# Patient Record
Sex: MALE | Race: WHITE | NOT HISPANIC OR LATINO | Employment: OTHER | URBAN - METROPOLITAN AREA
[De-identification: names, ages, dates, MRNs, and addresses within clinical notes are randomized per-mention and may not be internally consistent; named-entity substitution may affect disease eponyms.]

---

## 2017-03-23 ENCOUNTER — GENERIC CONVERSION - ENCOUNTER (OUTPATIENT)
Dept: OTHER | Facility: OTHER | Age: 62
End: 2017-03-23

## 2017-04-03 ENCOUNTER — HOSPITAL ENCOUNTER (OUTPATIENT)
Dept: RADIOLOGY | Facility: HOSPITAL | Age: 62
Discharge: HOME | End: 2017-04-03
Attending: INTERNAL MEDICINE
Payer: COMMERCIAL

## 2017-04-03 ENCOUNTER — TRANSCRIBE ORDERS (OUTPATIENT)
Dept: ADMINISTRATIVE | Facility: HOSPITAL | Age: 62
End: 2017-04-03

## 2017-04-03 DIAGNOSIS — Z79.899 LONG TERM CURRENT USE OF THERAPEUTIC DRUG: ICD-10-CM

## 2017-04-03 DIAGNOSIS — E78.5 OTHER AND UNSPECIFIED HYPERLIPIDEMIA: ICD-10-CM

## 2017-04-03 DIAGNOSIS — E78.5 OTHER AND UNSPECIFIED HYPERLIPIDEMIA: Primary | ICD-10-CM

## 2017-04-03 PROCEDURE — 71020 HB CHEST X-RAY 2VW FRONTAL&LATL: CPT

## 2017-04-10 ENCOUNTER — ALLSCRIPTS OFFICE VISIT (OUTPATIENT)
Dept: OTHER | Facility: OTHER | Age: 62
End: 2017-04-10

## 2017-04-12 ENCOUNTER — TRANSCRIBE ORDERS (OUTPATIENT)
Dept: ADMINISTRATIVE | Facility: HOSPITAL | Age: 62
End: 2017-04-12

## 2017-04-12 DIAGNOSIS — G47.30 UNSPECIFIED SLEEP APNEA: Primary | ICD-10-CM

## 2017-05-11 ENCOUNTER — HOSPITAL ENCOUNTER (OUTPATIENT)
Dept: SLEEP CENTER | Facility: CLINIC | Age: 62
Discharge: HOME | End: 2017-05-11
Payer: COMMERCIAL

## 2017-05-11 ENCOUNTER — TRANSCRIBE ORDERS (OUTPATIENT)
Dept: SLEEP CENTER | Facility: CLINIC | Age: 62
End: 2017-05-11

## 2017-05-11 DIAGNOSIS — G47.33 OSA (OBSTRUCTIVE SLEEP APNEA): Primary | ICD-10-CM

## 2017-05-11 DIAGNOSIS — G47.30 UNSPECIFIED SLEEP APNEA: ICD-10-CM

## 2017-07-10 ENCOUNTER — ALLSCRIPTS OFFICE VISIT (OUTPATIENT)
Dept: OTHER | Facility: OTHER | Age: 62
End: 2017-07-10

## 2017-07-18 ENCOUNTER — TRANSCRIBE ORDERS (OUTPATIENT)
Dept: SLEEP CENTER | Facility: CLINIC | Age: 62
End: 2017-07-18

## 2017-07-18 ENCOUNTER — HOSPITAL ENCOUNTER (OUTPATIENT)
Dept: SLEEP CENTER | Facility: CLINIC | Age: 62
Discharge: HOME | End: 2017-07-18
Payer: COMMERCIAL

## 2017-07-18 DIAGNOSIS — J44.9 OSA AND COPD OVERLAP SYNDROME (HCC): Primary | ICD-10-CM

## 2017-07-18 DIAGNOSIS — G47.33 OSA AND COPD OVERLAP SYNDROME (HCC): Primary | ICD-10-CM

## 2017-07-18 DIAGNOSIS — G47.33 OSA (OBSTRUCTIVE SLEEP APNEA): ICD-10-CM

## 2017-08-18 ENCOUNTER — GENERIC CONVERSION - ENCOUNTER (OUTPATIENT)
Dept: OTHER | Facility: OTHER | Age: 62
End: 2017-08-18

## 2017-08-25 ENCOUNTER — ALLSCRIPTS OFFICE VISIT (OUTPATIENT)
Dept: OTHER | Facility: OTHER | Age: 62
End: 2017-08-25

## 2017-08-27 ENCOUNTER — GENERIC CONVERSION - ENCOUNTER (OUTPATIENT)
Dept: OTHER | Facility: OTHER | Age: 62
End: 2017-08-27

## 2017-08-28 ENCOUNTER — GENERIC CONVERSION - ENCOUNTER (OUTPATIENT)
Dept: OTHER | Facility: OTHER | Age: 62
End: 2017-08-28

## 2017-09-25 ENCOUNTER — GENERIC CONVERSION - ENCOUNTER (OUTPATIENT)
Dept: OTHER | Facility: OTHER | Age: 62
End: 2017-09-25

## 2017-09-30 ENCOUNTER — LAB CONVERSION - ENCOUNTER (OUTPATIENT)
Dept: OTHER | Facility: OTHER | Age: 62
End: 2017-09-30

## 2017-09-30 LAB
A/G RATIO (HISTORICAL): 1.9 (CALC) (ref 1–2.5)
ALBUMIN SERPL BCP-MCNC: 4.4 G/DL (ref 3.6–5.1)
ALP SERPL-CCNC: 65 U/L (ref 40–115)
ALT SERPL W P-5'-P-CCNC: 51 U/L (ref 9–46)
AST SERPL W P-5'-P-CCNC: 23 U/L (ref 10–35)
BASOPHILS # BLD AUTO: 1 %
BASOPHILS # BLD AUTO: 50 CELLS/UL (ref 0–200)
BILIRUB SERPL-MCNC: 1.3 MG/DL (ref 0.2–1.2)
BUN SERPL-MCNC: 12 MG/DL (ref 7–25)
BUN/CREA RATIO (HISTORICAL): ABNORMAL (CALC) (ref 6–22)
CALCIUM SERPL-MCNC: 9.5 MG/DL (ref 8.6–10.3)
CHLORIDE SERPL-SCNC: 105 MMOL/L (ref 98–110)
CHOLEST SERPL-MCNC: 127 MG/DL
CHOLEST/HDLC SERPL: 3.3 (CALC)
CO2 SERPL-SCNC: 29 MMOL/L (ref 20–31)
CREAT SERPL-MCNC: 1.01 MG/DL (ref 0.7–1.25)
DEPRECATED RDW RBC AUTO: 12.6 % (ref 11–15)
EGFR AFRICAN AMERICAN (HISTORICAL): 92 ML/MIN/1.73M2
EGFR-AMERICAN CALC (HISTORICAL): 79 ML/MIN/1.73M2
EOSINOPHIL # BLD AUTO: 110 CELLS/UL (ref 15–500)
EOSINOPHIL # BLD AUTO: 2.2 %
GAMMA GLOBULIN (HISTORICAL): 2.3 G/DL (CALC) (ref 1.9–3.7)
GLUCOSE (HISTORICAL): 107 MG/DL (ref 65–99)
HBA1C MFR BLD HPLC: 5.6 % OF TOTAL HGB
HCT VFR BLD AUTO: 48.1 % (ref 38.5–50)
HDLC SERPL-MCNC: 38 MG/DL
HGB BLD-MCNC: 16.4 G/DL (ref 13.2–17.1)
LDL CHOLESTEROL (HISTORICAL): 71 MG/DL (CALC)
LYMPHOCYTES # BLD AUTO: 1840 CELLS/UL (ref 850–3900)
LYMPHOCYTES # BLD AUTO: 36.8 %
MCH RBC QN AUTO: 30.9 PG (ref 27–33)
MCHC RBC AUTO-ENTMCNC: 34.1 G/DL (ref 32–36)
MCV RBC AUTO: 90.6 FL (ref 80–100)
MONOCYTES # BLD AUTO: 625 CELLS/UL (ref 200–950)
MONOCYTES (HISTORICAL): 12.5 %
NEUTROPHILS # BLD AUTO: 2375 CELLS/UL (ref 1500–7800)
NEUTROPHILS # BLD AUTO: 47.5 %
NON-HDL-CHOL (CHOL-HDL) (HISTORICAL): 89 MG/DL (CALC)
PLATELET # BLD AUTO: 222 THOUSAND/UL (ref 140–400)
PMV BLD AUTO: 10.1 FL (ref 7.5–12.5)
POTASSIUM SERPL-SCNC: 4.3 MMOL/L (ref 3.5–5.3)
RBC # BLD AUTO: 5.31 MILLION/UL (ref 4.2–5.8)
SODIUM SERPL-SCNC: 141 MMOL/L (ref 135–146)
TOTAL PROTEIN (HISTORICAL): 6.7 G/DL (ref 6.1–8.1)
TRIGL SERPL-MCNC: 92 MG/DL
WBC # BLD AUTO: 5 THOUSAND/UL (ref 3.8–10.8)

## 2017-10-02 ENCOUNTER — GENERIC CONVERSION - ENCOUNTER (OUTPATIENT)
Dept: OTHER | Facility: OTHER | Age: 62
End: 2017-10-02

## 2017-10-10 DIAGNOSIS — R73.01 IMPAIRED FASTING GLUCOSE: ICD-10-CM

## 2017-10-10 DIAGNOSIS — I25.10 ATHEROSCLEROTIC HEART DISEASE OF NATIVE CORONARY ARTERY WITHOUT ANGINA PECTORIS: ICD-10-CM

## 2017-10-10 DIAGNOSIS — I44.0 FIRST DEGREE ATRIOVENTRICULAR BLOCK: ICD-10-CM

## 2017-10-10 DIAGNOSIS — R03.0 ELEVATED BLOOD PRESSURE READING WITHOUT DIAGNOSIS OF HYPERTENSION: ICD-10-CM

## 2017-10-10 DIAGNOSIS — E78.5 HYPERLIPIDEMIA: ICD-10-CM

## 2017-10-17 ENCOUNTER — ALLSCRIPTS OFFICE VISIT (OUTPATIENT)
Dept: OTHER | Facility: OTHER | Age: 62
End: 2017-10-17

## 2017-10-18 NOTE — PROGRESS NOTES
Assessment  1  Need for shingles vaccine (V04 89) (Z23)   2  Coronary artery disease (414 00) (I25 10)   3  Hyperlipidemia (272 4) (E78 5)   4  Impaired fasting glucose (790 21) (R73 01)   5  Special screening for malignant neoplasm of prostate (V76 44) (Z12 5)   6  History of Cholecystectomy Laparoscopic    Plan  Coronary artery disease    · Metoprolol Succinate ER 25 MG Oral Tablet Extended Release 24 Hour   · Metoprolol Succinate ER 25 MG Oral Tablet Extended Release 24 Hour  Coronary artery disease, Hyperlipidemia, Impaired fasting glucose    · (1) CBC/PLT/DIFF; Status:Active; Requested for:04Yge1204;    · (1) COMPREHENSIVE METABOLIC PANEL; Status:Active; Requested for:90Ffk1247;    · (1) HEMOGLOBIN A1C; Status:Active; Requested for:98Aia1674;    · (1) LIPID PANEL, FASTING; Status:Active; Requested for:31Zme2423;   Need for shingles vaccine    · Zostavax 35292 UNT/0 65ML Subcutaneous Solution Reconstituted (Zostavax  60943 UNT/0 65ML Subcutaneous Solution Reconstituted); INJECT 0 65 ML Once  Special screening for malignant neoplasm of prostate    · (1) PSA (SCREEN) (Dx V76 44 Screen for Prostate Cancer); Status:Active; Requested  for:51Xel8735; Discussion/Summary    -status post laparoscopic cholecystectomy and doing well  Follow up with general surgeon as scheduledis significantly improved on received 10 mg once daily  Continue same  Recheck cholesterol profile in 6 months  Goal LDL less than 70 which should hopefully help with reabsorption of plaquing in coronary arterieswill follow up with cardiologist in regards to coronary artery disease  He does take baby aspirin  He is on statin therapy  He declined being on beta blocker  He is asymptomatic with his heart disease  He still is active and exercisesdoes have mildly impaired fasting glucose with normal hemoglobin A1c  Patient will watch his dietary intake of carbohydrates   Repeat glucose and A1c in 6 1-patient will follow up in 6 months with labs physical exam admissiongiven a printed prescription for shingles vaccination  The patient was counseled regarding diagnostic results,-- instructions for management,-- risk factor reductions,-- prognosis,-- impressions,-- risks and benefits of treatment options  Possible side effects of new medications were reviewed with the patient/guardian today  The treatment plan was reviewed with the patient/guardian  The patient/guardian understands and agrees with the treatment plan      Chief Complaint  3 month follow up and lab review  Patient is here today for follow up of chronic conditions described in HPI  History of Present Illness  Patient presents for 3 month follow-up of chronic conditions  Patient has experienced significant improvement in his cholesterol profile since being switched from atorvastatin to rosuvastatin  No side effects from medication  Patient did have consultation with [facility] cardiologist  [place] cardiologist recommended medical management of his coronary artery disease  He was placed on beta-blocker but he spoke with his regular cardiologist and felt that he did not want to take the medication  He is asymptomatic with this heart disease  If he did need PCI he require dual stenting through a stent due to bifurcation and the location at the ostium of the circumflex artery  Patient will be following up with his regular cardiologist  Patient also had robot assisted laparoscopic cholecystectomy performed 5 days ago  He does feel well  He is scheduled to follow up with general surgeon at [facility] this Friday  Labs reviewed which showed normal CBC, impaired fasting glucose of 107, normal hemoglobin A1c of 5 6%, total cholesterol 127, HDL 38, LDL 71 and triglycerides of 92  Review of Systems    Constitutional: No fever or chills, feels well, no tiredness, no recent weight gain or weight loss  Eyes: No complaints of eye pain, no red eyes, no discharge from eyes, no itchy eyes     ENT: no complaints of earache, no hearing loss, no nosebleeds, no nasal discharge, no sore throat, no hoarseness  Cardiovascular: No complaints of slow heart rate, no fast heart rate, no chest pain, no palpitations, no leg claudication, no lower extremity  Respiratory: No complaints of shortness of breath, no wheezing, no cough, no SOB on exertion, no orthopnea or PND  Gastrointestinal: No complaints of abdominal pain, no constipation, no nausea or vomiting, no diarrhea or bloody stools  Genitourinary: No complaints of dysuria, no incontinence, no hesitancy, no nocturia, no genital lesion, no testicular pain  Musculoskeletal: No complaints of arthralgia, no myalgias, no joint swelling or stiffness, no limb pain or swelling  Integumentary: No complaints of skin rash or skin lesions, no itching, no skin wound, no dry skin  Neurological: No compliants of headache, no confusion, no convulsions, no numbness or tingling, no dizziness or fainting, no limb weakness, no difficulty walking  Psychiatric: Is not suicidal, no sleep disturbances, no anxiety or depression, no change in personality, no emotional problems  Endocrine: No complaints of proptosis, no hot flashes, no muscle weakness, no erectile dysfunction, no deepening of the voice, no feelings of weakness  Hematologic/Lymphatic: No complaints of swollen glands, no swollen glands in the neck, does not bleed easily, no easy bruising  Active Problems  1  Abnormal stress test (794 39) (R94 39)   2  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)   3  Chronic back pain (724 5,338 29) (M54 9,G89 29)   4  Coronary artery disease (414 00) (I25 10)   5  Elevated BP without diagnosis of hypertension (796 2) (R03 0)   6  Environmental and seasonal allergies (477 8) (J30 89)   7  External hemorrhoids (455 3) (K64 4)   8  Gastro-esophageal reflux disease without esophagitis (530 81) (K21 9)   9  Hyperlipidemia (272 4) (E78 5)   10   Impaired fasting glucose (790 21) (R73 01)   11  Organic impotence (607 84) (N52 9)   12  Orthostasis (458 0) (I95 1)   13  Sleep apnea (780 57) (G47 30)   14  Special screening for malignant neoplasm of prostate (V76 44) (Z12 5)    Past Medical History  1  History of _   2  History of _   3  History of _   4  History of Abrasion Of Left Foot (917 0)   5  History of Achilles tendinitis, unspecified laterality (726 71) (M76 60)   6  History of Cervicalgia (723 1) (M54 2)   7  History of Cough (786 2) (R05)   8  History of Cough (786 2) (R05)   9  History of Dysfunction of left eustachian tube (381 81) (H69 82)   10  History of acute bronchitis (V12 69) (Z87 09)   11  History of acute bronchitis (V12 69) (Z87 09)   12  History of acute sinusitis (V12 69) (Z87 09)   13  History of allergic rhinitis (V12 69) (Z87 09)   14  History of allergic rhinitis (V12 69) (Z87 09)   15  History of backache (V13 59) (Z87 39)   16  History of backache (V13 59) (Z87 39)   17  History of chest pain (V13 89) (Z87 898)   18  History of epistaxis (V12 69) (Z87 898)   19  History of first degree atrioventricular block (V12 59) (Z86 79)   20  History of gastric ulcer (V12 79) (Z87 19)   21  History of hemorrhoids (V13 89) (Z87 19)   22  History of sleep apnea (V13 89) (Z86 69)   23  History of Long term use of drug (V58 69) (Z79 899)   24  History of Myalgia And Myositis (729 1)   25  History of Need For Prophylactic Antibiotics (V07 8)   26  History of Numbness and tingling of foot (782 0) (R20 2)   27  History of Otalgia, unspecified laterality (388 70) (H92 09)   28  History of Otitis media of left ear (382 9) (H66 92)   29  History of Otitis media, unspecified laterality   30  History of Pain of finger, unspecified laterality (729 5) (M79 646)   31  History of Well adult on routine health check (V70 0) (Z00 00)    The active problems and past medical history were reviewed and updated today  Surgical History  1  History of Cholecystectomy Laparoscopic   2   History of Complete Colonoscopy   3  History of Myringotomy - With Ventilating Tube Insertion    Family History  Mother    1  Family history of Cancer  Maternal Uncle    2  Family history of Prostate Cancer (V16 42)    The family history was reviewed and updated today  Social History   · Alcohol Use (History)   · Denied: History of Drug Use   · Never A Smoker  The social history was reviewed and updated today  The social history was reviewed and is unchanged  Current Meds   1  CoQ10 200 MG Oral Capsule; TAKE AS DIRECTED; Therapy: (Federico Sandoval) to Recorded   2  Fish Oil 1000 MG Oral Capsule Delayed Release; TAKE CAPSULE Daily; Therapy: (Federico Sandoval) to Recorded   3  Flonase SUSP; Therapy: (Federico Sandoval) to Recorded   4  Glucosamine-Chondroitin Oral Capsule; TAKE CAPSULE Daily; Therapy: (Federico Sandoval) to Recorded   5  Omeprazole 20 MG Oral Capsule Delayed Release; TAKE 1 CAPSULE Daily; Therapy: (Federico Morillor) to Recorded   6  Rosuvastatin Calcium 10 MG Oral Tablet; Take 1 tablet daily; Therapy: [id number] to (Last Rx:16Oct2017)  Requested for: 16Oct2017 Ordered   7  ZyrTEC Allergy 10 MG Oral Tablet; take 1 tablet daily as needed Recorded    The medication list was reviewed and updated today  Allergies  1  AUGMENTIN    Vitals  Vital Signs    Recorded: [id number] 07:48AM   Heart Rate 72   Respiration 16   Systolic 479   Diastolic 74   Height 6 ft    Weight 219 lb    BMI Calculated 29 7   BSA Calculated 2 21     Physical Exam    Constitutional   General appearance: No acute distress, well appearing and well nourished  Ears, Nose, Mouth, and Throat   External inspection of ears and nose: Normal     Otoscopic examination: Tympanic membrance translucent with normal light reflex  Canals patent without erythema  Nasal mucosa, septum, and turbinates: Normal without edema or erythema  Oropharynx: Normal with no erythema, edema, exudate or lesions      Pulmonary Respiratory effort: No increased work of breathing or signs of respiratory distress  Auscultation of lungs: Clear to auscultation, equal breath sounds bilaterally, no wheezes, no rales, no rhonci  Cardiovascular   Palpation of heart: Normal PMI, no thrills  Auscultation of heart: Normal rate and rhythm, normal S1 and S2, without murmurs  Examination of extremities for edema and/or varicosities: Normal     Carotid pulses: Normal     Abdomen   Abdomen: Non-tender, no masses  Liver and spleen: No hepatomegaly or splenomegaly  Lymphatic   Palpation of lymph nodes in neck: No lymphadenopathy  Musculoskeletal   Gait and station: Normal     Skin   Skin and subcutaneous tissue: Normal without rashes or lesions      Psychiatric   Orientation to person, place and time: Normal     Mood and affect: Normal          Signatures   Electronically signed by : Chepe Lipscomb DO; Oct 17 2017  8:53AM EST                       (Author)

## 2017-12-14 ENCOUNTER — GENERIC CONVERSION - ENCOUNTER (OUTPATIENT)
Dept: OTHER | Facility: OTHER | Age: 62
End: 2017-12-14

## 2017-12-14 ENCOUNTER — APPOINTMENT (OUTPATIENT)
Dept: RADIOLOGY | Facility: OTHER | Age: 62
End: 2017-12-14
Payer: COMMERCIAL

## 2017-12-14 ENCOUNTER — TRANSCRIBE ORDERS (OUTPATIENT)
Dept: ADMINISTRATIVE | Facility: HOSPITAL | Age: 62
End: 2017-12-14

## 2017-12-14 DIAGNOSIS — M25.562 PAIN IN LEFT KNEE: ICD-10-CM

## 2017-12-14 DIAGNOSIS — S89.92XA INJURY OF LEFT LOWER LEG: ICD-10-CM

## 2017-12-14 DIAGNOSIS — S89.92XA INJURY OF LEFT KNEE, INITIAL ENCOUNTER: Primary | ICD-10-CM

## 2017-12-14 PROCEDURE — 73564 X-RAY EXAM KNEE 4 OR MORE: CPT

## 2017-12-22 ENCOUNTER — HOSPITAL ENCOUNTER (OUTPATIENT)
Dept: RADIOLOGY | Age: 62
Discharge: HOME | End: 2017-12-22
Payer: COMMERCIAL

## 2017-12-22 DIAGNOSIS — M25.562 PAIN IN LEFT KNEE: ICD-10-CM

## 2017-12-22 DIAGNOSIS — S89.92XA INJURY OF LEFT LOWER LEG: ICD-10-CM

## 2017-12-22 PROCEDURE — 73721 MRI JNT OF LWR EXTRE W/O DYE: CPT

## 2017-12-28 ENCOUNTER — GENERIC CONVERSION - ENCOUNTER (OUTPATIENT)
Dept: OTHER | Facility: OTHER | Age: 62
End: 2017-12-28

## 2018-01-09 ENCOUNTER — GENERIC CONVERSION - ENCOUNTER (OUTPATIENT)
Dept: OTHER | Facility: OTHER | Age: 63
End: 2018-01-09

## 2018-01-09 NOTE — RESULT NOTES
Verified Results  (1) LIPID PANEL, FASTING 10Oct2016 08:28AM Shirley Duck     Test Name Result Flag Reference   CHOLESTEROL, TOTAL 170 mg/dL  125-200   HDL CHOLESTEROL 41 mg/dL  > OR = 40   TRIGLICERIDES 612 mg/dL  <150   LDL-CHOLESTEROL 108 mg/dL (calc)  <130   Desirable range <100 mg/dL for patients with CHD or  diabetes and <70 mg/dL for diabetic patients with  known heart disease  CHOL/HDLC RATIO 4 1 (calc)  < OR = 5 0   NON HDL CHOLESTEROL 129 mg/dL (calc)     Target for non-HDL cholesterol is 30 mg/dL higher than   LDL cholesterol target  (1) COMPREHENSIVE METABOLIC PANEL [id number] 72:23IQ Shirley Duck     Test Name Result Flag Reference   GLUCOSE 95 mg/dL  65-99   Fasting reference interval   UREA NITROGEN (BUN) 14 mg/dL  7-25   CREATININE 1 07 mg/dL  0 70-1 25   For patients >52years of age, the reference limit  for Creatinine is approximately 13% higher for people  identified as -American  eGFR NON-AFR   AMERICAN 75 mL/min/1 73m2  > OR = 60   eGFR AFRICAN AMERICAN 86 mL/min/1 73m2  > OR = 60   BUN/CREATININE RATIO   2-80   NOT APPLICABLE (calc)   SODIUM 139 mmol/L  135-146   POTASSIUM 4 3 mmol/L  3 5-5 3   CHLORIDE 103 mmol/L     CARBON DIOXIDE 26 mmol/L  20-31   CALCIUM 9 8 mg/dL  8 6-10 3   PROTEIN, TOTAL 7 1 g/dL  6 1-8 1   ALBUMIN 4 4 g/dL  3 6-5 1   GLOBULIN 2 7 g/dL (calc)  1 9-3 7   ALBUMIN/GLOBULIN RATIO 1 6 (calc)  1 0-2 5   BILIRUBIN, TOTAL 1 6 mg/dL H 0 2-1 2   ALKALINE PHOSPHATASE 58 U/L     AST 20 U/L  10-35   ALT 38 U/L  9-46     (1) CBC/PLT/DIFF 10Oct2016 08:28AM Shirley Duck     Test Name Result Flag Reference   WHITE BLOOD CELL COUNT 6 3 Thousand/uL  3 8-10 8   RED BLOOD CELL COUNT 5 71 Million/uL  4 20-5 80   HEMOGLOBIN 17 4 g/dL H 13 2-17 1   HEMATOCRIT 53 1 % H 38 5-50 0   MCV 93 0 fL  80 0-100 0   MCH 30 5 pg  27 0-33 0   MCHC 32 7 g/dL  32 0-36 0   RDW 14 3 %  11 0-15 0   PLATELET COUNT 126 Thousand/uL  140-400   MPV 8 9 fL  7 5-11 5   ABSOLUTE NEUTROPHILS 3156 cells/uL  2076-6939   ABSOLUTE LYMPHOCYTES 2249 cells/uL  850-3900   ABSOLUTE MONOCYTES 712 cells/uL  200-950   ABSOLUTE EOSINOPHILS 158 cells/uL     ABSOLUTE BASOPHILS 25 cells/uL  0-200   NEUTROPHILS 50 1 %     LYMPHOCYTES 35 7 %     MONOCYTES 11 3 %     EOSINOPHILS 2 5 %     BASOPHILS 0 4 %       (Q) TSH, 3RD GENERATION W/REFLEX TO FT4 10Oct2016 08:28AM Jacekjoy Kin     Test Name Result Flag Reference   TSH W/REFLEX TO FT4 2 08 mIU/L  0 40-4 50     (Q) HEMOGLOBIN A1c 10Oct2016 08:28AM Malu Sanderson   REPORT COMMENT:  FASTING:YES     Test Name Result Flag Reference   HEMOGLOBIN A1c 5 8 % of total Hgb H <5 7   According to ADA guidelines, hemoglobin A1c <7 0%  represents optimal control in non-pregnant diabetic  patients  Different metrics may apply to specific  patient populations  Standards of Medical Care in    Diabetes Care  2013;36:s11-s66     For the purpose of screening for the presence of  diabetes  <5 7%       Consistent with the absence of diabetes  5 7-6 4%    Consistent with increased risk for diabetes              (prediabetes)  >or=6 5%    Consistent with diabetes     This assay result is consistent with an increased risk  of diabetes  Currently, no consensus exists for use of hemoglobin  A1c for diagnosis of diabetes for children  Discussion/Summary   Essentially normal screening labs  Cholesterol is very well controlled on atorvastatin 10 mg once daily  Please continue same  Hemoglobin A1c mildly elevated at 5 8% which indicates impaired fasting glucose  Recommend diet and exercise for weight loss  Please follow through with carotid Doppler and evaluation with next door neighbor, Dr Matt Galdamez

## 2018-01-09 NOTE — RESULT NOTES
Verified Results  (1) CBC/PLT/DIFF [id number] 07:45AM [facility]     Test Name Result Flag Reference   WHITE BLOOD CELL COUNT 5 1 Thousand/uL  3 8-10 8   RED BLOOD CELL COUNT 5 50 Million/uL  4 20-5 80   HEMOGLOBIN 16 5 g/dL  13 2-17 1   HEMATOCRIT 51 5 % H 38 5-50 0   MCV 93 8 fL  80 0-100 0   MCH 30 1 pg  27 0-33 0   MCHC 32 1 g/dL  32 0-36 0   RDW 14 0 %  11 0-15 0   PLATELET COUNT 990 Thousand/uL  140-400   MPV 8 8 fL  7 5-11 5   ABSOLUTE NEUTROPHILS 2504 cells/uL  5193-7903   ABSOLUTE LYMPHOCYTES 1882 cells/uL  850-3900   ABSOLUTE MONOCYTES 602 cells/uL  200-950   ABSOLUTE EOSINOPHILS 82 cells/uL     ABSOLUTE BASOPHILS 31 cells/uL  0-200   NEUTROPHILS 49 1 %     LYMPHOCYTES 36 9 %     MONOCYTES 11 8 %     EOSINOPHILS 1 6 %     BASOPHILS 0 6 %       (1) COMPREHENSIVE METABOLIC PANEL [id number] 97:18VF [facility]     Test Name Result Flag Reference   GLUCOSE 99 mg/dL  65-99   Fasting reference interval   UREA NITROGEN (BUN) 15 mg/dL  7-25   CREATININE 0 99 mg/dL  0 70-1 25   For patients >52years of age, the reference limit  for Creatinine is approximately 13% higher for people  identified as -American  eGFR NON-AFR   AMERICAN 82 mL/min/1 73m2  > OR = 60   eGFR AFRICAN AMERICAN 95 mL/min/1 73m2  > OR = 60   BUN/CREATININE RATIO   2-09   NOT APPLICABLE (calc)   SODIUM 139 mmol/L  135-146   POTASSIUM 4 8 mmol/L  3 5-5 3   CHLORIDE 103 mmol/L     CARBON DIOXIDE 24 mmol/L  19-30   CALCIUM 9 8 mg/dL  8 6-10 3   PROTEIN, TOTAL 7 2 g/dL  6 1-8 1   ALBUMIN 4 6 g/dL  3 6-5 1   GLOBULIN 2 6 g/dL (calc)  1 9-3 7   ALBUMIN/GLOBULIN RATIO 1 8 (calc)  1 0-2 5   BILIRUBIN, TOTAL 1 7 mg/dL H 0 2-1 2   ALKALINE PHOSPHATASE 60 U/L     AST 26 U/L  10-35   ALT 53 U/L H 9-46     (1) LIPID PANEL, FASTING [id number] 07:45AM [facility]     Test Name Result Flag Reference   CHOLESTEROL, TOTAL 155 mg/dL  125-200   HDL CHOLESTEROL 37 mg/dL L > OR = 40   TRIGLICERIDES 575 mg/dL  <150   LDL-CHOLESTEROL 91 mg/dL (calc)  <130   Desirable range <100 mg/dL for patients with CHD or  diabetes and <70 mg/dL for diabetic patients with  known heart disease  CHOL/HDLC RATIO 4 2 (calc)  < OR = 5 0   NON HDL CHOLESTEROL 118 mg/dL (calc)     Target for non-HDL cholesterol is 30 mg/dL higher than   LDL cholesterol target  (1) PSA (SCREEN) (Dx V76 44 Screen for Prostate Cancer) [id number] 07:45AM Day Ceballos     Test Name Result Flag Reference   PSA, TOTAL 1 1 ng/mL  < OR = 4 0   This test was performed using the Siemens  chemiluminescent method  Values obtained from  different assay methods cannot be used  interchangeably  PSA levels, regardless of  value, should not be interpreted as absolute  evidence of the presence or absence of disease       (Q) TSH, 3RD GENERATION W/REFLEX TO FT4 [id number] 07:45AM Day Ceballos   REPORT COMMENT:  FASTING:YES     Test Name Result Flag Reference   TSH W/REFLEX TO FT4 2 14 mIU/L  0 40-4 50

## 2018-01-10 NOTE — CONSULTS
Chief Complaint  New patient visit  History of Present Illness  Cardiology HPI Free Text Note Form [facility]: Issue here for second opinion consultation regarding coronary artery disease  Patient recently underwent cardiac catheterization at [facility] with Dr Torey Walter  His performed after ECG portion of a treadmill nuclear stress test was abnormal     Patient says 10 minutes Ángel protocol apparently had some symptoms that peak exercise  Nuclear images were normal with normal left ventricular ejection fraction  He underwent cardiac catheterization as a result here this revealed ostial circumflex disease and mild to moderate coronary artery disease and the remaining coronary arteries  Her graft since that time, he's been able to do all of his activities of daily living without difficulty including working out  He has had no symptoms  He was switched from atorvastatin to rosuvastatin after his cardiac catheterization recent visit with his family doctor  No follow up lipids were were performed as this which was just made  Review of Systems      Cardiac: as noted in HPI  Skin: No complaints of nonhealing sores or skin rash  Genitourinary: No complaints of recurrent urinary tract infections, frequent urination at night, difficult urination, blood in urine, kidney stones, loss of bladder control, no kidney or prostate problems, no erectile dysfunction  Psychological: No complaints of feeling depressed, anxiety, panic attacks, or difficulty concentrating  General: No complaints of trouble sleeping, lack of energy, fatigue, appetite changes, weight changes, fever, frequent infections, or night sweats  Respiratory: No complaints of shortness of breath, cough with sputum, or wheezing  HEENT: No complaints of serious problems, hearing problems, nose problems, throat problems, or snoring     Gastrointestinal: No complaints of liver problems, nausea, vomiting, heartburn, constipation, bloody stools, diarrhea, problems swallowing, adbominal pain, or rectal bleeding  Hematologic: No complaints of bleeding disorders, anemia, blood clots, or excessive brusing  Neurological: No complaints of numbness, tingling, dizziness, weakness, seizures, headaches, syncope or fainting, AM fatigue, daytime sleepiness, no witnessed apnea episodes  Musculoskeletal: No complaints of arthritis, back pain, or painfull swelling  ROS reviewed  Active Problems    1  Abnormal stress test (794 39) (R94 39)   2  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)   3  Chronic back pain (724 5,338 29) (M54 9,G89 29)   4  Coronary artery disease (414 00) (I25 10)   5  Elevated BP without diagnosis of hypertension (796 2) (R03 0)   6  Environmental and seasonal allergies (477 8) (J30 89)   7  External hemorrhoids (455 3) (K64 4)   8  First degree AV block (426 11) (I44 0)   9  Gastro-esophageal reflux disease without esophagitis (530 81) (K21 9)   10  Hyperlipidemia (272 4) (E78 5)   11  Impaired fasting glucose (790 21) (R73 01)   12  Organic impotence (607 84) (N52 9)   13  Orthostasis (458 0) (I95 1)   14  Sleep apnea (780 57) (G47 30)   15   Special screening for malignant neoplasm of prostate (V76 44) (Z12 5)    Past Medical History    · History of _   · History of _   · History of _   · History of Abrasion Of Left Foot (917 0)   · History of Achilles tendinitis, unspecified laterality (726 71) (M76 60)   · History of Cervicalgia (723 1) (M54 2)   · History of Cough (786 2) (R05)   · History of Cough (786 2) (R05)   · History of Dysfunction of left eustachian tube (381 81) (H69 82)   · History of acute bronchitis (V12 69) (Z87 09)   · History of acute bronchitis (V12 69) (Z87 09)   · History of acute sinusitis (V12 69) (Z87 09)   · History of allergic rhinitis (V12 69) (Z87 09)   · History of allergic rhinitis (V12 69) (Z87 09)   · History of backache (V13 59) (Z87 39)   · History of backache (V13 59) (Z87 39)   · History of chest pain (V13 89) (P94 117)   · History of epistaxis (V12 69) (Z87 898)   · History of gastric ulcer (V12 79) (Z87 19)   · History of hemorrhoids (V13 89) (Z87 19)   · History of sleep apnea (V13 89) (Z86 69)   · History of Long term use of drug (V58 69) (Z79 899)   · History of Myalgia And Myositis (729 1)   · History of Need For Prophylactic Antibiotics (V07 8)   · History of Numbness and tingling of foot (782 0) (R20 2)   · History of Otalgia, unspecified laterality (388 70) (H92 09)   · History of Otitis media of left ear (382 9) (H66 92)   · History of Otitis media, unspecified laterality   · History of Pain of finger, unspecified laterality (729 5) (M79 646)   · History of Well adult on routine health check (V70 0) (Z00 00)    The active problems and past medical history were reviewed and updated today  Surgical History    · History of Complete Colonoscopy   · History of Myringotomy - With Ventilating Tube Insertion    The surgical history was reviewed and updated today  Family History    · Family history of Cancer    · Family history of Prostate Cancer (V16 42)    The family history was reviewed and updated today  Social History    · Alcohol Use (History)   · Socially   · Denied: History of Drug Use   · Never A Smoker  The social history was reviewed and updated today  Current Meds   1  Aspirin 81 MG Oral Tablet Chewable; chew and swallow 1 tablet by mouth once daily; Therapy: [id number] to Recorded   2  CoQ10 200 MG Oral Capsule; TAKE AS DIRECTED; Therapy: (Jo Ayala) to Recorded   3  Fish Oil 1000 MG Oral Capsule Delayed Release; TAKE CAPSULE Daily; Therapy: (Jo Ayala) to Recorded   4  Flonase SUSP; Therapy: (Jo Ayala) to Recorded   5  Glucosamine-Chondroitin Oral Capsule; TAKE CAPSULE Daily; Therapy: (Jo Ayala) to Recorded   6  Omeprazole 20 MG Oral Capsule Delayed Release; TAKE 1 CAPSULE Daily;    Therapy: (Jo Ayala) to Recorded   7  Rosuvastatin Calcium 10 MG Oral Tablet; take 1 tablet by mouth daily; Therapy: 39XDV6959 to (Last Rx:[id number])  Requested for: 21Phg2728 Ordered   8  ZyrTEC Allergy 10 MG Oral Tablet; take 1 tablet daily as needed Recorded    Allergies    1  AUGMENTIN    Vitals  Signs    Heart Rate: 68, ApicalPulse Quality: Normal, ApicalSystolic: 404, RUE, SittingDiastolic: 70, RUE, SittingHeight: 6 ft Weight: 231 lb BMI Calculated: 31 33BSA Calculated: 2 26    Physical Exam    Constitutional   General appearance: No acute distress, well appearing and well nourished  Eyes   Conjunctiva and Sclera examination: Conjunctiva pink, sclera anicteric  Ears, Nose, Mouth, and Throat - Oropharynx: Clear, nares are clear, mucous membranes are moist    Neck   Neck and thyroid: Normal, supple, trachea midline, no thyromegaly  Pulmonary   Respiratory effort: No increased work of breathing or signs of respiratory distress  Auscultation of lungs: Clear to auscultation, no rales, no rhonchi, no wheezing, good air movement  Cardiovascular   Auscultation of heart: Normal rate and rhythm, normal S1 and S2, no murmurs  Carotid pulses: Normal, 2+ bilaterally  Peripheral vascular exam: Normal pulses throughout, no tenderness, erythema or swelling  Pedal pulses: Normal, 2+ bilaterally  Examination of extremities for edema and/or varicosities: Normal     Abdomen   Abdomen: Non-tender and no distention  Liver and spleen: No hepatomegaly or splenomegaly  Musculoskeletal Gait and station: Normal gait  Digits and nails: Normal without clubbing or cyanosis  Inspection/palpation of joints, bones, and muscles: Normal, ROM normal     Skin - Skin and subcutaneous tissue: Normal without rashes or lesions  Skin is warm and well perfused, normal turgor  Neurologic - Cranial nerves: II - XII intact   Speech: Normal     Psychiatric - Orientation to person, place, and time: Normal  Mood and affect: Normal  Results/Data    Rhythm and rate:  normal sinus rhythm  Assessment    1  Coronary artery disease (414 00) (I25 10)   2  Abnormal stress test (794 39) (R94 39)    Plan     Coronary artery disease    · Metoprolol Succinate ER 25 MG Oral Tablet Extended Release 24 Hour; Take 1  tablet daily   Rx By: Rosey Mota; Dispense: 30 Days ; #:30 Tablet Extended Release 24 Hour; Refill: 11; For: Coronary artery disease; BALTA = N; Verified Transmission to [id number] Last Updated By: System, [employer]; 8/25/2017 3:19:19 PM   · Metoprolol Succinate ER 25 MG Oral Tablet Extended Release 24 Hour; TAKE 1  TABLET DAILY   Rx By: Rosey Mota; Dispense: 90 Days ; #:90 Tablet Extended Release 24 Hour; Refill: 3; For: Coronary artery disease; BALTA = N; Verified Transmission to [facility]; Last Updated By: System, [employer]; 8/25/2017 3:18:21 PM   · EKG/ECG- POC; Status:Complete;   Done: 31NXC2582   Perform: In Office; Due:84Ioi5579; Last Updated By:Nikhil Garcia; 8/25/2017 1:59:22 PM;Ordered; For:Coronary artery disease; Ordered By:Adam Arellano;    Discussion/Summary    1  CAD, CCS 1  A  Moderate LCX disease cath 6/2017 following ex nuclear (images normal, ecg portion 0 5mm ST Dep per report)  B  Normal LVEF    2  hyperlipidemia recent change to rosuvastatin      Plan  Reviewed in detail with Mr Elayne Barbosa his angiogram, stress test and talk about his symptoms in detail  This was over 80 minute conversation  Reviewed his angiogram from [facility] in detail  We discussed treatment options for coronary artery disease  This includes medical therapy, bypass surgery and percutaneous revascularization for stable CAD  He is able to do his activities of daily living without any symptoms  Review the angiogram reveals probable moderate ostial circumflex disease, no other appreciable high-grade disease other than mild to moderate coronary artery disease   He had normal images on a nuclear stress test  He had some symptoms at peak exercise at 10 minutes of Ángel protocol but has had none since  With this in mind, we've elected to start metoprolol succinate 25 mg daily  He prefers low dose as worried about potential side effects  These were reviewed  Should he develop symptoms on medical therapy or symptoms that limit his ability to do any prefers to do throughout the course of the day then revascularization can be considered  However, First we want to perform FFR of both the ramus and circumflex as it is not readily apparent how significant this disease is angiographically after intensive review nor did it result in an inferolateral wall defect on stress images (normal images albeit less sensitive in circumflex terriory)  It appears upper moderate after my review  We also discussed the details of revascularization of this should it ever become significant and deemed to be source of symptoms  This includes bifurcaton PCI which is possible albeit at slightly higher risk due to ostial location and bifurcation  It is certainly not prohibitive  While bypass surgery is possible would not consider this first line treatment for single vessel CAD involving the circumflex  This was also discussed  He has follow up with his family doctor and Dr Ivelisse Lackey, cardiology in [place]  We left follow up open ended as a result        Signatures   Electronically signed by : Sharee Green DO; Aug 25 2017  3:56PM EST                       (Author)

## 2018-01-10 NOTE — MISCELLANEOUS
To Whom It May Concern,        Mr Mohini Vegas is presently a patient of my practice  He has a diagnosis of obstructive sleep apnea which is proven on sleep study  He does need to be on continuous positive airway  pressure therapy otherwise known as CPAP  Untreated obstructive sleep apnea is associated with daytime somnolence, elevated blood pressure, pulmonary hypertension, congestive heart failure as well as cardiac dysrhythmia such as atrial fibrillation  It  is my opinion as his physician, as well as the recommendation of most learned participants in the medical community, that with this diagnosis he should be treated with an effective treatment such as CPAP therapy  Should you have any questions in regards  to this common treatment for this common disorder please do not hesitate to contact my office  Thank you,          KATARINA Figueroa        Electronically signed by:Shemar Shepard DO  May 20 2016  1:28PM EST Author

## 2018-01-12 VITALS
RESPIRATION RATE: 16 BRPM | DIASTOLIC BLOOD PRESSURE: 72 MMHG | HEART RATE: 74 BPM | BODY MASS INDEX: 30.75 KG/M2 | HEIGHT: 72 IN | WEIGHT: 227 LBS | SYSTOLIC BLOOD PRESSURE: 126 MMHG

## 2018-01-12 NOTE — PROGRESS NOTES
Assessment    1  Hyperlipidemia (272 4) (E78 5)   2  Impaired fasting glucose (790 21) (R73 01)   3  Special screening for malignant neoplasm of prostate (V76 44) (Z12 5)   4  Encounter for preventive health examination (V70 0) (Z00 00)   5  Gastro-esophageal reflux disease without esophagitis (530 81) (K21 9)   6  First degree AV block (426 11) (I44 0)   7  Abnormal stress test (794 39) (R94 39)   8  Sleep apnea (780 57) (G47 30)   9  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)   10  Elevated BP without diagnosis of hypertension (796 2) (R03 0)    Plan  Environmental and seasonal allergies    · Montelukast Sodium 10 MG Oral Tablet  Gastro-esophageal reflux disease without esophagitis, Hyperlipidemia, Impaired fasting  glucose    · Pantoprazole Sodium 40 MG Oral Tablet Delayed Release; TAKE 1 TABLET DAILY  Health Maintenance, Hyperlipidemia, Impaired fasting glucose, Special screening for  malignant neoplasm of prostate    · (1) CBC/PLT/DIFF; Status:Complete;   Done: 57FSK8813   · (1) COMPREHENSIVE METABOLIC PANEL; Status:Complete;   Done: 43ARB0883   · (1) LIPID PANEL, FASTING; Status:Complete;   Done: 22NPV9899   · (1) PSA (SCREEN) (Dx V76 44 Screen for Prostate Cancer); Status:Complete;   Done:  78MHU5551  PMH: Need For Prophylactic Antibiotics    · Mefloquine HCl - 250 MG Oral Tablet  Sleep apnea    · 2 - Kane SLATER, Phyllis Gold (Pulmonary Medicine) Co-Management  *  Status: Hold For -  Scheduling  Requested for: 83Jcr4538  Care Summary provided  : Yes    Discussion/Summary  Impression: health maintenance visit, healthy adult male  Currently, he eats an adequate diet and has an adequate exercise regimen  Prostate cancer screening: prostate cancer screening is current  Testicular cancer screening: clinical testicular exam was done today  Colorectal cancer screening: colorectal cancer screening is current and colonoscopy is needed every five years   Advice and education were given regarding nutrition, aerobic exercise and weight loss      - Annual screening examination performed  -Hyperlipidemia is well controlled on atorvastatin 10 mg once daily  continue same dosage  Repeat lipid profile in 6 months  - GERD is no longer controlled on omeprazole  Switch to pantoprazole 40 mg once daily  He will need repeat EGD in the fall  -Patient's blood pressure is significantly elevated  He declines going on medications at this time  He did have normal carotid Dopplers performed  He will undergo cardiac catheter on the first with his cardiologist  I would like to see him back in 3 months  Counseled on diet and exercise for weight loss  - Referral to pulmonology for reevaluation in regards to repeating a titration sleep study or giving a different machine for obstructive sleep apnea  Chief Complaint  Preventative visit  History of Present Illness  HM, Adult Male: The patient is being seen for a health maintenance evaluation  General Health: The patient's health since the last visit is described as good  He has regular dental visits  He denies vision problems  He denies hearing loss  Immunizations status: not up to date  Lifestyle:  He consumes a diverse and healthy diet  He does not have any weight concerns  He exercises regularly  He does not use tobacco  He denies alcohol use  Screening: cancer screening reviewed and current  metabolic screening reviewed and current  risk screening reviewed and current  HPI: Patient presents for annual physical examination  Patient does complain that omeprazole is no longer working to control his GERD  In the past he was on Nexium  Nexium was not covered by his insurance  He does have history of Last's esophagus  Last EGD was October 2015  He will be due in October 2017  Patient did have abnormal stress test performed by Dr Tish Bray which showed first-degree AV block following stress test  Patient also had symptoms of right-sided chest pain   He will be going for cardiac catheterization on May 1  Labs reviewed which shows very well-controlled cholesterol  Mildly impaired fasting glucose at 102, normal TSH, normal PSA  Patient also states that he feels as though his C Pap machine is not working  Last time he had a sleep study with titration was 6 years ago by Dr Yashira Stacy  Patient's blood pressure has been elevated recently  He has been making dietary adjustments including reducing his intake of caffeine      Review of Systems    Constitutional: No fever or chills, feels well, no tiredness, no recent weight gain or weight loss  Eyes: No complaints of eye pain, no red eyes, no discharge from eyes, no itchy eyes  ENT: no complaints of earache, no hearing loss, no nosebleeds, no nasal discharge, no sore throat, no hoarseness  Cardiovascular: No complaints of slow heart rate, no fast heart rate, no chest pain, no palpitations, no leg claudication, no lower extremity  Respiratory: No complaints of shortness of breath, no wheezing, no cough, no SOB on exertion, no orthopnea or PND  Gastrointestinal: GERD, but as noted in HPI, no constipation and no blood in stools  Genitourinary: Erectile dysfunction, but as noted in HPI, no urinary hesitancy, no incontinence and no nocturia  Musculoskeletal: No complaints of arthralgia, no myalgias, no joint swelling or stiffness, no limb pain or swelling  Integumentary: No complaints of skin rash or skin lesions, no itching, no skin wound, no dry skin  Neurological: No compliants of headache, no confusion, no convulsions, no numbness or tingling, no dizziness or fainting, no limb weakness, no difficulty walking  Psychiatric: Is not suicidal, no sleep disturbances, no anxiety or depression, no change in personality, no emotional problems  Endocrine: No complaints of proptosis, no hot flashes, no muscle weakness, no erectile dysfunction, no deepening of the voice, no feelings of weakness     Hematologic/Lymphatic: No complaints of swollen glands, no swollen glands in the neck, does not bleed easily, no easy bruising  Active Problems    1  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)   2  Chronic back pain (724 5,338 29) (M54 9,G89 29)   3  Environmental and seasonal allergies (477 8) (J30 89)   4  External hemorrhoids (455 3) (K64 4)   5  Gastro-esophageal reflux disease without esophagitis (530 81) (K21 9)   6  Hyperlipidemia (272 4) (E78 5)   7  Impaired fasting glucose (790 21) (R73 01)   8  Organic impotence (607 84) (N52 9)   9  Orthostasis (458 0) (I95 1)   10  Sleep apnea (780 57) (G47 30)   11   Special screening for malignant neoplasm of prostate (V76 44) (Z12 5)    Past Medical History    · History of _   · History of _   · History of _   · History of Abrasion Of Left Foot (917 0)   · History of Achilles tendinitis, unspecified laterality (726 71) (M76 60)   · History of Cervicalgia (723 1) (M54 2)   · History of Cough (786 2) (R05)   · History of Cough (786 2) (R05)   · History of Dysfunction of left eustachian tube (381 81) (H69 82)   · History of acute bronchitis (V12 69) (Z87 09)   · History of acute bronchitis (V12 69) (Z87 09)   · History of acute sinusitis (V12 69) (Z87 09)   · History of allergic rhinitis (V12 69) (Z87 09)   · History of allergic rhinitis (V12 69) (Z87 09)   · History of backache (V13 59) (Z87 39)   · History of backache (V13 59) (Z87 39)   · History of chest pain (V13 89) (T89 466)   · History of epistaxis (V12 69) (B18 240)   · History of gastric ulcer (V12 79) (Z87 19)   · History of hemorrhoids (V13 89) (Z87 19)   · History of sleep apnea (V13 89) (Z86 69)   · History of Long term use of drug (V58 69) (Z79 899)   · History of Myalgia And Myositis (729 1)   · History of Need For Prophylactic Antibiotics (V07 8)   · History of Numbness and tingling of foot (782 0) (R20 2)   · History of Otalgia, unspecified laterality (388 70) (H92 09)   · History of Otitis media of left ear (382 9) (H66 92)   · History of Otitis media, unspecified laterality   · History of Pain of finger, unspecified laterality (729 5) (M38 048)   · History of Well adult on routine health check (V70 0) (Z00 00)    Surgical History    · History of Complete Colonoscopy   · History of Myringotomy - With Ventilating Tube Insertion    Family History  Mother    · Family history of Cancer  Maternal Uncle    · Family history of Prostate Cancer (V16 42)    Social History    · Alcohol Use (History)   · Socially   · Denied: History of Drug Use   · Never A Smoker    Current Meds   1  Atorvastatin Calcium 10 MG Oral Tablet; TAKE 1 TABLET DAILY IN THE EVENING; Therapy: 36PNK4507 to (Last Rx:06Apr2017)  Requested for: 06Apr2017 Ordered   2  Cialis 20 MG Oral Tablet; 1 po pre episode and not to use more than one per 48 hours; Therapy: 48XNL5202 to (Last Rx:11Mar2016) Ordered   3  Mefloquine HCl - 250 MG Oral Tablet; TAKE 1 TABLET WEEKLY (ON THE SAME DAY OF   EACH WEEK) BEGINNING 1 WEEK BEFORE DEPARTURE, CONTINUE DURING   TRAVEL AND FOR 4 WEEKS AFTER RETURN;   Therapy: 90ZTN6421 to (Evaluate:26Eeb3200)  Requested for: 10HMQ6553; Last   Rx:51Pqd8642 Ordered   4  Montelukast Sodium 10 MG Oral Tablet; 1 every day; Therapy: 41WVK3947 to (Last Rx:95Ksg3472) Ordered   5  ZyrTEC Allergy 10 MG Oral Tablet; take 1 tablet daily as needed Recorded    Allergies    1  AUGMENTIN    Vitals   Recorded: 38Acy9006 08:00AM   Heart Rate 84   Respiration 16   Systolic 149   Diastolic 70   Height 6 ft    Weight 227 lb    BMI Calculated 30 79   BSA Calculated 2 25     Physical Exam    Constitutional   General appearance: No acute distress, well appearing and well nourished  Eyes   Conjunctiva and lids: No erythema, swelling or discharge  Pupils and irises: Equal, round, reactive to light  Ophthalmoscopic examination: Normal fundi and optic discs  Ears, Nose, Mouth, and Throat   External inspection of ears and nose: Normal     Otoscopic examination: Abnormal   myringotomy and left ear   Unable to discern if it is still in place or simply embedded in the earwax  Hearing: Normal     Nasal mucosa, septum, and turbinates: Normal without edema or erythema  Lips, teeth, and gums: Normal, good dentition  Oropharynx: Normal with no erythema, edema, exudate or lesions  Neck   Neck: Supple, symmetric, trachea midline, no masses  Thyroid: Normal, no thyromegaly  Pulmonary   Respiratory effort: No increased work of breathing or signs of respiratory distress  Percussion of chest: Normal     Palpation of chest: Normal     Auscultation of lungs: Clear to auscultation  Cardiovascular   Palpation of heart: Normal PMI, no thrills  Auscultation of heart: Normal rate and rhythm, normal S1 and S2, no murmurs  Carotid pulses: 2+ bilaterally  Abdominal aorta: Normal     Femoral pulses: 2+ bilaterally  Pedal pulses: 2+ bilaterally  Examination of extremities for edema and/or varicosities: Normal     Chest   Breasts: Normal, no dimpling or skin changes appreciated  Palpation of breasts and axillae: Normal, no masses palpated  Chest: Normal     Abdomen   Abdomen: Non-tender, no masses  Liver and spleen: No hepatomegaly or splenomegaly  Examination for hernias: No hernias appreciated  Lymphatic   Palpation of lymph nodes in neck: No lymphadenopathy  Palpation of lymph nodes in axillae: No lymphadenopathy  Palpation of lymph nodes in groin: No lymphadenopathy  Palpation of lymph nodes in other areas: No lymphadenopathy  Musculoskeletal   Gait and station: Normal     Inspection/palpation of digits and nails: Normal without clubbing or cyanosis  Inspection/palpation of joints, bones, and muscles: Normal     Range of motion: Normal     Stability: Normal     Muscle strength/tone: Normal     Skin   Skin and subcutaneous tissue: Normal without rashes or lesions  Palpation of skin and subcutaneous tissue: Normal turgor      Neurologic   Cranial nerves: Cranial nerves 2-12 intact  Reflexes: 2+ and symmetric  Sensation: No sensory loss  Psychiatric   Judgment and insight: Normal     Orientation to person, place and time: Normal     Recent and remote memory: Intact  Mood and affect: Normal        Procedure    Procedure:   Results: 20/20 in the right eye with corrective device, 20/20 in the left eye with corrective device      Signatures   Electronically signed by : El Gray DO;  Apr 10 2017  8:45AM EST                       (Author)

## 2018-01-12 NOTE — PROGRESS NOTES
Assessment    1  Hyperlipidemia (272 4) (E78 5)   2  Organic impotence (607 84) (N52 9)   3  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)   4  Encounter for preventive health examination (V70 0) (Z00 00)   5  Gastro-Esophageal Reflux Disease Without Esophagitis (530 81) (K21 9)   6  External hemorrhoids (455 3) (K64 4)    Plan  External hemorrhoids    · 3 - Swetha Mckeon  (Gastroenterology) Physician Referral  Consult  Status: Hold For  - Scheduling  Requested for: 59ABB1934  are Referring to a non- Preferred Provider : Established Patient  Care Summary provided  : Yes  Hyperlipidemia    · (1) COMPREHENSIVE METABOLIC PANEL; Status:Active; Requested for:89Opk7609;    · (1) LIPID PANEL, FASTING; Status:Active; Requested for:68Izh3210;   Organic impotence    · Cialis 20 MG Oral Tablet; 1 po pre episode and not to use more than one per 48  hours    Discussion/Summary  Impression: health maintenance visit, healthy adult male  Currently, he eats an adequate diet and has an adequate exercise regimen  Prostate cancer screening: prostate cancer screening is current  Testicular cancer screening: clinical testicular exam was done today  Colorectal cancer screening: colorectal cancer screening is current and colonoscopy is needed every five years  - Annual screening examination performed  -Hyperlipidemia is well controlled on atorvastatin 10 mg once daily  Patient's continue same dosage  Repeat lipid profile in 6 months  -Patient does have a large external hemorrhoid  Referral back to gastroenterologist for treatment  -GERD is well controlled on omeprazole 40mg daily  Patient is scheduled for routine follow-up with gastroenterologist for repeat EGD  His Last's esophagus had resolved on last EGD  -Patient given a prescription for Cialis for use on a when necessary basis for erectile dysfunction  Patient will try this   If it is effective then he can request a three-month supply to be sent to his mail-order pharmacy  - Follow-up in 6 months with labs  Chief Complaint  Preventative visit  History of Present Illness  HM, Adult Male: The patient is being seen for a health maintenance evaluation  General Health: The patient's health since the last visit is described as good  He has regular dental visits  He denies vision problems  He denies hearing loss  Immunizations status: up to date  Lifestyle:  He consumes a diverse and healthy diet  He does not have any weight concerns  He exercises regularly  He does not use tobacco  He denies alcohol use  He denies drug use  Screening: cancer screening reviewed and current  metabolic screening reviewed and current  risk screening reviewed and current  HPI: Patient presents for annual physical examination  Patient does complain of difficulty with maintaining an erection  He is able to attain an erection but unable to maintain an erection until climax  Patient also complains of hemorrhoidal itching and occasional bleeding  Patient did have treatment for hemorrhoid in 2014 by Dr Renetta Macedo  He does have a history of GERD, prior history of Last's esophagus  Last EGD was performed in October 2015 which showed resolution of his Last's esophagus  Hyperlipidemia is well controlled on atorvastatin 10 mg once daily which was reduced from 20 mg  Remainder of screening labs were essentially normal  Patient does have intermittent elevation of his bilirubin associated with fasting labs which is consistent with Gilbert's syndrome  Review of Systems    Constitutional: No fever or chills, feels well, no tiredness, no recent weight gain or weight loss  Eyes: No complaints of eye pain, no red eyes, no discharge from eyes, no itchy eyes  ENT: no complaints of earache, no hearing loss, no nosebleeds, no nasal discharge, no sore throat, no hoarseness     Cardiovascular: No complaints of slow heart rate, no fast heart rate, no chest pain, no palpitations, no leg claudication, no lower extremity  Respiratory: No complaints of shortness of breath, no wheezing, no cough, no SOB on exertion, no orthopnea or PND  Gastrointestinal: hemorrhoid, but as noted in HPI, no constipation and no blood in stools  Genitourinary: Erectile dysfunction, but as noted in HPI, no urinary hesitancy, no incontinence and no nocturia  Musculoskeletal: No complaints of arthralgia, no myalgias, no joint swelling or stiffness, no limb pain or swelling  Integumentary: No complaints of skin rash or skin lesions, no itching, no skin wound, no dry skin  Neurological: No compliants of headache, no confusion, no convulsions, no numbness or tingling, no dizziness or fainting, no limb weakness, no difficulty walking  Psychiatric: Is not suicidal, no sleep disturbances, no anxiety or depression, no change in personality, no emotional problems  Endocrine: No complaints of proptosis, no hot flashes, no muscle weakness, no erectile dysfunction, no deepening of the voice, no feelings of weakness  Hematologic/Lymphatic: No complaints of swollen glands, no swollen glands in the neck, does not bleed easily, no easy bruising  Active Problems    1  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)   2  Chronic back pain (724 5,338 29) (M54 9,G89 29)   3  Environmental and seasonal allergies (477 8) (J30 89)   4  Gastro-Esophageal Reflux Disease Without Esophagitis (530 81) (K21 9)   5  Hyperlipidemia (272 4) (E78 5)   6  Impaired fasting glucose (790 21) (R73 01)   7  Organic impotence (607 84) (N52 9)   8  Sleep apnea (780 57) (G47 30)   9   Special screening for malignant neoplasm of prostate (V76 44) (Z12 5)    Past Medical History    · History of _   · History of _   · History of _   · History of Abrasion Of Left Foot (917 0)   · History of Achilles tendinitis, unspecified laterality (726 71) (M76 60)   · History of Cervicalgia (723 1) (M54 2)   · History of Cough (786 2) (R05)   · History of Cough (786 2) (R05)   · History of Dysfunction of left Eustachian tube (381 81) (H69 82)   · History of acute bronchitis (V12 69) (Z87 09)   · History of acute bronchitis (V12 69) (Z87 09)   · History of acute sinusitis (V12 69) (Z87 09)   · History of allergic rhinitis (V12 69) (Z87 09)   · History of allergic rhinitis (V12 69) (Z87 09)   · History of backache (V13 59) (Z87 39)   · History of backache (V13 59) (Z87 39)   · History of chest pain (V13 89) (H14 449)   · History of epistaxis (V12 69) (O41 336)   · History of gastric ulcer (V12 79) (Z87 19)   · History of hemorrhoids (V13 89) (Z87 19)   · History of sleep apnea (V13 89) (Z87 09)   · History of Long term use of drug (V58 69) (Z79 899)   · History of Myalgia And Myositis (729 1)   · History of Need For Prophylactic Antibiotics (V07 8)   · History of Numbness and tingling of foot (782 0) (R20 2)   · History of Otalgia, unspecified laterality (388 70) (H92 09)   · History of Otitis media of left ear (382 9) (H66 92)   · History of Otitis media, unspecified laterality   · History of Pain of finger, unspecified laterality (729 5) (M79 646)   · History of Well adult on routine health check (V70 0) (Z00 00)    Surgical History    · History of Complete Colonoscopy   · History of Myringotomy - With Ventilating Tube Insertion    Family History    · Family history of Cancer    · Family history of Prostate Cancer (V16 42)    Social History    · Alcohol Use (History)   · Socially   · Denied: History of Drug Use   · Never A Smoker    Current Meds   1  Atorvastatin Calcium 10 MG Oral Tablet; TAKE 1 TABLET DAILY IN THE EVENING; Therapy: 41BDF3136 to (Last Rx:93Lwq4334)  Requested for: 28Ebv3886 Ordered   2  Montelukast Sodium 10 MG Oral Tablet; 1 every day; Therapy: 56QIL4071 to (Last Rx:56Zjh7875) Ordered   3  Omeprazole 40 MG Oral Capsule Delayed Release; take 1 capsule daily; Therapy: 70QIA6239 to (Last Rx:48Kph4936)  Requested for: 63Bgp3797 Ordered   4   ZyrTEC Allergy 10 MG Oral Tablet; take 1 tablet daily as needed Recorded    Allergies    1  AUGMENTIN    Vitals   Recorded: 60ZZB1982 08:34AM   Heart Rate 80   Respiration 16   Systolic 801   Diastolic 62   Height 6 ft    Weight 226 lb    BMI Calculated 30 65   BSA Calculated 2 24     Physical Exam    Constitutional   General appearance: No acute distress, well appearing and well nourished  Eyes   Conjunctiva and lids: No erythema, swelling or discharge  Pupils and irises: Equal, round, reactive to light  Ophthalmoscopic examination: Normal fundi and optic discs  Ears, Nose, Mouth, and Throat   External inspection of ears and nose: Normal     Otoscopic examination: Abnormal   myringotomy and left ear  Unable to discern if it is still in place or simply embedded in the earwax  Hearing: Normal     Nasal mucosa, septum, and turbinates: Normal without edema or erythema  Lips, teeth, and gums: Normal, good dentition  Oropharynx: Normal with no erythema, edema, exudate or lesions  Neck   Neck: Supple, symmetric, trachea midline, no masses  Thyroid: Normal, no thyromegaly  Pulmonary   Respiratory effort: No increased work of breathing or signs of respiratory distress  Percussion of chest: Normal     Palpation of chest: Normal     Auscultation of lungs: Clear to auscultation  Cardiovascular   Palpation of heart: Normal PMI, no thrills  Auscultation of heart: Normal rate and rhythm, normal S1 and S2, no murmurs  Carotid pulses: 2+ bilaterally  Abdominal aorta: Normal     Femoral pulses: 2+ bilaterally  Pedal pulses: 2+ bilaterally  Examination of extremities for edema and/or varicosities: Normal     Chest   Breasts: Normal, no dimpling or skin changes appreciated  Palpation of breasts and axillae: Normal, no masses palpated  Chest: Normal     Abdomen   Abdomen: Non-tender, no masses  Liver and spleen: No hepatomegaly or splenomegaly  Examination for hernias: No hernias appreciated      Anus, perineum, and rectum: Abnormal   Internal hemorrhoid(s) were not present  Nontender, non-swollen and non-thrombosed external hemorrhoid(s) were present  The sphincter tone was normal  Large, nonthrombosed hemorrhoid at the 3 o'clock position  Stool sample for occult blood: Negative  Genitourinary   Scrotal contents: Normal testes, no masses  Penis: Normal, no lesions  Digital rectal exam of prostate: Normal size, no masses  Lymphatic   Palpation of lymph nodes in neck: No lymphadenopathy  Palpation of lymph nodes in axillae: No lymphadenopathy  Palpation of lymph nodes in groin: No lymphadenopathy  Palpation of lymph nodes in other areas: No lymphadenopathy  Musculoskeletal   Gait and station: Normal     Inspection/palpation of digits and nails: Normal without clubbing or cyanosis  Inspection/palpation of joints, bones, and muscles: Normal     Range of motion: Normal     Stability: Normal     Muscle strength/tone: Normal     Skin   Skin and subcutaneous tissue: Normal without rashes or lesions  Palpation of skin and subcutaneous tissue: Normal turgor  Neurologic   Cranial nerves: Cranial nerves 2-12 intact  Reflexes: 2+ and symmetric  Sensation: No sensory loss  Psychiatric   Judgment and insight: Normal     Orientation to person, place and time: Normal     Recent and remote memory: Intact      Mood and affect: Normal        Procedure    Procedure:   Results: 20/20 in the right eye with corrective device, 20/20 in the left eye with corrective device      Future Appointments    Date/Time Provider Specialty Site   09/16/2016 08:00 AM Phong Waite DO Family Medicine [facility]     Signatures   Electronically signed by : El Gray DO; Mar 11 2016  1:00PM EST                       (Author)

## 2018-01-13 VITALS
BODY MASS INDEX: 30.75 KG/M2 | SYSTOLIC BLOOD PRESSURE: 164 MMHG | WEIGHT: 227 LBS | DIASTOLIC BLOOD PRESSURE: 70 MMHG | HEART RATE: 84 BPM | HEIGHT: 72 IN | RESPIRATION RATE: 16 BRPM

## 2018-01-13 VITALS
BODY MASS INDEX: 29.66 KG/M2 | RESPIRATION RATE: 16 BRPM | DIASTOLIC BLOOD PRESSURE: 74 MMHG | WEIGHT: 219 LBS | HEIGHT: 72 IN | HEART RATE: 72 BPM | SYSTOLIC BLOOD PRESSURE: 132 MMHG

## 2018-01-14 VITALS
BODY MASS INDEX: 31.29 KG/M2 | DIASTOLIC BLOOD PRESSURE: 70 MMHG | HEIGHT: 72 IN | WEIGHT: 231 LBS | HEART RATE: 68 BPM | SYSTOLIC BLOOD PRESSURE: 138 MMHG

## 2018-01-15 NOTE — RESULT NOTES
Verified Results  VAS CAROTID COMPLETE STUDY 21Oct2016 07:55AM Vicky Medina     Test Name Result Flag Reference   VAS CAROTID COMPLETE STUDY (Report)     [facility] REPORT   CLINICAL:   Indications: Other abnormalities of gait and mobility [R26 89]  last 3 to 4   months lightheadedness with postural changes only lasts a few seconds denies any   other symptoms   Clinical   Right Pressure: 126/68 mm Hg, Left Pressure: 146/70 mm Hg  FINDINGS:      Right    Impression PSV EDV (cm/s) Ratio    Dist  ICA         40     14  0 36    Mid  ICA         46     12  0 42    Prox  ICA  1 - 49%   74     22  0 67    Dist CCA         100     15        Mid CCA         110     24  1 00    Prox CCA         110     17        Ext Carotid       131     14  1 18    Prox Vert         58     13        Subclavian        81     11           Left     Impression PSV EDV (cm/s) Ratio    Dist  ICA         69     22  0 76    Mid  ICA         59     20  0 64    Prox  ICA  1 - 49%   68     19  0 74    Dist CCA         78     19        Mid CCA          91     19  1 14    Prox CCA         80     16        Ext Carotid        82     15  0 90             CONCLUSION:      Impression   RIGHT:   There is <50% stenosis noted in the internal carotid artery  Plaque is   homogenous and smooth  Vertebral artery flow is antegrade  There is no significant subclavian artery   disease  LEFT:   There is <50% stenosis noted in the internal carotid artery  Plaque is   homogenous and smooth  Vertebral artery flow is antegrade  There is no significant subclavian artery   disease  No previous study for comparison      Internal carotid artery stenosis determination by consensus criteria from:   Edi Calle et al  Carotid Artery Stenosis: Gray-Scale and Doppler US Diagnosis   - Society of Radiologists in [address] [place] [address], Radiology 2003;   [phone]        SIGNATURE:   Electronically Signed by: Jae Saeed on 2016-10-21 03:58:23 PM       Discussion/Summary   Normal carotid Dopplers  No carotid cause of dizziness  Please follow-up with cardiologist, Dr Douglas Primer

## 2018-01-18 NOTE — RESULT NOTES
Verified Results  (1) CBC/PLT/DIFF [id number] 07:56AM [place]     Test Name Result Flag Reference   WHITE BLOOD CELL COUNT 5 0 Thousand/uL  3 8-10 8   RED BLOOD CELL COUNT 5 31 Million/uL  4 20-5 80   HEMOGLOBIN 16 4 g/dL  13 2-17 1   HEMATOCRIT 48 1 %  38 5-50 0   MCV 90 6 fL  80 0-100 0   MCH 30 9 pg  27 0-33 0   MCHC 34 1 g/dL  32 0-36 0   RDW 12 6 %  11 0-15 0   PLATELET COUNT 713 Thousand/uL  140-400   ABSOLUTE NEUTROPHILS 2375 cells/uL  4797-8010   ABSOLUTE LYMPHOCYTES 1840 cells/uL  850-3900   ABSOLUTE MONOCYTES 625 cells/uL  200-950   ABSOLUTE EOSINOPHILS 110 cells/uL     ABSOLUTE BASOPHILS 50 cells/uL  0-200   NEUTROPHILS 47 5 %     LYMPHOCYTES 36 8 %     MONOCYTES 12 5 %     EOSINOPHILS 2 2 %     BASOPHILS 1 0 %     MPV 10 1 fL  7 5-12 5     (1) COMPREHENSIVE METABOLIC PANEL [id number] 67:05NX [place]     Test Name Result Flag Reference   GLUCOSE 107 mg/dL H 65-99   Fasting reference interval     For someone without known diabetes, a glucose value  between 100 and 125 mg/dL is consistent with  prediabetes and should be confirmed with a  follow-up test    UREA NITROGEN (BUN) 12 mg/dL  7-25   CREATININE 1 01 mg/dL  0 70-1 25   For patients >52years of age, the reference limit  for Creatinine is approximately 13% higher for people  identified as -American  eGFR NON-AFR   AMERICAN 79 mL/min/1 73m2  > OR = 60   eGFR AFRICAN AMERICAN 92 mL/min/1 73m2  > OR = 60   BUN/CREATININE RATIO   1-69   NOT APPLICABLE (calc)   SODIUM 141 mmol/L  135-146   POTASSIUM 4 3 mmol/L  3 5-5 3   CHLORIDE 105 mmol/L     CARBON DIOXIDE 29 mmol/L  20-31   CALCIUM 9 5 mg/dL  8 6-10 3   PROTEIN, TOTAL 6 7 g/dL  6 1-8 1   ALBUMIN 4 4 g/dL  3 6-5 1   GLOBULIN 2 3 g/dL (calc)  1 9-3 7   ALBUMIN/GLOBULIN RATIO 1 9 (calc)  1 0-2 5   BILIRUBIN, TOTAL 1 3 mg/dL H 0 2-1 2   ALKALINE PHOSPHATASE 65 U/L     AST 23 U/L  10-35   ALT 51 U/L H 9-46     (1) LIPID PANEL, FASTING [id number] 07:56AM [facility] Test Name Result Flag Reference   CHOLESTEROL, TOTAL 127 mg/dL  <200   HDL CHOLESTEROL 38 mg/dL L >91   TRIGLICERIDES 92 mg/dL  <349   LDL-CHOLESTEROL 71 mg/dL (calc)     Reference range: <100     Desirable range <100 mg/dL for patients with CHD or  diabetes and <70 mg/dL for diabetic patients with  known heart disease  LDL-C is now calculated using the Malcolm-Montenegro   calculation, which is a validated novel method providing   better accuracy than the Friedewald equation in the   estimation of LDL-C  Arvind Romero al  [facility]  4052;046(10): 1235-4085   (http://[employer]/faq/QBR469)   CHOL/HDLC RATIO 3 3 (calc)  <5 0   NON HDL CHOLESTEROL 89 mg/dL (calc)  <130   For patients with diabetes plus 1 major ASCVD risk   factor, treating to a non-HDL-C goal of <100 mg/dL   (LDL-C of <70 mg/dL) is considered a therapeutic   option  (Q) HEMOGLOBIN A1c [id number] 07:56AM Lela Gerard   REPORT COMMENT:  FASTING:YES     Test Name Result Flag Reference   HEMOGLOBIN A1c 5 6 % of total Hgb  <5 7   For the purpose of screening for the presence of  diabetes:     <5 7%       Consistent with the absence of diabetes  5 7-6 4%    Consistent with increased risk for diabetes              (prediabetes)  > or =6 5%  Consistent with diabetes     This assay result is consistent with a decreased risk  of diabetes  Currently, no consensus exists regarding use of  hemoglobin A1c for diagnosis of diabetes in children  According to American Diabetes Association (ADA)  guidelines, hemoglobin A1c <7 0% represents optimal  control in non-pregnant diabetic patients  Different  metrics may apply to specific patient populations  Standards of Medical Care in Diabetes(ADA)

## 2018-01-23 NOTE — RESULT NOTES
Verified Results  * MRI KNEE LEFT  WO CONTRAST [id number] 08:23AM Mayra Sathya Order Number: [id number]   Performing Comments: Mickey 16-year-old male status post left knee injury with persistent medial femoral condyle and medial joint line pain with associated positive Alfredo's  Please evaluate for medial femoral condyle occult fracture versus bone    contusion and medial meniscal tear  - Patient Instructions: Scheduled at [facility] [address]   12/22/2017 @@ 8:30am   Please arrive 15 minutes early, No metal, No jewerly     Test Name Result Flag Reference   MRI KNEE LEFT  WO CONTRAST (Report)     MRI LEFT KNEE     INDICATION: Injury of left lower leg  [place] [place] Mickey 16-year-old male status post left knee injury with persistent medial femoral condyle and medial joint line pain with associated positive Alfredo's  Please evaluate for medial femoral condyle occult    fracture versus bone contusion and medial meniscal tear     COMPARISON: Left knee plain films from 12/14/2017  TECHNIQUE:  MR sequences were obtained of the left knee including: Localizer, axial T2 fat sat, coronal T1/T2 fat sat, sagittal PD/T2 fat sat  Images were acquired on a 1 5 Yoly unit  Gadolinium was not used  FINDINGS:     SUBCUTANEOUS TISSUES: Normal     JOINT EFFUSION: None  BAKER'S CYST: None  MENISCI: Intact  CRUCIATE LIGAMENTS: Intact  EXTENSOR APPARATUS: Intact  COLLATERAL LIGAMENTS: Intact  ARTICULAR SURFACES: Moderate medial tibiofemoral compartment osteoarthritis with near full-thickness to full-thickness cartilage loss over significant portions of the medial femoral condyle and medial tibial plateau  There is also mild patellofemoral compartment osteoarthritis with grade I chondromalacia over the medial patellar facet  BONE MARROW: Normal      MUSCULATURE: Intact  IMPRESSION:     There is no evidence of acute traumatic injury to the left knee       There is moderate medial tibiofemoral compartment and mild patellofemoral compartment osteoarthritis  Workstation performed: [id number]     Signed by:   China Anaya MD   12/22/17     * XR KNEE 4+ VW LEFT INJURY [id number] 11:53AM [facility] Order Number: [id number]   Performing Comments: room 5     Test Name Result Flag Reference   XR KNEE 4+ VW LEFT (Report)     LEFT KNEE     INDICATION: Medial left knee pain, twisted knee     COMPARISON: None  VIEWS: 4     IMAGES: 4     FINDINGS:     There is no acute fracture or dislocation  There is no joint effusion  Mild patellofemoral osteoarthritis seen     No lytic or blastic lesions are seen  Soft tissues are unremarkable  IMPRESSION:     No acute displaced fracture seen   Mild patellofemoral osteoarthritis seen         Workstation performed: [id number]     Signed by:   Ina Thomas MD   12/19/17

## 2018-01-24 VITALS
WEIGHT: 220.02 LBS | SYSTOLIC BLOOD PRESSURE: 124 MMHG | DIASTOLIC BLOOD PRESSURE: 79 MMHG | HEIGHT: 72 IN | HEART RATE: 71 BPM | BODY MASS INDEX: 29.8 KG/M2

## 2018-01-24 VITALS
HEART RATE: 71 BPM | WEIGHT: 230 LBS | SYSTOLIC BLOOD PRESSURE: 133 MMHG | DIASTOLIC BLOOD PRESSURE: 76 MMHG | HEIGHT: 72 IN | BODY MASS INDEX: 31.15 KG/M2

## 2018-02-28 DIAGNOSIS — K21.9 GASTROESOPHAGEAL REFLUX DISEASE WITHOUT ESOPHAGITIS: ICD-10-CM

## 2018-02-28 DIAGNOSIS — E78.2 MIXED HYPERLIPIDEMIA: Primary | ICD-10-CM

## 2018-02-28 RX ORDER — OMEPRAZOLE 40 MG/1
CAPSULE, DELAYED RELEASE ORAL
Qty: 90 CAPSULE | Refills: 1 | Status: SHIPPED | OUTPATIENT
Start: 2018-02-28 | End: 2018-10-26 | Stop reason: SDUPTHER

## 2018-02-28 RX ORDER — ROSUVASTATIN CALCIUM 10 MG/1
TABLET, COATED ORAL
Qty: 90 TABLET | Refills: 1 | Status: SHIPPED | OUTPATIENT
Start: 2018-02-28 | End: 2018-08-16 | Stop reason: SDUPTHER

## 2018-03-26 ENCOUNTER — HOSPITAL ENCOUNTER (EMERGENCY)
Facility: HOSPITAL | Age: 63
Discharge: HOME | End: 2018-03-26
Attending: EMERGENCY MEDICINE
Payer: COMMERCIAL

## 2018-03-26 ENCOUNTER — TELEPHONE (OUTPATIENT)
Dept: FAMILY MEDICINE CLINIC | Facility: CLINIC | Age: 63
End: 2018-03-26

## 2018-03-26 ENCOUNTER — OFFICE VISIT (OUTPATIENT)
Dept: FAMILY MEDICINE CLINIC | Facility: CLINIC | Age: 63
End: 2018-03-26
Payer: COMMERCIAL

## 2018-03-26 VITALS
SYSTOLIC BLOOD PRESSURE: 125 MMHG | OXYGEN SATURATION: 92 % | DIASTOLIC BLOOD PRESSURE: 70 MMHG | HEART RATE: 72 BPM | RESPIRATION RATE: 18 BRPM

## 2018-03-26 VITALS
RESPIRATION RATE: 16 BRPM | WEIGHT: 224 LBS | SYSTOLIC BLOOD PRESSURE: 130 MMHG | HEART RATE: 73 BPM | OXYGEN SATURATION: 98 % | BODY MASS INDEX: 30.34 KG/M2 | HEIGHT: 72 IN | DIASTOLIC BLOOD PRESSURE: 70 MMHG | TEMPERATURE: 98.7 F

## 2018-03-26 DIAGNOSIS — H66.91 OTITIS OF RIGHT EAR: ICD-10-CM

## 2018-03-26 DIAGNOSIS — H66.90 EAR INFECTION: ICD-10-CM

## 2018-03-26 DIAGNOSIS — H73.011 BULLOUS MYRINGITIS OF RIGHT EAR: ICD-10-CM

## 2018-03-26 DIAGNOSIS — H60.501 ACUTE OTITIS EXTERNA OF RIGHT EAR, UNSPECIFIED TYPE: Primary | ICD-10-CM

## 2018-03-26 DIAGNOSIS — T78.40XA ALLERGIC REACTION, INITIAL ENCOUNTER: Primary | ICD-10-CM

## 2018-03-26 PROBLEM — T14.8XXA CONTUSION OF BONE: Status: ACTIVE | Noted: 2017-12-28

## 2018-03-26 PROBLEM — R03.0 ELEVATED BP WITHOUT DIAGNOSIS OF HYPERTENSION: Status: ACTIVE | Noted: 2017-04-10

## 2018-03-26 PROBLEM — M17.12 PRIMARY OSTEOARTHRITIS OF LEFT KNEE: Status: ACTIVE | Noted: 2017-12-28

## 2018-03-26 PROBLEM — I25.10 CORONARY ARTERY DISEASE: Status: ACTIVE | Noted: 2017-07-10

## 2018-03-26 PROBLEM — R94.39 ABNORMAL STRESS TEST: Status: ACTIVE | Noted: 2017-04-10

## 2018-03-26 PROBLEM — M25.562 LEFT KNEE PAIN: Status: ACTIVE | Noted: 2017-12-14

## 2018-03-26 PROCEDURE — 96375 TX/PRO/DX INJ NEW DRUG ADDON: CPT

## 2018-03-26 PROCEDURE — 99213 OFFICE O/P EST LOW 20 MIN: CPT | Performed by: PHYSICIAN ASSISTANT

## 2018-03-26 PROCEDURE — 99283 EMERGENCY DEPT VISIT LOW MDM: CPT

## 2018-03-26 PROCEDURE — 96374 THER/PROPH/DIAG INJ IV PUSH: CPT

## 2018-03-26 PROCEDURE — 96361 HYDRATE IV INFUSION ADD-ON: CPT

## 2018-03-26 RX ORDER — AZITHROMYCIN 250 MG/1
250 TABLET, FILM COATED ORAL DAILY
Qty: 5 TABLET | Refills: 0 | Status: SHIPPED | OUTPATIENT
Start: 2018-03-26 | End: 2018-03-31

## 2018-03-26 RX ORDER — PREDNISONE 20 MG/1
40 TABLET ORAL DAILY
Qty: 15 TABLET | Refills: 0 | Status: SHIPPED | OUTPATIENT
Start: 2018-03-26 | End: 2018-03-31

## 2018-03-26 RX ORDER — CIPROFLOXACIN AND DEXAMETHASONE 3; 1 MG/ML; MG/ML
4 SUSPENSION/ DROPS AURICULAR (OTIC) 2 TIMES DAILY
Qty: 7.5 ML | Refills: 0 | Status: SHIPPED | OUTPATIENT
Start: 2018-03-26 | End: 2018-04-24

## 2018-03-26 RX ORDER — DIPHENHYDRAMINE HCL 25 MG
25 TABLET ORAL EVERY 8 HOURS PRN
Qty: 8 TABLET | Refills: 0 | Status: SHIPPED | OUTPATIENT
Start: 2018-03-26 | End: 2018-04-24

## 2018-03-26 RX ORDER — METHYLPREDNISOLONE SODIUM SUCCINATE 125 MG/2ML
125 INJECTION, POWDER, LYOPHILIZED, FOR SOLUTION INTRAMUSCULAR; INTRAVENOUS ONCE
Status: COMPLETED | OUTPATIENT
Start: 2018-03-26 | End: 2018-03-26

## 2018-03-26 RX ORDER — AZITHROMYCIN 250 MG/1
500 TABLET, FILM COATED ORAL ONCE
Status: COMPLETED | OUTPATIENT
Start: 2018-03-26 | End: 2018-03-26

## 2018-03-26 RX ORDER — AMOXICILLIN 500 MG/1
1000 CAPSULE ORAL EVERY 8 HOURS SCHEDULED
Qty: 30 CAPSULE | Refills: 0 | Status: SHIPPED | OUTPATIENT
Start: 2018-03-26 | End: 2018-03-31

## 2018-03-26 RX ORDER — FAMOTIDINE 20 MG/1
20 TABLET, FILM COATED ORAL 2 TIMES DAILY
Qty: 6 TABLET | Refills: 0 | Status: SHIPPED | OUTPATIENT
Start: 2018-03-26 | End: 2018-04-24

## 2018-03-26 RX ORDER — FLUTICASONE PROPIONATE 50 MCG
2 SPRAY, SUSPENSION (ML) NASAL DAILY
COMMUNITY
End: 2020-10-12 | Stop reason: CLARIF

## 2018-03-26 RX ORDER — CETIRIZINE HYDROCHLORIDE 10 MG/1
1 TABLET ORAL DAILY PRN
COMMUNITY
End: 2018-03-26 | Stop reason: ALTCHOICE

## 2018-03-26 RX ORDER — OMEGA-3 FATTY ACIDS CAP DELAYED RELEASE 1000 MG 1000 MG
CAPSULE DELAYED RELEASE ORAL DAILY
COMMUNITY

## 2018-03-26 RX ORDER — AMPICILLIN TRIHYDRATE 250 MG
1 CAPSULE ORAL DAILY
COMMUNITY

## 2018-03-26 RX ADMIN — SODIUM CHLORIDE 1000 ML: 0.9 INJECTION, SOLUTION INTRAVENOUS at 12:10

## 2018-03-26 RX ADMIN — METHYLPREDNISOLONE SODIUM SUCCINATE 125 MG: 125 INJECTION, POWDER, FOR SOLUTION INTRAMUSCULAR; INTRAVENOUS at 12:02

## 2018-03-26 RX ADMIN — FAMOTIDINE 20 MG: 10 INJECTION, SOLUTION INTRAVENOUS at 12:02

## 2018-03-26 RX ADMIN — AZITHROMYCIN 500 MG: 250 TABLET, FILM COATED ORAL at 12:59

## 2018-03-26 NOTE — PROGRESS NOTES
Assessment/Plan:  Acute Otitis Media and externa  Treating with Amoxicillin and Cipro otic drops     Diagnoses and all orders for this visit:    Acute otitis externa of right ear, unspecified type  -     ciprofloxacin-dexamethasone (CIPRODEX) otic suspension; Administer 4 drops to the right ear 2 (two) times a day  - Directed to avoid use of hearing aids on affected side  Otitis of right ear  -     amoxicillin (AMOXIL) 500 mg capsule; Take 2 capsules (1,000 mg total) by mouth every 8 (eight) hours for 5 days  - Directed Pt to return if symptoms persist despite antibiotics, unremitting headache, ear drainage or fever over 101  Subjective:    Patient ID: Cecille Moreno is a 60 y o  male  Pt is presenting today for significant right ear pain, outer ear tenderness and right sided jaw pain for 9 days  He returned 7 days ago from a trip to [place] where he swam often  The pain started in his right ear and has become constant  He has been using ibuprofen around the clock for pain management  He normally wears hearing aids  He was diagnosed and treated with acute otitis externa in January and was successfully treated with antibiotic ear drops  He says started similarly but is worse and he feels it is deeper now  Earache    There is pain in the right ear  The problem occurs constantly  There has been no fever  The pain is at a severity of 1/10  Associated symptoms include hearing loss (at baseline) and rhinorrhea  Pertinent negatives include no coughing, diarrhea, ear discharge, neck pain, sore throat or vomiting  He has tried NSAIDs (Flonase) for the symptoms  The following portions of the patient's history were reviewed and updated as appropriate: allergies, current medications, past medical history, past social history, past surgical history and problem list     Review of Systems   Constitutional: Negative for activity change, chills, fatigue, fever and unexpected weight change     HENT: Positive for ear pain, hearing loss (at baseline) and rhinorrhea  Negative for congestion, ear discharge, mouth sores, sinus pain, sinus pressure, sore throat and voice change  Respiratory: Negative for cough, shortness of breath and wheezing  Cardiovascular: Negative for chest pain, palpitations and leg swelling  Gastrointestinal: Negative for constipation, diarrhea, nausea and vomiting  Musculoskeletal: Negative for back pain, neck pain and neck stiffness  Objective:  /70   Pulse 73   Temp 98 7 °F (37 1 °C)   Resp 16   Ht 6' (1 829 m)   Wt 102 kg (224 lb)   SpO2 98%   BMI 30 38 kg/m²      Physical Exam   Constitutional: He appears well-developed and well-nourished  HENT:   Head: Normocephalic and atraumatic  Right Ear: There is swelling and tenderness  No drainage  Tympanic membrane is injected and erythematous  Tympanic membrane is not perforated  Decreased hearing is noted  Left Ear: Tympanic membrane, external ear and ear canal normal  No drainage  Tympanic membrane is not injected and not bulging  Decreased hearing is noted  Nose: Nose normal  No rhinorrhea  Mouth/Throat: Oropharynx is clear and moist  No oropharyngeal exudate  TM tube on left   Cardiovascular: Normal rate, regular rhythm and normal heart sounds  Exam reveals no gallop and no friction rub  No murmur heard  Pulmonary/Chest: Effort normal and breath sounds normal  He has no wheezes  He has no rales  Lymphadenopathy:        Head (right side): No submental, no submandibular, no tonsillar, no preauricular and no posterior auricular adenopathy present  Head (left side): No submental, no submandibular, no tonsillar, no preauricular and no posterior auricular adenopathy present  He has no cervical adenopathy

## 2018-03-26 NOTE — DISCHARGE INSTRUCTIONS
Antibiotic Medication Allergy   WHAT YOU NEED TO KNOW:   An antibiotic medication allergy is a harmful reaction to an antibiotic  The reaction can start soon after you take the medicine, or days or weeks after you stop  Healthcare providers cannot know ahead of time if you will have an allergic reaction  Your immune system may become sensitive to the antibiotic the first time you take it  You may have an allergic reaction the next time  The antibiotics most likely to cause an allergic reaction are penicillins and cephalosporins  DISCHARGE INSTRUCTIONS:   Call 911 for signs or symptoms of anaphylaxis,  such as trouble breathing, swelling in your mouth or throat, or wheezing  You may also have itching, a rash, hives, or feel like you are going to faint  Return to the emergency department if:   · You have a rash with itchy, swollen, red spots  · You have blisters, or your skin is peeling  · You have trouble swallowing or your voice sounds hoarse  · You have a fast or pounding heartbeat  · Your skin or the whites of your eyes turn yellow  Contact your healthcare provider if:   · You think you are having an allergic reaction  Contact your healthcare provider before you take another dose of your antibiotic  · You have a rash  · You have a fever  · You have a sore throat or swollen glands  You will feel hard lumps when you touch your throat if your glands are swollen  · Your skin itches and becomes red when you are in the sunlight  · You have questions or concerns about your condition, allergy, or care  Medicines:   · Epinephrine  is used to treat severe allergic reactions such as anaphylaxis  · Antihistamines  decrease mild symptoms such as itching or a rash  · Steroids  reduce inflammation  · Take your medicine as directed  Contact your healthcare provider if you think your medicine is not helping or if you have side effects   Tell him of her if you are allergic to any medicine  Keep a list of the medicines, vitamins, and herbs you take  Include the amounts, and when and why you take them  Bring the list or the pill bottles to follow-up visits  Carry your medicine list with you in case of an emergency  Follow up with your healthcare provider as directed:  Ask if you need to avoid other medicines you may be allergic to  Write down your questions so you remember to ask them during your visits  Steps to take for signs or symptoms of anaphylaxis:   · Immediately  give 1 shot of epinephrine only into the outer thigh muscle  · Leave the shot in place  as directed  Your healthcare provider may recommend you leave it in place for up to 10 seconds before you remove it  This helps make sure all of the epinephrine is delivered  · Call 911 and go to the emergency department,  even if the shot improved symptoms  Do not drive yourself  Bring the used epinephrine shot with you  Safety precautions to take if you are at risk for anaphylaxis:   · Keep 2 shots of epinephrine with you at all times  You may need a second shot, because epinephrine only works for about 20 minutes and symptoms may return  Your healthcare provider can show you and family members how to give the shot  Check the expiration date every month and replace it before it expires  · Create an action plan  Your healthcare provider can help you create a written plan that explains the allergy and an emergency plan to treat a reaction  The plan explains when to give a second epinephrine shot if symptoms return or do not improve after the first  Give copies of the action plan and emergency instructions to family members, work and school staff, and  providers  Show them how to give a shot of epinephrine  · Be careful when you exercise  If you have had exercise-induced anaphylaxis, do not exercise right after you eat  Stop exercising right away if you start to develop any signs or symptoms of anaphylaxis   You may first feel tired, warm, or have itchy skin  Hives, swelling, and severe breathing problems may develop if you continue to exercise  · Carry medical alert identification  Wear medical alert jewelry or carry a card that says you have an antibiotic medicine allergy  Healthcare providers need to know that they should not give you this antibiotic  Ask your healthcare provider where to get these items  · Read medicine labels before you use any medicine  Do not take the medicine if it contains the antibiotic that you are allergic to  This includes topical medicines that you put on your skin  Ask a pharmacist if you are not sure  · Tell all healthcare providers about your allergy  Always tell your healthcare providers the names of medicines that you are allergic to and the symptoms of your allergic reactions  · Ask if you need to avoid other medicines  You may be allergic to other medicines if you had an allergic reaction to an antibiotic  Make sure you know the names of other medicines that you should not take  © 2017 [employer]  Information is for End User's use only and may not be sold, redistributed or otherwise used for commercial purposes  All illustrations and images included in CareNotes® are the copyrighted property of [employer]  or Yovani Gonzalez  The above information is an  only  It is not intended as medical advice for individual conditions or treatments  Talk to your doctor, nurse or pharmacist before following any medical regimen to see if it is safe and effective for you  Otitis Externa   WHAT YOU NEED TO KNOW:   Otitis externa, or swimmer's ear, is an infection in the outer ear canal  This canal goes from the outside of the ear to the eardrum  DISCHARGE INSTRUCTIONS:   Return to the emergency department if:   · You have severe ear pain  · You are suddenly unable to hear at all      · You have new swelling in your face, behind your ears, or in your neck  · You suddenly cannot move part of your face  · Your face suddenly feels numb  Contact your healthcare provider if:   · You have a fever  · Your signs and symptoms do not get better after 2 days of treatment  · Your signs and symptoms go away for a time, but then come back  · You have questions or concerns about your condition or care  Medicines:   · NSAIDs , such as ibuprofen, help decrease swelling, pain, and fever  This medicine is available with or without a doctor's order  NSAIDs can cause stomach bleeding or kidney problems in certain people  If you take blood thinner medicine, always ask if NSAIDs are safe for you  Always read the medicine label and follow directions  Do not give these medicines to children under 10months of age without direction from your child's healthcare provider  · Acetaminophen  decreases pain and fever  It is available without a doctor's order  Ask how much to take and how often to take it  Follow directions  Acetaminophen can cause liver damage if not taken correctly  · Ear drops  that contain an antibiotic may be given  The antibiotic helps treat a bacterial infection  You may also be given steroid medicine  The steroid helps decrease redness, swelling, and pain  · Take your medicine as directed  Contact your healthcare provider if you think your medicine is not helping or if you have side effects  Tell him or her if you are allergic to any medicine  Keep a list of the medicines, vitamins, and herbs you take  Include the amounts, and when and why you take them  Bring the list or the pill bottles to follow-up visits  Carry your medicine list with you in case of an emergency  Follow up with your healthcare provider as directed:  Write down your questions so you remember to ask them during your visits  How to use eardrops:   · Lie down on your side with your infected ear facing up      · Carefully drip the correct number of eardrops into your ear  Have another person help you if possible  · Gently move the outside part of your ear back and forth to help the medicine reach your ear canal      · Stay lying down in the same position (with your ear facing up) for 3 to 5 minutes  Prevent otitis externa:   · Do not put cotton swabs or foreign objects in your ears  · Wrap a clean moist washcloth around your finger, and use it to clean your outer ear and remove extra ear wax  · Use ear plugs when you swim  Dry your outer ears completely after you swim or bathe  © 2017 [facility] Information is for End User's use only and may not be sold, redistributed or otherwise used for commercial purposes  All illustrations and images included in CareNotes® are the copyrighted property of [employer]  or Yovani Gonzalez  The above information is an  only  It is not intended as medical advice for individual conditions or treatments  Talk to your doctor, nurse or pharmacist before following any medical regimen to see if it is safe and effective for you

## 2018-03-26 NOTE — TELEPHONE ENCOUNTER
PT WAS SEEN THIS AM AND PRESCRIBED AMOXIL  PT CALLED AND STATES HE TOOK THE 2 AND NOW IS HAVING EXTREME ITCHING ALL OVER AND LIGHTHEADEDNESS  HE DID SOUND A LITTLE SOB ON PHONE BUT HE DENIED HAVING ANY  HE IS WITH HIS WIFE    I TOLD HIM TO GO TO NEAREST ER OR CALL 911, PT AGREED TO DO THIS

## 2018-03-26 NOTE — ED PROVIDER NOTES
History  Chief Complaint   Patient presents with    Allergic Reaction     Patient states he took 2 amoxicillin this morning and shortly after began to feel dizzy, lightheaded, SOB and was reporting pain in his chest     HPI    This is a 69-year-old male presenting for evaluation allergic reaction  Patient states she took amoxicillin this morning which is prescribed after a right acute otitis media  Patient has a known allergy to Augmentin  Patient was recently in [facility], scuba diving, has had right ear pain for past 1 week  Given Ciprodex as well as amoxicillin  He is coming in with redness of his skin, denies any shortness of breath, chest pain, dysphagia, or tongue swelling  He denies any nausea vomiting abdominal pain  No other complaints at this time  Prior to Admission Medications   Prescriptions Last Dose Informant Patient Reported? Taking?    Coenzyme Q10 (COQ10) 200 MG CAPS   Yes No   Sig: Take 1 tablet by mouth daily   Glucosamine-Chondroitin (GLUCOSAMINE CHONDR COMPLEX PO)   Yes No   Sig: Take 1 tablet by mouth daily   Omega-3 Fatty Acids (FISH OIL) 1000 MG CPDR   Yes No   Sig: Take by mouth daily   Zn-Pyg Afri-Nettle-Saw Palmet (SAW PALMETTO COMPLEX PO)  Self Yes No   Sig: Take 3 capsules by mouth daily   amoxicillin (AMOXIL) 500 mg capsule   No No   Sig: Take 2 capsules (1,000 mg total) by mouth every 8 (eight) hours for 5 days   ciprofloxacin-dexamethasone (CIPRODEX) otic suspension   No No   Sig: Administer 4 drops to the right ear 2 (two) times a day   fluticasone (FLONASE) 50 mcg/act nasal spray   Yes No   Si sprays into each nostril daily   omeprazole (PriLOSEC) 40 MG capsule   No No   Sig: TAKE 1 CAPSULE DAILY   rosuvastatin (CRESTOR) 10 MG tablet   No No   Sig: TAKE 1 TABLET DAILY      Facility-Administered Medications: None       Past Medical History:   Diagnosis Date    GERD (gastroesophageal reflux disease)        Past Surgical History:   Procedure Laterality Date    CARDIAC CATHETERIZATION  2017    CHOLECYSTECTOMY         History reviewed  No pertinent family history  I have reviewed and agree with the history as documented  Social History   Substance Use Topics    Smoking status: Former Smoker    Smokeless tobacco: Never Used      Comment: QUIT OVER 27 YEARS AGO    Alcohol use Yes      Comment: social        Review of Systems   Constitutional: Negative for chills, fatigue and fever  HENT: Negative for sore throat  Eyes: Negative for redness and visual disturbance  Respiratory: Negative for shortness of breath  Cardiovascular: Negative for chest pain  Gastrointestinal: Negative for abdominal pain and diarrhea  Endocrine: Negative for polyuria  Genitourinary: Negative for difficulty urinating  Skin: Positive for color change  Negative for rash  Psychiatric/Behavioral: Negative for dysphoric mood  All other systems reviewed and are negative  Physical Exam  ED Triage Vitals [03/26/18 1147]   Temp Pulse Respirations Blood Pressure SpO2   -- 72 18 125/70 92 %      Temp src Heart Rate Source Patient Position - Orthostatic VS BP Location FiO2 (%)   -- Monitor Sitting Left arm --      Pain Score       --           Orthostatic Vital Signs  Vitals:    03/26/18 1147   BP: 125/70   Pulse: 72   Patient Position - Orthostatic VS: Sitting       Physical Exam   Constitutional: He appears well-developed and well-nourished  HENT:   Head: Normocephalic and atraumatic  Oropharynx is clear, no tongue swelling noted  Right tympanic membrane noted to have erythema as well as vesicles in the ear canal      Eyes: Conjunctivae are normal    Neck: Normal range of motion  Cardiovascular: Normal rate, regular rhythm and normal heart sounds  Pulmonary/Chest: Effort normal and breath sounds normal  No respiratory distress  He has no wheezes  He exhibits no tenderness  Abdominal: Soft  Bowel sounds are normal    Musculoskeletal: Normal range of motion     Neurological: He is alert  No cranial nerve deficit or sensory deficit  He exhibits normal muscle tone  Coordination normal    Skin: Skin is warm and dry  No rash noted  Patient has diffuse erythema throughout his head and arms  No urticaria or hives noted  Psychiatric: He has a normal mood and affect  Nursing note and vitals reviewed  ED Medications  Medications   methylPREDNISolone sodium succinate (Solu-MEDROL) injection 125 mg (125 mg Intravenous Given 3/26/18 1202)   famotidine (PEPCID) injection 20 mg (20 mg Intravenous Given 3/26/18 1202)   sodium chloride 0 9 % bolus 1,000 mL (0 mL Intravenous Stopped 3/26/18 1303)   azithromycin (ZITHROMAX) tablet 500 mg (500 mg Oral Given 3/26/18 1259)       Diagnostic Studies  Results Reviewed     None                 No orders to display         Procedures  Procedures      Phone Consults  ED Phone Contact    ED Course  ED Course            MDM  CritCare Time    This is a 54-year-old male presenting for evaluation of allergic reaction with  At this time, the allergic reaction involves only 1 system, the skin  Not true anaphylaxis  Will hold off on epinephrine, will give a Pepcid and Solu-Medrol  patient already took 50 of Benadryl prior to arrival   Will observe  On re-evaluation, skin erythema has resolved  Patient will be discharged with H1 H2 blocker and prednisone burst   Regarding patient's right ear infection, patient was advised to no longer take his amoxicillin, he will continue with Ciprodex as well as azithromycin  The patient was instructed to follow up as documented  Strict return precautions were discussed with the patient and the patient was instructed to return to the emergency department immediately if symptoms worsen  The patient/patient family member acknowledged and were in agreement with plan  Disposition  Final diagnoses:    Allergic reaction, initial encounter   Ear infection   Bullous myringitis of right ear     Time reflects when diagnosis was documented in both MDM as applicable and the Disposition within this note     Time User Action Codes Description Comment    3/26/2018 12:45 PM Rudi, Kika Solomon Le Allergic reaction, initial encounter     3/26/2018 12:45 PM Margaretann Seip Add [H66 [id number]] Ear infection     3/26/2018 12:45 PM Margaretann Seip Add [Y67 [id number]] Bullous myringitis of right ear       ED Disposition     ED Disposition Condition Comment    Discharge  Preston Dominguez discharge to home/self care      Condition at discharge: Good        Follow-up Information     Follow up With Specialties Details Why Contact Info Additional Information    Alexa Olivia,  Family Medicine Schedule an appointment as soon as possible for a visit in 3 days For follow up regarding your symptoms [address]  [place] [address] [address] [place] [address] Emergency Department Emergency Medicine Go to If symptoms worsen [address] [facility], [facility], [place], [place], [address]        Discharge Medication List as of 3/26/2018 12:52 PM      START taking these medications    Details   azithromycin (ZITHROMAX) 250 mg tablet Take 1 tablet (250 mg total) by mouth daily for 5 days Take 1 every day until finished , Starting Mon 3/26/2018, Until Sat 3/31/2018, Normal      diphenhydrAMINE (BENADRYL) 25 mg tablet Take 1 tablet (25 mg total) by mouth every 8 (eight) hours as needed for itching for up to 3 days, Starting Mon 3/26/2018, Until Thu 3/29/2018, Normal      famotidine (PEPCID) 20 mg tablet Take 1 tablet (20 mg total) by mouth 2 (two) times a day for 3 days, Starting Mon 3/26/2018, Until Thu 3/29/2018, Normal      predniSONE 20 mg tablet Take 2 tablets (40 mg total) by mouth daily for 5 days, Starting Mon 3/26/2018, Until Sat 3/31/2018, Normal         CONTINUE these medications which have NOT CHANGED    Details   amoxicillin (AMOXIL) 500 mg capsule Take 2 capsules (1,000 mg total) by mouth every 8 (eight) hours for 5 days, Starting Mon 3/26/2018, Until Sat 3/31/2018, Normal      ciprofloxacin-dexamethasone (CIPRODEX) otic suspension Administer 4 drops to the right ear 2 (two) times a day, Starting Mon 3/26/2018, Normal      Coenzyme Q10 (COQ10) 200 MG CAPS Take 1 tablet by mouth daily, Historical Med      fluticasone (FLONASE) 50 mcg/act nasal spray 2 sprays into each nostril daily, Historical Med      Glucosamine-Chondroitin (GLUCOSAMINE CHONDR COMPLEX PO) Take 1 tablet by mouth daily, Historical Med      Omega-3 Fatty Acids (FISH OIL) 1000 MG CPDR Take by mouth daily, Historical Med      omeprazole (PriLOSEC) 40 MG capsule TAKE 1 CAPSULE DAILY, Normal      rosuvastatin (CRESTOR) 10 MG tablet TAKE 1 TABLET DAILY, Normal      Zn-Pyg Afri-Nettle-Saw Palmet (SAW PALMETTO COMPLEX PO) Take 3 capsules by mouth daily, Historical Med           No discharge procedures on file  ED Provider  Attending physically available and evaluated Alberta Duong I managed the patient along with the ED Attending      Electronically Signed by         Vinnie Maddox MD  03/26/18 0173

## 2018-03-26 NOTE — ED ATTENDING ATTESTATION
Chirag Hamilton DO, saw and evaluated the patient  I have discussed the patient with the resident/non-physician practitioner and agree with the resident's/non-physician practitioner's findings, Plan of Care, and MDM as documented in the resident's/non-physician practitioner's note, except where noted  All available labs and Radiology studies were reviewed  At this point I agree with the current assessment done in the Emergency Department  I have conducted an independent evaluation of this patient a history and physical is as follows:    61 yo male presents for evaluation of lightheadedness, dyspnea, chest pain which started after taking amoxicillin this AM which was prescribed for an apparent R AOM  Pt has a known allergy to augmentin  Denies abd pain or cramping, n/v, rash  No other c/o at this time  POP unremarkable  No swelling of tongue, lips or oropharynx  No stridor  Lungs CTAB  Skin warm, dry no rashes  Imp: multiple c/o possibly related to amoxicillin  Plan: antihistamines, reassess          Critical Care Time  CritCare Time    Procedures

## 2018-03-26 NOTE — ED RE-EVALUATION NOTE
R EAC edematous, no erythema or bleeding  TM with hemorrhagic myringitis  Pt is using ciprodex  Will change rx to azithromycin PO  F/u ENT       Shalini Hamilton, DO  03/26/18 5825

## 2018-03-27 NOTE — PROGRESS NOTES
Otitis of right ear  - Discussed antibiotic choices with patient, including his listed Augmentin allergy  Pt stated he may have had a childhood reaction  Pt stated he had taken amoxicillin since then and did not recall any reaction  Patient is willing to try Amoxicillin as it is first line medication for Acute Otitis Media  -     amoxicillin (AMOXIL) 500 mg capsule; Take 2 capsules (1,000 mg total) by mouth every 8 (eight) hours for 5 days  - Directed Pt to return if symptoms persist despite antibiotics, unremitting headache, ear drainage or fever over 101

## 2018-03-29 NOTE — TELEPHONE ENCOUNTER
Spoke with the patient again today  His outer ear continues to hurt but is getting better every day  He is almost finished taking his Erythromycin and will continue using his Cipro eardrops

## 2018-04-04 LAB
ALBUMIN SERPL-MCNC: 4.1 G/DL (ref 3.6–5.1)
ALBUMIN/GLOB SERPL: 1.6 (CALC) (ref 1–2.5)
ALP SERPL-CCNC: 58 U/L (ref 40–115)
ALT SERPL-CCNC: 54 U/L (ref 9–46)
AST SERPL-CCNC: 30 U/L (ref 10–35)
BASOPHILS # BLD AUTO: 30 CELLS/UL (ref 0–200)
BASOPHILS NFR BLD AUTO: 0.6 %
BILIRUB SERPL-MCNC: 1.8 MG/DL (ref 0.2–1.2)
BUN SERPL-MCNC: 17 MG/DL (ref 7–25)
BUN/CREAT SERPL: ABNORMAL (CALC) (ref 6–22)
CALCIUM SERPL-MCNC: 9.5 MG/DL (ref 8.6–10.3)
CHLORIDE SERPL-SCNC: 104 MMOL/L (ref 98–110)
CHOLEST SERPL-MCNC: 118 MG/DL
CHOLEST/HDLC SERPL: 3.2 (CALC)
CO2 SERPL-SCNC: 27 MMOL/L (ref 20–31)
CREAT SERPL-MCNC: 0.97 MG/DL (ref 0.7–1.25)
EOSINOPHIL # BLD AUTO: 80 CELLS/UL (ref 15–500)
EOSINOPHIL NFR BLD AUTO: 1.6 %
ERYTHROCYTE [DISTWIDTH] IN BLOOD BY AUTOMATED COUNT: 12.9 % (ref 11–15)
GLOBULIN SER CALC-MCNC: 2.5 G/DL (CALC) (ref 1.9–3.7)
GLUCOSE SERPL-MCNC: 86 MG/DL (ref 65–99)
HBA1C MFR BLD: 5.4 % OF TOTAL HGB
HCT VFR BLD AUTO: 46.4 % (ref 38.5–50)
HDLC SERPL-MCNC: 37 MG/DL
HGB BLD-MCNC: 15.8 G/DL (ref 13.2–17.1)
LDLC SERPL CALC-MCNC: 61 MG/DL (CALC)
LYMPHOCYTES # BLD AUTO: 1675 CELLS/UL (ref 850–3900)
LYMPHOCYTES NFR BLD AUTO: 33.5 %
MCH RBC QN AUTO: 31.2 PG (ref 27–33)
MCHC RBC AUTO-ENTMCNC: 34.1 G/DL (ref 32–36)
MCV RBC AUTO: 91.7 FL (ref 80–100)
MONOCYTES # BLD AUTO: 545 CELLS/UL (ref 200–950)
MONOCYTES NFR BLD AUTO: 10.9 %
NEUTROPHILS # BLD AUTO: 2670 CELLS/UL (ref 1500–7800)
NEUTROPHILS NFR BLD AUTO: 53.4 %
NONHDLC SERPL-MCNC: 81 MG/DL (CALC)
PLATELET # BLD AUTO: 251 THOUSAND/UL (ref 140–400)
PMV BLD REES-ECKER: 10 FL (ref 7.5–12.5)
POTASSIUM SERPL-SCNC: 4.5 MMOL/L (ref 3.5–5.3)
PROT SERPL-MCNC: 6.6 G/DL (ref 6.1–8.1)
PSA SERPL-MCNC: 1.9 NG/ML
RBC # BLD AUTO: 5.06 MILLION/UL (ref 4.2–5.8)
SL AMB EGFR AFRICAN AMERICAN: 96 ML/MIN/1.73M2
SL AMB EGFR NON AFRICAN AMERICAN: 83 ML/MIN/1.73M2
SODIUM SERPL-SCNC: 138 MMOL/L (ref 135–146)
TRIGL SERPL-MCNC: 115 MG/DL
WBC # BLD AUTO: 5 THOUSAND/UL (ref 3.8–10.8)

## 2018-04-17 DIAGNOSIS — I25.10 ATHEROSCLEROTIC HEART DISEASE OF NATIVE CORONARY ARTERY WITHOUT ANGINA PECTORIS: ICD-10-CM

## 2018-04-17 DIAGNOSIS — R73.01 IMPAIRED FASTING GLUCOSE: ICD-10-CM

## 2018-04-17 DIAGNOSIS — Z12.5 ENCOUNTER FOR SCREENING FOR MALIGNANT NEOPLASM OF PROSTATE: ICD-10-CM

## 2018-04-17 DIAGNOSIS — E78.5 HYPERLIPIDEMIA: ICD-10-CM

## 2018-04-24 ENCOUNTER — OFFICE VISIT (OUTPATIENT)
Dept: FAMILY MEDICINE CLINIC | Facility: CLINIC | Age: 63
End: 2018-04-24
Payer: COMMERCIAL

## 2018-04-24 VITALS
WEIGHT: 223 LBS | DIASTOLIC BLOOD PRESSURE: 72 MMHG | BODY MASS INDEX: 30.2 KG/M2 | RESPIRATION RATE: 16 BRPM | HEIGHT: 72 IN | SYSTOLIC BLOOD PRESSURE: 134 MMHG | HEART RATE: 70 BPM

## 2018-04-24 DIAGNOSIS — E78.2 MIXED HYPERLIPIDEMIA: ICD-10-CM

## 2018-04-24 DIAGNOSIS — R73.01 IMPAIRED FASTING GLUCOSE: Primary | ICD-10-CM

## 2018-04-24 DIAGNOSIS — N40.1 BENIGN PROSTATIC HYPERPLASIA WITH NOCTURIA: ICD-10-CM

## 2018-04-24 DIAGNOSIS — R03.0 ELEVATED BP WITHOUT DIAGNOSIS OF HYPERTENSION: ICD-10-CM

## 2018-04-24 DIAGNOSIS — R35.1 BENIGN PROSTATIC HYPERPLASIA WITH NOCTURIA: ICD-10-CM

## 2018-04-24 DIAGNOSIS — K21.9 GASTRO-ESOPHAGEAL REFLUX DISEASE WITHOUT ESOPHAGITIS: ICD-10-CM

## 2018-04-24 DIAGNOSIS — K22.70 BARRETT'S ESOPHAGUS WITHOUT DYSPLASIA: ICD-10-CM

## 2018-04-24 DIAGNOSIS — I25.10 CORONARY ARTERY DISEASE INVOLVING NATIVE HEART WITHOUT ANGINA PECTORIS, UNSPECIFIED VESSEL OR LESION TYPE: ICD-10-CM

## 2018-04-24 PROCEDURE — 99214 OFFICE O/P EST MOD 30 MIN: CPT | Performed by: FAMILY MEDICINE

## 2018-04-24 RX ORDER — PNEUMOCOCCAL 13-VALENT CONJUGATE VACCINE 2.2; 2.2; 2.2; 2.2; 2.2; 4.4; 2.2; 2.2; 2.2; 2.2; 2.2; 2.2; 2.2 UG/.5ML; UG/.5ML; UG/.5ML; UG/.5ML; UG/.5ML; UG/.5ML; UG/.5ML; UG/.5ML; UG/.5ML; UG/.5ML; UG/.5ML; UG/.5ML; UG/.5ML
INJECTION, SUSPENSION INTRAMUSCULAR
COMMUNITY
Start: 2018-01-27 | End: 2018-04-24

## 2018-04-24 RX ORDER — FINASTERIDE 5 MG/1
5 TABLET, FILM COATED ORAL DAILY
Qty: 30 TABLET | Refills: 5 | Status: SHIPPED | OUTPATIENT
Start: 2018-04-24 | End: 2018-07-24 | Stop reason: SDUPTHER

## 2018-04-24 RX ORDER — HYDROCORTISONE ACETATE 25 MG/1
SUPPOSITORY RECTAL
COMMUNITY
Start: 2018-02-02 | End: 2018-11-01 | Stop reason: ALTCHOICE

## 2018-04-24 NOTE — PROGRESS NOTES
Subjective:      Patient ID: Tomas Arnett is a 60 y o  male  Patient presents for 6 month follow-up of chronic conditions including elevated blood pressure without diagnosis of hypertension, hyperlipidemia, impaired fasting glucose  Patient was in the ER after developing allergic reaction to amoxicillin which was prescribed for ear infection last month  Normal screening PSA at 1 9  Patient retired in March  Less stress  No side effects from crestor  Patient does need to follow-up with cardiologist, Dr Mahi Medina,   In regards to history of small vessel heart disease          Past Medical History:   Diagnosis Date    Abrasion of left foot     LAST ASSESSED: 13    Achilles tendinitis, unspecified leg     LAST ASSESSED: 12    Allergic rhinitis     LAST ASSESSED: 13    Allergic rhinitis 2008    LAST ASSESSED: 08    Cervicalgia 2011    LAST ASSESSED: 11    Dysfunction of left eustachian tube     LAST ASSESSED: 13    Epistaxis     LAST ASSESSED: 5/27/15    First degree atrioventricular block     LAST ASSESSED: 7/10/17    Gastric ulcer 10/07/2011    LAST ASSESSED: 3/9/15    GERD (gastroesophageal reflux disease)     Hemorrhoids 2011    LAST ASSESSED: 11    Long term use of drug     LAST ASSESSED: 10/11/12    Myalgia 2007    LAST ASSESSED: 07    Myositis 2007    LAST ASSESSED: 07    Numbness and tingling of foot     LAST ASSESSED: 3/9/15    Sleep apnea        Family History   Problem Relation Age of Onset    Cancer Mother      SOFT TISSUE CANCER ON RT SIDE CHEST WALL AND     Prostate cancer Maternal Uncle        Past Surgical History:   Procedure Laterality Date    CARDIAC CATHETERIZATION      CHOLECYSTECTOMY      LAPAROSCOPIC; LAST ASSESSED: 10/17/17    COLONOSCOPY      COMPLETE; 3-4 YEARS AGO BY [facility]; LAST ASSESSED: 14    MYRINGOTOMY W/ TUBES      WITH VENTILATING TUBE INSERTION reports that he has quit smoking  He has never used smokeless tobacco  He reports that he drinks alcohol  He reports that he does not use drugs  Current Outpatient Prescriptions:     Coenzyme Q10 (COQ10) 200 MG CAPS, Take 1 tablet by mouth daily, Disp: , Rfl:     fluticasone (FLONASE) 50 mcg/act nasal spray, 2 sprays into each nostril daily, Disp: , Rfl:     Glucosamine-Chondroitin (GLUCOSAMINE CHONDR COMPLEX PO), Take 1 tablet by mouth daily, Disp: , Rfl:     hydrocortisone (ANUSOL-HC) 25 mg suppository, , Disp: , Rfl:     Omega-3 Fatty Acids (FISH OIL) 1000 MG CPDR, Take by mouth daily, Disp: , Rfl:     omeprazole (PriLOSEC) 40 MG capsule, TAKE 1 CAPSULE DAILY, Disp: 90 capsule, Rfl: 1    rosuvastatin (CRESTOR) 10 MG tablet, TAKE 1 TABLET DAILY, Disp: 90 tablet, Rfl: 1    Zn-Pyg Afri-Nettle-Saw Palmet (SAW PALMETTO COMPLEX PO), Take 3 capsules by mouth daily, Disp: , Rfl:     diphenhydrAMINE (BENADRYL) 25 mg tablet, Take 1 tablet (25 mg total) by mouth every 8 (eight) hours as needed for itching for up to 3 days, Disp: 8 tablet, Rfl: 0    famotidine (PEPCID) 20 mg tablet, Take 1 tablet (20 mg total) by mouth 2 (two) times a day for 3 days, Disp: 6 tablet, Rfl: 0    The following portions of the patient's history were reviewed and updated as appropriate: allergies, current medications, past family history, past medical history, past social history, past surgical history and problem list     Review of Systems   Constitutional: Negative  HENT: Positive for hearing loss (  Chronic hearing loss, does wear hearing aids)  Eyes: Negative  Respiratory: Negative  Cardiovascular: Negative  Gastrointestinal: Negative  Endocrine: Negative  Genitourinary: Positive for frequency  Negative for decreased urine volume, flank pain and urgency  Some slight nocturia   Musculoskeletal: Negative  Skin: Negative  Allergic/Immunologic: Negative  Neurological: Negative      Hematological: Negative  Psychiatric/Behavioral: Negative  All other systems reviewed and are negative  Objective:    /72   Pulse 70   Resp 16   Ht 6' (1 829 m)   Wt 101 kg (223 lb)   BMI 30 24 kg/m²      Physical Exam   Constitutional: He is oriented to person, place, and time  He appears well-developed and well-nourished  HENT:   Head: Normocephalic  Eyes: Conjunctivae and EOM are normal  Pupils are equal, round, and reactive to light  Neck: Normal range of motion  Neck supple  Cardiovascular: Normal rate, regular rhythm and normal heart sounds  Pulmonary/Chest: Effort normal and breath sounds normal    Abdominal: Soft  Bowel sounds are normal    Musculoskeletal: Normal range of motion  Neurological: He is alert and oriented to person, place, and time  Psychiatric: He has a normal mood and affect  His behavior is normal  Judgment and thought content normal    Nursing note and vitals reviewed          Recent Results (from the past 1008 hour(s))   Lipid panel    Collection Time: 04/03/18 10:12 AM   Result Value Ref Range    Total Cholesterol 118 <200 mg/dL    SL AMB HDL CHOLESTEROL 37 (L) >40 mg/dL    Triglycerides 115 <150 mg/dL    SL AMB LDL-CHOLESTEROL 61 mg/dL (calc)    SL AMB CHOL/HDLC RATIO 3 2 <5 0 (calc)    SL AMB NON HDL CHOLESTEROL 81 <130 mg/dL (calc)   Comprehensive metabolic panel    Collection Time: 04/03/18 10:12 AM   Result Value Ref Range    SL AMB GLUCOSE 86 65 - 99 mg/dL    BUN 17 7 - 25 mg/dL    Creatinine, Serum 0 97 0 70 - 1 25 mg/dL    eGFR Non  83 > OR = 60 mL/min/1 73m2    SL AMB EGFR  96 > OR = 60 mL/min/1 73m2    SL AMB BUN/CREATININE RATIO NOT APPLICABLE 6 - 22 (calc)    SL AMB SODIUM 138 135 - 146 mmol/L    SL AMB POTASSIUM 4 5 3 5 - 5 3 mmol/L    SL AMB CHLORIDE 104 98 - 110 mmol/L    SL AMB CARBON DIOXIDE 27 20 - 31 mmol/L    SL AMB CALCIUM 9 5 8 6 - 10 3 mg/dL    SL AMB PROTEIN, TOTAL 6 6 6 1 - 8 1 g/dL    Serum Albumin 4 1 3 6 - 5 1 g/dL    SL AMB GLOBULIN 2 5 1 9 - 3 7 g/dL (calc)    SL AMB ALBUMIN/GLOBULIN RATIO 1 6 1 0 - 2 5 (calc)    SL AMB BILIRUBIN, TOTAL 1 8 (H) 0 2 - 1 2 mg/dL    SL AMB ALKALINE PHOSPHATASE 58 40 - 115 U/L    SL AMB AST 30 10 - 35 U/L    SL AMB ALT 54 (H) 9 - 46 U/L   CBC and differential    Collection Time: 04/03/18 10:12 AM   Result Value Ref Range    SL AMB LAB WHITE BLOOD CELL COUNT 5 0 3 8 - 10 8 Thousand/uL    SL AMB LAB RED BLOOD CELLS 5 06 4 20 - 5 80 Million/uL    Hemoglobin 15 8 13 2 - 17 1 g/dL    Hematocrit 46 4 38 5 - 50 0 %    MCV 91 7 80 0 - 100 0 fL    MCH 31 2 27 0 - 33 0 pg    MCHC 34 1 32 0 - 36 0 g/dL    RDW 12 9 11 0 - 15 0 %    Platelet Count 779 369 - 400 Thousand/uL    SL AMB MPV 10 0 7 5 - 12 5 fL    Neutrophils (Absolute) 2,670 1,500 - 7,800 cells/uL    Lymphocytes (Absolute) 1,675 850 - 3,900 cells/uL    Monocytes (Absolute) 545 200 - 950 cells/uL    Eosinophils (Absolute) 80 15 - 500 cells/uL    Basophils (Absolute) 30 0 - 200 cells/uL    Neutrophils 53 4 %    Lymphocytes 33 5 %    Monocytes 10 9 %    Eosinophils 1 6 %    Basophils 0 6 %   PSA, Total Screen    Collection Time: 04/03/18 10:12 AM   Result Value Ref Range    Prostate Specific Antigen Total 1 9 < OR = 4 0 ng/mL   Hemoglobin A1c    Collection Time: 04/03/18 10:12 AM   Result Value Ref Range    Hemoglobin A1C 5 4 <5 7 % of total Hgb       Assessment/Plan:    No problem-specific Assessment & Plan notes found for this encounter             Problem List Items Addressed This Visit     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     Follow-up with GI as scheduled         Gastro-esophageal reflux disease without esophagitis     Continue on omeprazole 40mg daily         Hyperlipidemia    Relevant Orders    Comprehensive metabolic panel    Lipid panel    Coronary artery disease    Relevant Orders    Lipid panel    TSH, 3rd generation with T4 reflex    Impaired fasting glucose - Primary     Stable Hgb A1c and repeat in 6 months         Relevant Orders HEMOGLOBIN A1C W/ EAG ESTIMATION    Elevated BP without diagnosis of hypertension    Relevant Orders    CBC and differential    Benign prostatic hyperplasia with nocturia     Will start on Proscar instead of using OTC saw palmetto         Relevant Medications    finasteride (PROSCAR) 5 mg tablet

## 2018-06-25 ENCOUNTER — OFFICE VISIT (OUTPATIENT)
Dept: FAMILY MEDICINE CLINIC | Facility: CLINIC | Age: 63
End: 2018-06-25
Payer: COMMERCIAL

## 2018-06-25 VITALS
OXYGEN SATURATION: 99 % | BODY MASS INDEX: 29.46 KG/M2 | DIASTOLIC BLOOD PRESSURE: 70 MMHG | HEIGHT: 72 IN | RESPIRATION RATE: 16 BRPM | SYSTOLIC BLOOD PRESSURE: 126 MMHG | WEIGHT: 217.5 LBS | HEART RATE: 70 BPM
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
## 2018-06-25 PROCEDURE — 3008F BODY MASS INDEX DOCD: CPT | Performed by: FAMILY MEDICINE

## 2018-06-25 PROCEDURE — 99213 OFFICE O/P EST LOW 20 MIN: CPT | Performed by: FAMILY MEDICINE

## 2018-06-25 RX ORDER — DOXYCYCLINE HYCLATE 100 MG/1
100 CAPSULE ORAL EVERY 12 HOURS SCHEDULED
Qty: 28 CAPSULE | Refills: 0 | Status: SHIPPED | OUTPATIENT
Start: 2018-06-25 | End: 2018-07-09

## 2018-06-25 NOTE — ASSESSMENT & PLAN NOTE
Tick bite on the right thigh, 5 days ago     advised to start the doxycycline and will check the Lyme, the advised to have blood work done in a week from the tick bite,  Advised to use hydrocortisone cream he feels itchy on the skin

## 2018-06-25 NOTE — PROGRESS NOTES
Assessment/Plan:    Problem List Items Addressed This Visit        Musculoskeletal and Integument    Tick bite - Primary    Relevant Medications    doxycycline hyclate (VIBRAMYCIN) 100 mg capsule    Other Relevant Orders    Lyme Antibody Profile with reflex to WB          Chief Complaint   Patient presents with    Insect Bite       Subjective:   Patient ID: Adilson Coyle is a 60 y o  male  Insect Bite   This is a new problem  The current episode started in the past 7 days (5 days ago)  The problem has been gradually improving  Associated symptoms include a rash  Pertinent negatives include no abdominal pain, anorexia, arthralgias, change in bowel habit, chest pain, chills, congestion, coughing, diaphoresis, fatigue, fever, headaches, joint swelling, myalgias, nausea, neck pain, numbness, sore throat, swollen glands, urinary symptoms, vertigo, visual change, vomiting or weakness  Nothing aggravates the symptoms  He has tried nothing for the symptoms  He pulled the tick  from his right thigh, it was complete, 5 days ago and it was not engorged, he lives in the area where Lyme disease is prevalent, he lives in [facility],  He noted redness next day and itchiness which is getting better now  Review of Systems   Constitutional: Negative for activity change, appetite change, chills, diaphoresis, fatigue, fever and unexpected weight change  HENT: Negative for congestion, dental problem, ear discharge, ear pain, facial swelling, hearing loss, mouth sores, nosebleeds, postnasal drip, rhinorrhea, sinus pain, sinus pressure, sneezing, sore throat, trouble swallowing and voice change  Eyes: Negative for photophobia, pain, discharge, redness and itching  Respiratory: Negative for cough, chest tightness, shortness of breath and wheezing  Cardiovascular: Negative for chest pain, palpitations and leg swelling     Gastrointestinal: Negative for abdominal distention, abdominal pain, anorexia, blood in stool, change in bowel habit, constipation, diarrhea, nausea and vomiting  Endocrine: Negative for cold intolerance, heat intolerance, polydipsia, polyphagia and polyuria  Genitourinary: Negative for dysuria, flank pain, frequency, hematuria and urgency  Musculoskeletal: Negative for arthralgias, back pain, joint swelling, myalgias and neck pain  Skin: Positive for rash  Negative for color change and pallor  Allergic/Immunologic: Negative for environmental allergies and food allergies  Neurological: Negative for dizziness, vertigo, weakness, light-headedness, numbness and headaches  Hematological: Negative for adenopathy  Does not bruise/bleed easily  Psychiatric/Behavioral: Negative for behavioral problems, sleep disturbance and suicidal ideas  The patient is not nervous/anxious  Objective:  Physical Exam   Constitutional: He is oriented to person, place, and time  He appears well-developed and well-nourished  HENT:   Head: Normocephalic and atraumatic  Nose: Nose normal    Mouth/Throat: Oropharynx is clear and moist  No oropharyngeal exudate  Eyes: Conjunctivae and EOM are normal  Pupils are equal, round, and reactive to light  Right eye exhibits no discharge  Left eye exhibits no discharge  No scleral icterus  Neck: Normal range of motion  Neck supple  No tracheal deviation present  No thyromegaly present  Cardiovascular: Normal rate, regular rhythm and normal heart sounds  No murmur heard  Pulmonary/Chest: Effort normal and breath sounds normal  No respiratory distress  He has no wheezes  He has no rales  Abdominal: Soft  Bowel sounds are normal  He exhibits no distension and no mass  There is no tenderness  There is no rebound  Musculoskeletal: Normal range of motion  He exhibits no edema  Lymphadenopathy:     He has no cervical adenopathy  Neurological: He is alert and oriented to person, place, and time  He has normal reflexes  No cranial nerve deficit     Skin: Skin is warm  Rash noted  No erythema  No pallor  A circular about 1 cm rash on his right thigh, nontender   Psychiatric: He has a normal mood and affect  His behavior is normal  Judgment and thought content normal    Nursing note and vitals reviewed           Past Surgical History:   Procedure Laterality Date    CARDIAC CATHETERIZATION  2017    CHOLECYSTECTOMY      LAPAROSCOPIC; LAST ASSESSED: 10/17/17    COLONOSCOPY      COMPLETE; 3-4 YEARS AGO BY [facility]; LAST ASSESSED: 14    MYRINGOTOMY W/ TUBES      WITH VENTILATING TUBE INSERTION       Family History   Problem Relation Age of Onset    Cancer Mother         SOFT TISSUE CANCER ON RT SIDE CHEST WALL AND     Prostate cancer Maternal Uncle          Current Outpatient Prescriptions:     Coenzyme Q10 (COQ10) 200 MG CAPS, Take 1 tablet by mouth daily, Disp: , Rfl:     finasteride (PROSCAR) 5 mg tablet, Take 1 tablet (5 mg total) by mouth daily, Disp: 30 tablet, Rfl: 5    fluticasone (FLONASE) 50 mcg/act nasal spray, 2 sprays into each nostril daily, Disp: , Rfl:     Glucosamine-Chondroitin (GLUCOSAMINE CHONDR COMPLEX PO), Take 1 tablet by mouth daily, Disp: , Rfl:     hydrocortisone (ANUSOL-HC) 25 mg suppository, , Disp: , Rfl:     Omega-3 Fatty Acids (FISH OIL) 1000 MG CPDR, Take by mouth daily, Disp: , Rfl:     omeprazole (PriLOSEC) 40 MG capsule, TAKE 1 CAPSULE DAILY, Disp: 90 capsule, Rfl: 1    rosuvastatin (CRESTOR) 10 MG tablet, TAKE 1 TABLET DAILY, Disp: 90 tablet, Rfl: 1    Zn-Pyg Afri-Nettle-Saw Palmet (SAW PALMETTO COMPLEX PO), Take 3 capsules by mouth daily, Disp: , Rfl:     doxycycline hyclate (VIBRAMYCIN) 100 mg capsule, Take 1 capsule (100 mg total) by mouth every 12 (twelve) hours for 14 days, Disp: 28 capsule, Rfl: 0    Allergies   Allergen Reactions    Amoxicillin Anaphylaxis    Augmentin [Amoxicillin-Pot Clavulanate]     Dye [Iodinated Diagnostic Agents]        Vitals:    18 1121   BP: 126/70   Pulse: 70   Resp: 16   SpO2: 99%   Weight: 98 7 kg (217 lb 8 oz)   Height: 6' (1 829 m)

## 2018-07-02 LAB — B BURGDOR AB SER IA-ACNC: <0.9 INDEX

## 2018-07-24 ENCOUNTER — OFFICE VISIT (OUTPATIENT)
Dept: SLEEP CENTER | Facility: CLINIC | Age: 63
End: 2018-07-24
Payer: COMMERCIAL

## 2018-07-24 VITALS
HEART RATE: 58 BPM | DIASTOLIC BLOOD PRESSURE: 78 MMHG | SYSTOLIC BLOOD PRESSURE: 132 MMHG | HEIGHT: 72 IN | WEIGHT: 223.6 LBS | BODY MASS INDEX: 30.28 KG/M2

## 2018-07-24 DIAGNOSIS — G47.33 OSA AND COPD OVERLAP SYNDROME (HCC): ICD-10-CM

## 2018-07-24 DIAGNOSIS — N40.1 BENIGN PROSTATIC HYPERPLASIA WITH NOCTURIA: Primary | ICD-10-CM

## 2018-07-24 DIAGNOSIS — R35.1 BENIGN PROSTATIC HYPERPLASIA WITH NOCTURIA: Primary | ICD-10-CM

## 2018-07-24 DIAGNOSIS — J44.9 OSA AND COPD OVERLAP SYNDROME (HCC): ICD-10-CM

## 2018-07-24 PROCEDURE — 99214 OFFICE O/P EST MOD 30 MIN: CPT | Performed by: INTERNAL MEDICINE

## 2018-07-24 RX ORDER — FINASTERIDE 5 MG/1
5 TABLET, FILM COATED ORAL DAILY
Qty: 90 TABLET | Refills: 1 | Status: SHIPPED | OUTPATIENT
Start: 2018-07-24 | End: 2018-11-01 | Stop reason: SDUPTHER

## 2018-07-24 NOTE — PROGRESS NOTES
Progress Note - Sleep Center   Gema Caba :1955 MRN: [id number]      Reason for Visit:  60 y o male here for annual follow-up    Assessment:  Doing well on current therapy of APAP 9 to 13 5 cm for moderate LENY  I suspect that the patient needs a changes nasal pillows more frequently  He also had frequent hypopneas, which may be associated with a leak  I told him to changes pillows more frequently and I will review his compliance data in 2 weeks  Plan:  Continue same    Follow up: One year    History of Present Illness:  History of LENY on PAP therapy  Fully compliant and deriving benefit  I reviewed the patient's data (he has a Transcend device) which showed high leak    Frequent hypopneas were also seen (his respiratory index was approximately 15)      Review of Systems      Genitourinary none   Cardiology none   Gastrointestinal none   Neurology numbness/tingling of an extremity and difficulty with memory   Constitutional weight change   Integumentary none   Psychiatry none   Musculoskeletal joint pain   Pulmonary none   ENT none   Endocrine none   Hematological none         Historical Information    Past Medical History:   Past Medical History:   Diagnosis Date    Abrasion of left foot     LAST ASSESSED: 13    Achilles tendinitis, unspecified leg     LAST ASSESSED: 12    Allergic rhinitis     LAST ASSESSED: 13    Allergic rhinitis 2008    LAST ASSESSED: 08    Cervicalgia 2011    LAST ASSESSED: 11    Dysfunction of left eustachian tube     LAST ASSESSED: 13    Epistaxis     LAST ASSESSED: 5/27/15    First degree atrioventricular block     LAST ASSESSED: 7/10/17    Gastric ulcer 10/07/2011    LAST ASSESSED: 3/9/15    GERD (gastroesophageal reflux disease)     Hemorrhoids 2011    LAST ASSESSED: 11    Long term use of drug     LAST ASSESSED: 10/11/12    Myalgia 2007    LAST ASSESSED: 07    Myositis 2007    LAST ASSESSED: 07    Numbness and tingling of foot     LAST ASSESSED: 3/9/15    Sleep apnea          Past Surgical History:   Past Surgical History:   Procedure Laterality Date    CARDIAC CATHETERIZATION  2017    CHOLECYSTECTOMY      LAPAROSCOPIC; LAST ASSESSED: 10/17/17    COLONOSCOPY      COMPLETE; 3-4 YEARS AGO BY [facility]; LAST ASSESSED: 14    MYRINGOTOMY W/ TUBES      WITH VENTILATING TUBE INSERTION       Social History:   Social History     Social History    Marital status: /Civil Union     Spouse name: N/A    Number of children: N/A    Years of education: N/A     Social History Main Topics    Smoking status: Former Smoker    Smokeless tobacco: Never Used      Comment: QUIT OVER 30 YEARS AGO; NEVERR A SMOKER ASS PER [employer]    Alcohol use Yes      Comment: social    Drug use: No    Sexual activity: Not Asked     Other Topics Concern    None     Social History Narrative    None       Family History:   Family History   Problem Relation Age of Onset    Cancer Mother         SOFT TISSUE CANCER ON RT SIDE CHEST WALL AND     Prostate cancer Maternal Uncle        Medications/Allergies:      Current Outpatient Prescriptions:     Coenzyme Q10 (COQ10) 200 MG CAPS, Take 1 tablet by mouth daily, Disp: , Rfl:     finasteride (PROSCAR) 5 mg tablet, Take 1 tablet (5 mg total) by mouth daily, Disp: 30 tablet, Rfl: 5    fluticasone (FLONASE) 50 mcg/act nasal spray, 2 sprays into each nostril daily, Disp: , Rfl:     Glucosamine-Chondroitin (GLUCOSAMINE CHONDR COMPLEX PO), Take 1 tablet by mouth daily, Disp: , Rfl:     hydrocortisone (ANUSOL-HC) 25 mg suppository, , Disp: , Rfl:     Omega-3 Fatty Acids (FISH OIL) 1000 MG CPDR, Take by mouth daily, Disp: , Rfl:     omeprazole (PriLOSEC) 40 MG capsule, TAKE 1 CAPSULE DAILY, Disp: 90 capsule, Rfl: 1    rosuvastatin (CRESTOR) 10 MG tablet, TAKE 1 TABLET DAILY, Disp: 90 tablet, Rfl: 1    Zn-Pyg Afri-Nettle-Saw Palmet (SAW PALMETTO COMPLEX PO), Take 3 capsules by mouth daily, Disp: , Rfl:           Objective      Vital Signs:   Vitals:    07/24/18 0800   BP: 132/78   Pulse: 58     [place] Sleepiness Scale: Total score: 11        Physical Exam:    General: Alert, appropriate, cooperative, overweight    Head: NC/AT    Skin: Warm, dry    Neuro: No motor abnormalities, cranial nerves appear intact    Extremity: No clubbing, cyanosis    PAP setting:  As above  DME Provider: [facility]    Counseling / Coordination of Care  Total clinic time spent today 25 minutes  Greater than 50% of total time was spent with the patient and / or family counseling and / or coordination of care  A description of the counseling / coordination of care: Discussed equipment and response to treatment  I reviewed the patient's data from his machine  WILFRED Lay    Board Certified Sleep Specialist

## 2018-08-02 ENCOUNTER — TELEPHONE (OUTPATIENT)
Dept: SLEEP CENTER | Facility: CLINIC | Age: 63
End: 2018-08-02

## 2018-08-07 DIAGNOSIS — G47.33 OSA (OBSTRUCTIVE SLEEP APNEA): Primary | ICD-10-CM

## 2018-08-16 DIAGNOSIS — E78.2 MIXED HYPERLIPIDEMIA: ICD-10-CM

## 2018-08-16 RX ORDER — ROSUVASTATIN CALCIUM 10 MG/1
10 TABLET, COATED ORAL DAILY
Qty: 90 TABLET | Refills: 0 | Status: SHIPPED | OUTPATIENT
Start: 2018-08-16 | End: 2018-11-01 | Stop reason: SDUPTHER

## 2018-09-24 ENCOUNTER — TELEPHONE (OUTPATIENT)
Dept: FAMILY MEDICINE CLINIC | Facility: CLINIC | Age: 63
End: 2018-09-24

## 2018-09-24 NOTE — TELEPHONE ENCOUNTER
I EMAILED [facility] TO HAVE LYME TEST RESUBMITTED WITH TEST CODES M25 50 AND R53 83  PER PT THE CODE ON ORDER WAS NOT COVERED  I TRIED INSURANCE COMP 6 TIMES AND COULD NOT GET A CUSTOMER CARE PERSON     TRACKING NUMBER IS [id number]

## 2018-10-10 ENCOUNTER — TELEPHONE (OUTPATIENT)
Dept: FAMILY MEDICINE CLINIC | Facility: CLINIC | Age: 63
End: 2018-10-10

## 2018-10-10 NOTE — TELEPHONE ENCOUNTER
Patient was advised that PSA was done in April and [facility]  Insurance will only cover a screening once a year  He agreed

## 2018-10-26 DIAGNOSIS — K21.9 GASTROESOPHAGEAL REFLUX DISEASE WITHOUT ESOPHAGITIS: ICD-10-CM

## 2018-10-26 RX ORDER — OMEPRAZOLE 40 MG/1
CAPSULE, DELAYED RELEASE ORAL
Qty: 90 CAPSULE | Refills: 1 | Status: SHIPPED | OUTPATIENT
Start: 2018-10-26 | End: 2019-05-20 | Stop reason: SDUPTHER

## 2018-10-31 LAB
ALBUMIN SERPL-MCNC: 4.4 G/DL (ref 3.6–5.1)
ALBUMIN/GLOB SERPL: 1.7 (CALC) (ref 1–2.5)
ALP SERPL-CCNC: 67 U/L (ref 40–115)
ALT SERPL-CCNC: 43 U/L (ref 9–46)
AST SERPL-CCNC: 24 U/L (ref 10–35)
BASOPHILS # BLD AUTO: 52 CELLS/UL (ref 0–200)
BASOPHILS NFR BLD AUTO: 1 %
BILIRUB SERPL-MCNC: 1.5 MG/DL (ref 0.2–1.2)
BUN SERPL-MCNC: 15 MG/DL (ref 7–25)
BUN/CREAT SERPL: ABNORMAL (CALC) (ref 6–22)
CALCIUM SERPL-MCNC: 9.7 MG/DL (ref 8.6–10.3)
CHLORIDE SERPL-SCNC: 105 MMOL/L (ref 98–110)
CHOLEST SERPL-MCNC: 131 MG/DL
CHOLEST/HDLC SERPL: 3.5 (CALC)
CO2 SERPL-SCNC: 28 MMOL/L (ref 20–32)
CREAT SERPL-MCNC: 0.93 MG/DL (ref 0.7–1.25)
EOSINOPHIL # BLD AUTO: 62 CELLS/UL (ref 15–500)
EOSINOPHIL NFR BLD AUTO: 1.2 %
ERYTHROCYTE [DISTWIDTH] IN BLOOD BY AUTOMATED COUNT: 12.5 % (ref 11–15)
EST. AVERAGE GLUCOSE BLD GHB EST-MCNC: 111 (CALC)
EST. AVERAGE GLUCOSE BLD GHB EST-SCNC: 6.2 (CALC)
GLOBULIN SER CALC-MCNC: 2.6 G/DL (CALC) (ref 1.9–3.7)
GLUCOSE SERPL-MCNC: 96 MG/DL (ref 65–99)
HBA1C MFR BLD: 5.5 % OF TOTAL HGB
HCT VFR BLD AUTO: 47.5 % (ref 38.5–50)
HDLC SERPL-MCNC: 37 MG/DL
HGB BLD-MCNC: 16.2 G/DL (ref 13.2–17.1)
LDLC SERPL CALC-MCNC: 76 MG/DL (CALC)
LYMPHOCYTES # BLD AUTO: 1695 CELLS/UL (ref 850–3900)
LYMPHOCYTES NFR BLD AUTO: 32.6 %
MCH RBC QN AUTO: 31.8 PG (ref 27–33)
MCHC RBC AUTO-ENTMCNC: 34.1 G/DL (ref 32–36)
MCV RBC AUTO: 93.1 FL (ref 80–100)
MONOCYTES # BLD AUTO: 567 CELLS/UL (ref 200–950)
MONOCYTES NFR BLD AUTO: 10.9 %
NEUTROPHILS # BLD AUTO: 2824 CELLS/UL (ref 1500–7800)
NEUTROPHILS NFR BLD AUTO: 54.3 %
NONHDLC SERPL-MCNC: 94 MG/DL (CALC)
PLATELET # BLD AUTO: 214 THOUSAND/UL (ref 140–400)
PMV BLD REES-ECKER: 10 FL (ref 7.5–12.5)
POTASSIUM SERPL-SCNC: 4.4 MMOL/L (ref 3.5–5.3)
PROT SERPL-MCNC: 7 G/DL (ref 6.1–8.1)
RBC # BLD AUTO: 5.1 MILLION/UL (ref 4.2–5.8)
SL AMB EGFR AFRICAN AMERICAN: 101 ML/MIN/1.73M2
SL AMB EGFR NON AFRICAN AMERICAN: 87 ML/MIN/1.73M2
SODIUM SERPL-SCNC: 140 MMOL/L (ref 135–146)
TRIGL SERPL-MCNC: 95 MG/DL
TSH SERPL-ACNC: 1.99 MIU/L (ref 0.4–4.5)
WBC # BLD AUTO: 5.2 THOUSAND/UL (ref 3.8–10.8)

## 2018-11-01 ENCOUNTER — OFFICE VISIT (OUTPATIENT)
Dept: FAMILY MEDICINE CLINIC | Facility: CLINIC | Age: 63
End: 2018-11-01
Payer: COMMERCIAL

## 2018-11-01 ENCOUNTER — TELEPHONE (OUTPATIENT)
Dept: FAMILY MEDICINE CLINIC | Facility: CLINIC | Age: 63
End: 2018-11-01

## 2018-11-01 VITALS
HEIGHT: 72 IN | WEIGHT: 217 LBS | DIASTOLIC BLOOD PRESSURE: 70 MMHG | BODY MASS INDEX: 29.39 KG/M2 | RESPIRATION RATE: 18 BRPM | HEART RATE: 68 BPM | SYSTOLIC BLOOD PRESSURE: 120 MMHG | OXYGEN SATURATION: 98 %

## 2018-11-01 DIAGNOSIS — K22.70 BARRETT'S ESOPHAGUS WITHOUT DYSPLASIA: ICD-10-CM

## 2018-11-01 DIAGNOSIS — M17.12 PRIMARY OSTEOARTHRITIS OF LEFT KNEE: ICD-10-CM

## 2018-11-01 DIAGNOSIS — M19.042 OSTEOARTHRITIS OF FINGERS OF BOTH HANDS: ICD-10-CM

## 2018-11-01 DIAGNOSIS — R35.1 BENIGN PROSTATIC HYPERPLASIA WITH NOCTURIA: ICD-10-CM

## 2018-11-01 DIAGNOSIS — N40.1 BENIGN PROSTATIC HYPERPLASIA WITH NOCTURIA: ICD-10-CM

## 2018-11-01 DIAGNOSIS — M19.041 OSTEOARTHRITIS OF FINGERS OF BOTH HANDS: ICD-10-CM

## 2018-11-01 DIAGNOSIS — E78.2 MIXED HYPERLIPIDEMIA: ICD-10-CM

## 2018-11-01 DIAGNOSIS — I25.10 CORONARY ARTERY DISEASE INVOLVING NATIVE HEART WITHOUT ANGINA PECTORIS, UNSPECIFIED VESSEL OR LESION TYPE: Primary | ICD-10-CM

## 2018-11-01 DIAGNOSIS — R73.01 IMPAIRED FASTING GLUCOSE: ICD-10-CM

## 2018-11-01 DIAGNOSIS — Z12.5 PROSTATE CANCER SCREENING: ICD-10-CM
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
## 2018-11-01 PROBLEM — T14.8XXA CONTUSION OF BONE: Status: RESOLVED | Noted: 2017-12-28 | Resolved: 2018-11-01

## 2018-11-01 PROCEDURE — 1036F TOBACCO NON-USER: CPT | Performed by: FAMILY MEDICINE

## 2018-11-01 PROCEDURE — 99214 OFFICE O/P EST MOD 30 MIN: CPT | Performed by: FAMILY MEDICINE

## 2018-11-01 PROCEDURE — 3008F BODY MASS INDEX DOCD: CPT | Performed by: FAMILY MEDICINE

## 2018-11-01 RX ORDER — ASPIRIN 81 MG/1
81 TABLET ORAL DAILY
COMMUNITY
End: 2022-06-15

## 2018-11-01 RX ORDER — FINASTERIDE 5 MG/1
5 TABLET, FILM COATED ORAL DAILY
Qty: 90 TABLET | Refills: 0 | Status: SHIPPED | OUTPATIENT
Start: 2018-11-01 | End: 2018-11-01 | Stop reason: SDUPTHER

## 2018-11-01 RX ORDER — ROSUVASTATIN CALCIUM 10 MG/1
10 TABLET, COATED ORAL DAILY
Qty: 90 TABLET | Refills: 1 | Status: SHIPPED | OUTPATIENT
Start: 2018-11-01 | End: 2019-05-20 | Stop reason: SDUPTHER

## 2018-11-01 RX ORDER — ROSUVASTATIN CALCIUM 10 MG/1
10 TABLET, COATED ORAL DAILY
Qty: 90 TABLET | Refills: 0 | Status: SHIPPED | OUTPATIENT
Start: 2018-11-01 | End: 2018-11-01 | Stop reason: SDUPTHER

## 2018-11-01 RX ORDER — FINASTERIDE 5 MG/1
5 TABLET, FILM COATED ORAL DAILY
Qty: 90 TABLET | Refills: 1 | Status: SHIPPED | OUTPATIENT
Start: 2018-11-01 | End: 2019-05-20 | Stop reason: SDUPTHER

## 2018-11-01 RX ORDER — DICLOFENAC SODIUM 75 MG/1
75 TABLET, DELAYED RELEASE ORAL 2 TIMES DAILY
Qty: 60 TABLET | Refills: 1 | Status: SHIPPED | OUTPATIENT
Start: 2018-11-01 | End: 2020-10-08

## 2018-11-01 NOTE — ASSESSMENT & PLAN NOTE
Control risk factors  Patient remains on statin therapy, aspirin    Follow up with cardiologist as scheduled

## 2018-11-01 NOTE — ASSESSMENT & PLAN NOTE
Trial on Voltaren 75 mg twice daily    If this is effective and does not cause side effects then the patient can contact the office and will send a 3 month supply to mail-order

## 2018-11-01 NOTE — TELEPHONE ENCOUNTER
Please contact [facility] in [facility] and cancel Proscar and rosuvastatin prescriptions that were sent there    They were supposed to be sent to mail order pharmacy

## 2019-01-23 ENCOUNTER — TELEPHONE (OUTPATIENT)
Dept: FAMILY MEDICINE CLINIC | Facility: CLINIC | Age: 64
End: 2019-01-23

## 2019-01-23 DIAGNOSIS — Z29.8 NEED FOR MALARIA PROPHYLAXIS: Primary | ICD-10-CM

## 2019-01-23 PROBLEM — Z29.89 NEED FOR MALARIA PROPHYLAXIS: Status: ACTIVE | Noted: 2019-01-23

## 2019-01-23 NOTE — TELEPHONE ENCOUNTER
So any time he is getting malaria prophylaxis I would need to know how long he is going to be in the endemic country for because he has to take the medication days before, while he is down there and then 4 days after he returns but I need to know the duration that he is gone for

## 2019-01-24 DIAGNOSIS — Z29.8 NEED FOR MALARIA PROPHYLAXIS: Primary | ICD-10-CM

## 2019-01-24 RX ORDER — ATOVAQUONE AND PROGUANIL HYDROCHLORIDE 250; 100 MG/1; MG/1
TABLET, FILM COATED ORAL
Qty: 23 TABLET | Refills: 0 | Status: SHIPPED | OUTPATIENT
Start: 2019-01-24 | End: 2019-02-01

## 2019-02-01 ENCOUNTER — TELEPHONE (OUTPATIENT)
Dept: FAMILY MEDICINE CLINIC | Facility: CLINIC | Age: 64
End: 2019-02-01

## 2019-02-01 DIAGNOSIS — Z29.8 NEED FOR MALARIA PROPHYLAXIS: Primary | ICD-10-CM

## 2019-02-01 RX ORDER — MEFLOQUINE HYDROCHLORIDE 250 MG/1
250 TABLET ORAL
Qty: 5 TABLET | Refills: 0 | Status: SHIPPED | OUTPATIENT
Start: 2019-02-01 | End: 2019-09-09

## 2019-02-01 NOTE — TELEPHONE ENCOUNTER
Christine Jones could be a little more specific when he is leaving messages requesting medications  Prescription for 5 tablets of meth look when sent to pharmacy    He takes 1 tablet prior to travel, 1 tablet each week while away and 1 tablet each week after returning

## 2019-02-01 NOTE — TELEPHONE ENCOUNTER
Hemant Carson stated that he has taken Mefloquin in the past with no problem and he would like to stay with that med  Are you able to send this for him?

## 2019-02-01 NOTE — TELEPHONE ENCOUNTER
Malarone was sent to his pharmacy on January 24th when he requested    [facility] pharmacy inhaler town

## 2019-02-01 NOTE — TELEPHONE ENCOUNTER
DUE TO RETURN IN MAY  PT LM ON VOICEMAIL REQUESTING METHO SOMETHING,  [address]? DO YOU NORMALLY ORDER THIS?

## 2019-04-09 LAB
ALBUMIN SERPL-MCNC: 4.4 G/DL (ref 3.6–5.1)
ALBUMIN/GLOB SERPL: 1.8 (CALC) (ref 1–2.5)
ALP SERPL-CCNC: 65 U/L (ref 40–115)
ALT SERPL-CCNC: 42 U/L (ref 9–46)
AST SERPL-CCNC: 23 U/L (ref 10–35)
BASOPHILS # BLD AUTO: 40 CELLS/UL (ref 0–200)
BASOPHILS NFR BLD AUTO: 0.9 %
BILIRUB SERPL-MCNC: 1 MG/DL (ref 0.2–1.2)
BUN SERPL-MCNC: 15 MG/DL (ref 7–25)
BUN/CREAT SERPL: NORMAL (CALC) (ref 6–22)
CALCIUM SERPL-MCNC: 9.4 MG/DL (ref 8.6–10.3)
CHLORIDE SERPL-SCNC: 104 MMOL/L (ref 98–110)
CHOLEST SERPL-MCNC: 133 MG/DL
CHOLEST/HDLC SERPL: 3.4 (CALC)
CO2 SERPL-SCNC: 26 MMOL/L (ref 20–32)
CREAT SERPL-MCNC: 1 MG/DL (ref 0.7–1.25)
EOSINOPHIL # BLD AUTO: 62 CELLS/UL (ref 15–500)
EOSINOPHIL NFR BLD AUTO: 1.4 %
ERYTHROCYTE [DISTWIDTH] IN BLOOD BY AUTOMATED COUNT: 12.6 % (ref 11–15)
GLOBULIN SER CALC-MCNC: 2.4 G/DL (CALC) (ref 1.9–3.7)
GLUCOSE SERPL-MCNC: 97 MG/DL (ref 65–99)
HBA1C MFR BLD: 5.6 % OF TOTAL HGB
HCT VFR BLD AUTO: 45.8 % (ref 38.5–50)
HDLC SERPL-MCNC: 39 MG/DL
HGB BLD-MCNC: 15.7 G/DL (ref 13.2–17.1)
LDLC SERPL CALC-MCNC: 73 MG/DL (CALC)
LYMPHOCYTES # BLD AUTO: 1725 CELLS/UL (ref 850–3900)
LYMPHOCYTES NFR BLD AUTO: 39.2 %
MCH RBC QN AUTO: 31.5 PG (ref 27–33)
MCHC RBC AUTO-ENTMCNC: 34.3 G/DL (ref 32–36)
MCV RBC AUTO: 91.8 FL (ref 80–100)
MONOCYTES # BLD AUTO: 612 CELLS/UL (ref 200–950)
MONOCYTES NFR BLD AUTO: 13.9 %
NEUTROPHILS # BLD AUTO: 1962 CELLS/UL (ref 1500–7800)
NEUTROPHILS NFR BLD AUTO: 44.6 %
NONHDLC SERPL-MCNC: 94 MG/DL (CALC)
PLATELET # BLD AUTO: 222 THOUSAND/UL (ref 140–400)
PMV BLD REES-ECKER: 9.7 FL (ref 7.5–12.5)
POTASSIUM SERPL-SCNC: 4.4 MMOL/L (ref 3.5–5.3)
PROT SERPL-MCNC: 6.8 G/DL (ref 6.1–8.1)
PSA SERPL-MCNC: 0.8 NG/ML
RBC # BLD AUTO: 4.99 MILLION/UL (ref 4.2–5.8)
SL AMB EGFR AFRICAN AMERICAN: 92 ML/MIN/1.73M2
SL AMB EGFR NON AFRICAN AMERICAN: 79 ML/MIN/1.73M2
SODIUM SERPL-SCNC: 139 MMOL/L (ref 135–146)
TRIGL SERPL-MCNC: 125 MG/DL
TSH SERPL-ACNC: 4.02 MIU/L (ref 0.4–4.5)
WBC # BLD AUTO: 4.4 THOUSAND/UL (ref 3.8–10.8)

## 2019-04-10 ENCOUNTER — OFFICE VISIT (OUTPATIENT)
Dept: FAMILY MEDICINE CLINIC | Facility: CLINIC | Age: 64
End: 2019-04-10
Payer: COMMERCIAL

## 2019-04-10 VITALS
DIASTOLIC BLOOD PRESSURE: 60 MMHG | OXYGEN SATURATION: 98 % | HEIGHT: 72 IN | RESPIRATION RATE: 16 BRPM | SYSTOLIC BLOOD PRESSURE: 128 MMHG | WEIGHT: 219 LBS | BODY MASS INDEX: 29.66 KG/M2 | HEART RATE: 70 BPM

## 2019-04-10 DIAGNOSIS — R73.01 IMPAIRED FASTING GLUCOSE: ICD-10-CM

## 2019-04-10 DIAGNOSIS — R35.1 BENIGN PROSTATIC HYPERPLASIA WITH NOCTURIA: ICD-10-CM

## 2019-04-10 DIAGNOSIS — E78.2 MIXED HYPERLIPIDEMIA: ICD-10-CM

## 2019-04-10 DIAGNOSIS — N40.1 BENIGN PROSTATIC HYPERPLASIA WITH NOCTURIA: ICD-10-CM

## 2019-04-10 DIAGNOSIS — K21.9 GASTRO-ESOPHAGEAL REFLUX DISEASE WITHOUT ESOPHAGITIS: Primary | ICD-10-CM

## 2019-04-10 DIAGNOSIS — I25.10 CORONARY ARTERY DISEASE INVOLVING NATIVE HEART WITHOUT ANGINA PECTORIS, UNSPECIFIED VESSEL OR LESION TYPE: ICD-10-CM

## 2019-04-10 DIAGNOSIS — R03.0 ELEVATED BP WITHOUT DIAGNOSIS OF HYPERTENSION: ICD-10-CM

## 2019-04-10 DIAGNOSIS — R23.8 SKIN IRRITATION: ICD-10-CM

## 2019-04-10 DIAGNOSIS — K22.70 BARRETT'S ESOPHAGUS WITHOUT DYSPLASIA: ICD-10-CM

## 2019-04-10 PROCEDURE — 99214 OFFICE O/P EST MOD 30 MIN: CPT | Performed by: FAMILY MEDICINE

## 2019-04-10 PROCEDURE — 1036F TOBACCO NON-USER: CPT | Performed by: FAMILY MEDICINE

## 2019-04-10 PROCEDURE — 3008F BODY MASS INDEX DOCD: CPT | Performed by: FAMILY MEDICINE

## 2019-04-10 RX ORDER — CYCLOSPORINE 0.5 MG/ML
EMULSION OPHTHALMIC
COMMUNITY
Start: 2019-04-09 | End: 2020-10-12

## 2019-04-10 RX ORDER — NITROGLYCERIN 0.4 MG/1
TABLET SUBLINGUAL
Refills: 4 | COMMUNITY
Start: 2019-01-31 | End: 2022-06-15

## 2019-05-20 DIAGNOSIS — N40.1 BENIGN PROSTATIC HYPERPLASIA WITH NOCTURIA: ICD-10-CM

## 2019-05-20 DIAGNOSIS — R35.1 BENIGN PROSTATIC HYPERPLASIA WITH NOCTURIA: ICD-10-CM

## 2019-05-20 DIAGNOSIS — K21.9 GASTROESOPHAGEAL REFLUX DISEASE WITHOUT ESOPHAGITIS: ICD-10-CM

## 2019-05-20 DIAGNOSIS — E78.2 MIXED HYPERLIPIDEMIA: ICD-10-CM

## 2019-05-20 RX ORDER — OMEPRAZOLE 40 MG/1
CAPSULE, DELAYED RELEASE ORAL
Qty: 90 CAPSULE | Refills: 1 | Status: SHIPPED | OUTPATIENT
Start: 2019-05-20 | End: 2019-09-09 | Stop reason: SDUPTHER

## 2019-05-20 RX ORDER — FINASTERIDE 5 MG/1
TABLET, FILM COATED ORAL
Qty: 90 TABLET | Refills: 1 | Status: SHIPPED | OUTPATIENT
Start: 2019-05-20 | End: 2019-09-09 | Stop reason: SDUPTHER

## 2019-05-20 RX ORDER — ROSUVASTATIN CALCIUM 10 MG/1
TABLET, COATED ORAL
Qty: 90 TABLET | Refills: 1 | Status: SHIPPED | OUTPATIENT
Start: 2019-05-20 | End: 2019-09-09 | Stop reason: SDUPTHER

## 2019-07-23 ENCOUNTER — OFFICE VISIT (OUTPATIENT)
Dept: SLEEP CENTER | Facility: CLINIC | Age: 64
End: 2019-07-23
Payer: COMMERCIAL

## 2019-07-23 VITALS
WEIGHT: 229 LBS | HEIGHT: 72 IN | DIASTOLIC BLOOD PRESSURE: 68 MMHG | BODY MASS INDEX: 31.02 KG/M2 | SYSTOLIC BLOOD PRESSURE: 122 MMHG

## 2019-07-23 DIAGNOSIS — G47.33 OSA (OBSTRUCTIVE SLEEP APNEA): Primary | ICD-10-CM

## 2019-07-23 PROCEDURE — 99214 OFFICE O/P EST MOD 30 MIN: CPT | Performed by: INTERNAL MEDICINE

## 2019-07-23 NOTE — PROGRESS NOTES
Progress Note - Sleep Center   Ming Asif :1955 MRN: [id number]      Reason for Visit:  58 y o male here for annual follow-up    Assessment:  Doing well on current therapy of APAP 11 to 15 cm (using a Transcend) for previously diagnosed obstructive moderate sleep apnea  Plan:  Continue same  Consider new APAP 8 to 20 cm, when insurance allows  Follow up: One year or sooner for compliance check    History of Present Illness:  History of LENY on PAP therapy  Fully compliant and deriving benefit      Review of Systems      Genitourinary none   Cardiology none   Gastrointestinal none   Neurology numbness/tingling of an extremity   Constitutional none   Integumentary none   Psychiatry none   Musculoskeletal joint pain and sciatica   Pulmonary none   ENT none   Endocrine none   Hematological none         I have reviewed and updated the review of systems as necessary      Historical Information    Past Medical History:   Past Medical History:   Diagnosis Date    Abrasion of left foot     LAST ASSESSED: 13    Achilles tendinitis, unspecified leg     LAST ASSESSED: 12    Allergic rhinitis     LAST ASSESSED: 13    Allergic rhinitis 2008    LAST ASSESSED: 08    Cervicalgia 2011    LAST ASSESSED: 11    Coronary artery disease     Dysfunction of left eustachian tube     LAST ASSESSED: 13    Epistaxis     LAST ASSESSED: 5/27/15    First degree atrioventricular block     LAST ASSESSED: 7/10/17    Gastric ulcer 10/07/2011    LAST ASSESSED: 3/9/15    GERD (gastroesophageal reflux disease)     Hemorrhoids 2011    LAST ASSESSED: 11    Long term use of drug     LAST ASSESSED: 10/11/12    Myalgia 2007    LAST ASSESSED: 07    Myositis 2007    LAST ASSESSED: 07    Numbness and tingling of foot     LAST ASSESSED: 3/9/15    Sleep apnea          Past Surgical History:   Past Surgical History:   Procedure Laterality Date    CARDIAC CATHETERIZATION  2017    CHOLECYSTECTOMY      LAPAROSCOPIC; LAST ASSESSED: 10/17/17    COLONOSCOPY      COMPLETE; 3-4 YEARS AGO BY [facility]; LAST ASSESSED: 14    MYRINGOTOMY W/ TUBES      WITH VENTILATING TUBE INSERTION       Social History:   Social History     Socioeconomic History    Marital status: /Civil Union     Spouse name: None    Number of children: None    Years of education: None    Highest education level: None   Occupational History    None   Social Needs    Financial resource strain: None    Food insecurity:     Worry: None     Inability: None    Transportation needs:     Medical: None     Non-medical: None   Tobacco Use    Smoking status: Former Smoker    Smokeless tobacco: Former User    Tobacco comment: QUIT OVER 30 YEARS AGO; NEVERR A SMOKER ASS PER [employer]   Substance and Sexual Activity    Alcohol use: Yes     Comment: social    Drug use: No    Sexual activity: None   Lifestyle    Physical activity:     Days per week: None     Minutes per session: None    Stress: None   Relationships    Social connections:     Talks on phone: None     Gets together: None     Attends Rastafari service: None     Active member of club or organization: None     Attends meetings of clubs or organizations: None     Relationship status: None    Intimate partner violence:     Fear of current or ex partner: None     Emotionally abused: None     Physically abused: None     Forced sexual activity: None   Other Topics Concern    None   Social History Narrative    None       Family History:   Family History   Problem Relation Age of Onset    Cancer Mother         SOFT TISSUE CANCER ON RT SIDE CHEST WALL AND     Prostate cancer Maternal Uncle        Medications/Allergies:      Current Outpatient Medications:     aspirin (ASPIR-81) 81 mg EC tablet, Take 81 mg by mouth daily, Disp: , Rfl:     Coenzyme Q10 (COQ10) 200 MG CAPS, Take 1 tablet by mouth daily, Disp: , Rfl:     diclofenac (VOLTAREN) 75 mg EC tablet, Take 1 tablet (75 mg total) by mouth 2 (two) times a day, Disp: 60 tablet, Rfl: 1    finasteride (PROSCAR) 5 mg tablet, TAKE 1 TABLET DAILY, Disp: 90 tablet, Rfl: 1    fluticasone (FLONASE) 50 mcg/act nasal spray, 2 sprays into each nostril daily, Disp: , Rfl:     Glucosamine-Chondroitin (GLUCOSAMINE CHONDR COMPLEX PO), Take 1 tablet by mouth daily, Disp: , Rfl:     nitroglycerin (NITROSTAT) 0 4 mg SL tablet, PLACE 1 TABLET(S) BY SUBLINGUAL ROUTE , Disp: , Rfl: 4    Omega-3 Fatty Acids (FISH OIL) 1000 MG CPDR, Take by mouth daily, Disp: , Rfl:     omeprazole (PriLOSEC) 40 MG capsule, TAKE 1 CAPSULE DAILY, Disp: 90 capsule, Rfl: 1    RESTASIS 0 05 % ophthalmic emulsion, , Disp: , Rfl:     rosuvastatin (CRESTOR) 10 MG tablet, TAKE 1 TABLET DAILY, Disp: 90 tablet, Rfl: 1    mefloquine (LARIAM) 250 MG tablet, Take 1 tablet (250 mg total) by mouth every 7 days for 5 doses, Disp: 5 tablet, Rfl: 0          Objective      Vital Signs:   Vitals:    07/23/19 0906   BP: 122/68     [place] Sleepiness Scale: Total score: 7        Physical Exam:    General: Alert, appropriate, cooperative, overweight    Head: NC/AT    Skin: Warm, dry    Neuro: No motor abnormalities, cranial nerves appear intact    Extremity: No clubbing, cyanosis      DME Provider: [facility]        Counseling / Coordination of Care   I have spent 20 minutes with the patient today in which greater than 50% of this time was spent in counseling/coordination of care regarding: equipment and compliance  Board Certified Sleep Specialist    Portions of the record may have been created with voice recognition software  Occasional wrong word or "sound a like" substitutions may have occurred due to the inherent limitations of voice recognition software  Read the chart carefully and recognize, using context, where substitutions have occurred

## 2019-07-25 DIAGNOSIS — G47.33 OSA (OBSTRUCTIVE SLEEP APNEA): Primary | ICD-10-CM

## 2019-07-26 ENCOUNTER — TELEPHONE (OUTPATIENT)
Dept: SLEEP CENTER | Facility: CLINIC | Age: 64
End: 2019-07-26

## 2019-07-26 NOTE — TELEPHONE ENCOUNTER
----- Message from Ricarda Gonzalez MD sent at 7/25/2019  4:13 PM EDT -----  Regarding: RE: New Sleep Study  I have ordered a home sleep apnea test to read diagnose LENY for the purpose of obtaining a new APAP device  ----- Message -----  From: Severiano Cable, RT  Sent: 7/24/2019   3:09 PM EDT  To: Ricarda Gonzalez MD  Subject: Glenis Abo                                  Dr Peggy Irizarry,     PT will require a new diagnostic sleep study due to his old one not proving it was scored at 4%

## 2019-07-26 NOTE — TELEPHONE ENCOUNTER
I called PT to let him know that a new sleep study is required to get a replacement machine due to insurance guidelines  There is no proof that the old sleep study was scored at 4%, left a voicemail

## 2019-08-02 RX ORDER — GATIFLOXACIN 5 MG/ML
SOLUTION/ DROPS OPHTHALMIC
Refills: 1 | COMMUNITY
Start: 2019-05-20 | End: 2019-08-05

## 2019-08-02 RX ORDER — LOTEPREDNOL ETABONATE 5 MG/ML
SUSPENSION/ DROPS OPHTHALMIC
Refills: 1 | COMMUNITY
Start: 2019-05-20 | End: 2020-10-12

## 2019-08-05 ENCOUNTER — OFFICE VISIT (OUTPATIENT)
Dept: FAMILY MEDICINE CLINIC | Facility: CLINIC | Age: 64
End: 2019-08-05
Payer: COMMERCIAL

## 2019-08-05 VITALS
WEIGHT: 228 LBS | SYSTOLIC BLOOD PRESSURE: 136 MMHG | DIASTOLIC BLOOD PRESSURE: 84 MMHG | RESPIRATION RATE: 18 BRPM | BODY MASS INDEX: 30.88 KG/M2 | HEART RATE: 68 BPM | HEIGHT: 72 IN | OXYGEN SATURATION: 98 %

## 2019-08-05 DIAGNOSIS — Z01.818 PREOPERATIVE CLEARANCE: Primary | ICD-10-CM

## 2019-08-05 DIAGNOSIS — I25.10 CORONARY ARTERY DISEASE INVOLVING NATIVE HEART WITHOUT ANGINA PECTORIS, UNSPECIFIED VESSEL OR LESION TYPE: ICD-10-CM

## 2019-08-05 DIAGNOSIS — E78.2 MIXED HYPERLIPIDEMIA: ICD-10-CM

## 2019-08-05 DIAGNOSIS — H26.9 CATARACT OF RIGHT EYE, UNSPECIFIED CATARACT TYPE: ICD-10-CM

## 2019-08-05 PROCEDURE — 93000 ELECTROCARDIOGRAM COMPLETE: CPT | Performed by: NURSE PRACTITIONER

## 2019-08-05 PROCEDURE — 99242 OFF/OP CONSLTJ NEW/EST SF 20: CPT | Performed by: NURSE PRACTITIONER

## 2019-08-05 NOTE — PROGRESS NOTES
Assessment/Plan:      Diagnoses and all orders for this visit:    Preoperative clearance    Cataract of right eye, unspecified cataract type  Patient has cataract surgery scheduled for 8/20/19  Coronary artery disease involving native heart without angina pectoris, unspecified vessel or lesion type  EKG done and reviewed with Dr Surinder Conti  Old Anterior septal changes noted  EKG sent to cardiology for review  Mixed hyperlipidemia  Continue crestor  EKG sent to patient's cardiologist for review  Patient is cleared for cataract surgery pending evaluation of EKG by his cardiology  Subjective:     Patient ID: Hubert Pradhan is a 58 y o  male  Patient is here for a preoperative clearance for right eye cataract surgery on 8/20/19  Dr Samreen Harrison is performing the surgery  Patient reports that he has had surgery before  Denies any adverse reaction to anesthesia  Patient reports that he follows up with cardiology for CAD  Patient reports that he takes crestor for hyperlipidemia  Denies any chest pain or SOB at rest or with exertion  Denies any easy bleeding or bruising  Denies any wheezing, cough, palpitations, nausea, Has, or lightheadedness  Patient reports that his wife will help him after the surgery  Review of Systems   Constitutional: Negative for chills and fever  HENT: Negative for congestion, ear pain and sore throat  Eyes: Negative for pain, discharge and redness  Respiratory: Negative for cough, chest tightness, shortness of breath and wheezing  Cardiovascular: Negative for chest pain, palpitations and leg swelling  Gastrointestinal: Negative for abdominal pain, diarrhea, nausea and vomiting  Genitourinary: Negative for dysuria and hematuria  Skin: Negative for rash  Neurological: Negative for dizziness, syncope, light-headedness and headaches  Psychiatric/Behavioral: Negative for suicidal ideas  Denies any depression            Objective:     Physical Exam Constitutional: He is oriented to person, place, and time  No distress  HENT:   Right Ear: External ear normal    Left Ear: External ear normal    Mouth/Throat: Oropharynx is clear and moist    Eyes: Pupils are equal, round, and reactive to light  Conjunctivae are normal    Cardiovascular: Normal rate, regular rhythm, normal heart sounds and intact distal pulses  No edema noted  Pulmonary/Chest: Effort normal and breath sounds normal  He has no wheezes  Abdominal: Soft  He exhibits no distension and no mass  There is no tenderness  Musculoskeletal:   Gait wnl  Lymphadenopathy:     He has no cervical adenopathy  Neurological: He is alert and oriented to person, place, and time  Skin: No rash noted  Psychiatric: He has a normal mood and affect  Vitals reviewed

## 2019-08-06 ENCOUNTER — TELEPHONE (OUTPATIENT)
Dept: FAMILY MEDICINE CLINIC | Facility: CLINIC | Age: 64
End: 2019-08-06

## 2019-08-06 NOTE — TELEPHONE ENCOUNTER
Received a telephone call from Dr Elena Gosselin this morning who reviewed his EKG and compared to his prior EKGs  There have been no changes and from his perspective he is fine to have cataract surgery  Please notify the patient

## 2019-09-07 LAB
ALBUMIN SERPL-MCNC: 4.3 G/DL (ref 3.6–5.1)
ALBUMIN/GLOB SERPL: 1.7 (CALC) (ref 1–2.5)
ALP SERPL-CCNC: 59 U/L (ref 40–115)
ALT SERPL-CCNC: 48 U/L (ref 9–46)
AST SERPL-CCNC: 24 U/L (ref 10–35)
BASOPHILS # BLD AUTO: 40 CELLS/UL (ref 0–200)
BASOPHILS NFR BLD AUTO: 0.8 %
BILIRUB SERPL-MCNC: 1.1 MG/DL (ref 0.2–1.2)
BUN SERPL-MCNC: 15 MG/DL (ref 7–25)
BUN/CREAT SERPL: ABNORMAL (CALC) (ref 6–22)
CALCIUM SERPL-MCNC: 9.6 MG/DL (ref 8.6–10.3)
CHLORIDE SERPL-SCNC: 106 MMOL/L (ref 98–110)
CHOLEST SERPL-MCNC: 137 MG/DL
CHOLEST/HDLC SERPL: 3.4 (CALC)
CO2 SERPL-SCNC: 25 MMOL/L (ref 20–32)
CREAT SERPL-MCNC: 0.98 MG/DL (ref 0.7–1.25)
EOSINOPHIL # BLD AUTO: 70 CELLS/UL (ref 15–500)
EOSINOPHIL NFR BLD AUTO: 1.4 %
ERYTHROCYTE [DISTWIDTH] IN BLOOD BY AUTOMATED COUNT: 12.6 % (ref 11–15)
GLOBULIN SER CALC-MCNC: 2.6 G/DL (CALC) (ref 1.9–3.7)
GLUCOSE SERPL-MCNC: 105 MG/DL (ref 65–99)
HBA1C MFR BLD: 5.6 % OF TOTAL HGB
HCT VFR BLD AUTO: 46.1 % (ref 38.5–50)
HDLC SERPL-MCNC: 40 MG/DL
HGB BLD-MCNC: 15.6 G/DL (ref 13.2–17.1)
LDLC SERPL CALC-MCNC: 74 MG/DL (CALC)
LYMPHOCYTES # BLD AUTO: 1475 CELLS/UL (ref 850–3900)
LYMPHOCYTES NFR BLD AUTO: 29.5 %
MCH RBC QN AUTO: 31 PG (ref 27–33)
MCHC RBC AUTO-ENTMCNC: 33.8 G/DL (ref 32–36)
MCV RBC AUTO: 91.5 FL (ref 80–100)
MONOCYTES # BLD AUTO: 625 CELLS/UL (ref 200–950)
MONOCYTES NFR BLD AUTO: 12.5 %
NEUTROPHILS # BLD AUTO: 2790 CELLS/UL (ref 1500–7800)
NEUTROPHILS NFR BLD AUTO: 55.8 %
NONHDLC SERPL-MCNC: 97 MG/DL (CALC)
PLATELET # BLD AUTO: 218 THOUSAND/UL (ref 140–400)
PMV BLD REES-ECKER: 10.2 FL (ref 7.5–12.5)
POTASSIUM SERPL-SCNC: 4.3 MMOL/L (ref 3.5–5.3)
PROT SERPL-MCNC: 6.9 G/DL (ref 6.1–8.1)
RBC # BLD AUTO: 5.04 MILLION/UL (ref 4.2–5.8)
SL AMB EGFR AFRICAN AMERICAN: 94 ML/MIN/1.73M2
SL AMB EGFR NON AFRICAN AMERICAN: 81 ML/MIN/1.73M2
SODIUM SERPL-SCNC: 142 MMOL/L (ref 135–146)
TRIGL SERPL-MCNC: 155 MG/DL
TSH SERPL-ACNC: 3.27 MIU/L (ref 0.4–4.5)
WBC # BLD AUTO: 5 THOUSAND/UL (ref 3.8–10.8)

## 2019-09-09 ENCOUNTER — OFFICE VISIT (OUTPATIENT)
Dept: FAMILY MEDICINE CLINIC | Facility: CLINIC | Age: 64
End: 2019-09-09
Payer: COMMERCIAL

## 2019-09-09 VITALS
HEART RATE: 76 BPM | DIASTOLIC BLOOD PRESSURE: 72 MMHG | HEIGHT: 72 IN | RESPIRATION RATE: 16 BRPM | SYSTOLIC BLOOD PRESSURE: 142 MMHG | WEIGHT: 222 LBS | OXYGEN SATURATION: 98 % | BODY MASS INDEX: 30.07 KG/M2

## 2019-09-09 DIAGNOSIS — E78.2 MIXED HYPERLIPIDEMIA: ICD-10-CM

## 2019-09-09 DIAGNOSIS — Z11.59 NEED FOR HEPATITIS C SCREENING TEST: ICD-10-CM

## 2019-09-09 DIAGNOSIS — Z23 FLU VACCINE NEED: ICD-10-CM

## 2019-09-09 DIAGNOSIS — K22.70 BARRETT'S ESOPHAGUS WITHOUT DYSPLASIA: Primary | ICD-10-CM

## 2019-09-09 DIAGNOSIS — Z12.5 PROSTATE CANCER SCREENING: ICD-10-CM

## 2019-09-09 DIAGNOSIS — R35.1 BENIGN PROSTATIC HYPERPLASIA WITH NOCTURIA: ICD-10-CM

## 2019-09-09 DIAGNOSIS — Z23 NEED FOR PROPHYLACTIC VACCINATION AGAINST STREPTOCOCCUS PNEUMONIAE (PNEUMOCOCCUS): ICD-10-CM

## 2019-09-09 DIAGNOSIS — K21.9 GASTROESOPHAGEAL REFLUX DISEASE WITHOUT ESOPHAGITIS: ICD-10-CM

## 2019-09-09 DIAGNOSIS — R73.01 IMPAIRED FASTING GLUCOSE: ICD-10-CM

## 2019-09-09 DIAGNOSIS — N40.1 BENIGN PROSTATIC HYPERPLASIA WITH NOCTURIA: ICD-10-CM

## 2019-09-09 DIAGNOSIS — H00.035 ABSCESS OF LEFT LOWER EYELID: ICD-10-CM

## 2019-09-09 DIAGNOSIS — I25.10 CORONARY ARTERY DISEASE INVOLVING NATIVE HEART WITHOUT ANGINA PECTORIS, UNSPECIFIED VESSEL OR LESION TYPE: ICD-10-CM

## 2019-09-09 PROBLEM — Z01.818 PREOPERATIVE CLEARANCE: Status: RESOLVED | Noted: 2019-08-05 | Resolved: 2019-09-09

## 2019-09-09 PROCEDURE — 90732 PPSV23 VACC 2 YRS+ SUBQ/IM: CPT | Performed by: FAMILY MEDICINE

## 2019-09-09 PROCEDURE — 90472 IMMUNIZATION ADMIN EACH ADD: CPT | Performed by: FAMILY MEDICINE

## 2019-09-09 PROCEDURE — 99215 OFFICE O/P EST HI 40 MIN: CPT | Performed by: FAMILY MEDICINE

## 2019-09-09 PROCEDURE — 3008F BODY MASS INDEX DOCD: CPT | Performed by: FAMILY MEDICINE

## 2019-09-09 PROCEDURE — 90682 RIV4 VACC RECOMBINANT DNA IM: CPT | Performed by: FAMILY MEDICINE

## 2019-09-09 PROCEDURE — 90471 IMMUNIZATION ADMIN: CPT | Performed by: FAMILY MEDICINE

## 2019-09-09 RX ORDER — ROSUVASTATIN CALCIUM 10 MG/1
10 TABLET, COATED ORAL DAILY
Qty: 90 TABLET | Refills: 1 | Status: SHIPPED | OUTPATIENT
Start: 2019-09-09 | End: 2020-01-10 | Stop reason: SDUPTHER

## 2019-09-09 RX ORDER — OMEPRAZOLE 40 MG/1
40 CAPSULE, DELAYED RELEASE ORAL DAILY
Qty: 90 CAPSULE | Refills: 1 | Status: SHIPPED | OUTPATIENT
Start: 2019-09-09 | End: 2020-01-10 | Stop reason: SDUPTHER

## 2019-09-09 RX ORDER — NEPAFENAC 0.3 %
SUSPENSION, DROPS(FINAL DOSAGE FORM)(ML) OPHTHALMIC (EYE)
COMMUNITY
Start: 2019-08-16 | End: 2020-10-12

## 2019-09-09 RX ORDER — FINASTERIDE 5 MG/1
5 TABLET, FILM COATED ORAL DAILY
Qty: 90 TABLET | Refills: 1 | Status: SHIPPED | OUTPATIENT
Start: 2019-09-09 | End: 2020-01-13 | Stop reason: SDUPTHER

## 2019-09-09 RX ORDER — SULFAMETHOXAZOLE AND TRIMETHOPRIM 800; 160 MG/1; MG/1
1 TABLET ORAL EVERY 12 HOURS SCHEDULED
Qty: 40 TABLET | Refills: 0 | Status: SHIPPED | OUTPATIENT
Start: 2019-09-09 | End: 2019-09-29

## 2019-09-09 NOTE — ASSESSMENT & PLAN NOTE
- follow-up with Dr Carpenter Folds  - Patient will be placed on Bactrim DS which provides better skin jian coverage thing Keflex  Patient was placed on Keflex however staph epidermidis has a high rate of resistance in this area

## 2019-09-09 NOTE — PROGRESS NOTES
Subjective:      Patient ID: Roby Stover is a 58 y o  male  76-year-old male presents for 6 month follow-up of chronic conditions including hypertension, history of coronary disease, hyperlipidemia  Patient does complain of an abscess on his right lower eyelid at the medial canthus  He did have significant swelling and inflammation along with drainage  Patient did see ophthalmologist, Dr Shannan Shields, who did EVELYNE the abscess and obtain a culture  Patient was placed on Keflex  Patient had notice some improvement along with drainage  It is not affected his vision  The ophthalmologist wanted to perform surgery to reconstruct his lacrimal duct but the patient will be leaving for a hunting trip in [place] in 10 days and [place] additional medical care  Patient would not be able to get in for follow-up appointments following the surgery  Labs reviewed which showed normal CBC, CMP with the exception of impaired fasting glucose of 105 with a normal hemoglobin A1c of 5 6%, very well controlled cholesterol profile  Triglycerides were slightly elevated at 155 however the patient had a cheese steak the night before having his blood work done  Would like refills of medications    He will be getting Medicare as of December 1st      Past Medical History:   Diagnosis Date    Abrasion of left foot     LAST ASSESSED: 9/23/13    Achilles tendinitis, unspecified leg     LAST ASSESSED: 11/30/12    Allergic rhinitis     LAST ASSESSED: 11/25/13    Allergic rhinitis 06/25/2008    LAST ASSESSED: 6/25/08    Cervicalgia 04/22/2011    LAST ASSESSED: 4/22/11    Coronary artery disease     Dysfunction of left eustachian tube     LAST ASSESSED: 9/23/13    Epistaxis     LAST ASSESSED: 5/27/15    First degree atrioventricular block     LAST ASSESSED: 7/10/17    Gastric ulcer 10/07/2011    LAST ASSESSED: 3/9/15    GERD (gastroesophageal reflux disease)     Hemorrhoids 2011    LAST ASSESSED: 11    Long term use of drug     LAST ASSESSED: 10/11/12    Myalgia 2007    LAST ASSESSED: 07    Myositis 2007    LAST ASSESSED: 07    Numbness and tingling of foot     LAST ASSESSED: 3/9/15    Sleep apnea        Family History   Problem Relation Age of Onset    Cancer Mother         SOFT TISSUE CANCER ON RT SIDE CHEST WALL AND     Prostate cancer Maternal Uncle        Past Surgical History:   Procedure Laterality Date    CARDIAC CATHETERIZATION      CHOLECYSTECTOMY      LAPAROSCOPIC; LAST ASSESSED: 10/17/17    COLONOSCOPY      COMPLETE; 3-4 YEARS AGO BY [facility]; LAST ASSESSED: 14    MYRINGOTOMY W/ TUBES      WITH VENTILATING TUBE INSERTION    VASECTOMY          reports that he has quit smoking  He has quit using smokeless tobacco  He reports that he drinks alcohol  He reports that he does not use drugs        Current Outpatient Medications:     aspirin (ASPIR-81) 81 mg EC tablet, Take 81 mg by mouth daily, Disp: , Rfl:     Coenzyme Q10 (COQ10) 200 MG CAPS, Take 1 tablet by mouth daily, Disp: , Rfl:     diclofenac (VOLTAREN) 75 mg EC tablet, Take 1 tablet (75 mg total) by mouth 2 (two) times a day, Disp: 60 tablet, Rfl: 1    finasteride (PROSCAR) 5 mg tablet, Take 1 tablet (5 mg total) by mouth daily, Disp: 90 tablet, Rfl: 1    fluticasone (FLONASE) 50 mcg/act nasal spray, 2 sprays into each nostril daily, Disp: , Rfl:     Glucosamine-Chondroitin (GLUCOSAMINE CHONDR COMPLEX PO), Take 1 tablet by mouth daily, Disp: , Rfl:     ILEVRO 0 3 % SUSP, , Disp: , Rfl:     LOTEMAX 0 5 % ophthalmic suspension, INSTILL 1 DROP 3 TIMES EVERY DAY INTO RIGHT EYE THEN AS DIRECTED, Disp: , Rfl: 1    nitroglycerin (NITROSTAT) 0 4 mg SL tablet, PLACE 1 TABLET(S) BY SUBLINGUAL ROUTE , Disp: , Rfl: 4    Omega-3 Fatty Acids (FISH OIL) 1000 MG CPDR, Take by mouth daily, Disp: , Rfl:     omeprazole (PriLOSEC) 40 MG capsule, Take 1 capsule (40 mg total) by mouth daily, Disp: 90 capsule, Rfl: 1    RESTASIS 0 05 % ophthalmic emulsion, , Disp: , Rfl:     rosuvastatin (CRESTOR) 10 MG tablet, Take 1 tablet (10 mg total) by mouth daily, Disp: 90 tablet, Rfl: 1    sulfamethoxazole-trimethoprim (BACTRIM DS) 800-160 mg per tablet, Take 1 tablet by mouth every 12 (twelve) hours for 20 days, Disp: 40 tablet, Rfl: 0    The following portions of the patient's history were reviewed and updated as appropriate: allergies, current medications, past family history, past medical history, past social history, past surgical history and problem list     Review of Systems   Constitutional: Negative  HENT: Negative  Eyes: Negative  Negative for pain and visual disturbance  Abscess of the right lower eyelid   Respiratory: Negative  Cardiovascular: Negative  Gastrointestinal: Negative  Endocrine: Negative  Genitourinary: Negative  Musculoskeletal: Negative  Skin: Negative  Allergic/Immunologic: Negative  Neurological: Negative  Hematological: Negative  Psychiatric/Behavioral: Negative  All other systems reviewed and are negative  Objective:    /72   Pulse 76   Resp 16   Ht 6' (1 829 m)   Wt 101 kg (222 lb)   SpO2 98%   BMI 30 11 kg/m²      Physical Exam   Constitutional: He is oriented to person, place, and time  He appears well-developed and well-nourished  HENT:   Head: Normocephalic  Eyes: Pupils are equal, round, and reactive to light  Conjunctivae and EOM are normal        Neck: Normal range of motion  Neck supple  Cardiovascular: Normal rate, regular rhythm and normal heart sounds  Pulmonary/Chest: Effort normal and breath sounds normal    Abdominal: Soft  Bowel sounds are normal    Musculoskeletal: Normal range of motion  Neurological: He is alert and oriented to person, place, and time  Psychiatric: He has a normal mood and affect   His behavior is normal  Judgment and thought content normal    Nursing note and vitals reviewed          Recent Results (from the past 1008 hour(s))   Lipid panel    Collection Time: 09/06/19  7:39 AM   Result Value Ref Range    Total Cholesterol 137 <200 mg/dL    HDL 40 (L) >40 mg/dL    Triglycerides 155 (H) <150 mg/dL    LDL Direct 74 mg/dL (calc)    Chol HDLC Ratio 3 4 <5 0 (calc)    Non-HDL Cholesterol 97 <130 mg/dL (calc)   Comprehensive metabolic panel    Collection Time: 09/06/19  7:39 AM   Result Value Ref Range    Glucose, Random 105 (H) 65 - 99 mg/dL    BUN 15 7 - 25 mg/dL    Creatinine 0 98 0 70 - 1 25 mg/dL    eGFR Non  81 > OR = 60 mL/min/1 73m2    eGFR  94 > OR = 60 mL/min/1 73m2    SL AMB BUN/CREATININE RATIO NOT APPLICABLE 6 - 22 (calc)    Sodium 142 135 - 146 mmol/L    Potassium 4 3 3 5 - 5 3 mmol/L    Chloride 106 98 - 110 mmol/L    CO2 25 20 - 32 mmol/L    SL AMB CALCIUM 9 6 8 6 - 10 3 mg/dL    Protein, Total 6 9 6 1 - 8 1 g/dL    Albumin 4 3 3 6 - 5 1 g/dL    Globulin 2 6 1 9 - 3 7 g/dL (calc)    Albumin/Globulin Ratio 1 7 1 0 - 2 5 (calc)    TOTAL BILIRUBIN 1 1 0 2 - 1 2 mg/dL    Alkaline Phosphatase 59 40 - 115 U/L    AST 24 10 - 35 U/L    ALT 48 (H) 9 - 46 U/L   CBC and differential    Collection Time: 09/06/19  7:39 AM   Result Value Ref Range    White Blood Cell Count 5 0 3 8 - 10 8 Thousand/uL    Red Blood Cell Count 5 04 4 20 - 5 80 Million/uL    Hemoglobin 15 6 13 2 - 17 1 g/dL    HCT 46 1 38 5 - 50 0 %    MCV 91 5 80 0 - 100 0 fL    MCH 31 0 27 0 - 33 0 pg    MCHC 33 8 32 0 - 36 0 g/dL    RDW 12 6 11 0 - 15 0 %    Platelet Count 658 378 - 400 Thousand/uL    SL AMB MPV 10 2 7 5 - 12 5 fL    Neutrophils (Absolute) 2,790 1,500 - 7,800 cells/uL    Lymphocytes (Absolute) 1,475 850 - 3,900 cells/uL    Monocytes (Absolute) 625 200 - 950 cells/uL    Eosinophils (Absolute) 70 15 - 500 cells/uL    Basophils ABS 40 0 - 200 cells/uL    Neutrophils 55 8 %    Lymphocytes 29 5 %    Monocytes 12 5 %    Eosinophils 1 4 % Basophils PCT 0 8 %    Always Message     TSH, 3rd generation with Free T4 reflex    Collection Time: 09/06/19  7:39 AM   Result Value Ref Range    TSH W/RFX TO FREE T4 3 27 0 40 - 4 50 mIU/L   Hemoglobin A1c (w/out EAG)    Collection Time: 09/06/19  7:39 AM   Result Value Ref Range    Hemoglobin A1C 5 6 <5 7 % of total Hgb       Assessment/Plan:    Abscess of left lower eyelid  - follow-up with Dr Micheal Luna  - Patient will be placed on Bactrim DS which provides better skin jian coverage thing Keflex  Patient was placed on Keflex however staph epidermidis has a high rate of resistance in this area  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  Patient does follow up with Gastroenterology on a regular basis  Patient remains on omeprazole 40 mg once daily  He has had recent EGD which showed that his Last's esophagus have resolved  Impaired fasting glucose  Watch dietary intake of carbohydrates  Repeat hemoglobin A1c in 6 months  Coronary artery disease  Follow-up with cardiologist as scheduled  Patient remains on aspirin and rosuvastatin  Cholesterol is almost at goal with LDL less than 70    Benign prostatic hyperplasia with nocturia  Check screening PSA with next set of labs when he is on Medicare    Hyperlipidemia  Well controlled on rosuvastatin  Continue same  Repeat lipid profile in 6 months           Problem List Items Addressed This Visit        Digestive    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 - Primary     Patient does follow up with Gastroenterology on a regular basis  Patient remains on omeprazole 40 mg once daily  He has had recent EGD which showed that his Last's esophagus have resolved  Relevant Medications    omeprazole (PriLOSEC) 40 MG capsule       Endocrine    Impaired fasting glucose     Watch dietary intake of carbohydrates  Repeat hemoglobin A1c in 6 months  Relevant Orders    Hemoglobin A1C       Cardiovascular and Mediastinum    Coronary artery disease     Follow-up with cardiologist as scheduled  Patient remains on aspirin and rosuvastatin  Cholesterol is almost at goal with LDL less than 70         Relevant Orders    CBC and differential    TSH, 3rd generation with Free T4 reflex       Genitourinary    Benign prostatic hyperplasia with nocturia     Check screening PSA with next set of labs when he is on Medicare         Relevant Medications    finasteride (PROSCAR) 5 mg tablet       Other    Abscess of left lower eyelid     - follow-up with Dr Manny Boyd  - Patient will be placed on Bactrim DS which provides better skin jian coverage thing Keflex  Patient was placed on Keflex however staph epidermidis has a high rate of resistance in this area  Relevant Medications    sulfamethoxazole-trimethoprim (BACTRIM DS) 800-160 mg per tablet    Hyperlipidemia     Well controlled on rosuvastatin  Continue same    Repeat lipid profile in 6 months         Relevant Medications    rosuvastatin (CRESTOR) 10 MG tablet    Other Relevant Orders    Comprehensive metabolic panel    Lipid panel      Other Visit Diagnoses     Gastroesophageal reflux disease without esophagitis        Relevant Medications    omeprazole (PriLOSEC) 40 MG capsule    Flu vaccine need        Relevant Orders    influenza vaccine, 2447-6648, quadrivalent, recombinant, PF, 0 5 mL, for patients 50-64 yr (FLUBLOK)    Need for prophylactic vaccination against Streptococcus pneumoniae (pneumococcus)        Relevant Orders    PNEUMOCOCCAL POLYSACCHARIDE VACCINE 23-VALENT =>1YO SQ IM    Prostate cancer screening        Relevant Orders    PSA, Total Screen    Need for hepatitis C screening test        Relevant Orders    Hepatitis C antibody          I have spent 42 minutes with Patient  today in which greater than 50% of this time was spent in counseling/coordination of care regarding Diagnostic results, Prognosis, Risks and benefits of tx options, Intructions for management, Patient and family education, Importance of tx compliance, Risk factor reductions and Impressions

## 2019-09-09 NOTE — ASSESSMENT & PLAN NOTE
Follow-up with cardiologist as scheduled  Patient remains on aspirin and rosuvastatin    Cholesterol is almost at goal with LDL less than 70

## 2019-09-09 NOTE — ASSESSMENT & PLAN NOTE
Patient does follow up with Gastroenterology on a regular basis  Patient remains on omeprazole 40 mg once daily  He has had recent EGD which showed that his Last's esophagus have resolved

## 2020-01-10 DIAGNOSIS — E78.2 MIXED HYPERLIPIDEMIA: ICD-10-CM

## 2020-01-10 DIAGNOSIS — K21.9 GASTROESOPHAGEAL REFLUX DISEASE WITHOUT ESOPHAGITIS: ICD-10-CM

## 2020-01-10 RX ORDER — OMEPRAZOLE 40 MG/1
40 CAPSULE, DELAYED RELEASE ORAL DAILY
Qty: 90 CAPSULE | Refills: 1 | Status: SHIPPED | OUTPATIENT
Start: 2020-01-10 | End: 2020-03-24

## 2020-01-10 RX ORDER — ROSUVASTATIN CALCIUM 10 MG/1
10 TABLET, COATED ORAL DAILY
Qty: 90 TABLET | Refills: 1 | Status: SHIPPED | OUTPATIENT
Start: 2020-01-10 | End: 2020-03-24 | Stop reason: SDUPTHER

## 2020-01-10 NOTE — TELEPHONE ENCOUNTER
Patient no longer uses [facility] and could not get the refill that was left   swelling of the ankles he needs new to [facility] [place]

## 2020-01-13 DIAGNOSIS — N40.1 BENIGN PROSTATIC HYPERPLASIA WITH NOCTURIA: ICD-10-CM

## 2020-01-13 DIAGNOSIS — R35.1 BENIGN PROSTATIC HYPERPLASIA WITH NOCTURIA: ICD-10-CM

## 2020-01-13 RX ORDER — FINASTERIDE 5 MG/1
5 TABLET, FILM COATED ORAL DAILY
Qty: 90 TABLET | Refills: 1 | Status: SHIPPED | OUTPATIENT
Start: 2020-01-13 | End: 2020-03-24 | Stop reason: SDUPTHER

## 2020-02-12 ENCOUNTER — TELEPHONE (OUTPATIENT)
Dept: FAMILY MEDICINE CLINIC | Facility: CLINIC | Age: 65
End: 2020-02-12

## 2020-02-12 DIAGNOSIS — Z29.8 NEED FOR MALARIA PROPHYLAXIS: Primary | ICD-10-CM

## 2020-02-12 RX ORDER — MEFLOQUINE HYDROCHLORIDE 250 MG/1
250 TABLET ORAL
Qty: 5 TABLET | Refills: 0 | Status: SHIPPED | OUTPATIENT
Start: 2020-02-12 | End: 2020-10-20 | Stop reason: ALTCHOICE

## 2020-02-12 NOTE — TELEPHONE ENCOUNTER
Patient is requesting a refill of Mefloquine 250 mg as he is leaving for [facility] coming up

## 2020-02-19 ENCOUNTER — OFFICE VISIT (OUTPATIENT)
Dept: SLEEP CENTER | Facility: CLINIC | Age: 65
End: 2020-02-19
Payer: MEDICARE

## 2020-02-19 VITALS
BODY MASS INDEX: 31.15 KG/M2 | SYSTOLIC BLOOD PRESSURE: 140 MMHG | HEIGHT: 72 IN | HEART RATE: 72 BPM | WEIGHT: 230 LBS | DIASTOLIC BLOOD PRESSURE: 7 MMHG

## 2020-02-19 DIAGNOSIS — G47.33 OSA (OBSTRUCTIVE SLEEP APNEA): Primary | ICD-10-CM

## 2020-02-19 PROCEDURE — 4040F PNEUMOC VAC/ADMIN/RCVD: CPT | Performed by: INTERNAL MEDICINE

## 2020-02-19 PROCEDURE — 99214 OFFICE O/P EST MOD 30 MIN: CPT | Performed by: INTERNAL MEDICINE

## 2020-02-19 PROCEDURE — 1036F TOBACCO NON-USER: CPT | Performed by: INTERNAL MEDICINE

## 2020-02-19 PROCEDURE — 3008F BODY MASS INDEX DOCD: CPT | Performed by: INTERNAL MEDICINE

## 2020-02-19 NOTE — PROGRESS NOTES
Progress Note - Sleep Center   Martha Jorgensen CAD:[id number] MRN: [id number]      Reason for Visit:  71 y  o male here for annual follow-up    Assessment:  Doing well on current therapy of APAP 11 to 15 cm for previously diagnosed obstructive sleep apnea  Plan:  Continue same  Prescribed a new APAP device  I will order a split study which is required by Medicare    Follow up: One year    History of Present Illness:  History of LENY on PAP therapy  Fully compliant and deriving benefit  The patient has been using his travel CPAP (Transcend), which is breaking down (humidifier)  Apparently, he got a new machine in 2017, but he has not been using it      Review of Systems      Genitourinary need to urinate more than twice a night and difficulty with erection   Cardiology none   Gastrointestinal none   Neurology numbness/tingling of an extremity   Constitutional none   Integumentary none   Psychiatry none   Musculoskeletal joint pain   Pulmonary none   ENT ringing in ears   Endocrine none   Hematological none       I have reviewed and updated the review of systems as necessary      Historical Information    Past Medical History:   Past Medical History:   Diagnosis Date    Abrasion of left foot     LAST ASSESSED: 9/23/13    Achilles tendinitis, unspecified leg     LAST ASSESSED: 11/30/12    Allergic rhinitis     LAST ASSESSED: 11/25/13    Allergic rhinitis 06/25/2008    LAST ASSESSED: 6/25/08    Cervicalgia 04/22/2011    LAST ASSESSED: 4/22/11    Coronary artery disease     Dysfunction of left eustachian tube     LAST ASSESSED: 9/23/13    Epistaxis     LAST ASSESSED: 5/27/15    First degree atrioventricular block     LAST ASSESSED: 7/10/17    Gastric ulcer 10/07/2011    LAST ASSESSED: 3/9/15    GERD (gastroesophageal reflux disease)     Hemorrhoids 05/13/2011    LAST ASSESSED: 5/13/11    Long term use of drug     LAST ASSESSED: 10/11/12    Myalgia 12/21/2007    LAST ASSESSED: 12/21/07    Myositis 2007    LAST ASSESSED: 07    Numbness and tingling of foot     LAST ASSESSED: 3/9/15    Sleep apnea          Past Surgical History:   Past Surgical History:   Procedure Laterality Date    CARDIAC CATHETERIZATION  2017    CHOLECYSTECTOMY      LAPAROSCOPIC; LAST ASSESSED: 10/17/17    COLONOSCOPY      COMPLETE; 3-4 YEARS AGO BY [facility]; LAST ASSESSED: 14    MYRINGOTOMY W/ TUBES      WITH VENTILATING TUBE INSERTION    VASECTOMY         Social History:   Social History     Socioeconomic History    Marital status: /Civil Union     Spouse name: None    Number of children: None    Years of education: None    Highest education level: None   Occupational History    None   Social Needs    Financial resource strain: None    Food insecurity:     Worry: None     Inability: None    Transportation needs:     Medical: None     Non-medical: None   Tobacco Use    Smoking status: Former Smoker    Smokeless tobacco: Former User   Substance and Sexual Activity    Alcohol use: Yes     Comment: social    Drug use: No    Sexual activity: None   Lifestyle    Physical activity:     Days per week: None     Minutes per session: None    Stress: None   Relationships    Social connections:     Talks on phone: None     Gets together: None     Attends Yarsani service: None     Active member of club or organization: None     Attends meetings of clubs or organizations: None     Relationship status: None    Intimate partner violence:     Fear of current or ex partner: None     Emotionally abused: None     Physically abused: None     Forced sexual activity: None   Other Topics Concern    None   Social History Narrative    None       Family History:   Family History   Problem Relation Age of Onset    Cancer Mother         SOFT TISSUE CANCER ON RT SIDE CHEST WALL AND     Prostate cancer Maternal Uncle        Medications/Allergies:      Current Outpatient Medications:     aspirin (ASPIR-81) 81 mg EC tablet, Take 81 mg by mouth daily, Disp: , Rfl:     Coenzyme Q10 (COQ10) 200 MG CAPS, Take 1 tablet by mouth daily, Disp: , Rfl:     diclofenac (VOLTAREN) 75 mg EC tablet, Take 1 tablet (75 mg total) by mouth 2 (two) times a day, Disp: 60 tablet, Rfl: 1    finasteride (PROSCAR) 5 mg tablet, Take 1 tablet (5 mg total) by mouth daily, Disp: 90 tablet, Rfl: 1    fluticasone (FLONASE) 50 mcg/act nasal spray, 2 sprays into each nostril daily, Disp: , Rfl:     Glucosamine-Chondroitin (GLUCOSAMINE CHONDR COMPLEX PO), Take 1 tablet by mouth daily, Disp: , Rfl:     ILEVRO 0 3 % SUSP, , Disp: , Rfl:     LOTEMAX 0 5 % ophthalmic suspension, INSTILL 1 DROP 3 TIMES EVERY DAY INTO RIGHT EYE THEN AS DIRECTED, Disp: , Rfl: 1    mefloquine (LARIAM) 250 MG tablet, Take 1 tablet (250 mg total) by mouth every 7 days, Disp: 5 tablet, Rfl: 0    nitroglycerin (NITROSTAT) 0 4 mg SL tablet, PLACE 1 TABLET(S) BY SUBLINGUAL ROUTE , Disp: , Rfl: 4    Omega-3 Fatty Acids (FISH OIL) 1000 MG CPDR, Take by mouth daily, Disp: , Rfl:     omeprazole (PriLOSEC) 40 MG capsule, Take 1 capsule (40 mg total) by mouth daily, Disp: 90 capsule, Rfl: 1    RESTASIS 0 05 % ophthalmic emulsion, , Disp: , Rfl:     rosuvastatin (CRESTOR) 10 MG tablet, Take 1 tablet (10 mg total) by mouth daily, Disp: 90 tablet, Rfl: 1          Objective      Vital Signs:   Vitals:    02/19/20 1300   BP: (!) 140/7   Pulse: 72     Deming Sleepiness Scale: Total score: 10        Physical Exam:    General: Alert, appropriate, cooperative, overweight    Head: NC/AT    Skin: Warm, dry    Neuro: No motor abnormalities, cranial nerves appear intact    Extremity: No clubbing, cyanosis      DME Provider: [facility]        Counseling / Coordination of Care   I have spent 15 minutes with the patient today in which greater than 50% of this time was spent in counseling/coordination of care regarding: equipment and compliance                Board Certified Sleep Specialist    Portions of the record may have been created with voice recognition software  Occasional wrong word or "sound a like" substitutions may have occurred due to the inherent limitations of voice recognition software  Read the chart carefully and recognize, using context, where substitutions have occurred

## 2020-03-11 LAB
ALBUMIN SERPL-MCNC: 4.2 G/DL (ref 3.6–5.1)
ALBUMIN/GLOB SERPL: 1.9 (CALC) (ref 1–2.5)
ALP SERPL-CCNC: 64 U/L (ref 35–144)
ALT SERPL-CCNC: 44 U/L (ref 9–46)
AST SERPL-CCNC: 25 U/L (ref 10–35)
BASOPHILS # BLD AUTO: 39 CELLS/UL (ref 0–200)
BASOPHILS NFR BLD AUTO: 0.7 %
BILIRUB SERPL-MCNC: 0.9 MG/DL (ref 0.2–1.2)
BUN SERPL-MCNC: 15 MG/DL (ref 7–25)
BUN/CREAT SERPL: ABNORMAL (CALC) (ref 6–22)
CALCIUM SERPL-MCNC: 9.2 MG/DL (ref 8.6–10.3)
CHLORIDE SERPL-SCNC: 107 MMOL/L (ref 98–110)
CHOLEST SERPL-MCNC: 139 MG/DL
CHOLEST/HDLC SERPL: 3.5 (CALC)
CO2 SERPL-SCNC: 25 MMOL/L (ref 20–32)
CREAT SERPL-MCNC: 1.04 MG/DL (ref 0.7–1.25)
EOSINOPHIL # BLD AUTO: 110 CELLS/UL (ref 15–500)
EOSINOPHIL NFR BLD AUTO: 2 %
ERYTHROCYTE [DISTWIDTH] IN BLOOD BY AUTOMATED COUNT: 12.8 % (ref 11–15)
GLOBULIN SER CALC-MCNC: 2.2 G/DL (CALC) (ref 1.9–3.7)
GLUCOSE SERPL-MCNC: 101 MG/DL (ref 65–99)
HBA1C MFR BLD: 5.6 % OF TOTAL HGB
HCT VFR BLD AUTO: 46.8 % (ref 38.5–50)
HCV AB S/CO SERPL IA: 0.01
HCV AB SERPL QL IA: NORMAL
HDLC SERPL-MCNC: 40 MG/DL
HGB BLD-MCNC: 16.1 G/DL (ref 13.2–17.1)
LDLC SERPL CALC-MCNC: 80 MG/DL (CALC)
LYMPHOCYTES # BLD AUTO: 2101 CELLS/UL (ref 850–3900)
LYMPHOCYTES NFR BLD AUTO: 38.2 %
MCH RBC QN AUTO: 31.6 PG (ref 27–33)
MCHC RBC AUTO-ENTMCNC: 34.4 G/DL (ref 32–36)
MCV RBC AUTO: 91.8 FL (ref 80–100)
MONOCYTES # BLD AUTO: 688 CELLS/UL (ref 200–950)
MONOCYTES NFR BLD AUTO: 12.5 %
NEUTROPHILS # BLD AUTO: 2563 CELLS/UL (ref 1500–7800)
NEUTROPHILS NFR BLD AUTO: 46.6 %
NONHDLC SERPL-MCNC: 99 MG/DL (CALC)
PLATELET # BLD AUTO: 222 THOUSAND/UL (ref 140–400)
PMV BLD REES-ECKER: 10.2 FL (ref 7.5–12.5)
POTASSIUM SERPL-SCNC: 4.4 MMOL/L (ref 3.5–5.3)
PROT SERPL-MCNC: 6.4 G/DL (ref 6.1–8.1)
PSA SERPL-MCNC: 0.9 NG/ML
RBC # BLD AUTO: 5.1 MILLION/UL (ref 4.2–5.8)
SL AMB EGFR AFRICAN AMERICAN: 87 ML/MIN/1.73M2
SL AMB EGFR NON AFRICAN AMERICAN: 75 ML/MIN/1.73M2
SODIUM SERPL-SCNC: 140 MMOL/L (ref 135–146)
TRIGL SERPL-MCNC: 108 MG/DL
TSH SERPL-ACNC: 3.28 MIU/L (ref 0.4–4.5)
WBC # BLD AUTO: 5.5 THOUSAND/UL (ref 3.8–10.8)

## 2020-03-24 ENCOUNTER — TELEMEDICINE (OUTPATIENT)
Dept: FAMILY MEDICINE CLINIC | Facility: CLINIC | Age: 65
End: 2020-03-24
Payer: MEDICARE

## 2020-03-24 VITALS — DIASTOLIC BLOOD PRESSURE: 77 MMHG | SYSTOLIC BLOOD PRESSURE: 142 MMHG

## 2020-03-24 DIAGNOSIS — E78.2 MIXED HYPERLIPIDEMIA: ICD-10-CM

## 2020-03-24 DIAGNOSIS — R73.01 IMPAIRED FASTING GLUCOSE: ICD-10-CM

## 2020-03-24 DIAGNOSIS — I25.10 CORONARY ARTERY DISEASE INVOLVING NATIVE HEART WITHOUT ANGINA PECTORIS, UNSPECIFIED VESSEL OR LESION TYPE: Primary | ICD-10-CM

## 2020-03-24 DIAGNOSIS — K22.70 BARRETT'S ESOPHAGUS WITHOUT DYSPLASIA: ICD-10-CM

## 2020-03-24 DIAGNOSIS — R35.1 BENIGN PROSTATIC HYPERPLASIA WITH NOCTURIA: ICD-10-CM

## 2020-03-24 DIAGNOSIS — N40.1 BENIGN PROSTATIC HYPERPLASIA WITH NOCTURIA: ICD-10-CM

## 2020-03-24 PROCEDURE — 99213 OFFICE O/P EST LOW 20 MIN: CPT | Performed by: FAMILY MEDICINE

## 2020-03-24 RX ORDER — ROSUVASTATIN CALCIUM 10 MG/1
10 TABLET, COATED ORAL DAILY
Qty: 90 TABLET | Refills: 1 | Status: SHIPPED | OUTPATIENT
Start: 2020-03-24 | End: 2020-09-21

## 2020-03-24 RX ORDER — FINASTERIDE 5 MG/1
5 TABLET, FILM COATED ORAL DAILY
Qty: 90 TABLET | Refills: 1 | Status: SHIPPED | OUTPATIENT
Start: 2020-03-24 | End: 2020-09-21

## 2020-03-24 RX ORDER — OMEPRAZOLE 20 MG/1
CAPSULE, DELAYED RELEASE ORAL
COMMUNITY
Start: 2020-02-10 | End: 2021-02-04

## 2020-03-24 NOTE — ASSESSMENT & PLAN NOTE
- patient did have repeat EGD performed by Gastroenterology  Advised to reduce dose of omeprazole from 40 mg to 20 mg  Patient does understand the risk associated with proton pump inhibitor but also understands the risks of esophageal cancer associated with Last's esophagus  -patient does try to avoid foods that exacerbate his reflux which include fatty and fried foods  He seems to do well with spicy foods      -follow-up with Gastroenterology as scheduled

## 2020-03-24 NOTE — PROGRESS NOTES
Subjective:      Patient ID: Kailee Dickens is a 71 y o  male  HPI    Past Medical History:   Diagnosis Date    Abrasion of left foot     LAST ASSESSED: 13    Achilles tendinitis, unspecified leg     LAST ASSESSED: 12    Allergic rhinitis     LAST ASSESSED: 13    Allergic rhinitis 2008    LAST ASSESSED: 08    Cervicalgia 2011    LAST ASSESSED: 11    Coronary artery disease     Dysfunction of left eustachian tube     LAST ASSESSED: 13    Epistaxis     LAST ASSESSED: 5/27/15    First degree atrioventricular block     LAST ASSESSED: 7/10/17    Gastric ulcer 10/07/2011    LAST ASSESSED: 3/9/15    GERD (gastroesophageal reflux disease)     Hemorrhoids 2011    LAST ASSESSED: 11    Long term use of drug     LAST ASSESSED: 10/11/12    Myalgia 2007    LAST ASSESSED: 07    Myositis 2007    LAST ASSESSED: 07    Numbness and tingling of foot     LAST ASSESSED: 3/9/15    Sleep apnea        Family History   Problem Relation Age of Onset    Cancer Mother         SOFT TISSUE CANCER ON RT SIDE CHEST WALL AND     Prostate cancer Maternal Uncle        Past Surgical History:   Procedure Laterality Date    CARDIAC CATHETERIZATION      CHOLECYSTECTOMY      LAPAROSCOPIC; LAST ASSESSED: 10/17/17    COLONOSCOPY      COMPLETE; 3-4 YEARS AGO BY [facility]; LAST ASSESSED: 14    MYRINGOTOMY W/ TUBES      WITH VENTILATING TUBE INSERTION    VASECTOMY          reports that he has quit smoking  He has quit using smokeless tobacco  He reports that he drinks alcohol  He reports that he does not use drugs        Current Outpatient Medications:     aspirin (ASPIR-81) 81 mg EC tablet, Take 81 mg by mouth daily, Disp: , Rfl:     Coenzyme Q10 (COQ10) 200 MG CAPS, Take 1 tablet by mouth daily, Disp: , Rfl:     diclofenac (VOLTAREN) 75 mg EC tablet, Take 1 tablet (75 mg total) by mouth 2 (two) times a day, Disp: 60 tablet, Rfl: 1    finasteride (PROSCAR) 5 mg tablet, Take 1 tablet (5 mg total) by mouth daily, Disp: 90 tablet, Rfl: 1    fluticasone (FLONASE) 50 mcg/act nasal spray, 2 sprays into each nostril daily, Disp: , Rfl:     Glucosamine-Chondroitin (GLUCOSAMINE CHONDR COMPLEX PO), Take 1 tablet by mouth daily, Disp: , Rfl:     ILEVRO 0 3 % SUSP, , Disp: , Rfl:     LOTEMAX 0 5 % ophthalmic suspension, INSTILL 1 DROP 3 TIMES EVERY DAY INTO RIGHT EYE THEN AS DIRECTED, Disp: , Rfl: 1    mefloquine (LARIAM) 250 MG tablet, Take 1 tablet (250 mg total) by mouth every 7 days, Disp: 5 tablet, Rfl: 0    nitroglycerin (NITROSTAT) 0 4 mg SL tablet, PLACE 1 TABLET(S) BY SUBLINGUAL ROUTE , Disp: , Rfl: 4    Omega-3 Fatty Acids (FISH OIL) 1000 MG CPDR, Take by mouth daily, Disp: , Rfl:     omeprazole (PriLOSEC) 20 mg delayed release capsule, ONE BY MOUTH EVERY DAY 1/2 HOUR BEFORE BREAKFAST, Disp: , Rfl:     omeprazole (PriLOSEC) 40 MG capsule, Take 1 capsule (40 mg total) by mouth daily, Disp: 90 capsule, Rfl: 1    RESTASIS 0 05 % ophthalmic emulsion, , Disp: , Rfl:     rosuvastatin (CRESTOR) 10 MG tablet, Take 1 tablet (10 mg total) by mouth daily, Disp: 90 tablet, Rfl: 1    The following portions of the patient's history were reviewed and updated as appropriate: allergies, current medications, past family history, past medical history, past social history, past surgical history and problem list     Review of Systems        Objective: There were no vitals taken for this visit       Physical Exam      Recent Results (from the past 1008 hour(s))   Lipid panel    Collection Time: 03/10/20  7:35 AM   Result Value Ref Range    Total Cholesterol 139 <200 mg/dL    HDL 40 > OR = 40 mg/dL    Triglycerides 108 <150 mg/dL    LDL Direct 80 mg/dL (calc)    Chol HDLC Ratio 3 5 <5 0 (calc)    Non-HDL Cholesterol 99 <130 mg/dL (calc)   Comprehensive metabolic panel    Collection Time: 03/10/20  7:35 AM   Result Value Ref Range    Glucose, Random 101 (H) 65 - 99 mg/dL    BUN 15 7 - 25 mg/dL    Creatinine 1 04 0 70 - 1 25 mg/dL    eGFR Non  75 > OR = 60 mL/min/1 73m2    eGFR  87 > OR = 60 mL/min/1 73m2    SL AMB BUN/CREATININE RATIO NOT APPLICABLE 6 - 22 (calc)    Sodium 140 135 - 146 mmol/L    Potassium 4 4 3 5 - 5 3 mmol/L    Chloride 107 98 - 110 mmol/L    CO2 25 20 - 32 mmol/L    Calcium 9 2 8 6 - 10 3 mg/dL    Protein, Total 6 4 6 1 - 8 1 g/dL    Albumin 4 2 3 6 - 5 1 g/dL    Globulin 2 2 1 9 - 3 7 g/dL (calc)    Albumin/Globulin Ratio 1 9 1 0 - 2 5 (calc)    TOTAL BILIRUBIN 0 9 0 2 - 1 2 mg/dL    Alkaline Phosphatase 64 35 - 144 U/L    AST 25 10 - 35 U/L    ALT 44 9 - 46 U/L   CBC and differential    Collection Time: 03/10/20  7:35 AM   Result Value Ref Range    White Blood Cell Count 5 5 3 8 - 10 8 Thousand/uL    Red Blood Cell Count 5 10 4 20 - 5 80 Million/uL    Hemoglobin 16 1 13 2 - 17 1 g/dL    HCT 46 8 38 5 - 50 0 %    MCV 91 8 80 0 - 100 0 fL    MCH 31 6 27 0 - 33 0 pg    MCHC 34 4 32 0 - 36 0 g/dL    RDW 12 8 11 0 - 15 0 %    Platelet Count 041 842 - 400 Thousand/uL    SL AMB MPV 10 2 7 5 - 12 5 fL    Neutrophils (Absolute) 2,563 1,500 - 7,800 cells/uL    Lymphocytes (Absolute) 2,101 850 - 3,900 cells/uL    Monocytes (Absolute) 688 200 - 950 cells/uL    Eosinophils (Absolute) 110 15 - 500 cells/uL    Basophils ABS 39 0 - 200 cells/uL    Neutrophils 46 6 %    Lymphocytes 38 2 %    Monocytes 12 5 %    Eosinophils 2 0 %    Basophils PCT 0 7 %   Hepatitis C Ab W/Refl To HCV RNA, Qn, PCR    Collection Time: 03/10/20  7:35 AM   Result Value Ref Range    HEP C AB NON-REACTIVE NON-REACTIVE    Signal to Cut-Off 0 01 <1 00   PSA, Total Screen    Collection Time: 03/10/20  7:35 AM   Result Value Ref Range    Prostate Specific Antigen Total 0 9 < OR = 4 0 ng/mL   TSH, 3rd generation with Free T4 reflex    Collection Time: 03/10/20  7:35 AM   Result Value Ref Range    TSH W/RFX TO FREE T4 3 28 0 40 - 4 50 mIU/L   Hemoglobin A1c (w/out EAG)    Collection Time: 03/10/20  7:35 AM   Result Value Ref Range    Hemoglobin A1C 5 6 <5 7 % of total Hgb       Assessment/Plan:    No problem-specific Assessment & Plan notes found for this encounter          {[email or web address]:[id number]}

## 2020-03-24 NOTE — ASSESSMENT & PLAN NOTE
Cholesterol slightly worse after the holiday season  LDL cholesterol up to 80  With history of heart disease his goal is less than 70  No change in dose of Crestor  Continue on Crestor 10 mg once daily  Watch dietary intake of fats and cholesterol    Repeat lipid profile in 6 months

## 2020-03-24 NOTE — ASSESSMENT & PLAN NOTE
- patient does follow up with cardiologist on a routine basis  Patient has not had any recent symptoms  Continue to control risk factors including hypertension, hyperlipidemia and impaired fasting glucose  He is not a diabetic

## 2020-03-24 NOTE — ASSESSMENT & PLAN NOTE
- mildly in mildly impaired fasting glucose  Patient needs to watch dietary intake of carbohydrates    Recommend repeat 1 1 in 6 months A1c 5 6 presented is in a range

## 2020-03-24 NOTE — ASSESSMENT & PLAN NOTE
- screening PSA was normal      - patient remains on Proscar 5 mg once daily  - once things settle down he will likely need referral to Urology for evaluation  Patient had heard about urolift procedure    As the nocturia is getting annoying for him

## 2020-03-24 NOTE — PROGRESS NOTES
Virtual Regular Visit    Problem List Items Addressed This Visit        Endocrine    Impaired fasting glucose    Relevant Orders    TSH, 3rd generation with Free T4 reflex    Hemoglobin A1C       Cardiovascular and Mediastinum    Coronary artery disease - Primary    Relevant Orders    CBC and differential    TSH, 3rd generation with Free T4 reflex       Other    Benign prostatic hyperplasia with nocturia    Relevant Medications    finasteride (PROSCAR) 5 mg tablet    Other Relevant Orders    TSH, 3rd generation with Free T4 reflex    Hyperlipidemia    Relevant Medications    rosuvastatin (CRESTOR) 10 MG tablet    Other Relevant Orders    Comprehensive metabolic panel    Lipid panel    TSH, 3rd generation with Free T4 reflex          BMI Counseling: There is no height or weight on file to calculate BMI  The BMI is above normal  Nutrition recommendations include reducing intake of cholesterol  Exercise recommendations include exercising 3-5 times per week  Reason for visit is 6 month follow-up    Encounter provider Monika Ahumada, DO    Provider located at [address] [place] [address]      Recent Visits  No visits were found meeting these conditions  Showing recent visits within past 7 days and meeting all other requirements     Future Appointments  No visits were found meeting these conditions  Showing future appointments within next 150 days and meeting all other requirements        After connecting through [employer], the patient was identified by name and date of birth  Jennifer Correia was informed that this is a telemedicine visit and that the visit is being conducted through [employer] which may not be secure and therefore, might not be HIPAA-compliant  My office door was closed  No one else was in the room  He acknowledged consent and understanding of privacy and security of the video platform   The patient has agreed to participate and understands they can discontinue the visit at any time  Subjective  Elva Ugarte is a 71 y o  male presenting for 6 month follow-up of chronic conditions  Patient would actually be due for his subsequent annual wellness visit  Patient is being seen as a virtual visit due to ongoing concerns regarding corona virus  He feels well  He still finds that he has nocturia as well as weakness of urinary stream   Patient is on Proscar  Eventually he would like to see urologist but not at the current moment  Patient did see cardiologist who made no changes to his medications  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  His dose of omeprazole was actually reduced from 40 mg to 20 mg once daily  This seems to be doing well for his reflux symptoms  Patient does have follow-up scheduled with Gastroenterology as well as Cardiology  Labs were reviewed with patient which showed normal CBC, CMP with the exception of mildly impaired fasting glucose, hemoglobin A1c of 5 6%  Cholesterol did go up slightly with LDL of 80  Patient remains on Crestor 10 mg once daily  Patient does need refill of Crestor as well as Proscar    PSA normal at 0 9      Past Medical History:   Diagnosis Date    Abrasion of left foot     LAST ASSESSED: 9/23/13    Achilles tendinitis, unspecified leg     LAST ASSESSED: 11/30/12    Allergic rhinitis     LAST ASSESSED: 11/25/13    Allergic rhinitis 06/25/2008    LAST ASSESSED: 6/25/08    Cervicalgia 04/22/2011    LAST ASSESSED: 4/22/11    Coronary artery disease     Dysfunction of left eustachian tube     LAST ASSESSED: 9/23/13    Epistaxis     LAST ASSESSED: 5/27/15    First degree atrioventricular block     LAST ASSESSED: 7/10/17    Gastric ulcer 10/07/2011    LAST ASSESSED: 3/9/15    GERD (gastroesophageal reflux disease)     Hemorrhoids 05/13/2011    LAST ASSESSED: 5/13/11    Long term use of drug     LAST ASSESSED: 10/11/12    Myalgia 12/21/2007    LAST ASSESSED: 12/21/07    Myositis 12/21/2007    LAST ASSESSED: 12/21/07    Numbness and tingling of foot     LAST ASSESSED: 3/9/15    Sleep apnea        Past Surgical History:   Procedure Laterality Date    CARDIAC CATHETERIZATION  2017    CHOLECYSTECTOMY      LAPAROSCOPIC; LAST ASSESSED: 10/17/17    COLONOSCOPY      COMPLETE; 3-4 YEARS AGO BY [facility]; LAST ASSESSED: 8/18/14    MYRINGOTOMY W/ TUBES      WITH VENTILATING TUBE INSERTION    VASECTOMY         Current Outpatient Medications   Medication Sig Dispense Refill    aspirin (ASPIR-81) 81 mg EC tablet Take 81 mg by mouth daily      Coenzyme Q10 (COQ10) 200 MG CAPS Take 1 tablet by mouth daily      diclofenac (VOLTAREN) 75 mg EC tablet Take 1 tablet (75 mg total) by mouth 2 (two) times a day 60 tablet 1    finasteride (PROSCAR) 5 mg tablet Take 1 tablet (5 mg total) by mouth daily 90 tablet 1    fluticasone (FLONASE) 50 mcg/act nasal spray 2 sprays into each nostril daily      Glucosamine-Chondroitin (GLUCOSAMINE CHONDR COMPLEX PO) Take 1 tablet by mouth daily      ILEVRO 0 3 % SUSP       LOTEMAX 0 5 % ophthalmic suspension INSTILL 1 DROP 3 TIMES EVERY DAY INTO RIGHT EYE THEN AS DIRECTED  1    mefloquine (LARIAM) 250 MG tablet Take 1 tablet (250 mg total) by mouth every 7 days 5 tablet 0    nitroglycerin (NITROSTAT) 0 4 mg SL tablet PLACE 1 TABLET(S) BY SUBLINGUAL ROUTE   4    Omega-3 Fatty Acids (FISH OIL) 1000 MG CPDR Take by mouth daily      omeprazole (PriLOSEC) 20 mg delayed release capsule ONE BY MOUTH EVERY DAY 1/2 HOUR BEFORE BREAKFAST      RESTASIS 0 05 % ophthalmic emulsion       rosuvastatin (CRESTOR) 10 MG tablet Take 1 tablet (10 mg total) by mouth daily 90 tablet 1     No current facility-administered medications for this visit           Allergies   Allergen Reactions    Amoxicillin Anaphylaxis    Acetaminophen      Rapid heart rate    Apple     Cherry Flavor     Pollen Extract     Augmentin [Amoxicillin-Pot Clavulanate]     Dye [Iodinated Diagnostic Agents]        Review of Systems   Constitutional: Negative  HENT: Negative  Eyes: Negative  Respiratory: Negative  Cardiovascular: Negative  Gastrointestinal: Negative  Endocrine: Negative  Genitourinary: Negative  Some weakness of urinary stream as well as nocturia   Musculoskeletal: Negative  Skin: Negative  Allergic/Immunologic: Negative  Neurological: Negative  Hematological: Negative  Psychiatric/Behavioral: Negative  All other systems reviewed and are negative  Physical Exam   Constitutional: He is oriented to person, place, and time  He appears well-developed and well-nourished  HENT:   Head: Normocephalic and atraumatic  Eyes: Pupils are equal, round, and reactive to light  EOM are normal    Neurological: He is alert and oriented to person, place, and time  Psychiatric: He has a normal mood and affect   His behavior is normal  Judgment and thought content normal           Recent Results (from the past 1008 hour(s))   Lipid panel    Collection Time: 03/10/20  7:35 AM   Result Value Ref Range    Total Cholesterol 139 <200 mg/dL    HDL 40 > OR = 40 mg/dL    Triglycerides 108 <150 mg/dL    LDL Direct 80 mg/dL (calc)    Chol HDLC Ratio 3 5 <5 0 (calc)    Non-HDL Cholesterol 99 <130 mg/dL (calc)   Comprehensive metabolic panel    Collection Time: 03/10/20  7:35 AM   Result Value Ref Range    Glucose, Random 101 (H) 65 - 99 mg/dL    BUN 15 7 - 25 mg/dL    Creatinine 1 04 0 70 - 1 25 mg/dL    eGFR Non  75 > OR = 60 mL/min/1 73m2    eGFR  87 > OR = 60 mL/min/1 73m2    SL AMB BUN/CREATININE RATIO NOT APPLICABLE 6 - 22 (calc)    Sodium 140 135 - 146 mmol/L    Potassium 4 4 3 5 - 5 3 mmol/L    Chloride 107 98 - 110 mmol/L    CO2 25 20 - 32 mmol/L    Calcium 9 2 8 6 - 10 3 mg/dL    Protein, Total 6 4 6 1 - 8 1 g/dL    Albumin 4 2 3 6 - 5 1 g/dL    Globulin 2 2 1 9 - 3 7 g/dL (calc)    Albumin/Globulin Ratio 1 9 1 0 - 2 5 (calc)    TOTAL BILIRUBIN 0 9 0 2 - 1 2 mg/dL    Alkaline Phosphatase 64 35 - 144 U/L    AST 25 10 - 35 U/L    ALT 44 9 - 46 U/L   CBC and differential    Collection Time: 03/10/20  7:35 AM   Result Value Ref Range    White Blood Cell Count 5 5 3 8 - 10 8 Thousand/uL    Red Blood Cell Count 5 10 4 20 - 5 80 Million/uL    Hemoglobin 16 1 13 2 - 17 1 g/dL    HCT 46 8 38 5 - 50 0 %    MCV 91 8 80 0 - 100 0 fL    MCH 31 6 27 0 - 33 0 pg    MCHC 34 4 32 0 - 36 0 g/dL    RDW 12 8 11 0 - 15 0 %    Platelet Count 634 644 - 400 Thousand/uL    SL AMB MPV 10 2 7 5 - 12 5 fL    Neutrophils (Absolute) 2,563 1,500 - 7,800 cells/uL    Lymphocytes (Absolute) 2,101 850 - 3,900 cells/uL    Monocytes (Absolute) 688 200 - 950 cells/uL    Eosinophils (Absolute) 110 15 - 500 cells/uL    Basophils ABS 39 0 - 200 cells/uL    Neutrophils 46 6 %    Lymphocytes 38 2 %    Monocytes 12 5 %    Eosinophils 2 0 %    Basophils PCT 0 7 %   Hepatitis C Ab W/Refl To HCV RNA, Qn, PCR    Collection Time: 03/10/20  7:35 AM   Result Value Ref Range    HEP C AB NON-REACTIVE NON-REACTIVE    Signal to Cut-Off 0 01 <1 00   PSA, Total Screen    Collection Time: 03/10/20  7:35 AM   Result Value Ref Range    Prostate Specific Antigen Total 0 9 < OR = 4 0 ng/mL   TSH, 3rd generation with Free T4 reflex    Collection Time: 03/10/20  7:35 AM   Result Value Ref Range    TSH W/RFX TO FREE T4 3 28 0 40 - 4 50 mIU/L   Hemoglobin A1c (w/out EAG)    Collection Time: 03/10/20  7:35 AM   Result Value Ref Range    Hemoglobin A1C 5 6 <5 7 % of total Hgb     Problem List Items Addressed This Visit        Digestive    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     - patient did have repeat EGD performed by Gastroenterology  Advised to reduce dose of omeprazole from 40 mg to 20 mg  Patient does understand the risk associated with proton pump inhibitor but also understands the risks of esophageal cancer associated with Last's esophagus      -patient does try to avoid foods that exacerbate his reflux which include fatty and fried foods  He seems to do well with spicy foods  -follow-up with Gastroenterology as scheduled         Relevant Medications    omeprazole (PriLOSEC) 20 mg delayed release capsule       Endocrine    Impaired fasting glucose     - mildly in mildly impaired fasting glucose  Patient needs to watch dietary intake of carbohydrates  Recommend repeat 1 1 in 6 months A1c 5 6 presented is in a range         Relevant Orders    TSH, 3rd generation with Free T4 reflex    Hemoglobin A1C       Cardiovascular and Mediastinum    Coronary artery disease - Primary     - patient does follow up with cardiologist on a routine basis  Patient has not had any recent symptoms  Continue to control risk factors including hypertension, hyperlipidemia and impaired fasting glucose  He is not a diabetic  Relevant Orders    CBC and differential    TSH, 3rd generation with Free T4 reflex       Other    Benign prostatic hyperplasia with nocturia     - screening PSA was normal      - patient remains on Proscar 5 mg once daily  - once things settle down he will likely need referral to Urology for evaluation  Patient had heard about urolift procedure  As the nocturia is getting annoying for him         Relevant Medications    finasteride (PROSCAR) 5 mg tablet    Other Relevant Orders    TSH, 3rd generation with Free T4 reflex    Hyperlipidemia     Cholesterol slightly worse after the holiday season  LDL cholesterol up to 80  With history of heart disease his goal is less than 70  No change in dose of Crestor  Continue on Crestor 10 mg once daily  Watch dietary intake of fats and cholesterol  Repeat lipid profile in 6 months          Relevant Medications    rosuvastatin (CRESTOR) 10 MG tablet    Other Relevant Orders    Comprehensive metabolic panel    Lipid panel    TSH, 3rd generation with Free T4 reflex        I spent 30 minutes with the patient during this visit      Patient will follow-up in 6 months for Welcome to Medicare physical examination    [id number]

## 2020-03-30 ENCOUNTER — TELEPHONE (OUTPATIENT)
Dept: SLEEP CENTER | Facility: CLINIC | Age: 65
End: 2020-03-30

## 2020-03-30 NOTE — TELEPHONE ENCOUNTER
Advised patient split study scheduled for 4/14 is canceled & we will call back with recommendations  How would you like to proceed?

## 2020-04-01 DIAGNOSIS — G47.33 OSA (OBSTRUCTIVE SLEEP APNEA): Primary | ICD-10-CM

## 2020-04-02 NOTE — TELEPHONE ENCOUNTER
I spoke with patient, advised split study ordered    Scheduled for 7/23/2020 (patient request) at [facility]  Instructions provided, advised patient he will need to stop his CPAP 5 days prior to study  Patient adamantly refused  I advised if he does not meet criteria for diagnostic portion they will not be putting CPAP on him  Patient guarantees me that he will meet the criteria      I did reschedule his follow up to 7/30/2020

## 2020-04-03 ENCOUNTER — TELEPHONE (OUTPATIENT)
Dept: SLEEP CENTER | Facility: CLINIC | Age: 65
End: 2020-04-03

## 2020-04-06 DIAGNOSIS — G47.33 OSA (OBSTRUCTIVE SLEEP APNEA): Primary | ICD-10-CM

## 2020-04-07 ENCOUNTER — TELEPHONE (OUTPATIENT)
Dept: SLEEP CENTER | Facility: CLINIC | Age: 65
End: 2020-04-07

## 2020-04-10 DIAGNOSIS — G47.33 OSA (OBSTRUCTIVE SLEEP APNEA): Primary | ICD-10-CM

## 2020-04-14 ENCOUNTER — TELEPHONE (OUTPATIENT)
Dept: SLEEP CENTER | Facility: CLINIC | Age: 65
End: 2020-04-14

## 2020-06-24 ENCOUNTER — TELEPHONE (OUTPATIENT)
Dept: SLEEP CENTER | Facility: CLINIC | Age: 65
End: 2020-06-24

## 2020-06-24 DIAGNOSIS — Z20.822 ENCOUNTER FOR LABORATORY TESTING FOR COVID-19 VIRUS: Primary | ICD-10-CM

## 2020-07-15 DIAGNOSIS — Z20.822 ENCOUNTER FOR LABORATORY TESTING FOR COVID-19 VIRUS: ICD-10-CM

## 2020-07-15 PROCEDURE — U0003 INFECTIOUS AGENT DETECTION BY NUCLEIC ACID (DNA OR RNA); SEVERE ACUTE RESPIRATORY SYNDROME CORONAVIRUS 2 (SARS-COV-2) (CORONAVIRUS DISEASE [COVID-19]), AMPLIFIED PROBE TECHNIQUE, MAKING USE OF HIGH THROUGHPUT TECHNOLOGIES AS DESCRIBED BY CMS-2020-01-R: HCPCS | Performed by: OBSTETRICS & GYNECOLOGY

## 2020-07-16 LAB
INPATIENT: NORMAL
SARS-COV-2 RNA SPEC QL NAA+PROBE: NOT DETECTED

## 2020-07-23 ENCOUNTER — HOSPITAL ENCOUNTER (OUTPATIENT)
Dept: SLEEP CENTER | Facility: CLINIC | Age: 65
Discharge: HOME | End: 2020-07-23
Payer: MEDICARE

## 2020-07-23 DIAGNOSIS — G47.33 OSA (OBSTRUCTIVE SLEEP APNEA): ICD-10-CM

## 2020-07-23 PROCEDURE — 95810 POLYSOM 6/> YRS 4/> PARAM: CPT

## 2020-07-24 NOTE — PROGRESS NOTES
Sleep Study Documentation    Pre-Sleep Study       Sleep testing procedure explained to patient:YES    Patient napped prior to study:NO    Caffeine:Dayshift worker after 12PM   Caffeine use:NO    Alcohol:Dayshift workers after 5PM: Alcohol use:YES-Beer 1 serving at 4:30 PM and Wine 1 serving with dinner    Typical day for patient:NO  (Pt stated it was a physically strenuous day working outside with the high-heat index )     Study Documentation    Sleep Study Indications:    Snoring   Excessive daytime sleepiness   Currently on APAP (11-15 cm) for previous Dx of LENY    Sleep Study: Diagnostic   (Pt was ordered as a split study but did not meet split protocol due to poor sleep efficiency during first few hours of study )    Snore:Mild (intermittent)  Supplemental O2: no    Minimum SaO2:  88%  Baseline SaO2:   94%    EKG abnormalities: yes (cardiac arrhythmia and frequent PVCs)    EEG abnormalities: no    Sleep Study Recorded < 2 hours: N/A    Sleep Study Recorded > 2 hours but incomplete study:  N/A    Sleep Study Recorded 6 hours but no sleep obtained: NO    Patient classification:  semi-retired       Post-Sleep Study    Medication used at bedtime or during sleep study:NO    Patient reports time it took to fall asleep:greater than 60 minutes    Patient reports waking up during study:3 or more times  Patient reports returning to sleep in greater than 30 minutes  Patient reports sleeping 2 to 4 hours with dreaming  Patient reports sleep during study:worse than usual    Patient rated sleepiness: Very sleepy or tired    PAP treatment:no

## 2020-07-27 DIAGNOSIS — G47.33 OSA (OBSTRUCTIVE SLEEP APNEA): Primary | ICD-10-CM

## 2020-07-30 ENCOUNTER — OFFICE VISIT (OUTPATIENT)
Dept: SLEEP CENTER | Facility: CLINIC | Age: 65
End: 2020-07-30
Payer: MEDICARE

## 2020-07-30 VITALS
HEIGHT: 72 IN | SYSTOLIC BLOOD PRESSURE: 132 MMHG | BODY MASS INDEX: 30.75 KG/M2 | HEART RATE: 67 BPM | WEIGHT: 227 LBS | DIASTOLIC BLOOD PRESSURE: 70 MMHG

## 2020-07-30 DIAGNOSIS — G47.33 OSA (OBSTRUCTIVE SLEEP APNEA): Primary | ICD-10-CM

## 2020-07-30 PROCEDURE — 3008F BODY MASS INDEX DOCD: CPT | Performed by: INTERNAL MEDICINE

## 2020-07-30 PROCEDURE — 99213 OFFICE O/P EST LOW 20 MIN: CPT | Performed by: INTERNAL MEDICINE

## 2020-07-30 PROCEDURE — 4040F PNEUMOC VAC/ADMIN/RCVD: CPT | Performed by: INTERNAL MEDICINE

## 2020-07-30 PROCEDURE — 1036F TOBACCO NON-USER: CPT | Performed by: INTERNAL MEDICINE

## 2020-07-30 NOTE — PROGRESS NOTES
Progress Note - Sleep Center   Carlie Arias LK:[id number] MRN: [id number]      Reason for Visit:    71 y  o male with moderate obstructive sleep apnea (AHI = 17 7)    Assessment:  The patient requests a new machine  His current device is a Transcend and is not functioning properly    Plan:  New APAP with a range of 4 to 20 cm  Follow up:  Compliance check    History of Present Illness: Moderate LENY  The patient needs a new device      Review of Systems      Genitourinary none   Cardiology none   Gastrointestinal none   Neurology numbness/tingling of an extremity   Constitutional none   Integumentary none   Psychiatry none   Musculoskeletal none   Pulmonary none   ENT ringing in ears   Endocrine none   Hematological none           I have reviewed and updated the review of systems as necessary     Historical Information    Past Medical History:   Diagnosis Date    Abrasion of left foot     LAST ASSESSED: 9/23/13    Achilles tendinitis, unspecified leg     LAST ASSESSED: 11/30/12    Allergic rhinitis     LAST ASSESSED: 11/25/13    Allergic rhinitis 06/25/2008    LAST ASSESSED: 6/25/08    Cervicalgia 04/22/2011    LAST ASSESSED: 4/22/11    Coronary artery disease     Dysfunction of left eustachian tube     LAST ASSESSED: 9/23/13    Epistaxis     LAST ASSESSED: 5/27/15    First degree atrioventricular block     LAST ASSESSED: 7/10/17    Gastric ulcer 10/07/2011    LAST ASSESSED: 3/9/15    GERD (gastroesophageal reflux disease)     Hemorrhoids 05/13/2011    LAST ASSESSED: 5/13/11    Long term use of drug     LAST ASSESSED: 10/11/12    Myalgia 12/21/2007    LAST ASSESSED: 12/21/07    Myositis 12/21/2007    LAST ASSESSED: 12/21/07    Numbness and tingling of foot     LAST ASSESSED: 3/9/15    Sleep apnea          Past Surgical History:   Procedure Laterality Date    CARDIAC CATHETERIZATION  2017    CHOLECYSTECTOMY      LAPAROSCOPIC; LAST ASSESSED: 10/17/17    COLONOSCOPY      COMPLETE; 3-4 YEARS AGO BY [facility]; LAST ASSESSED: 14    MYRINGOTOMY W/ TUBES      WITH VENTILATING TUBE INSERTION    VASECTOMY           Social History     Socioeconomic History    Marital status: /Civil Union     Spouse name: None    Number of children: None    Years of education: None    Highest education level: None   Occupational History    None   Social Needs    Financial resource strain: None    Food insecurity:     Worry: None     Inability: None    Transportation needs:     Medical: None     Non-medical: None   Tobacco Use    Smoking status: Former Smoker    Smokeless tobacco: Former User   Substance and Sexual Activity    Alcohol use: Yes     Comment: social    Drug use: No    Sexual activity: None   Lifestyle    Physical activity:     Days per week: None     Minutes per session: None    Stress: None   Relationships    Social connections:     Talks on phone: None     Gets together: None     Attends [employer]ology service: None     Active member of club or organization: None     Attends meetings of clubs or organizations: None     Relationship status: None    Intimate partner violence:     Fear of current or ex partner: None     Emotionally abused: None     Physically abused: None     Forced sexual activity: None   Other Topics Concern    None   Social History Narrative    None           History   Alcohol use: Not on file       History   Smoking Status    Not on file   Smokeless Tobacco    Not on file       Family History:   Family History   Problem Relation Age of Onset    Cancer Mother         SOFT TISSUE CANCER ON RT SIDE CHEST WALL AND     Prostate cancer Maternal Uncle        Medications/Allergies:      Current Outpatient Medications:     aspirin (ASPIR-81) 81 mg EC tablet, Take 81 mg by mouth daily, Disp: , Rfl:     Coenzyme Q10 (COQ10) 200 MG CAPS, Take 1 tablet by mouth daily, Disp: , Rfl:     diclofenac (VOLTAREN) 75 mg EC tablet, Take 1 tablet (75 mg total) by mouth 2 (two) times a day, Disp: 60 tablet, Rfl: 1    finasteride (PROSCAR) 5 mg tablet, Take 1 tablet (5 mg total) by mouth daily, Disp: 90 tablet, Rfl: 1    fluticasone (FLONASE) 50 mcg/act nasal spray, 2 sprays into each nostril daily, Disp: , Rfl:     Glucosamine-Chondroitin (GLUCOSAMINE CHONDR COMPLEX PO), Take 1 tablet by mouth daily, Disp: , Rfl:     ILEVRO 0 3 % SUSP, , Disp: , Rfl:     LOTEMAX 0 5 % ophthalmic suspension, INSTILL 1 DROP 3 TIMES EVERY DAY INTO RIGHT EYE THEN AS DIRECTED, Disp: , Rfl: 1    mefloquine (LARIAM) 250 MG tablet, Take 1 tablet (250 mg total) by mouth every 7 days, Disp: 5 tablet, Rfl: 0    nitroglycerin (NITROSTAT) 0 4 mg SL tablet, PLACE 1 TABLET(S) BY SUBLINGUAL ROUTE , Disp: , Rfl: 4    Omega-3 Fatty Acids (FISH OIL) 1000 MG CPDR, Take by mouth daily, Disp: , Rfl:     omeprazole (PriLOSEC) 20 mg delayed release capsule, ONE BY MOUTH EVERY DAY 1/2 HOUR BEFORE BREAKFAST, Disp: , Rfl:     RESTASIS 0 05 % ophthalmic emulsion, , Disp: , Rfl:     rosuvastatin (CRESTOR) 10 MG tablet, Take 1 tablet (10 mg total) by mouth daily, Disp: 90 tablet, Rfl: 1      Objective    Vital Signs:   Vitals:    07/30/20 1400   BP: 132/70   Pulse: 67   Weight: 103 kg (227 lb)   Height: 6' (1 829 m)     [place] Sleepiness Scale: Total score: 10    Physical Exam:    General: Alert, appropriate, cooperative, overweight    Head: NC/AT    Skin: Warm, dry    Neuro: No motor abnormalities, cranial nerves appear intact    Psych: Normal affect            WILFRED Silva    Board Certified Sleep Specialist

## 2020-08-04 ENCOUNTER — TELEPHONE (OUTPATIENT)
Dept: SLEEP CENTER | Facility: CLINIC | Age: 65
End: 2020-08-04

## 2020-09-19 DIAGNOSIS — R35.1 BENIGN PROSTATIC HYPERPLASIA WITH NOCTURIA: ICD-10-CM

## 2020-09-19 DIAGNOSIS — N40.1 BENIGN PROSTATIC HYPERPLASIA WITH NOCTURIA: ICD-10-CM

## 2020-09-21 DIAGNOSIS — E78.2 MIXED HYPERLIPIDEMIA: ICD-10-CM

## 2020-09-21 RX ORDER — ROSUVASTATIN CALCIUM 10 MG/1
TABLET, COATED ORAL
Qty: 90 TABLET | Refills: 1 | Status: SHIPPED | OUTPATIENT
Start: 2020-09-21 | End: 2021-03-16

## 2020-09-21 RX ORDER — FINASTERIDE 5 MG/1
TABLET, FILM COATED ORAL
Qty: 90 TABLET | Refills: 1 | Status: SHIPPED | OUTPATIENT
Start: 2020-09-21 | End: 2021-03-16

## 2020-10-03 LAB
ALBUMIN SERPL-MCNC: 4.3 G/DL (ref 3.6–5.1)
ALBUMIN/GLOB SERPL: 1.7 (CALC) (ref 1–2.5)
ALP SERPL-CCNC: 60 U/L (ref 35–144)
ALT SERPL-CCNC: 55 U/L (ref 9–46)
AST SERPL-CCNC: 32 U/L (ref 10–35)
BASOPHILS # BLD AUTO: 39 CELLS/UL (ref 0–200)
BASOPHILS NFR BLD AUTO: 0.7 %
BILIRUB SERPL-MCNC: 1.1 MG/DL (ref 0.2–1.2)
BUN SERPL-MCNC: 17 MG/DL (ref 7–25)
BUN/CREAT SERPL: ABNORMAL (CALC) (ref 6–22)
CALCIUM SERPL-MCNC: 9.4 MG/DL (ref 8.6–10.3)
CHLORIDE SERPL-SCNC: 104 MMOL/L (ref 98–110)
CHOLEST SERPL-MCNC: 143 MG/DL
CHOLEST/HDLC SERPL: 3.8 (CALC)
CO2 SERPL-SCNC: 24 MMOL/L (ref 20–32)
CREAT SERPL-MCNC: 0.84 MG/DL (ref 0.7–1.25)
EOSINOPHIL # BLD AUTO: 62 CELLS/UL (ref 15–500)
EOSINOPHIL NFR BLD AUTO: 1.1 %
ERYTHROCYTE [DISTWIDTH] IN BLOOD BY AUTOMATED COUNT: 13 % (ref 11–15)
GLOBULIN SER CALC-MCNC: 2.6 G/DL (CALC) (ref 1.9–3.7)
GLUCOSE SERPL-MCNC: 96 MG/DL (ref 65–99)
HBA1C MFR BLD: 5.6 % OF TOTAL HGB
HCT VFR BLD AUTO: 45.8 % (ref 38.5–50)
HDLC SERPL-MCNC: 38 MG/DL
HGB BLD-MCNC: 15.5 G/DL (ref 13.2–17.1)
LDLC SERPL CALC-MCNC: 80 MG/DL (CALC)
LYMPHOCYTES # BLD AUTO: 2100 CELLS/UL (ref 850–3900)
LYMPHOCYTES NFR BLD AUTO: 37.5 %
MCH RBC QN AUTO: 31 PG (ref 27–33)
MCHC RBC AUTO-ENTMCNC: 33.8 G/DL (ref 32–36)
MCV RBC AUTO: 91.6 FL (ref 80–100)
MONOCYTES # BLD AUTO: 790 CELLS/UL (ref 200–950)
MONOCYTES NFR BLD AUTO: 14.1 %
NEUTROPHILS # BLD AUTO: 2610 CELLS/UL (ref 1500–7800)
NEUTROPHILS NFR BLD AUTO: 46.6 %
NONHDLC SERPL-MCNC: 105 MG/DL (CALC)
PLATELET # BLD AUTO: 242 THOUSAND/UL (ref 140–400)
PMV BLD REES-ECKER: 10.4 FL (ref 7.5–12.5)
POTASSIUM SERPL-SCNC: 4.5 MMOL/L (ref 3.5–5.3)
PROT SERPL-MCNC: 6.9 G/DL (ref 6.1–8.1)
RBC # BLD AUTO: 5 MILLION/UL (ref 4.2–5.8)
SL AMB EGFR AFRICAN AMERICAN: 106 ML/MIN/1.73M2
SL AMB EGFR NON AFRICAN AMERICAN: 92 ML/MIN/1.73M2
SODIUM SERPL-SCNC: 139 MMOL/L (ref 135–146)
TRIGL SERPL-MCNC: 155 MG/DL
TSH SERPL-ACNC: 2.23 MIU/L (ref 0.4–4.5)
WBC # BLD AUTO: 5.6 THOUSAND/UL (ref 3.8–10.8)

## 2020-10-08 ENCOUNTER — OFFICE VISIT (OUTPATIENT)
Dept: FAMILY MEDICINE CLINIC | Facility: CLINIC | Age: 65
End: 2020-10-08
Payer: MEDICARE

## 2020-10-08 VITALS
HEART RATE: 68 BPM | WEIGHT: 231 LBS | SYSTOLIC BLOOD PRESSURE: 128 MMHG | DIASTOLIC BLOOD PRESSURE: 72 MMHG | RESPIRATION RATE: 16 BRPM | OXYGEN SATURATION: 98 % | BODY MASS INDEX: 31.29 KG/M2 | HEIGHT: 72 IN

## 2020-10-08 DIAGNOSIS — Z12.5 PROSTATE CANCER SCREENING: ICD-10-CM

## 2020-10-08 DIAGNOSIS — M19.042 OSTEOARTHRITIS OF FINGERS OF BOTH HANDS: ICD-10-CM

## 2020-10-08 DIAGNOSIS — E78.2 MIXED HYPERLIPIDEMIA: ICD-10-CM

## 2020-10-08 DIAGNOSIS — R73.01 IMPAIRED FASTING GLUCOSE: ICD-10-CM

## 2020-10-08 DIAGNOSIS — G47.33 OSA (OBSTRUCTIVE SLEEP APNEA): ICD-10-CM

## 2020-10-08 DIAGNOSIS — I25.10 CORONARY ARTERY DISEASE INVOLVING NATIVE CORONARY ARTERY OF NATIVE HEART WITHOUT ANGINA PECTORIS: ICD-10-CM

## 2020-10-08 DIAGNOSIS — Z00.00 WELCOME TO MEDICARE PREVENTIVE VISIT: Primary | ICD-10-CM

## 2020-10-08 DIAGNOSIS — M19.041 OSTEOARTHRITIS OF FINGERS OF BOTH HANDS: ICD-10-CM

## 2020-10-08 PROCEDURE — G0402 INITIAL PREVENTIVE EXAM: HCPCS | Performed by: FAMILY MEDICINE

## 2020-10-08 RX ORDER — CELECOXIB 200 MG/1
200 CAPSULE ORAL DAILY
Qty: 30 CAPSULE | Refills: 1 | Status: SHIPPED | OUTPATIENT
Start: 2020-10-08 | End: 2020-10-12 | Stop reason: CLARIF

## 2020-10-12 ENCOUNTER — HOSPITAL ENCOUNTER (INPATIENT)
Facility: HOSPITAL | Age: 65
LOS: 1 days | Discharge: HOME | DRG: 313 | End: 2020-10-14
Attending: EMERGENCY MEDICINE | Admitting: INTERNAL MEDICINE
Payer: MEDICARE

## 2020-10-12 ENCOUNTER — APPOINTMENT (EMERGENCY)
Dept: RADIOLOGY | Facility: HOSPITAL | Age: 65
DRG: 313 | End: 2020-10-12
Payer: MEDICARE

## 2020-10-12 DIAGNOSIS — I49.3 PREMATURE VENTRICULAR BEATS: ICD-10-CM

## 2020-10-12 DIAGNOSIS — R07.9 CHEST PAIN IN ADULT: ICD-10-CM

## 2020-10-12 DIAGNOSIS — K22.70 BARRETT'S ESOPHAGUS WITHOUT DYSPLASIA: Primary | ICD-10-CM

## 2020-10-12 LAB
ALBUMIN SERPL BCP-MCNC: 4.2 G/DL (ref 3.5–5)
ALP SERPL-CCNC: 67 U/L (ref 46–116)
ALT SERPL W P-5'-P-CCNC: 64 U/L (ref 12–78)
ANION GAP SERPL CALCULATED.3IONS-SCNC: 3 MMOL/L (ref 4–13)
AST SERPL W P-5'-P-CCNC: 28 U/L (ref 5–45)
ATRIAL RATE: 69 BPM
ATRIAL RATE: 69 BPM
BASOPHILS # BLD AUTO: 0.04 THOUSANDS/ΜL (ref 0–0.1)
BASOPHILS NFR BLD AUTO: 1 % (ref 0–1)
BILIRUB SERPL-MCNC: 1.38 MG/DL (ref 0.2–1)
BUN SERPL-MCNC: 17 MG/DL (ref 5–25)
CALCIUM SERPL-MCNC: 9.3 MG/DL (ref 8.3–10.1)
CHLORIDE SERPL-SCNC: 106 MMOL/L (ref 100–108)
CO2 SERPL-SCNC: 28 MMOL/L (ref 21–32)
CREAT SERPL-MCNC: 1.06 MG/DL (ref 0.6–1.3)
EOSINOPHIL # BLD AUTO: 0.05 THOUSAND/ΜL (ref 0–0.61)
EOSINOPHIL NFR BLD AUTO: 1 % (ref 0–6)
ERYTHROCYTE [DISTWIDTH] IN BLOOD BY AUTOMATED COUNT: 12.8 % (ref 11.6–15.1)
GFR SERPL CREATININE-BSD FRML MDRD: 73 ML/MIN/1.73SQ M
GLUCOSE SERPL-MCNC: 89 MG/DL (ref 65–140)
HCT VFR BLD AUTO: 50.8 % (ref 36.5–49.3)
HGB BLD-MCNC: 16.9 G/DL (ref 12–17)
IMM GRANULOCYTES # BLD AUTO: 0.02 THOUSAND/UL (ref 0–0.2)
IMM GRANULOCYTES NFR BLD AUTO: 0 % (ref 0–2)
LYMPHOCYTES # BLD AUTO: 2.4 THOUSANDS/ΜL (ref 0.6–4.47)
LYMPHOCYTES NFR BLD AUTO: 39 % (ref 14–44)
MCH RBC QN AUTO: 30.8 PG (ref 26.8–34.3)
MCHC RBC AUTO-ENTMCNC: 33.3 G/DL (ref 31.4–37.4)
MCV RBC AUTO: 93 FL (ref 82–98)
MONOCYTES # BLD AUTO: 0.87 THOUSAND/ΜL (ref 0.17–1.22)
MONOCYTES NFR BLD AUTO: 14 % (ref 4–12)
NEUTROPHILS # BLD AUTO: 2.84 THOUSANDS/ΜL (ref 1.85–7.62)
NEUTS SEG NFR BLD AUTO: 45 % (ref 43–75)
NRBC BLD AUTO-RTO: 0 /100 WBCS
P AXIS: 194 DEGREES
P AXIS: 36 DEGREES
PLATELET # BLD AUTO: 236 THOUSANDS/UL (ref 149–390)
PMV BLD AUTO: 9.7 FL (ref 8.9–12.7)
POTASSIUM SERPL-SCNC: 4.3 MMOL/L (ref 3.5–5.3)
PR INTERVAL: 372 MS
PROT SERPL-MCNC: 7.9 G/DL (ref 6.4–8.2)
QRS AXIS: 34 DEGREES
QRS AXIS: 41 DEGREES
QRSD INTERVAL: 86 MS
QRSD INTERVAL: 88 MS
QT INTERVAL: 388 MS
QT INTERVAL: 390 MS
QTC INTERVAL: 412 MS
QTC INTERVAL: 412 MS
RBC # BLD AUTO: 5.49 MILLION/UL (ref 3.88–5.62)
SODIUM SERPL-SCNC: 137 MMOL/L (ref 136–145)
T WAVE AXIS: 60 DEGREES
T WAVE AXIS: 67 DEGREES
TROPONIN I SERPL-MCNC: <0.02 NG/ML
VENTRICULAR RATE: 67 BPM
VENTRICULAR RATE: 68 BPM
WBC # BLD AUTO: 6.22 THOUSAND/UL (ref 4.31–10.16)

## 2020-10-12 PROCEDURE — 93005 ELECTROCARDIOGRAM TRACING: CPT

## 2020-10-12 PROCEDURE — 36415 COLL VENOUS BLD VENIPUNCTURE: CPT

## 2020-10-12 PROCEDURE — 96375 TX/PRO/DX INJ NEW DRUG ADDON: CPT

## 2020-10-12 PROCEDURE — 99285 EMERGENCY DEPT VISIT HI MDM: CPT | Performed by: EMERGENCY MEDICINE

## 2020-10-12 PROCEDURE — 71275 CT ANGIOGRAPHY CHEST: CPT

## 2020-10-12 PROCEDURE — 96374 THER/PROPH/DIAG INJ IV PUSH: CPT

## 2020-10-12 PROCEDURE — 84484 ASSAY OF TROPONIN QUANT: CPT | Performed by: EMERGENCY MEDICINE

## 2020-10-12 PROCEDURE — 99285 EMERGENCY DEPT VISIT HI MDM: CPT

## 2020-10-12 PROCEDURE — 85025 COMPLETE CBC W/AUTO DIFF WBC: CPT

## 2020-10-12 PROCEDURE — 1124F ACP DISCUSS-NO DSCNMKR DOCD: CPT | Performed by: EMERGENCY MEDICINE

## 2020-10-12 PROCEDURE — 84484 ASSAY OF TROPONIN QUANT: CPT

## 2020-10-12 PROCEDURE — 80053 COMPREHEN METABOLIC PANEL: CPT

## 2020-10-12 PROCEDURE — 84484 ASSAY OF TROPONIN QUANT: CPT | Performed by: INTERNAL MEDICINE

## 2020-10-12 PROCEDURE — G1004 CDSM NDSC: HCPCS

## 2020-10-12 PROCEDURE — 74174 CTA ABD&PLVS W/CONTRAST: CPT

## 2020-10-12 PROCEDURE — 99220 PR INITIAL OBSERVATION CARE/DAY 70 MINUTES: CPT | Performed by: INTERNAL MEDICINE

## 2020-10-12 PROCEDURE — 93010 ELECTROCARDIOGRAM REPORT: CPT | Performed by: INTERNAL MEDICINE

## 2020-10-12 RX ORDER — ASPIRIN 81 MG/1
81 TABLET ORAL DAILY
Status: DISCONTINUED | OUTPATIENT
Start: 2020-10-13 | End: 2020-10-14 | Stop reason: HOSPADM

## 2020-10-12 RX ORDER — PANTOPRAZOLE SODIUM 40 MG/1
40 TABLET, DELAYED RELEASE ORAL
Status: DISCONTINUED | OUTPATIENT
Start: 2020-10-13 | End: 2020-10-14 | Stop reason: HOSPADM

## 2020-10-12 RX ORDER — MAGNESIUM HYDROXIDE/ALUMINUM HYDROXICE/SIMETHICONE 120; 1200; 1200 MG/30ML; MG/30ML; MG/30ML
30 SUSPENSION ORAL EVERY 6 HOURS PRN
Status: DISCONTINUED | OUTPATIENT
Start: 2020-10-12 | End: 2020-10-14 | Stop reason: HOSPADM

## 2020-10-12 RX ORDER — NITROGLYCERIN 0.4 MG/1
0.4 TABLET SUBLINGUAL
Status: DISCONTINUED | OUTPATIENT
Start: 2020-10-12 | End: 2020-10-14 | Stop reason: HOSPADM

## 2020-10-12 RX ORDER — ONDANSETRON 2 MG/ML
4 INJECTION INTRAMUSCULAR; INTRAVENOUS EVERY 6 HOURS PRN
Status: DISCONTINUED | OUTPATIENT
Start: 2020-10-12 | End: 2020-10-14 | Stop reason: HOSPADM

## 2020-10-12 RX ORDER — CHOLECALCIFEROL (VITAMIN D3) 125 MCG
200 CAPSULE ORAL DAILY
Status: DISCONTINUED | OUTPATIENT
Start: 2020-10-13 | End: 2020-10-14 | Stop reason: HOSPADM

## 2020-10-12 RX ORDER — CHLORAL HYDRATE 500 MG
1000 CAPSULE ORAL DAILY
Status: DISCONTINUED | OUTPATIENT
Start: 2020-10-13 | End: 2020-10-14 | Stop reason: HOSPADM

## 2020-10-12 RX ORDER — DIPHENHYDRAMINE HYDROCHLORIDE 50 MG/ML
50 INJECTION INTRAMUSCULAR; INTRAVENOUS ONCE
Status: COMPLETED | OUTPATIENT
Start: 2020-10-12 | End: 2020-10-12

## 2020-10-12 RX ORDER — DOCUSATE SODIUM 100 MG/1
100 CAPSULE, LIQUID FILLED ORAL 2 TIMES DAILY PRN
Status: DISCONTINUED | OUTPATIENT
Start: 2020-10-12 | End: 2020-10-14 | Stop reason: HOSPADM

## 2020-10-12 RX ORDER — FINASTERIDE 5 MG/1
5 TABLET, FILM COATED ORAL DAILY
Status: DISCONTINUED | OUTPATIENT
Start: 2020-10-13 | End: 2020-10-14 | Stop reason: HOSPADM

## 2020-10-12 RX ORDER — ATORVASTATIN CALCIUM 80 MG/1
80 TABLET, FILM COATED ORAL
Status: DISCONTINUED | OUTPATIENT
Start: 2020-10-13 | End: 2020-10-14 | Stop reason: HOSPADM

## 2020-10-12 RX ORDER — SODIUM PHOSPHATE,MONO-DIBASIC 19G-7G/118
500 ENEMA (ML) RECTAL DAILY
Status: DISCONTINUED | OUTPATIENT
Start: 2020-10-13 | End: 2020-10-14 | Stop reason: HOSPADM

## 2020-10-12 RX ADMIN — HYDROCORTISONE SODIUM SUCCINATE 200 MG: 100 INJECTION, POWDER, FOR SOLUTION INTRAMUSCULAR; INTRAVENOUS at 14:52

## 2020-10-12 RX ADMIN — IODIXANOL 100 ML: 320 INJECTION, SOLUTION INTRAVASCULAR at 19:09

## 2020-10-12 RX ADMIN — DIPHENHYDRAMINE HYDROCHLORIDE 50 MG: 50 INJECTION, SOLUTION INTRAMUSCULAR; INTRAVENOUS at 17:58

## 2020-10-13 LAB
ATRIAL RATE: 80 BPM
MAGNESIUM SERPL-MCNC: 2.4 MG/DL (ref 1.6–2.6)
P AXIS: 67 DEGREES
PR INTERVAL: 288 MS
QRS AXIS: 65 DEGREES
QRSD INTERVAL: 86 MS
QT INTERVAL: 356 MS
QTC INTERVAL: 410 MS
T WAVE AXIS: 79 DEGREES
TROPONIN I SERPL-MCNC: <0.02 NG/ML
TSH SERPL DL<=0.05 MIU/L-ACNC: 0.6 UIU/ML (ref 0.36–3.74)
VENTRICULAR RATE: 80 BPM

## 2020-10-13 PROCEDURE — 84443 ASSAY THYROID STIM HORMONE: CPT | Performed by: INTERNAL MEDICINE

## 2020-10-13 PROCEDURE — 93010 ELECTROCARDIOGRAM REPORT: CPT | Performed by: INTERNAL MEDICINE

## 2020-10-13 PROCEDURE — 99222 1ST HOSP IP/OBS MODERATE 55: CPT | Performed by: INTERNAL MEDICINE

## 2020-10-13 PROCEDURE — 93005 ELECTROCARDIOGRAM TRACING: CPT

## 2020-10-13 PROCEDURE — 99225 PR SBSQ OBSERVATION CARE/DAY 25 MINUTES: CPT | Performed by: PHYSICIAN ASSISTANT

## 2020-10-13 PROCEDURE — 84484 ASSAY OF TROPONIN QUANT: CPT

## 2020-10-13 PROCEDURE — 94760 N-INVAS EAR/PLS OXIMETRY 1: CPT

## 2020-10-13 PROCEDURE — 83735 ASSAY OF MAGNESIUM: CPT | Performed by: PHYSICIAN ASSISTANT

## 2020-10-13 PROCEDURE — 36415 COLL VENOUS BLD VENIPUNCTURE: CPT | Performed by: INTERNAL MEDICINE

## 2020-10-13 RX ORDER — IBUPROFEN 400 MG/1
400 TABLET ORAL EVERY 6 HOURS PRN
Status: DISCONTINUED | OUTPATIENT
Start: 2020-10-13 | End: 2020-10-14 | Stop reason: HOSPADM

## 2020-10-13 RX ADMIN — IBUPROFEN 400 MG: 400 TABLET ORAL at 20:42

## 2020-10-13 RX ADMIN — ATORVASTATIN CALCIUM 80 MG: 80 TABLET, FILM COATED ORAL at 16:15

## 2020-10-13 RX ADMIN — ASPIRIN 81 MG: 81 TABLET ORAL at 09:01

## 2020-10-13 RX ADMIN — FINASTERIDE 5 MG: 5 TABLET, FILM COATED ORAL at 09:02

## 2020-10-13 RX ADMIN — ENOXAPARIN SODIUM 40 MG: 40 INJECTION SUBCUTANEOUS at 09:02

## 2020-10-13 RX ADMIN — OMEGA-3 FATTY ACIDS CAP 1000 MG 1000 MG: 1000 CAP at 09:02

## 2020-10-13 RX ADMIN — PANTOPRAZOLE SODIUM 40 MG: 40 TABLET, DELAYED RELEASE ORAL at 05:04

## 2020-10-14 ENCOUNTER — APPOINTMENT (INPATIENT)
Dept: NON INVASIVE DIAGNOSTICS | Facility: HOSPITAL | Age: 65
DRG: 313 | End: 2020-10-14
Payer: MEDICARE

## 2020-10-14 ENCOUNTER — APPOINTMENT (INPATIENT)
Dept: RADIOLOGY | Facility: HOSPITAL | Age: 65
DRG: 313 | End: 2020-10-14
Payer: MEDICARE

## 2020-10-14 VITALS
BODY MASS INDEX: 30.07 KG/M2 | WEIGHT: 222 LBS | RESPIRATION RATE: 17 BRPM | OXYGEN SATURATION: 97 % | SYSTOLIC BLOOD PRESSURE: 154 MMHG | HEART RATE: 77 BPM | DIASTOLIC BLOOD PRESSURE: 75 MMHG | HEIGHT: 72 IN | TEMPERATURE: 97.8 F

## 2020-10-14 PROBLEM — R07.9 CHEST PAIN IN ADULT: Status: RESOLVED | Noted: 2020-10-12 | Resolved: 2020-10-14

## 2020-10-14 PROCEDURE — 99232 SBSQ HOSP IP/OBS MODERATE 35: CPT | Performed by: INTERNAL MEDICINE

## 2020-10-14 PROCEDURE — 99239 HOSP IP/OBS DSCHRG MGMT >30: CPT | Performed by: NURSE PRACTITIONER

## 2020-10-14 PROCEDURE — 78452 HT MUSCLE IMAGE SPECT MULT: CPT

## 2020-10-14 PROCEDURE — 93017 CV STRESS TEST TRACING ONLY: CPT

## 2020-10-14 PROCEDURE — 78452 HT MUSCLE IMAGE SPECT MULT: CPT | Performed by: INTERNAL MEDICINE

## 2020-10-14 PROCEDURE — 93016 CV STRESS TEST SUPVJ ONLY: CPT | Performed by: INTERNAL MEDICINE

## 2020-10-14 PROCEDURE — 93018 CV STRESS TEST I&R ONLY: CPT | Performed by: INTERNAL MEDICINE

## 2020-10-14 PROCEDURE — A9502 TC99M TETROFOSMIN: HCPCS

## 2020-10-14 RX ORDER — AMINOPHYLLINE DIHYDRATE 25 MG/ML
INJECTION, SOLUTION INTRAVENOUS
Status: DISCONTINUED
Start: 2020-10-14 | End: 2020-10-14 | Stop reason: WASHOUT

## 2020-10-14 RX ORDER — CELECOXIB 200 MG/1
200 CAPSULE ORAL DAILY
COMMUNITY
End: 2020-10-28 | Stop reason: SDUPTHER

## 2020-10-14 RX ADMIN — FINASTERIDE 5 MG: 5 TABLET, FILM COATED ORAL at 08:14

## 2020-10-14 RX ADMIN — Medication 200 MG: at 08:14

## 2020-10-14 RX ADMIN — OMEGA-3 FATTY ACIDS CAP 1000 MG 1000 MG: 1000 CAP at 08:14

## 2020-10-14 RX ADMIN — ENOXAPARIN SODIUM 40 MG: 40 INJECTION SUBCUTANEOUS at 08:14

## 2020-10-14 RX ADMIN — Medication 500 MG: at 08:14

## 2020-10-14 RX ADMIN — IBUPROFEN 400 MG: 400 TABLET ORAL at 12:23

## 2020-10-14 RX ADMIN — ASPIRIN 81 MG: 81 TABLET ORAL at 08:14

## 2020-10-15 ENCOUNTER — TRANSITIONAL CARE MANAGEMENT (OUTPATIENT)
Dept: FAMILY MEDICINE CLINIC | Facility: CLINIC | Age: 65
End: 2020-10-15

## 2020-10-15 LAB
CHEST PAIN STATEMENT: NORMAL
MAX DIASTOLIC BP: 70 MMHG
MAX HEART RATE: 141 BPM
MAX PREDICTED HEART RATE: 155 BPM
MAX. SYSTOLIC BP: 205 MMHG
PROTOCOL NAME: NORMAL
REASON FOR TERMINATION: NORMAL
TARGET HR FORMULA: NORMAL
TEST INDICATION: NORMAL
TIME IN EXERCISE PHASE: NORMAL

## 2020-10-20 ENCOUNTER — OFFICE VISIT (OUTPATIENT)
Dept: SLEEP CENTER | Facility: CLINIC | Age: 65
End: 2020-10-20
Payer: MEDICARE

## 2020-10-20 VITALS
BODY MASS INDEX: 31.51 KG/M2 | HEART RATE: 70 BPM | WEIGHT: 232.6 LBS | SYSTOLIC BLOOD PRESSURE: 126 MMHG | HEIGHT: 72 IN | DIASTOLIC BLOOD PRESSURE: 60 MMHG

## 2020-10-20 DIAGNOSIS — G47.33 OSA (OBSTRUCTIVE SLEEP APNEA): Primary | ICD-10-CM

## 2020-10-20 PROCEDURE — 99213 OFFICE O/P EST LOW 20 MIN: CPT | Performed by: INTERNAL MEDICINE

## 2020-10-28 ENCOUNTER — OFFICE VISIT (OUTPATIENT)
Dept: FAMILY MEDICINE CLINIC | Facility: CLINIC | Age: 65
End: 2020-10-28
Payer: MEDICARE

## 2020-10-28 VITALS
DIASTOLIC BLOOD PRESSURE: 76 MMHG | BODY MASS INDEX: 30.75 KG/M2 | TEMPERATURE: 97.9 F | SYSTOLIC BLOOD PRESSURE: 128 MMHG | HEIGHT: 72 IN | OXYGEN SATURATION: 98 % | HEART RATE: 76 BPM | RESPIRATION RATE: 16 BRPM | WEIGHT: 227 LBS

## 2020-10-28 DIAGNOSIS — I25.10 CORONARY ARTERY DISEASE INVOLVING NATIVE CORONARY ARTERY OF NATIVE HEART WITHOUT ANGINA PECTORIS: Primary | ICD-10-CM

## 2020-10-28 DIAGNOSIS — K21.9 GASTRO-ESOPHAGEAL REFLUX DISEASE WITHOUT ESOPHAGITIS: ICD-10-CM

## 2020-10-28 DIAGNOSIS — M19.042 OSTEOARTHRITIS OF FINGERS OF BOTH HANDS: ICD-10-CM

## 2020-10-28 DIAGNOSIS — M19.041 OSTEOARTHRITIS OF FINGERS OF BOTH HANDS: ICD-10-CM

## 2020-10-28 DIAGNOSIS — M17.12 PRIMARY OSTEOARTHRITIS OF LEFT KNEE: ICD-10-CM

## 2020-10-28 PROBLEM — R94.39 ABNORMAL STRESS TEST: Status: RESOLVED | Noted: 2017-04-10 | Resolved: 2020-10-28

## 2020-10-28 PROCEDURE — 99495 TRANSJ CARE MGMT MOD F2F 14D: CPT | Performed by: FAMILY MEDICINE

## 2020-10-28 PROCEDURE — 1111F DSCHRG MED/CURRENT MED MERGE: CPT | Performed by: FAMILY MEDICINE

## 2020-10-28 RX ORDER — CELECOXIB 200 MG/1
200 CAPSULE ORAL DAILY
Qty: 90 CAPSULE | Refills: 1 | Status: SHIPPED | OUTPATIENT
Start: 2020-10-28 | End: 2021-04-25

## 2020-10-29 ENCOUNTER — CONSULT (OUTPATIENT)
Dept: OBGYN CLINIC | Facility: CLINIC | Age: 65
End: 2020-10-29
Payer: MEDICARE

## 2020-10-29 ENCOUNTER — APPOINTMENT (OUTPATIENT)
Dept: RADIOLOGY | Facility: AMBULARY SURGERY CENTER | Age: 65
End: 2020-10-29
Attending: SURGERY
Payer: MEDICARE

## 2020-10-29 VITALS
BODY MASS INDEX: 30.75 KG/M2 | DIASTOLIC BLOOD PRESSURE: 65 MMHG | HEART RATE: 60 BPM | SYSTOLIC BLOOD PRESSURE: 143 MMHG | WEIGHT: 227 LBS | HEIGHT: 72 IN

## 2020-10-29 DIAGNOSIS — M19.042 OSTEOARTHRITIS OF FINGERS OF BOTH HANDS: ICD-10-CM

## 2020-10-29 DIAGNOSIS — M65.341 TRIGGER FINGER, RIGHT RING FINGER: ICD-10-CM

## 2020-10-29 DIAGNOSIS — M19.041 OSTEOARTHRITIS OF FINGERS OF BOTH HANDS: ICD-10-CM

## 2020-10-29 DIAGNOSIS — M65.311 TRIGGER THUMB OF RIGHT HAND: Primary | ICD-10-CM

## 2020-10-29 DIAGNOSIS — G56.03 CARPAL TUNNEL SYNDROME, BILATERAL: ICD-10-CM

## 2020-10-29 PROCEDURE — 99204 OFFICE O/P NEW MOD 45 MIN: CPT | Performed by: SURGERY

## 2020-10-29 PROCEDURE — 20550 NJX 1 TENDON SHEATH/LIGAMENT: CPT | Performed by: SURGERY

## 2020-10-29 PROCEDURE — 73130 X-RAY EXAM OF HAND: CPT

## 2020-10-29 RX ORDER — LIDOCAINE HYDROCHLORIDE 10 MG/ML
1 INJECTION, SOLUTION INFILTRATION; PERINEURAL
Status: COMPLETED | OUTPATIENT
Start: 2020-10-29 | End: 2020-10-29

## 2020-10-29 RX ORDER — DEXAMETHASONE SODIUM PHOSPHATE 100 MG/10ML
40 INJECTION INTRAMUSCULAR; INTRAVENOUS
Status: COMPLETED | OUTPATIENT
Start: 2020-10-29 | End: 2020-10-29

## 2020-10-29 RX ADMIN — LIDOCAINE HYDROCHLORIDE 1 ML: 10 INJECTION, SOLUTION INFILTRATION; PERINEURAL at 13:45

## 2020-10-29 RX ADMIN — DEXAMETHASONE SODIUM PHOSPHATE 40 MG: 100 INJECTION INTRAMUSCULAR; INTRAVENOUS at 13:45

## 2020-12-10 ENCOUNTER — OFFICE VISIT (OUTPATIENT)
Dept: OBGYN CLINIC | Facility: CLINIC | Age: 65
End: 2020-12-10
Payer: MEDICARE

## 2020-12-10 VITALS
DIASTOLIC BLOOD PRESSURE: 63 MMHG | HEART RATE: 67 BPM | BODY MASS INDEX: 31.15 KG/M2 | HEIGHT: 72 IN | SYSTOLIC BLOOD PRESSURE: 149 MMHG | WEIGHT: 230 LBS

## 2020-12-10 DIAGNOSIS — M19.041 OSTEOARTHRITIS OF FINGERS OF BOTH HANDS: ICD-10-CM

## 2020-12-10 DIAGNOSIS — M19.042 OSTEOARTHRITIS OF FINGERS OF BOTH HANDS: ICD-10-CM

## 2020-12-10 DIAGNOSIS — M65.311 TRIGGER THUMB OF RIGHT HAND: Primary | ICD-10-CM

## 2020-12-10 DIAGNOSIS — M65.341 TRIGGER FINGER, RIGHT RING FINGER: ICD-10-CM

## 2020-12-10 DIAGNOSIS — G56.03 CARPAL TUNNEL SYNDROME, BILATERAL: ICD-10-CM

## 2020-12-10 PROCEDURE — 20550 NJX 1 TENDON SHEATH/LIGAMENT: CPT | Performed by: SURGERY

## 2020-12-10 PROCEDURE — 99213 OFFICE O/P EST LOW 20 MIN: CPT | Performed by: SURGERY

## 2020-12-10 RX ORDER — LIDOCAINE HYDROCHLORIDE 10 MG/ML
1 INJECTION, SOLUTION INFILTRATION; PERINEURAL
Status: COMPLETED | OUTPATIENT
Start: 2020-12-10 | End: 2020-12-10

## 2020-12-10 RX ORDER — DEXAMETHASONE SODIUM PHOSPHATE 100 MG/10ML
40 INJECTION INTRAMUSCULAR; INTRAVENOUS
Status: COMPLETED | OUTPATIENT
Start: 2020-12-10 | End: 2020-12-10

## 2020-12-10 RX ADMIN — LIDOCAINE HYDROCHLORIDE 1 ML: 10 INJECTION, SOLUTION INFILTRATION; PERINEURAL at 08:12

## 2020-12-10 RX ADMIN — DEXAMETHASONE SODIUM PHOSPHATE 40 MG: 100 INJECTION INTRAMUSCULAR; INTRAVENOUS at 08:12

## 2020-12-23 ENCOUNTER — TELEMEDICINE (OUTPATIENT)
Dept: FAMILY MEDICINE CLINIC | Facility: CLINIC | Age: 65
End: 2020-12-23
Payer: MEDICARE

## 2020-12-23 DIAGNOSIS — H81.11 BPPV (BENIGN PAROXYSMAL POSITIONAL VERTIGO), RIGHT: Primary | ICD-10-CM

## 2020-12-23 PROCEDURE — 99214 OFFICE O/P EST MOD 30 MIN: CPT | Performed by: FAMILY MEDICINE

## 2020-12-23 RX ORDER — MECLIZINE HCL 12.5 MG/1
12.5 TABLET ORAL EVERY 8 HOURS PRN
Qty: 30 TABLET | Refills: 0 | Status: SHIPPED | OUTPATIENT
Start: 2020-12-23 | End: 2021-04-15

## 2021-01-20 ENCOUNTER — OFFICE VISIT (OUTPATIENT)
Dept: OBGYN CLINIC | Facility: CLINIC | Age: 66
End: 2021-01-20
Payer: MEDICARE

## 2021-01-20 VITALS
WEIGHT: 221 LBS | HEIGHT: 72 IN | SYSTOLIC BLOOD PRESSURE: 160 MMHG | DIASTOLIC BLOOD PRESSURE: 84 MMHG | BODY MASS INDEX: 29.93 KG/M2 | HEART RATE: 57 BPM

## 2021-01-20 DIAGNOSIS — M65.331 TRIGGER FINGER, RIGHT MIDDLE FINGER: Primary | ICD-10-CM

## 2021-01-20 DIAGNOSIS — M65.341 TRIGGER FINGER, RIGHT RING FINGER: ICD-10-CM

## 2021-01-20 DIAGNOSIS — M65.311 TRIGGER THUMB OF RIGHT HAND: ICD-10-CM

## 2021-01-20 PROCEDURE — 99214 OFFICE O/P EST MOD 30 MIN: CPT | Performed by: SURGERY

## 2021-01-20 PROCEDURE — 20550 NJX 1 TENDON SHEATH/LIGAMENT: CPT | Performed by: SURGERY

## 2021-01-20 RX ORDER — LIDOCAINE HYDROCHLORIDE 10 MG/ML
1 INJECTION, SOLUTION INFILTRATION; PERINEURAL
Status: COMPLETED | OUTPATIENT
Start: 2021-01-20 | End: 2021-01-20

## 2021-01-20 RX ORDER — DEXAMETHASONE SODIUM PHOSPHATE 100 MG/10ML
40 INJECTION INTRAMUSCULAR; INTRAVENOUS
Status: COMPLETED | OUTPATIENT
Start: 2021-01-20 | End: 2021-01-20

## 2021-01-20 RX ADMIN — DEXAMETHASONE SODIUM PHOSPHATE 40 MG: 100 INJECTION INTRAMUSCULAR; INTRAVENOUS at 11:07

## 2021-01-20 RX ADMIN — LIDOCAINE HYDROCHLORIDE 1 ML: 10 INJECTION, SOLUTION INFILTRATION; PERINEURAL at 11:07

## 2021-01-20 NOTE — PROGRESS NOTES
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 that is nonpainful  Recommended to let it be due to it not causing him any pain  He did develop a long finger trigger digit and initial decision for injection was made today  A cortisone injection was administered to the right long trigger finger which he tolerated well, that was his 1st   The right ring trigger finger is triggering but nonpainful  We will re-evaluate in 6 weeks  Briefly discuss that if trigger finger release was an option; all the fingers would be released same time  The patient verbalized understanding of exam findings and treatment plan  We engaged in the shared decision-making process and treatment options were discussed at length with the patient  Surgical and conservative management discussed today along with risks and benefits  Diagnoses and all orders for this visit:    Trigger finger, right middle finger    Trigger thumb of right hand    Trigger finger, right ring finger    Other orders  -     Hand/upper extremity injection        Follow Up:  Return in about 6 weeks (around 3/3/2021) for right triggers  To Do Next Visit:  Re-evaluation of current issue    ____________________________________________________________________________________________________________________________________________      CHIEF COMPLAINT:  Chief Complaint   Patient presents with    Follow-up       SUBJECTIVE:  Shiloh Benjamin is a 76y o  year old RHD male who presents today for a 6 week follow-up for his right thumb and ring trigger fingers  At his last visit on 12/10/2020, cortisone injections were administered to both the right thumb and ring trigger fingers which he gave him relief  He notes that 3 weeks after the 2nd injection to the right thumb A1 pulley, he developed a firm lump at the area    He also has now developed a right long trigger finger, but not as painful  He does have osteoarthritis of both hands along bilateral carpal tunnel syndrome  I have personally reviewed all the relevant PMH, PSH, SH, FH, Medications and allergies       PAST MEDICAL HISTORY:  Past Medical History:   Diagnosis Date    Abrasion of left foot     LAST ASSESSED: 13    Achilles tendinitis, unspecified leg     LAST ASSESSED: 12    Allergic rhinitis     LAST ASSESSED: 13    Allergic rhinitis 2008    LAST ASSESSED: 08    Cervicalgia 2011    LAST ASSESSED: 11    Coronary artery disease     Dysfunction of left eustachian tube     LAST ASSESSED: 13    Epistaxis     LAST ASSESSED: 5/27/15    First degree atrioventricular block     LAST ASSESSED: 7/10/17    Gastric ulcer 10/07/2011    LAST ASSESSED: 3/9/15    GERD (gastroesophageal reflux disease)     Hemorrhoids 2011    LAST ASSESSED: 11    Long term use of drug     LAST ASSESSED: 10/11/12    Myalgia 2007    LAST ASSESSED: 07    Myositis 2007    LAST ASSESSED: 07    Numbness and tingling of foot     LAST ASSESSED: 3/9/15    Sleep apnea        PAST SURGICAL HISTORY:  Past Surgical History:   Procedure Laterality Date    CARDIAC CATHETERIZATION      CHOLECYSTECTOMY      LAPAROSCOPIC; LAST ASSESSED: 10/17/17    COLONOSCOPY      COMPLETE; 3-4 YEARS AGO BY [facility]; LAST ASSESSED: 14    MYRINGOTOMY W/ TUBES      WITH VENTILATING TUBE INSERTION    VASECTOMY         FAMILY HISTORY:  Family History   Problem Relation Age of Onset    Cancer Mother         SOFT TISSUE CANCER ON RT SIDE CHEST WALL AND     Prostate cancer Maternal Uncle        SOCIAL HISTORY:  Social History     Tobacco Use    Smoking status: Former Smoker     Quit date:      Years since quittin 0    Smokeless tobacco: Never Used   Substance Use Topics    Alcohol use: Yes     Comment: social    Drug use: No       MEDICATIONS:    Current Outpatient Medications:     aspirin (ASPIR-81) 81 mg EC tablet, Take 81 mg by mouth daily, Disp: , Rfl:     celecoxib (CeleBREX) 200 mg capsule, Take 1 capsule (200 mg total) by mouth daily, Disp: 90 capsule, Rfl: 1    Coenzyme Q10 (COQ10) 200 MG CAPS, Take 1 tablet by mouth daily, Disp: , Rfl:     finasteride (PROSCAR) 5 mg tablet, TAKE 1 TABLET BY MOUTH EVERY DAY, Disp: 90 tablet, Rfl: 1    Glucosamine-Chondroitin (GLUCOSAMINE CHONDR COMPLEX PO), Take 1 tablet by mouth daily, Disp: , Rfl:     meclizine (ANTIVERT) 12 5 MG tablet, Take 1 tablet (12 5 mg total) by mouth every 8 (eight) hours as needed for dizziness, Disp: 30 tablet, Rfl: 0    nitroglycerin (NITROSTAT) 0 4 mg SL tablet, PLACE 1 TABLET(S) BY SUBLINGUAL ROUTE , Disp: , Rfl: 4    Omega-3 Fatty Acids (FISH OIL) 1000 MG CPDR, Take by mouth daily, Disp: , Rfl:     omeprazole (PriLOSEC) 20 mg delayed release capsule, ONE BY MOUTH EVERY DAY 1/2 HOUR BEFORE BREAKFAST, Disp: , Rfl:     rosuvastatin (CRESTOR) 10 MG tablet, TAKE 1 TABLET BY MOUTH EVERY DAY, Disp: 90 tablet, Rfl: 1    ALLERGIES:  Allergies   Allergen Reactions    Amoxicillin Anaphylaxis    Acetaminophen      Rapid heart rate    Apple     Cherry Flavor     Pollen Extract     Augmentin [Amoxicillin-Pot Clavulanate]     Dye [Iodinated Diagnostic Agents]        REVIEW OF SYSTEMS:  Review of Systems   Constitutional: Negative for chills, fever and unexpected weight change  HENT: Negative for hearing loss, nosebleeds and sore throat  Eyes: Negative for pain, redness and visual disturbance  Respiratory: Negative for cough, shortness of breath and wheezing  Cardiovascular: Negative for chest pain, palpitations and leg swelling  Gastrointestinal: Negative for abdominal pain, nausea and vomiting  Endocrine: Negative for polydipsia and polyuria  Genitourinary: Negative for dysuria and hematuria  Skin: Negative for rash and wound  Neurological: Negative for dizziness, light-headedness and headaches  Psychiatric/Behavioral: Negative for decreased concentration, dysphoric mood and suicidal ideas  The patient is not nervous/anxious  VITALS:  Vitals:    01/20/21 1048   BP: 160/84   Pulse: 57       LABS:  HgA1c:   Lab Results   Component Value Date    HGBA1C 5 6 10/02/2020     BMP:   Lab Results   Component Value Date    CALCIUM 9 3 10/12/2020     09/29/2017    K 4 3 10/12/2020    CO2 28 10/12/2020     10/12/2020    BUN 17 10/12/2020    CREATININE 1 06 10/12/2020       _____________________________________________________  PHYSICAL EXAMINATION:  General: well developed and well nourished, alert, oriented times 3 and appears comfortable  Psychiatric: Normal  HEENT: Normocephalic, Atraumatic Trachea Midline, No torticollis  Pulmonary: No audible wheezing or respiratory distress   Cardiovascular: No pitting edema, 2+ radial pulse   Skin: No masses, erythema, lacerations, fluctation, ulcerations  Neurovascular: Sensation Intact to the Median, Ulnar, Radial Nerve, Motor Intact to the Median, Ulnar, Radial Nerve and Pulses Intact  Musculoskeletal: Normal, except as noted in detailed exam and in HPI  MUSCULOSKELETAL EXAMINATION:  Right hand:  No swelling within the A1 pulley of the thumb, long or index fingers  Nonpainful Palpable nodules with crepitation at the A1 pulley of the thumb, long and index fingers  Firm retinacular cyst felt at the A1 pulley of the right thumb that is nontender  Full composite fist  Brisk capillary refill  ___________________________________________________  STUDIES REVIEWED:  No images reviewed today  PROCEDURES PERFORMED:  Hand/upper extremity injection: R long A1  Universal Protocol:  Consent: Verbal consent obtained    Risks and benefits: risks, benefits and alternatives were discussed  Consent given by: patient  Time out: Immediately prior to procedure a "time out" was called to verify the correct patient, procedure, equipment, support staff and site/side marked as required    Timeout called at: 1/20/2021 11:05 AM   Patient understanding: patient states understanding of the procedure being performed  Site marked: the operative site was marked  Patient identity confirmed: verbally with patient    Supporting Documentation  Indications: tendon swelling and pain   Procedure Details  Condition:trigger finger Location: long finger - R long A1   Preparation: Patient was prepped and draped in the usual sterile fashion  Needle size: 25 G  Ultrasound guidance: no  Approach: volar  Medications administered: 1 mL lidocaine 1 %; 40 mg dexamethasone 100 mg/10 mL    Patient tolerance: patient tolerated the procedure well with no immediate complications  Dressing:  Sterile dressing applied         _____________________________________________________      Scribe Attestation    I,:  Miky Hernandez am acting as a scribe while in the presence of the attending physician :       I,:  Shima Nicolas MD personally performed the services described in this documentation    as scribed in my presence :

## 2021-02-02 DIAGNOSIS — K21.9 GASTROESOPHAGEAL REFLUX DISEASE, UNSPECIFIED WHETHER ESOPHAGITIS PRESENT: Primary | ICD-10-CM

## 2021-02-04 RX ORDER — OMEPRAZOLE 20 MG/1
CAPSULE, DELAYED RELEASE ORAL
Qty: 90 CAPSULE | Refills: 3 | Status: SHIPPED | OUTPATIENT
Start: 2021-02-04 | End: 2021-05-10

## 2021-02-13 DIAGNOSIS — Z23 ENCOUNTER FOR IMMUNIZATION: ICD-10-CM

## 2021-02-19 ENCOUNTER — IMMUNIZATIONS (OUTPATIENT)
Dept: FAMILY MEDICINE CLINIC | Facility: HOSPITAL | Age: 66
End: 2021-02-19

## 2021-02-19 DIAGNOSIS — Z23 ENCOUNTER FOR IMMUNIZATION: Primary | ICD-10-CM

## 2021-02-19 PROCEDURE — 91300 SARS-COV-2 / COVID-19 MRNA VACCINE (PFIZER-BIONTECH) 30 MCG: CPT

## 2021-02-19 PROCEDURE — 0001A SARS-COV-2 / COVID-19 MRNA VACCINE (PFIZER-BIONTECH) 30 MCG: CPT

## 2021-03-03 ENCOUNTER — TELEPHONE (OUTPATIENT)
Dept: OBGYN CLINIC | Facility: CLINIC | Age: 66
End: 2021-03-03

## 2021-03-11 ENCOUNTER — IMMUNIZATIONS (OUTPATIENT)
Dept: FAMILY MEDICINE CLINIC | Facility: HOSPITAL | Age: 66
End: 2021-03-11

## 2021-03-11 DIAGNOSIS — Z23 ENCOUNTER FOR IMMUNIZATION: Primary | ICD-10-CM

## 2021-03-11 PROCEDURE — 0002A SARS-COV-2 / COVID-19 MRNA VACCINE (PFIZER-BIONTECH) 30 MCG: CPT

## 2021-03-11 PROCEDURE — 91300 SARS-COV-2 / COVID-19 MRNA VACCINE (PFIZER-BIONTECH) 30 MCG: CPT

## 2021-03-16 DIAGNOSIS — N40.1 BENIGN PROSTATIC HYPERPLASIA WITH NOCTURIA: ICD-10-CM

## 2021-03-16 DIAGNOSIS — E78.2 MIXED HYPERLIPIDEMIA: ICD-10-CM

## 2021-03-16 DIAGNOSIS — R35.1 BENIGN PROSTATIC HYPERPLASIA WITH NOCTURIA: ICD-10-CM

## 2021-03-16 RX ORDER — FINASTERIDE 5 MG/1
TABLET, FILM COATED ORAL
Qty: 90 TABLET | Refills: 1 | Status: SHIPPED | OUTPATIENT
Start: 2021-03-16 | End: 2021-09-09

## 2021-03-16 RX ORDER — ROSUVASTATIN CALCIUM 10 MG/1
TABLET, COATED ORAL
Qty: 90 TABLET | Refills: 1 | Status: SHIPPED | OUTPATIENT
Start: 2021-03-16 | End: 2021-09-09

## 2021-03-18 ENCOUNTER — OFFICE VISIT (OUTPATIENT)
Dept: OBGYN CLINIC | Facility: CLINIC | Age: 66
End: 2021-03-18
Payer: MEDICARE

## 2021-03-18 VITALS
BODY MASS INDEX: 29.8 KG/M2 | DIASTOLIC BLOOD PRESSURE: 71 MMHG | SYSTOLIC BLOOD PRESSURE: 159 MMHG | HEART RATE: 67 BPM | HEIGHT: 72 IN | WEIGHT: 220 LBS

## 2021-03-18 DIAGNOSIS — M65.341 TRIGGER FINGER, RIGHT RING FINGER: ICD-10-CM

## 2021-03-18 DIAGNOSIS — M65.331 TRIGGER FINGER, RIGHT MIDDLE FINGER: Primary | ICD-10-CM

## 2021-03-18 DIAGNOSIS — Z29.8 NEED FOR MALARIA PROPHYLAXIS: Primary | ICD-10-CM

## 2021-03-18 DIAGNOSIS — M65.311 TRIGGER THUMB OF RIGHT HAND: ICD-10-CM

## 2021-03-18 PROCEDURE — 20550 NJX 1 TENDON SHEATH/LIGAMENT: CPT | Performed by: SURGERY

## 2021-03-18 PROCEDURE — 99214 OFFICE O/P EST MOD 30 MIN: CPT | Performed by: SURGERY

## 2021-03-18 RX ORDER — DEXAMETHASONE SODIUM PHOSPHATE 100 MG/10ML
40 INJECTION INTRAMUSCULAR; INTRAVENOUS
Status: COMPLETED | OUTPATIENT
Start: 2021-03-18 | End: 2021-03-18

## 2021-03-18 RX ORDER — LIDOCAINE HYDROCHLORIDE 10 MG/ML
1 INJECTION, SOLUTION INFILTRATION; PERINEURAL
Status: COMPLETED | OUTPATIENT
Start: 2021-03-18 | End: 2021-03-18

## 2021-03-18 RX ORDER — MEFLOQUINE HYDROCHLORIDE 250 MG/1
250 TABLET ORAL
Qty: 10 TABLET | Refills: 0 | Status: ON HOLD | OUTPATIENT
Start: 2021-03-18 | End: 2021-07-16 | Stop reason: ALTCHOICE

## 2021-03-18 RX ADMIN — DEXAMETHASONE SODIUM PHOSPHATE 40 MG: 100 INJECTION INTRAMUSCULAR; INTRAVENOUS at 10:00

## 2021-03-18 RX ADMIN — LIDOCAINE HYDROCHLORIDE 1 ML: 10 INJECTION, SOLUTION INFILTRATION; PERINEURAL at 10:00

## 2021-03-18 NOTE — PROGRESS NOTES
ASSESSMENT/PLAN:      76 y o  male with right thumb, long and ring finger trigger fingers  The decision was made to proceed with a second right long finger trigger finger CSI  The CSI was performed in the office without complication  Post injection protocol was discussed  If Ashley Johnson does not see significant improvement he states that the finger is livable and does not wish to proceed with surgical intervention  Follow up in the office as needed if symptoms worsen or fail to improve  The patient verbalized understanding of exam findings and treatment plan  We engaged in the shared decision-making process and treatment options were discussed at length with the patient  Surgical and conservative management discussed today along with risks and benefits  Diagnoses and all orders for this visit:    Trigger finger, right middle finger  -     Hand/upper extremity injection: R long A1    Trigger thumb of right hand    Trigger finger, right ring finger      Follow Up:  Return if symptoms worsen or fail to improve  To Do Next Visit:  Re-evaluation of current issue    ____________________________________________________________________________________________________________________________________________      CHIEF COMPLAINT:  No chief complaint on file  SUBJECTIVE:  Sandip Alva is a 76y o  year old RHD male who presents to the office today for a follow up regarding right thumb, long and ring finger trigger fingers  Ashley Johnson was last seen in the office aprox  6 weeks ago, at which time a right long finger trigger finger CSI was performed  He notes some improvement with regards to the clicking and locking of his long finger  I have personally reviewed all the relevant PMH, PSH, SH, FH, Medications and allergies       PAST MEDICAL HISTORY:  Past Medical History:   Diagnosis Date    Abrasion of left foot     LAST ASSESSED: 9/23/13    Achilles tendinitis, unspecified leg     LAST ASSESSED: 11/30/12    Allergic rhinitis     LAST ASSESSED: 13    Allergic rhinitis 2008    LAST ASSESSED: 08    Cervicalgia 2011    LAST ASSESSED: 11    Coronary artery disease     Dysfunction of left eustachian tube     LAST ASSESSED: 13    Epistaxis     LAST ASSESSED: 5/27/15    First degree atrioventricular block     LAST ASSESSED: 7/10/17    Gastric ulcer 10/07/2011    LAST ASSESSED: 3/9/15    GERD (gastroesophageal reflux disease)     Hemorrhoids 2011    LAST ASSESSED: 11    Long term use of drug     LAST ASSESSED: 10/11/12    Myalgia 2007    LAST ASSESSED: 07    Myositis 2007    LAST ASSESSED: 07    Numbness and tingling of foot     LAST ASSESSED: 3/9/15    Sleep apnea        PAST SURGICAL HISTORY:  Past Surgical History:   Procedure Laterality Date    CARDIAC CATHETERIZATION      CHOLECYSTECTOMY      LAPAROSCOPIC; LAST ASSESSED: 10/17/17    COLONOSCOPY      COMPLETE; 3-4 YEARS AGO BY [facility]; LAST ASSESSED: 14    MYRINGOTOMY W/ TUBES      WITH VENTILATING TUBE INSERTION    VASECTOMY         FAMILY HISTORY:  Family History   Problem Relation Age of Onset    Cancer Mother         SOFT TISSUE CANCER ON RT SIDE CHEST WALL AND     Prostate cancer Maternal Uncle        SOCIAL HISTORY:  Social History     Tobacco Use    Smoking status: Former Smoker     Quit date:      Years since quittin 2    Smokeless tobacco: Never Used   Substance Use Topics    Alcohol use: Yes     Comment: social    Drug use: No       MEDICATIONS:    Current Outpatient Medications:     aspirin (ASPIR-81) 81 mg EC tablet, Take 81 mg by mouth daily, Disp: , Rfl:     celecoxib (CeleBREX) 200 mg capsule, Take 1 capsule (200 mg total) by mouth daily, Disp: 90 capsule, Rfl: 1    Coenzyme Q10 (COQ10) 200 MG CAPS, Take 1 tablet by mouth daily, Disp: , Rfl:     finasteride (PROSCAR) 5 mg tablet, TAKE 1 TABLET BY MOUTH EVERY DAY, Disp: 90 tablet, Rfl: 1    Glucosamine-Chondroitin (GLUCOSAMINE CHONDR COMPLEX PO), Take 1 tablet by mouth daily, Disp: , Rfl:     meclizine (ANTIVERT) 12 5 MG tablet, Take 1 tablet (12 5 mg total) by mouth every 8 (eight) hours as needed for dizziness, Disp: 30 tablet, Rfl: 0    nitroglycerin (NITROSTAT) 0 4 mg SL tablet, PLACE 1 TABLET(S) BY SUBLINGUAL ROUTE , Disp: , Rfl: 4    Omega-3 Fatty Acids (FISH OIL) 1000 MG CPDR, Take by mouth daily, Disp: , Rfl:     omeprazole (PriLOSEC) 20 mg delayed release capsule, ONE BY MOUTH EVERY DAY 1/2 HOUR BEFORE BREAKFAST, Disp: 90 capsule, Rfl: 3    rosuvastatin (CRESTOR) 10 MG tablet, TAKE 1 TABLET BY MOUTH EVERY DAY, Disp: 90 tablet, Rfl: 1    ALLERGIES:  Allergies   Allergen Reactions    Amoxicillin Anaphylaxis    Acetaminophen      Rapid heart rate    Apple     Cherry Flavor     Pollen Extract     Augmentin [Amoxicillin-Pot Clavulanate]     Dye [Iodinated Diagnostic Agents]        REVIEW OF SYSTEMS:  Review of Systems   Constitutional: Negative for chills, fever and unexpected weight change  HENT: Negative for hearing loss, nosebleeds and sore throat  Eyes: Negative for pain, redness and visual disturbance  Respiratory: Negative for cough, shortness of breath and wheezing  Cardiovascular: Negative for chest pain, palpitations and leg swelling  Gastrointestinal: Negative for abdominal pain, nausea and vomiting  Endocrine: Negative for polydipsia and polyuria  Genitourinary: Negative for difficulty urinating and hematuria  Musculoskeletal: Negative for arthralgias, joint swelling and myalgias  Skin: Negative for rash and wound  Neurological: Negative for dizziness, numbness and headaches  Psychiatric/Behavioral: Negative for decreased concentration, dysphoric mood and suicidal ideas  The patient is not nervous/anxious          VITALS:  Vitals:    03/18/21 0940   BP: 159/71   Pulse: 67       LABS:  HgA1c:   Lab Results   Component Value Date    HGBA1C 5 6 10/02/2020     BMP:   Lab Results   Component Value Date    CALCIUM 9 3 10/12/2020     09/29/2017    K 4 3 10/12/2020    CO2 28 10/12/2020     10/12/2020    BUN 17 10/12/2020    CREATININE 1 06 10/12/2020       _____________________________________________________  PHYSICAL EXAMINATION:  General: well developed and well nourished, alert, oriented times 3 and appears comfortable  Psychiatric: Normal  HEENT: Normocephalic, Atraumatic Trachea Midline, No torticollis  Pulmonary: No audible wheezing or respiratory distress   Cardiovascular: No pitting edema, 2+ radial pulse   Skin: No masses, erythema, lacerations, fluctation, ulcerations  Neurovascular: Sensation Intact to the Median, Ulnar, Radial Nerve, Motor Intact to the Median, Ulnar, Radial Nerve and Pulses Intact  Musculoskeletal: Normal, except as noted in detailed exam and in HPI  MUSCULOSKELETAL EXAMINATION:    right long finger:  Positive palpable nodule over the A1 pulley  Positive tenderness to palpation over A1 pulley  Negative catching  Positive clicking  Full composite fist  Brisk capillary refill      ___________________________________________________  STUDIES REVIEWED:  No new imaging to review           PROCEDURES PERFORMED:  Hand/upper extremity injection: R long A1  Universal Protocol:  Consent: Verbal consent obtained  Written consent not obtained  Risks and benefits: risks, benefits and alternatives were discussed  Consent given by: patient  Time out: Immediately prior to procedure a "time out" was called to verify the correct patient, procedure, equipment, support staff and site/side marked as required    Site marked: the operative site was marked  Patient identity confirmed: verbally with patient    Supporting Documentation  Indications: pain   Procedure Details  Condition:trigger finger Location: long finger - R long A1   Preparation: Patient was prepped and draped in the usual sterile fashion  Needle size: 25 G  Ultrasound guidance: no  Medications administered: 1 mL lidocaine 1 %; 40 mg dexamethasone 100 mg/10 mL    Patient tolerance: patient tolerated the procedure well with no immediate complications  Dressing:  Sterile dressing applied            _____________________________________________________      Scribe Attestation    I,:  Mallorie Herring am acting as a scribe while in the presence of the attending physician :       I,:  Nuris Lopez MD personally performed the services described in this documentation    as scribed in my presence :

## 2021-04-06 LAB
ALBUMIN SERPL-MCNC: 4.3 G/DL (ref 3.6–5.1)
ALBUMIN/GLOB SERPL: 1.8 (CALC) (ref 1–2.5)
ALP SERPL-CCNC: 67 U/L (ref 35–144)
ALT SERPL-CCNC: 48 U/L (ref 9–46)
AST SERPL-CCNC: 28 U/L (ref 10–35)
BASOPHILS # BLD AUTO: 51 CELLS/UL (ref 0–200)
BASOPHILS NFR BLD AUTO: 1 %
BILIRUB SERPL-MCNC: 1.7 MG/DL (ref 0.2–1.2)
BUN SERPL-MCNC: 15 MG/DL (ref 7–25)
BUN/CREAT SERPL: ABNORMAL (CALC) (ref 6–22)
CALCIUM SERPL-MCNC: 9.6 MG/DL (ref 8.6–10.3)
CHLORIDE SERPL-SCNC: 105 MMOL/L (ref 98–110)
CHOLEST SERPL-MCNC: 142 MG/DL
CHOLEST/HDLC SERPL: 3.3 (CALC)
CO2 SERPL-SCNC: 28 MMOL/L (ref 20–32)
CREAT SERPL-MCNC: 0.91 MG/DL (ref 0.7–1.25)
EOSINOPHIL # BLD AUTO: 128 CELLS/UL (ref 15–500)
EOSINOPHIL NFR BLD AUTO: 2.5 %
ERYTHROCYTE [DISTWIDTH] IN BLOOD BY AUTOMATED COUNT: 13.1 % (ref 11–15)
GLOBULIN SER CALC-MCNC: 2.4 G/DL (CALC) (ref 1.9–3.7)
GLUCOSE SERPL-MCNC: 98 MG/DL (ref 65–99)
HBA1C MFR BLD: 5.5 % OF TOTAL HGB
HCT VFR BLD AUTO: 47.9 % (ref 38.5–50)
HDLC SERPL-MCNC: 43 MG/DL
HGB BLD-MCNC: 16.3 G/DL (ref 13.2–17.1)
LDLC SERPL CALC-MCNC: 78 MG/DL (CALC)
LYMPHOCYTES # BLD AUTO: 1994 CELLS/UL (ref 850–3900)
LYMPHOCYTES NFR BLD AUTO: 39.1 %
MCH RBC QN AUTO: 31.6 PG (ref 27–33)
MCHC RBC AUTO-ENTMCNC: 34 G/DL (ref 32–36)
MCV RBC AUTO: 92.8 FL (ref 80–100)
MONOCYTES # BLD AUTO: 551 CELLS/UL (ref 200–950)
MONOCYTES NFR BLD AUTO: 10.8 %
NEUTROPHILS # BLD AUTO: 2377 CELLS/UL (ref 1500–7800)
NEUTROPHILS NFR BLD AUTO: 46.6 %
NONHDLC SERPL-MCNC: 99 MG/DL (CALC)
PLATELET # BLD AUTO: 248 THOUSAND/UL (ref 140–400)
PMV BLD REES-ECKER: 9.6 FL (ref 7.5–12.5)
POTASSIUM SERPL-SCNC: 4.7 MMOL/L (ref 3.5–5.3)
PROT SERPL-MCNC: 6.7 G/DL (ref 6.1–8.1)
PSA SERPL-MCNC: 0.9 NG/ML
RBC # BLD AUTO: 5.16 MILLION/UL (ref 4.2–5.8)
SL AMB EGFR AFRICAN AMERICAN: 101 ML/MIN/1.73M2
SL AMB EGFR NON AFRICAN AMERICAN: 88 ML/MIN/1.73M2
SODIUM SERPL-SCNC: 141 MMOL/L (ref 135–146)
TRIGL SERPL-MCNC: 127 MG/DL
TSH SERPL-ACNC: 2.36 MIU/L (ref 0.4–4.5)
WBC # BLD AUTO: 5.1 THOUSAND/UL (ref 3.8–10.8)

## 2021-04-15 ENCOUNTER — OFFICE VISIT (OUTPATIENT)
Dept: FAMILY MEDICINE CLINIC | Facility: CLINIC | Age: 66
End: 2021-04-15
Payer: MEDICARE

## 2021-04-15 VITALS
OXYGEN SATURATION: 98 % | DIASTOLIC BLOOD PRESSURE: 72 MMHG | BODY MASS INDEX: 29.93 KG/M2 | RESPIRATION RATE: 16 BRPM | SYSTOLIC BLOOD PRESSURE: 136 MMHG | HEIGHT: 72 IN | WEIGHT: 221 LBS | HEART RATE: 72 BPM

## 2021-04-15 DIAGNOSIS — K21.9 GASTROESOPHAGEAL REFLUX DISEASE, UNSPECIFIED WHETHER ESOPHAGITIS PRESENT: ICD-10-CM

## 2021-04-15 DIAGNOSIS — K21.9 GASTRO-ESOPHAGEAL REFLUX DISEASE WITHOUT ESOPHAGITIS: ICD-10-CM

## 2021-04-15 DIAGNOSIS — J44.9 OSA AND COPD OVERLAP SYNDROME (HCC): ICD-10-CM

## 2021-04-15 DIAGNOSIS — I25.10 CORONARY ARTERY DISEASE INVOLVING NATIVE CORONARY ARTERY OF NATIVE HEART WITHOUT ANGINA PECTORIS: ICD-10-CM

## 2021-04-15 DIAGNOSIS — E78.2 MIXED HYPERLIPIDEMIA: Primary | ICD-10-CM

## 2021-04-15 DIAGNOSIS — G47.33 OSA AND COPD OVERLAP SYNDROME (HCC): ICD-10-CM

## 2021-04-15 DIAGNOSIS — R73.01 IMPAIRED FASTING GLUCOSE: ICD-10-CM

## 2021-04-15 PROBLEM — H00.035 ABSCESS OF LEFT LOWER EYELID: Status: RESOLVED | Noted: 2019-09-09 | Resolved: 2021-04-15

## 2021-04-15 PROBLEM — H81.11 BPPV (BENIGN PAROXYSMAL POSITIONAL VERTIGO), RIGHT: Status: RESOLVED | Noted: 2020-12-23 | Resolved: 2021-04-15

## 2021-04-15 PROCEDURE — 99215 OFFICE O/P EST HI 40 MIN: CPT | Performed by: FAMILY MEDICINE

## 2021-04-15 RX ORDER — PANTOPRAZOLE SODIUM 40 MG/1
40 TABLET, DELAYED RELEASE ORAL DAILY
Qty: 30 TABLET | Refills: 1 | Status: SHIPPED | OUTPATIENT
Start: 2021-04-15 | End: 2021-05-10

## 2021-04-15 NOTE — ASSESSMENT & PLAN NOTE
-patient is experiencing breakthrough reflux on omeprazole 20 mg once daily     -I did suggest trying an alternative proton pump inhibitor    He has failed H2 blockers in the past   Will place the patient on pantoprazole 40 mg once daily for 1 month to see if that is effective for him

## 2021-04-15 NOTE — ASSESSMENT & PLAN NOTE
Patient has been watching dietary intake of carbohydrates  Hemoglobin A1c at 5 5% with normal fasting glucose    Repeat hemoglobin A1c in 6 months a

## 2021-04-15 NOTE — ASSESSMENT & PLAN NOTE
Lipid profile reviewed  LDL at 78  Definitely could be better with his history of heart disease  Patient is tolerating Crestor 10 mg once daily very well  May be beneficial to increase to 20 mg daily  Repeat lipid profile in 6 months    Would appreciate cardiologist input on this

## 2021-04-15 NOTE — PROGRESS NOTES
Subjective:      Patient ID: Drea Cedillo is a 76 y o  male  50-year-old male with past medical history of coronary artery disease, Last's esophagus, hypertension, hyperlipidemia presents for 6 month follow-up of chronic conditions  Overall the patient has been feeling well  He did have recent travel  Current on COVID-19 vaccination  Patient has been experiencing more breakthrough GERD  His dose of omeprazole was reduced from 40 mg to 20 mg  He states that he is having to take antacids approximately 2 times per week which is largely dependent upon what he eats  Not certain why his dosage of proton pump inhibitor was reduced  Patient would like to find new cardiologist in the Network  His prior cardiologist recommended that he find in interventional cardiologist   He does have history of heart disease which has required intervention in the past   No chest pain or shortness of breath  Patient did have recent stress testing with hospital admission  Labs reviewed which showed hemoglobin A1c of 5 5%, total cholesterol 142, HDL 43, triglycerides 127, LDL 78, normal CBC, CMP, PSA at 0 9        Past Medical History:   Diagnosis Date    Abrasion of left foot     LAST ASSESSED: 9/23/13    Achilles tendinitis, unspecified leg     LAST ASSESSED: 11/30/12    Allergic rhinitis     LAST ASSESSED: 11/25/13    Allergic rhinitis 06/25/2008    LAST ASSESSED: 6/25/08    Cervicalgia 04/22/2011    LAST ASSESSED: 4/22/11    Coronary artery disease     Dysfunction of left eustachian tube     LAST ASSESSED: 9/23/13    Epistaxis     LAST ASSESSED: 5/27/15    First degree atrioventricular block     LAST ASSESSED: 7/10/17    Gastric ulcer 10/07/2011    LAST ASSESSED: 3/9/15    GERD (gastroesophageal reflux disease)     Hemorrhoids 05/13/2011    LAST ASSESSED: 5/13/11    Long term use of drug     LAST ASSESSED: 10/11/12    Myalgia 12/21/2007    LAST ASSESSED: 12/21/07    Myositis 12/21/2007    LAST ASSESSED: 07    Numbness and tingling of foot     LAST ASSESSED: 3/9/15    Sleep apnea        Family History   Problem Relation Age of Onset    Cancer Mother         SOFT TISSUE CANCER ON RT SIDE CHEST WALL AND     Prostate cancer Maternal Uncle        Past Surgical History:   Procedure Laterality Date    CARDIAC CATHETERIZATION  2017    CHOLECYSTECTOMY      LAPAROSCOPIC; LAST ASSESSED: 10/17/17    COLONOSCOPY      COMPLETE; 3-4 YEARS AGO BY [facility]; LAST ASSESSED: 14    MYRINGOTOMY W/ TUBES      WITH VENTILATING TUBE INSERTION    VASECTOMY          reports that he quit smoking about 31 years ago  He has never used smokeless tobacco  He reports current alcohol use  He reports that he does not use drugs        Current Outpatient Medications:     aspirin (ASPIR-81) 81 mg EC tablet, Take 81 mg by mouth daily, Disp: , Rfl:     celecoxib (CeleBREX) 200 mg capsule, Take 1 capsule (200 mg total) by mouth daily, Disp: 90 capsule, Rfl: 1    finasteride (PROSCAR) 5 mg tablet, TAKE 1 TABLET BY MOUTH EVERY DAY, Disp: 90 tablet, Rfl: 1    Glucosamine-Chondroitin (GLUCOSAMINE CHONDR COMPLEX PO), Take 1 tablet by mouth daily, Disp: , Rfl:     mefloquine (LARIAM) 250 MG tablet, Take 1 tablet (250 mg total) by mouth every 7 days for 10 doses, Disp: 10 tablet, Rfl: 0    nitroglycerin (NITROSTAT) 0 4 mg SL tablet, PLACE 1 TABLET(S) BY SUBLINGUAL ROUTE , Disp: , Rfl: 4    Omega-3 Fatty Acids (FISH OIL) 1000 MG CPDR, Take by mouth daily, Disp: , Rfl:     omeprazole (PriLOSEC) 20 mg delayed release capsule, ONE BY MOUTH EVERY DAY 1/2 HOUR BEFORE BREAKFAST, Disp: 90 capsule, Rfl: 3    rosuvastatin (CRESTOR) 10 MG tablet, TAKE 1 TABLET BY MOUTH EVERY DAY, Disp: 90 tablet, Rfl: 1    Coenzyme Q10 (COQ10) 200 MG CAPS, Take 1 tablet by mouth daily, Disp: , Rfl:     pantoprazole (PROTONIX) 40 mg tablet, Take 1 tablet (40 mg total) by mouth daily, Disp: 30 tablet, Rfl: 1    The following portions of the patient's history were reviewed and updated as appropriate: allergies, current medications, past family history, past medical history, past social history, past surgical history and problem list     Review of Systems   Constitutional: Negative  HENT: Negative  Eyes: Negative  Respiratory: Negative  Cardiovascular: Negative  Gastrointestinal: Negative  GERD   Endocrine: Negative  Genitourinary: Negative  Musculoskeletal: Negative  Skin: Negative  Allergic/Immunologic: Negative  Neurological: Negative  Hematological: Negative  Psychiatric/Behavioral: Negative  All other systems reviewed and are negative  Objective:    /72   Pulse 72   Resp 16   Ht 6' (1 829 m)   Wt 100 kg (221 lb)   SpO2 98%   BMI 29 97 kg/m²      Physical Exam  Vitals signs and nursing note reviewed  Constitutional:       General: He is not in acute distress  Appearance: Normal appearance  He is well-developed and normal weight  He is not ill-appearing  HENT:      Head: Normocephalic and atraumatic  Right Ear: Tympanic membrane, ear canal and external ear normal       Left Ear: Tympanic membrane, ear canal and external ear normal    Eyes:      Extraocular Movements: Extraocular movements intact  Conjunctiva/sclera: Conjunctivae normal       Pupils: Pupils are equal, round, and reactive to light  Neck:      Musculoskeletal: Normal range of motion and neck supple  Cardiovascular:      Rate and Rhythm: Normal rate and regular rhythm  Pulses: Normal pulses  Heart sounds: Normal heart sounds  No murmur  Pulmonary:      Effort: Pulmonary effort is normal       Breath sounds: Normal breath sounds  Abdominal:      General: Abdomen is flat  Bowel sounds are normal       Palpations: Abdomen is soft  Musculoskeletal: Normal range of motion  Skin:     General: Skin is warm and dry  Neurological:      General: No focal deficit present        Mental Status: He is alert and oriented to person, place, and time  Psychiatric:         Mood and Affect: Mood normal          Behavior: Behavior normal          Thought Content:  Thought content normal          Judgment: Judgment normal            Recent Results (from the past 1008 hour(s))   NOVEL CORONAVIRUS (COVID-19), PCR [facility]    Collection Time: 03/17/21  3:11 PM    Specimen: Other   Result Value Ref Range    LUMIRADX SARS-COV-2 AG TEST Negative Negative, Invalid    INTERNAL CONTROLS VALID Yes--Test working appropriately     Expiration Date 10/26/2021     Lot Number [id number]     Test Brand Name_Covid-19 Lumiradx Sars-Cov-2 AG Test    Lipid panel    Collection Time: 04/05/21  9:20 AM   Result Value Ref Range    Total Cholesterol 142 <200 mg/dL    HDL 43 > OR = 40 mg/dL    Triglycerides 127 <150 mg/dL    LDL Calculated 78 mg/dL (calc)    Chol HDLC Ratio 3 3 <5 0 (calc)    Non-HDL Cholesterol 99 <130 mg/dL (calc)   Comprehensive metabolic panel    Collection Time: 04/05/21  9:20 AM   Result Value Ref Range    Glucose, Random 98 65 - 99 mg/dL    BUN 15 7 - 25 mg/dL    Creatinine 0 91 0 70 - 1 25 mg/dL    eGFR Non  88 > OR = 60 mL/min/1 73m2    eGFR  101 > OR = 60 mL/min/1 73m2    SL AMB BUN/CREATININE RATIO NOT APPLICABLE 6 - 22 (calc)    Sodium 141 135 - 146 mmol/L    Potassium 4 7 3 5 - 5 3 mmol/L    Chloride 105 98 - 110 mmol/L    CO2 28 20 - 32 mmol/L    Calcium 9 6 8 6 - 10 3 mg/dL    Protein, Total 6 7 6 1 - 8 1 g/dL    Albumin 4 3 3 6 - 5 1 g/dL    Globulin 2 4 1 9 - 3 7 g/dL (calc)    Albumin/Globulin Ratio 1 8 1 0 - 2 5 (calc)    TOTAL BILIRUBIN 1 7 (H) 0 2 - 1 2 mg/dL    Alkaline Phosphatase 67 35 - 144 U/L    AST 28 10 - 35 U/L    ALT 48 (H) 9 - 46 U/L   CBC and differential    Collection Time: 04/05/21  9:20 AM   Result Value Ref Range    White Blood Cell Count 5 1 3 8 - 10 8 Thousand/uL    Red Blood Cell Count 5 16 4 20 - 5 80 Million/uL    Hemoglobin 16 3 13 2 - 17 1 g/dL    HCT 47 9 38 5 - 50 0 %    MCV 92 8 80 0 - 100 0 fL    MCH 31 6 27 0 - 33 0 pg    MCHC 34 0 32 0 - 36 0 g/dL    RDW 13 1 11 0 - 15 0 %    Platelet Count 942 034 - 400 Thousand/uL    SL AMB MPV 9 6 7 5 - 12 5 fL    Neutrophils (Absolute) 2,377 1,500 - 7,800 cells/uL    Lymphocytes (Absolute) 1,994 850 - 3,900 cells/uL    Monocytes (Absolute) 551 200 - 950 cells/uL    Eosinophils (Absolute) 128 15 - 500 cells/uL    Basophils ABS 51 0 - 200 cells/uL    Neutrophils 46 6 %    Lymphocytes 39 1 %    Monocytes 10 8 %    Eosinophils 2 5 %    Basophils PCT 1 0 %   PSA, Total Screen    Collection Time: 04/05/21  9:20 AM   Result Value Ref Range    Prostate Specific Antigen Total 0 9 < OR = 4 0 ng/mL   TSH, 3rd generation with Free T4 reflex    Collection Time: 04/05/21  9:20 AM   Result Value Ref Range    TSH W/RFX TO FREE T4 2 36 0 40 - 4 50 mIU/L   Hemoglobin A1c (w/out EAG)    Collection Time: 04/05/21  9:20 AM   Result Value Ref Range    Hemoglobin A1C 5 5 <5 7 % of total Hgb       Assessment/Plan:    Gastro-esophageal reflux disease without esophagitis  -patient is experiencing breakthrough reflux on omeprazole 20 mg once daily     -I did suggest trying an alternative proton pump inhibitor  He has failed H2 blockers in the past   Will place the patient on pantoprazole 40 mg once daily for 1 month to see if that is effective for him    Impaired fasting glucose  Patient has been watching dietary intake of carbohydrates  Hemoglobin A1c at 5 5% with normal fasting glucose  Repeat hemoglobin A1c in 6 months a    Coronary artery disease involving native coronary artery of native heart without angina pectoris  - patient remains on Crestor, aspirin  Not presently on beta-blocker  He did have nuclear stress test and October which was negative for acute ischemia      -patient given referral to [address] [place] [address] cardiologist   At his request he would like to see an interventional cardiologist   Referral to Dr Humberto Law hyperlipidemia  Lipid profile reviewed  LDL at 78  Definitely could be better with his history of heart disease  Patient is tolerating Crestor 10 mg once daily very well  May be beneficial to increase to 20 mg daily  Repeat lipid profile in 6 months  Would appreciate cardiologist input on this          Problem List Items Addressed This Visit        Digestive    Gastro-esophageal reflux disease without esophagitis     -patient is experiencing breakthrough reflux on omeprazole 20 mg once daily     -I did suggest trying an alternative proton pump inhibitor  He has failed H2 blockers in the past   Will place the patient on pantoprazole 40 mg once daily for 1 month to see if that is effective for him         Relevant Medications    pantoprazole (PROTONIX) 40 mg tablet       Endocrine    Impaired fasting glucose     Patient has been watching dietary intake of carbohydrates  Hemoglobin A1c at 5 5% with normal fasting glucose  Repeat hemoglobin A1c in 6 months a         Relevant Orders    Hemoglobin A1C    TSH, 3rd generation with Free T4 reflex       Cardiovascular and Mediastinum    Coronary artery disease involving native coronary artery of native heart without angina pectoris     - patient remains on Crestor, aspirin  Not presently on beta-blocker  He did have nuclear stress test and October which was negative for acute ischemia  -patient given referral to [address] [place] [address] cardiologist   At his request he would like to see an interventional cardiologist   Referral to Dr Leal [place] referral to Cardiology    CBC and differential    TSH, 3rd generation with Free T4 reflex       Other    Mixed hyperlipidemia - Primary     Lipid profile reviewed  LDL at 78  Definitely could be better with his history of heart disease  Patient is tolerating Crestor 10 mg once daily very well  May be beneficial to increase to 20 mg daily  Repeat lipid profile in 6 months    Would appreciate cardiologist input on this         Relevant Orders    Comprehensive metabolic panel    Lipid panel    TSH, 3rd generation with Free T4 reflex      Other Visit Diagnoses     Gastroesophageal reflux disease, unspecified whether esophagitis present        Relevant Medications    pantoprazole (PROTONIX) 40 mg tablet    LENY and COPD overlap syndrome (HCC)                BMI Counseling: Body mass index is 29 97 kg/m²  The BMI is above normal  Nutrition recommendations include reducing intake of saturated fat and trans fat and reducing intake of cholesterol  I have spent 41 minutes with Patient  today in which greater than 50% of this time was spent in counseling/coordination of care regarding Diagnostic results, Prognosis, Risks and benefits of tx options, Intructions for management, Patient and family education, Importance of tx compliance, Risk factor reductions and Impressions

## 2021-04-25 DIAGNOSIS — M19.041 OSTEOARTHRITIS OF FINGERS OF BOTH HANDS: ICD-10-CM

## 2021-04-25 DIAGNOSIS — M19.042 OSTEOARTHRITIS OF FINGERS OF BOTH HANDS: ICD-10-CM

## 2021-04-25 DIAGNOSIS — M17.12 PRIMARY OSTEOARTHRITIS OF LEFT KNEE: ICD-10-CM

## 2021-04-25 RX ORDER — CELECOXIB 200 MG/1
CAPSULE ORAL
Qty: 90 CAPSULE | Refills: 1 | Status: SHIPPED | OUTPATIENT
Start: 2021-04-25 | End: 2021-10-20 | Stop reason: SDUPTHER

## 2021-04-27 ENCOUNTER — OFFICE VISIT (OUTPATIENT)
Dept: FAMILY MEDICINE CLINIC | Facility: CLINIC | Age: 66
End: 2021-04-27
Payer: MEDICARE

## 2021-04-27 VITALS
RESPIRATION RATE: 16 BRPM | OXYGEN SATURATION: 98 % | SYSTOLIC BLOOD PRESSURE: 148 MMHG | DIASTOLIC BLOOD PRESSURE: 86 MMHG | WEIGHT: 221 LBS | BODY MASS INDEX: 29.93 KG/M2 | HEIGHT: 72 IN | HEART RATE: 66 BPM

## 2021-04-27 DIAGNOSIS — I49.3 PREMATURE VENTRICULAR BEATS: ICD-10-CM

## 2021-04-27 DIAGNOSIS — R00.2 INTERMITTENT PALPITATIONS: Primary | ICD-10-CM

## 2021-04-27 DIAGNOSIS — I25.10 CORONARY ARTERY DISEASE INVOLVING NATIVE CORONARY ARTERY OF NATIVE HEART WITHOUT ANGINA PECTORIS: ICD-10-CM

## 2021-04-27 PROCEDURE — 99214 OFFICE O/P EST MOD 30 MIN: CPT | Performed by: FAMILY MEDICINE

## 2021-04-27 PROCEDURE — 93000 ELECTROCARDIOGRAM COMPLETE: CPT | Performed by: FAMILY MEDICINE

## 2021-04-27 NOTE — PROGRESS NOTES
Subjective:      Patient ID: Eliud Murray is a 76 y o  male  Patient presents to the office at my request after he had sent me a message from this past weekend  Below is the message that was sent on Monday:    "Dr Brendan Wood,  I was out of town visiting relatives and attending an event  On early evening on the 22nd, after having eaten dinner, I was meeting someone at a hotel  As I stood up from a bench outside, I experienced a dizzy/light headed spell, sat back down, seemed to pass  After having a brief conversation with the other person, I went back to my room  Noticed my heart beat was very rapid, laid down  This continued for about 1 1/2 hour, and about the time I was going to seek medical attention, it returned to normal  No other symptoms other than being tired       Not sure what brought it on, but 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, shooting three rounds without any issue, but very tired at end of the day       Let me know if you think I should come in  I do have the appointment with Dr Rosangela Condon but it isn't until June 30th       Sebastián Smith"      Patient was in [place] at a [employer] competition  He had been out shooting plays the entire day  It was not hot  He was not sweating and was actually wearing a jacket  He probably did not drink much in the way of water throughout the course of the day  Patient then went to have dinner, had a very salty crab cake as well as 2 beers and then had gone outside to meet somebody  When he got up from a bench he felt very dizzy and lightheaded, sat back down on the bench and checked his heart rate which was going very fast and was irregular  He did not get a be count of how fast it was going  He had no chest pain with this  He did go back to his hotel room, drank a few bottles of water and laid down and after about an hour and a half it had subsided  No further symptoms  No similar episodes    Patient does have history of coronary artery disease and is in the process of switching from [facility] cardiologist to [facility]        Past Medical History:   Diagnosis Date    Abrasion of left foot     LAST ASSESSED: 13    Achilles tendinitis, unspecified leg     LAST ASSESSED: 12    Allergic rhinitis     LAST ASSESSED: 13    Allergic rhinitis 2008    LAST ASSESSED: 08    Cervicalgia 2011    LAST ASSESSED: 11    Coronary artery disease     Dysfunction of left eustachian tube     LAST ASSESSED: 13    Epistaxis     LAST ASSESSED: 5/27/15    First degree atrioventricular block     LAST ASSESSED: 7/10/17    Gastric ulcer 10/07/2011    LAST ASSESSED: 3/9/15    GERD (gastroesophageal reflux disease)     Hemorrhoids 2011    LAST ASSESSED: 11    Long term use of drug     LAST ASSESSED: 10/11/12    Myalgia 2007    LAST ASSESSED: 07    Myositis 2007    LAST ASSESSED: 07    Numbness and tingling of foot     LAST ASSESSED: 3/9/15    Sleep apnea        Family History   Problem Relation Age of Onset    Cancer Mother         SOFT TISSUE CANCER ON RT SIDE CHEST WALL AND     Prostate cancer Maternal Uncle        Past Surgical History:   Procedure Laterality Date    CARDIAC CATHETERIZATION      CHOLECYSTECTOMY      LAPAROSCOPIC; LAST ASSESSED: 10/17/17    COLONOSCOPY      COMPLETE; 3-4 YEARS AGO BY [facility]; LAST ASSESSED: 14    MYRINGOTOMY W/ TUBES      WITH VENTILATING TUBE INSERTION    VASECTOMY          reports that he quit smoking about 31 years ago  He has never used smokeless tobacco  He reports current alcohol use  He reports that he does not use drugs        Current Outpatient Medications:     aspirin (ASPIR-81) 81 mg EC tablet, Take 81 mg by mouth daily, Disp: , Rfl:     celecoxib (CeleBREX) 200 mg capsule, TAKE 1 CAPSULE BY MOUTH EVERY DAY, Disp: 90 capsule, Rfl: 1    Coenzyme Q10 (COQ10) 200 MG CAPS, Take 1 tablet by mouth daily, Disp: , Rfl:     finasteride (PROSCAR) 5 mg tablet, TAKE 1 TABLET BY MOUTH EVERY DAY, Disp: 90 tablet, Rfl: 1    Glucosamine-Chondroitin (GLUCOSAMINE CHONDR COMPLEX PO), Take 1 tablet by mouth daily, Disp: , Rfl:     mefloquine (LARIAM) 250 MG tablet, Take 1 tablet (250 mg total) by mouth every 7 days for 10 doses, Disp: 10 tablet, Rfl: 0    nitroglycerin (NITROSTAT) 0 4 mg SL tablet, PLACE 1 TABLET(S) BY SUBLINGUAL ROUTE , Disp: , Rfl: 4    Omega-3 Fatty Acids (FISH OIL) 1000 MG CPDR, Take by mouth daily, Disp: , Rfl:     omeprazole (PriLOSEC) 20 mg delayed release capsule, ONE BY MOUTH EVERY DAY 1/2 HOUR BEFORE BREAKFAST, Disp: 90 capsule, Rfl: 3    pantoprazole (PROTONIX) 40 mg tablet, Take 1 tablet (40 mg total) by mouth daily, Disp: 30 tablet, Rfl: 1    rosuvastatin (CRESTOR) 10 MG tablet, TAKE 1 TABLET BY MOUTH EVERY DAY, Disp: 90 tablet, Rfl: 1    The following portions of the patient's history were reviewed and updated as appropriate: allergies, current medications, past family history, past medical history, past social history, past surgical history and problem list     Review of Systems   Constitutional: Negative  HENT: Negative  Respiratory: Negative for shortness of breath  Cardiovascular: Positive for palpitations  Neurological: Negative for dizziness and light-headedness  Objective:    /86   Pulse 66   Resp 16   Ht 6' (1 829 m)   Wt 100 kg (221 lb)   SpO2 98%   BMI 29 97 kg/m²      Physical Exam  Vitals signs and nursing note reviewed  Constitutional:       General: He is not in acute distress  Appearance: Normal appearance  He is normal weight  He is not ill-appearing  HENT:      Head: Normocephalic and atraumatic  Neck:      Vascular: No carotid bruit  Cardiovascular:      Rate and Rhythm: Normal rate and regular rhythm  Pulses: Normal pulses  Heart sounds: Normal heart sounds  No murmur     Lymphadenopathy: Cervical: No cervical adenopathy  Neurological:      General: No focal deficit present  Mental Status: He is alert and oriented to person, place, and time  Mental status is at baseline  Psychiatric:         Mood and Affect: Mood normal          Behavior: Behavior normal          Thought Content:  Thought content normal          Judgment: Judgment normal            Recent Results (from the past 1008 hour(s))   NOVEL CORONAVIRUS (COVID-19), PCR [facility]    Collection Time: 03/17/21  3:11 PM    Specimen: Other   Result Value Ref Range    LUMIRADX SARS-COV-2 AG TEST Negative Negative, Invalid    INTERNAL CONTROLS VALID Yes--Test working appropriately     Expiration Date 10/26/2021     Lot Number [id number]     Test Brand Name_Covid-19 Lumiradx Sars-Cov-2 AG Test    Lipid panel    Collection Time: 04/05/21  9:20 AM   Result Value Ref Range    Total Cholesterol 142 <200 mg/dL    HDL 43 > OR = 40 mg/dL    Triglycerides 127 <150 mg/dL    LDL Calculated 78 mg/dL (calc)    Chol HDLC Ratio 3 3 <5 0 (calc)    Non-HDL Cholesterol 99 <130 mg/dL (calc)   Comprehensive metabolic panel    Collection Time: 04/05/21  9:20 AM   Result Value Ref Range    Glucose, Random 98 65 - 99 mg/dL    BUN 15 7 - 25 mg/dL    Creatinine 0 91 0 70 - 1 25 mg/dL    eGFR Non  88 > OR = 60 mL/min/1 73m2    eGFR  101 > OR = 60 mL/min/1 73m2    SL AMB BUN/CREATININE RATIO NOT APPLICABLE 6 - 22 (calc)    Sodium 141 135 - 146 mmol/L    Potassium 4 7 3 5 - 5 3 mmol/L    Chloride 105 98 - 110 mmol/L    CO2 28 20 - 32 mmol/L    Calcium 9 6 8 6 - 10 3 mg/dL    Protein, Total 6 7 6 1 - 8 1 g/dL    Albumin 4 3 3 6 - 5 1 g/dL    Globulin 2 4 1 9 - 3 7 g/dL (calc)    Albumin/Globulin Ratio 1 8 1 0 - 2 5 (calc)    TOTAL BILIRUBIN 1 7 (H) 0 2 - 1 2 mg/dL    Alkaline Phosphatase 67 35 - 144 U/L    AST 28 10 - 35 U/L    ALT 48 (H) 9 - 46 U/L   CBC and differential    Collection Time: 04/05/21  9:20 AM   Result Value Ref Range    White Blood Cell Count 5 1 3 8 - 10 8 Thousand/uL    Red Blood Cell Count 5 16 4 20 - 5 80 Million/uL    Hemoglobin 16 3 13 2 - 17 1 g/dL    HCT 47 9 38 5 - 50 0 %    MCV 92 8 80 0 - 100 0 fL    MCH 31 6 27 0 - 33 0 pg    MCHC 34 0 32 0 - 36 0 g/dL    RDW 13 1 11 0 - 15 0 %    Platelet Count 164 818 - 400 Thousand/uL    SL AMB MPV 9 6 7 5 - 12 5 fL    Neutrophils (Absolute) 2,377 1,500 - 7,800 cells/uL    Lymphocytes (Absolute) 1,994 850 - 3,900 cells/uL    Monocytes (Absolute) 551 200 - 950 cells/uL    Eosinophils (Absolute) 128 15 - 500 cells/uL    Basophils ABS 51 0 - 200 cells/uL    Neutrophils 46 6 %    Lymphocytes 39 1 %    Monocytes 10 8 %    Eosinophils 2 5 %    Basophils PCT 1 0 %   PSA, Total Screen    Collection Time: 04/05/21  9:20 AM   Result Value Ref Range    Prostate Specific Antigen Total 0 9 < OR = 4 0 ng/mL   TSH, 3rd generation with Free T4 reflex    Collection Time: 04/05/21  9:20 AM   Result Value Ref Range    TSH W/RFX TO FREE T4 2 36 0 40 - 4 50 mIU/L   Hemoglobin A1c (w/out EAG)    Collection Time: 04/05/21  9:20 AM   Result Value Ref Range    Hemoglobin A1C 5 5 <5 7 % of total Hgb       Assessment/Plan:    Intermittent palpitations  - my suspicion is that the patient became a little volume depleted throughout the course of the day 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, had something that was excessively salty and as well as drinking some alcohol that did not help his hydration status  Likely irritated is heart resulting in tachycardia which could have been brief episode of SVT, atrial fibrillation or flutter    -EKG done here in the office showed normal sinus rhythm with first-degree AV block which is chronic for the patient    -patient is not symptomatic at this time  I did explain that we will try to get him on to a long-term cardiac monitor    Order provided for [employer] monitor and I did request that new cardiologist might be able to get him in a little sooner to be seen    -should the patient develop another episode like that he has to either contact our office immediately or go directly to the emergency room to have his dysrhythmia evaluated at that time    Coronary artery disease involving native coronary artery of native heart without angina pectoris  Patient remains on aspirin, Crestor  He is not presently on a beta-blocker  Nuclear stress test was negative for ischemia back in October    -referral to [facility] cardiologist has been me  Trying to transition to new cardiologist    Premature ventricular beats  Prior history of intermittent PVCs  Patient is usually somewhat bradycardic  Doubtful that placing him on beta-blocker would be beneficial aside from making him a little more tired          Problem List Items Addressed This Visit        Cardiovascular and Mediastinum    Coronary artery disease involving native coronary artery of native heart without angina pectoris     Patient remains on aspirin, Crestor  He is not presently on a beta-blocker  Nuclear stress test was negative for ischemia back in October    -referral to 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, had something that was excessively salty and as well as drinking some alcohol that did not help his hydration status    Likely irritated is heart resulting in tachycardia which could have been brief episode of SVT, atrial fibrillation or flutter    -EKG done here in the office showed normal sinus rhythm with first-degree AV block which is chronic for the patient    -patient is not symptomatic at this time  I did explain that we will try to get him on to a long-term cardiac monitor    Order provided for [employer] monitor and I did request that new cardiologist might be able to get him in a little sooner to be seen    -should the patient develop another episode like that he has to either contact our office immediately or go directly to the emergency room to have his dysrhythmia evaluated at that time         Relevant Orders    POCT ECG    Holter monitor - 48 hour

## 2021-04-27 NOTE — ASSESSMENT & PLAN NOTE
Prior history of intermittent PVCs  Patient is usually somewhat bradycardic    Doubtful that placing him on beta-blocker would be beneficial aside from making him a little more tired

## 2021-04-27 NOTE — ASSESSMENT & PLAN NOTE
- my suspicion is that the patient became a little volume depleted throughout the course of the day 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, had something that was excessively salty and as well as drinking some alcohol that did not help his hydration status  Likely irritated is heart resulting in tachycardia which could have been brief episode of SVT, atrial fibrillation or flutter    -EKG done here in the office showed normal sinus rhythm with first-degree AV block which is chronic for the patient    -patient is not symptomatic at this time  I did explain that we will try to get him on to a long-term cardiac monitor    Order provided for [employer] monitor and I did request that new cardiologist might be able to get him in a little sooner to be seen    -should the patient develop another episode like that he has to either contact our office immediately or go directly to the emergency room to have his dysrhythmia evaluated at that time

## 2021-04-27 NOTE — ASSESSMENT & PLAN NOTE
Patient remains on aspirin, Crestor  He is not presently on a beta-blocker  Nuclear stress test was negative for ischemia back in October    -referral to [facility] cardiologist has been me    Trying to transition to new cardiologist

## 2021-05-08 DIAGNOSIS — K21.9 GASTROESOPHAGEAL REFLUX DISEASE, UNSPECIFIED WHETHER ESOPHAGITIS PRESENT: ICD-10-CM

## 2021-05-10 RX ORDER — PANTOPRAZOLE SODIUM 40 MG/1
TABLET, DELAYED RELEASE ORAL
Qty: 90 TABLET | Refills: 1 | Status: SHIPPED | OUTPATIENT
Start: 2021-05-10 | End: 2021-10-20 | Stop reason: SDUPTHER

## 2021-06-09 ENCOUNTER — OFFICE VISIT (OUTPATIENT)
Dept: OBGYN CLINIC | Facility: CLINIC | Age: 66
End: 2021-06-09
Payer: MEDICARE

## 2021-06-09 VITALS
WEIGHT: 221.5 LBS | HEIGHT: 72 IN | HEART RATE: 75 BPM | RESPIRATION RATE: 16 BRPM | SYSTOLIC BLOOD PRESSURE: 142 MMHG | BODY MASS INDEX: 30 KG/M2 | DIASTOLIC BLOOD PRESSURE: 72 MMHG

## 2021-06-09 DIAGNOSIS — G56.03 CARPAL TUNNEL SYNDROME, BILATERAL: Primary | ICD-10-CM

## 2021-06-09 PROCEDURE — 99214 OFFICE O/P EST MOD 30 MIN: CPT | Performed by: SURGERY

## 2021-06-09 NOTE — PROGRESS NOTES
ASSESSMENT/PLAN:      76 y o  male with bilateral hand numbness/tingling, likely carpal tunnel syndrome  Re-discussed the etiology of carpal tunnel syndrome as well as treatment options  Debi Jefferson was advised to continue with nighttime bracing  EMG vs ultrasound was discussed today to confirm carpal tunnel diagnosis  It was discussed that the ultrasound will not tell us the severity of the compression or if any cervical involvement is present  Debi Jefferson has a history of 2 fused vertebrae's in his neck and states this does not seem to be radiculopathy  He wishes to proceed with bilateral wrist ultrasounds to further evaluate for carpal tunnel syndrome  Bilateral wrist ultrasounds were ordered today  Briefly discussed surgical intervention and recovery period  It was discussed that this is performed in a mini open approach as I do not do endoscopic carpal tunnel release's, but my colleagues do  I will see Debi Jefferson back in the office once the ultrasounds are complete to further discuss surgical intervention  The patient verbalized understanding of exam findings and treatment plan  We engaged in the shared decision-making process and treatment options were discussed at length with the patient  Surgical and conservative management discussed today along with risks and benefits  Diagnoses and all orders for this visit:    Carpal tunnel syndrome, bilateral  -     US MSK limited; Future    Open Carpal Tunnel Release: The anatomy and physiology of carpal tunnel syndrome was discussed with the patient today  Increase pressure localized under the transverse carpal ligament can cause pain, numbness, tingling, or dysesthesias within the median nerve distribution as well as feelings of fatigue, clumsiness, or awkwardness  These symptoms typically occur at night and worse in the morning upon waking    Eventually, untreated carpal tunnel syndrome can result in weakness and permanent loss of muscle within the thenar compartment of the hand  Treatment options were discussed with the patient  Conservative treatment includes nocturnal resting splints to keep the nerve in a neutral position, ergonomic changes within the work or home environment, activity modification, and tendon gliding exercises  Vitamin B6 one tablet daily over the counter may helpful to reduce symptoms  Steroid injections within the carpal canal can help a majority of patients, however this is often self-limited in a majority of patients  Surgical intervention to divide the transverse carpal ligament typically results in a long-lasting relief of the patient's complaints, with the recurrence rate of less than 1%  The patient has elected to undergo an open carpal tunnel release  The palmar incision technique was discussed in the office with the patient today  In the postoperative period, light activities are allowed immediately, driving is allowed when narcotic medication has stopped, and the bandages may be removed and incision may get wet after 2 days  Heavy activities (lifting more than approximately 10 pounds) will be allowed after follow up appointment in 1-2 weeks  While night symptoms (waking from sleep, pain, and discomfort in the hands) generally improves rapidly, the numbness and tingling as well as the strength will slowly improve over weeks to months depending on the chronicity and severity of the carpal tunnel syndrome  Pillar pain and scar discomfort were discussed with the patient which are self-limiting conditions  The risks of bleeding and infection from the surgery are less than 1%  Risk of recurrence is approximately 0 5%  The risks of nerve injury or nerve damage or damage to the blood vessels is approximately 1 in 1200  The patient has an understanding of the above mentioned discussion  The risks and benefits of the procedure were explained to the patient, which include, but are not limited to: Bleeding, infection, recurrence, pain, scar, damage to tendons, damage to nerves, and damage to blood vessels, failure to give desired results and complications related to anesthesia  These risks, along with alternative conservative treatment options, and postoperative protocols were voiced back and understood by the patient  All questions were answered to the patient's satisfaction  The patient agrees to comply with a standard postoperative protocol, and is willing to proceed  Education was provided via written and auditory forms  There were no barriers to learning  Written handouts regarding wound care, incision and scar care, and general preoperative information was provided to the patient  Prior to surgery, the patient may be requested to stop all anti-inflammatory medications  Prophylactic aspirin, Plavix, and Coumadin may be allowed to be continued  Medications including vitamin E , ginkgo, and fish oil are requested to be stopped approximately one week prior to surgery  Follow Up:  Return after ultrasounds are complete  To Do Next Visit:  Re-evaluation of current issue    ____________________________________________________________________________________________________________________________________________      CHIEF COMPLAINT:  Chief Complaint   Patient presents with    Right Hand - Numbness    Left Hand - Numbness       SUBJECTIVE:  Nisha Francois is a 76y o  year old RHD male who presents to the office today for a follow up regarding bilateral hand numbness/tingling  Prentiss Epley states that his left hand numbness/tingling is worse then his right  He notes near constant numbness/tingling to his radial 4 digits on his left hand  Numbness/tingling on the right is intermittent in nature and mainly affects his right long/ring fingers  Prentiss Epley notes dexterity issues, such as difficulty tying fishing lines  He is wearing a left sided cock up wrist brace, with partial improvement in numbness/tingling   The numbness/tingling is waking him up from sleep at night  Neelam Demetrius notes a + flick sign  I have personally reviewed all the relevant PMH, PSH, SH, FH, Medications and allergies       PAST MEDICAL HISTORY:  Past Medical History:   Diagnosis Date    Abrasion of left foot     LAST ASSESSED: 13    Achilles tendinitis, unspecified leg     LAST ASSESSED: 12    Allergic rhinitis     LAST ASSESSED: 13    Allergic rhinitis 2008    LAST ASSESSED: 08    Cervicalgia 2011    LAST ASSESSED: 11    Coronary artery disease     Dysfunction of left eustachian tube     LAST ASSESSED: 13    Epistaxis     LAST ASSESSED: 5/27/15    First degree atrioventricular block     LAST ASSESSED: 7/10/17    Gastric ulcer 10/07/2011    LAST ASSESSED: 3/9/15    GERD (gastroesophageal reflux disease)     Hemorrhoids 2011    LAST ASSESSED: 11    Long term use of drug     LAST ASSESSED: 10/11/12    Myalgia 2007    LAST ASSESSED: 07    Myositis 2007    LAST ASSESSED: 07    Numbness and tingling of foot     LAST ASSESSED: 3/9/15    Sleep apnea        PAST SURGICAL HISTORY:  Past Surgical History:   Procedure Laterality Date    CARDIAC CATHETERIZATION  2017    CHOLECYSTECTOMY      LAPAROSCOPIC; LAST ASSESSED: 10/17/17    COLONOSCOPY      COMPLETE; 3-4 YEARS AGO BY [facility]; LAST ASSESSED: 14    MYRINGOTOMY W/ TUBES      WITH VENTILATING TUBE INSERTION    VASECTOMY         FAMILY HISTORY:  Family History   Problem Relation Age of Onset    Cancer Mother         SOFT TISSUE CANCER ON RT SIDE CHEST WALL AND     Prostate cancer Maternal Uncle        SOCIAL HISTORY:  Social History     Tobacco Use    Smoking status: Former Smoker     Quit date:      Years since quittin 4    Smokeless tobacco: Never Used   Substance Use Topics    Alcohol use: Yes     Comment: social    Drug use: No       MEDICATIONS:    Current Outpatient Medications:     aspirin (ASPIR-81) 81 mg EC tablet, Take 81 mg by mouth daily, Disp: , Rfl:     celecoxib (CeleBREX) 200 mg capsule, TAKE 1 CAPSULE BY MOUTH EVERY DAY, Disp: 90 capsule, Rfl: 1    Coenzyme Q10 (COQ10) 200 MG CAPS, Take 1 tablet by mouth daily, Disp: , Rfl:     finasteride (PROSCAR) 5 mg tablet, TAKE 1 TABLET BY MOUTH EVERY DAY, Disp: 90 tablet, Rfl: 1    Glucosamine-Chondroitin (GLUCOSAMINE CHONDR COMPLEX PO), Take 1 tablet by mouth daily, Disp: , Rfl:     nitroglycerin (NITROSTAT) 0 4 mg SL tablet, PLACE 1 TABLET(S) BY SUBLINGUAL ROUTE , Disp: , Rfl: 4    Omega-3 Fatty Acids (FISH OIL) 1000 MG CPDR, Take by mouth daily, Disp: , Rfl:     pantoprazole (PROTONIX) 40 mg tablet, TAKE 1 TABLET BY MOUTH EVERY DAY, Disp: 90 tablet, Rfl: 1    rosuvastatin (CRESTOR) 10 MG tablet, TAKE 1 TABLET BY MOUTH EVERY DAY, Disp: 90 tablet, Rfl: 1    mefloquine (LARIAM) 250 MG tablet, Take 1 tablet (250 mg total) by mouth every 7 days for 10 doses (Patient not taking: Reported on 6/9/2021), Disp: 10 tablet, Rfl: 0    ALLERGIES:  Allergies   Allergen Reactions    Amoxicillin Anaphylaxis    Acetaminophen      Rapid heart rate    Apple - Food Allergy     Cherry Flavor - Food Allergy     Pollen Extract     Augmentin [Amoxicillin-Pot Clavulanate]     Dye [Iodinated Diagnostic Agents]        REVIEW OF SYSTEMS:  Review of Systems   Constitutional: Negative for chills, fever and unexpected weight change  HENT: Negative for hearing loss, nosebleeds and sore throat  Eyes: Negative for pain, redness and visual disturbance  Respiratory: Negative for cough, shortness of breath and wheezing  Cardiovascular: Negative for chest pain, palpitations and leg swelling  Gastrointestinal: Negative for abdominal pain, nausea and vomiting  Endocrine: Negative for polydipsia and polyuria  Genitourinary: Negative for difficulty urinating and hematuria  Musculoskeletal: Negative for arthralgias, joint swelling and myalgias     Skin: Negative for rash and wound    Neurological: Positive for numbness  Negative for dizziness and headaches  Psychiatric/Behavioral: Negative for decreased concentration, dysphoric mood and suicidal ideas  The patient is not nervous/anxious  VITALS:  Vitals:    06/09/21 1444   BP: 142/72   Pulse: 75   Resp: 16       LABS:  HgA1c:   Lab Results   Component Value Date    HGBA1C 5 5 04/05/2021     BMP:   Lab Results   Component Value Date    CALCIUM 9 6 04/05/2021     09/29/2017    K 4 7 04/05/2021    CO2 28 04/05/2021     04/05/2021    BUN 15 04/05/2021    CREATININE 0 91 04/05/2021       _____________________________________________________  PHYSICAL EXAMINATION:  General: well developed and well nourished, alert, oriented times 3 and appears comfortable  Psychiatric: Normal  HEENT: Normocephalic, Atraumatic Trachea Midline, No torticollis  Pulmonary: No audible wheezing or respiratory distress   Cardiovascular: No pitting edema, 2+ radial pulse   Abdominal/GI: abdomen non tender, non distended   Skin: No masses, erythema, lacerations, fluctation, ulcerations  Neurovascular: Sensation Intact to the Median, Ulnar, Radial Nerve, Motor Intact to the Median, Ulnar, Radial Nerve and Pulses Intact  Musculoskeletal: Normal, except as noted in detailed exam and in HPI  MUSCULOSKELETAL EXAMINATION:    Bilateral Carpal Tunnel Exam:    Negative thenar atrophy  Positive phalen's test  Negative carpal tunnel compression   Positive tinels over median nerve at the wrist  Full composite fist  2+ radial pulse      ___________________________________________________  STUDIES REVIEWED:  No new imaging to review            PROCEDURES PERFORMED:  Procedures  No Procedures performed today    _____________________________________________________      Andreina Spencer I,:  Lee Herring am acting as a scribe while in the presence of the attending physician :       I,:  Molly Lane MD personally performed the services described in this documentation    as scribed in my presence :

## 2021-06-11 ENCOUNTER — HOSPITAL ENCOUNTER (OUTPATIENT)
Dept: RADIOLOGY | Facility: HOSPITAL | Age: 66
Discharge: HOME | End: 2021-06-11
Attending: SURGERY
Payer: MEDICARE

## 2021-06-11 ENCOUNTER — TRANSCRIBE ORDERS (OUTPATIENT)
Dept: ADMINISTRATIVE | Facility: HOSPITAL | Age: 66
End: 2021-06-11

## 2021-06-11 DIAGNOSIS — G56.03 CARPAL TUNNEL SYNDROME, BILATERAL: ICD-10-CM

## 2021-06-11 PROCEDURE — 76882 US LMTD JT/FCL EVL NVASC XTR: CPT

## 2021-06-14 ENCOUNTER — OFFICE VISIT (OUTPATIENT)
Dept: OBGYN CLINIC | Facility: CLINIC | Age: 66
End: 2021-06-14
Payer: MEDICARE

## 2021-06-14 VITALS
SYSTOLIC BLOOD PRESSURE: 149 MMHG | WEIGHT: 221 LBS | RESPIRATION RATE: 17 BRPM | DIASTOLIC BLOOD PRESSURE: 72 MMHG | BODY MASS INDEX: 29.93 KG/M2 | HEIGHT: 72 IN | HEART RATE: 84 BPM

## 2021-06-14 DIAGNOSIS — Z01.812 PRE-OPERATIVE LABORATORY EXAMINATION: ICD-10-CM

## 2021-06-14 DIAGNOSIS — G56.02 CARPAL TUNNEL SYNDROME ON LEFT: Primary | ICD-10-CM

## 2021-06-14 DIAGNOSIS — G56.01 CARPAL TUNNEL SYNDROME ON RIGHT: ICD-10-CM

## 2021-06-14 PROCEDURE — 99214 OFFICE O/P EST MOD 30 MIN: CPT | Performed by: SURGERY

## 2021-06-14 RX ORDER — CHLORHEXIDINE GLUCONATE 0.12 MG/ML
15 RINSE ORAL ONCE
Status: CANCELLED | OUTPATIENT
Start: 2021-06-14 | End: 2021-06-14

## 2021-06-14 NOTE — PROGRESS NOTES
ASSESSMENT/PLAN:      75 yo male with bilateral carpal tunnel, symptomatically worse on the left side  Ultrasound evaluation of the bilateral wrists was reviewed and demonstrates evidence of bilateral carpal tunnel syndrome  Patient is symptomatically worse on the left and would like to proceed with definitive treatment  We discussed open carpal tunnel release as well as recovery period and expectations  Risks benefits and alternatives were discussed and patient elected to proceed with left open carpal tunnel release  Informed consent was signed for the same  Patient does see cardiology at [facility] for CAD which is well controlled  He takes a baby aspirin and fish oil, advised that he stop the fish oil about 1 week before surgery but may continue ASA  We discussed that we typically wait about 6 weeks in between the 2 sides  He does have a trigger finger on the right hand which can be release at the same time as his right carpal tunnel when the time comes  We will see him 10-14 use and surgery for suture removal and postop evaluation  The patient verbalized understanding of exam findings and treatment plan  We engaged in the shared decision-making process and treatment options were discussed at length with the patient  Surgical and conservative management discussed today along with risks and benefits  Diagnoses and all orders for this visit:    Carpal tunnel syndrome on left  -     Case request operating room: RELEASE CARPAL TUNNEL; Standing  -     Basic metabolic panel; Future  -     Case request operating room: RELEASE CARPAL TUNNEL    Carpal tunnel syndrome on right    Pre-operative laboratory examination  -     Case request operating room: RELEASE CARPAL TUNNEL; Standing  -     Basic metabolic panel;  Future  -     Case request operating room: RELEASE CARPAL TUNNEL    Other orders  -     Nursing Communication [facility] Interventions Implemented; Standing  -     Nursing Communication CHG bath, have staff wash entire body (neck down) per pre-op bathing protocol  Routine, evening prior to, and day of surgery ; Standing  -     Nursing Communication Swab both nares with Povidone-Iodine solution, EXCLUDE if patient has shellfish/Iodine allergy  Routine, day of surgery, on call to OR; Standing  -     chlorhexidine (PERIDEX) 0 12 % oral rinse 15 mL  -     Void on call to OR; Standing  -     Insert peripheral IV; Standing  -     Diet NPO; Sips with meds; Standing  -     clindamycin (CLEOCIN) 900 mg in sodium chloride 0 9 % 50 mL IVPB  -     Apply Sequential Compression Device; Standing        Open Carpal Tunnel Release: The anatomy and physiology of carpal tunnel syndrome was discussed with the patient today  Increase pressure localized under the transverse carpal ligament can cause pain, numbness, tingling, or dysesthesias within the median nerve distribution as well as feelings of fatigue, clumsiness, or awkwardness  These symptoms typically occur at night and worse in the morning upon waking  Eventually, untreated carpal tunnel syndrome can result in weakness and permanent loss of muscle within the thenar compartment of the hand  Treatment options were discussed with the patient  Conservative treatment includes nocturnal resting splints to keep the nerve in a neutral position, ergonomic changes within the work or home environment, activity modification, and tendon gliding exercises  Vitamin B6 one tablet daily over the counter may helpful to reduce symptoms  Steroid injections within the carpal canal can help a majority of patients, however this is often self-limited in a majority of patients  Surgical intervention to divide the transverse carpal ligament typically results in a long-lasting relief of the patient's complaints, with the recurrence rate of less than 1%  The patient has elected to undergo an open carpal tunnel release    The palmar incision technique was discussed in the office with the patient today  In the postoperative period, light activities are allowed immediately, driving is allowed when narcotic medication has stopped, and the bandages may be removed and incision may get wet after 2 days  Heavy activities (lifting more than approximately 10 pounds) will be allowed after follow up appointment in 1-2 weeks  While night symptoms (waking from sleep, pain, and discomfort in the hands) generally improves rapidly, the numbness and tingling as well as the strength will slowly improve over weeks to months depending on the chronicity and severity of the carpal tunnel syndrome  Pillar pain and scar discomfort were discussed with the patient which are self-limiting conditions  The risks of bleeding and infection from the surgery are less than 1%  Risk of recurrence is approximately 0 5%  The risks of nerve injury or nerve damage or damage to the blood vessels is approximately 1 in 1200  The patient has an understanding of the above mentioned discussion  The risks and benefits of the procedure were explained to the patient, which include, but are not limited to: Bleeding, infection, recurrence, pain, scar, damage to tendons, damage to nerves, and damage to blood vessels, failure to give desired results and complications related to anesthesia  These risks, along with alternative conservative treatment options, and postoperative protocols were voiced back and understood by the patient  All questions were answered to the patient's satisfaction  The patient agrees to comply with a standard postoperative protocol, and is willing to proceed  Education was provided via written and auditory forms  There were no barriers to learning  Written handouts regarding wound care, incision and scar care, and general preoperative information was provided to the patient  Prior to surgery, the patient may be requested to stop all anti-inflammatory medications    Prophylactic aspirin, Plavix, and Coumadin may be allowed to be continued  Medications including vitamin E , ginkgo, and fish oil are requested to be stopped approximately one week prior to surgery  Hypertensive medications and beta blockers, if taken, should be continued  Follow Up:  Return for After Surgery  To Do Next Visit:  Sutures out    ____________________________________________________________________________________________________________________________________________      CHIEF COMPLAINT:  Chief Complaint   Patient presents with    Right Hand - Numbness, Follow-up    Left Hand - Numbness, Follow-up       SUBJECTIVE:  Va Wagner is a 76y o  year old RHD male who presents for follow up evaluation of bilateral hand paresthesias  He just underwent an ultrasound evaluation of the wrists which demonstrates evidence for carpal tunnel syndrome  He notes worsened symptoms on the left than the right  He does use a brace at night which helps sometimes  He notes persistent tingling in the tips of the long and ring fingers but no perri numbness all the time  Symptoms do worsen at night and when driving  I have personally reviewed all the relevant PMH, PSH, SH, FH, Medications and allergies       PAST MEDICAL HISTORY:  Past Medical History:   Diagnosis Date    Abrasion of left foot     LAST ASSESSED: 9/23/13    Achilles tendinitis, unspecified leg     LAST ASSESSED: 11/30/12    Allergic rhinitis     LAST ASSESSED: 11/25/13    Allergic rhinitis 06/25/2008    LAST ASSESSED: 6/25/08    Cervicalgia 04/22/2011    LAST ASSESSED: 4/22/11    Coronary artery disease     Dysfunction of left eustachian tube     LAST ASSESSED: 9/23/13    Epistaxis     LAST ASSESSED: 5/27/15    First degree atrioventricular block     LAST ASSESSED: 7/10/17    Gastric ulcer 10/07/2011    LAST ASSESSED: 3/9/15    GERD (gastroesophageal reflux disease)     Hemorrhoids 05/13/2011    LAST ASSESSED: 5/13/11    Long term use of drug     LAST ASSESSED: 10/11/12  Myalgia 2007    LAST ASSESSED: 07    Myositis 2007    LAST ASSESSED: 07    Numbness and tingling of foot     LAST ASSESSED: 3/9/15    Sleep apnea        PAST SURGICAL HISTORY:  Past Surgical History:   Procedure Laterality Date    CARDIAC CATHETERIZATION  2017    CHOLECYSTECTOMY      LAPAROSCOPIC; LAST ASSESSED: 10/17/17    COLONOSCOPY      COMPLETE; 3-4 YEARS AGO BY [facility]; LAST ASSESSED: 14    MYRINGOTOMY W/ TUBES      WITH VENTILATING TUBE INSERTION    VASECTOMY         FAMILY HISTORY:  Family History   Problem Relation Age of Onset    Cancer Mother         SOFT TISSUE CANCER ON RT SIDE CHEST WALL AND     Prostate cancer Maternal Uncle        SOCIAL HISTORY:  Social History     Tobacco Use    Smoking status: Former Smoker     Quit date:      Years since quittin 4    Smokeless tobacco: Never Used   Vaping Use    Vaping Use: Never used   Substance Use Topics    Alcohol use: Yes     Comment: social    Drug use: No       MEDICATIONS:    Current Outpatient Medications:     aspirin (ASPIR-81) 81 mg EC tablet, Take 81 mg by mouth daily, Disp: , Rfl:     celecoxib (CeleBREX) 200 mg capsule, TAKE 1 CAPSULE BY MOUTH EVERY DAY, Disp: 90 capsule, Rfl: 1    Coenzyme Q10 (COQ10) 200 MG CAPS, Take 1 tablet by mouth daily, Disp: , Rfl:     finasteride (PROSCAR) 5 mg tablet, TAKE 1 TABLET BY MOUTH EVERY DAY, Disp: 90 tablet, Rfl: 1    Glucosamine-Chondroitin (GLUCOSAMINE CHONDR COMPLEX PO), Take 1 tablet by mouth daily, Disp: , Rfl:     nitroglycerin (NITROSTAT) 0 4 mg SL tablet, PLACE 1 TABLET(S) BY SUBLINGUAL ROUTE , Disp: , Rfl: 4    Omega-3 Fatty Acids (FISH OIL) 1000 MG CPDR, Take by mouth daily, Disp: , Rfl:     pantoprazole (PROTONIX) 40 mg tablet, TAKE 1 TABLET BY MOUTH EVERY DAY, Disp: 90 tablet, Rfl: 1    rosuvastatin (CRESTOR) 10 MG tablet, TAKE 1 TABLET BY MOUTH EVERY DAY, Disp: 90 tablet, Rfl: 1    mefloquine (LARIAM) 250 MG tablet, Take 1 tablet (250 mg total) by mouth every 7 days for 10 doses (Patient not taking: Reported on 6/9/2021), Disp: 10 tablet, Rfl: 0    ALLERGIES:  Allergies   Allergen Reactions    Amoxicillin Anaphylaxis    Acetaminophen      Rapid heart rate    Apple - Food Allergy     Cherry Flavor - Food Allergy     Pollen Extract     Augmentin [Amoxicillin-Pot Clavulanate]     Dye [Iodinated Diagnostic Agents]        REVIEW OF SYSTEMS:  Review of Systems   Constitutional: Negative for chills, fatigue, fever and unexpected weight change  HENT: Negative for hearing loss, nosebleeds and sore throat  Eyes: Negative for pain, redness and visual disturbance  Respiratory: Negative for cough, shortness of breath and wheezing  Cardiovascular: Negative for chest pain, palpitations and leg swelling  Gastrointestinal: Negative for abdominal pain, nausea and vomiting  Endocrine: Negative for polydipsia and polyuria  Genitourinary: Negative for difficulty urinating and hematuria  Musculoskeletal: Negative for arthralgias, joint swelling and myalgias  Skin: Negative for rash and wound  Neurological: Positive for numbness  Negative for dizziness and headaches  Psychiatric/Behavioral: Negative for decreased concentration, dysphoric mood and suicidal ideas  The patient is not nervous/anxious          VITALS:  Vitals:    06/14/21 1721   BP: 149/72   Pulse: 84   Resp: 17       LABS:  HgA1c:   Lab Results   Component Value Date    HGBA1C 5 5 04/05/2021     BMP:   Lab Results   Component Value Date    CALCIUM 9 6 04/05/2021     09/29/2017    K 4 7 04/05/2021    CO2 28 04/05/2021     04/05/2021    BUN 15 04/05/2021    CREATININE 0 91 04/05/2021       _____________________________________________________  PHYSICAL EXAMINATION:  General: well developed and well nourished, alert, oriented times 3 and appears comfortable  Psychiatric: Normal  HEENT: Normocephalic, Atraumatic Trachea Midline, No torticollis  Pulmonary: No audible wheezing or respiratory distress   Cardiovascular: No pitting edema, 2+ radial pulse   Skin: No masses, erythema, lacerations, fluctation, ulcerations  Neurovascular: Sensation Intact to the Median, Ulnar, Radial Nerve, Motor Intact to the Median, Ulnar, Radial Nerve and Pulses Intact  Musculoskeletal: Normal, except as noted in detailed exam and in HPI  MUSCULOSKELETAL EXAMINATION:  Bilateral Carpal Tunnel Exam:    Negative thenar atrophy  Positive phalen's test  Positive carpal tunnel compression  Negative tinels over median nerve at the wrist   Opposition strength 5/5    Abduction strength 5/5       ___________________________________________________  STUDIES REVIEWED:  I have personally reviewed ultrasound of the bilateral wrists   which demonstrate evidence of median nerve compression           PROCEDURES PERFORMED:  Procedures  No Procedures performed today    _____________________________________________________    Scribe Attestation    I,:  Bartolo Nelson PA-C am acting as a scribe while in the presence of the attending physician :       I,:  Juliane Pollard MD personally performed the services described in this documentation    as scribed in my presence :

## 2021-06-14 NOTE — H&P
ASSESSMENT/PLAN:      77 yo male with bilateral carpal tunnel, symptomatically worse on the left side  Ultrasound evaluation of the bilateral wrists was reviewed and demonstrates evidence of bilateral carpal tunnel syndrome  Patient is symptomatically worse on the left and would like to proceed with definitive treatment  We discussed open carpal tunnel release as well as recovery period and expectations  Risks benefits and alternatives were discussed and patient elected to proceed with left open carpal tunnel release  Informed consent was signed for the same  Patient does see cardiology at [facility] for CAD which is well controlled  He takes a baby aspirin and fish oil, advised that he stop the fish oil about 1 week before surgery but may continue ASA  We discussed that we typically wait about 6 weeks in between the 2 sides  He does have a trigger finger on the right hand which can be release at the same time as his right carpal tunnel when the time comes  We will see him 10-14 use and surgery for suture removal and postop evaluation  The patient verbalized understanding of exam findings and treatment plan  We engaged in the shared decision-making process and treatment options were discussed at length with the patient  Surgical and conservative management discussed today along with risks and benefits  Diagnoses and all orders for this visit:    Carpal tunnel syndrome on left  -     Case request operating room: RELEASE CARPAL TUNNEL; Standing  -     Basic metabolic panel; Future  -     Case request operating room: RELEASE CARPAL TUNNEL    Carpal tunnel syndrome on right    Pre-operative laboratory examination  -     Case request operating room: RELEASE CARPAL TUNNEL; Standing  -     Basic metabolic panel;  Future  -     Case request operating room: RELEASE CARPAL TUNNEL    Other orders  -     Nursing Communication [facility] Interventions Implemented; Standing  -     Nursing Communication CHG bath, have staff wash entire body (neck down) per pre-op bathing protocol  Routine, evening prior to, and day of surgery ; Standing  -     Nursing Communication Swab both nares with Povidone-Iodine solution, EXCLUDE if patient has shellfish/Iodine allergy  Routine, day of surgery, on call to OR; Standing  -     chlorhexidine (PERIDEX) 0 12 % oral rinse 15 mL  -     Void on call to OR; Standing  -     Insert peripheral IV; Standing  -     Diet NPO; Sips with meds; Standing  -     clindamycin (CLEOCIN) 900 mg in sodium chloride 0 9 % 50 mL IVPB  -     Apply Sequential Compression Device; Standing        Open Carpal Tunnel Release: The anatomy and physiology of carpal tunnel syndrome was discussed with the patient today  Increase pressure localized under the transverse carpal ligament can cause pain, numbness, tingling, or dysesthesias within the median nerve distribution as well as feelings of fatigue, clumsiness, or awkwardness  These symptoms typically occur at night and worse in the morning upon waking  Eventually, untreated carpal tunnel syndrome can result in weakness and permanent loss of muscle within the thenar compartment of the hand  Treatment options were discussed with the patient  Conservative treatment includes nocturnal resting splints to keep the nerve in a neutral position, ergonomic changes within the work or home environment, activity modification, and tendon gliding exercises  Vitamin B6 one tablet daily over the counter may helpful to reduce symptoms  Steroid injections within the carpal canal can help a majority of patients, however this is often self-limited in a majority of patients  Surgical intervention to divide the transverse carpal ligament typically results in a long-lasting relief of the patient's complaints, with the recurrence rate of less than 1%  The patient has elected to undergo an open carpal tunnel release    The palmar incision technique was discussed in the office with the patient today  In the postoperative period, light activities are allowed immediately, driving is allowed when narcotic medication has stopped, and the bandages may be removed and incision may get wet after 2 days  Heavy activities (lifting more than approximately 10 pounds) will be allowed after follow up appointment in 1-2 weeks  While night symptoms (waking from sleep, pain, and discomfort in the hands) generally improves rapidly, the numbness and tingling as well as the strength will slowly improve over weeks to months depending on the chronicity and severity of the carpal tunnel syndrome  Pillar pain and scar discomfort were discussed with the patient which are self-limiting conditions  The risks of bleeding and infection from the surgery are less than 1%  Risk of recurrence is approximately 0 5%  The risks of nerve injury or nerve damage or damage to the blood vessels is approximately 1 in 1200  The patient has an understanding of the above mentioned discussion  The risks and benefits of the procedure were explained to the patient, which include, but are not limited to: Bleeding, infection, recurrence, pain, scar, damage to tendons, damage to nerves, and damage to blood vessels, failure to give desired results and complications related to anesthesia  These risks, along with alternative conservative treatment options, and postoperative protocols were voiced back and understood by the patient  All questions were answered to the patient's satisfaction  The patient agrees to comply with a standard postoperative protocol, and is willing to proceed  Education was provided via written and auditory forms  There were no barriers to learning  Written handouts regarding wound care, incision and scar care, and general preoperative information was provided to the patient  Prior to surgery, the patient may be requested to stop all anti-inflammatory medications    Prophylactic aspirin, Plavix, and Coumadin may be allowed to be continued  Medications including vitamin E , ginkgo, and fish oil are requested to be stopped approximately one week prior to surgery  Hypertensive medications and beta blockers, if taken, should be continued  Follow Up:  Return for After Surgery  To Do Next Visit:  Sutures out    ____________________________________________________________________________________________________________________________________________      CHIEF COMPLAINT:  Chief Complaint   Patient presents with    Right Hand - Numbness, Follow-up    Left Hand - Numbness, Follow-up       SUBJECTIVE:  Frandy Solitario is a 76y o  year old RHD male who presents for follow up evaluation of bilateral hand paresthesias  He just underwent an ultrasound evaluation of the wrists which demonstrates evidence for carpal tunnel syndrome  He notes worsened symptoms on the left than the right  He does use a brace at night which helps sometimes  He notes persistent tingling in the tips of the long and ring fingers but no perri numbness all the time  Symptoms do worsen at night and when driving  I have personally reviewed all the relevant PMH, PSH, SH, FH, Medications and allergies       PAST MEDICAL HISTORY:  Past Medical History:   Diagnosis Date    Abrasion of left foot     LAST ASSESSED: 9/23/13    Achilles tendinitis, unspecified leg     LAST ASSESSED: 11/30/12    Allergic rhinitis     LAST ASSESSED: 11/25/13    Allergic rhinitis 06/25/2008    LAST ASSESSED: 6/25/08    Cervicalgia 04/22/2011    LAST ASSESSED: 4/22/11    Coronary artery disease     Dysfunction of left eustachian tube     LAST ASSESSED: 9/23/13    Epistaxis     LAST ASSESSED: 5/27/15    First degree atrioventricular block     LAST ASSESSED: 7/10/17    Gastric ulcer 10/07/2011    LAST ASSESSED: 3/9/15    GERD (gastroesophageal reflux disease)     Hemorrhoids 05/13/2011    LAST ASSESSED: 5/13/11    Long term use of drug     LAST ASSESSED: 10/11/12  Myalgia 2007    LAST ASSESSED: 07    Myositis 2007    LAST ASSESSED: 07    Numbness and tingling of foot     LAST ASSESSED: 3/9/15    Sleep apnea        PAST SURGICAL HISTORY:  Past Surgical History:   Procedure Laterality Date    CARDIAC CATHETERIZATION  2017    CHOLECYSTECTOMY      LAPAROSCOPIC; LAST ASSESSED: 10/17/17    COLONOSCOPY      COMPLETE; 3-4 YEARS AGO BY [facility]; LAST ASSESSED: 14    MYRINGOTOMY W/ TUBES      WITH VENTILATING TUBE INSERTION    VASECTOMY         FAMILY HISTORY:  Family History   Problem Relation Age of Onset    Cancer Mother         SOFT TISSUE CANCER ON RT SIDE CHEST WALL AND     Prostate cancer Maternal Uncle        SOCIAL HISTORY:  Social History     Tobacco Use    Smoking status: Former Smoker     Quit date:      Years since quittin 4    Smokeless tobacco: Never Used   Vaping Use    Vaping Use: Never used   Substance Use Topics    Alcohol use: Yes     Comment: social    Drug use: No       MEDICATIONS:    Current Outpatient Medications:     aspirin (ASPIR-81) 81 mg EC tablet, Take 81 mg by mouth daily, Disp: , Rfl:     celecoxib (CeleBREX) 200 mg capsule, TAKE 1 CAPSULE BY MOUTH EVERY DAY, Disp: 90 capsule, Rfl: 1    Coenzyme Q10 (COQ10) 200 MG CAPS, Take 1 tablet by mouth daily, Disp: , Rfl:     finasteride (PROSCAR) 5 mg tablet, TAKE 1 TABLET BY MOUTH EVERY DAY, Disp: 90 tablet, Rfl: 1    Glucosamine-Chondroitin (GLUCOSAMINE CHONDR COMPLEX PO), Take 1 tablet by mouth daily, Disp: , Rfl:     nitroglycerin (NITROSTAT) 0 4 mg SL tablet, PLACE 1 TABLET(S) BY SUBLINGUAL ROUTE , Disp: , Rfl: 4    Omega-3 Fatty Acids (FISH OIL) 1000 MG CPDR, Take by mouth daily, Disp: , Rfl:     pantoprazole (PROTONIX) 40 mg tablet, TAKE 1 TABLET BY MOUTH EVERY DAY, Disp: 90 tablet, Rfl: 1    rosuvastatin (CRESTOR) 10 MG tablet, TAKE 1 TABLET BY MOUTH EVERY DAY, Disp: 90 tablet, Rfl: 1    mefloquine (LARIAM) 250 MG tablet, Take 1 tablet (250 mg total) by mouth every 7 days for 10 doses (Patient not taking: Reported on 6/9/2021), Disp: 10 tablet, Rfl: 0    ALLERGIES:  Allergies   Allergen Reactions    Amoxicillin Anaphylaxis    Acetaminophen      Rapid heart rate    Apple - Food Allergy     Cherry Flavor - Food Allergy     Pollen Extract     Augmentin [Amoxicillin-Pot Clavulanate]     Dye [Iodinated Diagnostic Agents]        REVIEW OF SYSTEMS:  Review of Systems   Constitutional: Negative for chills, fatigue, fever and unexpected weight change  HENT: Negative for hearing loss, nosebleeds and sore throat  Eyes: Negative for pain, redness and visual disturbance  Respiratory: Negative for cough, shortness of breath and wheezing  Cardiovascular: Negative for chest pain, palpitations and leg swelling  Gastrointestinal: Negative for abdominal pain, nausea and vomiting  Endocrine: Negative for polydipsia and polyuria  Genitourinary: Negative for difficulty urinating and hematuria  Musculoskeletal: Negative for arthralgias, joint swelling and myalgias  Skin: Negative for rash and wound  Neurological: Positive for numbness  Negative for dizziness and headaches  Psychiatric/Behavioral: Negative for decreased concentration, dysphoric mood and suicidal ideas  The patient is not nervous/anxious          VITALS:  Vitals:    06/14/21 1721   BP: 149/72   Pulse: 84   Resp: 17       LABS:  HgA1c:   Lab Results   Component Value Date    HGBA1C 5 5 04/05/2021     BMP:   Lab Results   Component Value Date    CALCIUM 9 6 04/05/2021     09/29/2017    K 4 7 04/05/2021    CO2 28 04/05/2021     04/05/2021    BUN 15 04/05/2021    CREATININE 0 91 04/05/2021       _____________________________________________________  PHYSICAL EXAMINATION:  General: well developed and well nourished, alert, oriented times 3 and appears comfortable  Psychiatric: Normal  HEENT: Normocephalic, Atraumatic Trachea Midline, No torticollis  Pulmonary: No audible wheezing or respiratory distress   Cardiovascular: No pitting edema, 2+ radial pulse   Skin: No masses, erythema, lacerations, fluctation, ulcerations  Neurovascular: Sensation Intact to the Median, Ulnar, Radial Nerve, Motor Intact to the Median, Ulnar, Radial Nerve and Pulses Intact  Musculoskeletal: Normal, except as noted in detailed exam and in HPI  MUSCULOSKELETAL EXAMINATION:  Bilateral Carpal Tunnel Exam:    Negative thenar atrophy  Positive phalen's test  Positive carpal tunnel compression  Negative tinels over median nerve at the wrist   Opposition strength 5/5    Abduction strength 5/5       ___________________________________________________  STUDIES REVIEWED:  I have personally reviewed ultrasound of the bilateral wrists   which demonstrate evidence of median nerve compression           PROCEDURES PERFORMED:  Procedures  No Procedures performed today    _____________________________________________________    Scribe Attestation    I,:  Melanie Louie PA-C am acting as a scribe while in the presence of the attending physician :       I,:  Joleen Snell MD personally performed the services described in this documentation    as scribed in my presence :

## 2021-06-16 ENCOUNTER — TELEPHONE (OUTPATIENT)
Dept: OBGYN CLINIC | Facility: CLINIC | Age: 66
End: 2021-06-16

## 2021-06-16 NOTE — TELEPHONE ENCOUNTER
Patient called in requesting to speak to the sx coordinator  He stated she was suppose to call him to scheduled sx but no one hasn't called yet         Please advise,       Cb#: [phone]

## 2021-06-25 ENCOUNTER — APPOINTMENT (OUTPATIENT)
Dept: LAB | Facility: CLINIC | Age: 66
End: 2021-06-25
Payer: MEDICARE

## 2021-06-25 DIAGNOSIS — G56.02 CARPAL TUNNEL SYNDROME ON LEFT: ICD-10-CM

## 2021-06-25 DIAGNOSIS — Z01.812 PRE-OPERATIVE LABORATORY EXAMINATION: ICD-10-CM

## 2021-06-25 LAB
ANION GAP SERPL CALCULATED.3IONS-SCNC: 8 MMOL/L (ref 4–13)
BUN SERPL-MCNC: 17 MG/DL (ref 5–25)
CALCIUM SERPL-MCNC: 8.7 MG/DL (ref 8.3–10.1)
CHLORIDE SERPL-SCNC: 107 MMOL/L (ref 100–108)
CO2 SERPL-SCNC: 26 MMOL/L (ref 21–32)
CREAT SERPL-MCNC: 0.99 MG/DL (ref 0.6–1.3)
GFR SERPL CREATININE-BSD FRML MDRD: 79 ML/MIN/1.73SQ M
GLUCOSE SERPL-MCNC: 118 MG/DL (ref 65–140)
POTASSIUM SERPL-SCNC: 4.6 MMOL/L (ref 3.5–5.3)
SODIUM SERPL-SCNC: 141 MMOL/L (ref 136–145)

## 2021-06-25 PROCEDURE — 36415 COLL VENOUS BLD VENIPUNCTURE: CPT

## 2021-06-25 PROCEDURE — 80048 BASIC METABOLIC PNL TOTAL CA: CPT

## 2021-06-30 ENCOUNTER — OFFICE VISIT (OUTPATIENT)
Dept: CARDIOLOGY CLINIC | Facility: CLINIC | Age: 66
End: 2021-06-30
Payer: MEDICARE

## 2021-06-30 VITALS
DIASTOLIC BLOOD PRESSURE: 70 MMHG | OXYGEN SATURATION: 99 % | HEIGHT: 72 IN | WEIGHT: 212.5 LBS | BODY MASS INDEX: 28.78 KG/M2 | SYSTOLIC BLOOD PRESSURE: 148 MMHG | HEART RATE: 72 BPM

## 2021-06-30 DIAGNOSIS — I25.10 CORONARY ARTERY DISEASE INVOLVING NATIVE CORONARY ARTERY OF NATIVE HEART WITHOUT ANGINA PECTORIS: ICD-10-CM

## 2021-06-30 DIAGNOSIS — G47.33 OBSTRUCTIVE SLEEP APNEA SYNDROME: ICD-10-CM

## 2021-06-30 DIAGNOSIS — E78.2 MIXED HYPERLIPIDEMIA: ICD-10-CM

## 2021-06-30 DIAGNOSIS — R00.2 INTERMITTENT PALPITATIONS: ICD-10-CM

## 2021-06-30 DIAGNOSIS — G47.33 OSA (OBSTRUCTIVE SLEEP APNEA): ICD-10-CM

## 2021-06-30 DIAGNOSIS — I49.3 PREMATURE VENTRICULAR BEATS: ICD-10-CM

## 2021-06-30 DIAGNOSIS — R00.0 TACHYCARDIA: ICD-10-CM

## 2021-06-30 PROCEDURE — 99215 OFFICE O/P EST HI 40 MIN: CPT | Performed by: INTERNAL MEDICINE

## 2021-06-30 PROCEDURE — 93000 ELECTROCARDIOGRAM COMPLETE: CPT | Performed by: INTERNAL MEDICINE

## 2021-06-30 NOTE — PROGRESS NOTES
Consultation - Cardiology Office  [facility]  Tiki Gilliland 76 y o  male MRN: [id number]  : 1955  Unit/Bed#:  Encounter: [id number]      Assessment:     1  Coronary artery disease involving native coronary artery of native heart without angina pectoris    2  Premature ventricular beats    3  LENY (obstructive sleep apnea)    4  Obstructive sleep apnea syndrome    5  Mixed hyperlipidemia    6  Intermittent palpitations        Discussion summary and Plan:       1  Coronary artery disease status post catheterization in 2017 has moderate ostial circ disease and nonobstructive disease in other arteries  Patient denies any symptoms of any chest pain  He had a nonischemic nuclear in 2020       2  Dyslipidemia  Continue current Rx cholesterol profile is acceptable     3  History of palpitation with history of PVCs  Patient was recently [place] with episodes of palpitations and tachycardia  Her heart rate spontaneously went back to normal   He was kept overnight  Will need extended beyond monitoring will check y o  Past for 2 weeks and check echo Doppler for LV function  4  Obstructive sleep apnea  Continue using CPAP machine     5  Overweight  Advised to lose weight    Plan  Echo Doppler for LV function  Two weeks monitor to rule out occult atrial or ventricular arrhythmias  Patient was prescribed low-dose metoprolol which he never took as there was concern for bradycardia  Follow-up 4 months  Thank you for your consultation  If you have any question please call me at [phone]    Counseling :  A description of the counseling  Goals and Barriers  Patient's ability to self care: Yes  Medication side effect reviewed with patient in detail and all their questions answered to their satisfaction        Primary Care Physician Requesting Consult: Tayler Rivera DO    Reason for Consult / Principal Problem:  Palpitations and fast heartbeat        HPI Desi Ruiz is a 76y o  year old male who was referred by primary care doctor for for his history of cardiac disease  Patient who used to follow up with [facility] ([facility]) and has been evaluated by [address] [place] [address] cardiology in 2017 for 2nd opinion has medical history significant for coronary artery disease, dyslipidemia, DJD and GERD along with obstructive sleep apnea  He had a catheterization done in June of 2017 which shows he had a moderate ostial circumflex disease and he chose to have medical therapy  At that time he was seen by  [address] [place] [address] cardiology for 2nd opinion and various options were discussed with him including continue medical Rx, angioplasty of ostial circumflex which could be done but slightly high risk or single-vessel bypass surgery  At that time he was asymptomatic and he was continued medical therapy  Patient was recently in [place] where he had an episode of palpitation and fast heartbeat  He was kept overnight ruled out for acute coronary syndrome and he was advised to follow up with Cardiology here  He is known to have history of irregular heartbeat  EKG shows normal sinus with first-degree AV block and frequent PVCs  PVCs are also in the form of bigeminy and fusion beats are noted  He is otherwise very active he denies any chest pain any shortness of breath  He is able to do his activities without any problems  No nausea no vomiting no PND no orthopnea no syncope no other issues  No recent surgery  He has a previous history of surgery of gallbladder removal as well as neck fusion    He does not smoke but occasionally he will smoke a cigarette maybe once every 3 months    Review of Systems   Constitutional: Negative for activity change, chills, diaphoresis, fever and unexpected weight change  HENT: Negative for congestion  Eyes: Negative for discharge and redness     Respiratory: Negative for cough, chest tightness, shortness of breath and wheezing  Cardiovascular: Positive for palpitations  Negative for chest pain and leg swelling  Episodes of fast heart rate   Gastrointestinal: Negative for abdominal pain, diarrhea and nausea  Endocrine: Negative  Genitourinary: Negative for decreased urine volume and urgency  Musculoskeletal: Negative  Negative for arthralgias, back pain and gait problem  History of neck fusion surgery   Skin: Negative for rash and wound  Allergic/Immunologic: Negative  Neurological: Negative for dizziness, seizures, syncope, weakness, light-headedness and headaches  Hematological: Negative  Psychiatric/Behavioral: Negative for agitation and confusion  The patient is not nervous/anxious          Historical Information   Past Medical History:   Diagnosis Date    Abrasion of left foot     LAST ASSESSED: 9/23/13    Achilles tendinitis, unspecified leg     LAST ASSESSED: 11/30/12    Allergic rhinitis     LAST ASSESSED: 11/25/13    Allergic rhinitis 06/25/2008    LAST ASSESSED: 6/25/08    Cervicalgia 04/22/2011    LAST ASSESSED: 4/22/11    Coronary artery disease     Dysfunction of left eustachian tube     LAST ASSESSED: 9/23/13    Epistaxis     LAST ASSESSED: 5/27/15    First degree atrioventricular block     LAST ASSESSED: 7/10/17    Gastric ulcer 10/07/2011    LAST ASSESSED: 3/9/15    GERD (gastroesophageal reflux disease)     Hemorrhoids 05/13/2011    LAST ASSESSED: 5/13/11    Long term use of drug     LAST ASSESSED: 10/11/12    Myalgia 12/21/2007    LAST ASSESSED: 12/21/07    Myositis 12/21/2007    LAST ASSESSED: 12/21/07    Numbness and tingling of foot     LAST ASSESSED: 3/9/15    Sleep apnea      Past Surgical History:   Procedure Laterality Date    CARDIAC CATHETERIZATION  2017    CHOLECYSTECTOMY      LAPAROSCOPIC; LAST ASSESSED: 10/17/17    COLONOSCOPY      COMPLETE; 3-4 YEARS AGO BY [facility]; LAST ASSESSED: 8/18/14    MYRINGOTOMY W/ TUBES      WITH VENTILATING TUBE INSERTION    VASECTOMY       Social History     Substance and Sexual Activity   Alcohol Use Yes    Comment: social     Social History     Substance and Sexual Activity   Drug Use No     Social History     Tobacco Use   Smoking Status Former Smoker    Quit date: 200    Years since quittin 5   Smokeless Tobacco Never Used     Family History:   Family History   Problem Relation Age of Onset    Cancer Mother         SOFT TISSUE CANCER ON RT SIDE CHEST WALL AND     Prostate cancer Maternal Uncle        Meds/Allergies     Allergies   Allergen Reactions    Amoxicillin Anaphylaxis    Acetaminophen      Rapid heart rate    Apple - Food Allergy     Cherry Flavor - Food Allergy     Pollen Extract     Augmentin [Amoxicillin-Pot Clavulanate]     Dye [Iodinated Diagnostic Agents]        Current Outpatient Medications:     aspirin (ASPIR-81) 81 mg EC tablet, Take 81 mg by mouth daily, Disp: , Rfl:     celecoxib (CeleBREX) 200 mg capsule, TAKE 1 CAPSULE BY MOUTH EVERY DAY, Disp: 90 capsule, Rfl: 1    Coenzyme Q10 (COQ10) 200 MG CAPS, Take 1 tablet by mouth daily, Disp: , Rfl:     finasteride (PROSCAR) 5 mg tablet, TAKE 1 TABLET BY MOUTH EVERY DAY, Disp: 90 tablet, Rfl: 1    Glucosamine-Chondroitin (GLUCOSAMINE CHONDR COMPLEX PO), Take 1 tablet by mouth daily, Disp: , Rfl:     nitroglycerin (NITROSTAT) 0 4 mg SL tablet, PLACE 1 TABLET(S) BY SUBLINGUAL ROUTE , Disp: , Rfl: 4    Omega-3 Fatty Acids (FISH OIL) 1000 MG CPDR, Take by mouth daily, Disp: , Rfl:     pantoprazole (PROTONIX) 40 mg tablet, TAKE 1 TABLET BY MOUTH EVERY DAY, Disp: 90 tablet, Rfl: 1    rosuvastatin (CRESTOR) 10 MG tablet, TAKE 1 TABLET BY MOUTH EVERY DAY, Disp: 90 tablet, Rfl: 1    mefloquine (LARIAM) 250 MG tablet, Take 1 tablet (250 mg total) by mouth every 7 days for 10 doses (Patient not taking: Reported on 2021), Disp: 10 tablet, Rfl: 0    Vitals: Blood pressure 148/70, pulse 72, height 6' (1 829 m), weight 96 4 kg (212 lb 8 oz), SpO2 99 %  Body mass index is 28 82 kg/m²  Wt Readings from Last 3 Encounters:   06/30/21 96 4 kg (212 lb 8 oz)   06/14/21 100 kg (221 lb)   06/09/21 100 kg (221 lb 8 oz)     Vitals:    06/30/21 1351   Weight: 96 4 kg (212 lb 8 oz)     BP Readings from Last 3 Encounters:   06/30/21 148/70   06/14/21 149/72   06/09/21 142/72         Physical Exam    Neurologic:  Alert & oriented x 3, no new focal deficits, Not in any acute distress,  Constitutional:  Well developed, well nourished, non-toxic appearance   Eyes:  Pupil equal and reacting to light, conjunctiva normal, No JVP, No LNP   HENT:  Atraumatic, oropharynx moist, Neck- normal range of motion, no tenderness,  Neck supple   Respiratory:  Bilateral air entry, mostly clear to auscultation  Cardiovascular: S1-S2 irregularly irregular with 2/6 murmur S4 present  GI:  Soft, nondistended, normal bowel sounds, nontender, no hepatosplenomegaly appreciated  Musculoskeletal:  No edema, no tenderness, no deformities  Skin:  Well hydrated, no rash   Lymphatic:  No lymphadenopathy noted   Extremities:  No edema and distal pulses are present    Diagnostic Studies Review Cardio:   cardiac catheterization done in June 2017 at [facility] shows he had moderate stenosis of his ostial circumflex  Had a nonobstructive disease in other arteries and had a normal LV systolic function  Nuclear stress test done in October 2020  Nuclear stress done in October 2020 patient exercised for about 9 minutes  Fixed defect was seen no ischemia noted  EF was 47%    EKG:  Twelve lead EKG done 06/30/2021 shows normal sinus rhythm first-degree AV block with PVCs    No significant change from previous EKG done at his primary care doctor's office    Results for orders placed during the hospital encounter of 10/12/20    NM Myocardial Perfusion Spect (Exercise Induced Stress and/or Rest)    [place] [id number]  [phone]    Rest/Stress Gated SPECT Myocardial Perfusion Imaging After Exercise    Patient: Derick Burroughs  MR number: [id number]  Account number: [de-identified]  : 1955  Age: 71 years  Gender: Male  Status: Inpatient  Location: Stress lab  Height: 73 in  Weight: 202 lb  BP: 138/ 80 mmHg    Allergies: AMOXICILLIN, ACETAMINOPHEN, APPLE, CHERRY FLAVOR, POLLEN EXTRACT, AMOXICILLIN-POT CLAVULANATE, IODINATED DIAGNOSTIC AGENTS    Diagnosis: R07 9 - Chest pain, unspecified    Interpreting Physician:  Sharyn Velez MD  Referring Physician:  Rose Bustillos DO  Technician:  Dao Samson  RN:  Scarlet Sneed RN BSN  Group:  [facility]  Report Prepared by[de-identified]  Scarlet Sneed RN BSN    INDICATIONS: Coronary artery disease  HISTORY: The patient is a 71year old  male  Chest pain status: chest pain, radiation to neck and jaw area, radiation to arm(s)  Coronary artery disease risk factors: dyslipidemia  Cardiovascular history: coronary artery disease  and arrhythmia  Co-morbidity: obesity  PHYSICAL EXAM: Baseline physical exam screening: no evidence of significant aortic stenosis and no wheezes audible  REST ECG: Normal sinus rhythm  The ECG showed 1ï¾° AV block  PROCEDURE: The study was performed in the the Stress lab  Treadmill exercise testing was performed, using the Ángel protocol  Gated SPECT myocardial perfusion imaging was performed after stress  Systolic blood pressure was 138 mmHg, at the  start of the study  Diastolic blood pressure was 80 mmHg, at the start of the study  The heart rate was 75 bpm, at the start of the study  IV double checked      ÁNGEL PROTOCOL:  HR bpm SBP mmHg DBP mmHg Symptoms  Baseline 75 138 80 none  Stage 1 129 178 80 none  Stage 2 129 184 72 mild dyspnea  Stage 3 141 -- -- --  Immediate 141 205 70 moderate dyspnea  Recovery 1 92 192 80 subsiding  Recovery 2 93 150 78 none    STRESS SUMMARY: Duration of exercise was 9 min  The patient exercised to protocol stage 3  Maximal work rate was 10 1 METs  Functional capacity was normal  Maximal heart rate during stress was 141 bpm ( 90 % of maximal predicted heart  rate)  The heart rate response to stress was normal  There was normal resting blood pressure with a hypertensive response to stress  The rate-pressure product for the peak heart rate and blood pressure was 15662  There was no chest pain  during stress  The stress test was terminated due to moderate dyspnea  Pre oxygen saturation: 99 %  Peak oxygen saturation: 98 %  The stress ECG was equivocal for ischemia  Arrhythmia during stress: isolated premature ventricular beats  ISOTOPE ADMINISTRATION:  Resting isotope administration Stress isotope administration  Agent Tetrofosmin Tetrofosmin  Dose 11 mCi 30 9 mCi  Date 10/14/2020 10/14/2020  Injection time 08:55 10:38  Injection-image interval 37 min 52 min    Radiopharmaceutical was administered 6 min, 30 sec into the stress protocol  There was 2 min and 30 sec of exercise after the injection  MYOCARDIAL PERFUSION IMAGING:  The image quality was fair  Rotating projection images reveal mild diaphragmatic attenuation and mild subdiaphragmatic activity  Left ventricular size was normal  The TID ratio was 0 97  PERFUSION DEFECTS:  -  There was a moderate-sized, mildly severe, fixed myocardial perfusion defect of the basal inferior wall likely due to diaphragm attenuation  GATED SPECT:  The calculated left ventricular ejection fraction was 47 %  Left ventricular ejection fraction was within normal limits by visual estimate  There was no left ventricular regional abnormality  SUMMARY:  -  Stress results: Duration of exercise was 9 min  Target heart rate was achieved  There was normal resting blood pressure with a hypertensive response to stress  There was no chest pain during stress  The stress test was terminated due to  moderate dyspnea  -  ECG conclusions:  The stress ECG was equivocal for ischemia  -  Perfusion imaging: There was a moderate-sized, mildly severe, fixed myocardial perfusion defect of the basal inferior wall likely due to diaphragm attenuation   -  Gated SPECT: The calculated left ventricular ejection fraction was 47 %  Left ventricular ejection fraction was within normal limits by visual estimate  There was no left ventricular regional abnormality  IMPRESSIONS: Normal study after maximal exercise  There was image artifact, without diagnostic evidence for perfusion abnormality  Prepared and signed by    Angella Rosado MD  Signed 10/14/2020 15:31:45    No results found for this or any previous visit  Imaging:  Chest X-Ray:   No Chest XR results available for this patient  CT-scan of the chest:     CTA dissection protocol chest abdomen pelvis w wo contrast    Result Date: 10/12/2020  Impression 1  No aortic dissection or aneurysm  2   No acute finding within chest, abdomen, and pelvis  3   Colonic diverticulosis  4   Incidentally noted 1 5 cm right paravertebral simple fluid attenuating cystic lesion at the level of T6  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   Recommend correlation/comparison with prior outside imaging   Workstation performed: [id number]     Lab Review   Lab Results   Component Value Date    WBC 5 1 04/05/2021    HGB 16 3 04/05/2021    HCT 47 9 04/05/2021    MCV 92 8 04/05/2021    RDW 13 1 04/05/2021     04/05/2021     BMP:  Lab Results   Component Value Date    SODIUM 141 06/25/2021    K 4 6 06/25/2021     06/25/2021    CO2 26 06/25/2021    BUN 17 06/25/2021    CREATININE 0 99 06/25/2021    GLUC 118 06/25/2021    CALCIUM 8 7 06/25/2021    EGFR 79 06/25/2021    MG 2 4 10/13/2020     LFT:  Lab Results   Component Value Date    AST 28 04/05/2021    ALT 48 (H) 04/05/2021    ALKPHOS 67 04/05/2021    TP 6 7 04/05/2021    ALB 4 3 04/05/2021      Lab Results   Component Value Date    ZQR0VMOIPGCA 0 596 10/13/2020     No components found for: PONCHO [facility]  Lab Results   Component Value Date    HGBA1C 5 5 04/05/2021     Lipid Profile:   Lab Results   Component Value Date    CHOLESTEROL 142 04/05/2021    HDL 43 04/05/2021    LDLCALC 78 04/05/2021    TRIG 127 04/05/2021     Lab Results   Component Value Date    CHOLESTEROL 142 04/05/2021    CHOLESTEROL 143 10/02/2020     Lab Results   Component Value Date    TROPONINI <0 02 10/13/2020     No results found for: NTBNP         Dr Annabella Stiles MD [facility]      "This note has been constructed using a voice recognition system  Therefore there may be syntax, spelling, and/or grammatical errors   Please call if you have any questions  "

## 2021-07-02 ENCOUNTER — ANESTHESIA EVENT (OUTPATIENT)
Dept: PERIOP | Facility: HOSPITAL | Age: 66
End: 2021-07-02
Payer: MEDICARE

## 2021-07-02 NOTE — PRE-PROCEDURE INSTRUCTIONS
Pre-Surgery Instructions:   Medication Instructions    aspirin (ASPIR-81) 81 mg EC tablet Patient was instructed by Physician and understands   celecoxib (CeleBREX) 200 mg capsule Instructed patient per Anesthesia Guidelines   Coenzyme Q10 (COQ10) 200 MG CAPS Instructed patient per Anesthesia Guidelines   finasteride (PROSCAR) 5 mg tablet Instructed patient per Anesthesia Guidelines   Glucosamine-Chondroitin (GLUCOSAMINE CHONDR COMPLEX PO) Instructed patient per Anesthesia Guidelines   nitroglycerin (NITROSTAT) 0 4 mg SL tablet Instructed patient per Anesthesia Guidelines   Omega-3 Fatty Acids (FISH OIL) 1000 MG CPDR Patient was instructed by Physician and understands   pantoprazole (PROTONIX) 40 mg tablet Instructed patient per Anesthesia Guidelines   rosuvastatin (CRESTOR) 10 MG tablet Instructed patient per Anesthesia Guidelines  All pre-op instructions reviewed as per Nicole Guadarrama My Surgery Experience w/ pt verb understanding  Inst NPO post MN except sips water w/ meds on am of sx ( protonix, rosuvastatin, proscar)  Inst by Dr Edgar Salas ok to continue aspirin, but to hold his fish oil for 1 week before sx  Also inst to hold all OTC vit/supp for 1 week before sx, and to hold his celebrex & other NSAIDs for at least 3 days before sx  Pt cannot take tylenol r/t his allergy to it  Received pre-op showering instructions from surgeon office-has CHG, reviewed process at time of call  Current hospital visitor & masking policy reviewed  Pt also reports he is having a Zio heart monitor placed per his cardiologist order in coming week or so - will inform anesthesia & follow up w/pt should there be any concern related to surgery & operating room procedures  Pt Verbalized an understanding of all instructions reviewed - states has concern about anesthesia type - is preferring to have procedure done under local if possible - states will be following up w/ surgeon to discuss further

## 2021-07-07 ENCOUNTER — TELEPHONE (OUTPATIENT)
Dept: OBGYN CLINIC | Facility: HOSPITAL | Age: 66
End: 2021-07-07

## 2021-07-07 NOTE — TELEPHONE ENCOUNTER
Patient sees Bhargav Ko    Patient called regarding his surgery and wanted to know if the sticky material that holds his heart monitor will be used after his surgery to stick back to his chest, he decided to call his cardiologist

## 2021-07-09 ENCOUNTER — TELEPHONE (OUTPATIENT)
Dept: OBGYN CLINIC | Facility: CLINIC | Age: 66
End: 2021-07-09

## 2021-07-12 ENCOUNTER — TELEPHONE (OUTPATIENT)
Dept: OBGYN CLINIC | Facility: CLINIC | Age: 66
End: 2021-07-12

## 2021-07-12 ENCOUNTER — TELEPHONE (OUTPATIENT)
Dept: FAMILY MEDICINE CLINIC | Facility: CLINIC | Age: 66
End: 2021-07-12

## 2021-07-12 NOTE — TELEPHONE ENCOUNTER
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

## 2021-07-13 ENCOUNTER — ANESTHESIA (OUTPATIENT)
Dept: PERIOP | Facility: HOSPITAL | Age: 66
End: 2021-07-13
Payer: MEDICARE

## 2021-07-15 NOTE — TELEPHONE ENCOUNTER
Usually cystic lesions are not really of any clinical significance  That was an incidental finding on imaging ordered by orthopedic surgeon  Not certain if he has other imaging results of his spine as that is what they are looking for in order to compare  Most of that would be to evaluate the size of of the cyst compared over time  If he can find any older chart records then that would be helpful    These usually do not require a dedicated workup

## 2021-07-15 NOTE — ANESTHESIA PREPROCEDURE EVALUATION
Procedure:  RELEASE CARPAL TUNNEL (Left Wrist)    Relevant Problems   CARDIO   (+) Coronary artery disease involving native coronary artery of native heart without angina pectoris   (+) Mixed hyperlipidemia   (+) Premature ventricular beats      GI/HEPATIC   (+) Gastro-esophageal reflux disease without esophagitis   (+) PUD (peptic ulcer disease)      MUSCULOSKELETAL   (+) Chronic back pain   (+) Osteoarthritis of fingers of both hands   (+) Primary osteoarthritis of left knee      NEURO/PSYCH   (+) Chronic back pain      PULMONARY   (+) LENY (obstructive sleep apnea)   (+) Sleep apnea      Other   (+) 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   (+) CPAP (continuous positive airway pressure) dependence        Physical Exam    Airway    Mallampati score: II  TM Distance: >3 FB  Neck ROM: full     Dental       Cardiovascular  Rhythm: regular, Rate: normal,     Pulmonary  Breath sounds clear to auscultation,     Other Findings        Anesthesia Plan  ASA Score- 3     Anesthesia Type- IV sedation with anesthesia with ASA Monitors  Additional Monitors:   Airway Plan:           Plan Factors-    Chart reviewed  Patient is not a current smoker  Induction- intravenous  Postoperative Plan- Plan for postoperative opioid use  Informed Consent- Anesthetic plan and risks discussed with patient  I personally reviewed this patient with the CRNA  Discussed and agreed on the Anesthesia Plan with the CRNA  Mine Lopez

## 2021-07-15 NOTE — TELEPHONE ENCOUNTER
I spoke with Camille Hastings and advised him of what Dr Surinder Conti had said  He does not have any other records and understood that further workup is not warranted for cystic lesion

## 2021-07-16 ENCOUNTER — HOSPITAL ENCOUNTER (OUTPATIENT)
Facility: HOSPITAL | Age: 66
Setting detail: OUTPATIENT SURGERY
Discharge: HOME | End: 2021-07-16
Attending: SURGERY | Admitting: SURGERY
Payer: MEDICARE

## 2021-07-16 VITALS
HEART RATE: 65 BPM | SYSTOLIC BLOOD PRESSURE: 121 MMHG | TEMPERATURE: 96.9 F | RESPIRATION RATE: 16 BRPM | HEIGHT: 72 IN | BODY MASS INDEX: 30 KG/M2 | WEIGHT: 221.5 LBS | DIASTOLIC BLOOD PRESSURE: 58 MMHG | OXYGEN SATURATION: 96 %

## 2021-07-16 DIAGNOSIS — Z47.89 AFTERCARE FOLLOWING SURGERY OF THE MUSCULOSKELETAL SYSTEM: ICD-10-CM

## 2021-07-16 DIAGNOSIS — G56.02 CARPAL TUNNEL SYNDROME ON LEFT: Primary | ICD-10-CM

## 2021-07-16 PROCEDURE — 64721 CARPAL TUNNEL SURGERY: CPT | Performed by: SURGERY

## 2021-07-16 PROCEDURE — NC001 PR NO CHARGE: Performed by: SURGERY

## 2021-07-16 RX ORDER — ONDANSETRON 2 MG/ML
INJECTION INTRAMUSCULAR; INTRAVENOUS AS NEEDED
Status: DISCONTINUED | OUTPATIENT
Start: 2021-07-16 | End: 2021-07-16

## 2021-07-16 RX ORDER — SODIUM CHLORIDE, SODIUM LACTATE, POTASSIUM CHLORIDE, CALCIUM CHLORIDE 600; 310; 30; 20 MG/100ML; MG/100ML; MG/100ML; MG/100ML
75 INJECTION, SOLUTION INTRAVENOUS CONTINUOUS
Status: DISCONTINUED | OUTPATIENT
Start: 2021-07-16 | End: 2021-07-16 | Stop reason: HOSPADM

## 2021-07-16 RX ORDER — PROPOFOL 10 MG/ML
INJECTION, EMULSION INTRAVENOUS AS NEEDED
Status: DISCONTINUED | OUTPATIENT
Start: 2021-07-16 | End: 2021-07-16

## 2021-07-16 RX ORDER — DEXMEDETOMIDINE HYDROCHLORIDE 100 UG/ML
INJECTION, SOLUTION INTRAVENOUS AS NEEDED
Status: DISCONTINUED | OUTPATIENT
Start: 2021-07-16 | End: 2021-07-16

## 2021-07-16 RX ORDER — OXYCODONE HYDROCHLORIDE 5 MG/1
5 TABLET ORAL EVERY 4 HOURS PRN
Qty: 5 TABLET | Refills: 0 | Status: SHIPPED | OUTPATIENT
Start: 2021-07-16 | End: 2021-07-26

## 2021-07-16 RX ORDER — SODIUM CHLORIDE, SODIUM LACTATE, POTASSIUM CHLORIDE, CALCIUM CHLORIDE 600; 310; 30; 20 MG/100ML; MG/100ML; MG/100ML; MG/100ML
INJECTION, SOLUTION INTRAVENOUS CONTINUOUS PRN
Status: DISCONTINUED | OUTPATIENT
Start: 2021-07-16 | End: 2021-07-16

## 2021-07-16 RX ORDER — ONDANSETRON 2 MG/ML
4 INJECTION INTRAMUSCULAR; INTRAVENOUS ONCE AS NEEDED
Status: DISCONTINUED | OUTPATIENT
Start: 2021-07-16 | End: 2021-07-16 | Stop reason: HOSPADM

## 2021-07-16 RX ORDER — EPHEDRINE SULFATE 50 MG/ML
INJECTION INTRAVENOUS AS NEEDED
Status: DISCONTINUED | OUTPATIENT
Start: 2021-07-16 | End: 2021-07-16

## 2021-07-16 RX ORDER — MAGNESIUM HYDROXIDE 1200 MG/15ML
LIQUID ORAL AS NEEDED
Status: DISCONTINUED | OUTPATIENT
Start: 2021-07-16 | End: 2021-07-16 | Stop reason: HOSPADM

## 2021-07-16 RX ORDER — OXYCODONE HYDROCHLORIDE 5 MG/1
5 TABLET ORAL EVERY 4 HOURS PRN
Status: DISCONTINUED | OUTPATIENT
Start: 2021-07-16 | End: 2021-07-16 | Stop reason: HOSPADM

## 2021-07-16 RX ORDER — CLINDAMYCIN PHOSPHATE 900 MG/50ML
INJECTION INTRAVENOUS AS NEEDED
Status: DISCONTINUED | OUTPATIENT
Start: 2021-07-16 | End: 2021-07-16

## 2021-07-16 RX ORDER — LIDOCAINE HYDROCHLORIDE 10 MG/ML
0.5 INJECTION, SOLUTION EPIDURAL; INFILTRATION; INTRACAUDAL; PERINEURAL ONCE AS NEEDED
Status: DISCONTINUED | OUTPATIENT
Start: 2021-07-16 | End: 2021-07-16 | Stop reason: HOSPADM

## 2021-07-16 RX ORDER — PROPOFOL 10 MG/ML
INJECTION, EMULSION INTRAVENOUS CONTINUOUS PRN
Status: DISCONTINUED | OUTPATIENT
Start: 2021-07-16 | End: 2021-07-16

## 2021-07-16 RX ORDER — LIDOCAINE HYDROCHLORIDE 10 MG/ML
INJECTION, SOLUTION EPIDURAL; INFILTRATION; INTRACAUDAL; PERINEURAL AS NEEDED
Status: DISCONTINUED | OUTPATIENT
Start: 2021-07-16 | End: 2021-07-16

## 2021-07-16 RX ORDER — FENTANYL CITRATE/PF 50 MCG/ML
25 SYRINGE (ML) INJECTION
Status: DISCONTINUED | OUTPATIENT
Start: 2021-07-16 | End: 2021-07-16 | Stop reason: HOSPADM

## 2021-07-16 RX ORDER — SODIUM CHLORIDE, SODIUM LACTATE, POTASSIUM CHLORIDE, CALCIUM CHLORIDE 600; 310; 30; 20 MG/100ML; MG/100ML; MG/100ML; MG/100ML
125 INJECTION, SOLUTION INTRAVENOUS CONTINUOUS
Status: DISCONTINUED | OUTPATIENT
Start: 2021-07-16 | End: 2021-07-16 | Stop reason: HOSPADM

## 2021-07-16 RX ORDER — CLINDAMYCIN PHOSPHATE 900 MG/50ML
900 INJECTION INTRAVENOUS ONCE
Status: DISCONTINUED | OUTPATIENT
Start: 2021-07-16 | End: 2021-07-16 | Stop reason: HOSPADM

## 2021-07-16 RX ADMIN — PROPOFOL 90 MG: 10 INJECTION, EMULSION INTRAVENOUS at 10:32

## 2021-07-16 RX ADMIN — SODIUM CHLORIDE, SODIUM LACTATE, POTASSIUM CHLORIDE, AND CALCIUM CHLORIDE: .6; .31; .03; .02 INJECTION, SOLUTION INTRAVENOUS at 10:28

## 2021-07-16 RX ADMIN — CLINDAMYCIN PHOSPHATE 900 MG: 900 INJECTION, SOLUTION INTRAVENOUS at 10:28

## 2021-07-16 RX ADMIN — PROPOFOL 180 MG: 10 INJECTION, EMULSION INTRAVENOUS at 10:37

## 2021-07-16 RX ADMIN — PROPOFOL 120 MCG/KG/MIN: 10 INJECTION, EMULSION INTRAVENOUS at 10:32

## 2021-07-16 RX ADMIN — EPHEDRINE SULFATE 10 MG: 50 INJECTION, SOLUTION INTRAVENOUS at 10:55

## 2021-07-16 RX ADMIN — DEXMEDETOMIDINE HCL 12 MCG: 100 INJECTION INTRAVENOUS at 10:28

## 2021-07-16 RX ADMIN — ONDANSETRON 4 MG: 2 INJECTION INTRAMUSCULAR; INTRAVENOUS at 10:52

## 2021-07-16 RX ADMIN — LIDOCAINE HYDROCHLORIDE 50 MG: 10 INJECTION, SOLUTION EPIDURAL; INFILTRATION; INTRACAUDAL; PERINEURAL at 10:32

## 2021-07-16 NOTE — ANESTHESIA POSTPROCEDURE EVALUATION
Post-Op Assessment Note    CV Status:  Stable  Pain Score: 0    Pain management: adequate     Mental Status:  Sleepy   Hydration Status:  Euvolemic   PONV Controlled:  Controlled   Airway Patency:  Patent      Post Op Vitals Reviewed: Yes      Staff: Anesthesiologist, CRNA         No complications documented      BP  138/58   Temp   98   Pulse  72   Resp   22   SpO2   99

## 2021-07-16 NOTE — H&P
ASSESSMENT/PLAN:       75 yo male with bilateral carpal tunnel, symptomatically worse on the left side  Ultrasound evaluation of the bilateral wrists was reviewed and demonstrates evidence of bilateral carpal tunnel syndrome  Patient is symptomatically worse on the left and would like to proceed with definitive treatment  We discussed open carpal tunnel release as well as recovery period and expectations  Risks benefits and alternatives were discussed and patient elected to proceed with left open carpal tunnel release  Informed consent was signed for the same  Patient does see cardiology at [facility] for CAD which is well controlled  He takes a baby aspirin and fish oil, advised that he stop the fish oil about 1 week before surgery but may continue ASA  We discussed that we typically wait about 6 weeks in between the 2 sides  He does have a trigger finger on the right hand which can be release at the same time as his right carpal tunnel when the time comes  We will see him 10-14 use and surgery for suture removal and postop evaluation      The patient verbalized understanding of exam findings and treatment plan  We engaged in the shared decision-making process and treatment options were discussed at length with the patient  Surgical and conservative management discussed today along with risks and benefits      Diagnoses and all orders for this visit:     Carpal tunnel syndrome on left  -     Case request operating room: RELEASE CARPAL TUNNEL; Standing  -     Basic metabolic panel; Future  -     Case request operating room: RELEASE CARPAL TUNNEL     Carpal tunnel syndrome on right     Pre-operative laboratory examination  -     Case request operating room: RELEASE CARPAL TUNNEL; Standing  -     Basic metabolic panel;  Future  -     Case request operating room: RELEASE CARPAL TUNNEL     Other orders  -     Nursing Communication [id number] [facility] Interventions Implemented; Standing  -     Nursing Communication G bath, have staff wash entire body (neck down) per pre-op bathing protocol  Routine, evening prior to, and day of surgery ; Standing  -     Nursing Communication Swab both nares with Povidone-Iodine solution, EXCLUDE if patient has shellfish/Iodine allergy  Routine, day of surgery, on call to OR; Standing  -     chlorhexidine (PERIDEX) 0 12 % oral rinse 15 mL  -     Void on call to OR; Standing  -     Insert peripheral IV; Standing  -     Diet NPO; Sips with meds; Standing  -     clindamycin (CLEOCIN) 900 mg in sodium chloride 0 9 % 50 mL IVPB  -     Apply Sequential Compression Device; Standing          Open Carpal Tunnel Release: The anatomy and physiology of carpal tunnel syndrome was discussed with the patient today  Increase pressure localized under the transverse carpal ligament can cause pain, numbness, tingling, or dysesthesias within the median nerve distribution as well as feelings of fatigue, clumsiness, or awkwardness  These symptoms typically occur at night and worse in the morning upon waking  Eventually, untreated carpal tunnel syndrome can result in weakness and permanent loss of muscle within the thenar compartment of the hand  Treatment options were discussed with the patient  Conservative treatment includes nocturnal resting splints to keep the nerve in a neutral position, ergonomic changes within the work or home environment, activity modification, and tendon gliding exercises  Vitamin B6 one tablet daily over the counter may helpful to reduce symptoms  Steroid injections within the carpal canal can help a majority of patients, however this is often self-limited in a majority of patients  Surgical intervention to divide the transverse carpal ligament typically results in a long-lasting relief of the patient's complaints, with the recurrence rate of less than 1%  The patient has elected to undergo an open carpal tunnel release    The palmar incision technique was discussed in the office with the patient today  In the postoperative period, light activities are allowed immediately, driving is allowed when narcotic medication has stopped, and the bandages may be removed and incision may get wet after 2 days  Heavy activities (lifting more than approximately 10 pounds) will be allowed after follow up appointment in 1-2 weeks  While night symptoms (waking from sleep, pain, and discomfort in the hands) generally improves rapidly, the numbness and tingling as well as the strength will slowly improve over weeks to months depending on the chronicity and severity of the carpal tunnel syndrome  Pillar pain and scar discomfort were discussed with the patient which are self-limiting conditions  The risks of bleeding and infection from the surgery are less than 1%  Risk of recurrence is approximately 0 5%  The risks of nerve injury or nerve damage or damage to the blood vessels is approximately 1 in 1200  The patient has an understanding of the above mentioned discussion  The risks and benefits of the procedure were explained to the patient, which include, but are not limited to: Bleeding, infection, recurrence, pain, scar, damage to tendons, damage to nerves, and damage to blood vessels, failure to give desired results and complications related to anesthesia  These risks, along with alternative conservative treatment options, and postoperative protocols were voiced back and understood by the patient  All questions were answered to the patient's satisfaction  The patient agrees to comply with a standard postoperative protocol, and is willing to proceed  Education was provided via written and auditory forms  There were no barriers to learning  Written handouts regarding wound care, incision and scar care, and general preoperative information was provided to the patient  Prior to surgery, the patient may be requested to stop all anti-inflammatory medications    Prophylactic aspirin, Plavix, and Coumadin may be allowed to be continued  Medications including vitamin E , ginkgo, and fish oil are requested to be stopped approximately one week prior to surgery  Hypertensive medications and beta blockers, if taken, should be continued      Follow Up:  Return for After Surgery         To Do Next Visit:  Sutures out     ____________________________________________________________________________________________________________________________________________        CHIEF COMPLAINT:      Chief Complaint   Patient presents with    Right Hand - Numbness, Follow-up    Left Hand - Numbness, Follow-up         SUBJECTIVE:  Adilson Coyle is a 76y o  year old RHD male who presents for follow up evaluation of bilateral hand paresthesias  He just underwent an ultrasound evaluation of the wrists which demonstrates evidence for carpal tunnel syndrome  He notes worsened symptoms on the left than the right  He does use a brace at night which helps sometimes  He notes persistent tingling in the tips of the long and ring fingers but no perri numbness all the time   Symptoms do worsen at night and when driving           I have personally reviewed all the relevant PMH, PSH, SH, FH, Medications and allergies       PAST MEDICAL HISTORY:       Past Medical History:   Diagnosis Date    Abrasion of left foot       LAST ASSESSED: 9/23/13    Achilles tendinitis, unspecified leg       LAST ASSESSED: 11/30/12    Allergic rhinitis       LAST ASSESSED: 11/25/13    Allergic rhinitis 06/25/2008     LAST ASSESSED: 6/25/08    Cervicalgia 04/22/2011     LAST ASSESSED: 4/22/11    Coronary artery disease      Dysfunction of left eustachian tube       LAST ASSESSED: 9/23/13    Epistaxis       LAST ASSESSED: 5/27/15    First degree atrioventricular block       LAST ASSESSED: 7/10/17    Gastric ulcer 10/07/2011     LAST ASSESSED: 3/9/15    GERD (gastroesophageal reflux disease)      Hemorrhoids 05/13/2011     LAST ASSESSED: 5/13/11    Long term use of drug       LAST ASSESSED: 10/11/12    Myalgia 2007     LAST ASSESSED: 07    Myositis 2007     LAST ASSESSED: 07    Numbness and tingling of foot       LAST ASSESSED: 3/9/15    Sleep apnea           PAST SURGICAL HISTORY:        Past Surgical History:   Procedure Laterality Date    CARDIAC CATHETERIZATION   2017    CHOLECYSTECTOMY         LAPAROSCOPIC; LAST ASSESSED: 10/17/17    COLONOSCOPY         COMPLETE; 3-4 YEARS AGO BY [facility]; LAST ASSESSED: 14    MYRINGOTOMY W/ TUBES         WITH VENTILATING TUBE INSERTION    VASECTOMY             FAMILY HISTORY:        Family History   Problem Relation Age of Onset    Cancer Mother           SOFT TISSUE CANCER ON RT SIDE CHEST WALL AND     Prostate cancer Maternal Uncle           SOCIAL HISTORY:  Social History            Tobacco Use    Smoking status: Former Smoker       Quit date: 200       Years since quittin 4    Smokeless tobacco: Never Used   Vaping Use    Vaping Use: Never used   Substance Use Topics    Alcohol use: Yes       Comment: social    Drug use:  No         MEDICATIONS:     Current Outpatient Medications:     aspirin (ASPIR-81) 81 mg EC tablet, Take 81 mg by mouth daily, Disp: , Rfl:     celecoxib (CeleBREX) 200 mg capsule, TAKE 1 CAPSULE BY MOUTH EVERY DAY, Disp: 90 capsule, Rfl: 1    Coenzyme Q10 (COQ10) 200 MG CAPS, Take 1 tablet by mouth daily, Disp: , Rfl:     finasteride (PROSCAR) 5 mg tablet, TAKE 1 TABLET BY MOUTH EVERY DAY, Disp: 90 tablet, Rfl: 1    Glucosamine-Chondroitin (GLUCOSAMINE CHONDR COMPLEX PO), Take 1 tablet by mouth daily, Disp: , Rfl:     nitroglycerin (NITROSTAT) 0 4 mg SL tablet, PLACE 1 TABLET(S) BY SUBLINGUAL ROUTE , Disp: , Rfl: 4    Omega-3 Fatty Acids (FISH OIL) 1000 MG CPDR, Take by mouth daily, Disp: , Rfl:     pantoprazole (PROTONIX) 40 mg tablet, TAKE 1 TABLET BY MOUTH EVERY DAY, Disp: 90 tablet, Rfl: 1    rosuvastatin (CRESTOR) 10 MG tablet, TAKE 1 TABLET BY MOUTH EVERY DAY, Disp: 90 tablet, Rfl: 1    mefloquine (LARIAM) 250 MG tablet, Take 1 tablet (250 mg total) by mouth every 7 days for 10 doses (Patient not taking: Reported on 6/9/2021), Disp: 10 tablet, Rfl: 0     ALLERGIES:        Allergies   Allergen Reactions    Amoxicillin Anaphylaxis    Acetaminophen         Rapid heart rate    Apple - Food Allergy      Cherry Flavor - Food Allergy      Pollen Extract      Augmentin [Amoxicillin-Pot Clavulanate]      Dye [Iodinated Diagnostic Agents]           REVIEW OF SYSTEMS:  Review of Systems   Constitutional: Negative for chills, fatigue, fever and unexpected weight change  HENT: Negative for hearing loss, nosebleeds and sore throat  Eyes: Negative for pain, redness and visual disturbance  Respiratory: Negative for cough, shortness of breath and wheezing  Cardiovascular: Negative for chest pain, palpitations and leg swelling  Gastrointestinal: Negative for abdominal pain, nausea and vomiting  Endocrine: Negative for polydipsia and polyuria  Genitourinary: Negative for difficulty urinating and hematuria  Musculoskeletal: Negative for arthralgias, joint swelling and myalgias  Skin: Negative for rash and wound  Neurological: Positive for numbness  Negative for dizziness and headaches  Psychiatric/Behavioral: Negative for decreased concentration, dysphoric mood and suicidal ideas   The patient is not nervous/anxious           VITALS:      Vitals:     06/14/21 1721   BP: 149/72   Pulse: 84   Resp: 17         LABS:  HgA1c:         Lab Results   Component Value Date     HGBA1C 5 5 04/05/2021      BMP:         Lab Results   Component Value Date     CALCIUM 9 6 04/05/2021      09/29/2017     K 4 7 04/05/2021     CO2 28 04/05/2021      04/05/2021     BUN 15 04/05/2021     CREATININE 0 91 04/05/2021         _____________________________________________________  PHYSICAL EXAMINATION:  General: well developed and well nourished, alert, oriented times 3 and appears comfortable  Psychiatric: Normal  HEENT: Normocephalic, Atraumatic Trachea Midline, No torticollis  Pulmonary: No audible wheezing or respiratory distress   Cardiovascular: No pitting edema, 2+ radial pulse   Skin: No masses, erythema, lacerations, fluctation, ulcerations  Neurovascular: Sensation Intact to the Median, Ulnar, Radial Nerve, Motor Intact to the Median, Ulnar, Radial Nerve and Pulses Intact  Musculoskeletal: Normal, except as noted in detailed exam and in HPI         MUSCULOSKELETAL EXAMINATION:  Bilateral Carpal Tunnel Exam:     Negative thenar atrophy  Positive phalen's test  Positive carpal tunnel compression  Negative tinels over median nerve at the wrist   Opposition strength 5/5  Abduction strength 5/5        ___________________________________________________  STUDIES REVIEWED:  I have personally reviewed ultrasound of the bilateral wrists   which demonstrate evidence of median nerve compression               PROCEDURES PERFORMED:  Procedures  No Procedures performed today     _____________________________________________________          Scribe Attestation    I,:  Gisselle Campo PA-C am acting as a scribe while in the presence of the attending physician :       I,:  Antionette Tafoya MD personally performed the services described in this documentation    as scribed in my presence  :

## 2021-07-16 NOTE — OP NOTE
OPERATIVE REPORT  PATIENT NAME: Ozell Fabry  :  1955  MRN: [id number]  Pt Location: [facility]    SURGERY DATE: 21    Surgeon(s) and Role:     * Chio Lopze MD - Primary     * Rema Flores MD - Assisting     * Lizabeth Kang PA-C - Assisting    Pre-Op Diagnosis:  Carpal tunnel syndrome on left [G56 02]  Pre-operative laboratory examination [Z01 812]    Post-Op Diagnosis Codes:     * Carpal tunnel syndrome on left [G56 02]     * Pre-operative laboratory examination [Z01 812]    Procedure(s):  RELEASE CARPAL TUNNEL (Left)    Specimen(s):  * No orders in the log *    Estimated Blood Loss:   Minimal    Anesthesia Type:   Conscious Sedation     IMPLANTS:  * No implants in log *    PERIOPERATIVE ANTIBIOTICS:    clindamycin, 900 mg    Tourniquet Time: 15 min  at 250 mmHg          Operative Indications: The patient has a history of Carpal Tunnel Syndrome  left that was recalcitrant to conservative management  The decision was made to bring the patient to the operating room for Open Carpal Tunnel Release  left  Risks of the procedure were explained which include, but are not limited to bleeding; infection; damage to nerves, arteries,veins, tendons; scar; pain; need for reoperation; failure to give desired result; and risks of anaesthesia  All questions were answered to satisfaction and they were willing to proceed  Operative Findings:  Hypertrophy TCL     Complications:   None    Procedure and Technique:  After the patient, site, and procedure were identified, the patient was brought into the operating room in a supine position  Local anaesthesia and sedation were provided  A well padded tourniquet was applied to the extremity, set at 250 mmHg  The  left upper extremity was then prepped and drapped in a normal, sterile, orthopedic fashion  An anterior approach to the carpal tunnel was undertaken  A 2 cm incision was made in line with the fourth digit, proximal to Esteban's line  The skin and the subcutaneous tissue were sharply incised  Under loupe magnification, dissection was carried down to the palmar fascia and it was incised  The transverse carpal ligament was visualized and  Released distally with a #64 blade  A freer was was passed above and below the TCL in a retrograde fashion, freeing up any adhesions  A Knife Lite was used to release the proximal portion of the transverse carpal ligament under direct visualization  Distally the superficial arch was identified  A complete release was carried out and exploration of the carpal canal revealed no significant tenosynovitis  The median nerve and its branches were intact  At the completion of the procedure, hemostasis was obtained with cautery and direct pressure  The   wounds were copiously irrigated with sterile solution  The wounds were closed with Prolene  Sterile dressings were applied, including Xeroform, gauze, tweeners, webril, ACE  Please note, all sponge, needle, and instrument counts were correct prior to closure  Loupe magnification was utilized  The patient tolerated the procedure well       I was present for the entire procedure and A physician assistant was required during the procedure for retraction tissue handling,dissection and suturing    Patient Disposition:  PACU     SIGNATURE: Carole Swartz MD  DATE: 07/16/21  TIME: 11:09 AM

## 2021-07-16 NOTE — DISCHARGE INSTRUCTIONS
Post Operative Instructions    You have had surgery on your arm today, please read and follow the information below:  · Elevate your hand above your elbow during the next 24-48 hours to help with swelling  · Place your hand and arm over your head with motion at your shoulder three times a day  · Do not apply any cream/ointment/oil to your incisions including antibiotics  · Do not soak your hands in standing water (dishwater, tubs, Jacuzzi's, pools, etc ) until given permission (typically 2-3 weeks after injury)    Call the office if you notice any:  · Increased numbness or tingling of your hand or fingers that is not relieved with elevation  · Increasing pain that is not controlled with medication  · Difficulty chewing, breathing, swallowing  · Chest pains or shortness of breath  · Fever over 101 4 degrees  Bandage: Please keep bandages clean and dry  Remove bandage after 5 days  Once bandages are removed you may wash hands with soap and water  Short showers are okay as well but please avoid soaking the hand as described above  Please do NOT put any topical agents on the surgical wound including neosporin, peroxide, tea tree oil, vitamin E, etc  as these can delay wound healing  Motion: Move fingers into a fist 5 times a day, DO NOT move any splinted fingers  Weight bearing status: Avoid heavy lifting (>5 pounds) with the extremity that was operated on until follow up appointment  Normal activities of daily living are OK  Ice: Ice for 10 minutes every hour as needed for swelling x 24 hours  Sling: No sling necessary      Pain medication:   Naproxen 220 mg two time a day (do not take this medication if you were told by your doctor that you cannot take anti-inflammatories or NSAIDS)  Tylenol Extended Release 650 mg every 8 hours  Oxycodone one tab every 4-6 hours ONLY AS NEEDED for severe pain         Follow-up Appointment: 10-14 days with Dr RAO        Please call the office if you have any questions or concerns regarding your post-operative care

## 2021-07-28 ENCOUNTER — OFFICE VISIT (OUTPATIENT)
Dept: OBGYN CLINIC | Facility: CLINIC | Age: 66
End: 2021-07-28

## 2021-07-28 VITALS
BODY MASS INDEX: 29.93 KG/M2 | RESPIRATION RATE: 16 BRPM | DIASTOLIC BLOOD PRESSURE: 77 MMHG | WEIGHT: 221 LBS | HEIGHT: 72 IN | SYSTOLIC BLOOD PRESSURE: 161 MMHG | HEART RATE: 61 BPM

## 2021-07-28 DIAGNOSIS — Z98.890 S/P CARPAL TUNNEL RELEASE: Primary | ICD-10-CM

## 2021-07-28 PROCEDURE — 99024 POSTOP FOLLOW-UP VISIT: CPT | Performed by: SURGERY

## 2021-07-28 NOTE — PROGRESS NOTES
Assessment/Plan:  Patient ID: Ozell Fabry 76 y o  male   Surgery: Release Carpal Tunnel - Left  Date of Surgery: 7/16/2021    12 days s/p left carpal tunnel release  Sutures were removed today  He was advised to perform scar massage  CTS symptoms have completely resolved, patient notes improved function  Activities as tolerated  Follow up as needed     Follow Up:  PRN    To Do Next Visit:         CHIEF COMPLAINT:  Chief Complaint   Patient presents with    Left Wrist - Pain, Post-op    Left Hand - Pain, Post-op         SUBJECTIVE:  Ozell Fabry is a 76y o  year old male who presents for follow up after Release Carpal Tunnel - Left  Today patient has no significant pain and notes complete resolution of his carpal tunnel symptoms  He does have a small numb patch at the scar site but otherwise doing well   No fevers or chills        PHYSICAL EXAMINATION:  General: well developed and well nourished, alert, oriented times 3 and appears comfortable  Psychiatric: Normal    MUSCULOSKELETAL EXAMINATION:  Incision: clean, dry and suture(s) intact   Surgery Site: normal, no evidence of infection   Range of Motion: As expected and full composite fist possible  Neurovascular status: Neuro intact, good cap refill  Activity Restrictions: No restrictions  Done today: Sutures out      STUDIES REVIEWED:  No Studies to review      PROCEDURES PERFORMED:  Procedures  No Procedures performed today     Yuki Vital PA-C

## 2021-08-02 ENCOUNTER — CLINICAL SUPPORT (OUTPATIENT)
Dept: CARDIOLOGY CLINIC | Facility: CLINIC | Age: 66
End: 2021-08-02
Payer: MEDICARE

## 2021-08-02 DIAGNOSIS — I49.3 PREMATURE VENTRICULAR BEATS: ICD-10-CM

## 2021-08-02 DIAGNOSIS — E78.2 MIXED HYPERLIPIDEMIA: ICD-10-CM

## 2021-08-02 DIAGNOSIS — R00.0 TACHYCARDIA: ICD-10-CM

## 2021-08-02 DIAGNOSIS — G47.33 OSA (OBSTRUCTIVE SLEEP APNEA): ICD-10-CM

## 2021-08-02 DIAGNOSIS — I25.10 CORONARY ARTERY DISEASE INVOLVING NATIVE CORONARY ARTERY OF NATIVE HEART WITHOUT ANGINA PECTORIS: ICD-10-CM

## 2021-08-02 DIAGNOSIS — G47.33 OBSTRUCTIVE SLEEP APNEA SYNDROME: ICD-10-CM

## 2021-08-02 DIAGNOSIS — I47.2 NSVT (NONSUSTAINED VENTRICULAR TACHYCARDIA) (HCC): Primary | ICD-10-CM

## 2021-08-02 DIAGNOSIS — R00.2 INTERMITTENT PALPITATIONS: ICD-10-CM

## 2021-08-02 PROCEDURE — 93248 EXT ECG>7D<15D REV&INTERPJ: CPT | Performed by: INTERNAL MEDICINE

## 2021-08-03 ENCOUNTER — TELEPHONE (OUTPATIENT)
Dept: CARDIOLOGY CLINIC | Facility: CLINIC | Age: 66
End: 2021-08-03

## 2021-08-03 NOTE — TELEPHONE ENCOUNTER
----- Message from Bebo Flynn MD sent at 8/2/2021  3:51 PM EDT -----  Patient need to see EP as soon as possible  Please help him get an appointment at [facility]  Patient is aware of it  Advised him to go to the hospital if he does not feel well I spoke to him he said he feels okay now

## 2021-08-04 ENCOUNTER — CONSULT (OUTPATIENT)
Dept: CARDIOLOGY CLINIC | Facility: CLINIC | Age: 66
End: 2021-08-04
Payer: MEDICARE

## 2021-08-04 VITALS
BODY MASS INDEX: 30.07 KG/M2 | DIASTOLIC BLOOD PRESSURE: 62 MMHG | HEIGHT: 72 IN | SYSTOLIC BLOOD PRESSURE: 152 MMHG | WEIGHT: 222 LBS

## 2021-08-04 DIAGNOSIS — E78.2 MIXED HYPERLIPIDEMIA: ICD-10-CM

## 2021-08-04 DIAGNOSIS — R00.2 INTERMITTENT PALPITATIONS: ICD-10-CM

## 2021-08-04 DIAGNOSIS — I49.3 PREMATURE VENTRICULAR BEATS: ICD-10-CM

## 2021-08-04 DIAGNOSIS — I49.5 SICK SINUS SYNDROME (HCC): ICD-10-CM

## 2021-08-04 DIAGNOSIS — G47.33 OSA (OBSTRUCTIVE SLEEP APNEA): ICD-10-CM

## 2021-08-04 DIAGNOSIS — I47.2 NSVT (NONSUSTAINED VENTRICULAR TACHYCARDIA) (HCC): ICD-10-CM

## 2021-08-04 DIAGNOSIS — I44.2 INTERMITTENT COMPLETE HEART BLOCK (HCC): ICD-10-CM

## 2021-08-04 DIAGNOSIS — I25.10 CORONARY ARTERY DISEASE INVOLVING NATIVE CORONARY ARTERY OF NATIVE HEART WITHOUT ANGINA PECTORIS: ICD-10-CM

## 2021-08-04 DIAGNOSIS — I49.8 JUNCTIONAL RHYTHM: ICD-10-CM

## 2021-08-04 PROCEDURE — 93000 ELECTROCARDIOGRAM COMPLETE: CPT | Performed by: INTERNAL MEDICINE

## 2021-08-04 PROCEDURE — 99205 OFFICE O/P NEW HI 60 MIN: CPT | Performed by: INTERNAL MEDICINE

## 2021-08-04 NOTE — H&P (VIEW-ONLY)
Consultation - Electrophysiology-Cardiology (EP)   Roby Stover 76 y o  male MRN: [id number]  Unit/Bed#:  Encounter: [id number]      1  Sick sinus syndrome ([address] )     2  Intermittent complete heart block ([address] )     3  NSVT (nonsustained ventricular tachycardia) ([facility])  Ambulatory referral to Cardiac Electrophysiology    POCT ECG   4  Junctional rhythm     5  LENY (obstructive sleep apnea)     6  Coronary artery disease involving native coronary artery of native heart without angina pectoris     7  Premature ventricular beats     8  Mixed hyperlipidemia     9   Intermittent palpitations           Consults  Physician Requesting Consult: Dr Giuliana Hook  Reason for Consult / Principal Problem: CAD, conduction system disease      Assessment/Plan     Sick sinus syndrome  Intermittent complete heart block   Wenckebach   Intermittent junctional escape  symptomatic bradycardia  NSVT    Patient is a candidate for dual-chamber pacemaker   LVEF is 60    Patient had anaphylaxis to amoxicillin   Patient is allergic to contrast dye    TabSprint MRI compatible device, dual-chamber   Left side   Use contrast only if needed  Use antibiotic -vancomycin    Patient should get a steroid prep for dye allergy before the procedure  Have hydrocortisone 100 milligrams, Benadryl 25 milligrams, Pepcid 20 milligrams the night before  Repeat the dose in the morning        CAD   NSVT  Prior history of catheterization in 2017, CAD noted   Was not significant to be stented +was considered a high risk procedure  Considering NSVT, recommend follow-up with primary cardiologist with regards to re-evaluation underlying ischemic state      Obesity / overweight  BMI-30 0 1  This increases the risk of-CAD, CVA, vascular disease, diabetes, kidney dysfunction, hypertension, hyperlipidemia  Diet is responsible for 80% of weight gain   Advice nutritional counseling and healthy diet  Also advised to increase activity  He is going to follow up with his primary care        Obstructive sleep apnea   Patient has history of snoring, morning fatigue and daytime sleepiness at least on some days  Examination is also suggestive  Recommended to follow up with primary care and Pulmonary Medicine        Hypertension   Blood pressure-152/62  Medication-not on any medication  My recommendation-start beta-blockers after device in place        Mixed hyperlipidemia   Medication-rosuvastatin  No myositis or myalgia   My recommendation-continue with same        Tobacco abuse   Former smoker and quit in 200  Three years of smoking   Recommend to continue with current situation status      Alcohol use  Seven standard drinks a week  Recommended not to exceed the same as it increases risk of AFib          summary of my recommendation for the patient  Cardiac catheterization to know status of ischemic heart disease-to be done by primary cardiologist    [employer] MRI compatible device, dual-chamber   Left side   Use contrast only if needed- history of contrast dye allergy  Use antibiotic -vancomycin    Patient should get a steroid prep for dye allergy before the procedure  Have hydrocortisone 100 milligrams, Benadryl 25 milligrams, Pepcid 20 milligrams the night before  Repeat the dose in the morning        History of Present Illness   HPI: Elvin Mccarthy is a 76y o  year old male has been referred to me by Dr Ivana Zambrano for evaluation of CAD and conduction system disease      The patient has significant medical illnesses which include  Sick sinus syndrome  Intermittent complete heart block   Wenckebach   Intermittent junctional escape  symptomatic bradycardia  NSVT  CAD   Obesity  Obstructive sleep apnea  Hypertension   Mixed hyperlipidemia   History of tobacco use      As far as cardiac symptoms are concerned  Angina -negative  Orthopnea -negative  Paroxysmal nocturnal dyspnea -negative  Leg swelling-negative   Palpitations -occasional forceful beat  Presyncope-occasional  Syncope -negative  Orthostatic lightheadedness -occasional  Exertional intolerance-occasional      Snoring-cannot recall  morning fatigue-rare  Daytime sleepiness-negative      Social history  Tobacco use-few years when he was young but has quit over 30 years  Alcohol use-at least 7 standard drinks a week  Energy drink use-none  Recreational drug use-none      Family history  Prostate cancer      Historical Information   Past Medical History:   Diagnosis Date    Abrasion of left foot     LAST ASSESSED: 9/23/13    Achilles tendinitis, unspecified leg     LAST ASSESSED: 11/30/12    Allergic rhinitis     LAST ASSESSED: 11/25/13    Allergic rhinitis 06/25/2008    LAST ASSESSED: 6/25/08    Cervicalgia 04/22/2011    LAST ASSESSED: 4/22/11    Coronary artery disease     CPAP (continuous positive airway pressure) dependence     Dysfunction of left eustachian tube     LAST ASSESSED: 9/23/13    Epistaxis     LAST ASSESSED: 5/27/15    First degree atrioventricular block     LAST ASSESSED: 7/10/17    Gastric ulcer 10/07/2011    LAST ASSESSED: 3/9/15    GERD (gastroesophageal reflux disease)     Hemorrhoids 05/13/2011    LAST ASSESSED: 5/13/11    Long term use of drug     LAST ASSESSED: 10/11/12    Myalgia 12/21/2007    LAST ASSESSED: 12/21/07    Myositis 12/21/2007    LAST ASSESSED: 12/21/07    Numbness and tingling of foot     LAST ASSESSED: 3/9/15    Premature ventricular beats     states cardiologist ordered Zio ambulatory cardiac monitor    Sleep apnea      Past Surgical History:   Procedure Laterality Date    CARDIAC CATHETERIZATION  2017    CATARACT EXTRACTION Right     CERVICAL FUSION      pt thinks C4-C5    CHOLECYSTECTOMY      LAPAROSCOPIC; LAST ASSESSED: 10/17/17    COLONOSCOPY      COMPLETE; 3-4 YEARS AGO BY [place] ; LAST ASSESSED: 8/18/14    MYRINGOTOMY W/ TUBES      WITH VENTILATING TUBE INSERTION    PA REVISE MEDIAN N/CARPAL TUNNEL SURG Left 7/16/2021    Procedure: RELEASE CARPAL TUNNEL; Surgeon: Chaya Hoyos MD;  Location: [facility];  Service: Orthopedics    VASECTOMY       Social History     Substance and Sexual Activity   Alcohol Use Yes    Alcohol/week: 7 0 standard drinks    Types: 7 Glasses of wine per week    Comment: Once in a while     Social History     Substance and Sexual Activity   Drug Use No     Social History     Tobacco Use   Smoking Status Former Smoker    Quit date: 200    Years since quittin 6   Smokeless Tobacco Never Used     Social History     Socioeconomic History    Marital status: /Civil Union     Spouse name: Not on file    Number of children: Not on file    Years of education: Not on file    Highest education level: Not on file   Occupational History    Not on file   Tobacco Use    Smoking status: Former Smoker     Quit date:      Years since quittin 6    Smokeless tobacco: Never Used   Vaping Use    Vaping Use: Never used   Substance and Sexual Activity    Alcohol use: Yes     Alcohol/week: 7 0 standard drinks     Types: 7 Glasses of wine per week     Comment: Once in a while    Drug use: No    Sexual activity: Not Currently   Other Topics Concern    Not on file   Social History Narrative    Not on file     Social Determinants of Health     Financial Resource Strain:     Difficulty of Paying Living Expenses:    Food Insecurity:     Worried About Running Out of Food in the Last Year:     Ran Out of Food in the Last Year:    Transportation Needs:     Lack of Transportation (Medical):      Lack of Transportation (Non-Medical):    Physical Activity:     Days of Exercise per Week:     Minutes of Exercise per Session:    Stress:     Feeling of Stress :    Social Connections:     Frequency of Communication with Friends and Family:     Frequency of Social Gatherings with Friends and Family:     Attends Evangelical Services:     Active Member of Clubs or Organizations:     Attends Club or Organization Meetings:    Gloria Leigh Marital Status:    Intimate Partner Violence:     Fear of Current or Ex-Partner:     Emotionally Abused:     Physically Abused:     Sexually Abused:    Family History:  Family History   Problem Relation Age of Onset    Cancer Mother         SOFT TISSUE CANCER ON RT SIDE CHEST WALL AND     Prostate cancer Maternal Uncle          Meds/Allergies      No current facility-administered medications for this visit  (Not in a hospital admission)      Allergies   Allergen Reactions    Amoxicillin Anaphylaxis    Acetaminophen      Rapid heart rate    Apple - Food Allergy     Cherry Flavor - Food Allergy Other (See Comments)     States allergy is to raw cherries    Pollen Extract     Augmentin [Amoxicillin-Pot Clavulanate]     Dye [Iodinated Diagnostic Agents]            Objective   Vitals:   Visit Vitals  /62 (BP Location: Left arm, Patient Position: Sitting, Cuff Size: Standard)   Ht 6' (1 829 m)   Wt 101 kg (222 lb) Comment: per pt; pt refused scale   BMI 30 11 kg/m²   Smoking Status Former Smoker   BSA 2 23 m²      Vitals:    21 1002   Weight: 101 kg (222 lb)   [unfilled]    Invasive Devices     None                   ROS  Review of Systems   All other systems reviewed and are negative  As described in my history of present illness      PHYSICAL EXAM  Physical Exam  Vitals reviewed  Constitutional:       General: He is not in acute distress  Appearance: Normal appearance  He is obese  He is not ill-appearing  HENT:      Head: Normocephalic and atraumatic  Right Ear: External ear normal       Left Ear: External ear normal       Nose: Nose normal       Mouth/Throat:      Mouth: Mucous membranes are moist       Comments: Posterior pharynx is crowded  Eyes:      General: No scleral icterus  Extraocular Movements: Extraocular movements intact  Conjunctiva/sclera: Conjunctivae normal       Pupils: Pupils are equal, round, and reactive to light     Neck: Comments: Large neck  Cardiovascular:      Rate and Rhythm: Normal rate and regular rhythm  Pulses: Normal pulses  Heart sounds: Normal heart sounds  Pulmonary:      Effort: Pulmonary effort is normal  No respiratory distress  Abdominal:      Comments: Some central obesity   Musculoskeletal:         General: No swelling or tenderness  Normal range of motion  Cervical back: Normal range of motion  Rigidity present  Skin:     Coloration: Skin is not jaundiced  Findings: No bruising or lesion  Neurological:      Mental Status: He is alert  Mental status is at baseline  Cranial Nerves: No cranial nerve deficit  Psychiatric:         Mood and Affect: Mood normal          Thought Content: Thought content normal                LAB RESULTS:      CBC:  Results from Last 12 Months   Lab Units 08/10/21  0735 04/05/21  0920 10/12/20  1203 10/02/20  0745   WBC Thousand/uL  --   --  6 22  --    WHITE BLOOD CELL COUNT  Thousand/uL 5 5 5 1  --  5 6   HEMOGLOBIN g/dL  --   --  16 9  --    HEMOGLOBIN  g/dL 16 4 16 3  --  15 5   HEMATOCRIT %  --   --  50 8*  --    HEMATOCRIT  % 47 7 47 9  --  45 8   MCV fL  --   --  93  --    MCV  fL 90 5 92 8  --  91 6   PLATELETS Thousands/uL  --   --  236  --    PLATELETS   Thousand/uL 216 248  --  242   MCH pg  --   --  30 8  --    MCH  pg 31 1 31 6  --  31 0   MCHC g/dL  --   --  33 3  --    MCHC  g/dL 34 4 34 0  --  33 8   RDW %  --   --  12 8  --    RDW  % 12 6 13 1  --  13 0   MPV fL  --   --  9 7  --    NRBC AUTO /100 WBCs  --   --  0  --         CMP:  Results from Last 12 Months   Lab Units 08/10/21  0735 06/25/21  0922 04/05/21  0920 10/12/20  1203 10/02/20  0745   POTASSIUM mmol/L 4 3 4 6 4 7 4 3 4 5   CHLORIDE mmol/L 105 107 105 106 104   CO2 mmol/L 25 26 28 28 24   BUN mg/dL 21 17 15 17 17   CREATININE mg/dL 1 00 0 99 0 91 1 06 0 84   CALCIUM mg/dL 9 8 8 7 9 6 9 3 9 4   AST U/L 27  --  28 28 32   ALT U/L 43  --  48* 64 55*   ALK PHOS U/L 65  --  67 67 60 EGFR ml/min/1 73sq m  --  79  --  73  --         Magnesium:   Results from Last 12 Months   Lab Units 10/13/20  0504   MAGNESIUM mg/dL 2 4       A1C:  Results from Last 12 Months   Lab Units 08/10/21  0735 04/05/21  0920 10/02/20  0745   HEMOGLOBIN A1C % of total Hgb 5 5 5 5 5 6        TSH:  No results for input(s): TSH in the last 8784 hours  PT/INR:  No results for input(s): PROTIME, INR in the last 8784 hours  Lipid Panel:  Results from Last 12 Months   Lab Units 08/10/21  0735 04/05/21  0920 10/02/20  0745   CHOLESTEROL mg/dL 132 142 143   TRIGLYCERIDES mg/dL 130 127 155*   HDL mg/dL 43 43 38*        Troponin:  Results from Last 12 Months   Lab Units 10/13/20  0504 10/12/20  2140 10/12/20  1459 10/12/20  1203   TROPONIN I ng/mL <0 02 <0 02 <0 02 <0 02          Event monitor  August 2020                                  Imaging:  Echocardiogram  August 2021  LEFT VENTRICLE:  Systolic function was normal  Ejection fraction was estimated to be 60 %  There were no regional wall motion abnormalities      AORTIC VALVE:  There was mild regurgitation  NM Myocardial Perfusion Spect (Exercise Induced Stress and/or Rest)  Oct 2020  STRESS SUMMARY: Duration of exercise was 9 min  The patient exercised to protocol stage 3  Maximal work rate was 10 1 METs  Functional capacity was normal  Maximal heart rate during stress was 141 bpm ( 90 % of maximal predicted heart  rate)  The heart rate response to stress was normal  There was normal resting blood pressure with a hypertensive response to stress  The rate-pressure product for the peak heart rate and blood pressure was 67727  There was no chest pain  during stress  The stress test was terminated due to moderate dyspnea  Pre oxygen saturation: 99 %  Peak oxygen saturation: 98 %  The stress ECG was equivocal for ischemia  Arrhythmia during stress: isolated premature ventricular beats      ISOTOPE ADMINISTRATION:  Resting isotope administration Stress isotope administration  Agent Tetrofosmin Tetrofosmin  Dose 11 mCi 30 9 mCi  Date 10/14/2020 10/14/2020  Injection time 08:55 10:38  Injection-image interval 37 min 52 min    Radiopharmaceutical was administered 6 min, 30 sec into the stress protocol  There was 2 min and 30 sec of exercise after the injection  MYOCARDIAL PERFUSION IMAGING:  The image quality was fair  Rotating projection images reveal mild diaphragmatic attenuation and mild subdiaphragmatic activity  Left ventricular size was normal  The TID ratio was 0 97  PERFUSION DEFECTS:  -  There was a moderate-sized, mildly severe, fixed myocardial perfusion defect of the basal inferior wall likely due to diaphragm attenuation  GATED SPECT:  The calculated left ventricular ejection fraction was 47 %  Left ventricular ejection fraction was within normal limits by visual estimate  There was no left ventricular regional abnormality  SUMMARY:  -  Stress results: Duration of exercise was 9 min  Target heart rate was achieved  There was normal resting blood pressure with a hypertensive response to stress  There was no chest pain during stress  The stress test was terminated due to  moderate dyspnea  -  ECG conclusions: The stress ECG was equivocal for ischemia  -  Perfusion imaging: There was a moderate-sized, mildly severe, fixed myocardial perfusion defect of the basal inferior wall likely due to diaphragm attenuation   -  Gated SPECT: The calculated left ventricular ejection fraction was 47 %  Left ventricular ejection fraction was within normal limits by visual estimate  There was no left ventricular regional abnormality  IMPRESSIONS: Normal study after maximal exercise  There was image artifact, without diagnostic evidence for perfusion abnormality  Prepared and signed by    Omar Lloyd MD  Signed 10/14/2020 15:31:45        Cardiac catheterization :  No results found for this or any previous visit                      Code Status: [unfilled]  Advance Directive and Living Will:      Power of :    POLST:      Counseling / Coordination of Care  Very detailed discussion done with the patient  He needs to get cardiac catheterization for his underlying CAD and a permanent pacemaker    Patient needs to have steroid prep before the procedure      Kathy Hahn MD

## 2021-08-04 NOTE — PROGRESS NOTES
Consultation - Electrophysiology-Cardiology (EP)   Demetris Packer 76 y o  male MRN: [id number]  Unit/Bed#:  Encounter: [id number]      1  Sick sinus syndrome ([address] )     2  Intermittent complete heart block ([address] )     3  NSVT (nonsustained ventricular tachycardia) ([facility])  Ambulatory referral to Cardiac Electrophysiology    POCT ECG   4  Junctional rhythm     5  LENY (obstructive sleep apnea)     6  Coronary artery disease involving native coronary artery of native heart without angina pectoris     7  Premature ventricular beats     8  Mixed hyperlipidemia     9   Intermittent palpitations           Consults  Physician Requesting Consult: Dr Neelam Vazquez  Reason for Consult / Principal Problem: CAD, conduction system disease      Assessment/Plan     Sick sinus syndrome  Intermittent complete heart block   Wenckebach   Intermittent junctional escape  symptomatic bradycardia  NSVT    Patient is a candidate for dual-chamber pacemaker   LVEF is 60    Patient had anaphylaxis to amoxicillin   Patient is allergic to contrast dye    In[employer] Med MRI compatible device, dual-chamber   Left side   Use contrast only if needed  Use antibiotic -vancomycin    Patient should get a steroid prep for dye allergy before the procedure  Have hydrocortisone 100 milligrams, Benadryl 25 milligrams, Pepcid 20 milligrams the night before  Repeat the dose in the morning        CAD   NSVT  Prior history of catheterization in 2017, CAD noted   Was not significant to be stented +was considered a high risk procedure  Considering NSVT, recommend follow-up with primary cardiologist with regards to re-evaluation underlying ischemic state      Obesity / overweight  BMI-30 0 1  This increases the risk of-CAD, CVA, vascular disease, diabetes, kidney dysfunction, hypertension, hyperlipidemia  Diet is responsible for 80% of weight gain   Advice nutritional counseling and healthy diet  Also advised to increase activity  He is going to follow up with his primary care        Obstructive sleep apnea   Patient has history of snoring, morning fatigue and daytime sleepiness at least on some days  Examination is also suggestive  Recommended to follow up with primary care and Pulmonary Medicine        Hypertension   Blood pressure-152/62  Medication-not on any medication  My recommendation-start beta-blockers after device in place        Mixed hyperlipidemia   Medication-rosuvastatin  No myositis or myalgia   My recommendation-continue with same        Tobacco abuse   Former smoker and quit in 200  Three years of smoking   Recommend to continue with current situation status      Alcohol use  Seven standard drinks a week  Recommended not to exceed the same as it increases risk of AFib          summary of my recommendation for the patient  Cardiac catheterization to know status of ischemic heart disease-to be done by primary cardiologist    [employer] MRI compatible device, dual-chamber   Left side   Use contrast only if needed- history of contrast dye allergy  Use antibiotic -vancomycin    Patient should get a steroid prep for dye allergy before the procedure  Have hydrocortisone 100 milligrams, Benadryl 25 milligrams, Pepcid 20 milligrams the night before  Repeat the dose in the morning        History of Present Illness   HPI: Elier Saba is a 76y o  year old male has been referred to me by Dr Ramirez Patel for evaluation of CAD and conduction system disease      The patient has significant medical illnesses which include  Sick sinus syndrome  Intermittent complete heart block   Wenckebach   Intermittent junctional escape  symptomatic bradycardia  NSVT  CAD   Obesity  Obstructive sleep apnea  Hypertension   Mixed hyperlipidemia   History of tobacco use      As far as cardiac symptoms are concerned  Angina -negative  Orthopnea -negative  Paroxysmal nocturnal dyspnea -negative  Leg swelling-negative   Palpitations -occasional forceful beat  Presyncope-occasional  Syncope -negative  Orthostatic lightheadedness -occasional  Exertional intolerance-occasional      Snoring-cannot recall  morning fatigue-rare  Daytime sleepiness-negative      Social history  Tobacco use-few years when he was young but has quit over 30 years  Alcohol use-at least 7 standard drinks a week  Energy drink use-none  Recreational drug use-none      Family history  Prostate cancer      Historical Information   Past Medical History:   Diagnosis Date    Abrasion of left foot     LAST ASSESSED: 9/23/13    Achilles tendinitis, unspecified leg     LAST ASSESSED: 11/30/12    Allergic rhinitis     LAST ASSESSED: 11/25/13    Allergic rhinitis 06/25/2008    LAST ASSESSED: 6/25/08    Cervicalgia 04/22/2011    LAST ASSESSED: 4/22/11    Coronary artery disease     CPAP (continuous positive airway pressure) dependence     Dysfunction of left eustachian tube     LAST ASSESSED: 9/23/13    Epistaxis     LAST ASSESSED: 5/27/15    First degree atrioventricular block     LAST ASSESSED: 7/10/17    Gastric ulcer 10/07/2011    LAST ASSESSED: 3/9/15    GERD (gastroesophageal reflux disease)     Hemorrhoids 05/13/2011    LAST ASSESSED: 5/13/11    Long term use of drug     LAST ASSESSED: 10/11/12    Myalgia 12/21/2007    LAST ASSESSED: 12/21/07    Myositis 12/21/2007    LAST ASSESSED: 12/21/07    Numbness and tingling of foot     LAST ASSESSED: 3/9/15    Premature ventricular beats     states cardiologist ordered Zio ambulatory cardiac monitor    Sleep apnea      Past Surgical History:   Procedure Laterality Date    CARDIAC CATHETERIZATION  2017    CATARACT EXTRACTION Right     CERVICAL FUSION      pt thinks C4-C5    CHOLECYSTECTOMY      LAPAROSCOPIC; LAST ASSESSED: 10/17/17    COLONOSCOPY      COMPLETE; 3-4 YEARS AGO BY [place] ; LAST ASSESSED: 8/18/14    MYRINGOTOMY W/ TUBES      WITH VENTILATING TUBE INSERTION    WA REVISE MEDIAN N/CARPAL TUNNEL SURG Left 7/16/2021    Procedure: RELEASE CARPAL TUNNEL; Surgeon: Rosezella Curling, MD;  Location: [facility];  Service: Orthopedics    VASECTOMY       Social History     Substance and Sexual Activity   Alcohol Use Yes    Alcohol/week: 7 0 standard drinks    Types: 7 Glasses of wine per week    Comment: Once in a while     Social History     Substance and Sexual Activity   Drug Use No     Social History     Tobacco Use   Smoking Status Former Smoker    Quit date: 200    Years since quittin 6   Smokeless Tobacco Never Used     Social History     Socioeconomic History    Marital status: /Civil Union     Spouse name: Not on file    Number of children: Not on file    Years of education: Not on file    Highest education level: Not on file   Occupational History    Not on file   Tobacco Use    Smoking status: Former Smoker     Quit date:      Years since quittin 6    Smokeless tobacco: Never Used   Vaping Use    Vaping Use: Never used   Substance and Sexual Activity    Alcohol use: Yes     Alcohol/week: 7 0 standard drinks     Types: 7 Glasses of wine per week     Comment: Once in a while    Drug use: No    Sexual activity: Not Currently   Other Topics Concern    Not on file   Social History Narrative    Not on file     Social Determinants of Health     Financial Resource Strain:     Difficulty of Paying Living Expenses:    Food Insecurity:     Worried About Running Out of Food in the Last Year:     Ran Out of Food in the Last Year:    Transportation Needs:     Lack of Transportation (Medical):      Lack of Transportation (Non-Medical):    Physical Activity:     Days of Exercise per Week:     Minutes of Exercise per Session:    Stress:     Feeling of Stress :    Social Connections:     Frequency of Communication with Friends and Family:     Frequency of Social Gatherings with Friends and Family:     Attends Yarsani Services:     Active Member of Clubs or Organizations:     Attends Club or Organization Meetings:    [facility] Marital Status:    Intimate Partner Violence:     Fear of Current or Ex-Partner:     Emotionally Abused:     Physically Abused:     Sexually Abused:    Family History:  Family History   Problem Relation Age of Onset    Cancer Mother         SOFT TISSUE CANCER ON RT SIDE CHEST WALL AND     Prostate cancer Maternal Uncle          Meds/Allergies      No current facility-administered medications for this visit  (Not in a hospital admission)      Allergies   Allergen Reactions    Amoxicillin Anaphylaxis    Acetaminophen      Rapid heart rate    Apple - Food Allergy     Cherry Flavor - Food Allergy Other (See Comments)     States allergy is to raw cherries    Pollen Extract     Augmentin [Amoxicillin-Pot Clavulanate]     Dye [Iodinated Diagnostic Agents]            Objective   Vitals:   Visit Vitals  /62 (BP Location: Left arm, Patient Position: Sitting, Cuff Size: Standard)   Ht 6' (1 829 m)   Wt 101 kg (222 lb) Comment: per pt; pt refused scale   BMI 30 11 kg/m²   Smoking Status Former Smoker   BSA 2 23 m²      Vitals:    21 1002   Weight: 101 kg (222 lb)   [unfilled]    Invasive Devices     None                   ROS  Review of Systems   All other systems reviewed and are negative  As described in my history of present illness      PHYSICAL EXAM  Physical Exam  Vitals reviewed  Constitutional:       General: He is not in acute distress  Appearance: Normal appearance  He is obese  He is not ill-appearing  HENT:      Head: Normocephalic and atraumatic  Right Ear: External ear normal       Left Ear: External ear normal       Nose: Nose normal       Mouth/Throat:      Mouth: Mucous membranes are moist       Comments: Posterior pharynx is crowded  Eyes:      General: No scleral icterus  Extraocular Movements: Extraocular movements intact  Conjunctiva/sclera: Conjunctivae normal       Pupils: Pupils are equal, round, and reactive to light     Neck: Comments: Large neck  Cardiovascular:      Rate and Rhythm: Normal rate and regular rhythm  Pulses: Normal pulses  Heart sounds: Normal heart sounds  Pulmonary:      Effort: Pulmonary effort is normal  No respiratory distress  Abdominal:      Comments: Some central obesity   Musculoskeletal:         General: No swelling or tenderness  Normal range of motion  Cervical back: Normal range of motion  Rigidity present  Skin:     Coloration: Skin is not jaundiced  Findings: No bruising or lesion  Neurological:      Mental Status: He is alert  Mental status is at baseline  Cranial Nerves: No cranial nerve deficit  Psychiatric:         Mood and Affect: Mood normal          Thought Content: Thought content normal                LAB RESULTS:      CBC:  Results from Last 12 Months   Lab Units 08/10/21  0735 04/05/21  0920 10/12/20  1203 10/02/20  0745   WBC Thousand/uL  --   --  6 22  --    WHITE BLOOD CELL COUNT  Thousand/uL 5 5 5 1  --  5 6   HEMOGLOBIN g/dL  --   --  16 9  --    HEMOGLOBIN  g/dL 16 4 16 3  --  15 5   HEMATOCRIT %  --   --  50 8*  --    HEMATOCRIT  % 47 7 47 9  --  45 8   MCV fL  --   --  93  --    MCV  fL 90 5 92 8  --  91 6   PLATELETS Thousands/uL  --   --  236  --    PLATELETS   Thousand/uL 216 248  --  242   MCH pg  --   --  30 8  --    MCH  pg 31 1 31 6  --  31 0   MCHC g/dL  --   --  33 3  --    MCHC  g/dL 34 4 34 0  --  33 8   RDW %  --   --  12 8  --    RDW  % 12 6 13 1  --  13 0   MPV fL  --   --  9 7  --    NRBC AUTO /100 WBCs  --   --  0  --         CMP:  Results from Last 12 Months   Lab Units 08/10/21  0735 06/25/21  0922 04/05/21  0920 10/12/20  1203 10/02/20  0745   POTASSIUM mmol/L 4 3 4 6 4 7 4 3 4 5   CHLORIDE mmol/L 105 107 105 106 104   CO2 mmol/L 25 26 28 28 24   BUN mg/dL 21 17 15 17 17   CREATININE mg/dL 1 00 0 99 0 91 1 06 0 84   CALCIUM mg/dL 9 8 8 7 9 6 9 3 9 4   AST U/L 27  --  28 28 32   ALT U/L 43  --  48* 64 55*   ALK PHOS U/L 65  --  67 67 60 EGFR ml/min/1 73sq m  --  79  --  73  --         Magnesium:   Results from Last 12 Months   Lab Units 10/13/20  0504   MAGNESIUM mg/dL 2 4       A1C:  Results from Last 12 Months   Lab Units 08/10/21  0735 04/05/21  0920 10/02/20  0745   HEMOGLOBIN A1C % of total Hgb 5 5 5 5 5 6        TSH:  No results for input(s): TSH in the last 8784 hours  PT/INR:  No results for input(s): PROTIME, INR in the last 8784 hours  Lipid Panel:  Results from Last 12 Months   Lab Units 08/10/21  0735 04/05/21  0920 10/02/20  0745   CHOLESTEROL mg/dL 132 142 143   TRIGLYCERIDES mg/dL 130 127 155*   HDL mg/dL 43 43 38*        Troponin:  Results from Last 12 Months   Lab Units 10/13/20  0504 10/12/20  2140 10/12/20  1459 10/12/20  1203   TROPONIN I ng/mL <0 02 <0 02 <0 02 <0 02          Event monitor  August 2020                                  Imaging:  Echocardiogram  August 2021  LEFT VENTRICLE:  Systolic function was normal  Ejection fraction was estimated to be 60 %  There were no regional wall motion abnormalities      AORTIC VALVE:  There was mild regurgitation  NM Myocardial Perfusion Spect (Exercise Induced Stress and/or Rest)  Oct 2020  STRESS SUMMARY: Duration of exercise was 9 min  The patient exercised to protocol stage 3  Maximal work rate was 10 1 METs  Functional capacity was normal  Maximal heart rate during stress was 141 bpm ( 90 % of maximal predicted heart  rate)  The heart rate response to stress was normal  There was normal resting blood pressure with a hypertensive response to stress  The rate-pressure product for the peak heart rate and blood pressure was 07708  There was no chest pain  during stress  The stress test was terminated due to moderate dyspnea  Pre oxygen saturation: 99 %  Peak oxygen saturation: 98 %  The stress ECG was equivocal for ischemia  Arrhythmia during stress: isolated premature ventricular beats      ISOTOPE ADMINISTRATION:  Resting isotope administration Stress isotope administration  Agent Tetrofosmin Tetrofosmin  Dose 11 mCi 30 9 mCi  Date 10/14/2020 10/14/2020  Injection time 08:55 10:38  Injection-image interval 37 min 52 min    Radiopharmaceutical was administered 6 min, 30 sec into the stress protocol  There was 2 min and 30 sec of exercise after the injection  MYOCARDIAL PERFUSION IMAGING:  The image quality was fair  Rotating projection images reveal mild diaphragmatic attenuation and mild subdiaphragmatic activity  Left ventricular size was normal  The TID ratio was 0 97  PERFUSION DEFECTS:  -  There was a moderate-sized, mildly severe, fixed myocardial perfusion defect of the basal inferior wall likely due to diaphragm attenuation  GATED SPECT:  The calculated left ventricular ejection fraction was 47 %  Left ventricular ejection fraction was within normal limits by visual estimate  There was no left ventricular regional abnormality  SUMMARY:  -  Stress results: Duration of exercise was 9 min  Target heart rate was achieved  There was normal resting blood pressure with a hypertensive response to stress  There was no chest pain during stress  The stress test was terminated due to  moderate dyspnea  -  ECG conclusions: The stress ECG was equivocal for ischemia  -  Perfusion imaging: There was a moderate-sized, mildly severe, fixed myocardial perfusion defect of the basal inferior wall likely due to diaphragm attenuation   -  Gated SPECT: The calculated left ventricular ejection fraction was 47 %  Left ventricular ejection fraction was within normal limits by visual estimate  There was no left ventricular regional abnormality  IMPRESSIONS: Normal study after maximal exercise  There was image artifact, without diagnostic evidence for perfusion abnormality  Prepared and signed by    Annie Lopez MD  Signed 10/14/2020 15:31:45        Cardiac catheterization :  No results found for this or any previous visit                      Code Status: [unfilled]  Advance Directive and Living Will:      Power of :    POLST:      Counseling / Coordination of Care  Very detailed discussion done with the patient  He needs to get cardiac catheterization for his underlying CAD and a permanent pacemaker    Patient needs to have steroid prep before the procedure      Charity Madsen MD

## 2021-08-04 NOTE — LETTER
August 16, 2021     Mahesh Velez DO  [address]  [facility] [address]    Patient: Rebekah Girard   YOB: 1955   Date of Visit: 8/4/2021       Dear Dr Etelvina Alvarado:    Thank you for referring Luis Clark to me for evaluation  Below are my notes for this consultation  If you have questions, please do not hesitate to call me  I look forward to following your patient along with you  Sincerely,        Aleksandr Underwood MD        CC: MD Nesha Ventura MA Debroah Copes, MA Ola Dopp, MD  8/16/2021 10:52 AM  Sign when Signing Visit   Consultation - Electrophysiology-Cardiology (EP)   Rebekah Girard 76 y o  male MRN: [id number]  Unit/Bed#:  Encounter: [id number]      1  NSVT (nonsustained ventricular tachycardia) ([facility] [address] )  Ambulatory referral to Cardiac Electrophysiology    POCT ECG   2  LENY (obstructive sleep apnea)     3  Coronary artery disease involving native coronary artery of native heart without angina pectoris     4  Premature ventricular beats     5  Mixed hyperlipidemia     6   Intermittent palpitations           Consults  Physician Requesting Consult: Dr Jen Piña  Reason for Consult / Principal Problem: CAD, conduction system disease      Assessment/Plan     Sick sinus syndrome  Intermittent complete heart block   Wenckebach   Intermittent junctional escape  symptomatic bradycardia  NSVT    Patient is a candidate for dual-chamber pacemaker   LVEF is 60    Patient had anaphylaxis to amoxicillin   Patient is allergic to contrast dye    Medtronic MRI compatible device, dual-chamber   Left side   Use contrast only if needed  Use antibiotic -vancomycin    Patient should get a steroid prep for dye allergy before the procedure  Have hydrocortisone 100 milligrams, Benadryl 25 milligrams, Pepcid 20 milligrams the night before  Repeat the dose in the morning        CAD   NSVT  Prior history of catheterization in 2017, CAD noted   Was not significant to be stented +was considered a high risk procedure  Considering NSVT, recommend follow-up with primary cardiologist with regards to re-evaluation underlying ischemic state      Obesity / overweight  BMI-30 0 1  This increases the risk of-CAD, CVA, vascular disease, diabetes, kidney dysfunction, hypertension, hyperlipidemia  Diet is responsible for 80% of weight gain   Advice nutritional counseling and healthy diet  Also advised to increase activity  He is going to follow up with his primary care        Obstructive sleep apnea   Patient has history of snoring, morning fatigue and daytime sleepiness at least on some days  Examination is also suggestive  Recommended to follow up with primary care and Pulmonary Medicine        Hypertension   Blood pressure-152/62  Medication-not on any medication  My recommendation-start beta-blockers after device in place        Mixed hyperlipidemia   Medication-rosuvastatin  No myositis or myalgia   My recommendation-continue with same        Tobacco abuse   Former smoker and quit [facility] [place] [address] [facility]  Three years of smoking   Recommend to continue with current situation status      Alcohol use  Seven standard drinks a week  Recommended not to exceed the same as it increases risk of AFib          summary of my recommendation for the patient  Cardiac catheterization to know status of ischemic heart disease-to be done by primary cardiologist    Medtronic MRI compatible device, dual-chamber   Left side   Use contrast only if needed- history of contrast dye allergy  Use antibiotic -vancomycin    Patient should get a steroid prep for dye allergy before the procedure  Have hydrocortisone 100 milligrams, Benadryl 25 milligrams, Pepcid 20 milligrams the night before  Repeat the dose in the morning        History of Present Illness   HPI: Jolene Mccartney is a 76y o  year old male has been referred to me by Dr Stepan Smith for evaluation of CAD and conduction system disease      The patient has significant medical illnesses which include  Sick sinus syndrome  Intermittent complete heart block   Wenckebach   Intermittent junctional escape  symptomatic bradycardia  NSVT  CAD   Obesity  Obstructive sleep apnea  Hypertension   Mixed hyperlipidemia   History of tobacco use      As far as cardiac symptoms are concerned  Angina -negative  Orthopnea -negative  Paroxysmal nocturnal dyspnea -negative  Leg swelling-negative   Palpitations -occasional forceful beat  Presyncope-occasional  Syncope -negative  Orthostatic lightheadedness -occasional  Exertional intolerance-occasional      Snoring-cannot recall  morning fatigue-rare  Daytime sleepiness-negative      Social history  Tobacco use-few years when he was young but has quit over 30 years  Alcohol use-at least 7 standard drinks a week  Energy drink use-none  Recreational drug use-none      Family history  Prostate cancer      Historical Information   Past Medical History:   Diagnosis Date    Abrasion of left foot     LAST ASSESSED: 9/23/13    Achilles tendinitis, unspecified leg     LAST ASSESSED: 11/30/12    Allergic rhinitis     LAST ASSESSED: 11/25/13    Allergic rhinitis 06/25/2008    LAST ASSESSED: 6/25/08    Cervicalgia 04/22/2011    LAST ASSESSED: 4/22/11    Coronary artery disease     CPAP (continuous positive airway pressure) dependence     Dysfunction of left eustachian tube     LAST ASSESSED: 9/23/13    Epistaxis     LAST ASSESSED: 5/27/15    First degree atrioventricular block     LAST ASSESSED: 7/10/17    Gastric ulcer 10/07/2011    LAST ASSESSED: 3/9/15    GERD (gastroesophageal reflux disease)     Hemorrhoids 05/13/2011    LAST ASSESSED: 5/13/11    Long term use of drug     LAST ASSESSED: 10/11/12    Myalgia 12/21/2007    LAST ASSESSED: 12/21/07    Myositis 12/21/2007    LAST ASSESSED: 12/21/07    Numbness and tingling of foot     LAST ASSESSED: 3/9/15    Premature ventricular beats     states cardiologist ordered [facility] ambulatory cardiac monitor    Sleep apnea      Past Surgical History:   Procedure Laterality Date    CARDIAC CATHETERIZATION  2017    CATARACT EXTRACTION Right     CERVICAL FUSION      pt thinks C4-C5    CHOLECYSTECTOMY      LAPAROSCOPIC; LAST ASSESSED: 10/17/17    COLONOSCOPY      COMPLETE; 3-4 YEARS AGO BY [facility]; LAST ASSESSED: 14    MYRINGOTOMY W/ TUBES      WITH VENTILATING TUBE INSERTION    W[place] REVISE MEDIAN N/CARPAL TUNNEL SURG Left 2021    Procedure: RELEASE CARPAL TUNNEL;  Surgeon: Rosezella Curling, MD;  [facility]: [facility];  Service: Orthopedics    VASECTOMY       Social History     Substance and Sexual Activity   Alcohol Use Yes    Alcohol/week: 7 0 standard drinks    Types: 7 Glasses of wine per week    Comment: Once in a while     Social History     Substance and Sexual Activity   Drug Use No     Social History     Tobacco Use   Smoking Status Former Smoker    Quit date: 200    Years since quittin    Smokeless Tobacco Never Used     Social History     Socioeconomic History    Marital status: /Civil Union     Spouse name: Not on file    Number of children: Not on file    Years of education: Not on file    Highest education level: Not on file   Occupational History    Not on file   Tobacco Use    Smoking status: Former Smoker     Quit date:      Years since quittin 6    Smokeless tobacco: Never Used   Vaping Use    Vaping Use: Never used   Substance and Sexual Activity    Alcohol use:  Yes     Alcohol/week: 7 0 standard drinks     Types: 7 Glasses of wine per week     Comment: Once in a while    Drug use: No    Sexual activity: Not Currently   Other Topics Concern    Not on file   Social History Narrative    Not on file     Social Determinants of Health     Financial Resource Strain:     Difficulty of Paying Living Expenses:    Food Insecurity:     Worried About Running Out of Food in the Last Year:     [address] in the Last Year: Transportation Needs:     Lack of Transportation (Medical):  Lack of Transportation (Non-Medical):    Physical Activity:     Days of Exercise per Week:     Minutes of Exercise per Session:    Stress:     Feeling of Stress :    Social Connections:     Frequency of Communication with Friends and Family:     Frequency of Social Gatherings with Friends and Family:     Attends Hoah[employer] Services:     Active Member of Clubs or Organizations:     Attends Club or Organization Meetings:     Marital Status:    Intimate Partner Violence:     Fear of Current or Ex-Partner:     Emotionally Abused:     Physically Abused:     Sexually Abused:    Family History:  Family History   Problem Relation Age of Onset    Cancer Mother         SOFT TISSUE CANCER ON RT SIDE CHEST WALL AND     Prostate cancer Maternal Uncle          Meds/Allergies      No current facility-administered medications for this visit  (Not in a hospital admission)      Allergies   Allergen Reactions    Amoxicillin Anaphylaxis    Acetaminophen      Rapid heart rate    Apple - Food Allergy     Cherry Flavor - Food Allergy Other (See Comments)     States allergy is to raw cherries    Pollen Extract     Augmentin [Amoxicillin-Pot Clavulanate]     Dye [Iodinated Diagnostic Agents]            Objective   Vitals:   Visit Vitals  /62 (BP Location: Left arm, Patient Position: Sitting, Cuff Size: Standard)   Ht 6' (1 829 m)   Wt 101 kg (222 lb) Comment: per pt; pt refused scale   BMI 30 11 kg/m²   Smoking Status Former Smoker   BSA 2 23 m²      Vitals:    21 1002   Weight: 101 kg (222 lb)   [unfilled]    Invasive Devices     None                   ROS  Review of Systems   All other systems reviewed and are negative  As described in my history of present illness      PHYSICAL EXAM  Physical Exam  Vitals reviewed  Constitutional:       General: He is not in acute distress  Appearance: Normal appearance   He is obese  He is not ill-appearing  HENT:      Head: Normocephalic and atraumatic  Right Ear: External ear normal       Left Ear: External ear normal       Nose: Nose normal       Mouth/Throat:      Mouth: Mucous membranes are moist       Comments: Posterior pharynx is crowded  Eyes:      General: No scleral icterus  Extraocular Movements: Extraocular movements intact  Conjunctiva/sclera: Conjunctivae normal       Pupils: Pupils are equal, round, and reactive to light  Neck:      Comments: Large neck  Cardiovascular:      Rate and Rhythm: Normal rate and regular rhythm  Pulses: Normal pulses  Heart sounds: Normal heart sounds  Pulmonary:      Effort: Pulmonary effort is normal  No respiratory distress  Abdominal:      Comments: Some central obesity   Musculoskeletal:         General: No swelling or tenderness  Normal range of motion  Cervical back: Normal range of motion  Rigidity present  Skin:     Coloration: Skin is not jaundiced  Findings: No bruising or lesion  Neurological:      Mental Status: He is alert  Mental status is at baseline  Cranial Nerves: No cranial nerve deficit  Psychiatric:         Mood and Affect: Mood normal          Thought Content: Thought content normal                LAB RESULTS:      CBC:  Results from Last 12 Months   Lab Units 08/10/21  0735 04/05/21  0920 10/12/20  1203 10/02/20  0745   WBC Thousand/uL  --   --  6 22  --    WHITE BLOOD CELL COUNT  Thousand/uL 5 5 5 1  --  5 6   HEMOGLOBIN g/dL  --   --  16 9  --    HEMOGLOBIN  g/dL 16 4 16 3  --  15 5   HEMATOCRIT %  --   --  50 8*  --    HEMATOCRIT  % 47 7 47 9  --  45 8   MCV fL  --   --  93  --    MCV  fL 90 5 92 8  --  91 6   PLATELETS Thousands/uL  --   --  236  --    PLATELETS   Thousand/uL 216 248  --  242   MCH pg  --   --  30 8  --    MCH  pg 31 1 31 6  --  31 0   MCHC g/dL  --   --  33 3  --    MCHC  g/dL 34 4 34 0  --  33 8   RDW %  --   --  12 8  --    RDW  % 12 6 13 1  -- 13 0   MPV fL  --   --  9 7  --    NRBC AUTO /100 WBCs  --   --  0  --         CMP:  Results from Last 12 Months   Lab Units 08/10/21  0735 06/25/21  0922 04/05/21  0920 10/12/20  1203 10/02/20  0745   POTASSIUM mmol/L 4 3 4 6 4 7 4 3 4 5   CHLORIDE mmol/L 105 107 105 106 104   CO2 mmol/L 25 26 28 28 24   BUN mg/dL 21 17 15 17 17   CREATININE mg/dL 1 00 0 99 0 91 1 06 0 84   CALCIUM mg/dL 9 8 8 7 9 6 9 3 9 4   AST U/L 27  --  28 28 32   ALT U/L 43  --  48* 64 55*   ALK PHOS U/L 65  --  67 67 60   EGFR ml/min/1 73sq m  --  79  --  73  --         Magnesium:   Results from Last 12 Months   Lab Units 10/13/20  0504   MAGNESIUM mg/dL 2 4       A1C:  Results from Last 12 Months   Lab Units 08/10/21  0735 04/05/21  0920 10/02/20  0745   HEMOGLOBIN A1C % of total Hgb 5 5 5 5 5 6        TSH:  No results for input(s): TSH in the last 8784 hours  PT/INR:  No results for input(s): PROTIME, INR in the last 8784 hours  Lipid Panel:  Results from Last 12 Months   Lab Units 08/10/21  0735 04/05/21  0920 10/02/20  0745   CHOLESTEROL mg/dL 132 142 143   TRIGLYCERIDES mg/dL 130 127 155*   HDL mg/dL 43 43 38*        Troponin:  Results from Last 12 Months   Lab Units 10/13/20  0504 10/12/20  2140 10/12/20  1459 10/12/20  1203   TROPONIN I ng/mL <0 02 <0 02 <0 02 <0 02          Event monitor  August 2020                                  Imaging:  Echocardiogram  August 2021  LEFT VENTRICLE:  Systolic function was normal  Ejection fraction was estimated to be 60 %  There were no regional wall motion abnormalities      AORTIC VALVE:  There was mild regurgitation  NM Myocardial Perfusion Spect (Exercise Induced Stress and/or Rest)  Oct 2020  STRESS SUMMARY: Duration of exercise was 9 min  The patient exercised to protocol stage 3  Maximal work rate was 10 1 METs  Functional capacity was normal  Maximal heart rate during stress was 141 bpm ( 90 % of maximal predicted heart  rate)   The heart rate response to stress was normal  There was normal resting blood pressure with a hypertensive response to stress  The rate-pressure product for the peak heart rate and blood pressure was 29004  There was no chest pain  during stress  The stress test was terminated due to moderate dyspnea  Pre oxygen saturation: 99 %  Peak oxygen saturation: 98 %  The stress ECG was equivocal for ischemia  Arrhythmia during stress: isolated premature ventricular beats  ISOTOPE ADMINISTRATION:  Resting isotope administration Stress isotope administration  Agent Tetrofosmin Tetrofosmin  Dose 11 mCi 30 9 mCi  Date 10/14/2020 10/14/2020  Injection time 08:55 10:38  Injection-image interval 37 min 52 min    Radiopharmaceutical was administered 6 min, 30 sec into the stress protocol  There was 2 min and 30 sec of exercise after the injection  MYOCARDIAL PERFUSION IMAGING:  The image quality was fair  Rotating projection images reveal mild diaphragmatic attenuation and mild subdiaphragmatic activity  Left ventricular size was normal  The TID ratio was 0 97  PERFUSION DEFECTS:  -  There was a moderate-sized, mildly severe, fixed myocardial perfusion defect of the basal inferior wall likely due to diaphragm attenuation  GATED SPECT:  The calculated left ventricular ejection fraction was 47 %  Left ventricular ejection fraction was within normal limits by visual estimate  There was no left ventricular regional abnormality  SUMMARY:  -  Stress results: Duration of exercise was 9 min  Target heart rate was achieved  There was normal resting blood pressure with a hypertensive response to stress  There was no chest pain during stress  The stress test was terminated due to  moderate dyspnea  -  ECG conclusions: The stress ECG was equivocal for ischemia  -  Perfusion imaging:  There was a moderate-sized, mildly severe, fixed myocardial perfusion defect of the basal inferior wall likely due to diaphragm attenuation   -  Gated SPECT: The calculated left ventricular ejection fraction was 47 %  Left ventricular ejection fraction was within normal limits by visual estimate  There was no left ventricular regional abnormality  IMPRESSIONS: Normal study after maximal exercise  There was image artifact, without diagnostic evidence for perfusion abnormality  Prepared and signed by    Sandra Gutierrez MD  Signed 10/14/2020 15:31:45        Cardiac catheterization :  No results found for this or any previous visit  Code Status: [unfilled]  Advance Directive and Living Will:      Power of :    POLST:      Counseling / Coordination of Care  Very detailed discussion done with the patient  He needs to get cardiac catheterization for his underlying CAD and a permanent pacemaker    Patient needs to have steroid prep before the procedure      Augusta Mcdonald MD

## 2021-08-04 NOTE — LETTER
patient needs to have a cardiac catheterization to look for source of NSVT /had a catheterization in 2017 and was told that he has disease but it is not advanced enough to be stented at that time     post catheterization patient will need to proceed with device   He definitely needs a Bi V pacer  Will make a decision whether ICD is needed depending upon catheterization

## 2021-08-06 ENCOUNTER — TELEPHONE (OUTPATIENT)
Dept: CARDIOLOGY CLINIC | Facility: CLINIC | Age: 66
End: 2021-08-06

## 2021-08-06 NOTE — TELEPHONE ENCOUNTER
Please call patient on Monday August 9 to discuss Cardiac cath to be done at [facility]  Also if you can order his lab work  Patient aware you will be calling him Monday   [phone]

## 2021-08-09 NOTE — TELEPHONE ENCOUNTER
Spoke to Monisha today to let him know Dr Denise Gillespie will be calling him tomorrow on Tuesday at some point time unknown

## 2021-08-10 DIAGNOSIS — I47.2 NSVT (NONSUSTAINED VENTRICULAR TACHYCARDIA) (HCC): ICD-10-CM

## 2021-08-10 DIAGNOSIS — E78.2 MIXED HYPERLIPIDEMIA: ICD-10-CM

## 2021-08-10 DIAGNOSIS — I25.10 CORONARY ARTERY DISEASE INVOLVING NATIVE CORONARY ARTERY OF NATIVE HEART WITHOUT ANGINA PECTORIS: ICD-10-CM

## 2021-08-10 NOTE — PROGRESS NOTES
Spoke toPatient about his extended monitoring  All his questions which he raised were answered to his satisfaction  He is willing to have the procedure done at [place] which would be schedule on August 19

## 2021-08-11 ENCOUNTER — TELEPHONE (OUTPATIENT)
Dept: CARDIOLOGY CLINIC | Facility: CLINIC | Age: 66
End: 2021-08-11

## 2021-08-11 ENCOUNTER — TELEPHONE (OUTPATIENT)
Dept: FAMILY MEDICINE CLINIC | Facility: CLINIC | Age: 66
End: 2021-08-11

## 2021-08-11 LAB
ALBUMIN SERPL-MCNC: 4.4 G/DL (ref 3.6–5.1)
ALBUMIN/GLOB SERPL: 2.1 (CALC) (ref 1–2.5)
ALP SERPL-CCNC: 65 U/L (ref 35–144)
ALT SERPL-CCNC: 43 U/L (ref 9–46)
AST SERPL-CCNC: 27 U/L (ref 10–35)
BASOPHILS # BLD AUTO: 50 CELLS/UL (ref 0–200)
BASOPHILS NFR BLD AUTO: 0.9 %
BILIRUB SERPL-MCNC: 1 MG/DL (ref 0.2–1.2)
BUN SERPL-MCNC: 21 MG/DL (ref 7–25)
BUN/CREAT SERPL: ABNORMAL (CALC) (ref 6–22)
CALCIUM SERPL-MCNC: 9.8 MG/DL (ref 8.6–10.3)
CHLORIDE SERPL-SCNC: 105 MMOL/L (ref 98–110)
CHOLEST SERPL-MCNC: 132 MG/DL
CHOLEST/HDLC SERPL: 3.1 (CALC)
CO2 SERPL-SCNC: 25 MMOL/L (ref 20–32)
CREAT SERPL-MCNC: 1 MG/DL (ref 0.7–1.25)
EOSINOPHIL # BLD AUTO: 121 CELLS/UL (ref 15–500)
EOSINOPHIL NFR BLD AUTO: 2.2 %
ERYTHROCYTE [DISTWIDTH] IN BLOOD BY AUTOMATED COUNT: 12.6 % (ref 11–15)
GLOBULIN SER CALC-MCNC: 2.1 G/DL (CALC) (ref 1.9–3.7)
GLUCOSE SERPL-MCNC: 104 MG/DL (ref 65–99)
HBA1C MFR BLD: 5.5 % OF TOTAL HGB
HCT VFR BLD AUTO: 47.7 % (ref 38.5–50)
HDLC SERPL-MCNC: 43 MG/DL
HGB BLD-MCNC: 16.4 G/DL (ref 13.2–17.1)
LDLC SERPL CALC-MCNC: 68 MG/DL (CALC)
LYMPHOCYTES # BLD AUTO: 2354 CELLS/UL (ref 850–3900)
LYMPHOCYTES NFR BLD AUTO: 42.8 %
MCH RBC QN AUTO: 31.1 PG (ref 27–33)
MCHC RBC AUTO-ENTMCNC: 34.4 G/DL (ref 32–36)
MCV RBC AUTO: 90.5 FL (ref 80–100)
MONOCYTES # BLD AUTO: 737 CELLS/UL (ref 200–950)
MONOCYTES NFR BLD AUTO: 13.4 %
NEUTROPHILS # BLD AUTO: 2239 CELLS/UL (ref 1500–7800)
NEUTROPHILS NFR BLD AUTO: 40.7 %
NONHDLC SERPL-MCNC: 89 MG/DL (CALC)
PLATELET # BLD AUTO: 216 THOUSAND/UL (ref 140–400)
PMV BLD REES-ECKER: 10.5 FL (ref 7.5–12.5)
POTASSIUM SERPL-SCNC: 4.3 MMOL/L (ref 3.5–5.3)
PROT SERPL-MCNC: 6.5 G/DL (ref 6.1–8.1)
RBC # BLD AUTO: 5.27 MILLION/UL (ref 4.2–5.8)
SL AMB EGFR AFRICAN AMERICAN: 90 ML/MIN/1.73M2
SL AMB EGFR NON AFRICAN AMERICAN: 78 ML/MIN/1.73M2
SODIUM SERPL-SCNC: 140 MMOL/L (ref 135–146)
TRIGL SERPL-MCNC: 130 MG/DL
TSH SERPL-ACNC: 3.45 MIU/L (ref 0.4–4.5)
WBC # BLD AUTO: 5.5 THOUSAND/UL (ref 3.8–10.8)

## 2021-08-11 NOTE — TELEPHONE ENCOUNTER
----- Message from Elsy Sanders DO sent at 8/11/2021  8:00 AM EDT -----  Please call patient and notify that results are normal  No changes in medications   Please keep follow-up appointment as scheduled

## 2021-08-11 NOTE — TELEPHONE ENCOUNTER
Patient notified of procedure at [facility] on Aug 19  Please order pre meds to [facility] in [place]

## 2021-08-12 ENCOUNTER — HOSPITAL ENCOUNTER (OUTPATIENT)
Dept: NON INVASIVE DIAGNOSTICS | Facility: CLINIC | Age: 66
Discharge: HOME | End: 2021-08-12
Payer: MEDICARE

## 2021-08-12 DIAGNOSIS — I49.3 PREMATURE VENTRICULAR BEATS: ICD-10-CM

## 2021-08-12 DIAGNOSIS — I25.10 CORONARY ARTERY DISEASE INVOLVING NATIVE CORONARY ARTERY OF NATIVE HEART WITHOUT ANGINA PECTORIS: ICD-10-CM

## 2021-08-12 DIAGNOSIS — G47.33 OSA (OBSTRUCTIVE SLEEP APNEA): ICD-10-CM

## 2021-08-12 DIAGNOSIS — E78.2 MIXED HYPERLIPIDEMIA: ICD-10-CM

## 2021-08-12 DIAGNOSIS — R00.2 INTERMITTENT PALPITATIONS: ICD-10-CM

## 2021-08-12 DIAGNOSIS — R00.0 TACHYCARDIA: ICD-10-CM

## 2021-08-12 DIAGNOSIS — G47.33 OBSTRUCTIVE SLEEP APNEA SYNDROME: ICD-10-CM

## 2021-08-12 PROCEDURE — 93306 TTE W/DOPPLER COMPLETE: CPT | Performed by: INTERNAL MEDICINE

## 2021-08-12 PROCEDURE — 93306 TTE W/DOPPLER COMPLETE: CPT

## 2021-08-12 NOTE — TELEPHONE ENCOUNTER
I spoke with patient, advised on medications prior to procedure  Patient understood  Patient is aware of date of procedure  No further questions

## 2021-08-12 NOTE — TELEPHONE ENCOUNTER
Patient will take prednisolone  50 mg 21 to 24 hour before procedure, 7-8 hour before procedure and 1 hour before procedure    He can take Pepcid over-the-counter 20 mg daily and we will give him Benadryl in the hospital

## 2021-08-13 ENCOUNTER — TELEPHONE (OUTPATIENT)
Dept: CARDIOLOGY CLINIC | Facility: CLINIC | Age: 66
End: 2021-08-13

## 2021-08-13 NOTE — TELEPHONE ENCOUNTER
Pepcid is given because of his allergies to dye it has nothing to with Protonix    It works on H2 blockers which helps with   Acute allergic reaction

## 2021-08-13 NOTE — TELEPHONE ENCOUNTER
----- Message from Sheri Whitman MD sent at 8/12/2021  4:55 PM EDT -----  Patient's echo shows normal LV systolic function  No significant valvular disease  Patient can keep an appointment  Please call patient about echo report

## 2021-08-16 PROBLEM — G56.02 CARPAL TUNNEL SYNDROME ON LEFT: Status: RESOLVED | Noted: 2021-07-16 | Resolved: 2021-08-16

## 2021-08-16 PROBLEM — I49.5 SICK SINUS SYNDROME (HCC): Status: ACTIVE | Noted: 2021-08-16

## 2021-08-16 PROBLEM — I49.8 JUNCTIONAL RHYTHM: Status: ACTIVE | Noted: 2021-08-16

## 2021-08-16 PROBLEM — I44.2 INTERMITTENT COMPLETE HEART BLOCK (HCC): Status: ACTIVE | Noted: 2021-08-16

## 2021-08-19 ENCOUNTER — HOSPITAL ENCOUNTER (OUTPATIENT)
Dept: NON INVASIVE DIAGNOSTICS | Facility: HOSPITAL | Age: 66
Discharge: HOME | End: 2021-08-19
Attending: INTERNAL MEDICINE | Admitting: INTERNAL MEDICINE
Payer: MEDICARE

## 2021-08-19 VITALS
TEMPERATURE: 98.1 F | HEART RATE: 65 BPM | BODY MASS INDEX: 30.16 KG/M2 | HEIGHT: 72 IN | SYSTOLIC BLOOD PRESSURE: 149 MMHG | DIASTOLIC BLOOD PRESSURE: 66 MMHG | RESPIRATION RATE: 18 BRPM | WEIGHT: 222.66 LBS | OXYGEN SATURATION: 95 %

## 2021-08-19 DIAGNOSIS — E78.2 MIXED HYPERLIPIDEMIA: ICD-10-CM

## 2021-08-19 DIAGNOSIS — I47.2 NSVT (NONSUSTAINED VENTRICULAR TACHYCARDIA) (HCC): ICD-10-CM

## 2021-08-19 DIAGNOSIS — I25.10 CORONARY ARTERY DISEASE INVOLVING NATIVE CORONARY ARTERY OF NATIVE HEART WITHOUT ANGINA PECTORIS: ICD-10-CM

## 2021-08-19 LAB
ANION GAP SERPL CALCULATED.3IONS-SCNC: 6 MMOL/L (ref 4–13)
BASOPHILS # BLD AUTO: 0.01 THOUSANDS/ΜL (ref 0–0.1)
BASOPHILS NFR BLD AUTO: 0 % (ref 0–1)
BUN SERPL-MCNC: 18 MG/DL (ref 5–25)
CALCIUM SERPL-MCNC: 9.3 MG/DL (ref 8.3–10.1)
CHLORIDE SERPL-SCNC: 109 MMOL/L (ref 100–108)
CO2 SERPL-SCNC: 23 MMOL/L (ref 21–32)
CREAT SERPL-MCNC: 1.04 MG/DL (ref 0.6–1.3)
EOSINOPHIL # BLD AUTO: 0 THOUSAND/ΜL (ref 0–0.61)
EOSINOPHIL NFR BLD AUTO: 0 % (ref 0–6)
ERYTHROCYTE [DISTWIDTH] IN BLOOD BY AUTOMATED COUNT: 12.6 % (ref 11.6–15.1)
GFR SERPL CREATININE-BSD FRML MDRD: 74 ML/MIN/1.73SQ M
GLUCOSE P FAST SERPL-MCNC: 123 MG/DL (ref 65–99)
GLUCOSE SERPL-MCNC: 123 MG/DL (ref 65–140)
HCT VFR BLD AUTO: 49.5 % (ref 36.5–49.3)
HGB BLD-MCNC: 17.1 G/DL (ref 12–17)
IMM GRANULOCYTES # BLD AUTO: 0.06 THOUSAND/UL (ref 0–0.2)
IMM GRANULOCYTES NFR BLD AUTO: 1 % (ref 0–2)
KCT BLD-ACNC: 201 SEC (ref 89–137)
LYMPHOCYTES # BLD AUTO: 1.92 THOUSANDS/ΜL (ref 0.6–4.47)
LYMPHOCYTES NFR BLD AUTO: 15 % (ref 14–44)
MCH RBC QN AUTO: 31.5 PG (ref 26.8–34.3)
MCHC RBC AUTO-ENTMCNC: 34.5 G/DL (ref 31.4–37.4)
MCV RBC AUTO: 91 FL (ref 82–98)
MONOCYTES # BLD AUTO: 1.37 THOUSAND/ΜL (ref 0.17–1.22)
MONOCYTES NFR BLD AUTO: 11 % (ref 4–12)
NEUTROPHILS # BLD AUTO: 9.13 THOUSANDS/ΜL (ref 1.85–7.62)
NEUTS SEG NFR BLD AUTO: 73 % (ref 43–75)
NRBC BLD AUTO-RTO: 0 /100 WBCS
PLATELET # BLD AUTO: 238 THOUSANDS/UL (ref 149–390)
PMV BLD AUTO: 10.4 FL (ref 8.9–12.7)
POTASSIUM SERPL-SCNC: 3.8 MMOL/L (ref 3.5–5.3)
RBC # BLD AUTO: 5.43 MILLION/UL (ref 3.88–5.62)
SODIUM SERPL-SCNC: 138 MMOL/L (ref 136–145)
SPECIMEN SOURCE: ABNORMAL
WBC # BLD AUTO: 12.49 THOUSAND/UL (ref 4.31–10.16)

## 2021-08-19 PROCEDURE — C1887 CATHETER, GUIDING: HCPCS | Performed by: INTERNAL MEDICINE

## 2021-08-19 PROCEDURE — 93571 IV DOP VEL&/PRESS C FLO 1ST: CPT | Performed by: INTERNAL MEDICINE

## 2021-08-19 PROCEDURE — C1769 GUIDE WIRE: HCPCS | Performed by: INTERNAL MEDICINE

## 2021-08-19 PROCEDURE — C1725 CATH, TRANSLUMIN NON-LASER: HCPCS | Performed by: INTERNAL MEDICINE

## 2021-08-19 PROCEDURE — 80048 BASIC METABOLIC PNL TOTAL CA: CPT | Performed by: INTERNAL MEDICINE

## 2021-08-19 PROCEDURE — C1894 INTRO/SHEATH, NON-LASER: HCPCS | Performed by: INTERNAL MEDICINE

## 2021-08-19 PROCEDURE — 99153 MOD SED SAME PHYS/QHP EA: CPT | Performed by: INTERNAL MEDICINE

## 2021-08-19 PROCEDURE — 93458 L HRT ARTERY/VENTRICLE ANGIO: CPT | Performed by: INTERNAL MEDICINE

## 2021-08-19 PROCEDURE — 85025 COMPLETE CBC W/AUTO DIFF WBC: CPT | Performed by: INTERNAL MEDICINE

## 2021-08-19 PROCEDURE — 99152 MOD SED SAME PHYS/QHP 5/>YRS: CPT | Performed by: INTERNAL MEDICINE

## 2021-08-19 PROCEDURE — 85347 COAGULATION TIME ACTIVATED: CPT

## 2021-08-19 RX ORDER — CLOPIDOGREL BISULFATE 75 MG/1
600 TABLET ORAL ONCE
Status: COMPLETED | OUTPATIENT
Start: 2021-08-19 | End: 2021-08-19

## 2021-08-19 RX ORDER — VERAPAMIL HYDROCHLORIDE 2.5 MG/ML
INJECTION, SOLUTION INTRAVENOUS CODE/TRAUMA/SEDATION MEDICATION
Status: COMPLETED | OUTPATIENT
Start: 2021-08-19 | End: 2021-08-19

## 2021-08-19 RX ORDER — AMLODIPINE BESYLATE 5 MG/1
5 TABLET ORAL ONCE
Status: DISCONTINUED | OUTPATIENT
Start: 2021-08-19 | End: 2021-08-19 | Stop reason: HOSPADM

## 2021-08-19 RX ORDER — SODIUM CHLORIDE 9 MG/ML
125 INJECTION, SOLUTION INTRAVENOUS CONTINUOUS
Status: DISCONTINUED | OUTPATIENT
Start: 2021-08-19 | End: 2021-08-19

## 2021-08-19 RX ORDER — NITROGLYCERIN 20 MG/100ML
INJECTION INTRAVENOUS CODE/TRAUMA/SEDATION MEDICATION
Status: COMPLETED | OUTPATIENT
Start: 2021-08-19 | End: 2021-08-19

## 2021-08-19 RX ORDER — HEPARIN SODIUM 1000 [USP'U]/ML
INJECTION, SOLUTION INTRAVENOUS; SUBCUTANEOUS CODE/TRAUMA/SEDATION MEDICATION
Status: COMPLETED | OUTPATIENT
Start: 2021-08-19 | End: 2021-08-19

## 2021-08-19 RX ORDER — LIDOCAINE HYDROCHLORIDE 10 MG/ML
INJECTION, SOLUTION EPIDURAL; INFILTRATION; INTRACAUDAL; PERINEURAL CODE/TRAUMA/SEDATION MEDICATION
Status: COMPLETED | OUTPATIENT
Start: 2021-08-19 | End: 2021-08-19

## 2021-08-19 RX ORDER — ONDANSETRON 2 MG/ML
4 INJECTION INTRAMUSCULAR; INTRAVENOUS EVERY 6 HOURS PRN
Status: DISCONTINUED | OUTPATIENT
Start: 2021-08-19 | End: 2021-08-19 | Stop reason: HOSPADM

## 2021-08-19 RX ORDER — SODIUM CHLORIDE 9 MG/ML
150 INJECTION, SOLUTION INTRAVENOUS CONTINUOUS
Status: DISPENSED | OUTPATIENT
Start: 2021-08-19 | End: 2021-08-19

## 2021-08-19 RX ORDER — ASPIRIN 81 MG/1
324 TABLET, CHEWABLE ORAL ONCE
Status: COMPLETED | OUTPATIENT
Start: 2021-08-19 | End: 2021-08-19

## 2021-08-19 RX ORDER — NITROGLYCERIN 0.4 MG/1
0.4 TABLET SUBLINGUAL
Status: DISCONTINUED | OUTPATIENT
Start: 2021-08-19 | End: 2021-08-19 | Stop reason: HOSPADM

## 2021-08-19 RX ORDER — DIPHENHYDRAMINE HYDROCHLORIDE 50 MG/ML
50 INJECTION INTRAMUSCULAR; INTRAVENOUS ONCE
Status: COMPLETED | OUTPATIENT
Start: 2021-08-19 | End: 2021-08-19

## 2021-08-19 RX ORDER — MIDAZOLAM HYDROCHLORIDE 2 MG/2ML
INJECTION, SOLUTION INTRAMUSCULAR; INTRAVENOUS CODE/TRAUMA/SEDATION MEDICATION
Status: COMPLETED | OUTPATIENT
Start: 2021-08-19 | End: 2021-08-19

## 2021-08-19 RX ORDER — LABETALOL 20 MG/4 ML (5 MG/ML) INTRAVENOUS SYRINGE
CODE/TRAUMA/SEDATION MEDICATION
Status: COMPLETED | OUTPATIENT
Start: 2021-08-19 | End: 2021-08-19

## 2021-08-19 RX ORDER — FENTANYL CITRATE 50 UG/ML
INJECTION, SOLUTION INTRAMUSCULAR; INTRAVENOUS CODE/TRAUMA/SEDATION MEDICATION
Status: COMPLETED | OUTPATIENT
Start: 2021-08-19 | End: 2021-08-19

## 2021-08-19 RX ADMIN — IOHEXOL 100 ML: 350 INJECTION, SOLUTION INTRAVENOUS at 08:09

## 2021-08-19 RX ADMIN — HEPARIN SODIUM 4000 UNITS: 1000 INJECTION INTRAVENOUS; SUBCUTANEOUS at 08:06

## 2021-08-19 RX ADMIN — IOHEXOL 50 ML: 350 INJECTION, SOLUTION INTRAVENOUS at 08:47

## 2021-08-19 RX ADMIN — MIDAZOLAM 1 MG: 1 INJECTION INTRAMUSCULAR; INTRAVENOUS at 08:25

## 2021-08-19 RX ADMIN — LABETALOL 20 MG/4 ML (5 MG/ML) INTRAVENOUS SYRINGE 10 MG: at 08:05

## 2021-08-19 RX ADMIN — MIDAZOLAM 1 MG: 1 INJECTION INTRAMUSCULAR; INTRAVENOUS at 08:38

## 2021-08-19 RX ADMIN — DIPHENHYDRAMINE HYDROCHLORIDE 50 MG: 50 INJECTION, SOLUTION INTRAMUSCULAR; INTRAVENOUS at 07:09

## 2021-08-19 RX ADMIN — FENTANYL CITRATE 25 MCG: 50 INJECTION INTRAMUSCULAR; INTRAVENOUS at 07:45

## 2021-08-19 RX ADMIN — MIDAZOLAM 1 MG: 1 INJECTION INTRAMUSCULAR; INTRAVENOUS at 07:45

## 2021-08-19 RX ADMIN — VERAPAMIL HYDROCHLORIDE 2.5 MG: 2.5 INJECTION, SOLUTION INTRAVENOUS at 07:55

## 2021-08-19 RX ADMIN — CLOPIDOGREL BISULFATE 600 MG: 75 TABLET ORAL at 07:09

## 2021-08-19 RX ADMIN — LIDOCAINE HYDROCHLORIDE 1 ML: 10 INJECTION, SOLUTION EPIDURAL; INFILTRATION; INTRACAUDAL; PERINEURAL at 07:45

## 2021-08-19 RX ADMIN — HEPARIN SODIUM 5000 UNITS: 1000 INJECTION INTRAVENOUS; SUBCUTANEOUS at 08:41

## 2021-08-19 RX ADMIN — Medication 200 MCG: at 07:55

## 2021-08-19 RX ADMIN — FENTANYL CITRATE 50 MCG: 50 INJECTION INTRAMUSCULAR; INTRAVENOUS at 08:25

## 2021-08-19 RX ADMIN — FENTANYL CITRATE 25 MCG: 50 INJECTION INTRAMUSCULAR; INTRAVENOUS at 08:38

## 2021-08-19 RX ADMIN — HEPARIN SODIUM 4000 UNITS: 1000 INJECTION INTRAVENOUS; SUBCUTANEOUS at 07:55

## 2021-08-19 RX ADMIN — ASPIRIN 243 MG: 81 TABLET, CHEWABLE ORAL at 07:10

## 2021-08-19 RX ADMIN — SODIUM CHLORIDE 125 ML/HR: 0.9 INJECTION, SOLUTION INTRAVENOUS at 07:21

## 2021-08-19 NOTE — DISCHARGE INSTRUCTIONS
1  Please see the post cardiac catheterization dishcarge instructions  No heavy lifting, greater than 10 lbs  or strenuous  activity for 48 hrs  2 Remove band aid tomorrow  Shower and wash area- wrist gently with soap and water- beginning tomorrow  Rinse and pat dry  Apply new water seal band aid  Repeat this process for 5 days  No powders, creams lotions or antibiotic ointments  for 5 days  No tub baths, hot tubs or swimming for 5 days  3  Please call our office ([phone]) if you have any fever, redness, swelling, discharge from your wrist access site  4 No driving for 1 day    Left Heart Catheterization   WHAT YOU NEED TO KNOW:   A left heart catheterization is a procedure to look at your heart and its arteries  You may need this procedure if you have chest pain, heart disease, or your heart is not working as it should  WHILE YOU ARE HERE:   Before your procedure:   · Informed consent  is a legal document that explains the tests, treatments, or procedures that you may need  Informed consent means you understand what will be done and can make decisions about what you want  You give your permission when you sign the consent form  You can have someone sign this form for you if you are not able to sign it  You have the right to understand your medical care in words you know  Before you sign the consent form, understand the risks and benefits of what will be done  Make sure all your questions are answered  · An IV  is a small tube placed in your vein that is used to give you medicine or liquids  · Medicines:      ? Antihistamines  help prevent a reaction to the dye used during your heart catheterization  ? A sedative  is given to help you stay calm and relaxed  ? Steroids  decrease inflammation and help prevent a reaction to the contrast dye  · Local anesthesia  is a shot of medicine put into your arm or leg  It is used to numb the area and dull the pain   You may still feel pressure or pushing during the procedure  During your procedure:   · Your healthcare provider will insert a catheter into an artery in your arm, wrist, or leg  An x-ray will be used to carefully guide the catheter to your heart  He will inject a dye so he can see the blood vessels, muscle, or valves of your heart more clearly  You may get a warm feeling or slight nausea right after the dye is injected  This is normal, and will pass quickly  Your healthcare provider may remove a small sample of heart tissue and send it to a lab for testing  He may also open a narrow or blocked heart valve or artery  A stent (small tube) may be left inside your artery to hold it open  · The catheter may be left in place to monitor pressure in your heart  When the catheter is removed, a healthcare provider will apply pressure to the site for at least 30 minutes to help decrease the risk of bleeding  A collagen plug or other closure devices may be used to close the site  If the site is in your wrist, your healthcare provider will place a compression device around your wrist  Healthcare providers will cover the site with a pressure bandage to decrease further bleeding  After your procedure: You will be taken to a room to rest until you are fully awake  If insertion was in your wrist, the pressure device will be around your wrist  Healthcare providers will slowly decrease pressure in the device  If insertion was in your groin, a pressure bandage will be in place  Keep your arm or leg straight  Do not  get out of bed until your healthcare provider says it is okay  Healthcare providers will frequently monitor your vital signs and pulses  They will also frequently check your wound for bleeding  Healthcare providers may ask you to drink more liquids to help flush the dye out of your body  If the catheter was in your groin and you need to cough, apply pressure over the area with your hands as directed    RISKS:   During the procedure, the catheter may tear an artery and cause bleeding  An air bubble may enter your lung, or your lung may collapse  You may have a heart attack  After the procedure, you may have bleeding or an infection  You may have damage to a heart valve, or a fistula (abnormal opening) may form between an artery and vein  You may have irregular heartbeats, which may cause dizziness or fainting  You may get a blood clot in your leg or arm  Without this procedure, your condition may get worse  These problems may become life-threatening  CARE AGREEMENT:   You have the right to help plan your care  Learn about your health condition and how it may be treated  Discuss treatment options with your healthcare providers to decide what care you want to receive  You always have the right to refuse treatment  © Copyright [employer] 2018 Information is for End User's use only and may not be sold, redistributed or otherwise used for commercial purposes  All illustrations and images included in CareNotes® are the copyrighted property of A D A M , Inc  or [facility] Dorothea Teague   The above information is an  only  It is not intended as medical advice for individual conditions or treatments  Talk to your doctor, nurse or pharmacist before following any medical regimen to see if it is safe and effective for you

## 2021-08-27 ENCOUNTER — TELEPHONE (OUTPATIENT)
Dept: SLEEP CENTER | Facility: CLINIC | Age: 66
End: 2021-08-27

## 2021-08-27 NOTE — TELEPHONE ENCOUNTER
Patient called requesting script for CPAP supplies  Send to [facility]  Dr Mario Alvarado, please write resupply order for patient  He uses nasal pillows  He was last seen 10/20/20 and has follow up scheduled 10/20/21

## 2021-09-01 ENCOUNTER — OFFICE VISIT (OUTPATIENT)
Dept: CARDIOLOGY CLINIC | Facility: CLINIC | Age: 66
End: 2021-09-01
Payer: MEDICARE

## 2021-09-01 VITALS
WEIGHT: 219.6 LBS | DIASTOLIC BLOOD PRESSURE: 78 MMHG | HEIGHT: 72 IN | BODY MASS INDEX: 29.74 KG/M2 | HEART RATE: 64 BPM | OXYGEN SATURATION: 98 % | TEMPERATURE: 98.6 F | SYSTOLIC BLOOD PRESSURE: 120 MMHG

## 2021-09-01 DIAGNOSIS — I44.2 INTERMITTENT COMPLETE HEART BLOCK (HCC): ICD-10-CM

## 2021-09-01 DIAGNOSIS — I49.5 SICK SINUS SYNDROME (HCC): ICD-10-CM

## 2021-09-01 DIAGNOSIS — I47.2 NSVT (NONSUSTAINED VENTRICULAR TACHYCARDIA) (HCC): ICD-10-CM

## 2021-09-01 DIAGNOSIS — I49.3 PREMATURE VENTRICULAR BEATS: ICD-10-CM

## 2021-09-01 DIAGNOSIS — G47.33 OSA (OBSTRUCTIVE SLEEP APNEA): ICD-10-CM

## 2021-09-01 DIAGNOSIS — I25.10 CORONARY ARTERY DISEASE INVOLVING NATIVE CORONARY ARTERY OF NATIVE HEART WITHOUT ANGINA PECTORIS: ICD-10-CM

## 2021-09-01 DIAGNOSIS — E78.2 MIXED HYPERLIPIDEMIA: ICD-10-CM

## 2021-09-01 DIAGNOSIS — G47.33 OBSTRUCTIVE SLEEP APNEA SYNDROME: ICD-10-CM

## 2021-09-01 PROCEDURE — 99214 OFFICE O/P EST MOD 30 MIN: CPT | Performed by: INTERNAL MEDICINE

## 2021-09-01 PROCEDURE — 93000 ELECTROCARDIOGRAM COMPLETE: CPT | Performed by: INTERNAL MEDICINE

## 2021-09-01 NOTE — LETTER
2021     Anirudh Dan, [address] [facility] [address]    Patient: Lino Crowell   YOB: 1955   Date of Visit: 2021       Dear Dr Monique Luque: Thank you for referring Abraham Carlson to me for evaluation  Below are my notes for this consultation  If you have questions, please do not hesitate to call me  I look forward to following your patient along with you  Sincerely,        Haim Miller MD        CC: No Recipients  Haim Miller MD  2021  1:33 PM  Incomplete     Progress note - Cardiology Office   [facility]  Lino Crowell 76 y o  male MRN: [id number]  : 1955  Unit/Bed#:  Encounter: [id number]      Assessment:     1  Coronary artery disease involving native coronary artery of native heart without angina pectoris    2  Sick sinus syndrome ([address] )    3  Intermittent complete heart block ([address] )    4  NSVT (nonsustained ventricular tachycardia) ([address] )    5  LENY (obstructive sleep apnea)    6  Premature ventricular beats    7  Obstructive sleep apnea syndrome    8  Mixed hyperlipidemia        Discussion summary and Plan:       1  Coronary artery disease status post catheterization in 2017 has moderate ostial circ disease and nonobstructive disease in other arteries  He has repeat cardiac catheterization done  Cath was done in 2021  Patient has 50-70% ostial circumflex stenosis which is non dominant artery and IFR is 0 96 other arteries have nonobstructive disease  2  Dyslipidemia  Continue current Rx cholesterol profile is acceptable   Continue statin     3  History of palpitation with history of PVCs  Event monitor has shows episodes of NSVT, episodes of third-degree AV block, junctional escape and he was evaluated by EP and was recommended dual-chamber pacemaker  His willing to consider it  We will discussed with EP  4  Obstructive sleep apnea  Continue using CPAP machine     5  Overweight    Advised to lose weight      6  Sick sinus syndrome with episodes of NSVT  Referral to EP    Plan  at this time patient is doing very well he has no new complaints  His cardiac catheterization report discussed with him at length  All his questions were answered  We will send this report to EP to have him scheduled for pacemaker  He has already been evaluated by them  EP note reviewed  Patient is ambulatory monitoring reviewed with him  Follow-up 4 months  Thank you for your consultation  If you have any question please call me at [phone]    Counseling :  A description of the counseling  Goals and Barriers  Patient's ability to self care: Yes  Medication side effect reviewed with patient in detail and all their questions answered to their satisfaction  Primary Care Physician : Chepe Lipscomb DO      HPI :     Pavan Moreno is a 76y o  year old male who was referred by primary care doctor for for his history of cardiac disease  Patient who used to follow up with [facility] ([facility]) and has been evaluated by [facility] cardiology in 2017 for 2nd opinion has medical history significant for coronary artery disease, dyslipidemia, DJD and GERD along with obstructive sleep apnea  He had a catheterization done in June of 2017 which shows he had a moderate ostial circumflex disease and he chose to have medical therapy  At that time he was seen by  [facility] cardiology for 2nd opinion and various options were discussed with him including continue medical Rx, angioplasty of ostial circumflex which could be done but slightly high risk or single-vessel bypass surgery  At that time he was asymptomatic and he was continued medical therapy  Patient was recently in [place] where he had an episode of palpitation and fast heartbeat  He was kept overnight ruled out for acute coronary syndrome and he was advised to follow up with Cardiology here    He is known to have history of irregular heartbeat  EKG shows normal sinus with first-degree AV block and frequent PVCs  PVCs are also in the form of bigeminy and fusion beats are noted  He is otherwise very active he denies any chest pain any shortness of breath  He is able to do his activities without any problems  No nausea no vomiting no PND no orthopnea no syncope no other issues  No recent surgery  He has a previous history of surgery of gallbladder removal as well as neck fusion    He does not smoke but occasionally he will smoke a cigar maybe once every 3 months        09/01/2021  Above reviewed  Patient came for follow-up he is doing well he underwent cardiac catheterization we found he has ostial circumflex 50- 70% visually but IFR was 0 96  It was felt patient does not need stenting  His nuclear stress test also shows no ischemia  He denies any symptoms he is very active he does exercise without any problems  But he does have history of palpitations and he was found to have episode of NSVT, as well as intermittent third-degree AV block and junctional escape  He was evaluated by EP and was recommended dual-chamber pacemaker but they would like to have him cardiac catheterization done fast   His medications reviewed today EKG shows heart rate 64 beats per minute rare PVCs  No nausea no vomiting he is not on any AV node blocking drug because of his episodes of bradycardia  He has multiple questions regarding his angiogram and related to his rhythm problem and pacemaker which were discussed openly  No other cardiovascular issues at this time  Review of Systems   Constitutional: Negative for activity change, chills, diaphoresis, fever and unexpected weight change  HENT: Negative for congestion  Eyes: Negative for discharge and redness  Respiratory: Negative for cough, chest tightness, shortness of breath and wheezing  Cardiovascular: Positive for palpitations  Negative for chest pain and leg swelling  Occasional   Gastrointestinal: Negative for abdominal pain, diarrhea and nausea  Endocrine: Negative  Genitourinary: Negative for decreased urine volume and urgency  Musculoskeletal: Negative  Negative for arthralgias, back pain and gait problem  Skin: Negative for rash and wound  Allergic/Immunologic: Negative  Neurological: Negative for dizziness, seizures, syncope, weakness, light-headedness and headaches  Hematological: Negative  Psychiatric/Behavioral: Negative for agitation and confusion  The patient is not nervous/anxious          Historical Information   Past Medical History:   Diagnosis Date    Abrasion of left foot     LAST ASSESSED: 9/23/13    Achilles tendinitis, unspecified leg     LAST ASSESSED: 11/30/12    Allergic rhinitis     LAST ASSESSED: 11/25/13    Allergic rhinitis 06/25/2008    LAST ASSESSED: 6/25/08    Cervicalgia 04/22/2011    LAST ASSESSED: 4/22/11    Coronary artery disease     CPAP (continuous positive airway pressure) dependence     Dysfunction of left eustachian tube     LAST ASSESSED: 9/23/13    Epistaxis     LAST ASSESSED: 5/27/15    First degree atrioventricular block     LAST ASSESSED: 7/10/17    Gastric ulcer 10/07/2011    LAST ASSESSED: 3/9/15    GERD (gastroesophageal reflux disease)     Hemorrhoids 05/13/2011    LAST ASSESSED: 5/13/11    Long term use of drug     LAST ASSESSED: 10/11/12    Myalgia 12/21/2007    LAST ASSESSED: 12/21/07    Myositis 12/21/2007    LAST ASSESSED: 12/21/07    Numbness and tingling of foot     LAST ASSESSED: 3/9/15    Premature ventricular beats     states cardiologist ordered Zio ambulatory cardiac monitor    Sleep apnea      Past Surgical History:   Procedure Laterality Date    CARDIAC CATHETERIZATION  2017    CATARACT EXTRACTION Right     CERVICAL FUSION      pt thinks C4-C5    CHOLECYSTECTOMY      LAPAROSCOPIC; LAST ASSESSED: 10/17/17    COLONOSCOPY      COMPLETE; 3-4 YEARS AGO BY [facility]; LAST ASSESSED: 14    MYRINGOTOMY W/ TUBES      WITH VENTILATING TUBE INSERTION    CO REVISE MEDIAN N/CARPAL TUNNEL SURG Left 2021    Procedure: RELEASE CARPAL TUNNEL;  Surgeon: Syeda Braun MD;  Location: [facility];  Service: Orthopedics    VASECTOMY       Social History     Substance and Sexual Activity   Alcohol Use Yes    Alcohol/week: 7 0 standard drinks    Types: 7 Glasses of wine per week    Comment: Once in a while     Social History     Substance and Sexual Activity   Drug Use No     Social History     Tobacco Use   Smoking Status Former Smoker    Quit date:     Years since quittin 6   Smokeless Tobacco Never Used     Family History:   Family History   Problem Relation Age of Onset    Cancer Mother         SOFT TISSUE CANCER ON RT SIDE CHEST WALL AND     Prostate cancer Maternal Uncle        Meds/Allergies     Allergies   Allergen Reactions    Amoxicillin Anaphylaxis    Acetaminophen      Rapid heart rate    Apple - Food Allergy     Cherry Flavor - Food Allergy Other (See Comments)     States allergy is to raw cherries    Pollen Extract     Augmentin [Amoxicillin-Pot Clavulanate]     Dye [Iodinated Diagnostic Agents] Itching       Current Outpatient Medications:     aspirin (ASPIR-81) 81 mg EC tablet, Take 81 mg by mouth daily, Disp: , Rfl:     celecoxib (CeleBREX) 200 mg capsule, TAKE 1 CAPSULE BY MOUTH EVERY DAY, Disp: 90 capsule, Rfl: 1    Coenzyme Q10 (COQ10) 200 MG CAPS, Take 1 tablet by mouth daily, Disp: , Rfl:     finasteride (PROSCAR) 5 mg tablet, TAKE 1 TABLET BY MOUTH EVERY DAY, Disp: 90 tablet, Rfl: 1    fluticasone (VERAMYST) 27 5 MCG/SPRAY nasal spray, 2 sprays into each nostril daily, Disp: , Rfl:     Glucosamine-Chondroitin (GLUCOSAMINE CHONDR COMPLEX PO), Take 1 tablet by mouth daily, Disp: , Rfl:     nitroglycerin (NITROSTAT) 0 4 mg SL tablet, PLACE 1 TABLET(S) BY SUBLINGUAL ROUTE , Disp: , Rfl: 4    Omega-3 Fatty Acids (FISH OIL) 1000 MG CPDR, Take by mouth daily, Disp: , Rfl:     pantoprazole (PROTONIX) 40 mg tablet, TAKE 1 TABLET BY MOUTH EVERY DAY, Disp: 90 tablet, Rfl: 1    rosuvastatin (CRESTOR) 10 MG tablet, TAKE 1 TABLET BY MOUTH EVERY DAY, Disp: 90 tablet, Rfl: 1    Vitals: Blood pressure 120/78, pulse 64, temperature 98 6 °F (37 °C), height 6' (1 829 m), weight 99 6 kg (219 lb 9 6 oz), SpO2 98 %  Body mass index is 29 78 kg/m²  Wt Readings from Last 3 Encounters:   09/01/21 99 6 kg (219 lb 9 6 oz)   08/19/21 101 kg (222 lb 10 6 oz)   08/04/21 101 kg (222 lb)     Vitals:    09/01/21 1244   Weight: 99 6 kg (219 lb 9 6 oz)     BP Readings from Last 3 Encounters:   09/01/21 120/78   08/19/21 149/66   08/04/21 152/62         Physical Exam  Constitutional:       General: He is not in acute distress  Appearance: He is well-developed  He is not diaphoretic  HENT:      Head: Normocephalic and atraumatic  Eyes:      Pupils: Pupils are equal, round, and reactive to light  Neck:      Thyroid: No thyromegaly  Vascular: No JVD  Trachea: No tracheal deviation  Cardiovascular:      Rate and Rhythm: Normal rate and regular rhythm  Heart sounds: S1 normal and S2 normal  Heart sounds not distant  Murmur heard  Systolic (ejection) murmur is present with a grade of 2/6  No friction rub  No gallop  No S3 or S4 sounds  Pulmonary:      Effort: Pulmonary effort is normal  No respiratory distress  Breath sounds: Normal breath sounds  No wheezing or rales  Chest:      Chest wall: No tenderness  Abdominal:      General: Bowel sounds are normal  There is no distension  Palpations: Abdomen is soft  Tenderness: There is no abdominal tenderness  Musculoskeletal:         General: No deformity  Cervical back: Neck supple  Skin:     General: Skin is warm and dry  Coloration: Skin is not pale  Findings: No rash     Neurological:      Mental Status: He is alert and oriented to person, place, and time    Psychiatric:         Behavior: Behavior normal          Judgment: Judgment normal              Diagnostic Studies Review Cardio:   cardiac catheterization done in 2017 at [facility] shows he had moderate stenosis of his ostial circumflex  Had a nonobstructive disease in other arteries and had a normal LV systolic function  Repeat cardiac catheterization done 2021 shows patient has nonobstructive disease involving RCA and LAD  50- 70% ostial circumflex and IFR was 0 95 EF is acceptable  Nuclear stress test done in 2020  Nuclear stress done in 2020 patient exercised for about 9 minutes  Fixed defect was seen no ischemia noted  EF was 47%      Event monitor has shows episodes of pauses, intermittent 3 AV block, episode of NSVT was evaluated by EP  EKG:  Twelve lead EKG done 2021 shows normal sinus rhythm first-degree AV block with PVCs  No significant change from previous EKG done at his primary care doctor's office     12 lead EKG done 2021 shows Normal sinus rhythm first-degree AV block heart rate 64 beats per minute  Rare PVCs noted      Results for orders placed during the hospital encounter of 10/12/20    NM Myocardial Perfusion Spect (Exercise Induced Stress and/or Rest)    [address], [address] [place] [address]  [phone]    Rest/Stress Gated SPECT Myocardial Perfusion Imaging After Exercise    Patient: Tyron Sandy  MR number: [id number]  Account number: [de-identified]  : 1955  Age: 71 years  Gender: Male  Status: Inpatient  Location: Stress lab  Height: 73 in  Weight: 202 lb  BP: 138/ 80 mmHg    Allergies: AMOXICILLIN, ACETAMINOPHEN, APPLE, CHERRY FLAVOR, POLLEN EXTRACT, AMOXICILLIN-POT CLAVULANATE, IODINATED DIAGNOSTIC AGENTS    Diagnosis: R07 9 - Chest pain, unspecified    Interpreting Physician:  Vashti Bean MD  Referring Physician:  Nilsa Bravo DO  Technician: Elsa Del Rio  RN:  Marci Timmons RN BSN  Group:  [facility]  Report Prepared by[de-identified]  Marci Timmons RN BSN    INDICATIONS: Coronary artery disease  HISTORY: The patient is a 71year old  male  Chest pain status: chest pain, radiation to neck and jaw area, radiation to arm(s)  Coronary artery disease risk factors: dyslipidemia  Cardiovascular history: coronary artery disease  and arrhythmia  Co-morbidity: obesity  PHYSICAL EXAM: Baseline physical exam screening: no evidence of significant aortic stenosis and no wheezes audible  REST ECG: Normal sinus rhythm  The ECG showed 1ï¾° AV block  PROCEDURE: The study was performed in the the Stress lab  Treadmill exercise testing was performed, using the Ángel protocol  Gated SPECT myocardial perfusion imaging was performed after stress  Systolic blood pressure was 138 mmHg, at the  start of the study  Diastolic blood pressure was 80 mmHg, at the start of the study  The heart rate was 75 bpm, at the start of the study  IV double checked  NÁGEL PROTOCOL:  HR bpm SBP mmHg DBP mmHg Symptoms  Baseline 75 138 80 none  Stage 1 129 178 80 none  Stage 2 129 184 72 mild dyspnea  Stage 3 141 -- -- --  Immediate 141 205 70 moderate dyspnea  Recovery 1 92 192 80 subsiding  Recovery 2 93 150 78 none    STRESS SUMMARY: Duration of exercise was 9 min  The patient exercised to protocol stage 3  Maximal work rate was 10 1 METs  Functional capacity was normal  Maximal heart rate during stress was 141 bpm ( 90 % of maximal predicted heart  rate)  The heart rate response to stress was normal  There was normal resting blood pressure with a hypertensive response to stress  The rate-pressure product for the peak heart rate and blood pressure was 04786  There was no chest pain  during stress  The stress test was terminated due to moderate dyspnea  Pre oxygen saturation: 99 %  Peak oxygen saturation: 98 %  The stress ECG was equivocal for ischemia  Arrhythmia during stress: isolated premature ventricular beats  ISOTOPE ADMINISTRATION:  Resting isotope administration Stress isotope administration  Agent Tetrofosmin Tetrofosmin  Dose 11 mCi 30 9 mCi  Date 10/14/2020 10/14/2020  Injection time 08:55 10:38  Injection-image interval 37 min 52 min    Radiopharmaceutical was administered 6 min, 30 sec into the stress protocol  There was 2 min and 30 sec of exercise after the injection  MYOCARDIAL PERFUSION IMAGING:  The image quality was fair  Rotating projection images reveal mild diaphragmatic attenuation and mild subdiaphragmatic activity  Left ventricular size was normal  The TID ratio was 0 97  PERFUSION DEFECTS:  -  There was a moderate-sized, mildly severe, fixed myocardial perfusion defect of the basal inferior wall likely due to diaphragm attenuation  GATED SPECT:  The calculated left ventricular ejection fraction was 47 %  Left ventricular ejection fraction was within normal limits by visual estimate  There was no left ventricular regional abnormality  SUMMARY:  -  Stress results: Duration of exercise was 9 min  Target heart rate was achieved  There was normal resting blood pressure with a hypertensive response to stress  There was no chest pain during stress  The stress test was terminated due to  moderate dyspnea  -  ECG conclusions: The stress ECG was equivocal for ischemia  -  Perfusion imaging: There was a moderate-sized, mildly severe, fixed myocardial perfusion defect of the basal inferior wall likely due to diaphragm attenuation   -  Gated SPECT: The calculated left ventricular ejection fraction was 47 %  Left ventricular ejection fraction was within normal limits by visual estimate  There was no left ventricular regional abnormality  IMPRESSIONS: Normal study after maximal exercise  There was image artifact, without diagnostic evidence for perfusion abnormality      Prepared and signed by    Nieves Ramachandran MD  Signed 10/14/2020 15:31:45    No results found for this or any previous visit  Imaging:  Chest X-Ray:   No Chest XR results available for this patient  CT-scan of the chest:     CTA dissection protocol chest abdomen pelvis w wo contrast    Result Date: 10/12/2020  Impression 1  No aortic dissection or aneurysm  2   No acute finding within chest, abdomen, and pelvis  3   Colonic diverticulosis  4   Incidentally noted 1 5 cm right paravertebral simple fluid attenuating cystic lesion at the level of T6  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   Recommend correlation/comparison with prior outside imaging  Workstation performed: [id number]     Lab Review   Lab Results   Component Value Date    WBC 12 49 (H) 08/19/2021    HGB 17 1 (H) 08/19/2021    HCT 49 5 (H) 08/19/2021    MCV 91 08/19/2021    RDW 12 6 08/19/2021     08/19/2021     BMP:  Lab Results   Component Value Date    SODIUM 138 08/19/2021    K 3 8 08/19/2021     (H) 08/19/2021    CO2 23 08/19/2021    BUN 18 08/19/2021    CREATININE 1 04 08/19/2021    GLUC 123 08/19/2021    GLUF 123 (H) 08/19/2021    CALCIUM 9 3 08/19/2021    EGFR 74 08/19/2021    MG 2 4 10/13/2020     LFT:  Lab Results   Component Value Date    AST 27 08/10/2021    ALT 43 08/10/2021    ALKPHOS 65 08/10/2021    TP 6 5 08/10/2021    ALB 4 4 08/10/2021      Lab Results   Component Value Date    [id number] 0 596 10/13/2020     No components found for: [facility]  Lab Results   Component Value Date    HGBA1C 5 5 08/10/2021     Lipid Profile:   Lab Results   Component Value Date    CHOLESTEROL 132 08/10/2021    HDL 43 08/10/2021    LDLCALC 68 08/10/2021    TRIG 130 08/10/2021     Lab Results   Component Value Date    CHOLESTEROL 132 08/10/2021    CHOLESTEROL 142 04/05/2021     Lab Results   Component Value Date    TROPONINI <0 02 10/13/2020     No results found for: NTBNP         Dr Tam Boggs MD [facility]      "This note has been constructed using a voice recognition system  Therefore there may be syntax, spelling, and/or grammatical errors  Please call if you have any questions  "    Nicole Álvarez MD  2021  1:07 PM  Sign when Signing Visit     Progress note - Cardiology Office   [facility]  Arpit Pedroza 76 y o  male MRN: [id number]  : 1955  Unit/Bed#:  Encounter: [id number]      Assessment:     1  Coronary artery disease involving native coronary artery of native heart without angina pectoris    2  Sick sinus syndrome ([address] )    3  Intermittent complete heart block ([address] )    4  NSVT (nonsustained ventricular tachycardia) ([address] )    5  LENY (obstructive sleep apnea)    6  Premature ventricular beats    7  Obstructive sleep apnea syndrome    8  Mixed hyperlipidemia        Discussion summary and Plan:       1  Coronary artery disease status post catheterization in 2017 has moderate ostial circ disease and nonobstructive disease in other arteries  Patient denies any symptoms of any chest pain  He had a nonischemic nuclear in 2020       2  Dyslipidemia  Continue current Rx cholesterol profile is acceptable     3  History of palpitation with history of PVCs  Patient was recently [place] with episodes of palpitations and tachycardia  Her heart rate spontaneously went back to normal   He was kept overnight  Will need extended beyond monitoring will check y o  Past for 2 weeks and check echo Doppler for LV function  4  Obstructive sleep apnea  Continue using CPAP machine     5  Overweight  Advised to lose weight    Plan  Echo Doppler for LV function  Two weeks monitor to rule out occult atrial or ventricular arrhythmias  Patient was prescribed low-dose metoprolol which he never took as there was concern for bradycardia  Follow-up 4 months  Thank you for your consultation  If you have any question please call me at [phone]    Counseling :  A description of the counseling  Goals and Barriers    Patient's ability to self care: Yes  Medication side effect reviewed with patient in detail and all their questions answered to their satisfaction  Primary Care Physician : Shaq Gutierrez, DO      HPI :     Roby Martino is a 76y o  year old male who was referred by primary care doctor for for his history of cardiac disease  Patient who used to follow up with [facility] ([facility]) and has been evaluated by [facility] cardiology in 2017 for 2nd opinion has medical history significant for coronary artery disease, dyslipidemia, DJD and GERD along with obstructive sleep apnea  He had a catheterization done in June of 2017 which shows he had a moderate ostial circumflex disease and he chose to have medical therapy  At that time he was seen by  [facility] cardiology for 2nd opinion and various options were discussed with him including continue medical Rx, angioplasty of ostial circumflex which could be done but slightly high risk or single-vessel bypass surgery  At that time he was asymptomatic and he was continued medical therapy  Patient was recently in [facility] where he had an episode of palpitation and fast heartbeat  He was kept overnight ruled out for acute coronary syndrome and he was advised to follow up with Cardiology here  He is known to have history of irregular heartbeat  EKG shows normal sinus with first-degree AV block and frequent PVCs  PVCs are also in the form of bigeminy and fusion beats are noted  He is otherwise very active he denies any chest pain any shortness of breath  He is able to do his activities without any problems  No nausea no vomiting no PND no orthopnea no syncope no other issues  No recent surgery    He has a previous history of surgery of gallbladder removal as well as neck fusion    He does not smoke but occasionally he will smoke a cigarette maybe once every 3 months    Review of Systems   Constitutional: Negative for activity change, chills, diaphoresis, fever and unexpected weight change  HENT: Negative for congestion  Eyes: Negative for discharge and redness  Respiratory: Negative for cough, chest tightness, shortness of breath and wheezing  Cardiovascular: Positive for palpitations  Negative for chest pain and leg swelling  Episodes of fast heart rate   Gastrointestinal: Negative for abdominal pain, diarrhea and nausea  Endocrine: Negative  Genitourinary: Negative for decreased urine volume and urgency  Musculoskeletal: Negative  Negative for arthralgias, back pain and gait problem  History of neck fusion surgery   Skin: Negative for rash and wound  Allergic/Immunologic: Negative  Neurological: Negative for dizziness, seizures, syncope, weakness, light-headedness and headaches  Hematological: Negative  Psychiatric/Behavioral: Negative for agitation and confusion  The patient is not nervous/anxious          Historical Information   Past Medical History:   Diagnosis Date    Abrasion of left foot     LAST ASSESSED: 9/23/13    Achilles tendinitis, unspecified leg     LAST ASSESSED: 11/30/12    Allergic rhinitis     LAST ASSESSED: 11/25/13    Allergic rhinitis 06/25/2008    LAST ASSESSED: 6/25/08    Cervicalgia 04/22/2011    LAST ASSESSED: 4/22/11    Coronary artery disease     CPAP (continuous positive airway pressure) dependence     Dysfunction of left eustachian tube     LAST ASSESSED: 9/23/13    Epistaxis     LAST ASSESSED: 5/27/15    First degree atrioventricular block     LAST ASSESSED: 7/10/17    Gastric ulcer 10/07/2011    LAST ASSESSED: 3/9/15    GERD (gastroesophageal reflux disease)     Hemorrhoids 05/13/2011    LAST ASSESSED: 5/13/11    Long term use of drug     LAST ASSESSED: 10/11/12    Myalgia 12/21/2007    LAST ASSESSED: 12/21/07    Myositis 12/21/2007    LAST ASSESSED: 12/21/07    Numbness and tingling of foot     LAST ASSESSED: 3/9/15    Premature ventricular beats     states cardiologist ordered [facility] cardiac monitor    Sleep apnea      Past Surgical History:   Procedure Laterality Date    CARDIAC CATHETERIZATION      CATARACT EXTRACTION Right     CERVICAL FUSION      pt thinks C4-C5    CHOLECYSTECTOMY      LAPAROSCOPIC; LAST ASSESSED: 10/17/17    COLONOSCOPY      COMPLETE; 3-4 YEARS AGO BY [facility]; LAST ASSESSED: 14    MYRINGOTOMY W/ TUBES      WITH VENTILATING TUBE INSERTION    IL REVISE MEDIAN N/CARPAL TUNNEL SURG Left 2021    Procedure: RELEASE CARPAL TUNNEL;  Surgeon: Mirlande Velarde MD;  Location: [facility];  Service: Orthopedics    VASECTOMY       Social History     Substance and Sexual Activity   Alcohol Use Yes    Alcohol/week: 7 0 standard drinks    Types: 7 Glasses of wine per week    Comment: Once in a while     Social History     Substance and Sexual Activity   Drug Use No     Social History     Tobacco Use   Smoking Status Former Smoker    Quit date:     Years since quittin 6   Smokeless Tobacco Never Used     Family History:   Family History   Problem Relation Age of Onset    Cancer Mother         SOFT TISSUE CANCER ON RT SIDE CHEST WALL AND     Prostate cancer Maternal Uncle        Meds/Allergies     Allergies   Allergen Reactions    Amoxicillin Anaphylaxis    Acetaminophen      Rapid heart rate    [employer] - Food Allergy     [employer] - Food Allergy Other (See Comments)     States allergy is to raw cherries    Pollen Extract     Augmentin [Amoxicillin-Pot Clavulanate]     Dye [Iodinated Diagnostic Agents] Itching       Current Outpatient Medications:     aspirin (ASPIR-81) 81 mg EC tablet, Take 81 mg by mouth daily, Disp: , Rfl:     celecoxib (CeleBREX) 200 mg capsule, TAKE 1 CAPSULE BY MOUTH EVERY DAY, Disp: 90 capsule, Rfl: 1    Coenzyme Q10 (COQ10) 200 MG CAPS, Take 1 tablet by mouth daily, Disp: , Rfl:     finasteride (PROSCAR) 5 mg tablet, TAKE 1 TABLET BY MOUTH EVERY DAY, Disp: 90 tablet, Rfl: 1    fluticasone (VERAMYST) 27 5 MCG/SPRAY nasal spray, 2 sprays into each nostril daily, Disp: , Rfl:     Glucosamine-Chondroitin (GLUCOSAMINE CHONDR COMPLEX PO), Take 1 tablet by mouth daily, Disp: , Rfl:     nitroglycerin (NITROSTAT) 0 4 mg SL tablet, PLACE 1 TABLET(S) BY SUBLINGUAL ROUTE , Disp: , Rfl: 4    Omega-3 Fatty Acids (FISH OIL) 1000 MG CPDR, Take by mouth daily, Disp: , Rfl:     pantoprazole (PROTONIX) 40 mg tablet, TAKE 1 TABLET BY MOUTH EVERY DAY, Disp: 90 tablet, Rfl: 1    rosuvastatin (CRESTOR) 10 MG tablet, TAKE 1 TABLET BY MOUTH EVERY DAY, Disp: 90 tablet, Rfl: 1    Vitals: Blood pressure 120/78, pulse 64, temperature 98 6 °F (37 °C), height 6' (1 829 m), weight 99 6 kg (219 lb 9 6 oz), SpO2 98 %  Body mass index is 29 78 kg/m²  Wt Readings from Last 3 Encounters:   09/01/21 99 6 kg (219 lb 9 6 oz)   08/19/21 101 kg (222 lb 10 6 oz)   08/04/21 101 kg (222 lb)     Vitals:    09/01/21 1244   Weight: 99 6 kg (219 lb 9 6 oz)     BP Readings from Last 3 Encounters:   09/01/21 120/78   08/19/21 149/66   08/04/21 152/62         Physical Exam    Neurologic:  Alert & oriented x 3, no new focal deficits, Not in any acute distress,  Constitutional:  Well developed, well nourished, non-toxic appearance   Eyes:  Pupil equal and reacting to light, conjunctiva normal, No JVP, No LNP   HENT:  Atraumatic, oropharynx moist, Neck- normal range of motion, no tenderness,  Neck supple   Respiratory:  Bilateral air entry, mostly clear to auscultation  Cardiovascular: S1-S2 irregularly irregular with 2/6 murmur S4 present  GI:  Soft, nondistended, normal bowel sounds, nontender, no hepatosplenomegaly appreciated  Musculoskeletal:  No edema, no tenderness, no deformities     Skin:  Well hydrated, no rash   Lymphatic:  No lymphadenopathy noted   Extremities:  No edema and distal pulses are present    Diagnostic Studies Review Cardio:   cardiac catheterization done in June 2017 at [facility] shows he had moderate stenosis of his ostial circumflex  Had a nonobstructive disease in other arteries and had a normal LV systolic function  Nuclear stress test done in 2020  Nuclear stress done in 2020 patient exercised for about 9 minutes  Fixed defect was seen no ischemia noted  EF was 47%    EKG:  Twelve lead EKG done 2021 shows normal sinus rhythm first-degree AV block with PVCs  No significant change from previous EKG done at his primary care doctor's office     12 lead EKG done 2021 shows    Results for orders placed during the hospital encounter of 10/12/20    NM Myocardial Perfusion Spect (Exercise Induced Stress and/or Rest)    [address]  [facility], [address] [facility]  [phone]    Rest/Stress Gated SPECT Myocardial Perfusion Imaging After Exercise    Patient: Gabriela Emmanuel  MR number: [id number]  Account number: [de-identified]  : 1955  Age: 71 years  Gender: Male  Status: Inpatient  Location: Stress lab  Height: 73 in  Weight: 202 lb  BP: 138/ 80 mmHg    Allergies: AMOXICILLIN, ACETAMINOPHEN, APPLE, CHERRY FLAVOR, POLLEN EXTRACT, AMOXICILLIN-POT CLAVULANATE, IODINATED DIAGNOSTIC AGENTS    Diagnosis: R07 9 - Chest pain, unspecified    Interpreting Physician:  Ana Hines MD  Referring Physician:  Felipe Pichardo DO  Technician:  Diaz Villanueva  RN:  Carolyn Joe RN BSN  Group:  [facility]  Report Prepared by[de-identified]  Carolyn Joe RN BSN    INDICATIONS: Coronary artery disease  HISTORY: The patient is a 71year old  male  Chest pain status: chest pain, radiation to neck and jaw area, radiation to arm(s)  Coronary artery disease risk factors: dyslipidemia  Cardiovascular history: coronary artery disease  and arrhythmia  Co-morbidity: obesity  PHYSICAL EXAM: Baseline physical exam screening: no evidence of significant aortic stenosis and no wheezes audible  REST ECG: Normal sinus rhythm   The ECG showed 1ï¾° AV block  PROCEDURE: The study was performed in the the Stress lab  Treadmill exercise testing was performed, using the Linda protocol  Gated SPECT myocardial perfusion imaging was performed after stress  Systolic blood pressure was 138 mmHg, at the  start of the study  Diastolic blood pressure was 80 mmHg, at the start of the study  The heart rate was 75 bpm, at the start of the study  IV double checked  LINDA PROTOCOL:  HR bpm SBP mmHg DBP mmHg Symptoms  Baseline 75 138 80 none  Stage 1 129 178 80 none  Stage 2 129 184 72 mild dyspnea  Stage 3 141 -- -- --  Immediate 141 205 70 moderate dyspnea  Recovery 1 92 192 80 subsiding  Recovery 2 93 150 78 none    STRESS SUMMARY: Duration of exercise was 9 min  The patient exercised to protocol stage 3  Maximal work rate was 10 1 METs  Functional capacity was normal  Maximal heart rate during stress was 141 bpm ( 90 % of maximal predicted heart  rate)  The heart rate response to stress was normal  There was normal resting blood pressure with a hypertensive response to stress  The rate-pressure product for the peak heart rate and blood pressure was 40346  There was no chest pain  during stress  The stress test was terminated due to moderate dyspnea  Pre oxygen saturation: 99 %  Peak oxygen saturation: 98 %  The stress ECG was equivocal for ischemia  Arrhythmia during stress: isolated premature ventricular beats  ISOTOPE ADMINISTRATION:  Resting isotope administration Stress isotope administration  Agent Tetrofosmin Tetrofosmin  Dose 11 mCi 30 9 mCi  Date 10/14/2020 10/14/2020  Injection time 08:55 10:38  Injection-image interval 37 min 52 min    Radiopharmaceutical was administered 6 min, 30 sec into the stress protocol  There was 2 min and 30 sec of exercise after the injection  MYOCARDIAL PERFUSION IMAGING:  The image quality was fair  Rotating projection images reveal mild diaphragmatic attenuation and mild subdiaphragmatic activity   Left ventricular size was normal  The TID ratio was 0 97  PERFUSION DEFECTS:  -  There was a moderate-sized, mildly severe, fixed myocardial perfusion defect of the basal inferior wall likely due to diaphragm attenuation  GATED SPECT:  The calculated left ventricular ejection fraction was 47 %  Left ventricular ejection fraction was within normal limits by visual estimate  There was no left ventricular regional abnormality  SUMMARY:  -  Stress results: Duration of exercise was 9 min  Target heart rate was achieved  There was normal resting blood pressure with a hypertensive response to stress  There was no chest pain during stress  The stress test was terminated due to  moderate dyspnea  -  ECG conclusions: The stress ECG was equivocal for ischemia  -  Perfusion imaging: There was a moderate-sized, mildly severe, fixed myocardial perfusion defect of the basal inferior wall likely due to diaphragm attenuation   -  Gated SPECT: The calculated left ventricular ejection fraction was 47 %  Left ventricular ejection fraction was within normal limits by visual estimate  There was no left ventricular regional abnormality  IMPRESSIONS: Normal study after maximal exercise  There was image artifact, without diagnostic evidence for perfusion abnormality  Prepared and signed by    Katie Fernandez MD  Signed 10/14/2020 15:31:45    No results found for this or any previous visit  Imaging:  Chest X-Ray:   No Chest XR results available for this patient  CT-scan of the chest:     CTA dissection protocol chest abdomen pelvis w wo contrast    Result Date: 10/12/2020  Impression 1  No aortic dissection or aneurysm  2   No acute finding within chest, abdomen, and pelvis  3   Colonic diverticulosis  4   Incidentally noted 1 5 cm right paravertebral simple fluid attenuating cystic lesion at the level of T6  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   Recommend correlation/comparison with prior outside imaging   Workstation performed: [id number]     Lab Review   Lab Results   Component Value Date    WBC 12 49 (H) 08/19/2021    HGB 17 1 (H) 08/19/2021    HCT 49 5 (H) 08/19/2021    MCV 91 08/19/2021    RDW 12 6 08/19/2021     08/19/2021     BMP:  Lab Results   Component Value Date    SODIUM 138 08/19/2021    K 3 8 08/19/2021     (H) 08/19/2021    CO2 23 08/19/2021    BUN 18 08/19/2021    CREATININE 1 04 08/19/2021    GLUC 123 08/19/2021    GLUF 123 (H) 08/19/2021    CALCIUM 9 3 08/19/2021    EGFR 74 08/19/2021    MG 2 4 10/13/2020     LFT:  Lab Results   Component Value Date    AST 27 08/10/2021    ALT 43 08/10/2021    ALKPHOS 65 08/10/2021    TP 6 5 08/10/2021    ALB 4 4 08/10/2021      Lab Results   Component Value Date    [id number] 0 596 10/13/2020     No components found for: [employer] [facility]  Lab Results   Component Value Date    HGBA1C 5 5 08/10/2021     Lipid Profile:   Lab Results   Component Value Date    CHOLESTEROL 132 08/10/2021    HDL 43 08/10/2021    LDLCALC 68 08/10/2021    TRIG 130 08/10/2021     Lab Results   Component Value Date    CHOLESTEROL 132 08/10/2021    CHOLESTEROL 142 04/05/2021     Lab Results   Component Value Date    TROPONINI <0 02 10/13/2020     No results found for: NTBNP         Dr Raj Patel, MD [facility]      "This note has been constructed using a voice recognition system  Therefore there may be syntax, spelling, and/or grammatical errors   Please call if you have any questions  "

## 2021-09-01 NOTE — PROGRESS NOTES
Progress note - Cardiology Office   [facility]  Ming Asif 76 y o  male MRN: [id number]  : 1955  Unit/Bed#:  Encounter: [id number]      Assessment:     1  Coronary artery disease involving native coronary artery of native heart without angina pectoris    2  Sick sinus syndrome ([address] )    3  Intermittent complete heart block ([address] )    4  NSVT (nonsustained ventricular tachycardia) ([address] )    5  LENY (obstructive sleep apnea)    6  Premature ventricular beats    7  Obstructive sleep apnea syndrome    8  Mixed hyperlipidemia        Discussion summary and Plan:       1  Coronary artery disease status post catheterization in 2017 has moderate ostial circ disease and nonobstructive disease in other arteries  He has repeat cardiac catheterization done  Cath was done in 2021  Patient has 50-70% ostial circumflex stenosis which is non dominant artery and IFR is 0 96 other arteries have nonobstructive disease  2  Dyslipidemia  Continue current Rx cholesterol profile is acceptable   Continue statin     3  History of palpitation with history of PVCs  Event monitor has shows episodes of NSVT, episodes of third-degree AV block, junctional escape and he was evaluated by EP and was recommended dual-chamber pacemaker  His willing to consider it  We will discussed with EP  4  Obstructive sleep apnea  Continue using CPAP machine     5  Overweight  Advised to lose weight      6  Sick sinus syndrome with episodes of NSVT  Referral to EP    Plan  at this time patient is doing very well he has no new complaints  His cardiac catheterization report discussed with him at length  All his questions were answered  We will send this report to EP to have him scheduled for pacemaker  He has already been evaluated by them  EP note reviewed  Patient is ambulatory monitoring reviewed with him  Follow-up 4 months  Thank you for your consultation    If you have any question please call me at [phone]    Counseling :  A description of the counseling  Goals and Barriers  Patient's ability to self care: Yes  Medication side effect reviewed with patient in detail and all their questions answered to their satisfaction  Primary Care Physician : Amy Marie DO      HPI :     Lino Crowell is a 76y o  year old male who was referred by primary care doctor for for his history of cardiac disease  Patient who used to follow up with [facility] ([facility]) and has been evaluated by [facility] cardiology in 2017 for 2nd opinion has medical history significant for coronary artery disease, dyslipidemia, DJD and GERD along with obstructive sleep apnea  He had a catheterization done in June of 2017 which shows he had a moderate ostial circumflex disease and he chose to have medical therapy  At that time he was seen by  [facility] cardiology for 2nd opinion and various options were discussed with him including continue medical Rx, angioplasty of ostial circumflex which could be done but slightly high risk or single-vessel bypass surgery  At that time he was asymptomatic and he was continued medical therapy  Patient was recently in [place] where he had an episode of palpitation and fast heartbeat  He was kept overnight ruled out for acute coronary syndrome and he was advised to follow up with Cardiology here  He is known to have history of irregular heartbeat  EKG shows normal sinus with first-degree AV block and frequent PVCs  PVCs are also in the form of bigeminy and fusion beats are noted  He is otherwise very active he denies any chest pain any shortness of breath  He is able to do his activities without any problems  No nausea no vomiting no PND no orthopnea no syncope no other issues  No recent surgery    He has a previous history of surgery of gallbladder removal as well as neck fusion    He does not smoke but occasionally he will smoke a cigar maybe once every 3 months        09/01/2021  Above reviewed  Patient came for follow-up he is doing well he underwent cardiac catheterization we found he has ostial circumflex 50- 70% visually but IFR was 0 96  It was felt patient does not need stenting  His nuclear stress test also shows no ischemia  He denies any symptoms he is very active he does exercise without any problems  But he does have history of palpitations and he was found to have episode of NSVT, as well as intermittent third-degree AV block and junctional escape  He was evaluated by EP and was recommended dual-chamber pacemaker but they would like to have him cardiac catheterization done fast   His medications reviewed today EKG shows heart rate 64 beats per minute rare PVCs  No nausea no vomiting he is not on any AV node blocking drug because of his episodes of bradycardia  He has multiple questions regarding his angiogram and related to his rhythm problem and pacemaker which were discussed openly  No other cardiovascular issues at this time  Review of Systems   Constitutional: Negative for activity change, chills, diaphoresis, fever and unexpected weight change  HENT: Negative for congestion  Eyes: Negative for discharge and redness  Respiratory: Negative for cough, chest tightness, shortness of breath and wheezing  Cardiovascular: Positive for palpitations  Negative for chest pain and leg swelling  Occasional   Gastrointestinal: Negative for abdominal pain, diarrhea and nausea  Endocrine: Negative  Genitourinary: Negative for decreased urine volume and urgency  Musculoskeletal: Negative  Negative for arthralgias, back pain and gait problem  Skin: Negative for rash and wound  Allergic/Immunologic: Negative  Neurological: Negative for dizziness, seizures, syncope, weakness, light-headedness and headaches  Hematological: Negative  Psychiatric/Behavioral: Negative for agitation and confusion   The patient is not nervous/anxious          Historical Information   Past Medical History:   Diagnosis Date    Abrasion of left foot     LAST ASSESSED: 9/23/13    Achilles tendinitis, unspecified leg     LAST ASSESSED: 11/30/12    Allergic rhinitis     LAST ASSESSED: 11/25/13    Allergic rhinitis 06/25/2008    LAST ASSESSED: 6/25/08    Cervicalgia 04/22/2011    LAST ASSESSED: 4/22/11    Coronary artery disease     CPAP (continuous positive airway pressure) dependence     Dysfunction of left eustachian tube     LAST ASSESSED: 9/23/13    Epistaxis     LAST ASSESSED: 5/27/15    First degree atrioventricular block     LAST ASSESSED: 7/10/17    Gastric ulcer 10/07/2011    LAST ASSESSED: 3/9/15    GERD (gastroesophageal reflux disease)     Hemorrhoids 05/13/2011    LAST ASSESSED: 5/13/11    Long term use of drug     LAST ASSESSED: 10/11/12    Myalgia 12/21/2007    LAST ASSESSED: 12/21/07    Myositis 12/21/2007    LAST ASSESSED: 12/21/07    Numbness and tingling of foot     LAST ASSESSED: 3/9/15    Premature ventricular beats     states cardiologist ordered Zio ambulatory cardiac monitor    Sleep apnea      Past Surgical History:   Procedure Laterality Date    CARDIAC CATHETERIZATION  2017    CATARACT EXTRACTION Right     CERVICAL FUSION      pt thinks C4-C5    CHOLECYSTECTOMY      LAPAROSCOPIC; LAST ASSESSED: 10/17/17    COLONOSCOPY      COMPLETE; 3-4 YEARS AGO BY [place] ; LAST ASSESSED: 8/18/14    MYRINGOTOMY W/ TUBES      WITH VENTILATING TUBE INSERTION    WI REVISE MEDIAN N/CARPAL TUNNEL SURG Left 7/16/2021    Procedure: RELEASE CARPAL TUNNEL;  Surgeon: Alfredo Maxwell MD;  Location: [facility];  Service: Orthopedics    VASECTOMY       Social History     Substance and Sexual Activity   Alcohol Use Yes    Alcohol/week: 7 0 standard drinks    Types: 7 Glasses of wine per week    Comment: Once in a while     Social History     Substance and Sexual Activity   Drug Use No     Social History Tobacco Use   Smoking Status Former Smoker    Quit date: 200    Years since quittin 6   Smokeless Tobacco Never Used     Family History:   Family History   Problem Relation Age of Onset    Cancer Mother         SOFT TISSUE CANCER ON RT SIDE CHEST WALL AND     Prostate cancer Maternal Uncle        Meds/Allergies     Allergies   Allergen Reactions    Amoxicillin Anaphylaxis    Acetaminophen      Rapid heart rate    Apple - Food Allergy     Cherry Flavor - Food Allergy Other (See Comments)     States allergy is to raw cherries    Pollen Extract     Augmentin [Amoxicillin-Pot Clavulanate]     Dye [Iodinated Diagnostic Agents] Itching       Current Outpatient Medications:     aspirin (ASPIR-81) 81 mg EC tablet, Take 81 mg by mouth daily, Disp: , Rfl:     celecoxib (CeleBREX) 200 mg capsule, TAKE 1 CAPSULE BY MOUTH EVERY DAY, Disp: 90 capsule, Rfl: 1    Coenzyme Q10 (COQ10) 200 MG CAPS, Take 1 tablet by mouth daily, Disp: , Rfl:     finasteride (PROSCAR) 5 mg tablet, TAKE 1 TABLET BY MOUTH EVERY DAY, Disp: 90 tablet, Rfl: 1    fluticasone (VERAMYST) 27 5 MCG/SPRAY nasal spray, 2 sprays into each nostril daily, Disp: , Rfl:     Glucosamine-Chondroitin (GLUCOSAMINE CHONDR COMPLEX PO), Take 1 tablet by mouth daily, Disp: , Rfl:     nitroglycerin (NITROSTAT) 0 4 mg SL tablet, PLACE 1 TABLET(S) BY SUBLINGUAL ROUTE , Disp: , Rfl: 4    Omega-3 Fatty Acids (FISH OIL) 1000 MG CPDR, Take by mouth daily, Disp: , Rfl:     pantoprazole (PROTONIX) 40 mg tablet, TAKE 1 TABLET BY MOUTH EVERY DAY, Disp: 90 tablet, Rfl: 1    rosuvastatin (CRESTOR) 10 MG tablet, TAKE 1 TABLET BY MOUTH EVERY DAY, Disp: 90 tablet, Rfl: 1    Vitals: Blood pressure 120/78, pulse 64, temperature 98 6 °F (37 °C), height 6' (1 829 m), weight 99 6 kg (219 lb 9 6 oz), SpO2 98 %  Body mass index is 29 78 kg/m²    Wt Readings from Last 3 Encounters:   21 99 6 kg (219 lb 9 6 oz)   21 101 kg (222 lb 10 6 oz) 08/04/21 101 kg (222 lb)     Vitals:    09/01/21 1244   Weight: 99 6 kg (219 lb 9 6 oz)     BP Readings from Last 3 Encounters:   09/01/21 120/78   08/19/21 149/66   08/04/21 152/62         Physical Exam  Constitutional:       General: He is not in acute distress  Appearance: He is well-developed  He is not diaphoretic  HENT:      Head: Normocephalic and atraumatic  Eyes:      Pupils: Pupils are equal, round, and reactive to light  Neck:      Thyroid: No thyromegaly  Vascular: No JVD  Trachea: No tracheal deviation  Cardiovascular:      Rate and Rhythm: Normal rate and regular rhythm  Heart sounds: S1 normal and S2 normal  Heart sounds not distant  Murmur heard  Systolic (ejection) murmur is present with a grade of 2/6  No friction rub  No gallop  No S3 or S4 sounds  Pulmonary:      Effort: Pulmonary effort is normal  No respiratory distress  Breath sounds: Normal breath sounds  No wheezing or rales  Chest:      Chest wall: No tenderness  Abdominal:      General: Bowel sounds are normal  There is no distension  Palpations: Abdomen is soft  Tenderness: There is no abdominal tenderness  Musculoskeletal:         General: No deformity  Cervical back: Neck supple  Skin:     General: Skin is warm and dry  Coloration: Skin is not pale  Findings: No rash  Neurological:      Mental Status: He is alert and oriented to person, place, and time  Psychiatric:         Behavior: Behavior normal          Judgment: Judgment normal              Diagnostic Studies Review Cardio:   cardiac catheterization done in June 2017 at [facility] shows he had moderate stenosis of his ostial circumflex  Had a nonobstructive disease in other arteries and had a normal LV systolic function  Repeat cardiac catheterization done 08/19/2021 shows patient has nonobstructive disease involving RCA and LAD    50- 70% ostial circumflex and IFR was 0 95 EF is acceptable  Nuclear stress test done in 2020  Nuclear stress done in 2020 patient exercised for about 9 minutes  Fixed defect was seen no ischemia noted  EF was 47%      Event monitor has shows episodes of pauses, intermittent 3 AV block, episode of NSVT was evaluated by EP  EKG:  Twelve lead EKG done 2021 shows normal sinus rhythm first-degree AV block with PVCs  No significant change from previous EKG done at his primary care doctor's office     12 lead EKG done 2021 shows Normal sinus rhythm first-degree AV block heart rate 64 beats per minute  Rare PVCs noted  Results for orders placed during the hospital encounter of 10/12/20    NM Myocardial Perfusion Spect (Exercise Induced Stress and/or Rest)    Narrative  [address]  [facility], [address] [facility]  [phone]    Rest/Stress Gated SPECT Myocardial Perfusion Imaging After Exercise    Patient: Milagro Rawls  MR number: [id number]  Account number: [de-identified]  : 1955  Age: 71 years  Gender: Male  Status: Inpatient  Location: Stress lab  Height: 73 in  Weight: 202 lb  BP: 138/ 80 mmHg    Allergies: AMOXICILLIN, ACETAMINOPHEN, APPLE, CHERRY FLAVOR, POLLEN EXTRACT, AMOXICILLIN-POT CLAVULANATE, IODINATED DIAGNOSTIC AGENTS    Diagnosis: R07 9 - Chest pain, unspecified    Interpreting Physician:  Jyoti Tejada MD  Referring Physician:  Raquel Saeed DO  Technician:  Rosa Elena Mcghee  RN:  Layne Jiménez RN BSN  Group:  [facility]  Report Prepared by[de-identified]  Layne Jiménez RN BSN    INDICATIONS: Coronary artery disease  HISTORY: The patient is a 71year old  male  Chest pain status: chest pain, radiation to neck and jaw area, radiation to arm(s)  Coronary artery disease risk factors: dyslipidemia  Cardiovascular history: coronary artery disease  and arrhythmia  Co-morbidity: obesity      PHYSICAL EXAM: Baseline physical exam screening: no evidence of significant aortic stenosis and no wheezes audible  REST ECG: Normal sinus rhythm  The ECG showed 1ï¾° AV block  PROCEDURE: The study was performed in the the Stress lab  Treadmill exercise testing was performed, using the Linda protocol  Gated SPECT myocardial perfusion imaging was performed after stress  Systolic blood pressure was 138 mmHg, at the  start of the study  Diastolic blood pressure was 80 mmHg, at the start of the study  The heart rate was 75 bpm, at the start of the study  IV double checked  LINDA PROTOCOL:  HR bpm SBP mmHg DBP mmHg Symptoms  Baseline 75 138 80 none  Stage 1 129 178 80 none  Stage 2 129 184 72 mild dyspnea  Stage 3 141 -- -- --  Immediate 141 205 70 moderate dyspnea  Recovery 1 92 192 80 subsiding  Recovery 2 93 150 78 none    STRESS SUMMARY: Duration of exercise was 9 min  The patient exercised to protocol stage 3  Maximal work rate was 10 1 METs  Functional capacity was normal  Maximal heart rate during stress was 141 bpm ( 90 % of maximal predicted heart  rate)  The heart rate response to stress was normal  There was normal resting blood pressure with a hypertensive response to stress  The rate-pressure product for the peak heart rate and blood pressure was 03465  There was no chest pain  during stress  The stress test was terminated due to moderate dyspnea  Pre oxygen saturation: 99 %  Peak oxygen saturation: 98 %  The stress ECG was equivocal for ischemia  Arrhythmia during stress: isolated premature ventricular beats  ISOTOPE ADMINISTRATION:  Resting isotope administration Stress isotope administration  Agent Tetrofosmin Tetrofosmin  Dose 11 mCi 30 9 mCi  Date 10/14/2020 10/14/2020  Injection time 08:55 10:38  Injection-image interval 37 min 52 min    Radiopharmaceutical was administered 6 min, 30 sec into the stress protocol  There was 2 min and 30 sec of exercise after the injection  MYOCARDIAL PERFUSION IMAGING:  The image quality was fair  Rotating projection images reveal mild diaphragmatic attenuation and mild subdiaphragmatic activity  Left ventricular size was normal  The TID ratio was 0 97  PERFUSION DEFECTS:  -  There was a moderate-sized, mildly severe, fixed myocardial perfusion defect of the basal inferior wall likely due to diaphragm attenuation  GATED SPECT:  The calculated left ventricular ejection fraction was 47 %  Left ventricular ejection fraction was within normal limits by visual estimate  There was no left ventricular regional abnormality  SUMMARY:  -  Stress results: Duration of exercise was 9 min  Target heart rate was achieved  There was normal resting blood pressure with a hypertensive response to stress  There was no chest pain during stress  The stress test was terminated due to  moderate dyspnea  -  ECG conclusions: The stress ECG was equivocal for ischemia  -  Perfusion imaging: There was a moderate-sized, mildly severe, fixed myocardial perfusion defect of the basal inferior wall likely due to diaphragm attenuation   -  Gated SPECT: The calculated left ventricular ejection fraction was 47 %  Left ventricular ejection fraction was within normal limits by visual estimate  There was no left ventricular regional abnormality  IMPRESSIONS: Normal study after maximal exercise  There was image artifact, without diagnostic evidence for perfusion abnormality  Prepared and signed by    Job Officer, MD  Signed 10/14/2020 15:31:45    No results found for this or any previous visit  Imaging:  Chest X-Ray:   No Chest XR results available for this patient  CT-scan of the chest:     CTA dissection protocol chest abdomen pelvis w wo contrast    Result Date: 10/12/2020  Impression 1  No aortic dissection or aneurysm  2   No acute finding within chest, abdomen, and pelvis  3   Colonic diverticulosis  4   Incidentally noted 1 5 cm right paravertebral simple fluid attenuating cystic lesion at the level of T6    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   Recommend correlation/comparison with prior outside imaging  Workstation performed: [id number]     Lab Review   Lab Results   Component Value Date    WBC 12 49 (H) 08/19/2021    HGB 17 1 (H) 08/19/2021    HCT 49 5 (H) 08/19/2021    MCV 91 08/19/2021    RDW 12 6 08/19/2021     08/19/2021     BMP:  Lab Results   Component Value Date    SODIUM 138 08/19/2021    K 3 8 08/19/2021     (H) 08/19/2021    CO2 23 08/19/2021    BUN 18 08/19/2021    CREATININE 1 04 08/19/2021    GLUC 123 08/19/2021    GLUF 123 (H) 08/19/2021    CALCIUM 9 3 08/19/2021    EGFR 74 08/19/2021    MG 2 4 10/13/2020     LFT:  Lab Results   Component Value Date    AST 27 08/10/2021    ALT 43 08/10/2021    ALKPHOS 65 08/10/2021    TP 6 5 08/10/2021    ALB 4 4 08/10/2021      Lab Results   Component Value Date    HAF8IHKXTYCZ 0 596 10/13/2020     No components found for: [facility]  Lab Results   Component Value Date    HGBA1C 5 5 08/10/2021     Lipid Profile:   Lab Results   Component Value Date    CHOLESTEROL 132 08/10/2021    HDL 43 08/10/2021    LDLCALC 68 08/10/2021    TRIG 130 08/10/2021     Lab Results   Component Value Date    CHOLESTEROL 132 08/10/2021    CHOLESTEROL 142 04/05/2021     Lab Results   Component Value Date    TROPONINI <0 02 10/13/2020     No results found for: NTBNP         Dr Mark Mendoza MD [facility]      "This note has been constructed using a voice recognition system  Therefore there may be syntax, spelling, and/or grammatical errors   Please call if you have any questions  "

## 2021-09-09 DIAGNOSIS — R35.1 BENIGN PROSTATIC HYPERPLASIA WITH NOCTURIA: ICD-10-CM

## 2021-09-09 DIAGNOSIS — E78.2 MIXED HYPERLIPIDEMIA: ICD-10-CM

## 2021-09-09 DIAGNOSIS — N40.1 BENIGN PROSTATIC HYPERPLASIA WITH NOCTURIA: ICD-10-CM

## 2021-09-09 RX ORDER — ROSUVASTATIN CALCIUM 10 MG/1
TABLET, COATED ORAL
Qty: 90 TABLET | Refills: 1 | Status: SHIPPED | OUTPATIENT
Start: 2021-09-09 | End: 2022-01-28 | Stop reason: SDUPTHER

## 2021-09-09 RX ORDER — FINASTERIDE 5 MG/1
TABLET, FILM COATED ORAL
Qty: 90 TABLET | Refills: 1 | Status: SHIPPED | OUTPATIENT
Start: 2021-09-09 | End: 2022-01-28 | Stop reason: SDUPTHER

## 2021-09-14 ENCOUNTER — TELEPHONE (OUTPATIENT)
Dept: CARDIOLOGY CLINIC | Facility: CLINIC | Age: 66
End: 2021-09-14

## 2021-09-14 DIAGNOSIS — I25.10 CORONARY ARTERY DISEASE INVOLVING NATIVE CORONARY ARTERY OF NATIVE HEART WITHOUT ANGINA PECTORIS: ICD-10-CM

## 2021-09-14 DIAGNOSIS — I47.2 NSVT (NONSUSTAINED VENTRICULAR TACHYCARDIA) (HCC): ICD-10-CM

## 2021-09-14 DIAGNOSIS — I49.5 SICK SINUS SYNDROME (HCC): Primary | ICD-10-CM

## 2021-09-14 NOTE — TELEPHONE ENCOUNTER
Patient schedule for dual chamber PM at [facility] on 10/25/21 with Dr Jaylin Lea  patein aware of general instructions, meds holds (Omega- Hold week prior) and labs require  Patient have DYE allergy per Dr Jaylin Lea we will sent  The following meds to his retail Rx   hydrocortisone 100 milligrams, Benadryl 25 milligrams, Pepcid 20 milligrams the night before  Repeat the dose in the morning"      Patient mentioned to be told by Dr Natalia Chand that he will tried to be at the hospital the moment of the surgery, so Dr Natalia Chand the date choose by the patient was 10/25/21  Dr Jaylin Lea patient would like to have either appointment with you prior of the procedure or a phone called to go over couple of questions  Thank you

## 2021-09-14 NOTE — TELEPHONE ENCOUNTER
Called premedication in to the patient retail Rx, Hydrocortisone doesn't come on 100mg the way you asked for, the highest dose will be 20mg so patient will need to take 5 tab the PM prior and 5 tab the Am of the procedure together with the other medications

## 2021-09-14 NOTE — TELEPHONE ENCOUNTER
----- Message from Aleksandr Underwood MD sent at 9/9/2021  8:53 AM EDT -----  Medtronic MRI compatible device, dual-chamber   Left side   Use contrast only if needed- history of contrast dye allergy  Use antibiotic -vancomycin     Patient should get a steroid prep for dye allergy before the procedure  Have hydrocortisone 100 milligrams, Benadryl 25 milligrams, Pepcid 20 milligrams the night before  Repeat the dose in the morning        ----- Message -----  From: Jen Piña MD  Sent: 9/1/2021   1:35 PM EDT  To: Aleksandr Underwood MD

## 2021-09-30 ENCOUNTER — TELEPHONE (OUTPATIENT)
Dept: CARDIOLOGY CLINIC | Facility: CLINIC | Age: 66
End: 2021-09-30

## 2021-09-30 NOTE — TELEPHONE ENCOUNTER
----- Message from Katie Dickerson sent at 9/29/2021  9:05 AM EDT -----  Regarding: FW: Pre-Op/Post-Op Question  Contact: [phone]    ----- Message -----  From: Mary Gordon  Sent: 9/29/2021   8:59 AM EDT  To: Cardiology [facility]  Subject: FW: Pre-Op/Post-Op Question                        ----- Message -----  From: Elier Saba  Sent: 9/28/2021   3:59 PM EDT  To: Cardiology [facility]  Subject: RE: Pre-Op/Post-Op Question                      As a supplement to my recent message,  A reminder that this all started with my episode of dizziness and rapid heart beat for about 40 minutes while I was at an event back in April

## 2021-09-30 NOTE — TELEPHONE ENCOUNTER
Patient has prior history of NSVT  Patient does describe occasional palpitation and lightheadedness   Patient does have CAD, although nonobstructive at this time      Hence recommending changing procedure   He will initially undergo an EP study through the pocket  If VT is inducible then it will be an ICD  Otherwise it will be pacemaker      Communication to Dr Dennis, with whom case was discussed, Dr Corinne Guard - primary cardiologist, and also to the patient

## 2021-10-02 ENCOUNTER — IMMUNIZATIONS (OUTPATIENT)
Dept: FAMILY MEDICINE CLINIC | Facility: HOSPITAL | Age: 66
End: 2021-10-02

## 2021-10-02 DIAGNOSIS — Z23 ENCOUNTER FOR IMMUNIZATION: Primary | ICD-10-CM

## 2021-10-02 PROCEDURE — 91300 SARS-COV-2 / COVID-19 MRNA VACCINE (PFIZER-BIONTECH) 30 MCG: CPT

## 2021-10-02 PROCEDURE — 0001A SARS-COV-2 / COVID-19 MRNA VACCINE (PFIZER-BIONTECH) 30 MCG: CPT

## 2021-10-19 LAB
ALBUMIN SERPL-MCNC: 4.5 G/DL (ref 3.6–5.1)
ALBUMIN/GLOB SERPL: 2 (CALC) (ref 1–2.5)
ALP SERPL-CCNC: 67 U/L (ref 35–144)
ALT SERPL-CCNC: 51 U/L (ref 9–46)
AST SERPL-CCNC: 31 U/L (ref 10–35)
BASOPHILS # BLD AUTO: 32 CELLS/UL (ref 0–200)
BASOPHILS NFR BLD AUTO: 0.6 %
BILIRUB SERPL-MCNC: 1.4 MG/DL (ref 0.2–1.2)
BUN SERPL-MCNC: 18 MG/DL (ref 7–25)
BUN/CREAT SERPL: ABNORMAL (CALC) (ref 6–22)
CALCIUM SERPL-MCNC: 10 MG/DL (ref 8.6–10.3)
CHLORIDE SERPL-SCNC: 103 MMOL/L (ref 98–110)
CO2 SERPL-SCNC: 30 MMOL/L (ref 20–32)
CREAT SERPL-MCNC: 0.93 MG/DL (ref 0.7–1.25)
EOSINOPHIL # BLD AUTO: 90 CELLS/UL (ref 15–500)
EOSINOPHIL NFR BLD AUTO: 1.7 %
ERYTHROCYTE [DISTWIDTH] IN BLOOD BY AUTOMATED COUNT: 12.4 % (ref 11–15)
GLOBULIN SER CALC-MCNC: 2.3 G/DL (CALC) (ref 1.9–3.7)
GLUCOSE SERPL-MCNC: 100 MG/DL (ref 65–99)
HCT VFR BLD AUTO: 49.3 % (ref 38.5–50)
HGB BLD-MCNC: 16.6 G/DL (ref 13.2–17.1)
LYMPHOCYTES # BLD AUTO: 2109 CELLS/UL (ref 850–3900)
LYMPHOCYTES NFR BLD AUTO: 39.8 %
MCH RBC QN AUTO: 31.1 PG (ref 27–33)
MCHC RBC AUTO-ENTMCNC: 33.7 G/DL (ref 32–36)
MCV RBC AUTO: 92.3 FL (ref 80–100)
MONOCYTES # BLD AUTO: 567 CELLS/UL (ref 200–950)
MONOCYTES NFR BLD AUTO: 10.7 %
NEUTROPHILS # BLD AUTO: 2502 CELLS/UL (ref 1500–7800)
NEUTROPHILS NFR BLD AUTO: 47.2 %
PLATELET # BLD AUTO: 216 THOUSAND/UL (ref 140–400)
PMV BLD REES-ECKER: 10.1 FL (ref 7.5–12.5)
POTASSIUM SERPL-SCNC: 4.8 MMOL/L (ref 3.5–5.3)
PROT SERPL-MCNC: 6.8 G/DL (ref 6.1–8.1)
RBC # BLD AUTO: 5.34 MILLION/UL (ref 4.2–5.8)
SL AMB EGFR AFRICAN AMERICAN: 99 ML/MIN/1.73M2
SL AMB EGFR NON AFRICAN AMERICAN: 85 ML/MIN/1.73M2
SODIUM SERPL-SCNC: 140 MMOL/L (ref 135–146)
WBC # BLD AUTO: 5.3 THOUSAND/UL (ref 3.8–10.8)

## 2021-10-19 RX ORDER — FAMOTIDINE 20 MG/1
TABLET, FILM COATED ORAL
COMMUNITY
Start: 2021-09-14 | End: 2021-10-25 | Stop reason: HOSPADM

## 2021-10-19 RX ORDER — HYDROCORTISONE 20 MG/1
TABLET ORAL
COMMUNITY
Start: 2021-09-14 | End: 2021-10-25 | Stop reason: HOSPADM

## 2021-10-20 ENCOUNTER — OFFICE VISIT (OUTPATIENT)
Dept: FAMILY MEDICINE CLINIC | Facility: CLINIC | Age: 66
End: 2021-10-20
Payer: MEDICARE

## 2021-10-20 ENCOUNTER — OFFICE VISIT (OUTPATIENT)
Dept: SLEEP CENTER | Facility: CLINIC | Age: 66
End: 2021-10-20
Payer: MEDICARE

## 2021-10-20 VITALS
HEART RATE: 74 BPM | BODY MASS INDEX: 29.53 KG/M2 | SYSTOLIC BLOOD PRESSURE: 126 MMHG | OXYGEN SATURATION: 98 % | WEIGHT: 218 LBS | TEMPERATURE: 98 F | RESPIRATION RATE: 14 BRPM | DIASTOLIC BLOOD PRESSURE: 74 MMHG | HEIGHT: 72 IN

## 2021-10-20 VITALS
DIASTOLIC BLOOD PRESSURE: 72 MMHG | BODY MASS INDEX: 28.89 KG/M2 | WEIGHT: 218 LBS | SYSTOLIC BLOOD PRESSURE: 130 MMHG | HEIGHT: 73 IN

## 2021-10-20 DIAGNOSIS — M17.12 PRIMARY OSTEOARTHRITIS OF LEFT KNEE: ICD-10-CM

## 2021-10-20 DIAGNOSIS — M19.041 OSTEOARTHRITIS OF FINGERS OF BOTH HANDS: ICD-10-CM

## 2021-10-20 DIAGNOSIS — R00.2 INTERMITTENT PALPITATIONS: ICD-10-CM

## 2021-10-20 DIAGNOSIS — Z00.00 MEDICARE ANNUAL WELLNESS VISIT, SUBSEQUENT: Primary | ICD-10-CM

## 2021-10-20 DIAGNOSIS — R73.01 IMPAIRED FASTING GLUCOSE: ICD-10-CM

## 2021-10-20 DIAGNOSIS — K21.9 GASTROESOPHAGEAL REFLUX DISEASE, UNSPECIFIED WHETHER ESOPHAGITIS PRESENT: ICD-10-CM

## 2021-10-20 DIAGNOSIS — I44.2 INTERMITTENT COMPLETE HEART BLOCK (HCC): ICD-10-CM

## 2021-10-20 DIAGNOSIS — K21.9 GASTRO-ESOPHAGEAL REFLUX DISEASE WITHOUT ESOPHAGITIS: ICD-10-CM

## 2021-10-20 DIAGNOSIS — I49.5 SICK SINUS SYNDROME (HCC): ICD-10-CM

## 2021-10-20 DIAGNOSIS — I25.10 CORONARY ARTERY DISEASE INVOLVING NATIVE CORONARY ARTERY OF NATIVE HEART WITHOUT ANGINA PECTORIS: ICD-10-CM

## 2021-10-20 DIAGNOSIS — G47.33 OSA (OBSTRUCTIVE SLEEP APNEA): Primary | ICD-10-CM

## 2021-10-20 DIAGNOSIS — M19.042 OSTEOARTHRITIS OF FINGERS OF BOTH HANDS: ICD-10-CM

## 2021-10-20 DIAGNOSIS — Z12.5 PROSTATE CANCER SCREENING: ICD-10-CM

## 2021-10-20 DIAGNOSIS — E78.2 MIXED HYPERLIPIDEMIA: ICD-10-CM

## 2021-10-20 PROCEDURE — 99213 OFFICE O/P EST LOW 20 MIN: CPT | Performed by: INTERNAL MEDICINE

## 2021-10-20 PROCEDURE — G0438 PPPS, INITIAL VISIT: HCPCS | Performed by: FAMILY MEDICINE

## 2021-10-20 PROCEDURE — 99214 OFFICE O/P EST MOD 30 MIN: CPT | Performed by: FAMILY MEDICINE

## 2021-10-20 PROCEDURE — 1124F ACP DISCUSS-NO DSCNMKR DOCD: CPT | Performed by: FAMILY MEDICINE

## 2021-10-20 RX ORDER — CELECOXIB 200 MG/1
200 CAPSULE ORAL DAILY
Qty: 90 CAPSULE | Refills: 1 | Status: SHIPPED | OUTPATIENT
Start: 2021-10-20 | End: 2022-04-14 | Stop reason: SDUPTHER

## 2021-10-20 RX ORDER — PANTOPRAZOLE SODIUM 40 MG/1
40 TABLET, DELAYED RELEASE ORAL DAILY
Qty: 90 TABLET | Refills: 1 | Status: SHIPPED | OUTPATIENT
Start: 2021-10-20 | End: 2022-04-14 | Stop reason: SDUPTHER

## 2021-10-21 ENCOUNTER — TELEPHONE (OUTPATIENT)
Dept: SLEEP CENTER | Facility: CLINIC | Age: 66
End: 2021-10-21

## 2021-10-25 ENCOUNTER — APPOINTMENT (OUTPATIENT)
Dept: RADIOLOGY | Facility: HOSPITAL | Age: 66
End: 2021-10-25
Payer: MEDICARE

## 2021-10-25 ENCOUNTER — ANESTHESIA EVENT (OUTPATIENT)
Dept: NON INVASIVE DIAGNOSTICS | Facility: HOSPITAL | Age: 66
End: 2021-10-25
Payer: MEDICARE

## 2021-10-25 ENCOUNTER — ANESTHESIA (OUTPATIENT)
Dept: NON INVASIVE DIAGNOSTICS | Facility: HOSPITAL | Age: 66
End: 2021-10-25
Payer: MEDICARE

## 2021-10-25 ENCOUNTER — HOSPITAL ENCOUNTER (OUTPATIENT)
Facility: HOSPITAL | Age: 66
Setting detail: OUTPATIENT SURGERY
Discharge: HOME | End: 2021-10-25
Attending: INTERNAL MEDICINE | Admitting: INTERNAL MEDICINE
Payer: MEDICARE

## 2021-10-25 VITALS
BODY MASS INDEX: 29.46 KG/M2 | HEIGHT: 72 IN | TEMPERATURE: 97.9 F | RESPIRATION RATE: 16 BRPM | OXYGEN SATURATION: 99 % | WEIGHT: 217.5 LBS | HEART RATE: 79 BPM | SYSTOLIC BLOOD PRESSURE: 141 MMHG | DIASTOLIC BLOOD PRESSURE: 70 MMHG

## 2021-10-25 DIAGNOSIS — I47.2 NSVT (NONSUSTAINED VENTRICULAR TACHYCARDIA) (HCC): ICD-10-CM

## 2021-10-25 DIAGNOSIS — Z95.810 S/P ICD (INTERNAL CARDIAC DEFIBRILLATOR) PROCEDURE: Primary | ICD-10-CM

## 2021-10-25 LAB
ANION GAP SERPL CALCULATED.3IONS-SCNC: 4 MMOL/L (ref 4–13)
ATRIAL RATE: 64 BPM
ATRIAL RATE: 67 BPM
BUN SERPL-MCNC: 15 MG/DL (ref 5–25)
CALCIUM SERPL-MCNC: 10.2 MG/DL (ref 8.3–10.1)
CHLORIDE SERPL-SCNC: 108 MMOL/L (ref 100–108)
CO2 SERPL-SCNC: 25 MMOL/L (ref 21–32)
CREAT SERPL-MCNC: 1.03 MG/DL (ref 0.6–1.3)
ERYTHROCYTE [DISTWIDTH] IN BLOOD BY AUTOMATED COUNT: 12.2 % (ref 11.6–15.1)
GFR SERPL CREATININE-BSD FRML MDRD: 75 ML/MIN/1.73SQ M
GLUCOSE P FAST SERPL-MCNC: 116 MG/DL (ref 65–99)
GLUCOSE SERPL-MCNC: 116 MG/DL (ref 65–140)
HCT VFR BLD AUTO: 53.4 % (ref 36.5–49.3)
HGB BLD-MCNC: 18.2 G/DL (ref 12–17)
INR PPP: 1.04 (ref 0.84–1.19)
MCH RBC QN AUTO: 30.9 PG (ref 26.8–34.3)
MCHC RBC AUTO-ENTMCNC: 34.1 G/DL (ref 31.4–37.4)
MCV RBC AUTO: 91 FL (ref 82–98)
P AXIS: 19 DEGREES
P AXIS: 7 DEGREES
PLATELET # BLD AUTO: 248 THOUSANDS/UL (ref 149–390)
PMV BLD AUTO: 9.7 FL (ref 8.9–12.7)
POTASSIUM SERPL-SCNC: 4.1 MMOL/L (ref 3.5–5.3)
PR INTERVAL: 336 MS
PR INTERVAL: 356 MS
PROTHROMBIN TIME: 13.2 SECONDS (ref 11.6–14.5)
QRS AXIS: 5 DEGREES
QRS AXIS: 9 DEGREES
QRSD INTERVAL: 96 MS
QRSD INTERVAL: 96 MS
QT INTERVAL: 394 MS
QT INTERVAL: 400 MS
QTC INTERVAL: 412 MS
QTC INTERVAL: 416 MS
RBC # BLD AUTO: 5.89 MILLION/UL (ref 3.88–5.62)
SODIUM SERPL-SCNC: 137 MMOL/L (ref 136–145)
T WAVE AXIS: 43 DEGREES
T WAVE AXIS: 46 DEGREES
VENTRICULAR RATE: 64 BPM
VENTRICULAR RATE: 67 BPM
WBC # BLD AUTO: 7.17 THOUSAND/UL (ref 4.31–10.16)

## 2021-10-25 PROCEDURE — 92960 CARDIOVERSION ELECTRIC EXT: CPT | Performed by: INTERNAL MEDICINE

## 2021-10-25 PROCEDURE — 93618 INDCTJ ARRHYTHMIA ELEC PACG: CPT | Performed by: INTERNAL MEDICINE

## 2021-10-25 PROCEDURE — C1730 CATH, EP, 19 OR FEW ELECT: HCPCS | Performed by: INTERNAL MEDICINE

## 2021-10-25 PROCEDURE — 93603 RIGHT VENTRICULAR RECORDING: CPT | Performed by: INTERNAL MEDICINE

## 2021-10-25 PROCEDURE — C1892 INTRO/SHEATH,FIXED,PEEL-AWAY: HCPCS | Performed by: INTERNAL MEDICINE

## 2021-10-25 PROCEDURE — C1898 LEAD, PMKR, OTHER THAN TRANS: HCPCS | Performed by: INTERNAL MEDICINE

## 2021-10-25 PROCEDURE — NC001 PR NO CHARGE: Performed by: PHYSICIAN ASSISTANT

## 2021-10-25 PROCEDURE — 33249 INSJ/RPLCMT DEFIB W/LEAD(S): CPT | Performed by: INTERNAL MEDICINE

## 2021-10-25 PROCEDURE — 80048 BASIC METABOLIC PNL TOTAL CA: CPT | Performed by: PHYSICIAN ASSISTANT

## 2021-10-25 PROCEDURE — 85027 COMPLETE CBC AUTOMATED: CPT | Performed by: PHYSICIAN ASSISTANT

## 2021-10-25 PROCEDURE — 93612 INTRAVENTRICULAR PACING: CPT | Performed by: INTERNAL MEDICINE

## 2021-10-25 PROCEDURE — 93010 ELECTROCARDIOGRAM REPORT: CPT | Performed by: INTERNAL MEDICINE

## 2021-10-25 PROCEDURE — 85610 PROTHROMBIN TIME: CPT | Performed by: PHYSICIAN ASSISTANT

## 2021-10-25 PROCEDURE — C1721 AICD, DUAL CHAMBER: HCPCS | Performed by: INTERNAL MEDICINE

## 2021-10-25 PROCEDURE — 71045 X-RAY EXAM CHEST 1 VIEW: CPT

## 2021-10-25 PROCEDURE — 93005 ELECTROCARDIOGRAM TRACING: CPT

## 2021-10-25 PROCEDURE — C1777 LEAD, AICD, ENDO SINGLE COIL: HCPCS | Performed by: INTERNAL MEDICINE

## 2021-10-25 DEVICE — LEAD 6935M62 QUATTRO SECURE S MRI US
Type: IMPLANTABLE DEVICE | Site: HEART | Status: FUNCTIONAL
Brand: SPRINT QUATTRO SECURE S MRI™ SURESCAN™

## 2021-10-25 DEVICE — ICD DDPA2D4 COBALT XT DR MRI DF4 USA
Type: IMPLANTABLE DEVICE | Site: CHEST | Status: FUNCTIONAL
Brand: COBALT™ XT DR MRI SURESCAN™

## 2021-10-25 DEVICE — LEAD 457453 MRI US BI RCMCRD MVC
Type: IMPLANTABLE DEVICE | Site: HEART | Status: FUNCTIONAL
Brand: CAPSURE SENSE MRI™ SURESCAN™

## 2021-10-25 RX ORDER — VANCOMYCIN HYDROCHLORIDE 1 G/200ML
1000 INJECTION, SOLUTION INTRAVENOUS ONCE
Status: COMPLETED | OUTPATIENT
Start: 2021-10-25 | End: 2021-10-25

## 2021-10-25 RX ORDER — OXYCODONE HYDROCHLORIDE 5 MG/1
5 TABLET ORAL EVERY 4 HOURS PRN
Status: DISCONTINUED | OUTPATIENT
Start: 2021-10-25 | End: 2021-10-25 | Stop reason: HOSPADM

## 2021-10-25 RX ORDER — LIDOCAINE HYDROCHLORIDE 10 MG/ML
INJECTION, SOLUTION EPIDURAL; INFILTRATION; INTRACAUDAL; PERINEURAL AS NEEDED
Status: DISCONTINUED | OUTPATIENT
Start: 2021-10-25 | End: 2021-10-25 | Stop reason: HOSPADM

## 2021-10-25 RX ORDER — PROPOFOL 10 MG/ML
INJECTION, EMULSION INTRAVENOUS AS NEEDED
Status: DISCONTINUED | OUTPATIENT
Start: 2021-10-25 | End: 2021-10-25

## 2021-10-25 RX ORDER — OXYCODONE HYDROCHLORIDE 5 MG/1
5 TABLET ORAL EVERY 6 HOURS PRN
Qty: 10 TABLET | Refills: 0 | Status: SHIPPED | OUTPATIENT
Start: 2021-10-25 | End: 2021-11-04

## 2021-10-25 RX ORDER — METOPROLOL SUCCINATE 25 MG/1
25 TABLET, EXTENDED RELEASE ORAL DAILY
Qty: 30 TABLET | Refills: 3 | Status: SHIPPED | OUTPATIENT
Start: 2021-10-25 | End: 2022-01-06 | Stop reason: SDUPTHER

## 2021-10-25 RX ORDER — FENTANYL CITRATE 50 UG/ML
INJECTION, SOLUTION INTRAMUSCULAR; INTRAVENOUS AS NEEDED
Status: DISCONTINUED | OUTPATIENT
Start: 2021-10-25 | End: 2021-10-25

## 2021-10-25 RX ORDER — GENTAMICIN SULFATE 40 MG/ML
INJECTION, SOLUTION INTRAMUSCULAR; INTRAVENOUS AS NEEDED
Status: DISCONTINUED | OUTPATIENT
Start: 2021-10-25 | End: 2021-10-25 | Stop reason: HOSPADM

## 2021-10-25 RX ORDER — SODIUM CHLORIDE 9 MG/ML
INJECTION, SOLUTION INTRAVENOUS CONTINUOUS PRN
Status: DISCONTINUED | OUTPATIENT
Start: 2021-10-25 | End: 2021-10-25

## 2021-10-25 RX ORDER — PROPOFOL 10 MG/ML
INJECTION, EMULSION INTRAVENOUS CONTINUOUS PRN
Status: DISCONTINUED | OUTPATIENT
Start: 2021-10-25 | End: 2021-10-25

## 2021-10-25 RX ORDER — LIDOCAINE HYDROCHLORIDE 10 MG/ML
INJECTION, SOLUTION EPIDURAL; INFILTRATION; INTRACAUDAL; PERINEURAL AS NEEDED
Status: DISCONTINUED | OUTPATIENT
Start: 2021-10-25 | End: 2021-10-25

## 2021-10-25 RX ADMIN — PHENYLEPHRINE HYDROCHLORIDE 30 MCG/MIN: 10 INJECTION INTRAVENOUS at 10:41

## 2021-10-25 RX ADMIN — HYDROCORTISONE SODIUM SUCCINATE 100 MG: 100 INJECTION, POWDER, FOR SOLUTION INTRAMUSCULAR; INTRAVENOUS at 10:38

## 2021-10-25 RX ADMIN — FENTANYL CITRATE 25 MCG: 50 INJECTION INTRAMUSCULAR; INTRAVENOUS at 10:32

## 2021-10-25 RX ADMIN — LIDOCAINE HYDROCHLORIDE 50 MG: 10 INJECTION, SOLUTION EPIDURAL; INFILTRATION; INTRACAUDAL; PERINEURAL at 09:55

## 2021-10-25 RX ADMIN — SODIUM CHLORIDE: 9 INJECTION, SOLUTION INTRAVENOUS at 09:52

## 2021-10-25 RX ADMIN — VANCOMYCIN HYDROCHLORIDE 1000 MG: 1 INJECTION, SOLUTION INTRAVENOUS at 09:42

## 2021-10-25 RX ADMIN — PROPOFOL 50 MG: 10 INJECTION, EMULSION INTRAVENOUS at 09:55

## 2021-10-25 RX ADMIN — PROPOFOL 100 MCG/KG/MIN: 10 INJECTION, EMULSION INTRAVENOUS at 09:55

## 2021-11-09 ENCOUNTER — IN-CLINIC DEVICE VISIT (OUTPATIENT)
Dept: CARDIOLOGY CLINIC | Facility: CLINIC | Age: 66
End: 2021-11-09

## 2021-11-09 DIAGNOSIS — Z95.810 PRESENCE OF AUTOMATIC CARDIOVERTER/DEFIBRILLATOR (AICD): Primary | ICD-10-CM

## 2021-11-09 PROCEDURE — 99024 POSTOP FOLLOW-UP VISIT: CPT | Performed by: INTERNAL MEDICINE

## 2021-11-18 ENCOUNTER — OFFICE VISIT (OUTPATIENT)
Dept: CARDIOLOGY CLINIC | Facility: CLINIC | Age: 66
End: 2021-11-18
Payer: MEDICARE

## 2021-11-18 VITALS
DIASTOLIC BLOOD PRESSURE: 74 MMHG | HEIGHT: 72 IN | SYSTOLIC BLOOD PRESSURE: 142 MMHG | HEART RATE: 70 BPM | BODY MASS INDEX: 29.32 KG/M2 | WEIGHT: 216.5 LBS

## 2021-11-18 DIAGNOSIS — I25.10 CORONARY ARTERY DISEASE INVOLVING NATIVE CORONARY ARTERY OF NATIVE HEART WITHOUT ANGINA PECTORIS: ICD-10-CM

## 2021-11-18 DIAGNOSIS — I49.3 PREMATURE VENTRICULAR BEATS: ICD-10-CM

## 2021-11-18 DIAGNOSIS — G47.33 OSA (OBSTRUCTIVE SLEEP APNEA): ICD-10-CM

## 2021-11-18 DIAGNOSIS — I49.8 JUNCTIONAL RHYTHM: ICD-10-CM

## 2021-11-18 DIAGNOSIS — Z95.810 S/P ICD (INTERNAL CARDIAC DEFIBRILLATOR) PROCEDURE: ICD-10-CM

## 2021-11-18 DIAGNOSIS — E78.2 MIXED HYPERLIPIDEMIA: ICD-10-CM

## 2021-11-18 DIAGNOSIS — I49.5 SICK SINUS SYNDROME (HCC): Primary | ICD-10-CM

## 2021-11-18 DIAGNOSIS — R00.2 INTERMITTENT PALPITATIONS: ICD-10-CM

## 2021-11-18 DIAGNOSIS — Z99.89 CPAP (CONTINUOUS POSITIVE AIRWAY PRESSURE) DEPENDENCE: ICD-10-CM

## 2021-11-18 DIAGNOSIS — I44.2 INTERMITTENT COMPLETE HEART BLOCK (HCC): ICD-10-CM

## 2021-11-18 PROCEDURE — 93000 ELECTROCARDIOGRAM COMPLETE: CPT | Performed by: INTERNAL MEDICINE

## 2021-11-18 PROCEDURE — 99024 POSTOP FOLLOW-UP VISIT: CPT | Performed by: INTERNAL MEDICINE

## 2022-01-18 ENCOUNTER — OFFICE VISIT (OUTPATIENT)
Dept: CARDIOLOGY CLINIC | Facility: CLINIC | Age: 67
End: 2022-01-18
Payer: MEDICARE

## 2022-01-18 VITALS
OXYGEN SATURATION: 98 % | BODY MASS INDEX: 29.39 KG/M2 | DIASTOLIC BLOOD PRESSURE: 60 MMHG | WEIGHT: 217 LBS | HEART RATE: 63 BPM | SYSTOLIC BLOOD PRESSURE: 128 MMHG | HEIGHT: 72 IN

## 2022-01-18 DIAGNOSIS — G47.33 OSA (OBSTRUCTIVE SLEEP APNEA): ICD-10-CM

## 2022-01-18 DIAGNOSIS — I25.10 CORONARY ARTERY DISEASE INVOLVING NATIVE CORONARY ARTERY OF NATIVE HEART WITHOUT ANGINA PECTORIS: ICD-10-CM

## 2022-01-18 DIAGNOSIS — Z99.89 CPAP (CONTINUOUS POSITIVE AIRWAY PRESSURE) DEPENDENCE: ICD-10-CM

## 2022-01-18 DIAGNOSIS — Z95.810 S/P ICD (INTERNAL CARDIAC DEFIBRILLATOR) PROCEDURE: ICD-10-CM

## 2022-01-18 DIAGNOSIS — I47.2 NSVT (NONSUSTAINED VENTRICULAR TACHYCARDIA) (HCC): ICD-10-CM

## 2022-01-18 DIAGNOSIS — E78.2 MIXED HYPERLIPIDEMIA: ICD-10-CM

## 2022-01-18 PROCEDURE — 99214 OFFICE O/P EST MOD 30 MIN: CPT | Performed by: INTERNAL MEDICINE

## 2022-01-18 NOTE — PROGRESS NOTES
Progress note - Cardiology Office  [facility]  Nisha Francois 78 y o  male MRN: [id number]  : 1955  Unit/Bed#:  Encounter: [id number]      Assessment:     1  Coronary artery disease involving native coronary artery of native heart without angina pectoris    2  NSVT (nonsustained ventricular tachycardia) ([address] )    3  Mixed hyperlipidemia    4  S/P ICD (internal cardiac defibrillator) procedure    5  CPAP (continuous positive airway pressure) dependence    6  LENY (obstructive sleep apnea)        Discussion summary and Plan:       1  Coronary artery disease status post catheterization in 2017 has moderate ostial circ disease and nonobstructive disease in other arteries  He has repeat cardiac catheterization done  Cath was done in 2021  Patient has 50-70% ostial circumflex stenosis which is non dominant artery and IFR is 0 96 other arteries have nonobstructive disease  Continue aspirin and statins     2  Dyslipidemia  Continue current Rx cholesterol profile is acceptable   Continue statin he  He is tolerating his Crestor very well labs from 2021 reviewed     3  History of intermittent AV block, and SVT with production of VT during EP study status post Medtronic dual-chamber ICD which is functioning adequately  Device reviewed device site has healed well  4  Obstructive sleep apnea  Continue using CPAP machine  He is pretty compliant     5  Overweight  Advised to lose weight      6  Sick sinus syndrome with episodes of NSVT  Device interrogation reviewed    Patient is doing well from cardiac point of view  Continue current Rx  Continue beta-blockers  Continue statins and aspirin  He is asymptomatic his device is regularly interrogated  He does have episode of NSVT if he has more episode will increase beta-blockers at this time he is doing well  Follow-up 6 months  Thank you for your consultation    If you have any question please call me at [phone]    Counseling :  A description of the counseling  Goals and Barriers  Patient's ability to self care: Yes  Medication side effect reviewed with patient in detail and all their questions answered to their satisfaction  Primary Care Physician : Leila Delgadillo DO      HPI :     Rigoberto Caretr is a 78y o  year old male who was referred by primary care doctor for for his history of cardiac disease  Patient who used to follow up with [facility] ([facility]) and has been evaluated by [facility] cardiology in 2017 for 2nd opinion has medical history significant for coronary artery disease, dyslipidemia, DJD and GERD along with obstructive sleep apnea  He had a catheterization done in June of 2017 which shows he had a moderate ostial circumflex disease and he chose to have medical therapy  At that time he was seen by  [facility] cardiology for 2nd opinion and various options were discussed with him including continue medical Rx, angioplasty of ostial circumflex which could be done but slightly high risk or single-vessel bypass surgery  At that time he was asymptomatic and he was continued medical therapy  Patient was recently in [address] [place] [address] where he had an episode of palpitation and fast heartbeat  He was kept overnight ruled out for acute coronary syndrome and he was advised to follow up with Cardiology here  He is known to have history of irregular heartbeat  EKG shows normal sinus with first-degree AV block and frequent PVCs  PVCs are also in the form of bigeminy and fusion beats are noted  He is otherwise very active he denies any chest pain any shortness of breath  He is able to do his activities without any problems  No nausea no vomiting no PND no orthopnea no syncope no other issues  No recent surgery    He has a previous history of surgery of gallbladder removal as well as neck fusion    He does not smoke but occasionally he will smoke a cigar maybe once every 3 months        09/01/2021  Above reviewed  Patient came for follow-up he is doing well he underwent cardiac catheterization we found he has ostial circumflex 50- 70% visually but IFR was 0 96  It was felt patient does not need stenting  His nuclear stress test also shows no ischemia  He denies any symptoms he is very active he does exercise without any problems  But he does have history of palpitations and he was found to have episode of NSVT, as well as intermittent third-degree AV block and junctional escape  He was evaluated by EP and was recommended dual-chamber pacemaker but they would like to have him cardiac catheterization done fast   His medications reviewed today EKG shows heart rate 64 beats per minute rare PVCs  No nausea no vomiting he is not on any AV node blocking drug because of his episodes of bradycardia  He has multiple questions regarding his angiogram and related to his rhythm problem and pacemaker which were discussed openly  No other cardiovascular issues at this time  01/18/2022  Above reviewed  Patient came for follow-up  He is doing well now  He had Medtronic dual-chamber ICD paced after EP study as he was having intermittent AV block and episodes of NSVT  He had an episode of VT during EP study  He does have history of coronary artery disease with ostial circumflex 50-70% stenosis visually but IFR was 0 96  It was felt that patient does not need stent and he has no symptoms  He denies any new complaints  Actually since his procedure he is feeling lot better he has no nausea no vomiting no fever no chills no other issues  He is compliant with medications he is taking aspirin statins and metoprolol  He is tolerating his Crestor well no other cardiovascular issues  His vitals has been stable    He has blood test done on October 2021 where cholesterol profile was acceptable electrolytes were acceptable his ALT was slightly elevated but still less than 2 times the normal   His device was interrogated in November 2021  He had episode of NSVT but otherwise device is functioning adequately  His low rate is set at 50         Review of Systems   Constitutional: Negative for activity change, chills, diaphoresis, fever and unexpected weight change  HENT: Negative for congestion  Eyes: Negative for discharge and redness  Respiratory: Negative for cough, chest tightness, shortness of breath and wheezing  Cardiovascular: Negative  Negative for chest pain, palpitations and leg swelling  Gastrointestinal: Negative for abdominal pain, diarrhea and nausea  Endocrine: Negative  Genitourinary: Negative for decreased urine volume and urgency  Musculoskeletal: Negative  Negative for arthralgias, back pain and gait problem  Skin: Negative for rash and wound  Allergic/Immunologic: Negative  Neurological: Negative for dizziness, seizures, syncope, weakness, light-headedness and headaches  Hematological: Negative  Psychiatric/Behavioral: Negative for agitation and confusion  The patient is not nervous/anxious          Historical Information   Past Medical History:   Diagnosis Date    Abrasion of left foot     LAST ASSESSED: 9/23/13    Achilles tendinitis, unspecified leg     LAST ASSESSED: 11/30/12    Allergic rhinitis     LAST ASSESSED: 11/25/13    Allergic rhinitis 06/25/2008    LAST ASSESSED: 6/25/08    Cervicalgia 04/22/2011    LAST ASSESSED: 4/22/11    Coronary artery disease     CPAP (continuous positive airway pressure) dependence     Dysfunction of left eustachian tube     LAST ASSESSED: 9/23/13    Epistaxis     LAST ASSESSED: 5/27/15    First degree atrioventricular block     LAST ASSESSED: 7/10/17    Gastric ulcer 10/07/2011    LAST ASSESSED: 3/9/15    GERD (gastroesophageal reflux disease)     Hemorrhoids 05/13/2011    LAST ASSESSED: 5/13/11    Long term use of drug     LAST ASSESSED: 10/11/12    Myalgia 12/21/2007    LAST ASSESSED: 07    Myositis 2007    LAST ASSESSED: 07    Numbness and tingling of foot     LAST ASSESSED: 3/9/15    Premature ventricular beats     states cardiologist ordered Zio ambulatory cardiac monitor    Sleep apnea      Past Surgical History:   Procedure Laterality Date    CARDIAC CATHETERIZATION  2017    CARDIAC ELECTROPHYSIOLOGY PROCEDURE N/A 10/25/2021    Procedure: CARDIAC EPS;  Surgeon: Kylee Haynes MD;  [facility]; Service: Cardiology    CARDIAC ELECTROPHYSIOLOGY PROCEDURE Left 10/25/2021    Procedure: Cardiac icd implant;  Surgeon: Kylee Haynes MD;  [facility]; Service: Cardiology    CARDIOVERSION N/A 10/25/2021    Procedure: Cardioversion;  Surgeon: Kylee Haynes MD;  [facility];   Service: Cardiology    CATARACT EXTRACTION Right     CERVICAL FUSION      pt thinks C4-C5    CHOLECYSTECTOMY      LAPAROSCOPIC; LAST ASSESSED: 10/17/17    COLONOSCOPY      COMPLETE; 3-4 YEARS AGO BY [facility]; LAST ASSESSED: 14    MYRINGOTOMY W/ TUBES      WITH VENTILATING TUBE INSERTION    MI REVISE MEDIAN N/CARPAL TUNNEL SURG Left 2021    Procedure: RELEASE CARPAL TUNNEL;  Surgeon: Madelyn Solis MD;  Location: [facility]IN [facility];  Service: Orthopedics    VASECTOMY       Social History     Substance and Sexual Activity   Alcohol Use Yes    Alcohol/week: 7 0 standard drinks    Types: 7 Glasses of wine per week    Comment: Once in a while     Social History     Substance and Sexual Activity   Drug Use No     Social History     Tobacco Use   Smoking Status Former Smoker    Quit date:     Years since quittin 0   Smokeless Tobacco Never Used     Family History:   Family History   Problem Relation Age of Onset    Cancer Mother         SOFT TISSUE CANCER ON RT SIDE CHEST WALL AND     Prostate cancer Maternal Uncle        Meds/Allergies     Allergies   Allergen Reactions    Amoxicillin Anaphylaxis    Acetaminophen Rapid heart rate    Apple - Food Allergy     Cherry Flavor - Food Allergy Other (See Comments)     States allergy is to raw cherries    Pollen Extract     Augmentin [Amoxicillin-Pot Clavulanate]     Dye [Iodinated Diagnostic Agents] Itching       Current Outpatient Medications:     aspirin (ASPIR-81) 81 mg EC tablet, Take 81 mg by mouth daily, Disp: , Rfl:     celecoxib (CeleBREX) 200 mg capsule, Take 1 capsule (200 mg total) by mouth daily, Disp: 90 capsule, Rfl: 1    Coenzyme Q10 (COQ10) 200 MG CAPS, Take 1 tablet by mouth daily, Disp: , Rfl:     finasteride (PROSCAR) 5 mg tablet, TAKE 1 TABLET BY MOUTH EVERY DAY, Disp: 90 tablet, Rfl: 1    fluticasone (VERAMYST) 27 5 MCG/SPRAY nasal spray, 2 sprays into each nostril daily, Disp: , Rfl:     Glucosamine-Chondroitin (GLUCOSAMINE CHONDR COMPLEX PO), Take 1 tablet by mouth daily, Disp: , Rfl:     metoprolol succinate (TOPROL-XL) 25 mg 24 hr tablet, Take 1 tablet (25 mg total) by mouth daily, Disp: 90 tablet, Rfl: 0    nitroglycerin (NITROSTAT) 0 4 mg SL tablet, PLACE 1 TABLET(S) BY SUBLINGUAL ROUTE , Disp: , Rfl: 4    Omega-3 Fatty Acids (FISH OIL) 1000 MG CPDR, Take by mouth daily, Disp: , Rfl:     pantoprazole (PROTONIX) 40 mg tablet, Take 1 tablet (40 mg total) by mouth daily, Disp: 90 tablet, Rfl: 1    rosuvastatin (CRESTOR) 10 MG tablet, TAKE 1 TABLET BY MOUTH EVERY DAY, Disp: 90 tablet, Rfl: 1    Vitals: Blood pressure 128/60, pulse 63, height 6' (1 829 m), weight 98 4 kg (217 lb), SpO2 98 %  Body mass index is 29 43 kg/m²  Wt Readings from Last 3 Encounters:   01/18/22 98 4 kg (217 lb)   11/18/21 98 2 kg (216 lb 8 oz)   10/25/21 98 7 kg (217 lb 8 oz)     Vitals:    01/18/22 1330   Weight: 98 4 kg (217 lb)     BP Readings from Last 3 Encounters:   01/18/22 128/60   11/18/21 142/74   10/25/21 141/70         Physical Exam  Constitutional:       General: He is not in acute distress  Appearance: He is well-developed  He is not diaphoretic  Neck:      Thyroid: No thyromegaly  Vascular: No JVD  Trachea: No tracheal deviation  Comments: No carotid bruit  Cardiovascular:      Rate and Rhythm: Normal rate and regular rhythm  Heart sounds: S1 normal and S2 normal  Heart sounds not distant  Murmur heard  Systolic (ejection) murmur is present with a grade of 2/6  No friction rub  No gallop  No S3 or S4 sounds  Comments: S1-S2 regular with 2/6 murmur  Pulmonary:      Effort: Pulmonary effort is normal  No respiratory distress  Breath sounds: Normal breath sounds  No wheezing or rales  Comments: Clear to auscultate  Chest:      Chest wall: No tenderness  Abdominal:      General: Bowel sounds are normal  There is no distension  Palpations: Abdomen is soft  Tenderness: There is no abdominal tenderness  Musculoskeletal:         General: No deformity  Cervical back: Neck supple  Comments: No edema   Skin:     General: Skin is warm and dry  Coloration: Skin is not pale  Findings: No rash  Neurological:      Mental Status: He is alert and oriented to person, place, and time  Psychiatric:         Behavior: Behavior normal          Judgment: Judgment normal              Diagnostic Studies Review Cardio:   cardiac catheterization done in June 2017 at [facility] shows he had moderate stenosis of his ostial circumflex  Had a nonobstructive disease in other arteries and had a normal LV systolic function  Repeat cardiac catheterization done 08/19/2021 shows patient has nonobstructive disease involving RCA and LAD  50- 70% ostial circumflex and IFR was 0 95 EF is acceptable  Nuclear stress test done in October 2020  Nuclear stress done in October 2020 patient exercised for about 9 minutes  Fixed defect was seen no ischemia noted  EF was 47%      Event monitor has shows episodes of pauses, intermittent 3 AV block, episode of NSVT was evaluated by EP      EKG:  Twelve lead EKG done 2021 shows normal sinus rhythm first-degree AV block with PVCs  No significant change from previous EKG done at his primary care doctor's office     12 lead EKG done 2021 shows Normal sinus rhythm first-degree AV block heart rate 64 beats per minute  Rare PVCs noted  Results for orders placed during the hospital encounter of 10/12/20    NM Myocardial Perfusion Spect (Exercise Induced Stress and/or Rest)    Narrative  [address]  [facility], [address] [facility]  [phone]    Rest/Stress Gated SPECT Myocardial Perfusion Imaging After Exercise    Patient: Reese Boswell  MR number: [id number]  Account number: [de-identified]  : 1955  Age: 71 years  Gender: Male  Status: Inpatient  Location: Stress lab  Height: 73 in  Weight: 202 lb  BP: 138/ 80 mmHg    Allergies: AMOXICILLIN, ACETAMINOPHEN, APPLE, CHERRY FLAVOR, POLLEN EXTRACT, AMOXICILLIN-POT CLAVULANATE, IODINATED DIAGNOSTIC AGENTS    Diagnosis: R07 9 - Chest pain, unspecified    Interpreting Physician:  Melquiades Reed MD  Referring Physician:  Keshia Nichols DO  Technician:  Ludy Guillen  RN:  Any Maldonado RN BSN  Group:  [facility]  Report Prepared by[de-identified]  Any Maldonado RN BSN    INDICATIONS: Coronary artery disease  HISTORY: The patient is a 71year old  male  Chest pain status: chest pain, radiation to neck and jaw area, radiation to arm(s)  Coronary artery disease risk factors: dyslipidemia  Cardiovascular history: coronary artery disease  and arrhythmia  Co-morbidity: obesity  PHYSICAL EXAM: Baseline physical exam screening: no evidence of significant aortic stenosis and no wheezes audible  REST ECG: Normal sinus rhythm  The ECG showed 1ï¾° AV block  PROCEDURE: The study was performed in the the Stress lab  Treadmill exercise testing was performed, using the Ángel protocol   Gated SPECT myocardial perfusion imaging was performed after stress  Systolic blood pressure was 138 mmHg, at the  start of the study  Diastolic blood pressure was 80 mmHg, at the start of the study  The heart rate was 75 bpm, at the start of the study  IV double checked  LINDA PROTOCOL:  HR bpm SBP mmHg DBP mmHg Symptoms  Baseline 75 138 80 none  Stage 1 129 178 80 none  Stage 2 129 184 72 mild dyspnea  Stage 3 141 -- -- --  Immediate 141 205 70 moderate dyspnea  Recovery 1 92 192 80 subsiding  Recovery 2 93 150 78 none    STRESS SUMMARY: Duration of exercise was 9 min  The patient exercised to protocol stage 3  Maximal work rate was 10 1 METs  Functional capacity was normal  Maximal heart rate during stress was 141 bpm ( 90 % of maximal predicted heart  rate)  The heart rate response to stress was normal  There was normal resting blood pressure with a hypertensive response to stress  The rate-pressure product for the peak heart rate and blood pressure was 84402  There was no chest pain  during stress  The stress test was terminated due to moderate dyspnea  Pre oxygen saturation: 99 %  Peak oxygen saturation: 98 %  The stress ECG was equivocal for ischemia  Arrhythmia during stress: isolated premature ventricular beats  ISOTOPE ADMINISTRATION:  Resting isotope administration Stress isotope administration  Agent Tetrofosmin Tetrofosmin  Dose 11 mCi 30 9 mCi  Date 10/14/2020 10/14/2020  Injection time 08:55 10:38  Injection-image interval 37 min 52 min    Radiopharmaceutical was administered 6 min, 30 sec into the stress protocol  There was 2 min and 30 sec of exercise after the injection  MYOCARDIAL PERFUSION IMAGING:  The image quality was fair  Rotating projection images reveal mild diaphragmatic attenuation and mild subdiaphragmatic activity  Left ventricular size was normal  The TID ratio was 0 97      PERFUSION DEFECTS:  -  There was a moderate-sized, mildly severe, fixed myocardial perfusion defect of the basal inferior wall likely due to diaphragm attenuation  GATED SPECT:  The calculated left ventricular ejection fraction was 47 %  Left ventricular ejection fraction was within normal limits by visual estimate  There was no left ventricular regional abnormality  SUMMARY:  -  Stress results: Duration of exercise was 9 min  Target heart rate was achieved  There was normal resting blood pressure with a hypertensive response to stress  There was no chest pain during stress  The stress test was terminated due to  moderate dyspnea  -  ECG conclusions: The stress ECG was equivocal for ischemia  -  Perfusion imaging: There was a moderate-sized, mildly severe, fixed myocardial perfusion defect of the basal inferior wall likely due to diaphragm attenuation   -  Gated SPECT: The calculated left ventricular ejection fraction was 47 %  Left ventricular ejection fraction was within normal limits by visual estimate  There was no left ventricular regional abnormality  IMPRESSIONS: Normal study after maximal exercise  There was image artifact, without diagnostic evidence for perfusion abnormality  Prepared and signed by    Raiza Mcmillan MD  Signed 10/14/2020 15:31:45    No results found for this or any previous visit  Imaging:  Chest X-Ray:   No Chest XR results available for this patient  CT-scan of the chest:     CTA dissection protocol chest abdomen pelvis w wo contrast    Result Date: 10/12/2020  Impression 1  No aortic dissection or aneurysm  2   No acute finding within chest, abdomen, and pelvis  3   Colonic diverticulosis  4   Incidentally noted 1 5 cm right paravertebral simple fluid attenuating cystic lesion at the level of T6  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   Recommend correlation/comparison with prior outside imaging   Workstation performed: [id number]     Lab Review   Lab Results   Component Value Date    WBC 7 17 10/25/2021    HGB 18 2 (H) 10/25/2021    HCT 53 4 (H) 10/25/2021    MCV 91 10/25/2021    RDW 12 2 10/25/2021  10/25/2021     BMP:  Lab Results   Component Value Date    SODIUM 137 10/25/2021    K 4 1 10/25/2021     10/25/2021    CO2 25 10/25/2021    BUN 15 10/25/2021    CREATININE 1 03 10/25/2021    GLUC 116 10/25/2021    GLUF 116 (H) 10/25/2021    CALCIUM 10 2 (H) 10/25/2021    EGFR 75 10/25/2021    MG 2 4 10/13/2020     LFT:  Lab Results   Component Value Date    AST 31 10/19/2021    ALT 51 (H) 10/19/2021    ALKPHOS 67 10/19/2021    TP 6 8 10/19/2021    ALB 4 5 10/19/2021      Lab Results   Component Value Date    GMJ7CRVNNVTO 0 596 10/13/2020     No components found for: [employer] [facility]  Lab Results   Component Value Date    HGBA1C 5 5 08/10/2021     Lipid Profile:   Lab Results   Component Value Date    CHOLESTEROL 132 08/10/2021    HDL 43 08/10/2021    LDLCALC 68 08/10/2021    TRIG 130 08/10/2021     Lab Results   Component Value Date    CHOLESTEROL 132 08/10/2021    CHOLESTEROL 142 04/05/2021     Lab Results   Component Value Date    TROPONINI <0 02 10/13/2020     No results found for: NTBNP         Dr Christiana Montanez MD [facility]      "This note has been constructed using a voice recognition system  Therefore there may be syntax, spelling, and/or grammatical errors   Please call if you have any questions  "

## 2022-01-25 ENCOUNTER — IN-CLINIC DEVICE VISIT (OUTPATIENT)
Dept: CARDIOLOGY CLINIC | Facility: CLINIC | Age: 67
End: 2022-01-25
Payer: MEDICARE

## 2022-01-25 DIAGNOSIS — Z95.810 PRESENCE OF AUTOMATIC CARDIOVERTER/DEFIBRILLATOR (AICD): Primary | ICD-10-CM

## 2022-01-25 PROCEDURE — 93283 PRGRMG EVAL IMPLANTABLE DFB: CPT | Performed by: INTERNAL MEDICINE

## 2022-01-28 DIAGNOSIS — E78.2 MIXED HYPERLIPIDEMIA: ICD-10-CM

## 2022-01-28 DIAGNOSIS — N40.1 BENIGN PROSTATIC HYPERPLASIA WITH NOCTURIA: ICD-10-CM

## 2022-01-28 DIAGNOSIS — R35.1 BENIGN PROSTATIC HYPERPLASIA WITH NOCTURIA: ICD-10-CM

## 2022-01-28 RX ORDER — ROSUVASTATIN CALCIUM 10 MG/1
10 TABLET, COATED ORAL DAILY
Qty: 90 TABLET | Refills: 0 | Status: SHIPPED | OUTPATIENT
Start: 2022-01-28 | End: 2022-04-14 | Stop reason: SDUPTHER

## 2022-01-28 RX ORDER — FINASTERIDE 5 MG/1
5 TABLET, FILM COATED ORAL DAILY
Qty: 90 TABLET | Refills: 0 | Status: SHIPPED | OUTPATIENT
Start: 2022-01-28 | End: 2022-04-14 | Stop reason: SDUPTHER

## 2022-03-21 ENCOUNTER — TELEPHONE (OUTPATIENT)
Dept: GASTROENTEROLOGY | Facility: CLINIC | Age: 67
End: 2022-03-21

## 2022-03-21 NOTE — TELEPHONE ENCOUNTER
[facility] Assessment    Name: Alberta Duong  YOB: 1955  Last Height: 6' (1 829 m)  Last weight: 98 4 kg (217 lb)  BMI: 29 43 kg/m²  Procedure: egd  Diagnosis: see order  Date of procedure: 5/2/22  Prep: n/a  Responsible : yes  Phone#: [phone]  Name completing form: Ron Espino  Date form completed: 03/21/22    If the patient answers yes to any of these questions, schedule in a hospital  Are you pregnant: No  Do you rely on a wheelchair for mobility: No  Have you been diagnosed with End Stage Renal Disease (ESRD): No  Do you need oxygen during the day: No  Have you had a heart attack or stroke within the past three months: No  Have you had a seizure within the past three months: No  Have you ever been informed by anesthesia that you have a difficult airway: No  Additional Questions  Have you had any cardiac testing or are under the care of a Cardiologist (see cardiac list): Yes (Comment: Obtain Cardiac Clearance)  Cardiac list:   Do you have an implanted cardiac defibrillator: Yes (Comment: Obtain ICD clearance)    Have any bleeding problems, such as anemia or hemophilia (If patient has H&H result below 8, schedule in hospital   H&H must be within 30 days of procedure): No    Had an organ transplant within the past 3 months: No    Do you have any present infections: No  Do you get short of breath when walking a few blocks: No  Have you been diagnosed with diabetes: No  Comments (provide cardiac provider information if applicable): pacemaker/ Dr Fatuma Bonilla

## 2022-04-03 DIAGNOSIS — I47.2 NSVT (NONSUSTAINED VENTRICULAR TACHYCARDIA) (HCC): ICD-10-CM

## 2022-04-04 RX ORDER — METOPROLOL SUCCINATE 25 MG/1
25 TABLET, EXTENDED RELEASE ORAL DAILY
Qty: 90 TABLET | Refills: 0 | Status: SHIPPED | OUTPATIENT
Start: 2022-04-04 | End: 2022-04-14 | Stop reason: SDUPTHER

## 2022-04-06 LAB
ALBUMIN SERPL-MCNC: 4.5 G/DL (ref 3.6–5.1)
ALBUMIN/GLOB SERPL: 2 (CALC) (ref 1–2.5)
ALP SERPL-CCNC: 67 U/L (ref 35–144)
ALT SERPL-CCNC: 49 U/L (ref 9–46)
AST SERPL-CCNC: 28 U/L (ref 10–35)
BASOPHILS # BLD AUTO: 32 CELLS/UL (ref 0–200)
BASOPHILS NFR BLD AUTO: 0.6 %
BILIRUB SERPL-MCNC: 1.1 MG/DL (ref 0.2–1.2)
BUN SERPL-MCNC: 19 MG/DL (ref 7–25)
BUN/CREAT SERPL: ABNORMAL (CALC) (ref 6–22)
CALCIUM SERPL-MCNC: 9.9 MG/DL (ref 8.6–10.3)
CHLORIDE SERPL-SCNC: 104 MMOL/L (ref 98–110)
CHOLEST SERPL-MCNC: 137 MG/DL
CHOLEST/HDLC SERPL: 3.1 (CALC)
CO2 SERPL-SCNC: 29 MMOL/L (ref 20–32)
CREAT SERPL-MCNC: 1 MG/DL (ref 0.7–1.25)
EOSINOPHIL # BLD AUTO: 69 CELLS/UL (ref 15–500)
EOSINOPHIL NFR BLD AUTO: 1.3 %
ERYTHROCYTE [DISTWIDTH] IN BLOOD BY AUTOMATED COUNT: 12.2 % (ref 11–15)
GLOBULIN SER CALC-MCNC: 2.3 G/DL (CALC) (ref 1.9–3.7)
GLUCOSE SERPL-MCNC: 97 MG/DL (ref 65–99)
HBA1C MFR BLD: 5.4 % OF TOTAL HGB
HCT VFR BLD AUTO: 47.7 % (ref 38.5–50)
HDLC SERPL-MCNC: 44 MG/DL
HGB BLD-MCNC: 16.1 G/DL (ref 13.2–17.1)
LDLC SERPL CALC-MCNC: 76 MG/DL (CALC)
LYMPHOCYTES # BLD AUTO: 2025 CELLS/UL (ref 850–3900)
LYMPHOCYTES NFR BLD AUTO: 38.2 %
MCH RBC QN AUTO: 31 PG (ref 27–33)
MCHC RBC AUTO-ENTMCNC: 33.8 G/DL (ref 32–36)
MCV RBC AUTO: 91.9 FL (ref 80–100)
MONOCYTES # BLD AUTO: 737 CELLS/UL (ref 200–950)
MONOCYTES NFR BLD AUTO: 13.9 %
NEUTROPHILS # BLD AUTO: 2438 CELLS/UL (ref 1500–7800)
NEUTROPHILS NFR BLD AUTO: 46 %
NONHDLC SERPL-MCNC: 93 MG/DL (CALC)
PLATELET # BLD AUTO: 198 THOUSAND/UL (ref 140–400)
PMV BLD REES-ECKER: 10.1 FL (ref 7.5–12.5)
POTASSIUM SERPL-SCNC: 4.6 MMOL/L (ref 3.5–5.3)
PROT SERPL-MCNC: 6.8 G/DL (ref 6.1–8.1)
PSA SERPL-MCNC: 0.69 NG/ML
RBC # BLD AUTO: 5.19 MILLION/UL (ref 4.2–5.8)
SL AMB EGFR AFRICAN AMERICAN: 90 ML/MIN/1.73M2
SL AMB EGFR NON AFRICAN AMERICAN: 78 ML/MIN/1.73M2
SODIUM SERPL-SCNC: 142 MMOL/L (ref 135–146)
TRIGL SERPL-MCNC: 88 MG/DL
TSH SERPL-ACNC: 2.79 MIU/L (ref 0.4–4.5)
WBC # BLD AUTO: 5.3 THOUSAND/UL (ref 3.8–10.8)

## 2022-04-07 ENCOUNTER — TELEPHONE (OUTPATIENT)
Dept: CARDIOLOGY CLINIC | Facility: CLINIC | Age: 67
End: 2022-04-07

## 2022-04-07 NOTE — TELEPHONE ENCOUNTER
----- Message from Chris Cuba MD sent at 4/7/2022  9:50 AM EDT -----  Pt's Patient's stress test is normal    Patient can keep regular appointment  Cholesterol is acceptable  ALT minimally elevated but better than before  Other labs are okay  Please call patient with the result

## 2022-04-07 NOTE — TELEPHONE ENCOUNTER
Pt notified of results, pt noted that he did not have a stress test recently  Reviewed testing and verified that the notification is an error, pt aware

## 2022-04-07 NOTE — RESULT ENCOUNTER NOTE
Pt's Patient's stress test is normal    Patient can keep regular appointment  Cholesterol is acceptable  ALT minimally elevated but better than before  Other labs are okay  Please call patient with the result

## 2022-04-14 ENCOUNTER — OFFICE VISIT (OUTPATIENT)
Dept: FAMILY MEDICINE CLINIC | Facility: CLINIC | Age: 67
End: 2022-04-14
Payer: MEDICARE

## 2022-04-14 VITALS
SYSTOLIC BLOOD PRESSURE: 128 MMHG | WEIGHT: 218 LBS | HEART RATE: 75 BPM | BODY MASS INDEX: 29.53 KG/M2 | DIASTOLIC BLOOD PRESSURE: 80 MMHG | RESPIRATION RATE: 16 BRPM | OXYGEN SATURATION: 98 % | HEIGHT: 72 IN

## 2022-04-14 DIAGNOSIS — M17.12 PRIMARY OSTEOARTHRITIS OF LEFT KNEE: ICD-10-CM

## 2022-04-14 DIAGNOSIS — G47.33 OSA AND COPD OVERLAP SYNDROME (HCC): ICD-10-CM

## 2022-04-14 DIAGNOSIS — I44.2 INTERMITTENT COMPLETE HEART BLOCK (HCC): Primary | ICD-10-CM

## 2022-04-14 DIAGNOSIS — E78.2 MIXED HYPERLIPIDEMIA: ICD-10-CM

## 2022-04-14 DIAGNOSIS — I49.5 SICK SINUS SYNDROME (HCC): ICD-10-CM

## 2022-04-14 DIAGNOSIS — N40.1 BENIGN PROSTATIC HYPERPLASIA WITH NOCTURIA: ICD-10-CM

## 2022-04-14 DIAGNOSIS — M19.042 OSTEOARTHRITIS OF FINGERS OF BOTH HANDS: ICD-10-CM

## 2022-04-14 DIAGNOSIS — J44.9 OSA AND COPD OVERLAP SYNDROME (HCC): ICD-10-CM

## 2022-04-14 DIAGNOSIS — I47.2 NSVT (NONSUSTAINED VENTRICULAR TACHYCARDIA) (HCC): ICD-10-CM

## 2022-04-14 DIAGNOSIS — I49.3 PREMATURE VENTRICULAR BEATS: ICD-10-CM

## 2022-04-14 DIAGNOSIS — K21.9 GASTROESOPHAGEAL REFLUX DISEASE, UNSPECIFIED WHETHER ESOPHAGITIS PRESENT: ICD-10-CM

## 2022-04-14 DIAGNOSIS — I25.10 CORONARY ARTERY DISEASE INVOLVING NATIVE CORONARY ARTERY OF NATIVE HEART WITHOUT ANGINA PECTORIS: ICD-10-CM

## 2022-04-14 DIAGNOSIS — R00.2 INTERMITTENT PALPITATIONS: ICD-10-CM

## 2022-04-14 DIAGNOSIS — R35.1 BENIGN PROSTATIC HYPERPLASIA WITH NOCTURIA: ICD-10-CM

## 2022-04-14 DIAGNOSIS — R73.01 IMPAIRED FASTING GLUCOSE: ICD-10-CM

## 2022-04-14 DIAGNOSIS — M19.041 OSTEOARTHRITIS OF FINGERS OF BOTH HANDS: ICD-10-CM

## 2022-04-14 PROBLEM — R23.8 SKIN IRRITATION: Status: RESOLVED | Noted: 2019-04-10 | Resolved: 2022-04-14

## 2022-04-14 PROCEDURE — 99215 OFFICE O/P EST HI 40 MIN: CPT | Performed by: FAMILY MEDICINE

## 2022-04-14 PROCEDURE — 93000 ELECTROCARDIOGRAM COMPLETE: CPT | Performed by: FAMILY MEDICINE

## 2022-04-14 RX ORDER — ROSUVASTATIN CALCIUM 10 MG/1
10 TABLET, COATED ORAL DAILY
Qty: 90 TABLET | Refills: 1 | Status: SHIPPED | OUTPATIENT
Start: 2022-04-14

## 2022-04-14 RX ORDER — FINASTERIDE 5 MG/1
5 TABLET, FILM COATED ORAL DAILY
Qty: 90 TABLET | Refills: 1 | Status: SHIPPED | OUTPATIENT
Start: 2022-04-14

## 2022-04-14 RX ORDER — CELECOXIB 200 MG/1
200 CAPSULE ORAL DAILY
Qty: 90 CAPSULE | Refills: 1 | Status: SHIPPED | OUTPATIENT
Start: 2022-04-14

## 2022-04-14 RX ORDER — PANTOPRAZOLE SODIUM 40 MG/1
40 TABLET, DELAYED RELEASE ORAL DAILY
Qty: 90 TABLET | Refills: 1 | Status: SHIPPED | OUTPATIENT
Start: 2022-04-14

## 2022-04-14 RX ORDER — METOPROLOL SUCCINATE 25 MG/1
37.5 TABLET, EXTENDED RELEASE ORAL DAILY
Qty: 135 TABLET | Refills: 1 | Status: SHIPPED | OUTPATIENT
Start: 2022-04-14 | End: 2022-06-15

## 2022-04-14 NOTE — ASSESSMENT & PLAN NOTE
Patient has been experiencing frequent PVCs  EKG and rhythm strip performed in the office which showed frequent couplets but his heart rate is in the 80s  I did send message along with pictures of EKG to cardiologist who recommended increasing dose of Toprol XL from 25 mg to 37 5 mg  That should suppress his PVCs    Also as that patient schedule follow-up appointment with cardiologist

## 2022-04-14 NOTE — PROGRESS NOTES
Subjective:      Patient ID: Digna Arzate is a 78 y o  male  59-year-old male with past medical history of heart disease, hypertension, hyperlipidemia presents for follow-up of chronic conditions  Labs reviewed which showed normal CBC, CMP with the exception of minimally elevated ALT  Cholesterol remains very well controlled  Patient states that since having pacemaker placed he has been feeling tremendous however last evening he woke up and felt is though his heart was fluttering  Patient does have history of frequent PVCs  He was placed on Toprol XL after having pacemaker placed because of underlying bradycardia  Has not been back in with cardiologist   He has no chest pain or shortness of breath  Still feels as though his heart seems to be fluttering at this time  Previously the patient has been helping his son with home renovation project which includes lifting 5 gal buckets of concrete up from basement stairs without much difficulty    No shortness of breath      Past Medical History:   Diagnosis Date    Abrasion of left foot     LAST ASSESSED: 9/23/13    Achilles tendinitis, unspecified leg     LAST ASSESSED: 11/30/12    Allergic rhinitis     LAST ASSESSED: 11/25/13    Allergic rhinitis 06/25/2008    LAST ASSESSED: 6/25/08    Cervicalgia 04/22/2011    LAST ASSESSED: 4/22/11    Coronary artery disease     CPAP (continuous positive airway pressure) dependence     Dysfunction of left eustachian tube     LAST ASSESSED: 9/23/13    Epistaxis     LAST ASSESSED: 5/27/15    First degree atrioventricular block     LAST ASSESSED: 7/10/17    Gastric ulcer 10/07/2011    LAST ASSESSED: 3/9/15    GERD (gastroesophageal reflux disease)     Hemorrhoids 05/13/2011    LAST ASSESSED: 5/13/11    Long term use of drug     LAST ASSESSED: 10/11/12    Myalgia 12/21/2007    LAST ASSESSED: 12/21/07    Myositis 12/21/2007    LAST ASSESSED: 12/21/07    Numbness and tingling of foot     LAST ASSESSED: 3/9/15  Premature ventricular beats     states cardiologist ordered Zio ambulatory cardiac monitor    Sleep apnea        Family History   Problem Relation Age of Onset    Cancer Mother         SOFT TISSUE CANCER ON RT SIDE CHEST WALL AND     Prostate cancer Maternal Uncle        Past Surgical History:   Procedure Laterality Date    CARDIAC CATHETERIZATION      CARDIAC ELECTROPHYSIOLOGY PROCEDURE N/A 10/25/2021    Procedure: CARDIAC EPS;  Surgeon: Mahesh Davenport MD;  [facility]; Service: Cardiology    CARDIAC ELECTROPHYSIOLOGY PROCEDURE Left 10/25/2021    Procedure: Cardiac icd implant;  Surgeon: Mahesh Davenport MD;  [facility]; Service: Cardiology    CARDIOVERSION N/A 10/25/2021    Procedure: Cardioversion;  Surgeon: Mahesh Davenport MD;  [facility]; Service: Cardiology    CATARACT EXTRACTION Right     CERVICAL FUSION      pt thinks C4-C5    CHOLECYSTECTOMY      LAPAROSCOPIC; LAST ASSESSED: 10/17/17    COLONOSCOPY      COMPLETE; 3-4 YEARS AGO BY [facility]; LAST ASSESSED: 14    MYRINGOTOMY W/ TUBES      WITH VENTILATING TUBE INSERTION    CA REVISE MEDIAN N/CARPAL TUNNEL SURG Left 2021    Procedure: RELEASE CARPAL TUNNEL;  Surgeon: Belem Lopez MD;  Location: [facility];  Service: Orthopedics    VASECTOMY          reports that he quit smoking about 32 years ago  He has never used smokeless tobacco  He reports current alcohol use of about 7 0 standard drinks of alcohol per week  He reports that he does not use drugs        Current Outpatient Medications:     aspirin (ASPIR-81) 81 mg EC tablet, Take 81 mg by mouth daily, Disp: , Rfl:     celecoxib (CeleBREX) 200 mg capsule, Take 1 capsule (200 mg total) by mouth daily, Disp: 90 capsule, Rfl: 1    Coenzyme Q10 (COQ10) 200 MG CAPS, Take 1 tablet by mouth daily, Disp: , Rfl:     finasteride (PROSCAR) 5 mg tablet, Take 1 tablet (5 mg total) by mouth daily, Disp: 90 tablet, Rfl: 1    fluticasone (VERAMYST) 27 5 MCG/SPRAY nasal spray, 2 sprays into each nostril daily, Disp: , Rfl:     Glucosamine-Chondroitin (GLUCOSAMINE CHONDR COMPLEX PO), Take 1 tablet by mouth daily, Disp: , Rfl:     metoprolol succinate (TOPROL-XL) 25 mg 24 hr tablet, Take 1 5 tablets (37 5 mg total) by mouth daily, Disp: 135 tablet, Rfl: 1    nitroglycerin (NITROSTAT) 0 4 mg SL tablet, PLACE 1 TABLET(S) BY SUBLINGUAL ROUTE , Disp: , Rfl: 4    Omega-3 Fatty Acids (FISH OIL) 1000 MG CPDR, Take by mouth daily, Disp: , Rfl:     pantoprazole (PROTONIX) 40 mg tablet, Take 1 tablet (40 mg total) by mouth daily, Disp: 90 tablet, Rfl: 1    rosuvastatin (CRESTOR) 10 MG tablet, Take 1 tablet (10 mg total) by mouth daily, Disp: 90 tablet, Rfl: 1    The following portions of the patient's history were reviewed and updated as appropriate: allergies, current medications, past family history, past medical history, past social history, past surgical history and problem list     Review of Systems   Constitutional: Negative  HENT: Negative  Eyes: Negative  Respiratory: Negative  Negative for shortness of breath  Cardiovascular: Positive for palpitations  Negative for chest pain and leg swelling  Gastrointestinal: Negative  Endocrine: Negative  Genitourinary: Negative  Musculoskeletal: Negative  Skin: Negative  Allergic/Immunologic: Negative  Neurological: Negative  Hematological: Negative  Psychiatric/Behavioral: Negative  All other systems reviewed and are negative  Objective:    /80   Pulse 75   Resp 16   Ht 6' (1 829 m)   Wt 98 9 kg (218 lb)   SpO2 98%   BMI 29 57 kg/m²      Physical Exam  Vitals and nursing note reviewed  Constitutional:       General: He is not in acute distress  Appearance: Normal appearance  He is well-developed  He is obese  He is not ill-appearing  HENT:      Head: Normocephalic and atraumatic        Right Ear: Tympanic membrane, ear canal and external ear normal       Left Ear: Tympanic membrane, ear canal and external ear normal    Eyes:      Extraocular Movements: Extraocular movements intact  Conjunctiva/sclera: Conjunctivae normal       Pupils: Pupils are equal, round, and reactive to light  Cardiovascular:      Rate and Rhythm: Normal rate  Rhythm irregular  Pulses: Normal pulses  Heart sounds: Normal heart sounds  No murmur heard  Pulmonary:      Effort: Pulmonary effort is normal       Breath sounds: Normal breath sounds  Abdominal:      General: Abdomen is flat  Bowel sounds are normal       Palpations: Abdomen is soft  Musculoskeletal:         General: Normal range of motion  Cervical back: Normal range of motion and neck supple  Skin:     General: Skin is warm and dry  Neurological:      General: No focal deficit present  Mental Status: He is alert and oriented to person, place, and time  Psychiatric:         Mood and Affect: Mood normal          Behavior: Behavior normal          Thought Content:  Thought content normal          Judgment: Judgment normal            Recent Results (from the past 1008 hour(s))   Lipid panel    Collection Time: 04/06/22  7:58 AM   Result Value Ref Range    Total Cholesterol 137 <200 mg/dL    HDL 44 > OR = 40 mg/dL    Triglycerides 88 <150 mg/dL    LDL Calculated 76 mg/dL (calc)    Chol HDLC Ratio 3 1 <5 0 (calc)    Non-HDL Cholesterol 93 <130 mg/dL (calc)   Comprehensive metabolic panel    Collection Time: 04/06/22  7:58 AM   Result Value Ref Range    Glucose, Random 97 65 - 99 mg/dL    BUN 19 7 - 25 mg/dL    Creatinine 1 00 0 70 - 1 25 mg/dL    eGFR Non  78 > OR = 60 mL/min/1 73m2    eGFR  90 > OR = 60 mL/min/1 73m2    SL AMB BUN/CREATININE RATIO NOT APPLICABLE 6 - 22 (calc)    Sodium 142 135 - 146 mmol/L    Potassium 4 6 3 5 - 5 3 mmol/L    Chloride 104 98 - 110 mmol/L    CO2 29 20 - 32 mmol/L    Calcium 9 9 8 6 - 10 3 mg/dL Protein, Total 6 8 6 1 - 8 1 g/dL    Albumin 4 5 3 6 - 5 1 g/dL    Globulin 2 3 1 9 - 3 7 g/dL (calc)    Albumin/Globulin Ratio 2 0 1 0 - 2 5 (calc)    TOTAL BILIRUBIN 1 1 0 2 - 1 2 mg/dL    Alkaline Phosphatase 67 35 - 144 U/L    AST 28 10 - 35 U/L    ALT 49 (H) 9 - 46 U/L   CBC and differential    Collection Time: 04/06/22  7:58 AM   Result Value Ref Range    White Blood Cell Count 5 3 3 8 - 10 8 Thousand/uL    Red Blood Cell Count 5 19 4 20 - 5 80 Million/uL    Hemoglobin 16 1 13 2 - 17 1 g/dL    HCT 47 7 38 5 - 50 0 %    MCV 91 9 80 0 - 100 0 fL    MCH 31 0 27 0 - 33 0 pg    MCHC 33 8 32 0 - 36 0 g/dL    RDW 12 2 11 0 - 15 0 %    Platelet Count 528 358 - 400 Thousand/uL    SL AMB MPV 10 1 7 5 - 12 5 fL    Neutrophils (Absolute) 2,438 1,500 - 7,800 cells/uL    Lymphocytes (Absolute) 2,025 850 - 3,900 cells/uL    Monocytes (Absolute) 737 200 - 950 cells/uL    Eosinophils (Absolute) 69 15 - 500 cells/uL    Basophils ABS 32 0 - 200 cells/uL    Neutrophils 46 %    Lymphocytes 38 2 %    Monocytes 13 9 %    Eosinophils 1 3 %    Basophils PCT 0 6 %   PSA, Total Screen    Collection Time: 04/06/22  7:58 AM   Result Value Ref Range    Prostate Specific Antigen Total 0 69 < OR = 4 00 ng/mL   TSH, 3rd generation with Free T4 reflex    Collection Time: 04/06/22  7:58 AM   Result Value Ref Range    TSH W/RFX TO FREE T4 2 79 0 40 - 4 50 mIU/L   Hemoglobin A1c (w/out EAG)    Collection Time: 04/06/22  7:58 AM   Result Value Ref Range    Hemoglobin A1C 5 4 <5 7 % of total Hgb       Assessment/Plan:    Impaired fasting glucose  Impaired fasting glucose has normalized and Hgb A1c is normal at 5 4%  LENY and COPD overlap syndrome ([facility] [address] )  Patient continues using CPAP on a nightly basis    Coronary artery disease involving native coronary artery of native heart without angina pectoris  Patient remains on aspirin, Toprol XL 25 mg once daily, statin therapy    Patient does follow up with cardiologist     Intermittent complete heart block [facility])  Status post pacemaker and has been doing extremely well    Premature ventricular beats  Patient has been experiencing frequent PVCs  EKG and rhythm strip performed in the office which showed frequent couplets but his heart rate is in the 80s  I did send message along with pictures of EKG to cardiologist who recommended increasing dose of Toprol XL from 25 mg to 37 5 mg  That should suppress his PVCs  Also as that patient schedule follow-up appointment with cardiologist    Sick sinus syndrome [facility])  Status post pacemaker placement and has been doing extremely    Benign prostatic hyperplasia with nocturia  Patient remains on Proscar 5 mg once daily  Doing well    Intermittent palpitations  Frequent PVCs  Patient does have pacemaker in place  Dose of Toprol XL is being increased to suppress PVCs  Will need follow-up with cardiologist    Mixed hyperlipidemia  Cholesterol remains very well controlled on Crestor 10 mg once daily  Repeat lipid panel will be done in 6 months        None  Problem List Items Addressed This Visit        Endocrine    Impaired fasting glucose     Impaired fasting glucose has normalized and Hgb A1c is normal at 5 4%  Relevant Orders    Hemoglobin A1C       Respiratory    [facility]     Patient continues using CPAP on a nightly basis            Cardiovascular and Mediastinum    Coronary artery disease involving native coronary artery of native heart without angina pectoris     Patient remains on aspirin, Toprol XL 25 mg once daily, statin therapy    Patient does follow up with cardiologist          Relevant Medications    metoprolol succinate (TOPROL-XL) 25 mg 24 hr tablet    Other Relevant Orders    CBC and differential    TSH, 3rd generation with Free T4 reflex    Intermittent complete heart block ([facility] [address] ) - Primary     Status post pacemaker and has been doing extremely well         Relevant Medications    metoprolol succinate (TOPROL-XL) 25 mg 24 hr tablet    Other Relevant Orders    TSH, 3rd generation with Free T4 reflex    Premature ventricular beats     Patient has been experiencing frequent PVCs  EKG and rhythm strip performed in the office which showed frequent couplets but his heart rate is in the 80s  I did send message along with pictures of EKG to cardiologist who recommended increasing dose of Toprol XL from 25 mg to 37 5 mg  That should suppress his PVCs  Also as that patient schedule follow-up appointment with cardiologist         Relevant Medications    metoprolol succinate (TOPROL-XL) 25 mg 24 hr tablet    Other Relevant Orders    TSH, 3rd generation with Free T4 reflex    Sick sinus syndrome ([address] )     Status post pacemaker placement and has been doing extremely         Relevant Medications    metoprolol succinate (TOPROL-XL) 25 mg 24 hr tablet    Other Relevant Orders    POCT ECG    TSH, 3rd generation with Free T4 reflex       Musculoskeletal and Integument    Osteoarthritis of fingers of both hands    Relevant Medications    celecoxib (CeleBREX) 200 mg capsule    Primary osteoarthritis of left knee    Relevant Medications    celecoxib (CeleBREX) 200 mg capsule       Other    Benign prostatic hyperplasia with nocturia     Patient remains on Proscar 5 mg once daily  Doing well         Relevant Medications    finasteride (PROSCAR) 5 mg tablet    Intermittent palpitations     Frequent PVCs  Patient does have pacemaker in place  Dose of Toprol XL is being increased to suppress PVCs  Will need follow-up with cardiologist         Relevant Orders    TSH, 3rd generation with Free T4 reflex    Mixed hyperlipidemia     Cholesterol remains very well controlled on Crestor 10 mg once daily    Repeat lipid panel will be done in 6 months         Relevant Medications    rosuvastatin (CRESTOR) 10 MG tablet    Other Relevant Orders    Comprehensive metabolic panel    Lipid panel      Other Visit Diagnoses     Gastroesophageal reflux disease, unspecified whether esophagitis present        Relevant Medications    pantoprazole (PROTONIX) 40 mg tablet    NSVT (nonsustained ventricular tachycardia) (HCC)        Relevant Medications    metoprolol succinate (TOPROL-XL) 25 mg 24 hr tablet    Other Relevant Orders    TSH, 3rd generation with Free T4 reflex          I have spent 42 minutes with Patient  today in which greater than 50% of this time was spent in counseling/coordination of care regarding Diagnostic results, Prognosis, Risks and benefits of tx options, Intructions for management, Patient and family education, Importance of tx compliance, Risk factor reductions and Impressions

## 2022-04-14 NOTE — ASSESSMENT & PLAN NOTE
Frequent PVCs  Patient does have pacemaker in place  Dose of Toprol XL is being increased to suppress PVCs    Will need follow-up with cardiologist

## 2022-04-14 NOTE — ASSESSMENT & PLAN NOTE
Patient remains on aspirin, Toprol XL 25 mg once daily, statin therapy    Patient does follow up with cardiologist

## 2022-04-14 NOTE — ASSESSMENT & PLAN NOTE
Cholesterol remains very well controlled on Crestor 10 mg once daily    Repeat lipid panel will be done in 6 months

## 2022-04-15 ENCOUNTER — TELEPHONE (OUTPATIENT)
Dept: CARDIOLOGY CLINIC | Facility: CLINIC | Age: 67
End: 2022-04-15

## 2022-04-15 NOTE — TELEPHONE ENCOUNTER
Pre  Op  Clearance note- Cardiology    Digna Arzate   78 y o   male  1955      [facility] Peralta [facility] [phone]  Digna Arzate :     Patient's chart was reviewed for preop clearance  Patient was seen in our office on 01/18/2022  Patient has past medical history significant for CAD, NSVT, hyperlipidemia, S/P ICD, And LENY  Patient is now scheduled for and endoscopy 5/2/22  Patient has no clinical evidence of  active heart failure or  active ischemia or active arrhythmia  Patient's last cardiac workup including cardiac catheterization, device interrogation, echo Doppler reports were reviewed and it shows normal LV systolic function mild-to-moderate coronary artery disease involving circumflex coronary artery, history of NSVT status post AICD still have some PVCs  In my opinion patient is in optimum condition for the procedure as planned  Patient is low risk for the surgery as planned from cardiac point of view  Continue current cardiac medications  Patient can hold  Aspirin for  5-7 days as required for surgery  Patient can hold Plavix for 5 days  Patient can hold Eliquis/Xarelto/Pradaxa for 3 days before the procedure  Please restart after the procedure  immediately or next day if no contraindication form surgical point of view and advise patient to contact our office  If you have any question please do not hesitate to call us at our office of [facility]    Phone # [phone]        Lab Results   Component Value Date    WBC 5 3 04/06/2022    HGB 16 1 04/06/2022    HCT 47 7 04/06/2022    MCV 91 9 04/06/2022     04/06/2022     Lab Results   Component Value Date    CREATININE 1 00 04/06/2022     Lab Results   Component Value Date    GLUF 116 (H) 10/25/2021       Cardiac testing:   Results for orders placed during the hospital encounter of 08/12/21    Echo complete with contrast if indicated    Narrative  [address]  [phone]    Transthoracic Echocardiogram  2D, M-mode, Doppler, and Color Doppler    Study date:  12-Aug-2021    Patient: Gregorio Galindo  MR number: [id number]  Account number: [de-identified]  : 1955  Age: 76 years  Gender: Male  Status: Outpatient  Location: [address] [place] [address]  Height: 72 in  Weight: 221 5 lb  BP: 152/ 62 mmHg    Indications: Assess left ventricular function  Diagnoses: I47 2 - Ventricular tachycardia    Sonographer:  CHRISTI Velásquez  Primary Physician:  Kailyn Hardin DO  Referring Physician:  Katie Salazar MD  Group:  [facility]  Interpreting Physician:  Geoff Reno MD    SUMMARY    LEFT VENTRICLE:  Systolic function was normal  Ejection fraction was estimated to be 60 %  There were no regional wall motion abnormalities  AORTIC VALVE:  There was mild regurgitation  HISTORY: PRIOR HISTORY: NSVT, Non-obtructive coronary artery disease, Hyperlipidemia, LENY    PROCEDURE: The study was performed in the [address] [place] [address]  This was a routine study  The transthoracic approach was used  The study included complete 2D imaging, M-mode, complete spectral Doppler, and color Doppler  The  heart rate was 54 bpm, at the start of the study  Images were obtained from the parasternal, apical, subcostal, and suprasternal notch acoustic windows  Image quality was adequate  LEFT VENTRICLE: Size was normal  Systolic function was normal  Ejection fraction was estimated to be 60 %  There were no regional wall motion abnormalities  Wall thickness was normal  DOPPLER: There was an increased relative contribution  of atrial contraction to ventricular filling  Left ventricular diastolic function parameters were normal for the patient's age      RIGHT VENTRICLE: The size was normal  Systolic function was normal  Wall thickness was normal     LEFT ATRIUM: Size was normal     RIGHT ATRIUM: Size was normal     MITRAL VALVE: Valve structure was normal  There was normal leaflet separation  DOPPLER: The transmitral velocity was within the normal range  There was no evidence for stenosis  There was trace regurgitation  AORTIC VALVE: The valve was trileaflet  Leaflets exhibited normal thickness and normal cuspal separation  DOPPLER: Transaortic velocity was within the normal range  There was no evidence for stenosis  There was mild regurgitation  TRICUSPID VALVE: The valve structure was normal  There was normal leaflet separation  DOPPLER: The transtricuspid velocity was within the normal range  There was no evidence for stenosis  There was trace regurgitation  PULMONIC VALVE: Leaflets exhibited normal thickness, no calcification, and normal cuspal separation  DOPPLER: The transpulmonic velocity was within the normal range  There was trace regurgitation  PERICARDIUM: There was no pericardial effusion  The pericardium was normal in appearance  AORTA: The root exhibited normal size  SYSTEMIC VEINS: IVC: The inferior vena cava was normal in size  Respirophasic changes were normal     SYSTEM MEASUREMENT TABLES    2D  %FS: 21 05 %  Ao Diam: 3 02 cm  EDV(Teich): 117 73 ml  EF(Teich): 42 64 %  ESV(Teich): 67 53 ml  IVSd: 0 78 cm  LA Area: 19 18 cm2  LA Diam: 4 cm  LVEDV MOD A4C: 167 49 ml  LVEF MOD A4C: 52 37 %  LVESV MOD A4C: 79 77 ml  LVIDd: 4 99 cm  LVIDs: 3 94 cm  LVLd A4C: 9 72 cm  LVLs A4C: 8 4 cm  LVPWd: 0 88 cm  RA Area: 14 03 cm2  RVIDd: 3 7 cm  SV MOD A4C: 87 71 ml  SV(Teich): 50 2 ml    MM  TAPSE: 2 85 cm    PW  LVOT Env  Ti: 315 89 ms  LVOT VTI: 14 53 cm  LVOT Vmax: 0 63 m/s  LVOT Vmean: 0 46 m/s  LVOT maxP 57 mmHg  LVOT meanP 93 mmHg  MV A Bi: 0 81 m/s  MV Dec Hinds: 2 25 m/s2  MV DecT: 247 18 ms  MV E Bi: 0 56 m/s  MV E/A Ratio: 0 69  MV PHT: 71 68 ms  MVA By PHT: 3 07 cm2    [facility] Accredited Echocardiography Laboratory    Prepared and electronically signed by    Rustam Simmons MD  Signed 12-Aug-2021 15:00:09    No results found for this or any previous visit  No results found for this or any previous visit  No results found for this or any previous visit  Results for orders placed during the hospital encounter of 10/12/20    NM Myocardial Perfusion Spect (Exercise Induced Stress and/or Rest)    [address]  [facility], [address] [facility]  [phone]    Rest/Stress Gated SPECT Myocardial Perfusion Imaging After Exercise    Patient: Olive Akhtar  MR number: [id number]  Account number: [de-identified]  : 1955  Age: 71 years  Gender: Male  Status: Inpatient  Location: Stress lab  Height: 73 in  Weight: 202 lb  BP: 138/ 80 mmHg    Allergies: AMOXICILLIN, ACETAMINOPHEN, APPLE, CHERRY FLAVOR, POLLEN EXTRACT, AMOXICILLIN-POT CLAVULANATE, IODINATED DIAGNOSTIC AGENTS    Diagnosis: R07 9 - Chest pain, unspecified    Interpreting Physician:  Elva Liao MD  Referring Physician:  Wilfredo Rodriguez DO  Technician:  Charley Patel  RN:  Stacey James RN BSN  Group:  [facility]  Report Prepared by[de-identified]  Stacey James RN BSN    INDICATIONS: Coronary artery disease  HISTORY: The patient is a 71year old  male  Chest pain status: chest pain, radiation to neck and jaw area, radiation to arm(s)  Coronary artery disease risk factors: dyslipidemia  Cardiovascular history: coronary artery disease  and arrhythmia  Co-morbidity: obesity  PHYSICAL EXAM: Baseline physical exam screening: no evidence of significant aortic stenosis and no wheezes audible  REST ECG: Normal sinus rhythm  The ECG showed 1ï¾° AV block  PROCEDURE: The study was performed in the the Stress lab  Treadmill exercise testing was performed, using the Ángel protocol  Gated SPECT myocardial perfusion imaging was performed after stress  Systolic blood pressure was 138 mmHg, at the  start of the study  Diastolic blood pressure was 80 mmHg, at the start of the study  The heart rate was 75 bpm, at the start of the study  IV double checked  LINDA PROTOCOL:  HR bpm SBP mmHg DBP mmHg Symptoms  Baseline 75 138 80 none  Stage 1 129 178 80 none  Stage 2 129 184 72 mild dyspnea  Stage 3 141 -- -- --  Immediate 141 205 70 moderate dyspnea  Recovery 1 92 192 80 subsiding  Recovery 2 93 150 78 none    STRESS SUMMARY: Duration of exercise was 9 min  The patient exercised to protocol stage 3  Maximal work rate was 10 1 METs  Functional capacity was normal  Maximal heart rate during stress was 141 bpm ( 90 % of maximal predicted heart  rate)  The heart rate response to stress was normal  There was normal resting blood pressure with a hypertensive response to stress  The rate-pressure product for the peak heart rate and blood pressure was 84096  There was no chest pain  during stress  The stress test was terminated due to moderate dyspnea  Pre oxygen saturation: 99 %  Peak oxygen saturation: 98 %  The stress ECG was equivocal for ischemia  Arrhythmia during stress: isolated premature ventricular beats  ISOTOPE ADMINISTRATION:  Resting isotope administration Stress isotope administration  Agent Tetrofosmin Tetrofosmin  Dose 11 mCi 30 9 mCi  Date 10/14/2020 10/14/2020  Injection time 08:55 10:38  Injection-image interval 37 min 52 min    Radiopharmaceutical was administered 6 min, 30 sec into the stress protocol  There was 2 min and 30 sec of exercise after the injection  MYOCARDIAL PERFUSION IMAGING:  The image quality was fair  Rotating projection images reveal mild diaphragmatic attenuation and mild subdiaphragmatic activity  Left ventricular size was normal  The TID ratio was 0 97  PERFUSION DEFECTS:  -  There was a moderate-sized, mildly severe, fixed myocardial perfusion defect of the basal inferior wall likely due to diaphragm attenuation      GATED SPECT:  The calculated left ventricular ejection fraction was 47 %  Left ventricular ejection fraction was within normal limits by visual estimate  There was no left ventricular regional abnormality  SUMMARY:  -  Stress results: Duration of exercise was 9 min  Target heart rate was achieved  There was normal resting blood pressure with a hypertensive response to stress  There was no chest pain during stress  The stress test was terminated due to  moderate dyspnea  -  ECG conclusions: The stress ECG was equivocal for ischemia  -  Perfusion imaging: There was a moderate-sized, mildly severe, fixed myocardial perfusion defect of the basal inferior wall likely due to diaphragm attenuation   -  Gated SPECT: The calculated left ventricular ejection fraction was 47 %  Left ventricular ejection fraction was within normal limits by visual estimate  There was no left ventricular regional abnormality  IMPRESSIONS: Normal study after maximal exercise  There was image artifact, without diagnostic evidence for perfusion abnormality  Prepared and signed by    Angelita Dsouza MD  Signed 10/14/2020 15:31:45      DR Didier Dodge MD [facility]  4/15/2022  9:36 AM

## 2022-04-15 NOTE — TELEPHONE ENCOUNTER
Cardiac clearance is in chart  I called and lmom for pt that he is cleared to have EGD and can hold asa  I asked him to call us back to let us know that he got this message  If he calls back, please update chart  Thank you

## 2022-04-18 DIAGNOSIS — I48.0 PAROXYSMAL ATRIAL FIBRILLATION (HCC): Primary | ICD-10-CM

## 2022-04-18 NOTE — PROGRESS NOTES
Spoke to patient about episodes of atrial fibrillation  Issues related to Eliquis discussed with him  His question related to blood thinner discussed and reviewed  He is willing to take Eliquis  He understand risk factors involved

## 2022-04-29 ENCOUNTER — TELEPHONE (OUTPATIENT)
Dept: CARDIOLOGY CLINIC | Facility: CLINIC | Age: 67
End: 2022-04-29

## 2022-04-29 NOTE — TELEPHONE ENCOUNTER
Patient is scheduled for an endoscopy on Monday 5/2/22  Can he hold Eliquis for 2 days prior?  Please advise

## 2022-05-02 ENCOUNTER — HOSPITAL ENCOUNTER (OUTPATIENT)
Dept: GASTROENTEROLOGY | Facility: HOSPITAL | Age: 67
Setting detail: OUTPATIENT SURGERY
Discharge: HOME | End: 2022-05-02
Attending: INTERNAL MEDICINE
Payer: MEDICARE

## 2022-05-02 ENCOUNTER — ANESTHESIA (OUTPATIENT)
Dept: GASTROENTEROLOGY | Facility: HOSPITAL | Age: 67
End: 2022-05-02

## 2022-05-02 ENCOUNTER — ANESTHESIA EVENT (OUTPATIENT)
Dept: GASTROENTEROLOGY | Facility: HOSPITAL | Age: 67
End: 2022-05-02

## 2022-05-02 VITALS
BODY MASS INDEX: 29.39 KG/M2 | HEART RATE: 63 BPM | DIASTOLIC BLOOD PRESSURE: 70 MMHG | OXYGEN SATURATION: 96 % | RESPIRATION RATE: 18 BRPM | SYSTOLIC BLOOD PRESSURE: 142 MMHG | WEIGHT: 217 LBS | TEMPERATURE: 97.9 F | HEIGHT: 72 IN

## 2022-05-02 DIAGNOSIS — K22.70 BARRETT'S ESOPHAGUS WITHOUT DYSPLASIA: ICD-10-CM

## 2022-05-02 DIAGNOSIS — K21.9 GASTROESOPHAGEAL REFLUX DISEASE, UNSPECIFIED WHETHER ESOPHAGITIS PRESENT: ICD-10-CM

## 2022-05-02 PROCEDURE — 88305 TISSUE EXAM BY PATHOLOGIST: CPT | Performed by: PATHOLOGY

## 2022-05-02 PROCEDURE — 43239 EGD BIOPSY SINGLE/MULTIPLE: CPT | Performed by: INTERNAL MEDICINE

## 2022-05-02 RX ORDER — LIDOCAINE HYDROCHLORIDE 10 MG/ML
INJECTION, SOLUTION EPIDURAL; INFILTRATION; INTRACAUDAL; PERINEURAL AS NEEDED
Status: DISCONTINUED | OUTPATIENT
Start: 2022-05-02 | End: 2022-05-02

## 2022-05-02 RX ORDER — SODIUM CHLORIDE, SODIUM LACTATE, POTASSIUM CHLORIDE, CALCIUM CHLORIDE 600; 310; 30; 20 MG/100ML; MG/100ML; MG/100ML; MG/100ML
125 INJECTION, SOLUTION INTRAVENOUS CONTINUOUS
Status: DISCONTINUED | OUTPATIENT
Start: 2022-05-02 | End: 2022-05-06 | Stop reason: HOSPADM

## 2022-05-02 RX ORDER — SODIUM CHLORIDE, SODIUM LACTATE, POTASSIUM CHLORIDE, CALCIUM CHLORIDE 600; 310; 30; 20 MG/100ML; MG/100ML; MG/100ML; MG/100ML
INJECTION, SOLUTION INTRAVENOUS CONTINUOUS PRN
Status: DISCONTINUED | OUTPATIENT
Start: 2022-05-02 | End: 2022-05-02

## 2022-05-02 RX ORDER — PROPOFOL 10 MG/ML
INJECTION, EMULSION INTRAVENOUS AS NEEDED
Status: DISCONTINUED | OUTPATIENT
Start: 2022-05-02 | End: 2022-05-02

## 2022-05-02 RX ADMIN — SODIUM CHLORIDE, SODIUM LACTATE, POTASSIUM CHLORIDE, AND CALCIUM CHLORIDE: .6; .31; .03; .02 INJECTION, SOLUTION INTRAVENOUS at 14:05

## 2022-05-02 RX ADMIN — LIDOCAINE HYDROCHLORIDE 100 MG: 10 INJECTION, SOLUTION EPIDURAL; INFILTRATION; INTRACAUDAL; PERINEURAL at 14:09

## 2022-05-02 RX ADMIN — SODIUM CHLORIDE, SODIUM LACTATE, POTASSIUM CHLORIDE, AND CALCIUM CHLORIDE 125 ML/HR: .6; .31; .03; .02 INJECTION, SOLUTION INTRAVENOUS at 13:33

## 2022-05-02 RX ADMIN — PROPOFOL 100 MG: 10 INJECTION, EMULSION INTRAVENOUS at 14:09

## 2022-05-02 RX ADMIN — PROPOFOL 50 MG: 10 INJECTION, EMULSION INTRAVENOUS at 14:12

## 2022-05-02 NOTE — DISCHARGE INSTRUCTIONS
Upper Endoscopy   WHAT YOU NEED TO KNOW:   An upper endoscopy is also called an upper gastrointestinal (GI) endoscopy, or an esophagogastroduodenoscopy (EGD)  You may feel bloated, gassy, or have some abdominal discomfort after your procedure  Your throat may be sore for 24 to 36 hours  You may burp or pass gas from air that is still inside your body  DISCHARGE INSTRUCTIONS:   Call 911 for any of the following:   · You have sudden chest pain or trouble breathing  Seek care immediately if:   · You feel dizzy or faint  · You have trouble swallowing  · Your bowel movements are very dark or black  · Your abdomen is hard and firm and you have severe pain  · You vomit blood  Contact your healthcare provider if:   · You feel full or bloated and cannot burp or pass gas  · You have not had a bowel movement for 3 days after your procedure  · You have neck pain  · You have a fever or chills  · You have nausea or are vomiting  · You have a rash or hives  · You have questions or concerns about your endoscopy  Relieve a sore throat:  Suck on throat lozenges or crushed ice  Gargle with a small amount of warm salt water  Mix 1 teaspoon of salt and 1 cup of warm water to make salt water  Relieve gas and discomfort from bloating:  Lie on your right side with a heating pad on your abdomen  Take short walks to help pass gas  Eat small meals until bloating is relieved  Rest after your procedure: You have been given medicine to relax you  Do not  drive or make important decisions until the day after your procedure  Return to your normal activity as directed  You can usually return to work the day after your procedure  Follow up with your healthcare provider as directed:  Write down your questions so you remember to ask them during your visits  © 2017 [address] is for End User's use only and may not be sold, redistributed or otherwise used for commercial purposes  All illustrations and images included in CareNotes® are the copyrighted property of [employer]  or Yovani Gonzalez  The above information is an  only  It is not intended as medical advice for individual conditions or treatments  Talk to your doctor, nurse or pharmacist before following any medical regimen to see if it is safe and effective for you  Gastric Polyps   WHAT YOU NEED TO KNOW:   Gastric polyps are growths that form in the lining of your stomach  They are not cancer, but certain types of polyps can change into cancer  DISCHARGE INSTRUCTIONS:   Seek care immediately if:   · You have blood in your vomit  · You have dark bowel movements  · You have severe pain in your abdomen that does not go away after you take medicine  Call your doctor or gastroenterologist if:   · You have indigestion that does not go away with treatment  · You vomit after meals  · You have questions or concerns about your condition or care  Medicines:   · Antibiotics  may be given if you have an infection caused by H  pylori bacteria  · Take your medicine as directed  Contact your healthcare provider if you think your medicine is not helping or if you have side effects  Tell him or her if you are allergic to any medicine  Keep a list of the medicines, vitamins, and herbs you take  Include the amounts, and when and why you take them  Bring the list or the pill bottles to follow-up visits  Carry your medicine list with you in case of an emergency  Manage or prevent gastric polyps:   · Wash your hands often to prevent an H  pylori infection  Use soap and warm water  Use an alcohol-based gel if soap and water are not available  Clean your hands before you eat and after you use the bathroom  Clean your hands after you change a baby's diaper  · Ask your healthcare provider about medicines before you take them  NSAIDs, such as ibuprofen, can cause stomach bleeding   Your provider may tell you not to use proton pump inhibitors if you have polyps called fundic polyps  He or she can tell you about other medicines you should not take to prevent new polyps  · Do not smoke  Nicotine and other chemicals in cigarettes and cigars can worsen your symptoms  Ask your provider for information if you currently smoke and need help to quit  E-cigarettes or smokeless tobacco still contain nicotine  Talk to your provider before you use these products  · Do not drink alcohol  Alcohol may worsen your symptoms  Alcohol also increases the risk for cancer of the esophagus or stomach  Ask your provider for information if you currently drink alcohol and need help to quit  Follow up with your doctor or gastroenterologist as directed: You may need more tests or procedures  Write down any questions so you remember to ask them at your visits  © Copyright [employer] 2022 Information is for End User's use only and may not be sold, redistributed or otherwise used for commercial purposes  All illustrations and images included in CareNotes® are the copyrighted property of A D A M , Inc  or [facility]  The above information is an  only  It is not intended as medical advice for individual conditions or treatments  Talk to your doctor, nurse or pharmacist before following any medical regimen to see if it is safe and effective for you  Hiatal Hernia   WHAT YOU NEED TO KNOW:   A hiatal hernia is a condition that causes part of your stomach to bulge through the hiatus (small opening) in your diaphragm  The part of the stomach may move up and down, or it may get trapped above the diaphragm  DISCHARGE INSTRUCTIONS:   Call your local emergency number (911 in the [address] [place] [address],[address]) if:   · You have severe chest pain and sudden trouble breathing  Seek care immediately if:   · You have severe abdominal pain  · You try to vomit but nothing comes out (retching)      · Your bowel movements are black or bloody  · Your vomit looks like coffee grounds or has blood in it  Call your doctor if:   · Your symptoms are getting worse  · You have nausea, and you are vomiting  · You are losing weight without trying  · You have questions or concerns about your condition or care  Medicines:   · Medicines  may be given to relieve heartburn symptoms  These medicines help to decrease or block stomach acid  You may also be given medicines that help to tighten the esophageal sphincter  · Take your medicine as directed  Contact your healthcare provider if you think your medicine is not helping or if you have side effects  Tell him or her if you are allergic to any medicine  Keep a list of the medicines, vitamins, and herbs you take  Include the amounts, and when and why you take them  Bring the list or the pill bottles to follow-up visits  Carry your medicine list with you in case of an emergency  Self care: The following nutrition and lifestyle changes may be recommended to relieve symptoms of heartburn:     · Avoid foods that make your symptoms worse  These may include spicy foods, fruit juices, alcohol, caffeine, chocolate, and mint  · Eat several small meals during the day  Small meals give your stomach less food to digest     · Avoid lying down and bending forward after you eat  Do not eat meals 2 to 3 hours before bedtime  This decreases your risk for reflux  · Maintain a healthy weight  If you are overweight, weight loss may help relieve your symptoms  · Sleep with your head elevated  at least 6 inches  · Do not smoke  Smoking can increase your symptoms of heartburn  Follow up with your doctor as directed:  Write down your questions so you remember to ask them during your visits  © Copyright [employer] 2022 Information is for End User's use only and may not be sold, redistributed or otherwise used for commercial purposes   All illustrations and images included in [employer] [address] are the copyrighted property of A D A M , Inc  or [facility] Dorothea Teague   The above information is an  only  It is not intended as medical advice for individual conditions or treatments  Talk to your doctor, nurse or pharmacist before following any medical regimen to see if it is safe and effective for you

## 2022-05-02 NOTE — ANESTHESIA PREPROCEDURE EVALUATION
Procedure:  EGD    Relevant Problems   CARDIO   (+) Coronary artery disease involving native coronary artery of native heart without angina pectoris   (+) Intermittent complete heart block (HCC)   (+) Junctional rhythm   (+) Mixed hyperlipidemia   (+) Premature ventricular beats   (+) Sick sinus syndrome (HCC)      GI/HEPATIC   (+) Gastro-esophageal reflux disease without esophagitis   (+) PUD (peptic ulcer disease)      MUSCULOSKELETAL   (+) Chronic back pain   (+) Osteoarthritis of fingers of both hands   (+) Primary osteoarthritis of left knee      NEURO/PSYCH   (+) Chronic back pain   (+) S/P ICD (internal cardiac defibrillator) procedure      PULMONARY   (+) LENY and COPD overlap syndrome (HCC)        Physical Exam    Airway    Mallampati score: II  TM Distance: >3 FB  Neck ROM: full     Dental   No notable dental hx     Cardiovascular      Pulmonary      Other Findings        Anesthesia Plan  ASA Score- 3     Anesthesia Type- IV sedation with anesthesia with ASA Monitors  Additional Monitors:   Airway Plan:           Plan Factors-Exercise tolerance (METS): >4 METS  Chart reviewed  EKG reviewed  Patient summary reviewed  Patient is not a current smoker  Induction- intravenous  Postoperative Plan-     Informed Consent- Anesthetic plan and risks discussed with patient  I personally reviewed this patient with the CRNA  Discussed and agreed on the Anesthesia Plan with the CRNA  Zac Burroughs

## 2022-05-02 NOTE — H&P
History and Physical - [facility]  Mayra Carpenter 78 y o  male MRN: [id number]                  HPI: Mayra Carpenter is a 78y o  year old male who presents for h/o gerd      REVIEW OF SYSTEMS: Per the HPI, and otherwise unremarkable  Historical Information   Past Medical History:   Diagnosis Date    Abrasion of left foot     LAST ASSESSED: 9/23/13    Achilles tendinitis, unspecified leg     LAST ASSESSED: 11/30/12    Allergic rhinitis     LAST ASSESSED: 11/25/13    Allergic rhinitis 06/25/2008    LAST ASSESSED: 6/25/08    Cervicalgia 04/22/2011    LAST ASSESSED: 4/22/11    Coronary artery disease     CPAP (continuous positive airway pressure) dependence     Dysfunction of left eustachian tube     LAST ASSESSED: 9/23/13    Epistaxis     LAST ASSESSED: 5/27/15    First degree atrioventricular block     LAST ASSESSED: 7/10/17    Gastric ulcer 10/07/2011    LAST ASSESSED: 3/9/15    GERD (gastroesophageal reflux disease)     Hemorrhoids 05/13/2011    LAST ASSESSED: 5/13/11    Long term use of drug     LAST ASSESSED: 10/11/12    Myalgia 12/21/2007    LAST ASSESSED: 12/21/07    Myositis 12/21/2007    LAST ASSESSED: 12/21/07    Numbness and tingling of foot     LAST ASSESSED: 3/9/15    Premature ventricular beats     states cardiologist ordered Zio ambulatory cardiac monitor    Sleep apnea      Past Surgical History:   Procedure Laterality Date    CARDIAC CATHETERIZATION  2017    CARDIAC ELECTROPHYSIOLOGY PROCEDURE N/A 10/25/2021    Procedure: CARDIAC EPS;  Surgeon: Murali Bush MD;  Location: [facility]; Service: Cardiology    CARDIAC ELECTROPHYSIOLOGY PROCEDURE Left 10/25/2021    Procedure: Cardiac icd implant;  Surgeon: Murali Bush MD;  [facility]: [facility]; Service: Cardiology    CARDIOVERSION N/A 10/25/2021    Procedure: Cardioversion;  Surgeon: Murali Bush MD;  Location: [facility];   Service: Cardiology    CATARACT EXTRACTION Right     CERVICAL FUSION      pt thinks C4-C5    CHOLECYSTECTOMY      LAPAROSCOPIC; LAST ASSESSED: 10/17/17    COLONOSCOPY      COMPLETE; 3-4 YEARS AGO BY [facility]; LAST ASSESSED: 14    MYRINGOTOMY W/ TUBES      WITH VENTILATING TUBE INSERTION    MA REVISE MEDIAN N/CARPAL TUNNEL SURG Left 2021    Procedure: RELEASE CARPAL TUNNEL;  Surgeon: Zuhair Hernandez MD;  Location: [facility];  Service: Orthopedics    VASECTOMY       Social History   Social History     Substance and Sexual Activity   Alcohol Use Yes    Alcohol/week: 7 0 standard drinks    Types: 7 Glasses of wine per week    Comment: Once in a while     Social History     Substance and Sexual Activity   Drug Use No     Social History     Tobacco Use   Smoking Status Former Smoker    Quit date:     Years since quittin 3   Smokeless Tobacco Never Used     Family History   Problem Relation Age of Onset    Cancer Mother         SOFT TISSUE CANCER ON RT SIDE CHEST WALL AND     Prostate cancer Maternal Uncle        Meds/Allergies       Current Outpatient Medications:     apixaban (Eliquis) 5 mg    aspirin (ASPIR-81) 81 mg EC tablet    celecoxib (CeleBREX) 200 mg capsule    Coenzyme Q10 (COQ10) 200 MG CAPS    finasteride (PROSCAR) 5 mg tablet    Glucosamine-Chondroitin (GLUCOSAMINE CHONDR COMPLEX PO)    metoprolol succinate (TOPROL-XL) 25 mg 24 hr tablet    Omega-3 Fatty Acids (FISH OIL) 1000 MG CPDR    pantoprazole (PROTONIX) 40 mg tablet    rosuvastatin (CRESTOR) 10 MG tablet    fluticasone (VERAMYST) 27 5 MCG/SPRAY nasal spray    nitroglycerin (NITROSTAT) 0 4 mg SL tablet    Current Facility-Administered Medications:     lactated ringers infusion, 125 mL/hr, Intravenous, Continuous, 125 mL/hr at 22 1333    Allergies   Allergen Reactions    Amoxicillin Anaphylaxis    Acetaminophen      Rapid heart rate    Apple - Food Allergy     Cherry Flavor - Food Allergy Other (See Comments)     States allergy is to raw cherries    Pollen Extract     Augmentin [Amoxicillin-Pot Clavulanate]     Dye [Iodinated Diagnostic Agents] Itching       Objective     BP (!) 177/80   Pulse 69   Temp (!) 97 3 °F (36 3 °C) (Temporal)   Resp 16   Ht 6' (1 829 m)   Wt 98 4 kg (217 lb)   SpO2 99%   BMI 29 43 kg/m²       PHYSICAL EXAM    Gen: NAD  Head: NCAT  CV: RRR  CHEST: Clear  ABD: soft, NT/ND  EXT: no edema      ASSESSMENT/PLAN:  This is a 78y o  year old male here for egd, and he is stable and optimized for his procedure

## 2022-05-02 NOTE — ANESTHESIA POSTPROCEDURE EVALUATION
Post-Op Assessment Note    CV Status:  Stable    Pain management: adequate     Mental Status:  Alert   Hydration Status:  Stable   PONV Controlled:  None   Airway Patency:  Patent and adequate      Post Op Vitals Reviewed: Yes      Staff: Anesthesiologist, CRNA         No complications documented      BP  160/80   Temp  97 9   Pulse  69   Resp 15   SpO2  98%

## 2022-05-03 ENCOUNTER — REMOTE DEVICE CLINIC VISIT (OUTPATIENT)
Dept: CARDIOLOGY CLINIC | Facility: CLINIC | Age: 67
End: 2022-05-03
Payer: MEDICARE

## 2022-05-03 ENCOUNTER — OFFICE VISIT (OUTPATIENT)
Dept: CARDIOLOGY CLINIC | Facility: CLINIC | Age: 67
End: 2022-05-03
Payer: MEDICARE

## 2022-05-03 VITALS
SYSTOLIC BLOOD PRESSURE: 140 MMHG | HEIGHT: 72 IN | WEIGHT: 218 LBS | HEART RATE: 64 BPM | BODY MASS INDEX: 29.53 KG/M2 | OXYGEN SATURATION: 98 % | DIASTOLIC BLOOD PRESSURE: 82 MMHG

## 2022-05-03 DIAGNOSIS — Z95.810 PRESENCE OF AUTOMATIC CARDIOVERTER/DEFIBRILLATOR (AICD): ICD-10-CM

## 2022-05-03 DIAGNOSIS — E78.2 MIXED HYPERLIPIDEMIA: ICD-10-CM

## 2022-05-03 DIAGNOSIS — I25.10 CORONARY ARTERY DISEASE INVOLVING NATIVE CORONARY ARTERY OF NATIVE HEART WITHOUT ANGINA PECTORIS: ICD-10-CM

## 2022-05-03 DIAGNOSIS — I48.0 PAROXYSMAL ATRIAL FIBRILLATION (HCC): ICD-10-CM

## 2022-05-03 DIAGNOSIS — Z99.89 CPAP (CONTINUOUS POSITIVE AIRWAY PRESSURE) DEPENDENCE: ICD-10-CM

## 2022-05-03 DIAGNOSIS — I47.2 NSVT (NONSUSTAINED VENTRICULAR TACHYCARDIA) (HCC): ICD-10-CM

## 2022-05-03 DIAGNOSIS — Z95.810 PRESENCE OF AUTOMATIC CARDIOVERTER/DEFIBRILLATOR (AICD): Primary | ICD-10-CM

## 2022-05-03 DIAGNOSIS — G47.33 OSA (OBSTRUCTIVE SLEEP APNEA): ICD-10-CM

## 2022-05-03 PROCEDURE — 93295 DEV INTERROG REMOTE 1/2/MLT: CPT | Performed by: INTERNAL MEDICINE

## 2022-05-03 PROCEDURE — 99214 OFFICE O/P EST MOD 30 MIN: CPT | Performed by: INTERNAL MEDICINE

## 2022-05-03 PROCEDURE — 93296 REM INTERROG EVL PM/IDS: CPT | Performed by: INTERNAL MEDICINE

## 2022-05-03 NOTE — PROGRESS NOTES
Progress note - Cardiology Office   [facility]  Kuldeep Sheriff 78 y o  male MRN: [id number]  : 1955  Unit/Bed#:  Encounter: [id number]      Assessment:     1  Coronary artery disease involving native coronary artery of native heart without angina pectoris    2  Paroxysmal atrial fibrillation (HCC)    3  NSVT (nonsustained ventricular tachycardia) ([facility] [address] )    4  Presence of automatic cardioverter/defibrillator (AICD)    5  Mixed hyperlipidemia    6  CPAP (continuous positive airway pressure) dependence    7  LENY (obstructive sleep apnea)        Discussion summary and Plan:       1  Coronary artery disease status post catheterization in 2017 has moderate ostial circ disease and nonobstructive disease in other arteries  He has repeat cardiac catheterization done  Cath was done in 2021  Patient has 50-70% ostial circumflex stenosis which is non dominant artery and IFR is 0 96 other arteries have nonobstructive disease  Symptoms of angina continue aspirin and statin     2  Dyslipidemia  Continue statins he is tolerating it very well  LFTs are acceptable from 2022     3  History of intermittent AV block, and SVT with production of VT during EP study status post Medtronic dual-chamber ICD which is functioning adequately  Recently noted to have atrial fibrillation written separately     4  Obstructive sleep apnea  Continue using CPAP machine  He is pretty compliant     5  Paroxysmal atrial fibrillation  Patient is noted to have episodes of atrial fibrillation on the device  AFib burden was 2 5%  He is already on beta-blocker  No more reoccurrence of AFib is noted  If he has significant AFib we will increase beta-blockers  He is taking his Eliquis  Issues related to blood thinners discussed with him  We also discussed that if his AFib burden is higher, we may consider AFib ablation  He has multiple questions which were answered to his satisfaction      6  Sick sinus syndrome with episodes of NSVT  Device interrogation reviewed in detail    Continue current Rx  Continue beta-blocker if significant AFib noted we will increase metoprolol XL to 50 milligram daily  Patient will call us if he has any problem  Follow-up 6 months  Thank you for your consultation  If you have any question please call me at [phone]    Counseling :  A description of the counseling  Goals and Barriers  Patient's ability to self care: Yes  Medication side effect reviewed with patient in detail and all their questions answered to their satisfaction  Primary Care Physician : Amanda Sender, DO      HPI :     Gill Severance is a 78y o  year old male who was referred by primary care doctor for for his history of cardiac disease  Patient who used to follow up with [facility] ([facility]) and has been evaluated by [facility] cardiology in 2017 for 2nd opinion has medical history significant for coronary artery disease, dyslipidemia, DJD and GERD along with obstructive sleep apnea  He had a catheterization done in June of 2017 which shows he had a moderate ostial circumflex disease and he chose to have medical therapy  At that time he was seen by  [facility] cardiology for 2nd opinion and various options were discussed with him including continue medical Rx, angioplasty of ostial circumflex which could be done but slightly high risk or single-vessel bypass surgery  At that time he was asymptomatic and he was continued medical therapy  Patient was recently in [place] where he had an episode of palpitation and fast heartbeat  He was kept overnight ruled out for acute coronary syndrome and he was advised to follow up with Cardiology here  He is known to have history of irregular heartbeat  EKG shows normal sinus with first-degree AV block and frequent PVCs  PVCs are also in the form of bigeminy and fusion beats are noted    He is otherwise very active he denies any chest pain any shortness of breath  He is able to do his activities without any problems  No nausea no vomiting no PND no orthopnea no syncope no other issues  No recent surgery  He has a previous history of surgery of gallbladder removal as well as neck fusion    He does not smoke but occasionally he will smoke a cigar maybe once every 3 months        09/01/2021  Above reviewed  Patient came for follow-up he is doing well he underwent cardiac catheterization we found he has ostial circumflex 50- 70% visually but IFR was 0 96  It was felt patient does not need stenting  His nuclear stress test also shows no ischemia  He denies any symptoms he is very active he does exercise without any problems  But he does have history of palpitations and he was found to have episode of NSVT, as well as intermittent third-degree AV block and junctional escape  He was evaluated by EP and was recommended dual-chamber pacemaker but they would like to have him cardiac catheterization done fast   His medications reviewed today EKG shows heart rate 64 beats per minute rare PVCs  No nausea no vomiting he is not on any AV node blocking drug because of his episodes of bradycardia  He has multiple questions regarding his angiogram and related to his rhythm problem and pacemaker which were discussed openly  No other cardiovascular issues at this time  01/18/2022  Above reviewed  Patient came for follow-up  He is doing well now  He had Medtronic dual-chamber ICD paced after EP study as he was having intermittent AV block and episodes of NSVT  He had an episode of VT during EP study  He does have history of coronary artery disease with ostial circumflex 50-70% stenosis visually but IFR was 0 96  It was felt that patient does not need stent and he has no symptoms  He denies any new complaints    Actually since his procedure he is feeling lot better he has no nausea no vomiting no fever no chills no other issues  He is compliant with medications he is taking aspirin statins and metoprolol  He is tolerating his Crestor well no other cardiovascular issues  His vitals has been stable  He has blood test done on October 2021 where cholesterol profile was acceptable electrolytes were acceptable his ALT was slightly elevated but still less than 2 times the normal   His device was interrogated in November 2021  He had episode of NSVT but otherwise device is functioning adequately  His low rate is set at 50     05/03/2022  Above reviewed  Patient came for follow-up  He had a medical history significant for dual-chamber ICD which was placed after EP study as he was found to have intermittent AV block and episode of NSVT  Recently he was noted to have episode of atrial fibrillation and started on antithrombotic therapy  He had history of coronary artery disease status post cardiac catheterization which shows ostial circumflex 50-70% stenosis but IFR was 0 96  Patient did not need stent and he has no symptoms  He has blood test done in April 2022 which has been acceptable  His other medical history significant for dyslipidemia, hypertension, and peptic ulcer disease but no recent bleeding  His device interrogation shows patient has AFib burden of 2 5% which was new  He does have history of obstructive sleep apnea and he is compliant with his CPAP machine he is overall feeling well  He has multiple questions regarding blood thinners which were answered he does have history of hemorrhoid but not active at this time  No issues at this time his device interrogation reviewed with him in detail  Review of Systems   Constitutional: Negative for activity change, chills, diaphoresis, fever and unexpected weight change  HENT: Negative for congestion  Eyes: Negative for discharge and redness  Respiratory: Negative for cough, chest tightness, shortness of breath and wheezing  Cardiovascular: Negative  Negative for chest pain, palpitations and leg swelling  Gastrointestinal: Negative for abdominal pain, diarrhea and nausea  Endocrine: Negative  Genitourinary: Negative for decreased urine volume and urgency  Musculoskeletal: Negative  Negative for arthralgias, back pain and gait problem  Skin: Negative for rash and wound  Allergic/Immunologic: Negative  Neurological: Negative for dizziness, seizures, syncope, weakness, light-headedness and headaches  Hematological: Negative  Psychiatric/Behavioral: Negative for agitation and confusion  The patient is not nervous/anxious  Historical Information   Past Medical History:   Diagnosis Date    Abrasion of left foot     LAST ASSESSED: 9/23/13    Achilles tendinitis, unspecified leg     LAST ASSESSED: 11/30/12    Allergic rhinitis     LAST ASSESSED: 11/25/13    Allergic rhinitis 06/25/2008    LAST ASSESSED: 6/25/08    Cervicalgia 04/22/2011    LAST ASSESSED: 4/22/11    Coronary artery disease     CPAP (continuous positive airway pressure) dependence     Dysfunction of left eustachian tube     LAST ASSESSED: 9/23/13    Epistaxis     LAST ASSESSED: 5/27/15    First degree atrioventricular block     LAST ASSESSED: 7/10/17    Gastric ulcer 10/07/2011    LAST ASSESSED: 3/9/15    GERD (gastroesophageal reflux disease)     Hemorrhoids 05/13/2011    LAST ASSESSED: 5/13/11    Long term use of drug     LAST ASSESSED: 10/11/12    Myalgia 12/21/2007    LAST ASSESSED: 12/21/07    Myositis 12/21/2007    LAST ASSESSED: 12/21/07    Numbness and tingling of foot     LAST ASSESSED: 3/9/15    Premature ventricular beats     states cardiologist ordered Zio ambulatory cardiac monitor    Sleep apnea      Past Surgical History:   Procedure Laterality Date    CARDIAC CATHETERIZATION  2017    CARDIAC ELECTROPHYSIOLOGY PROCEDURE N/A 10/25/2021    Procedure: CARDIAC EPS;  Surgeon: Mery Gamez MD;  Location: [facility];   Service: Cardiology  CARDIAC ELECTROPHYSIOLOGY PROCEDURE Left 10/25/2021    Procedure: Cardiac icd implant;  Surgeon: Marce Painting MD;  Location: [facility]; Service: Cardiology    CARDIOVERSION N/A 10/25/2021    Procedure: Cardioversion;  Surgeon: Marce Painting MD;  Location: [facility];   Service: Cardiology    CATARACT EXTRACTION Right     CERVICAL FUSION      pt thinks C4-C5    CHOLECYSTECTOMY      LAPAROSCOPIC; LAST ASSESSED: 10/17/17    COLONOSCOPY      COMPLETE; 3-4 YEARS AGO BY [facility]; LAST ASSESSED: 14    MYRINGOTOMY W/ TUBES      WITH VENTILATING TUBE INSERTION    AL REVISE MEDIAN N/CARPAL TUNNEL SURG Left 2021    Procedure: RELEASE CARPAL TUNNEL;  Surgeon: Damari Gray MD;  Location: [facility] MAIN OR;  Service: Orthopedics    VASECTOMY       Social History     Substance and Sexual Activity   Alcohol Use Yes    Alcohol/week: 7 0 standard drinks    Types: 7 Glasses of wine per week    Comment: Once in a while with a meal     Social History     Substance and Sexual Activity   Drug Use No     Social History     Tobacco Use   Smoking Status Former Smoker    Quit date:     Years since quittin 3   Smokeless Tobacco Never Used     Family History:   Family History   Problem Relation Age of Onset    Cancer Mother         SOFT TISSUE CANCER ON RT SIDE CHEST WALL AND     Prostate cancer Maternal Uncle        Meds/Allergies     Allergies   Allergen Reactions    Amoxicillin Anaphylaxis    Acetaminophen      Rapid heart rate    [employer] - Food Allergy     [employer] - Food Allergy Other (See Comments)     States allergy is to raw cherries    Pollen Extract     Augmentin [Amoxicillin-Pot Clavulanate]     Dye [Iodinated Diagnostic Agents] Itching       Current Outpatient Medications:     apixaban (Eliquis) 5 mg, Take 1 tablet (5 mg total) by mouth 2 (two) times a day, Disp: 60 tablet, Rfl: 3    aspirin (ASPIR-81) 81 mg EC tablet, Take 81 mg by mouth daily, Disp: , Rfl:     celecoxib (CeleBREX) 200 mg capsule, Take 1 capsule (200 mg total) by mouth daily, Disp: 90 capsule, Rfl: 1    Coenzyme Q10 (COQ10) 200 MG CAPS, Take 1 tablet by mouth daily, Disp: , Rfl:     finasteride (PROSCAR) 5 mg tablet, Take 1 tablet (5 mg total) by mouth daily, Disp: 90 tablet, Rfl: 1    fluticasone (VERAMYST) 27 5 MCG/SPRAY nasal spray, 2 sprays into each nostril daily, Disp: , Rfl:     Glucosamine-Chondroitin (GLUCOSAMINE CHONDR COMPLEX PO), Take 1 tablet by mouth daily, Disp: , Rfl:     metoprolol succinate (TOPROL-XL) 25 mg 24 hr tablet, Take 1 5 tablets (37 5 mg total) by mouth daily, Disp: 135 tablet, Rfl: 1    nitroglycerin (NITROSTAT) 0 4 mg SL tablet, PLACE 1 TABLET(S) BY SUBLINGUAL ROUTE , Disp: , Rfl: 4    Omega-3 Fatty Acids (FISH OIL) 1000 MG CPDR, Take by mouth daily, Disp: , Rfl:     pantoprazole (PROTONIX) 40 mg tablet, Take 1 tablet (40 mg total) by mouth daily, Disp: 90 tablet, Rfl: 1    rosuvastatin (CRESTOR) 10 MG tablet, Take 1 tablet (10 mg total) by mouth daily, Disp: 90 tablet, Rfl: 1  No current facility-administered medications for this visit  Facility-Administered Medications Ordered in Other Visits:     lactated ringers infusion, 125 mL/hr, Intravenous, Continuous, Julian Tomlin MD, Last Rate: 125 mL/hr at 05/02/22 1333, 125 mL/hr at 05/02/22 1333    Vitals: Blood pressure 140/82, pulse 64, height 6' (1 829 m), weight 98 9 kg (218 lb), SpO2 98 %  Body mass index is 29 57 kg/m²  Wt Readings from Last 3 Encounters:   05/03/22 98 9 kg (218 lb)   05/02/22 98 4 kg (217 lb)   04/14/22 98 9 kg (218 lb)     Vitals:    05/03/22 1243   Weight: 98 9 kg (218 lb)     BP Readings from Last 3 Encounters:   05/03/22 140/82   05/02/22 142/70   04/14/22 128/80         Physical Exam        Diagnostic Studies Review Cardio:   cardiac catheterization done in June 2017 at [facility] shows he had moderate stenosis of his ostial circumflex    Had a nonobstructive disease in other arteries and had a normal LV systolic function  Repeat cardiac catheterization done 2021 shows patient has nonobstructive disease involving RCA and LAD  50- 70% ostial circumflex and IFR was 0 95 EF is acceptable  Nuclear stress test done in 2020  Nuclear stress done in 2020 patient exercised for about 9 minutes  Fixed defect was seen no ischemia noted  EF was 47%      Event monitor has shows episodes of pauses, intermittent 3 AV block, episode of NSVT was evaluated by EP  EKG:  Twelve lead EKG done 2021 shows normal sinus rhythm first-degree AV block with PVCs  No significant change from previous EKG done at his primary care doctor's office     12 lead EKG done 2021 shows Normal sinus rhythm first-degree AV block heart rate 64 beats per minute  Rare PVCs noted  Results for orders placed during the hospital encounter of 10/12/20    NM Myocardial Perfusion Spect (Exercise Induced Stress and/or Rest)    [facility] HernándezyoanE[facility] Division[facility]ir[facility] [address]  [facility], [address] [facility]  [phone]    Rest/Stress Gated SPECT Myocardial Perfusion Imaging After Exercise    Patient: Mikie Krause  MR number: [id number]  Account number: [de-identified]  : 1955  Age: 71 years  Gender: Male  Status: Inpatient  Location: Stress lab  Height: 73 in  Weight: 202 lb  BP: 138/ 80 mmHg    Allergies: AMOXICILLIN, ACETAMINOPHEN, APPLE, CHERRY FLAVOR, POLLEN EXTRACT, AMOXICILLIN-POT CLAVULANATE, IODINATED DIAGNOSTIC AGENTS    Diagnosis: R07 9 - Chest pain, unspecified    Interpreting Physician:  Ash Menjivar MD  Referring Physician:  Marya Vizcarra DO  Technician:  Lakeshia Linda  RN:  Eusebio Hu RN BSN  Group:  [facility]  Report Prepared by[de-identified]  Eusebio Hu RN BSN    INDICATIONS: Coronary artery disease  HISTORY: The patient is a 71year old  male   Chest pain status: chest pain, radiation to neck and jaw area, radiation to arm(s)  Coronary artery disease risk factors: dyslipidemia  Cardiovascular history: coronary artery disease  and arrhythmia  Co-morbidity: obesity  PHYSICAL EXAM: Baseline physical exam screening: no evidence of significant aortic stenosis and no wheezes audible  REST ECG: Normal sinus rhythm  The ECG showed 1ï¾° AV block  PROCEDURE: The study was performed in the the Stress lab  Treadmill exercise testing was performed, using the Linda protocol  Gated SPECT myocardial perfusion imaging was performed after stress  Systolic blood pressure was 138 mmHg, at the  start of the study  Diastolic blood pressure was 80 mmHg, at the start of the study  The heart rate was 75 bpm, at the start of the study  IV double checked  LINDA PROTOCOL:  HR bpm SBP mmHg DBP mmHg Symptoms  Baseline 75 138 80 none  Stage 1 129 178 80 none  Stage 2 129 184 72 mild dyspnea  Stage 3 141 -- -- --  Immediate 141 205 70 moderate dyspnea  Recovery 1 92 192 80 subsiding  Recovery 2 93 150 78 none    STRESS SUMMARY: Duration of exercise was 9 min  The patient exercised to protocol stage 3  Maximal work rate was 10 1 METs  Functional capacity was normal  Maximal heart rate during stress was 141 bpm ( 90 % of maximal predicted heart  rate)  The heart rate response to stress was normal  There was normal resting blood pressure with a hypertensive response to stress  The rate-pressure product for the peak heart rate and blood pressure was 34269  There was no chest pain  during stress  The stress test was terminated due to moderate dyspnea  Pre oxygen saturation: 99 %  Peak oxygen saturation: 98 %  The stress ECG was equivocal for ischemia  Arrhythmia during stress: isolated premature ventricular beats      ISOTOPE ADMINISTRATION:  Resting isotope administration Stress isotope administration  Agent Tetrofosmin Tetrofosmin  Dose 11 mCi 30 9 mCi  Date 10/14/2020 10/14/2020  Injection time 08:55 10: 38  Injection-image interval 37 min 52 min    Radiopharmaceutical was administered 6 min, 30 sec into the stress protocol  There was 2 min and 30 sec of exercise after the injection  MYOCARDIAL PERFUSION IMAGING:  The image quality was fair  Rotating projection images reveal mild diaphragmatic attenuation and mild subdiaphragmatic activity  Left ventricular size was normal  The TID ratio was 0 97  PERFUSION DEFECTS:  -  There was a moderate-sized, mildly severe, fixed myocardial perfusion defect of the basal inferior wall likely due to diaphragm attenuation  GATED SPECT:  The calculated left ventricular ejection fraction was 47 %  Left ventricular ejection fraction was within normal limits by visual estimate  There was no left ventricular regional abnormality  SUMMARY:  -  Stress results: Duration of exercise was 9 min  Target heart rate was achieved  There was normal resting blood pressure with a hypertensive response to stress  There was no chest pain during stress  The stress test was terminated due to  moderate dyspnea  -  ECG conclusions: The stress ECG was equivocal for ischemia  -  Perfusion imaging: There was a moderate-sized, mildly severe, fixed myocardial perfusion defect of the basal inferior wall likely due to diaphragm attenuation   -  Gated SPECT: The calculated left ventricular ejection fraction was 47 %  Left ventricular ejection fraction was within normal limits by visual estimate  There was no left ventricular regional abnormality  IMPRESSIONS: Normal study after maximal exercise  There was image artifact, without diagnostic evidence for perfusion abnormality  Prepared and signed by    Roselyn Mai MD  Signed 10/14/2020 15:31:45    No results found for this or any previous visit  Imaging:  Chest X-Ray:   No Chest XR results available for this patient      CT-scan of the chest:     CTA dissection protocol chest abdomen pelvis w wo contrast    Result Date: 10/12/2020  Impression 1  No aortic dissection or aneurysm  2   No acute finding within chest, abdomen, and pelvis  3   Colonic diverticulosis  4   Incidentally noted 1 5 cm right paravertebral simple fluid attenuating cystic lesion at the level of T6  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   Recommend correlation/comparison with prior outside imaging  Workstation performed: [id number]     Lab Review   Lab Results   Component Value Date    WBC 5 3 04/06/2022    HGB 16 1 04/06/2022    HCT 47 7 04/06/2022    MCV 91 9 04/06/2022    RDW 12 2 04/06/2022     04/06/2022     BMP:  Lab Results   Component Value Date    SODIUM 142 04/06/2022    K 4 6 04/06/2022     04/06/2022    CO2 29 04/06/2022    BUN 19 04/06/2022    CREATININE 1 00 04/06/2022    GLUC 97 04/06/2022    GLUF 116 (H) 10/25/2021    CALCIUM 9 9 04/06/2022    EGFR 75 10/25/2021    MG 2 4 10/13/2020     LFT:  Lab Results   Component Value Date    AST 28 04/06/2022    ALT 49 (H) 04/06/2022    ALKPHOS 67 04/06/2022    TP 6 8 04/06/2022    ALB 4 5 04/06/2022      Lab Results   Component Value Date    [id number] 0 596 10/13/2020     No components found for: [employer] [facility]  Lab Results   Component Value Date    HGBA1C 5 4 04/06/2022     Lipid Profile:   Lab Results   Component Value Date    CHOLESTEROL 137 04/06/2022    HDL 44 04/06/2022    LDLCALC 76 04/06/2022    TRIG 88 04/06/2022     Lab Results   Component Value Date    CHOLESTEROL 137 04/06/2022    CHOLESTEROL 132 08/10/2021     Lab Results   Component Value Date    TROPONINI <0 02 10/13/2020     Device interrogation from 04/17/2022 shows episode of atrial fibrillation      Dr Odessa Akins MD [facility]      "This note has been constructed using a voice recognition system  Therefore there may be syntax, spelling, and/or grammatical errors   Please call if you have any questions  "

## 2022-05-03 NOTE — PROGRESS NOTES
Results for orders placed or performed in visit on 05/03/22   Cardiac EP device report    Narrative    MDT-DUAL CHAMBER ICD (AAI-DDD MODE) / ACTIVE SYSTEM IS MRI CONDITIONAL  CARELINK TRANSMISSION: (NOTE COULD ONLY DOWNLOAD INDIVIDUAL PDF'S DUE [place]BALT/CARELINK ISSUE~CARELINK/PACEART AWARE WORKING ON RESOLIVING THIS ISSUE) BATTERY VOLTAGE ADEQUATE (10 5 YRS)  AP: 24 3%  : 86 9% (>40%~MVP-ON/50)  ALL AVAILABLE LEAD PARAMETERS WITHIN NORMAL LIMITS  46 VT EPISODES W/ AVAIL EGMS SHOWING NSVT 46 BEATS  @159 BPM, 6 BEATS @ 162 BPM, 12 BEATS @ 149 BPM, 9 BEATS @# 147 BPM  19 VT-NS EPISODES W/ AVAIL EGMS SHOWING PROB RVR W/ -146 BPM, MAX DURATION 28 SECS  AT/AF EPISODES NOT AVAIL  AF BURDEN: 0 3%  PT TAKES ELIQUIS, METOPROLOL SUCC, ASA 81MG  EF: 60% (ECHO 8/12/21)  OPTI-VOL WITHIN NORMAL LIMITS  APPROPRIATELY FUNCTIONING ICD    [address]

## 2022-05-04 ENCOUNTER — TELEPHONE (OUTPATIENT)
Dept: CARDIOLOGY CLINIC | Facility: CLINIC | Age: 67
End: 2022-05-04

## 2022-05-04 NOTE — TELEPHONE ENCOUNTER
----- Message from Va Stanley MD sent at 5/4/2022  4:58 PM EDT -----  Patient is recommended to have antiarrhythmic  I will let EP to decide that  Advised patient to contact Dr Naomi Logan does office maybe he need to see them sooner    I will also try to connect with Dr Naomi Logan

## 2022-05-04 NOTE — TELEPHONE ENCOUNTER
I sp w/patient;made aware of device interrogation and recommendations to be prescribed an antiarrhythmic to be ordered by Dr Layne Samson  Placed a task to [address]

## 2022-05-05 ENCOUNTER — OFFICE VISIT (OUTPATIENT)
Dept: CARDIOLOGY CLINIC | Facility: CLINIC | Age: 67
End: 2022-05-05
Payer: MEDICARE

## 2022-05-05 VITALS
DIASTOLIC BLOOD PRESSURE: 68 MMHG | BODY MASS INDEX: 29.59 KG/M2 | SYSTOLIC BLOOD PRESSURE: 122 MMHG | WEIGHT: 218.5 LBS | HEART RATE: 65 BPM | HEIGHT: 72 IN

## 2022-05-05 DIAGNOSIS — I49.5 SICK SINUS SYNDROME (HCC): ICD-10-CM

## 2022-05-05 DIAGNOSIS — E78.2 MIXED HYPERLIPIDEMIA: ICD-10-CM

## 2022-05-05 DIAGNOSIS — M17.12 PRIMARY OSTEOARTHRITIS OF LEFT KNEE: ICD-10-CM

## 2022-05-05 DIAGNOSIS — I44.2 INTERMITTENT COMPLETE HEART BLOCK (HCC): ICD-10-CM

## 2022-05-05 DIAGNOSIS — J44.9 OSA AND COPD OVERLAP SYNDROME (HCC): ICD-10-CM

## 2022-05-05 DIAGNOSIS — I25.10 CORONARY ARTERY DISEASE INVOLVING NATIVE CORONARY ARTERY OF NATIVE HEART WITHOUT ANGINA PECTORIS: ICD-10-CM

## 2022-05-05 DIAGNOSIS — Z99.89 CPAP (CONTINUOUS POSITIVE AIRWAY PRESSURE) DEPENDENCE: ICD-10-CM

## 2022-05-05 DIAGNOSIS — G47.33 OSA AND COPD OVERLAP SYNDROME (HCC): ICD-10-CM

## 2022-05-05 DIAGNOSIS — Z95.810 S/P ICD (INTERNAL CARDIAC DEFIBRILLATOR) PROCEDURE: ICD-10-CM

## 2022-05-05 DIAGNOSIS — Z95.810 PRESENCE OF AUTOMATIC CARDIOVERTER/DEFIBRILLATOR (AICD): ICD-10-CM

## 2022-05-05 DIAGNOSIS — I48.0 PAROXYSMAL ATRIAL FIBRILLATION (HCC): ICD-10-CM

## 2022-05-05 DIAGNOSIS — M25.50 ARTHRALGIA, UNSPECIFIED JOINT: ICD-10-CM

## 2022-05-05 DIAGNOSIS — R00.2 INTERMITTENT PALPITATIONS: ICD-10-CM

## 2022-05-05 DIAGNOSIS — I49.8 JUNCTIONAL RHYTHM: ICD-10-CM

## 2022-05-05 DIAGNOSIS — M54.2 NECK PAIN: ICD-10-CM

## 2022-05-05 DIAGNOSIS — I47.2 NON-SUSTAINED VENTRICULAR TACHYCARDIA (HCC): ICD-10-CM

## 2022-05-05 PROBLEM — I47.29 NON-SUSTAINED VENTRICULAR TACHYCARDIA: Status: ACTIVE | Noted: 2022-05-05

## 2022-05-05 PROCEDURE — 99215 OFFICE O/P EST HI 40 MIN: CPT | Performed by: INTERNAL MEDICINE

## 2022-05-05 PROCEDURE — 93000 ELECTROCARDIOGRAM COMPLETE: CPT | Performed by: INTERNAL MEDICINE

## 2022-05-05 NOTE — PROGRESS NOTES
Consultation - Electrophysiology-Cardiology (EP)   Digna Arzate 78 y o  male MRN: [id number]  Unit/Bed#:  Encounter: [id number]      No diagnosis found        Consults  Physician Requesting Consult: Self, Referral     No att  providers found  Reason for Consult / Principal Problem:       Assessment/Plan       Atrial fibrillation  Long-term anticoagulation    Symptoms present for -  Current worsening for-    Risk factors   Age-  Obesity-  Obstructive sleep apnea -  Thyroid disorder -  Hypertension -  Heart failure -  Diabetes mellitus -  Tobacco use-  Alcohol use-  Energy drink use-      Current therapy   Anticoagulation-chads Vasc score-  Rate control-  Rhythm control-    My recommendation for this patient              CHF  LVEF-  NYHA class-  QRS morphology and weight-  Symptoms-  Medications-  My recommendation for this patient        Obesity / overweight  BMI-  This increases the risk of-CAD, CVA, vascular disease, diabetes, kidney dysfunction, hypertension, hyperlipidemia  Diet is responsible for 80% of weight gain   Advice nutritional counseling and healthy diet  Also advised to increase activity  He is going to follow up with his primary care        Obstructive sleep apnea   Patient has history of snoring, morning fatigue and daytime sleepiness at least on some days  Examination is also suggestive  Recommended to follow up with primary care and Pulmonary Medicine      CAD  Symptoms-  Medications-          Hypertension   Blood pressure-  Medication-  My recommendation        Mixed hyperlipidemia   Medication-  No myositis or myalgia   My recommendation        Diabetes mellitus   Hemoglobin A1c   Medication   My recommendation        Chronic kidney disease   Current stage   On medication-  Ability to use antiarrhythmics-        Thyroid condition  TSH-  My recommendation        Tobacco abuse   Number of years   Number   This does increase the risk of atrial fibrillation   Recommend complete cessation        Alcohol abuse   Kind of drinks used-  Number of units a day   This increases the risk of-atrial fibrillation, cardiomyopathy  Recommend complete cessation              History of Present Illness   HPI: Mayra Carpenter is a 78y o  year old male has been referred to me by        The patient has significant medical illnesses which include        As far as cardiac symptoms are concerned  Angina -  Orthopnea -  Paroxysmal nocturnal dyspnea -  Leg swelling-  Palpitations -  Presyncope-  Syncope -  Orthostatic lightheadedness -  Exertional intolerance-      Snoring-  morning fatigue-  Daytime sleepiness-      Social history  Tobacco use-  Alcohol use-  Energy drink use-  Recreational drug use-      Family history        Historical Information   Past Medical History:   Diagnosis Date    Abrasion of left foot     LAST ASSESSED: 9/23/13    Achilles tendinitis, unspecified leg     LAST ASSESSED: 11/30/12    Allergic rhinitis     LAST ASSESSED: 11/25/13    Allergic rhinitis 06/25/2008    LAST ASSESSED: 6/25/08    Cervicalgia 04/22/2011    LAST ASSESSED: 4/22/11    Coronary artery disease     CPAP (continuous positive airway pressure) dependence     Dysfunction of left eustachian tube     LAST ASSESSED: 9/23/13    Epistaxis     LAST ASSESSED: 5/27/15    First degree atrioventricular block     LAST ASSESSED: 7/10/17    Gastric ulcer 10/07/2011    LAST ASSESSED: 3/9/15    GERD (gastroesophageal reflux disease)     Hemorrhoids 05/13/2011    LAST ASSESSED: 5/13/11    Long term use of drug     LAST ASSESSED: 10/11/12    Myalgia 12/21/2007    LAST ASSESSED: 12/21/07    Myositis 12/21/2007    LAST ASSESSED: 12/21/07    Numbness and tingling of foot     LAST ASSESSED: 3/9/15    Premature ventricular beats     states cardiologist ordered Zio ambulatory cardiac monitor    Sleep apnea      Past Surgical History:   Procedure Laterality Date    CARDIAC CATHETERIZATION  2017   [facility] CARDIAC ELECTROPHYSIOLOGY PROCEDURE N/A 10/25/2021    Procedure: CARDIAC EPS;  Surgeon: Missy Wood MD;  [facility]; Service: Cardiology    CARDIAC ELECTROPHYSIOLOGY PROCEDURE Left 10/25/2021    Procedure: Cardiac icd implant;  Surgeon: Missy Wood MD;  [facility]; Service: Cardiology    CARDIOVERSION N/A 10/25/2021    Procedure: Cardioversion;  Surgeon: Missy Wood MD;  [facility]; Service: Cardiology    CATARACT EXTRACTION Right     CERVICAL FUSION      pt thinks C4-C5    CHOLECYSTECTOMY      LAPAROSCOPIC; LAST ASSESSED: 10/17/17    COLONOSCOPY      COMPLETE; 3-4 YEARS AGO BY [facility]; LAST ASSESSED: 14    MYRINGOTOMY W/ TUBES      WITH VENTILATING TUBE INSERTION    KS REVISE MEDIAN N/CARPAL TUNNEL SURG Left 2021    Procedure: RELEASE CARPAL TUNNEL;  Surgeon: Grant Urban MD;  Location: [facility] MAIN [facility];  Service: Orthopedics    VASECTOMY       Social History     Substance and Sexual Activity   Alcohol Use Yes    Alcohol/week: 7 0 standard drinks    Types: 7 Glasses of wine per week    Comment: Once in a while with a meal     Social History     Substance and Sexual Activity   Drug Use No     Social History     Tobacco Use   Smoking Status Former Smoker    Quit date: 200    Years since quittin 3   Smokeless Tobacco Never Used     Social History     Socioeconomic History    Marital status: /Civil Union     Spouse name: Not on file    Number of children: Not on file    Years of education: Not on file    Highest education level: Not on file   Occupational History    Not on file   Tobacco Use    Smoking status: Former Smoker     Quit date:      Years since quittin 3    Smokeless tobacco: Never Used   Vaping Use    Vaping Use: Never used   Substance and Sexual Activity    Alcohol use:  Yes     Alcohol/week: 7 0 standard drinks     Types: 7 Glasses of wine per week     Comment: Once in a while with a meal    Drug use: No    Sexual activity: Not Currently   Other Topics Concern    Not on file   Social History Narrative    Not on file     Social Determinants of Health     Financial Resource Strain: Not on file   Food Insecurity: Not on file   Transportation Needs: Not on file   Physical Activity: Not on file   Stress: Not on file   Social Connections: Not on file   Intimate Partner Violence: Not on file   Housing Stability: Not on file       Family History:  Family History   Problem Relation Age of Onset    Cancer Mother         SOFT TISSUE CANCER ON RT SIDE CHEST WALL AND     Prostate cancer Maternal Uncle          Meds/Allergies      No current facility-administered medications for this visit  Facility-Administered Medications Ordered in Other Visits   Medication Dose Route Frequency    lactated ringers infusion  125 mL/hr Intravenous Continuous        (Not in a hospital admission)      Allergies   Allergen Reactions    Amoxicillin Anaphylaxis    Acetaminophen      Rapid heart rate    Apple - Food Allergy     Cherry Flavor - Food Allergy Other (See Comments)     States allergy is to raw cherries    Pollen Extract     Augmentin [Amoxicillin-Pot Clavulanate]     Dye [Iodinated Diagnostic Agents] Itching           Objective   Vitals:   Visit Vitals  Smoking Status Former Smoker      There were no vitals filed for this visit  [unfilled]    Invasive Devices  Report    None                   ROS  ROS        PHYSICAL EXAM  Physical Exam          LAB RESULTS:      CBC:  Results from Last 12 Months   Lab Units 22  0758 10/25/21  0852 10/19/21  0732 21  0655 08/10/21  0735   WBC Thousand/uL  --  7 17  --  12 49*  --    WHITE BLOOD CELL COUNT  Thousand/uL 5 3  --  5 3  --  5 5   HEMOGLOBIN g/dL  --  18 2*  --  17 1*  --    HEMOGLOBIN  g/dL 16 1  --  16 6  --  16 4   HEMATOCRIT %  --  53 4*  --  49 5*  --    HEMATOCRIT  % 47 7  --  49 3  --  47 7   MCV fL  --  91  --  91  --    MCV   fL 91 9  -- 92 3  --  90 5   PLATELETS Thousands/uL  --  248  --  238  --    PLATELETS  Thousand/uL 198  --  216  --  216   MCH pg  --  30 9  --  31 5  --    MCH  pg 31 0  --  31 1  --  31 1   MCHC g/dL  --  34 1  --  34 5  --    MCHC  g/dL 33 8  --  33 7  --  34 4   RDW %  --  12 2  --  12 6  --    RDW  % 12 2  --  12 4  --  12 6   MPV fL  --  9 7  --  10 4  --    NRBC AUTO /100 WBCs  --   --   --  0  --         CMP:  Results from Last 12 Months   Lab Units 04/06/22  0758 10/25/21  0852 10/19/21  0732 08/19/21  0658 08/10/21  0735 06/25/21  0922   POTASSIUM mmol/L 4 6 4 1 4 8 3 8 4 3 4 6   CHLORIDE mmol/L 104 108 103 109* 105 107   CO2 mmol/L 29 25 30 23 25 26   BUN mg/dL 19 15 18 18 21 17   CREATININE mg/dL 1 00 1 03 0 93 1 04 1 00 0 99   CALCIUM mg/dL 9 9 10 2* 10 0 9 3 9 8 8 7   AST U/L 28  --  31  --  27  --    ALT U/L 49*  --  51*  --  43  --    ALK PHOS U/L 67  --  67  --  65  --    EGFR ml/min/1 73sq m  --  75  --  74  --  79        Magnesium:   No results for input(s): MG in the last 8784 hours  A1C:  Results from Last 12 Months   Lab Units 04/06/22  0758 08/10/21  0735   HEMOGLOBIN A1C % of total Hgb 5 4 5 5        TSH:  No results for input(s): TSH in the last 8784 hours  TSH 3rd Gen:  No results for input(s): [id number] in the last 8784 hours  PT/INR:  Results from Last 12 Months   Lab Units 10/25/21  0852   PROTIME seconds 13 2   INR  1 04       Lipid Panel:  Results from Last 12 Months   Lab Units 04/06/22  0758 08/10/21  0735   CHOLESTEROL mg/dL 137 132   TRIGLYCERIDES mg/dL 88 130   HDL mg/dL 44 43        Troponin:  No results for input(s): TROPONINI in the last 8784 hours       Imaging:    Device Check:  Results for orders placed or performed in visit on 05/03/22   Cardiac EP device report    Narrative    MDT-DUAL CHAMBER ICD (AAI-DDD MODE) / ACTIVE SYSTEM IS MRI CONDITIONAL  CARELINK TRANSMISSION: (NOTE COULD ONLY DOWNLOAD INDIVIDUAL PDF'S DUE [place]/CARELINK ISSUE~CARELINK/PACEART AWARE WORKING ON RESOLIVING THIS ISSUE) BATTERY VOLTAGE ADEQUATE (10 5 YRS)  AP: 24 3%  : 86 9% (>40%~MVP-ON/50)  ALL AVAILABLE LEAD PARAMETERS WITHIN NORMAL LIMITS  46 VT EPISODES W/ AVAIL EGMS SHOWING NSVT 46 BEATS  @159 BPM, 6 BEATS @ 162 BPM, 12 BEATS @ 149 BPM, 9 BEATS @# 147 BPM  19 VT-NS EPISODES W/ AVAIL EGMS SHOWING PROB RVR W/ -146 BPM, MAX DURATION 28 SECS  AT/AF EPISODES NOT AVAIL  AF BURDEN: 0 3%  PT TAKES ELIQUIS, METOPROLOL SUCC, ASA 81MG  EF: 60% (ECHO 21)  OPTI-VOL WITHIN NORMAL LIMITS  APPROPRIATELY FUNCTIONING ICD  [address]         Imaging:  Echocardiogram  Results for orders placed during the hospital encounter of 21    Echo complete with contrast if indicated    Narrative  [facility] [address] [facility]  [phone]    Transthoracic Echocardiogram  2D, M-mode, Doppler, and Color Doppler    Study date:  12-Aug-2021    Patient: Gregorio Galindo  MR number: [id number]  Account number: [de-identified]  : 1955  Age: 76 years  Gender: Male  Status: Outpatient  Location: [address] [place] [address]  Height: 72 in  Weight: 221 5 lb  BP: 152/ 62 mmHg    Indications: Assess left ventricular function  Diagnoses: I47 2 - Ventricular tachycardia    Sonographer:  CHRISTI Velásquez  Primary Physician:  Kailyn Hardin DO  Referring Physician:  Katie Salazar MD  Group:  [facility]  Interpreting Physician:  Geoff Reno MD    SUMMARY    LEFT VENTRICLE:  Systolic function was normal  Ejection fraction was estimated to be 60 %  There were no regional wall motion abnormalities  AORTIC VALVE:  There was mild regurgitation  HISTORY: PRIOR HISTORY: NSVT, Non-obtructive coronary artery disease, Hyperlipidemia, LENY    PROCEDURE: The study was performed in the [address] [place] [address]  This was a routine study  The transthoracic approach was used   The study included complete 2D imaging, M-mode, complete spectral Doppler, and color Doppler  The  heart rate was 54 bpm, at the start of the study  Images were obtained from the parasternal, apical, subcostal, and suprasternal notch acoustic windows  Image quality was adequate  LEFT VENTRICLE: Size was normal  Systolic function was normal  Ejection fraction was estimated to be 60 %  There were no regional wall motion abnormalities  Wall thickness was normal  DOPPLER: There was an increased relative contribution  of atrial contraction to ventricular filling  Left ventricular diastolic function parameters were normal for the patient's age  RIGHT VENTRICLE: The size was normal  Systolic function was normal  Wall thickness was normal     LEFT ATRIUM: Size was normal     RIGHT ATRIUM: Size was normal     MITRAL VALVE: Valve structure was normal  There was normal leaflet separation  DOPPLER: The transmitral velocity was within the normal range  There was no evidence for stenosis  There was trace regurgitation  AORTIC VALVE: The valve was trileaflet  Leaflets exhibited normal thickness and normal cuspal separation  DOPPLER: Transaortic velocity was within the normal range  There was no evidence for stenosis  There was mild regurgitation  TRICUSPID VALVE: The valve structure was normal  There was normal leaflet separation  DOPPLER: The transtricuspid velocity was within the normal range  There was no evidence for stenosis  There was trace regurgitation  PULMONIC VALVE: Leaflets exhibited normal thickness, no calcification, and normal cuspal separation  DOPPLER: The transpulmonic velocity was within the normal range  There was trace regurgitation  PERICARDIUM: There was no pericardial effusion  The pericardium was normal in appearance  AORTA: The root exhibited normal size  SYSTEMIC VEINS: IVC: The inferior vena cava was normal in size   Respirophasic changes were normal     SYSTEM MEASUREMENT TABLES    2D  %FS: 21 05 %  Ao Diam: 3 02 cm  EDV(Teich): 117 73 ml  EF(Teich): 42 64 %  ESV(Teich): 67 53 ml  IVSd: 0 78 cm  LA Area: 19 18 cm2  LA Diam: 4 cm  LVEDV MOD A4C: 167 49 ml  LVEF MOD A4C: 52 37 %  LVESV MOD A4C: 79 77 ml  LVIDd: 4 99 cm  LVIDs: 3 94 cm  LVLd A4C: 9 72 cm  LVLs A4C: 8 4 cm  LVPWd: 0 88 cm  RA Area: 14 03 cm2  RVIDd: 3 7 cm  SV MOD A4C: 87 71 ml  SV(Teich): 50 2 ml    MM  TAPSE: 2 85 cm    PW  LVOT Env  Ti: 315 89 ms  LVOT VTI: 14 53 cm  LVOT Vmax: 0 63 m/s  LVOT Vmean: 0 46 m/s  LVOT maxP 57 mmHg  LVOT meanP 93 mmHg  MV A Bi: 0 81 m/s  MV Dec [place]: 2 25 m/s2  MV DecT: 247 18 ms  MV E Bi: 0 56 m/s  MV E/A Ratio: 0 69  MV PHT: 71 68 ms  MVA By PHT: 3 07 cm2    [facility] Accredited Echocardiography Laboratory    Prepared and electronically signed by    Chastity Figueroa MD  Signed 12-Aug-2021 15:00:09    No results found for this or any previous visit  Stress Test:   Results for orders placed during the hospital encounter of 10/12/20    NM Myocardial Perfusion Spect (Exercise Induced Stress and/or Rest)    [address]  [phone]    Rest/Stress Gated SPECT Myocardial Perfusion Imaging After Exercise    Patient: Daija Boyer  MR number: [id number]  Account number: [de-identified]  : 1955  Age: 71 years  Gender: Male  Status: Inpatient  Location: Stress lab  Height: 73 in  Weight: 202 lb  BP: 138/ 80 mmHg    Allergies: AMOXICILLIN, ACETAMINOPHEN, APPLE, CHERRY FLAVOR, POLLEN EXTRACT, AMOXICILLIN-POT CLAVULANATE, IODINATED DIAGNOSTIC AGENTS    Diagnosis: R07 9 - Chest pain, unspecified    Interpreting Physician:  Blade Patino MD  Referring Physician:  Monse Anders DO  Technician:  Shirley Avendano  RN:  Elvis Youngblood RN BSN  Group:  [facility]  Report Prepared by[de-identified]  Elvis Youngblood RN BSN    INDICATIONS: Coronary artery disease  HISTORY: The patient is a 71year old  male   Chest pain status: chest pain, radiation to neck and jaw area, radiation to arm(s)  Coronary artery disease risk factors: dyslipidemia  Cardiovascular history: coronary artery disease  and arrhythmia  Co-morbidity: obesity  PHYSICAL EXAM: Baseline physical exam screening: no evidence of significant aortic stenosis and no wheezes audible  REST ECG: Normal sinus rhythm  The ECG showed 1ï¾° AV block  PROCEDURE: The study was performed in the the Stress lab  Treadmill exercise testing was performed, using the Linda protocol  Gated SPECT myocardial perfusion imaging was performed after stress  Systolic blood pressure was 138 mmHg, at the  start of the study  Diastolic blood pressure was 80 mmHg, at the start of the study  The heart rate was 75 bpm, at the start of the study  IV double checked  LINDA PROTOCOL:  HR bpm SBP mmHg DBP mmHg Symptoms  Baseline 75 138 80 none  Stage 1 129 178 80 none  Stage 2 129 184 72 mild dyspnea  Stage 3 141 -- -- --  Immediate 141 205 70 moderate dyspnea  Recovery 1 92 192 80 subsiding  Recovery 2 93 150 78 none    STRESS SUMMARY: Duration of exercise was 9 min  The patient exercised to protocol stage 3  Maximal work rate was 10 1 METs  Functional capacity was normal  Maximal heart rate during stress was 141 bpm ( 90 % of maximal predicted heart  rate)  The heart rate response to stress was normal  There was normal resting blood pressure with a hypertensive response to stress  The rate-pressure product for the peak heart rate and blood pressure was 68328  There was no chest pain  during stress  The stress test was terminated due to moderate dyspnea  Pre oxygen saturation: 99 %  Peak oxygen saturation: 98 %  The stress ECG was equivocal for ischemia  Arrhythmia during stress: isolated premature ventricular beats      ISOTOPE ADMINISTRATION:  Resting isotope administration Stress isotope administration  Agent Tetrofosmin Tetrofosmin  Dose 11 mCi 30 9 mCi  Date 10/14/2020 10/14/2020  Injection time 08:55 10: 38  Injection-image interval 37 min 52 min    Radiopharmaceutical was administered 6 min, 30 sec into the stress protocol  There was 2 min and 30 sec of exercise after the injection  MYOCARDIAL PERFUSION IMAGING:  The image quality was fair  Rotating projection images reveal mild diaphragmatic attenuation and mild subdiaphragmatic activity  Left ventricular size was normal  The TID ratio was 0 97  PERFUSION DEFECTS:  -  There was a moderate-sized, mildly severe, fixed myocardial perfusion defect of the basal inferior wall likely due to diaphragm attenuation  GATED SPECT:  The calculated left ventricular ejection fraction was 47 %  Left ventricular ejection fraction was within normal limits by visual estimate  There was no left ventricular regional abnormality  SUMMARY:  -  Stress results: Duration of exercise was 9 min  Target heart rate was achieved  There was normal resting blood pressure with a hypertensive response to stress  There was no chest pain during stress  The stress test was terminated due to  moderate dyspnea  -  ECG conclusions: The stress ECG was equivocal for ischemia  -  Perfusion imaging: There was a moderate-sized, mildly severe, fixed myocardial perfusion defect of the basal inferior wall likely due to diaphragm attenuation   -  Gated SPECT: The calculated left ventricular ejection fraction was 47 %  Left ventricular ejection fraction was within normal limits by visual estimate  There was no left ventricular regional abnormality  IMPRESSIONS: Normal study after maximal exercise  There was image artifact, without diagnostic evidence for perfusion abnormality  Prepared and signed by    Rohini Aparicio MD  Signed 10/14/2020 15:31:45        Cardiac catheterization :  No results found for this or any previous visit                Code Status: [unfilled]  Advance Directive and Living Will:      Power of :    POLST:      Counseling / Coordination of [address] [place] [address] [employer] [facility] Juarez Flake
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
## 2022-05-05 NOTE — TELEPHONE ENCOUNTER
Patient is r/s'd to see Dr Denisse Murillo today in the [facility] office    Violeta Allan
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
## 2022-05-05 NOTE — PATIENT INSTRUCTIONS
We will price out Sotalol 160 mg twice a day vs dofetilide 500 mcg twice a day to see which is the better price once we do so we will admit to hospital for medication admission      Device checks every 2 weeks X 6 times

## 2022-05-09 ENCOUNTER — TELEPHONE (OUTPATIENT)
Dept: CARDIOLOGY CLINIC | Facility: CLINIC | Age: 67
End: 2022-05-09

## 2022-05-09 DIAGNOSIS — I48.0 PAROXYSMAL ATRIAL FIBRILLATION (HCC): Primary | ICD-10-CM

## 2022-05-09 NOTE — TELEPHONE ENCOUNTER
Per Dr Flor Pemberton Please jay out Sotalol 160 mg twice a day vs dofetilide 500 mcg twice a day to see which is the better price once we do so we will admit to hospital for medication admission    Hi Delicia, can you please check med price for this patient  Thank you

## 2022-05-10 NOTE — TELEPHONE ENCOUNTER
Patient scheduled for med admission (Sotalol) at [facility] on 06/13/22 per Jimmy Patel  Patient aware of general instructions  Can we please have insurance check for service

## 2022-05-10 NOTE — TELEPHONE ENCOUNTER
Dofetilide 500 mcg BID- No prior authorization require   60 for 30 $ 138 37    Sotalol  mg BID- Tier 2 No prior authorization require   60 for 30 $ 4 00    Ykcowzms-[phone]  ID- [id number]

## 2022-05-16 NOTE — RESULT ENCOUNTER NOTE
Please call the patient regarding his abnormal result  Biopsy suggest mild gastritis, no infection or precancer cells  Please advise patient of anti-reflux measures    Follow up in my office as needed

## 2022-06-06 ENCOUNTER — HOSPITAL ENCOUNTER (OUTPATIENT)
Dept: RADIOLOGY | Facility: HOSPITAL | Age: 67
Discharge: HOME | End: 2022-06-06
Attending: ANESTHESIOLOGY
Payer: MEDICARE

## 2022-06-06 ENCOUNTER — CONSULT (OUTPATIENT)
Dept: PAIN MEDICINE | Facility: CLINIC | Age: 67
End: 2022-06-06
Payer: MEDICARE

## 2022-06-06 VITALS
HEIGHT: 72 IN | DIASTOLIC BLOOD PRESSURE: 72 MMHG | SYSTOLIC BLOOD PRESSURE: 139 MMHG | BODY MASS INDEX: 29.63 KG/M2 | HEART RATE: 69 BPM

## 2022-06-06 DIAGNOSIS — M25.511 CHRONIC RIGHT SHOULDER PAIN: ICD-10-CM

## 2022-06-06 DIAGNOSIS — M54.2 NECK PAIN: ICD-10-CM

## 2022-06-06 DIAGNOSIS — M54.12 CERVICAL RADICULOPATHY: ICD-10-CM

## 2022-06-06 DIAGNOSIS — M54.40 LOW BACK PAIN WITH SCIATICA, SCIATICA LATERALITY UNSPECIFIED, UNSPECIFIED BACK PAIN LATERALITY, UNSPECIFIED CHRONICITY: ICD-10-CM

## 2022-06-06 DIAGNOSIS — Z98.1 STATUS POST CERVICAL SPINAL FUSION: ICD-10-CM

## 2022-06-06 DIAGNOSIS — Z98.1 STATUS POST CERVICAL SPINAL FUSION: Primary | ICD-10-CM

## 2022-06-06 DIAGNOSIS — G89.29 CHRONIC RIGHT SHOULDER PAIN: ICD-10-CM

## 2022-06-06 PROCEDURE — 72052 X-RAY EXAM NECK SPINE 6/>VWS: CPT

## 2022-06-06 PROCEDURE — 99204 OFFICE O/P NEW MOD 45 MIN: CPT | Performed by: ANESTHESIOLOGY

## 2022-06-06 PROCEDURE — 72110 X-RAY EXAM L-2 SPINE 4/>VWS: CPT

## 2022-06-06 PROCEDURE — 73030 X-RAY EXAM OF SHOULDER: CPT

## 2022-06-06 RX ORDER — GABAPENTIN 100 MG/1
300 CAPSULE ORAL
Qty: 90 CAPSULE | Refills: 1 | Status: SHIPPED | OUTPATIENT
Start: 2022-06-06 | End: 2022-06-15

## 2022-06-06 NOTE — PROGRESS NOTES
Assessment  1  Status post cervical spinal fusion    2  Neck pain    3  Cervical radiculopathy    4  Chronic right shoulder pain    5  Low back pain with sciatica, sciatica laterality unspecified, unspecified back pain laterality, unspecified chronicity        Plan  70-year-old male with a history of CAD, PAF, status post ICD placement, Last's esophagus and peptic ulcer disease, LEYN, previous cervical fusion, referred by Dr Annabelle Garcia, presenting for initial consultation regarding neck pain which will occasionally radiate into bilateral upper extremities, localized right shoulder pain, numbness in the hands with a history of carpal tunnel syndrome, and low back pain that will occasionally radiate into the lower extremities  Patient's low back pain is intermittent and rare, where as neck, upper extremity, and right shoulder symptoms are more persistent  The patient does not have any imaging of the cervical spine, shoulder, or lumbar spine to review  He is currently taking Celebrex 200 mg daily which gives him excellent relief, however cardiology would like the patient to discontinue NSAIDs secondary to history of ulcer disease, cardiac history including anticoagulation  He does occasionally use Voltaren gel sparingly  He has done some physical therapy as directed by his friend who is a physical therapist with some relief  Patient's neck pain seems to be multifactorial including myofascial and facet mediated components  Right shoulder pain seems secondary to arthritis and there may be some rotator cuff pathology  No evidence of full tear on exam   Arm and hand symptoms seem to be multifactorial including possible components of cervical radiculopathy and carpal tunnel syndrome  May also be component of cubital tunnel syndrome as well  Patient's lumbar exam was normal today, however lumbar radiculitis on the differential which is intermittent per patient's account        1  I will order an x-ray of the lumbar spine and right shoulder  As well as flexion-extension x-rays of the cervical spine  A CT of the cervical spine was also ordered considering the patient is unable to have an MRI secondary to ICD placement  2  I will prescribe a trial of gabapentin 100 mg q h s  and will titrate up to 300 mg q h s  for his neuropathic complaints  The patient was apprised of the most common side effects of gabapentin and he was given a titration schedule at today's office visit  3  Will avoid oral NSAIDs at the request of cardiology  Advised patient to try using Voltaren gel more frequently for right shoulder pain  4  Offer the patient physical therapy, however he declined  5  Will follow up the patient in 6 weeks and we will call with results of imaging once received and our recommendations based upon those results   6  Did discuss opioids, however the patient wants to avoid these if at all possible  My impressions and treatment recommendations were discussed in detail with the patient who verbalized understanding and had no further questions  Discharge instructions were provided  I personally saw and examined the patient and I agree with the above discussed plan of care  No orders of the defined types were placed in this encounter  No orders of the defined types were placed in this encounter  History of Present Illness    Gem Bustamante is a 78 y o  male with a history of CAD, PAF, status post ICD placement, Last's esophagus and peptic ulcer disease, LENY, previous cervical fusion, referred by Dr Karel Villafuerte, presenting for initial consultation regarding neck pain which will occasionally radiate into bilateral upper extremities, localized right shoulder pain, numbness in the hands with a history of carpal tunnel syndrome, and low back pain that will occasionally radiate into the lower extremities    Patient's low back pain is intermittent and rare, where as neck, upper extremity, and right shoulder symptoms are more persistent  He denies any recent trauma or inciting event  He denies any bladder or bowel incontinence, saddle anesthesia, or balance issues  He denies any weakness of the lower extremities  The patient does not have any imaging of the cervical spine, shoulder, or lumbar spine to review  He is currently taking Celebrex 200 mg daily which gives him excellent relief, however cardiology would like the patient to discontinue NSAIDs secondary to history of ulcer disease, cardiac history including anticoagulation  He does occasionally use Voltaren gel sparingly  He has done some physical therapy as directed by his friend who is a physical therapist with some relief  The patient rates his pain as 6/10 on the pain is intermittent  The pain is not follow any particular pattern throughout the day  The pain is described as aching  He gets good relief with heat and ice application and chiropractic treatment, as well as steroids  He gets excellent relief with Celebrex  Other than as stated above, the patient denies any interval changes in medications, medical condition, mental condition, symptoms, or allergies since the last office visit  I have personally reviewed and/or updated the patient's past medical history, past surgical history, family history, social history, current medications, allergies, and vital signs today  Review of Systems   Constitutional: Negative for fever and unexpected weight change  HENT: Negative for trouble swallowing  Eyes: Negative for visual disturbance  Respiratory: Negative for shortness of breath and wheezing  Cardiovascular: Positive for palpitations  Negative for chest pain  Gastrointestinal: Negative for constipation, diarrhea, nausea and vomiting  Endocrine: Negative for cold intolerance, heat intolerance and polydipsia  Genitourinary: Negative for difficulty urinating and frequency  Musculoskeletal: Positive for arthralgias   Negative for gait problem, joint swelling and myalgias  Skin: Negative for rash  Neurological: Negative for dizziness, seizures, syncope, weakness and headaches  Hematological: Does not bruise/bleed easily  Psychiatric/Behavioral: Negative for dysphoric mood  All other systems reviewed and are negative        Patient Active Problem List   Diagnosis    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    External hemorrhoids    Gastro-esophageal reflux disease without esophagitis    Mixed hyperlipidemia    Environmental and seasonal allergies    Coronary artery disease involving native coronary artery of native heart without angina pectoris    Chronic back pain    Organic impotence    Primary osteoarthritis of left knee    Impaired fasting glucose    Benign prostatic hyperplasia with nocturia    Osteoarthritis of fingers of both hands    Need for malaria prophylaxis    Cataract    LENY and COPD overlap syndrome ([facility] )    Medicare annual wellness visit, subsequent    Premature ventricular beats    Trigger thumb of right hand    Trigger finger, right ring finger    Carpal tunnel syndrome, bilateral    Intermittent palpitations    CPAP (continuous positive airway pressure) dependence    PUD (peptic ulcer disease)    Sick sinus syndrome (HCC)    Intermittent complete heart block (HCC)    Junctional rhythm    Prostate cancer screening    S/P ICD (internal cardiac defibrillator) procedure    Non-sustained ventricular tachycardia (HCC)    Paroxysmal atrial fibrillation ([facility] )       Past Medical History:   Diagnosis Date    Abrasion of left foot     LAST ASSESSED: 9/23/13    Achilles tendinitis, unspecified leg     LAST ASSESSED: 11/30/12    Allergic rhinitis     LAST ASSESSED: 11/25/13    Allergic rhinitis 06/25/2008    LAST ASSESSED: 6/25/08    Cervicalgia 04/22/2011    LAST ASSESSED: 4/22/11    Coronary artery disease     CPAP (continuous positive airway pressure) dependence     Dysfunction of left eustachian tube     LAST ASSESSED: 13    Epistaxis     LAST ASSESSED: 5/27/15    First degree atrioventricular block     LAST ASSESSED: 7/10/17    Gastric ulcer 10/07/2011    LAST ASSESSED: 3/9/15    GERD (gastroesophageal reflux disease)     Hemorrhoids 2011    LAST ASSESSED: 11    Long term use of drug     LAST ASSESSED: 10/11/12    Myalgia 2007    LAST ASSESSED: 07    Myositis 2007    LAST ASSESSED: 07    Numbness and tingling of foot     LAST ASSESSED: 3/9/15    Premature ventricular beats     states cardiologist ordered Zio ambulatory cardiac monitor    Sleep apnea        Past Surgical History:   Procedure Laterality Date    CARDIAC CATHETERIZATION      CARDIAC ELECTROPHYSIOLOGY PROCEDURE N/A 10/25/2021    Procedure: CARDIAC EPS;  Surgeon: Bessy Norwood MD;  [facility]; Service: Cardiology    CARDIAC ELECTROPHYSIOLOGY PROCEDURE Left 10/25/2021    Procedure: Cardiac icd implant;  Surgeon: Bessy Norwood MD;  [facility]; Service: Cardiology    CARDIOVERSION N/A 10/25/2021    Procedure: Cardioversion;  Surgeon: Bessy Norwood MD;  [facility];   Service: Cardiology    CATARACT EXTRACTION Right     CERVICAL FUSION      pt thinks C4-C5    CHOLECYSTECTOMY      LAPAROSCOPIC; LAST ASSESSED: 10/17/17    COLONOSCOPY      COMPLETE; 3-4 YEARS AGO BY [facility]; LAST ASSESSED: 14    MYRINGOTOMY W/ TUBES      WITH VENTILATING TUBE INSERTION    AK REVISE MEDIAN N/CARPAL TUNNEL SURG Left 2021    Procedure: RELEASE CARPAL TUNNEL;  Surgeon: Ruslan Jones MD;  Location: [facility];  Service: Orthopedics    VASECTOMY         Family History   Problem Relation Age of Onset    Cancer Mother         SOFT TISSUE CANCER ON RT SIDE CHEST WALL AND     Prostate cancer Maternal Uncle        Social History     Occupational History    Not on file   Tobacco Use    Smoking status: Former Smoker     Quit date:  Years since quittin 4    Smokeless tobacco: Never Used   Vaping Use    Vaping Use: Never used   Substance and Sexual Activity    Alcohol use: Yes     Alcohol/week: 7 0 standard drinks     Types: 7 Glasses of wine per week     Comment: Once in a while with a meal    Drug use: No    Sexual activity: Not Currently       Current Outpatient Medications on File Prior to Visit   Medication Sig    apixaban (Eliquis) 5 mg Take 1 tablet (5 mg total) by mouth 2 (two) times a day    aspirin (ECOTRIN LOW STRENGTH) 81 mg EC tablet Take 81 mg by mouth daily    celecoxib (CeleBREX) 200 mg capsule Take 1 capsule (200 mg total) by mouth daily    Coenzyme Q10 (COQ10) 200 MG CAPS Take 1 tablet by mouth daily    finasteride (PROSCAR) 5 mg tablet Take 1 tablet (5 mg total) by mouth daily    fluticasone (VERAMYST) 27 5 MCG/SPRAY nasal spray 2 sprays into each nostril daily    Glucosamine-Chondroitin (GLUCOSAMINE CHONDR COMPLEX PO) Take 1 tablet by mouth daily    metoprolol succinate (TOPROL-XL) 25 mg 24 hr tablet Take 1 5 tablets (37 5 mg total) by mouth daily    Omega-3 Fatty Acids (FISH OIL) 1000 MG CPDR Take by mouth daily    pantoprazole (PROTONIX) 40 mg tablet Take 1 tablet (40 mg total) by mouth daily    rosuvastatin (CRESTOR) 10 MG tablet Take 1 tablet (10 mg total) by mouth daily    nitroglycerin (NITROSTAT) 0 4 mg SL tablet PLACE 1 TABLET(S) BY SUBLINGUAL ROUTE  (Patient not taking: Reported on 2022)     No current facility-administered medications on file prior to visit         Allergies   Allergen Reactions    Amoxicillin Anaphylaxis    Acetaminophen      Rapid heart rate    Apple - Food Allergy     Cherry Flavor - Food Allergy Other (See Comments)     States allergy is to raw cherries    Pollen Extract     Augmentin [Amoxicillin-Pot Clavulanate]     Dye [Iodinated Diagnostic Agents] Itching       Physical Exam    /72   Pulse 69   Ht 6' (1 829 m)   BMI 29 63 kg/m²     Constitutional: normal, well developed, well nourished, alert, in no distress and non-toxic and no overt pain behavior  Eyes: anicteric  HEENT: grossly intact  Neck: supple, symmetric, trachea midline and no masses   Pulmonary:even and unlabored  Cardiovascular:No edema or pitting edema present  Skin:Normal without rashes or lesions and well hydrated  Psychiatric:Mood and affect appropriate  Neurologic:Cranial Nerves II-XII grossly intact  Musculoskeletal:normal gait  Bilateral cervical paraspinals minimally tender to palpation  Limited range of motion with extension and bilateral side bending of the cervical spine  Bilateral biceps, triceps, and brachioradialis reflexes were 1/4 and symmetrical   Bilateral patellar and Achilles reflexes were 2/4 and symmetrical   No clonus was noted bilaterally  Negative Brito's reflex bilaterally  Bilateral upper extremity strength 5/5 in all muscle groups  Sensation intact to light touch in C5 through T1 dermatomes bilaterally  Negative Spurling's bilaterally  Positive Tinel's over bilateral wrists and left cubital tunnel  Bilateral lumbar paraspinals minimally tender to palpation  Bilateral SI joints nontender to palpation  Bilateral lower extremity strength 5/5 in all muscle groups  Sensation intact to light touch in L3 through S1 dermatomes bilaterally  In negative straight leg raise bilaterally  Negative Nba's test bilaterally      Imaging  RIGHT HAND     INDICATION:   M19 041: Primary osteoarthritis, right hand  M19 042: Primary osteoarthritis, left hand      COMPARISON:  None     VIEWS:  XR HAND 3+ VW RIGHT   Images: 3     For the purposes of institution wide universal language the following terms will apply: (thumb=1st digit/finger, index finger=2nd digit/finger, long finger=3rd digit/finger, ring=4th digit/finger and small finger=5th digit/finger)     FINDINGS:     There is no acute fracture or dislocation      Degenerative changes seen within the distal interphalangeal joint of the finger with joint space narrowing, proliferative changes and mild arthritic changes at the 2nd and 3rd metacarpophalangeal joint  A well-corticated bony density seen in relation of the ulnar styloid process, chronic     No lytic or blastic osseous lesion      Soft tissues are unremarkable      IMPRESSION:  Degenerative arthritic changes in the distal interphalangeal joint of the finger  No findings suggest inflammatory arthropathy  Mild 1st carpometacarpal arthritis    LEFT HAND     INDICATION:   M19 041: Primary osteoarthritis, right hand  M19 042: Primary osteoarthritis, left hand      COMPARISON:  None     VIEWS:  XR HAND 3+ VW LEFT      IMAGES:  3           For the purposes of institution wide universal language the following terms will apply: (thumb=1st digit/finger, index finger=2nd digit/finger, long finger=3rd digit/finger, ring=4th digit/finger and small finger=5th digit/finger)     FINDINGS:     There is no acute fracture or dislocation      Degenerative changes seen in the distal interphalangeal joint of the fingers with joint space narrowing, subchondral sclerosis  Mild arthritic changes at the 1st carpometacarpal joint and triscaphe joint  No erosive changes seen  Bony density seen in relation of the ulnar styloid chronic  No lytic or blastic osseous lesion      Soft tissues are unremarkable      IMPRESSION:  Degenerative changes in the distal interphalangeal joint of the 2nd through 5th fingers  Mild arthritic changes at the 1st carpometacarpal joint  No erosive changes to suggest inflammatory arthropathy

## 2022-06-08 ENCOUNTER — HOSPITAL ENCOUNTER (OUTPATIENT)
Dept: NON INVASIVE DIAGNOSTICS | Facility: CLINIC | Age: 67
Discharge: HOME | End: 2022-06-08
Payer: MEDICARE

## 2022-06-08 VITALS
SYSTOLIC BLOOD PRESSURE: 139 MMHG | HEART RATE: 68 BPM | HEIGHT: 72 IN | BODY MASS INDEX: 29.53 KG/M2 | DIASTOLIC BLOOD PRESSURE: 72 MMHG | WEIGHT: 218 LBS

## 2022-06-08 DIAGNOSIS — R00.2 INTERMITTENT PALPITATIONS: ICD-10-CM

## 2022-06-08 DIAGNOSIS — Z95.810 S/P ICD (INTERNAL CARDIAC DEFIBRILLATOR) PROCEDURE: ICD-10-CM

## 2022-06-08 LAB
AORTIC ROOT: 3.8 CM
APICAL FOUR CHAMBER EJECTION FRACTION: 47 %
ASCENDING AORTA: 3.4 CM
E WAVE DECELERATION TIME: 285 MS
FRACTIONAL SHORTENING: 27 % (ref 28–44)
INTERVENTRICULAR SEPTUM IN DIASTOLE (PARASTERNAL SHORT AXIS VIEW): 1.2 CM
INTERVENTRICULAR SEPTUM: 1.2 CM (ref 0.6–1.1)
LAAS-AP2: 15.8 CM2
LAAS-AP4: 17.5 CM2
LEFT ATRIUM AREA SYSTOLE SINGLE PLANE A4C: 17 CM2
LEFT ATRIUM SIZE: 4.1 CM
LEFT INTERNAL DIMENSION IN SYSTOLE: 3.8 CM (ref 2.1–4)
LEFT VENTRICLE DIASTOLIC VOLUME (MOD BIPLANE): 240 ML
LEFT VENTRICLE SYSTOLIC VOLUME (MOD BIPLANE): 124 ML
LEFT VENTRICULAR INTERNAL DIMENSION IN DIASTOLE: 5.2 CM (ref 3.5–6)
LEFT VENTRICULAR POSTERIOR WALL IN END DIASTOLE: 1.2 CM
LEFT VENTRICULAR STROKE VOLUME: 69 ML
LV EF: 49 %
LVSV (TEICH): 69 ML
MV E'TISSUE VEL-SEP: 7 CM/S
MV PEAK A VEL: 0.74 M/S
MV PEAK E VEL: 55 CM/S
MV STENOSIS PRESSURE HALF TIME: 83 MS
MV VALVE AREA P 1/2 METHOD: 2.65 CM2
RIGHT ATRIUM AREA SYSTOLE A4C: 13.4 CM2
RIGHT VENTRICLE ID DIMENSION: 3.8 CM
SL CV LEFT ATRIUM LENGTH A2C: 3.7 CM
SL CV LV EF: 60
SL CV PED ECHO LEFT VENTRICLE DIASTOLIC VOLUME (MOD BIPLANE) 2D: 131 ML
SL CV PED ECHO LEFT VENTRICLE SYSTOLIC VOLUME (MOD BIPLANE) 2D: 62 ML

## 2022-06-08 PROCEDURE — 93306 TTE W/DOPPLER COMPLETE: CPT | Performed by: INTERNAL MEDICINE

## 2022-06-08 PROCEDURE — 93306 TTE W/DOPPLER COMPLETE: CPT

## 2022-06-13 ENCOUNTER — HOSPITAL ENCOUNTER (INPATIENT)
Facility: HOSPITAL | Age: 67
LOS: 2 days | Discharge: HOME | DRG: 310 | End: 2022-06-15
Attending: INTERNAL MEDICINE | Admitting: INTERNAL MEDICINE
Payer: MEDICARE

## 2022-06-13 DIAGNOSIS — I47.2 VENTRICULAR TACHYCARDIA (HCC): ICD-10-CM

## 2022-06-13 DIAGNOSIS — I49.3 PREMATURE VENTRICULAR BEATS: Primary | ICD-10-CM

## 2022-06-13 DIAGNOSIS — I48.0 PAROXYSMAL ATRIAL FIBRILLATION (HCC): ICD-10-CM

## 2022-06-13 LAB
ANION GAP SERPL CALCULATED.3IONS-SCNC: 3 MMOL/L (ref 4–13)
BASOPHILS # BLD AUTO: 0.06 THOUSANDS/ΜL (ref 0–0.1)
BASOPHILS NFR BLD AUTO: 1 % (ref 0–1)
BUN SERPL-MCNC: 14 MG/DL (ref 5–25)
CALCIUM SERPL-MCNC: 9.2 MG/DL (ref 8.3–10.1)
CHLORIDE SERPL-SCNC: 110 MMOL/L (ref 100–108)
CO2 SERPL-SCNC: 24 MMOL/L (ref 21–32)
CREAT SERPL-MCNC: 1.09 MG/DL (ref 0.6–1.3)
EOSINOPHIL # BLD AUTO: 0.07 THOUSAND/ΜL (ref 0–0.61)
EOSINOPHIL NFR BLD AUTO: 1 % (ref 0–6)
ERYTHROCYTE [DISTWIDTH] IN BLOOD BY AUTOMATED COUNT: 12.7 % (ref 11.6–15.1)
GFR SERPL CREATININE-BSD FRML MDRD: 69 ML/MIN/1.73SQ M
GLUCOSE SERPL-MCNC: 108 MG/DL (ref 65–140)
HCT VFR BLD AUTO: 50 % (ref 36.5–49.3)
HGB BLD-MCNC: 16.8 G/DL (ref 12–17)
IMM GRANULOCYTES # BLD AUTO: 0.02 THOUSAND/UL (ref 0–0.2)
IMM GRANULOCYTES NFR BLD AUTO: 0 % (ref 0–2)
LYMPHOCYTES # BLD AUTO: 1.83 THOUSANDS/ΜL (ref 0.6–4.47)
LYMPHOCYTES NFR BLD AUTO: 31 % (ref 14–44)
MAGNESIUM SERPL-MCNC: 2.3 MG/DL (ref 1.6–2.6)
MCH RBC QN AUTO: 31.2 PG (ref 26.8–34.3)
MCHC RBC AUTO-ENTMCNC: 33.6 G/DL (ref 31.4–37.4)
MCV RBC AUTO: 93 FL (ref 82–98)
MONOCYTES # BLD AUTO: 0.73 THOUSAND/ΜL (ref 0.17–1.22)
MONOCYTES NFR BLD AUTO: 13 % (ref 4–12)
NEUTROPHILS # BLD AUTO: 3.15 THOUSANDS/ΜL (ref 1.85–7.62)
NEUTS SEG NFR BLD AUTO: 54 % (ref 43–75)
NRBC BLD AUTO-RTO: 0 /100 WBCS
PLATELET # BLD AUTO: 203 THOUSANDS/UL (ref 149–390)
PMV BLD AUTO: 10 FL (ref 8.9–12.7)
POTASSIUM SERPL-SCNC: 4.7 MMOL/L (ref 3.5–5.3)
RBC # BLD AUTO: 5.39 MILLION/UL (ref 3.88–5.62)
SODIUM SERPL-SCNC: 137 MMOL/L (ref 136–145)
WBC # BLD AUTO: 5.86 THOUSAND/UL (ref 4.31–10.16)

## 2022-06-13 PROCEDURE — 80048 BASIC METABOLIC PNL TOTAL CA: CPT | Performed by: PHYSICIAN ASSISTANT

## 2022-06-13 PROCEDURE — 85025 COMPLETE CBC W/AUTO DIFF WBC: CPT | Performed by: PHYSICIAN ASSISTANT

## 2022-06-13 PROCEDURE — 93005 ELECTROCARDIOGRAM TRACING: CPT

## 2022-06-13 PROCEDURE — 99223 1ST HOSP IP/OBS HIGH 75: CPT | Performed by: INTERNAL MEDICINE

## 2022-06-13 PROCEDURE — 83735 ASSAY OF MAGNESIUM: CPT | Performed by: PHYSICIAN ASSISTANT

## 2022-06-13 RX ORDER — PANTOPRAZOLE SODIUM 40 MG/1
40 TABLET, DELAYED RELEASE ORAL DAILY
Status: DISCONTINUED | OUTPATIENT
Start: 2022-06-14 | End: 2022-06-15 | Stop reason: HOSPADM

## 2022-06-13 RX ORDER — METOPROLOL SUCCINATE 25 MG/1
12.5 TABLET, EXTENDED RELEASE ORAL EVERY EVENING
Status: DISCONTINUED | OUTPATIENT
Start: 2022-06-13 | End: 2022-06-14

## 2022-06-13 RX ORDER — ASPIRIN 81 MG/1
81 TABLET ORAL DAILY
Status: DISCONTINUED | OUTPATIENT
Start: 2022-06-14 | End: 2022-06-15 | Stop reason: HOSPADM

## 2022-06-13 RX ORDER — FINASTERIDE 5 MG/1
5 TABLET, FILM COATED ORAL DAILY
Status: DISCONTINUED | OUTPATIENT
Start: 2022-06-14 | End: 2022-06-15 | Stop reason: HOSPADM

## 2022-06-13 RX ORDER — FLUTICASONE PROPIONATE 50 MCG
1 SPRAY, SUSPENSION (ML) NASAL DAILY PRN
Status: DISCONTINUED | OUTPATIENT
Start: 2022-06-14 | End: 2022-06-15 | Stop reason: HOSPADM

## 2022-06-13 RX ORDER — PRAVASTATIN SODIUM 20 MG
80 TABLET ORAL
Refills: 1 | Status: DISCONTINUED | OUTPATIENT
Start: 2022-06-13 | End: 2022-06-15 | Stop reason: HOSPADM

## 2022-06-13 RX ORDER — GABAPENTIN 300 MG/1
300 CAPSULE ORAL
Status: DISCONTINUED | OUTPATIENT
Start: 2022-06-13 | End: 2022-06-15 | Stop reason: HOSPADM

## 2022-06-13 RX ORDER — SOTALOL HYDROCHLORIDE 120 MG/1
120 TABLET ORAL DAILY
Status: DISCONTINUED | OUTPATIENT
Start: 2022-06-13 | End: 2022-06-14

## 2022-06-13 RX ORDER — CHLORAL HYDRATE 500 MG
1000 CAPSULE ORAL DAILY
Status: DISCONTINUED | OUTPATIENT
Start: 2022-06-14 | End: 2022-06-15 | Stop reason: HOSPADM

## 2022-06-13 RX ORDER — SODIUM PHOSPHATE,MONO-DIBASIC 19G-7G/118
ENEMA (ML) RECTAL DAILY
Status: DISCONTINUED | OUTPATIENT
Start: 2022-06-14 | End: 2022-06-13

## 2022-06-13 RX ORDER — CHOLECALCIFEROL (VITAMIN D3) 125 MCG
200 CAPSULE ORAL DAILY
Status: DISCONTINUED | OUTPATIENT
Start: 2022-06-14 | End: 2022-06-15 | Stop reason: HOSPADM

## 2022-06-13 RX ADMIN — GABAPENTIN 300 MG: 300 CAPSULE ORAL at 21:13

## 2022-06-13 RX ADMIN — APIXABAN 5 MG: 5 TABLET, FILM COATED ORAL at 18:39

## 2022-06-13 RX ADMIN — SOTALOL HYDROCHLORIDE 120 MG: 120 TABLET ORAL at 15:36

## 2022-06-13 NOTE — H&P
H&P Exam - Cardiology   Supa Luna 78 y o  male MRN: [id number]  Unit/Bed#: -01 Encounter: [id number]    Assessment/Plan :  1  NSVT  a  Inducible sustained monomorphic ventricular tachycardia on EPS 10/2021  b  Increase in burden on last device interrogation,   c  Starting Sotalol this admission  2  Presence of [employer] dual chamber ICD  a  Implanted 10/2021  b  AP 18%,  89%  3  Paroxysmal atrial fibrillation  a  Diagnosed 2022 - noted on device interrogation  From  - 2 5% burden  b  No further episodes  c  Maintained on Eliquis 5 mg BID   d  Prior to admission - Toprol XL 37 5 mg QD, will  to 12 5 QD  e  LA mildly dilated  4  COPD  5  LENY with CPAP  6  CAD  a  Cardiac cath 2021 - non obstructive CAD, 50-70% ostial Cx  b  Nonischemic nuclear stress test 10/2020  c  Maintained on baby Aspirin and Crestor 10 mg QD  7  SSS and Intermittent complete heart block  a  Seen on prior cardiac event monitor  8  Preseved LV systolic function, EF 66% per TTE 2022  9  HLD  10  S/p cervical spinal fusion  a  Follows with Pain Medicine as an outpatient  11  Lats's esophagus  12  PUD      Patient presents to the hospital for elective initiation of Sotolol for management of NSVT and PAF  Due to patients intolerance of higher dose of beta blocker, we will lower Toprol to 12 5 mg QD and start Sotalol at 120 mg QD  Baseline EKG and lab work has been obtained  For his ICD, we will turn on rate response to try and decrease symptoms of fatigue after starting Sotalol  We will monitor continuously on telemetry  We will check BMP and Mag daily  He will have an EKG done 2 hours after each Sotalol dose  History of Present Illness   HPI:  Supa Luna is a 78y o  year old male with a PMH as stated above, who presents to [facility] today to undergo initiation of Sotalol for NSVT and paroxysmal atrial fibrillation  In 2021, patient noted palpitations and feeling of heart racing  Extended cardiac event monitor revealed  Noted to have SSS, intermittent complete heart block, and NSVT on cardiac event monitor in 8/2021  Patient underwent EPS in 10/2021, at which time sustained monomorphic VT was inducible, requiring DC cardioversion  He underwent DC ICD placement and was started on Toprol XL  Regular device interrogations revealed new onset afib in April of 2022  He was started on Eliquis  Further device interrogation in May 2022 showed increase in NSVT burden  Initiation of antiarrythmic medication was recommended  Of note, patient has been seen by Pain Medicine to find alternative for Celebrex, which was used to control pain related to osteoarthritis and history of cervical spinal fusion  He has been started on Gabapentin  Recent echo on June 8 showed EF 60%  Upon evaluation, patient is sitting comfortably in his chair  He is worried about starting a beta blocker due to his previous symptoms of fatigue after attempting an increase in Toprol  Patient currently denies chest pain/heaviness/tightness/pressure, palpitations, shortness of breath, orthopnea, lightheadedness, presyncope, syncope or N/V  Telemetry review shows AS/ with wide paced complex and frequent PVCs  EKG:           Review of Systems   All other systems reviewed and are negative            Historical Information   Past Medical History:   Diagnosis Date    Abrasion of left foot     LAST ASSESSED: 9/23/13    Achilles tendinitis, unspecified leg     LAST ASSESSED: 11/30/12    Allergic rhinitis     LAST ASSESSED: 11/25/13    Allergic rhinitis 06/25/2008    LAST ASSESSED: 6/25/08    Cervicalgia 04/22/2011    LAST ASSESSED: 4/22/11    Coronary artery disease     CPAP (continuous positive airway pressure) dependence     Dysfunction of left eustachian tube     LAST ASSESSED: 9/23/13    Epistaxis     LAST ASSESSED: 5/27/15    First degree atrioventricular block     LAST ASSESSED: 7/10/17    Gastric ulcer 10/07/2011 LAST ASSESSED: 3/9/15    GERD (gastroesophageal reflux disease)     Hemorrhoids 2011    LAST ASSESSED: 11    Long term use of drug     LAST ASSESSED: 10/11/12    Myalgia 2007    LAST ASSESSED: 07    Myositis 2007    LAST ASSESSED: 07    Numbness and tingling of foot     LAST ASSESSED: 3/9/15    Premature ventricular beats     states cardiologist ordered Zio ambulatory cardiac monitor    Sleep apnea        Past Surgical History:   Procedure Laterality Date    CARDIAC CATHETERIZATION      CARDIAC ELECTROPHYSIOLOGY PROCEDURE N/A 10/25/2021    Procedure: CARDIAC EPS;  Surgeon: Emilia Fitzgerald MD;  [facility]; Service: Cardiology    CARDIAC ELECTROPHYSIOLOGY PROCEDURE Left 10/25/2021    Procedure: Cardiac icd implant;  Surgeon: Emilia Fitzgerald MD;  [facility]; Service: Cardiology    CARDIOVERSION N/A 10/25/2021    Procedure: Cardioversion;  Surgeon: Emilia Fitzgerald MD;  [facility];   Service: Cardiology    CATARACT EXTRACTION Right     CERVICAL FUSION      pt thinks C4-C5    CHOLECYSTECTOMY      LAPAROSCOPIC; LAST ASSESSED: 10/17/17    COLONOSCOPY      COMPLETE; 3-4 YEARS AGO BY [facility]; LAST ASSESSED: 14    MYRINGOTOMY W/ TUBES      WITH VENTILATING TUBE INSERTION    MN REVISE MEDIAN N/CARPAL TUNNEL SURG Left 2021    Procedure: RELEASE CARPAL TUNNEL;  Surgeon: Regis Borrego MD;  Location: [facility] MAIN [facility];  Service: Orthopedics    VASECTOMY         Family History   Problem Relation Age of Onset    Cancer Mother         SOFT TISSUE CANCER ON RT SIDE CHEST WALL AND     Prostate cancer Maternal Uncle        Social History   Social History     Substance and Sexual Activity   Alcohol Use Yes    Alcohol/week: 7 0 standard drinks    Types: 7 Glasses of wine per week    Comment: Once in a while with a meal     Social History     Substance and Sexual Activity   Drug Use No     Social History Tobacco Use   Smoking Status Former Smoker    Quit date: 200    Years since quittin 4   Smokeless Tobacco Never Used         Meds/Allergies   all medications and allergies reviewed  Home Medications:   Medications Prior to Admission   Medication    apixaban (Eliquis) 5 mg    aspirin (ECOTRIN LOW STRENGTH) 81 mg EC tablet    celecoxib (CeleBREX) 200 mg capsule    Coenzyme Q10 (COQ10) 200 MG CAPS    finasteride (PROSCAR) 5 mg tablet    fluticasone (VERAMYST) 27 5 MCG/SPRAY nasal spray    gabapentin (NEURONTIN) 100 mg capsule    Glucosamine-Chondroitin (GLUCOSAMINE CHONDR COMPLEX PO)    metoprolol succinate (TOPROL-XL) 25 mg 24 hr tablet    nitroglycerin (NITROSTAT) 0 4 mg SL tablet    Omega-3 Fatty Acids (FISH OIL) 1000 MG CPDR    pantoprazole (PROTONIX) 40 mg tablet    rosuvastatin (CRESTOR) 10 MG tablet       Allergies   Allergen Reactions    Amoxicillin Anaphylaxis    Acetaminophen      Rapid heart rate    Apple - Food Allergy     Cherry Flavor - Food Allergy Other (See Comments)     States allergy is to raw cherries    Pollen Extract     Augmentin [Amoxicillin-Pot Clavulanate]     Dye [Iodinated Diagnostic Agents] Itching       Objective   Vitals: There were no vitals taken for this visit  No intake or output data in the 24 hours ending 22 1020    Invasive Devices  Report    None                 Physical Exam  Vitals reviewed  Constitutional:       General: He is not in acute distress  Appearance: He is not ill-appearing or diaphoretic  HENT:      Head: Normocephalic and atraumatic  Right Ear: External ear normal       Left Ear: External ear normal       Nose: Nose normal    Eyes:      General:         Right eye: No discharge  Left eye: No discharge  Cardiovascular:      Rate and Rhythm: Normal rate and regular rhythm  Heart sounds: No murmur heard  No friction rub     Pulmonary:      Effort: Pulmonary effort is normal       Breath sounds: Normal breath sounds  No wheezing, rhonchi or rales  Abdominal:      General: There is no distension  Palpations: Abdomen is soft  Tenderness: There is no abdominal tenderness  Musculoskeletal:         General: No deformity or signs of injury  Cervical back: No rigidity  No muscular tenderness  Right lower leg: No edema  Left lower leg: No edema  Skin:     General: Skin is warm and dry  Capillary Refill: Capillary refill takes less than 2 seconds  Coloration: Skin is not jaundiced or pale  Neurological:      General: No focal deficit present  Mental Status: He is alert and oriented to person, place, and time  Mental status is at baseline  Psychiatric:         Mood and Affect: Mood normal          Behavior: Behavior normal          Thought Content: Thought content normal              Lab Results: I have personally reviewed pertinent lab results  Invalid input(s): LABGLOM              Imaging: I have personally reviewed pertinent films in PACS  ECHO: 6/8/22   Left Ventricle: Left ventricular cavity size is normal  Wall thickness is mildly increased  There is mild concentric hypertrophy  The left ventricular ejection fraction is 60%  Systolic function is normal  Wall motion is normal  Diastolic function is mildly abnormal, consistent with grade I (abnormal) relaxation   Right Ventricle: Right ventricular cavity size is normal  Systolic function is normal     Left Atrium: The atrium is mildly dilated   Aortic Valve: There is mild regurgitation   Mitral Valve: There is mild regurgitation   Tricuspid Valve: There is mild regurgitation  CATH: 8/2021  CORONARY CIRCULATION:  Left main: The vessel was normal sized and mildly calcified  Angiography showed mild atherosclerosis  Distal left main has tapering around 10% stenosis  LAD: The vessel was medium to large sized  Angiography showed mild atherosclerosis   It has mild luminal irregularities proximally  Mid there has around 35% stenosis distal branches has mild luminal irregularities  It gives a diagonal which  has mild luminal irregularities with no focal stenosis  Circumflex: The vessel was normal sized  Angiography showed moderate atherosclerosis  It has ostial around 60% stenosis  It gives a obtuse marginal which is high obtuse marginal and  run in a tertiary of ramus intermedius  Mid circumflex has around 35% stenosis  No other focal stenosis seen  And IFR of ostial circumflex lesion was performed IFR was 0 96  Ostial circumflex: There was a discrete around 60 % stenosis  There was JAJA grade 3 flow through the vessel (brisk flow)  IFR 0 96  RCA: The vessel was large sized  Angiography showed mild atherosclerosis  It has mild luminal irregularities causing about 25% stenosis in the mid and around 25% to 30% distally no other focal stenosis seen      IMPRESSIONS:  Patient has moderate stenosis of ostial/proximal circumflex with calcification  IFR of this lesion was performed  IFR was 0 96  There was mild nonobstructive disease involving left main, right coronary artery and LAD is noted      RECOMMENDATIONS:  Patient has nonobstructive disease  Moderate lesion noted in the proximal stressed ostial circumflex shows IFR of 0 96 at this time continue aggressive medical Rx  HOLTER: 8/2021  Patient had a min HR of 24 bpm, max HR of 197 bpm, and avg HR of 57 bpm   Predominant underlying rhythm was Sinus Rhythm  First Degree AV Block was  present  4 Ventricular Tachycardia runs occurred, the run with the fastest interval  lasting 15 beats with a max rate of 197 bpm, the longest lasting 15 beats with an  avg rate of 125 bpm  16 Pauses occurred, the longest lasting 3 2 secs (19 bpm)  Pauses occurred due to Second Degree AV Block-Mobitz I (Wenckebach) and Non-  Conducted SVE(s)  97 episode(s) of AV Block (3rd°) occurred, lasting a total of 21  mins 29 secs  Idioventricular Rhythm was present   Second Degree AV Block-Mobitz I  (Wenckebach) was present  Isolated SVEs were rare (<1 0%), and no SVE Couplets  or SVE Triplets were present  Isolated VEs were rare (<1 0%, 8322), VE Couplets  were rare (<1 0%, 438), and VE Triplets were rare (<1 0%, 25)  Ventricular  Bigeminy and Trigeminy were present  MD notification criteria for Complete Heart  Block met - report posted prior to notification per account request (EB)              Code Status: Level 1 - Full Code

## 2022-06-13 NOTE — CASE MANAGEMENT
Case Management Assessment & Discharge Planning Note    Patient name Art Ping  Location /-15 MRN [id number]  : 1955 Date 2022       Current Admission Date: 2022  Current Admission Diagnosis:Non-sustained ventricular tachycardia [facility])   Patient Active Problem List    Diagnosis Date Noted    Neck pain 2022    Status post cervical spinal fusion 2022    Cervical radiculopathy 2022    Chronic right shoulder pain 2022    Low back pain with sciatica 2022    Non-sustained ventricular tachycardia ([facility] [address] [facility] ) 2022    Paroxysmal atrial fibrillation ([facility] [address] ) 2022    S/P ICD (internal cardiac defibrillator) procedure 10/25/2021    Prostate cancer screening 10/20/2021    Sick sinus syndrome ([facility] [address] [facility] ) 2021    Intermittent complete heart block (HCC) 2021    Junctional rhythm 2021    CPAP (continuous positive airway pressure) dependence     PUD (peptic ulcer disease)     Intermittent palpitations 2021    Trigger thumb of right hand 12/10/2020    Trigger finger, right ring finger 12/10/2020    Carpal tunnel syndrome, bilateral 12/10/2020    Premature ventricular beats 10/12/2020    Medicare annual wellness visit, subsequent 10/08/2020    LENY and COPD overlap syndrome ([facility] [address] )     Cataract 2019    Need for malaria prophylaxis 2019    Osteoarthritis of fingers of both hands 2018    Benign prostatic hyperplasia with nocturia 2018    Primary osteoarthritis of left knee 2017    Coronary artery disease involving native coronary artery of native heart without angina pectoris 07/10/2017    External hemorrhoids 2016    Environmental and seasonal allergies 2015    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 2015    Chronic back pain 2015    Mixed hyperlipidemia 2012    Impaired fasting glucose 2012    Gastro-esophageal reflux disease without esophagitis 2012    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      LOS (days): 0  Geometric Mean LOS (GMLOS) (days):   Days to GMLOS:     OBJECTIVE:    Risk of Unplanned Readmission Score: 4 7         Current admission status: Inpatient       Preferred Pharmacy:   [facility], [address] [place] [address] [address] [address]  Phone: [phone] Fax: [phone]    Primary Care Provider: Julia Long DO    Primary Insurance: MEDICARE  Secondary Insurance: AAR    ASSESSMENT:  [address], [address]  Representative - Spouse   Primary Phone: [phone] (Mobile)  Home Phone: [phone]               Advance Directives  Does patient have a [address]?: No  Was patient offered paperwork?: Yes (declined)  Does patient currently have a Health Care decision maker?: Yes, please see Health Care Proxy section  Does patient have Advance Directives?: No  Was patient offered paperwork?: Yes (declined)  Primary Contact: Anisa Banks [phone]         Readmission Root Cause  30 Day Readmission: No    Patient Information  Admitted from[de-identified] Home  Mental Status: Alert  During Assessment patient was accompanied by: Not accompanied during assessment  Assessment information provided by[de-identified] Patient  Primary Caregiver: Self  Support Systems: Self, Spouse/significant other, Daughter  Type of Current Residence: [address] home  Upon entering residence, is there a bedroom on the main floor (no further steps)?: Yes  Upon entering residence, is there a bathroom on the main floor (no further steps)?: Yes  In the last 12 months, was there a time when you were not able to pay the mortgage or rent on time?: No  In the last 12 months, how many places have you lived?: 1  In the last 12 months, was there a time when you did not have a steady place to sleep or slept in a shelter (including now)?: No  Living Arrangements: Lives w/ Spouse/significant other, Lives w/ Daughter  Is patient a ?: No    Activities of Daily Living Prior to Admission  Functional Status: Independent  Completes ADLs independently?: Yes  Ambulates independently?: Yes  Does patient use assisted devices?: No  Does patient currently own DME?: Yes  What DME does the patient currently own?: CPAP  Does patient have a history of Outpatient Therapy (PT/OT)?: No  Does the patient have a history of Short-Term Rehab?: No  Does patient have a history of HHC?: No  Does patient currently have [facility]?: No         Patient Information Continued  Income Source: Employed  Does patient have prescription coverage?: Yes (Patient uses [facility] in  [place])  Within the past 12 months, you worried that your food would run out before you got the money to buy more : Never true  Within the past 12 months, the food you bought just didn't last and you didn't have money to get more : Never true  Food insecurity resource given?: N/A  Does patient receive dialysis treatments?: No  Does patient have a history of substance abuse?: No  Does patient have a history of Mental Health Diagnosis?: No         Means of Transportation  Means of Transport to Appts[de-identified] Drives Self  In the past 12 months, has lack of transportation kept you from medical appointments or from getting medications?: No  In the past 12 months, has lack of transportation kept you from meetings, work, or from getting things needed for daily living?: No  Was application for public transport provided?: N/A        DISCHARGE DETAILS:    Discharge planning discussed with[de-identified] Patient  Freedom of Choice: Yes     CM contacted family/caregiver?: No- see comments (declined)  Were Treatment Team discharge recommendations reviewed with patient/caregiver?: Yes  Did patient/caregiver verbalize understanding of patient care needs?: Yes  Were patient/caregiver advised of the risks associated with not following Treatment Team discharge recommendations?: Yes    Contacts  Patient Contacts: [employer]  Relationship to Patient[de-identified] Family  Contact Method: Phone  Phone Number: [phone]  Reason/Outcome: Emergency [address]         Is the patient interested in [facility] at discharge?: No    DME Referral Provided  Referral made for DME?: No         Would you like to participate in our [address] service program?  : No - Declined    Treatment Team Recommendation: Home  Discharge Destination Plan[de-identified] Home  Transport at Discharge : Self                             Patient lives in a 2 story home with his wife and daughter, [address] with bedroom/bathroom access on main level  Patient independent in ADLS, uses CPAP  Patient does not own any other DME  Patient uses [facility] in [facility], not interested in [facility]  Patient drives self and plans to drive self home  Patient is vaccinated for covid-19 with [place] vaccine x1 booster  Patient denies any substance abuse, mental health diagnoses, STR, outpatient therapy, or [facility] history  CM reviewed d/c planning process including the following: identifying help at home, patient preference for d/c planning needs, Discharge Lounge, [facility] Meds to Bed program, availability of treatment team to discuss questions or concerns patient and/or family may have regarding understanding medications and recognizing signs and symptoms once discharged  CM also encouraged patient to follow up with all recommended appointments after discharge  Patient advised of importance for patient and family to participate in managing patients medical well being

## 2022-06-14 ENCOUNTER — EPISODE CHANGES (OUTPATIENT)
Dept: CASE MANAGEMENT | Facility: OTHER | Age: 67
End: 2022-06-14

## 2022-06-14 LAB
ANION GAP SERPL CALCULATED.3IONS-SCNC: 5 MMOL/L (ref 4–13)
ATRIAL RATE: 59 BPM
ATRIAL RATE: 60 BPM
ATRIAL RATE: 70 BPM
BUN SERPL-MCNC: 14 MG/DL (ref 5–25)
CALCIUM SERPL-MCNC: 9.3 MG/DL (ref 8.3–10.1)
CHLORIDE SERPL-SCNC: 111 MMOL/L (ref 100–108)
CO2 SERPL-SCNC: 24 MMOL/L (ref 21–32)
CREAT SERPL-MCNC: 0.97 MG/DL (ref 0.6–1.3)
GFR SERPL CREATININE-BSD FRML MDRD: 80 ML/MIN/1.73SQ M
GLUCOSE SERPL-MCNC: 99 MG/DL (ref 65–140)
MAGNESIUM SERPL-MCNC: 2.1 MG/DL (ref 1.6–2.6)
P AXIS: -15 DEGREES
P AXIS: -19 DEGREES
P AXIS: 12 DEGREES
POTASSIUM SERPL-SCNC: 4 MMOL/L (ref 3.5–5.3)
PR INTERVAL: 182 MS
PR INTERVAL: 188 MS
PR INTERVAL: 196 MS
QRS AXIS: -33 DEGREES
QRS AXIS: -42 DEGREES
QRS AXIS: 46 DEGREES
QRSD INTERVAL: 178 MS
QRSD INTERVAL: 180 MS
QRSD INTERVAL: 182 MS
QT INTERVAL: 448 MS
QT INTERVAL: 478 MS
QT INTERVAL: 496 MS
QTC INTERVAL: 478 MS
QTC INTERVAL: 483 MS
QTC INTERVAL: 491 MS
SODIUM SERPL-SCNC: 140 MMOL/L (ref 136–145)
T WAVE AXIS: 59 DEGREES
T WAVE AXIS: 65 DEGREES
T WAVE AXIS: 75 DEGREES
VENTRICULAR RATE: 59 BPM
VENTRICULAR RATE: 60 BPM
VENTRICULAR RATE: 70 BPM

## 2022-06-14 PROCEDURE — 93005 ELECTROCARDIOGRAM TRACING: CPT

## 2022-06-14 PROCEDURE — 99232 SBSQ HOSP IP/OBS MODERATE 35: CPT | Performed by: INTERNAL MEDICINE

## 2022-06-14 PROCEDURE — 93010 ELECTROCARDIOGRAM REPORT: CPT | Performed by: INTERNAL MEDICINE

## 2022-06-14 PROCEDURE — 80048 BASIC METABOLIC PNL TOTAL CA: CPT | Performed by: PHYSICIAN ASSISTANT

## 2022-06-14 PROCEDURE — 83735 ASSAY OF MAGNESIUM: CPT | Performed by: PHYSICIAN ASSISTANT

## 2022-06-14 RX ORDER — SOTALOL HYDROCHLORIDE 80 MG/1
80 TABLET ORAL EVERY 12 HOURS SCHEDULED
Status: DISCONTINUED | OUTPATIENT
Start: 2022-06-14 | End: 2022-06-15 | Stop reason: HOSPADM

## 2022-06-14 RX ADMIN — APIXABAN 5 MG: 5 TABLET, FILM COATED ORAL at 08:06

## 2022-06-14 RX ADMIN — GABAPENTIN 300 MG: 300 CAPSULE ORAL at 21:17

## 2022-06-14 RX ADMIN — OMEGA-3 FATTY ACIDS CAP 1000 MG 1000 MG: 1000 CAP at 08:06

## 2022-06-14 RX ADMIN — SOTALOL HYDROCHLORIDE 120 MG: 120 TABLET ORAL at 08:06

## 2022-06-14 RX ADMIN — FINASTERIDE 5 MG: 5 TABLET, FILM COATED ORAL at 09:46

## 2022-06-14 RX ADMIN — PANTOPRAZOLE SODIUM 40 MG: 40 TABLET, DELAYED RELEASE ORAL at 06:03

## 2022-06-14 RX ADMIN — ASPIRIN 81 MG: 81 TABLET, COATED ORAL at 08:06

## 2022-06-14 RX ADMIN — APIXABAN 5 MG: 5 TABLET, FILM COATED ORAL at 17:05

## 2022-06-14 RX ADMIN — Medication 200 MG: at 09:45

## 2022-06-14 RX ADMIN — SOTALOL HYDROCHLORIDE 80 MG: 80 TABLET ORAL at 20:03

## 2022-06-14 NOTE — PLAN OF CARE
Problem: PAIN - ADULT  Goal: Verbalizes/displays adequate comfort level or baseline comfort level  Description: Interventions:  - Encourage patient to monitor pain and request assistance  - Assess pain using appropriate pain scale  - Administer analgesics based on type and severity of pain and evaluate response  - Implement non-pharmacological measures as appropriate and evaluate response  - Consider cultural and social influences on pain and pain management  - Notify physician/advanced practitioner if interventions unsuccessful or patient reports new pain  Outcome: Progressing     Problem: INFECTION - ADULT  Goal: Absence or prevention of progression during hospitalization  Description: INTERVENTIONS:  - Assess and monitor for signs and symptoms of infection  - Monitor lab/diagnostic results  - Monitor all insertion sites, i e  indwelling lines, tubes, and drains  - Monitor endotracheal if appropriate and nasal secretions for changes in amount and color  - [place] appropriate cooling/warming therapies per order  - Administer medications as ordered  - Instruct and encourage patient and family to use good hand hygiene technique  - Identify and instruct in appropriate isolation precautions for identified infection/condition  Outcome: Progressing  Goal: Absence of fever/infection during neutropenic period  Description: INTERVENTIONS:  - Monitor WBC    Outcome: Progressing     Problem: SAFETY ADULT  Goal: Patient will remain free of falls  Description: INTERVENTIONS:  - Educate patient/family on patient safety including physical limitations  - Instruct patient to call for assistance with activity   - Consult OT/PT to assist with strengthening/mobility   - Keep Call bell within reach  - Keep bed low and locked with side rails adjusted as appropriate  - Keep care items and personal belongings within reach  - Initiate and maintain comfort rounds  - Make Fall Risk Sign visible to staff  - Offer Toileting every  Hours, in advance of need  - Initiate/Maintain alarm  - Obtain necessary fall risk management equipment:   - Apply yellow socks and bracelet for high fall risk patients  - Consider moving patient to room near nurses station  Outcome: Progressing  Goal: Maintain or return to baseline ADL function  Description: INTERVENTIONS:  -  Assess patient's ability to carry out ADLs; assess patient's baseline for ADL function and identify physical deficits which impact ability to perform ADLs (bathing, care of mouth/teeth, toileting, grooming, dressing, etc )  - Assess/evaluate cause of self-care deficits   - Assess range of motion  - Assess patient's mobility; develop plan if impaired  - Assess patient's need for assistive devices and provide as appropriate  - Encourage maximum independence but intervene and supervise when necessary  - Involve family in performance of ADLs  - Assess for home care needs following discharge   - Consider OT consult to assist with ADL evaluation and planning for discharge  - Provide patient education as appropriate  Outcome: Progressing  Goal: Maintains/Returns to pre admission functional level  Description: INTERVENTIONS:  - Perform BMAT or MOVE assessment daily    - Set and communicate daily mobility goal to care team and patient/family/caregiver  - Collaborate with rehabilitation services on mobility goals if consulted  - Perform Range of Motion  times a day  - Reposition patient every  hours    - Dangle patient  times a day  - Stand patient  times a day  - Ambulate patient  times a day  - Out of bed to chair  times a day   - Out of bed for meals  times a day  - Out of bed for toileting  - Record patient progress and toleration of activity level   Outcome: Progressing     Problem: DISCHARGE PLANNING  Goal: Discharge to home or other facility with appropriate resources  Description: INTERVENTIONS:  - Identify barriers to discharge w/patient and caregiver  - Arrange for needed discharge resources and transportation as appropriate  - Identify discharge learning needs (meds, wound care, etc )  - Arrange for interpretive services to assist at discharge as needed  - Refer to Case Management Department for coordinating discharge planning if the patient needs post-hospital services based on physician/advanced practitioner order or complex needs related to functional status, cognitive ability, or social support system  Outcome: Progressing     Problem: Knowledge Deficit  Goal: Patient/family/caregiver demonstrates understanding of disease process, treatment plan, medications, and discharge instructions  Description: Complete learning assessment and assess knowledge base    Interventions:  - Provide teaching at level of understanding  - Provide teaching via preferred learning methods  Outcome: Progressing

## 2022-06-14 NOTE — PLAN OF CARE
Problem: PAIN - ADULT  Goal: Verbalizes/displays adequate comfort level or baseline comfort level  Description: Interventions:  - Encourage patient to monitor pain and request assistance  - Assess pain using appropriate pain scale  - Administer analgesics based on type and severity of pain and evaluate response  - Implement non-pharmacological measures as appropriate and evaluate response  - Consider cultural and social influences on pain and pain management  - Notify physician/advanced practitioner if interventions unsuccessful or patient reports new pain  Outcome: Progressing     Problem: INFECTION - ADULT  Goal: Absence or prevention of progression during hospitalization  Description: INTERVENTIONS:  - Assess and monitor for signs and symptoms of infection  - Monitor lab/diagnostic results  - Monitor all insertion sites, i e  indwelling lines, tubes, and drains  - Monitor endotracheal if appropriate and nasal secretions for changes in amount and color  - [place] appropriate cooling/warming therapies per order  - Administer medications as ordered  - Instruct and encourage patient and family to use good hand hygiene technique  - Identify and instruct in appropriate isolation precautions for identified infection/condition  Outcome: Progressing  Goal: Absence of fever/infection during neutropenic period  Description: INTERVENTIONS:  - Monitor WBC    Outcome: Progressing     Problem: SAFETY ADULT  Goal: Patient will remain free of falls  Description: INTERVENTIONS:  - Educate patient/family on patient safety including physical limitations  - Instruct patient to call for assistance with activity   - Consult OT/PT to assist with strengthening/mobility   - Keep Call bell within reach  - Keep bed low and locked with side rails adjusted as appropriate  - Keep care items and personal belongings within reach  - Initiate and maintain comfort rounds  - Make Fall Risk Sign visible to staff  - Offer Toileting every  Hours, in advance of need  - Initiate/Maintainalarm  - Obtain necessary fall risk management equipment:   - Apply yellow socks and bracelet for high fall risk patients  - Consider moving patient to room near nurses station  Outcome: Progressing  Goal: Maintain or return to baseline ADL function  Description: INTERVENTIONS:  -  Assess patient's ability to carry out ADLs; assess patient's baseline for ADL function and identify physical deficits which impact ability to perform ADLs (bathing, care of mouth/teeth, toileting, grooming, dressing, etc )  - Assess/evaluate cause of self-care deficits   - Assess range of motion  - Assess patient's mobility; develop plan if impaired  - Assess patient's need for assistive devices and provide as appropriate  - Encourage maximum independence but intervene and supervise when necessary  - Involve family in performance of ADLs  - Assess for home care needs following discharge   - Consider OT consult to assist with ADL evaluation and planning for discharge  - Provide patient education as appropriate  Outcome: Progressing  Goal: Maintains/Returns to pre admission functional level  Description: INTERVENTIONS:  - Perform BMAT or MOVE assessment daily    - Set and communicate daily mobility goal to care team and patient/family/caregiver  - Collaborate with rehabilitation services on mobility goals if consulted  - Perform Range of Motion  times a day  - Reposition patient every  hours    - Dangle patient  times a day  - Stand patient times a day  - Ambulate patient  times a day  - Out of bed to chair  times a day   - Out of bed for meals times a day  - Out of bed for toileting  - Record patient progress and toleration of activity level   Outcome: Progressing     Problem: DISCHARGE PLANNING  Goal: Discharge to home or other facility with appropriate resources  Description: INTERVENTIONS:  - Identify barriers to discharge w/patient and caregiver  - Arrange for needed discharge resources and transportation as appropriate  - Identify discharge learning needs (meds, wound care, etc )  - Arrange for interpretive services to assist at discharge as needed  - Refer to Case Management Department for coordinating discharge planning if the patient needs post-hospital services based on physician/advanced practitioner order or complex needs related to functional status, cognitive ability, or social support system  Outcome: Progressing     Problem: Knowledge Deficit  Goal: Patient/family/caregiver demonstrates understanding of disease process, treatment plan, medications, and discharge instructions  Description: Complete learning assessment and assess knowledge base    Interventions:  - Provide teaching at level of understanding  - Provide teaching via preferred learning methods  Outcome: Progressing

## 2022-06-14 NOTE — PROGRESS NOTES
Progress Note - Electrophysiology  Art Roselia 78 y o  male MRN: [id number]  Unit/Bed#: -01 Encounter: [id number]      Assessment/Plan:    1  NSVT  a  Inducible sustained monomorphic ventricular tachycardia on EPS 10/2021  b  Increase in burden on last device interrogation,   c  Starting Sotalol this admission  2  Presence of [employer] dual chamber ICD  a  Implanted 10/2021  b  AP 18%,  89%  3  Paroxysmal atrial fibrillation  a  Diagnosed 2022 - noted on device interrogation  From  - 2 5% burden  b  No further episodes  c  Maintained on Eliquis 5 mg BID   d  Prior to admission - Toprol XL 37 5 mg QD, will  to 12 5 QD  e  LA mildly dilated  4  COPD  5  LENY with CPAP  6  CAD  a  Cardiac cath 2021 - non obstructive CAD, 50-70% ostial Cx  b  Nonischemic nuclear stress test 10/2020  c  Maintained on baby Aspirin and Crestor 10 mg QD  7  SSS and Intermittent complete heart block  a  Seen on prior cardiac event monitor  8  Preseved LV systolic function, EF 75% per TTE 2022  9  HLD  10  S/p cervical spinal fusion  a  Follows with Pain Medicine as an outpatient  11  Last's esophagus  12  PUD      Patient is maintaining NSR with reduction in PVC burden  He has already received 2 doses of Sotalol at the current dose of 120 mg QD  QTc is remaining stable  Due to side effects described below, we will change Sotalol to 80 mg BID and discontinue Toprol XL  We will continue to monitor on telemetry and measure QTc with EKGs 2 hours after each dose  Repeat labs tomorrow morning to monitor electrolyte and kidney function  Subjective/Objective   Subjective: Upon evaluation, patient is resting comfortably in his chair  Mild rate response was turned on yesterday  Telemetry review shows decrease in PVC burden, and appropriate rate response while walking  He had one presyncopal episode this morning an hour after taking Sotalol and shortly after a hot shower   He felt a "head fullness" after going for a walk today, and states he feels "different" since starting sotalol but is unable to explain further  Patient denies chest pain/heaviness/tightness/pressure, palpitations, shortness of breath, orthopnea, syncope or N/V  Objective:  Vitals: /74 (BP Location: Right arm)   Pulse 60   Temp 97 7 °F (36 5 °C) (Oral)   Resp 18   SpO2 98%     There were no vitals filed for this visit  Orthostatic Blood Pressures    Flowsheet Row Most Recent Value   Blood Pressure 136/74 filed at 06/14/2022 5105   Patient Position - Orthostatic VS Sitting filed at 06/14/2022 2082            Intake/Output Summary (Last 24 hours) at 6/14/2022 1018  Last data filed at 6/14/2022 3193  Gross per 24 hour   Intake 760 ml   Output 0 ml   Net 760 ml       Invasive Devices  Report    Peripheral Intravenous Line  Duration           Peripheral IV 06/13/22 Distal;Right Wrist <1 day                          Scheduled Meds:  Current Facility-Administered Medications   Medication Dose Route Frequency Provider Last Rate    apixaban  5 mg Oral BID Lg Dee PA-C      aspirin  81 mg Oral Daily Sadaf Simon PA-C      co-enzyme Q-10  200 mg Oral Daily Sadaf Simon PA-C      finasteride  5 mg Oral Daily Sadaf Simon PA-C      fish oil  1,000 mg Oral Daily Sadaf Simon PA-C      fluticasone  1 spray Each Nare Daily PRN Lg Dee PA-C      gabapentin  300 mg Oral HS Sadaf Simon PA-C      metoprolol succinate  12 5 mg Oral QPM Sadaf Simon PA-C      pantoprazole  40 mg Oral Daily Sadaf Simon PA-C      pravastatin  80 mg Oral Daily With KeySVIRGEN hannon      sotalol  120 mg Oral Daily Sadaf Simon PA-C       Continuous Infusions:   PRN Meds:   fluticasone    Review of Systems:  ROS as noted above, otherwise 12 point review of systems was performed and is negative  Physical Exam:   Physical Exam  Vitals reviewed     Constitutional: General: He is not in acute distress  Appearance: He is not ill-appearing or diaphoretic  HENT:      Head: Normocephalic and atraumatic  Right Ear: External ear normal       Left Ear: External ear normal       Nose: Nose normal    Eyes:      General:         Right eye: No discharge  Left eye: No discharge  Cardiovascular:      Rate and Rhythm: Normal rate and regular rhythm  Heart sounds: No murmur heard  No friction rub  Pulmonary:      Effort: Pulmonary effort is normal       Breath sounds: Normal breath sounds  No wheezing, rhonchi or rales  Abdominal:      General: There is no distension  Palpations: Abdomen is soft  Tenderness: There is no abdominal tenderness  Musculoskeletal:         General: No deformity or signs of injury  Cervical back: No rigidity  No muscular tenderness  Right lower leg: No edema  Left lower leg: No edema  Skin:     General: Skin is warm and dry  Capillary Refill: Capillary refill takes less than 2 seconds  Coloration: Skin is not jaundiced or pale  Neurological:      General: No focal deficit present  Mental Status: He is alert and oriented to person, place, and time  Mental status is at baseline  Psychiatric:         Mood and Affect: Mood normal          Behavior: Behavior normal          Thought Content: Thought content normal                    Lab Results: I have personally reviewed pertinent lab results      Results from last 7 days   Lab Units 06/13/22  1253   WBC Thousand/uL 5 86   HEMOGLOBIN g/dL 16 8   HEMATOCRIT % 50 0*   PLATELETS Thousands/uL 203     Results from last 7 days   Lab Units 06/14/22  0458 06/13/22  1253   POTASSIUM mmol/L 4 0 4 7   CHLORIDE mmol/L 111* 110*   CO2 mmol/L 24 24   BUN mg/dL 14 14   CREATININE mg/dL 0 97 1 09   CALCIUM mg/dL 9 3 9 2         Results from last 7 days   Lab Units 06/14/22  0458 06/13/22  1253   MAGNESIUM mg/dL 2 1 2 3       Imaging: I have personally reviewed pertinent films in PACS  ECHO: 6/8/22   Left Ventricle: Left ventricular cavity size is normal  Wall thickness is mildly increased  There is mild concentric hypertrophy  The left ventricular ejection fraction is 60%  Systolic function is normal  Wall motion is normal  Diastolic function is mildly abnormal, consistent with grade I (abnormal) relaxation   Right Ventricle: Right ventricular cavity size is normal  Systolic function is normal     Left Atrium: The atrium is mildly dilated   Aortic Valve: There is mild regurgitation   Mitral Valve: There is mild regurgitation   Tricuspid Valve: There is mild regurgitation         CATH: 8/2021  CORONARY CIRCULATION:  Left main: The vessel was normal sized and mildly calcified  Angiography showed mild atherosclerosis  Distal left main has tapering around 10% stenosis  LAD: The vessel was medium to large sized  Angiography showed mild atherosclerosis  It has mild luminal irregularities proximally  Mid there has around 35% stenosis distal branches has mild luminal irregularities  It gives a diagonal which  has mild luminal irregularities with no focal stenosis  Circumflex: The vessel was normal sized  Angiography showed moderate atherosclerosis  It has ostial around 60% stenosis  It gives a obtuse marginal which is high obtuse marginal and  run in a tertiary of ramus intermedius  Mid circumflex has around 35% stenosis  No other focal stenosis seen  And IFR of ostial circumflex lesion was performed IFR was 0 96  Ostial circumflex: There was a discrete around 60 % stenosis  There was JAJA grade 3 flow through the vessel (brisk flow)  IFR 0 96  RCA: The vessel was large sized  Angiography showed mild atherosclerosis  It has mild luminal irregularities causing about 25% stenosis in the mid and around 25% to 30% distally no other focal stenosis seen      IMPRESSIONS:  Patient has moderate stenosis of ostial/proximal circumflex with calcification   IFR of this lesion was performed  IFR was 0 96  There was mild nonobstructive disease involving left main, right coronary artery and LAD is noted      RECOMMENDATIONS:  Patient has nonobstructive disease  Moderate lesion noted in the proximal stressed ostial circumflex shows IFR of 0 96 at this time continue aggressive medical Rx           HOLTER: 8/2021  Patient had a min HR of 24 bpm, max HR of 197 bpm, and avg HR of 57 bpm   Predominant underlying rhythm was Sinus Rhythm  First Degree AV Block was  present  4 Ventricular Tachycardia runs occurred, the run with the fastest interval  lasting 15 beats with a max rate of 197 bpm, the longest lasting 15 beats with an  avg rate of 125 bpm  16 Pauses occurred, the longest lasting 3 2 secs (19 bpm)  Pauses occurred due to Second Degree AV Block-Mobitz I (Wenckebach) and Non-  Conducted SVE(s)  97 episode(s) of AV Block (3rd°) occurred, lasting a total of 21  mins 29 secs  Idioventricular Rhythm was present  Second Degree AV Block-Mobitz I  (Wenckebach) was present  Isolated SVEs were rare (<1 0%), and no SVE Couplets  or SVE Triplets were present  Isolated VEs were rare (<1 0%, 8322), VE Couplets  were rare (<1 0%, 438), and VE Triplets were rare (<1 0%, 25)  Ventricular  Bigeminy and Trigeminy were present   MD notification criteria for Complete Heart  Block met - report posted prior to notification per account request (EB)        VTE Pharmacologic Prophylaxis: Eliquis  VTE Mechanical Prophylaxis: sequential compression device

## 2022-06-15 VITALS
DIASTOLIC BLOOD PRESSURE: 68 MMHG | TEMPERATURE: 97.8 F | SYSTOLIC BLOOD PRESSURE: 131 MMHG | OXYGEN SATURATION: 96 % | RESPIRATION RATE: 20 BRPM | HEART RATE: 60 BPM

## 2022-06-15 LAB
ANION GAP SERPL CALCULATED.3IONS-SCNC: 5 MMOL/L (ref 4–13)
ATRIAL RATE: 56 BPM
ATRIAL RATE: 59 BPM
BUN SERPL-MCNC: 17 MG/DL (ref 5–25)
CALCIUM SERPL-MCNC: 9.1 MG/DL (ref 8.3–10.1)
CHLORIDE SERPL-SCNC: 109 MMOL/L (ref 100–108)
CO2 SERPL-SCNC: 25 MMOL/L (ref 21–32)
CREAT SERPL-MCNC: 1.05 MG/DL (ref 0.6–1.3)
GFR SERPL CREATININE-BSD FRML MDRD: 73 ML/MIN/1.73SQ M
GLUCOSE SERPL-MCNC: 93 MG/DL (ref 65–140)
MAGNESIUM SERPL-MCNC: 2.1 MG/DL (ref 1.6–2.6)
P AXIS: -19 DEGREES
P AXIS: -2 DEGREES
POTASSIUM SERPL-SCNC: 4.2 MMOL/L (ref 3.5–5.3)
PR INTERVAL: 182 MS
PR INTERVAL: 186 MS
QRS AXIS: -10 DEGREES
QRS AXIS: 56 DEGREES
QRSD INTERVAL: 176 MS
QRSD INTERVAL: 182 MS
QT INTERVAL: 500 MS
QT INTERVAL: 504 MS
QTC INTERVAL: 486 MS
QTC INTERVAL: 495 MS
SODIUM SERPL-SCNC: 139 MMOL/L (ref 136–145)
T WAVE AXIS: 63 DEGREES
T WAVE AXIS: 64 DEGREES
VENTRICULAR RATE: 56 BPM
VENTRICULAR RATE: 59 BPM

## 2022-06-15 PROCEDURE — 99238 HOSP IP/OBS DSCHRG MGMT 30/<: CPT | Performed by: INTERNAL MEDICINE

## 2022-06-15 PROCEDURE — 83735 ASSAY OF MAGNESIUM: CPT | Performed by: PHYSICIAN ASSISTANT

## 2022-06-15 PROCEDURE — 93010 ELECTROCARDIOGRAM REPORT: CPT | Performed by: INTERNAL MEDICINE

## 2022-06-15 PROCEDURE — 93005 ELECTROCARDIOGRAM TRACING: CPT

## 2022-06-15 PROCEDURE — 80048 BASIC METABOLIC PNL TOTAL CA: CPT | Performed by: PHYSICIAN ASSISTANT

## 2022-06-15 RX ORDER — SOTALOL HYDROCHLORIDE 80 MG/1
80 TABLET ORAL EVERY 12 HOURS SCHEDULED
Qty: 60 TABLET | Refills: 11 | Status: SHIPPED | OUTPATIENT
Start: 2022-06-15 | End: 2022-07-05 | Stop reason: SDUPTHER

## 2022-06-15 RX ORDER — SOTALOL HYDROCHLORIDE 80 MG/1
80 TABLET ORAL EVERY 12 HOURS SCHEDULED
Qty: 10 TABLET | Refills: 0 | Status: SHIPPED | OUTPATIENT
Start: 2022-06-15 | End: 2022-06-15 | Stop reason: SDUPTHER

## 2022-06-15 RX ADMIN — PANTOPRAZOLE SODIUM 40 MG: 40 TABLET, DELAYED RELEASE ORAL at 08:20

## 2022-06-15 RX ADMIN — APIXABAN 5 MG: 5 TABLET, FILM COATED ORAL at 08:20

## 2022-06-15 RX ADMIN — FINASTERIDE 5 MG: 5 TABLET, FILM COATED ORAL at 08:20

## 2022-06-15 RX ADMIN — OMEGA-3 FATTY ACIDS CAP 1000 MG 1000 MG: 1000 CAP at 08:20

## 2022-06-15 RX ADMIN — SOTALOL HYDROCHLORIDE 80 MG: 80 TABLET ORAL at 08:20

## 2022-06-15 RX ADMIN — Medication 200 MG: at 08:21

## 2022-06-15 RX ADMIN — ASPIRIN 81 MG: 81 TABLET, COATED ORAL at 08:21

## 2022-06-15 NOTE — CASE MANAGEMENT
Case Management Discharge Planning Note    Patient name Oscar Chackor  Location /-98 MRN [id number]  : 1955 Date 6/15/2022       Current Admission Date: 2022  Current Admission Diagnosis:Non-sustained ventricular tachycardia [facility])   Patient Active Problem List    Diagnosis Date Noted    Neck pain 2022    Status post cervical spinal fusion 2022    Cervical radiculopathy 2022    Chronic right shoulder pain 2022    Low back pain with sciatica 2022    Non-sustained ventricular tachycardia ([address] ) 2022    Paroxysmal atrial fibrillation ([facility] [address] ) 2022    S/P ICD (internal cardiac defibrillator) procedure 10/25/2021    Prostate cancer screening 10/20/2021    Sick sinus syndrome ([facility] [address] ) 2021    Intermittent complete heart block (HCC) 2021    Junctional rhythm 2021    CPAP (continuous positive airway pressure) dependence     PUD (peptic ulcer disease)     Intermittent palpitations 2021    Trigger thumb of right hand 12/10/2020    Trigger finger, right ring finger 12/10/2020    Carpal tunnel syndrome, bilateral 12/10/2020    Premature ventricular beats 10/12/2020    Medicare annual wellness visit, subsequent 10/08/2020    LENY and COPD overlap syndrome ([facility] [address] )     Cataract 2019    Need for malaria prophylaxis 2019    Osteoarthritis of fingers of both hands 2018    Benign prostatic hyperplasia with nocturia 2018    Primary osteoarthritis of left knee 2017    Coronary artery disease involving native coronary artery of native heart without angina pectoris 07/10/2017    External hemorrhoids 2016    Environmental and seasonal allergies 2015    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 2015    Chronic back pain 2015    Mixed hyperlipidemia 2012    Impaired fasting glucose 2012    Gastro-esophageal reflux disease without esophagitis 2012    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      LOS (days): 2  Geometric Mean LOS (GMLOS) (days): 1 90  Days to GMLOS:-0 1     OBJECTIVE:  Risk of Unplanned Readmission Score: 5 45         Current admission status: Inpatient   Preferred Pharmacy:   [facility]/pharmacy #[id number]- [facility] - [address] [place] [id number]  Phone: [phone] Fax: [phone]    Primary Care Provider: Charisse Crocker DO    Primary Insurance: MEDICARE  Secondary Insurance: [facility]    DISCHARGE DETAILS:    Discharge planning discussed with[de-identified] Patient  Freedom of Choice: Yes        Were Treatment Team discharge recommendations reviewed with patient/caregiver?: Yes  Did patient/caregiver verbalize understanding of patient care needs?: Yes  Were patient/caregiver advised of the risks associated with not following Treatment Team discharge recommendations?: Yes         [address]         Is the patient interested in [facility] at discharge?: No    DME Referral Provided  Referral made for DME?: No              Treatment Team Recommendation: Home  Discharge Destination Plan[de-identified] Home  Transport at Discharge : Self                 IMM Given (Date):: 06/15/22  IMM Given to[de-identified] Patient        IMM reviewed with patient, patient agrees with discharge determination

## 2022-06-15 NOTE — DISCHARGE INSTRUCTIONS
Continue taking Sotalol every 12 hours  If the dosing schedule needs to be adjusted, you may only do so one hour at a time  Please check with our office or your pharmacist before beginning new medications in order to avoid drug interactions with Sotalol which might prolong your QT interval (ex  Antibiotics, antifungals)

## 2022-06-15 NOTE — NURSING NOTE
All discharge paperwork, medications and follow-up appointments reviewed with patient  Patient verbalized understanding  Patient finishing lunch  Family en-route to pick-up patient  Pending

## 2022-06-15 NOTE — DISCHARGE SUMMARY
Discharge Summary - Margarito Grullon 78 y o  male MRN: [id number]    Unit/Bed#: -01 Encounter: [id number]      Admission Date: 2022   Discharge Date: 6/15/2022    Discharge Diagnosis:   1  NSVT  a  Inducible sustained monomorphic ventricular tachycardia on EPS 10/2021  b  Increase in burden on last device interrogation,   c  Starting Sotalol this admission  2  Presence of [employer] dual chamber ICD  a  Implanted 10/2021  b  AP 18%,  89%  3  Paroxysmal atrial fibrillation  a  Diagnosed 2022 - noted on device interrogation  From  - 2 5% burden  b  No further episodes  c  Maintained on Eliquis 5 mg BID   d  Prior to admission - Toprol XL 37 5 mg QD, will  to 12 5 QD  e  LA mildly dilated  4  COPD  5  LENY with CPAP  6  CAD  a  Cardiac cath 2021 - non obstructive CAD, 50-70% ostial Cx  b  Nonischemic nuclear stress test 10/2020  c  Maintained on baby Aspirin and Crestor 10 mg QD  7  SSS and Intermittent complete heart block  a  Seen on prior cardiac event monitor  8  Preseved LV systolic function, EF 92% per TTE 2022  9  HLD  10  S/p cervical spinal fusion  a  Follows with Pain Medicine as an outpatient  11  Last's esophagus  12  PUD      Procedures Performed:   No orders of the defined types were placed in this encounter  Consultants: None    HPI: Please refer to the initial history and physical as well as procedure notes for full details  Briefly, Margarito Grullon is a 78y o  year old male with PMH significant for NSVT, VT, PAF, SSS s/p  DC ICD  He was seen by Dr Matt Falk as an outpatient after a device interrogation showed increase in NSVT, and antiarrythmic initiation was recommended  Patient presented this hospital admission to undergo initiation of Sotalol  Hospital Course:   Margarito Grullon is a 78 y o  male who was admitted electively for antiarrhythmic medication initiation  He presents to the hospital in NSR with frequent PVCs    He was started on Sotalol 120 mg QD, a reduced dose due to his intolerance of higher doses of beta blocker due to fatigue   Serial EKGs were performed 2 hours after each dose of antiarrhythmic medication  There were no significant changes in QT/QTc with intiating doses  There were no significant occurrence of ventricular ectopy on telemetry  Electrolytes and renal function were monitored throughout his stay  He did have an episode of presyncope one hour following his second dose  The dose was adjusted to 80 mg BID  He underwent a total of 4 doses, with his last EKG showing a V-paced rhythm and QTc of 490 ms  His frequency of PVCs were greatly reduced  Physical exam on the day of discharge was as follows:  Physical Exam  Vitals reviewed  Constitutional:       General: He is not in acute distress  Appearance: He is not ill-appearing or diaphoretic  HENT:      Head: Normocephalic and atraumatic  Right Ear: External ear normal       Left Ear: External ear normal       Nose: Nose normal    Eyes:      General:         Right eye: No discharge  Left eye: No discharge  Cardiovascular:      Rate and Rhythm: Normal rate and regular rhythm  Heart sounds: No murmur heard  No friction rub  Pulmonary:      Effort: Pulmonary effort is normal       Breath sounds: Normal breath sounds  No wheezing, rhonchi or rales  Abdominal:      General: There is no distension  Palpations: Abdomen is soft  Tenderness: There is no abdominal tenderness  Musculoskeletal:         General: No deformity or signs of injury  Cervical back: No rigidity  No muscular tenderness  Right lower leg: No edema  Left lower leg: No edema  Skin:     General: Skin is warm and dry  Capillary Refill: Capillary refill takes less than 2 seconds  Coloration: Skin is not jaundiced or pale  Neurological:      General: No focal deficit present  Mental Status: He is alert and oriented to person, place, and time   Mental status is at baseline  Psychiatric:         Mood and Affect: Mood normal          Behavior: Behavior normal          Thought Content: Thought content normal           At time of discharge, patient was ambulating the cardiac unit without complaints of  chest pain/heaviness/tightness/pressure, palpitations, shortness of breath, orthopnea, syncope or N/V or bleeding  He received education regarding Sotalol therapy, avoiding missed doses, pharmacy moitoring for drug to drug interactions, and concerning signs and symptoms that would prompt urgent evaluation  He was given a one-week EKG appointment along with a follow up with Angelo Bradshaw PA-C in 4-6 weeks in the office  In terms of patient's medications, he was prescribed Sotalol 80 mg BID  He was instructed to discontinue Toprol XL  Patient requested to restart Celebrex, which was discontinued after starting Eliquis, due to increased risk of GI bleed with NSAIDs and Eliquis  [facility] Pharmacist stated Celebrex has a lower risk of GI bleed while on Eliquis as compared to other NSAIDs, and that benefits outweigh risks if both medications are indicated  Therefore, gabapentin was discontinued and Celebrex was restarted  Patient instructed to discontinue Aspirin  He is stable for discharge at this time with all questions answered  He was discussed in detail with Dr Obed Isaac who is in agreement with this discharge summary  Discharge Medications:  See after visit summary for reconciled discharge medications provided to patient and family      Medications Prior to Admission   Medication    apixaban (Eliquis) 5 mg    aspirin (ECOTRIN LOW STRENGTH) 81 mg EC tablet    celecoxib (CeleBREX) 200 mg capsule    Coenzyme Q10 (COQ10) 200 MG CAPS    finasteride (PROSCAR) 5 mg tablet    fluticasone (VERAMYST) 27 5 MCG/SPRAY nasal spray    gabapentin (NEURONTIN) 100 mg capsule    Glucosamine-Chondroitin (GLUCOSAMINE CHONDR COMPLEX PO)    metoprolol succinate (TOPROL-XL) 25 mg 24 hr tablet    Omega-3 Fatty Acids (FISH OIL) 1000 MG CPDR    pantoprazole (PROTONIX) 40 mg tablet    rosuvastatin (CRESTOR) 10 MG tablet    nitroglycerin (NITROSTAT) 0 4 mg SL tablet         Pertininet Labs/diagnostics:  CBC with diff:   Results from last 7 days   Lab Units 06/13/22  1253   WBC Thousand/uL 5 86   HEMOGLOBIN g/dL 16 8   HEMATOCRIT % 50 0*   MCV fL 93   PLATELETS Thousands/uL 203   MCH pg 31 2   MCHC g/dL 33 6   RDW % 12 7   MPV fL 10 0   NRBC AUTO /100 WBCs 0       BMP:   Results from last 7 days   Lab Units 06/15/22  0511 06/14/22  0458 06/13/22  1253   POTASSIUM mmol/L 4 2 4 0 4 7   CHLORIDE mmol/L 109* 111* 110*   CO2 mmol/L 25 24 24   BUN mg/dL 17 14 14   CREATININE mg/dL 1 05 0 97 1 09   CALCIUM mg/dL 9 1 9 3 9 2       Magnesium:   Results from last 7 days   Lab Units 06/15/22  0511 06/14/22  0458 06/13/22  1253   MAGNESIUM mg/dL 2 1 2 1 2 3       Coags:         Complications: none    Condition at Discharge: good       Discharge instructions/Information to patient and family:   See after visit summary for information provided to patient and family  Provisions for Follow-Up Care:  See after visit summary for information related to follow-up care and any pertinent home health orders  Disposition: Home    Planned Readmission: No    Discharge Statement   I spent 45 minutes minutes discharging the patient  This time was spent on the day of discharge  I had direct contact with the patient on the day of discharge  Additional documentation is required if more than 30 minutes were spent on discharge   Evaluating the incision, discussing discharge instructions and restrictions, arranging follow up appointments, discussing medications

## 2022-06-15 NOTE — PLAN OF CARE
Problem: PAIN - ADULT  Goal: Verbalizes/displays adequate comfort level or baseline comfort level  Description: Interventions:  - Encourage patient to monitor pain and request assistance  - Assess pain using appropriate pain scale  - Administer analgesics based on type and severity of pain and evaluate response  - Implement non-pharmacological measures as appropriate and evaluate response  - Consider cultural and social influences on pain and pain management  - Notify physician/advanced practitioner if interventions unsuccessful or patient reports new pain  Outcome: Progressing     Problem: INFECTION - ADULT  Goal: Absence or prevention of progression during hospitalization  Description: INTERVENTIONS:  - Assess and monitor for signs and symptoms of infection  - Monitor lab/diagnostic results  - Monitor all insertion sites, i e  indwelling lines, tubes, and drains  - Monitor endotracheal if appropriate and nasal secretions for changes in amount and color  - [place] appropriate cooling/warming therapies per order  - Administer medications as ordered  - Instruct and encourage patient and family to use good hand hygiene technique  - Identify and instruct in appropriate isolation precautions for identified infection/condition  Outcome: Progressing  Goal: Absence of fever/infection during neutropenic period  Description: INTERVENTIONS:  - Monitor WBC    Outcome: Progressing     Problem: SAFETY ADULT  Goal: Patient will remain free of falls  Description: INTERVENTIONS:  - Educate patient/family on patient safety including physical limitations  - Instruct patient to call for assistance with activity   - Consult OT/PT to assist with strengthening/mobility   - Keep Call bell within reach  - Keep bed low and locked with side rails adjusted as appropriate  - Keep care items and personal belongings within reach  - Initiate and maintain comfort rounds  - Make Fall Risk Sign visible to staff  - Offer Toileting every  Hours, in advance of need  - Initiate/Maintain alarm  - Obtain necessary fall risk management equipment:   - Apply yellow socks and bracelet for high fall risk patients  - Consider moving patient to room near nurses station  Outcome: Progressing  Goal: Maintain or return to baseline ADL function  Description: INTERVENTIONS:  -  Assess patient's ability to carry out ADLs; assess patient's baseline for ADL function and identify physical deficits which impact ability to perform ADLs (bathing, care of mouth/teeth, toileting, grooming, dressing, etc )  - Assess/evaluate cause of self-care deficits   - Assess range of motion  - Assess patient's mobility; develop plan if impaired  - Assess patient's need for assistive devices and provide as appropriate  - Encourage maximum independence but intervene and supervise when necessary  - Involve family in performance of ADLs  - Assess for home care needs following discharge   - Consider OT consult to assist with ADL evaluation and planning for discharge  - Provide patient education as appropriate  Outcome: Progressing  Goal: Maintains/Returns to pre admission functional level  Description: INTERVENTIONS:  - Perform BMAT or MOVE assessment daily    - Set and communicate daily mobility goal to care team and patient/family/caregiver  - Collaborate with rehabilitation services on mobility goals if consulted  - Perform Range of Motion  times a day  - Reposition patient every hours    - Dangle patient  times a day  - Stand patient  times a day  - Ambulate patient times a day  - Out of bed to chair  times a day   - Out of bed for meals times a day  - Out of bed for toileting  - Record patient progress and toleration of activity level   Outcome: Progressing     Problem: DISCHARGE PLANNING  Goal: Discharge to home or other facility with appropriate resources  Description: INTERVENTIONS:  - Identify barriers to discharge w/patient and caregiver  - Arrange for needed discharge resources and transportation as appropriate  - Identify discharge learning needs (meds, wound care, etc )  - Arrange for interpretive services to assist at discharge as needed  - Refer to Case Management Department for coordinating discharge planning if the patient needs post-hospital services based on physician/advanced practitioner order or complex needs related to functional status, cognitive ability, or social support system  Outcome: Progressing     Problem: Knowledge Deficit  Goal: Patient/family/caregiver demonstrates understanding of disease process, treatment plan, medications, and discharge instructions  Description: Complete learning assessment and assess knowledge base    Interventions:  - Provide teaching at level of understanding  - Provide teaching via preferred learning methods  Outcome: Progressing

## 2022-06-16 ENCOUNTER — TELEPHONE (OUTPATIENT)
Dept: CARDIOLOGY CLINIC | Facility: CLINIC | Age: 67
End: 2022-06-16

## 2022-06-16 NOTE — TELEPHONE ENCOUNTER
Pt called, stated that he was D/C'ed yesterday after a 2 day stay in the hospital on a cardiac diet  He believes this has made him constipated  He has hemorrhoids and was bleeding profusely last night  He stated that he held his dose of Eliquis this morning and will continue to hold his Eliquis until the hemorrhoids are under control  He will be reaching out to his colorectal physician today but wanted to call us first      He would like any advice you can give him in regards to this situation

## 2022-06-17 ENCOUNTER — PATIENT OUTREACH (OUTPATIENT)
Dept: FAMILY MEDICINE CLINIC | Facility: CLINIC | Age: 67
End: 2022-06-17

## 2022-06-17 ENCOUNTER — TELEPHONE (OUTPATIENT)
Dept: FAMILY MEDICINE CLINIC | Facility: CLINIC | Age: 67
End: 2022-06-17

## 2022-06-17 ENCOUNTER — TRANSITIONAL CARE MANAGEMENT (OUTPATIENT)
Dept: FAMILY MEDICINE CLINIC | Facility: CLINIC | Age: 67
End: 2022-06-17

## 2022-06-17 NOTE — TELEPHONE ENCOUNTER
Patient returned call regarding scheduling a TCM visit  He stated he feels that he is ok and does not need the appointment at this time  He is following up with Dr Saba Arellano and [facility] and will be scheduling appointments with them

## 2022-06-17 NOTE — PROGRESS NOTES
Call to patient, reviewed Bundled program  He agrees to outreach calls  Provided my name and contact number  He reports he has his hospital AVS and denies questions  Verified he has all medications  He had an episode Wednesday, was complaining of constipation, has hemorrhoids which began bleeding  He verbalized that he is still "holding his Eliquis and cardiology aware"  He reports he is no longer constipated now that he has returned to his home cooking  Has appointment with Cardiology 6/22/22  All questions answered at this time  Agrees to next outreach in two weeks

## 2022-06-22 ENCOUNTER — CLINICAL SUPPORT (OUTPATIENT)
Dept: CARDIOLOGY CLINIC | Facility: CLINIC | Age: 67
End: 2022-06-22
Payer: MEDICARE

## 2022-06-22 VITALS
HEART RATE: 56 BPM | WEIGHT: 218.5 LBS | DIASTOLIC BLOOD PRESSURE: 84 MMHG | BODY MASS INDEX: 29.59 KG/M2 | HEIGHT: 72 IN | SYSTOLIC BLOOD PRESSURE: 136 MMHG

## 2022-06-22 DIAGNOSIS — I47.2 NON-SUSTAINED VENTRICULAR TACHYCARDIA (HCC): Primary | ICD-10-CM

## 2022-06-22 PROCEDURE — 93000 ELECTROCARDIOGRAM COMPLETE: CPT | Performed by: PHYSICIAN ASSISTANT

## 2022-06-22 NOTE — PROGRESS NOTES
Patient presents for EKG s/p Sotalol initiation for NSVT  Previously a patient of Dr Steven Saldaña but is now going to follow with you  He does have appointment with [place] Youngblood in July  EKG today showed NSR, HR 56 bpm   /74  He d/c'd his Eliquis due to hemorrhoidal bleeding  He will be seeing colorectal soon to try and alleviate this problem so he can return to take [facility]  I did explain the risk of not being on an [facility]  He c/o fatigue and some postural dizziness      Currently on Sotalol 80mg bid

## 2022-06-23 ENCOUNTER — HOSPITAL ENCOUNTER (OUTPATIENT)
Dept: CT IMAGING | Facility: HOSPITAL | Age: 67
Discharge: HOME | End: 2022-06-23
Attending: ANESTHESIOLOGY
Payer: MEDICARE

## 2022-06-23 DIAGNOSIS — M54.12 CERVICAL RADICULOPATHY: ICD-10-CM

## 2022-06-23 DIAGNOSIS — Z98.1 STATUS POST CERVICAL SPINAL FUSION: ICD-10-CM

## 2022-06-23 PROCEDURE — 72125 CT NECK SPINE W/O DYE: CPT

## 2022-06-23 PROCEDURE — G1004 CDSM NDSC: HCPCS

## 2022-06-28 ENCOUNTER — PATIENT OUTREACH (OUTPATIENT)
Dept: FAMILY MEDICINE CLINIC | Facility: CLINIC | Age: 67
End: 2022-06-28

## 2022-06-28 NOTE — PROGRESS NOTES
Pt reports he is "doing well" and has follow up with Cardiology 7/18/22  Denies any symptoms at this time  All questions answered  He agrees to final call in September at end of bundled episode  Pt has my contact information if sooner assistance needed

## 2022-06-29 ENCOUNTER — TELEPHONE (OUTPATIENT)
Dept: RADIOLOGY | Facility: CLINIC | Age: 67
End: 2022-06-29

## 2022-06-29 NOTE — TELEPHONE ENCOUNTER
Please notify the patient the CT of the cervical spine shows stable ACDF from C5-7  Arthritis noted throughout the cervical spine  Mild to moderate foraminal stenosis (narrowing where nerves exit the spine) from C2-3 to C5-6  X-ray of the lumbar spine shows multilevel degenerative disc disease and arthritic changes throughout  An x-ray of the right shoulder shows mild arthritis of the glenohumeral joint and moderate arthritis of the acromioclavicular joint as well as findings consistent with calcific tendinitis of the right shoulder

## 2022-06-30 ENCOUNTER — OFFICE VISIT (OUTPATIENT)
Dept: GASTROENTEROLOGY | Facility: CLINIC | Age: 67
End: 2022-06-30
Payer: MEDICARE

## 2022-06-30 VITALS
HEIGHT: 72 IN | BODY MASS INDEX: 29.59 KG/M2 | DIASTOLIC BLOOD PRESSURE: 68 MMHG | HEART RATE: 69 BPM | SYSTOLIC BLOOD PRESSURE: 144 MMHG | WEIGHT: 218.5 LBS

## 2022-06-30 DIAGNOSIS — K64.1 GRADE II HEMORRHOIDS: ICD-10-CM

## 2022-06-30 DIAGNOSIS — R19.4 CHANGE IN BOWEL HABITS: ICD-10-CM

## 2022-06-30 DIAGNOSIS — K62.5 RECTAL BLEED: Primary | ICD-10-CM

## 2022-06-30 DIAGNOSIS — I48.91 ATRIAL FIBRILLATION, UNSPECIFIED TYPE (HCC): ICD-10-CM

## 2022-06-30 PROCEDURE — 99213 OFFICE O/P EST LOW 20 MIN: CPT | Performed by: INTERNAL MEDICINE

## 2022-06-30 NOTE — TELEPHONE ENCOUNTER
Cardiology aware that pt stopped his Eliquis on his own per documentation by Dr Denny Fuentes explained risks of being off of it

## 2022-06-30 NOTE — TELEPHONE ENCOUNTER
OJ  S/w pt, reviewed imaging results  Pt wanted to give JW an update, he stopped the Gabapentin because it was not helping and causing too much drowsiness  Pt has also stopped his eliquis so he went back on Celebrex  Pt will f/u at next OV

## 2022-06-30 NOTE — PROGRESS NOTES
Latonya Orozco [facility] - Outpatient Follow-up Note  Maricruz Lacy 78 y o  male MRN: [id number]  Encounter: [id number]          ASSESSMENT AND PLAN:      1  Rectal bleed  Patient was recently in the hospital   He was admitted for cardiac arrhythmia  Patient had tachy-sulma syndrome  Subsequently patient had a pacemaker placed  He was put on sotalol  He is doing well  He was also on Eliquis temporarily  Double of rectal bleeding  The bleeding is stopped  Patient does have history of hemorrhoidal bleeding in the past   Hemorrhoidal banding was performed  Wants to get hemorrhoidal banding done again  2  Grade II hemorrhoids  Patient has history of grade 2 hemorrhoids  3  Change in bowel habits  Change in bowel habits probably because medications and being in the hospital in a less ambulatory status  Currently his bowel habits are back to normal     4  Atrial fibrillation, unspecified type [facility])  Patient has recurrent atrial fibrillation  Has required Eliquis     ______________________________________________________________________    SUBJECTIVE:  Rectal bleeding  Done or constipation high in the hospital   There is no weight loss or weight gain  There is no abdominal pain or discomfort  There is no nausea vomiting  There is no chest pain, cough or wheezing  There is no palpitation, orthopnea  Hemorrhoid Bandligation Procedure Note    Indications: The patient had a history of bleeding from internal hemorrhoids as described in the office note  Pre-operative Diagnosis: * No surgery found *    Post-operative Diagnosis: * No surgery found *    Surgeon: Kristina Harvey MD     Assistants:  None    Anesthesia:  No anesthesia use  Local anesthesia with Novocain given      ASA Class: 2    Procedure Details   After discussion and acceptance of all risks, benefits and alternatives an anoscope was inserted into the anal canal  The hemorrhoid petechials were defined and a ruiz band was applied to the left lateral hemorrhoids approximately 3cm above the dente line  Findings: There were moderate enlarged hemorrhoids in the left lateral quadrants    Estimated Blood Loss:  None           Drains: None           Total IV Fluids:  Not relevant ml           Specimens: * Cannot find log *           Implants: * No surgical log found *           Complications:  None; patient tolerated the procedure well  Disposition: To home when the patient had fully recovered  Condition: stable    Attending Attestation: I was present for the entire procedure      REVIEW OF SYSTEMS IS OTHERWISE NEGATIVE        Historical Information   Past Medical History:   Diagnosis Date    Abrasion of left foot     LAST ASSESSED: 9/23/13    Achilles tendinitis, unspecified leg     LAST ASSESSED: 11/30/12    Allergic rhinitis     LAST ASSESSED: 11/25/13    Allergic rhinitis 06/25/2008    LAST ASSESSED: 6/25/08    Cervicalgia 04/22/2011    LAST ASSESSED: 4/22/11    Coronary artery disease     CPAP (continuous positive airway pressure) dependence     Dysfunction of left eustachian tube     LAST ASSESSED: 9/23/13    Epistaxis     LAST ASSESSED: 5/27/15    First degree atrioventricular block     LAST ASSESSED: 7/10/17    Gastric ulcer 10/07/2011    LAST ASSESSED: 3/9/15    GERD (gastroesophageal reflux disease)     Hemorrhoids 05/13/2011    LAST ASSESSED: 5/13/11    Long term use of drug     LAST ASSESSED: 10/11/12    Myalgia 12/21/2007    LAST ASSESSED: 12/21/07    Myositis 12/21/2007    LAST ASSESSED: 12/21/07    Numbness and tingling of foot     LAST ASSESSED: 3/9/15    Premature ventricular beats     states cardiologist ordered Zio ambulatory cardiac monitor    Sleep apnea      Past Surgical History:   Procedure Laterality Date    CARDIAC CATHETERIZATION  2017    CARDIAC ELECTROPHYSIOLOGY PROCEDURE N/A 10/25/2021    Procedure: CARDIAC EPS;  Surgeon: Robyn Saab MD;  [facility]; Service: Cardiology    CARDIAC ELECTROPHYSIOLOGY PROCEDURE Left 10/25/2021    Procedure: Cardiac icd implant;  Surgeon: Robyn Saab MD;  [facility]; Service: Cardiology    CARDIOVERSION N/A 10/25/2021    Procedure: Cardioversion;  Surgeon: Robyn Saab MD;  [facility];   Service: Cardiology    CATARACT EXTRACTION Right     CERVICAL FUSION      pt thinks C4-C5    CHOLECYSTECTOMY      LAPAROSCOPIC; LAST ASSESSED: 10/17/17    COLONOSCOPY      COMPLETE; 3-4 YEARS AGO BY [facility]; LAST ASSESSED: 14    MYRINGOTOMY W/ TUBES      WITH VENTILATING TUBE INSERTION    OK REVISE MEDIAN N/CARPAL TUNNEL SURG Left 2021    Procedure: RELEASE CARPAL TUNNEL;  Surgeon: Lakshmi Bowens MD;  Location: [facility];  Service: Orthopedics    VASECTOMY       Social History   Social History     Substance and Sexual Activity   Alcohol Use Yes    Alcohol/week: 7 0 standard drinks    Types: 7 Glasses of wine per week    Comment: Once in a while with a meal     Social History     Substance and Sexual Activity   Drug Use No     Social History     Tobacco Use   Smoking Status Former Smoker    Quit date:     Years since quittin 5   Smokeless Tobacco Never Used     Family History   Problem Relation Age of Onset    Cancer Mother         SOFT TISSUE CANCER ON RT SIDE CHEST WALL AND     Prostate cancer Maternal Uncle        Meds/Allergies       Current Outpatient Medications:     celecoxib (CeleBREX) 200 mg capsule    Coenzyme Q10 (COQ10) 200 MG CAPS    finasteride (PROSCAR) 5 mg tablet    fluticasone (VERAMYST) 27 5 MCG/SPRAY nasal spray    Glucosamine-Chondroitin (GLUCOSAMINE CHONDR COMPLEX PO)    Omega-3 Fatty Acids (FISH OIL) 1000 MG CPDR    pantoprazole (PROTONIX) 40 mg tablet    rosuvastatin (CRESTOR) 10 MG tablet    sotalol (BETAPACE) 80 mg tablet    Allergies   Allergen Reactions    Amoxicillin Anaphylaxis    Acetaminophen      Rapid heart rate    [employer] - Food Allergy     [employer] - Food Allergy Other (See Comments)     States allergy is to raw cherries    Pollen Extract     Augmentin [Amoxicillin-Pot Clavulanate]     Dye [Iodinated Diagnostic Agents] Itching           Objective     Blood pressure 144/68, pulse 69, height 6' (1 829 m), weight 99 1 kg (218 lb 8 oz)  Body mass index is 29 63 kg/m²  PHYSICAL EXAM:      General Appearance:   Alert, cooperative, no distress   HEENT:   Normocephalic, atraumatic, anicteric      Neck:  Supple, symmetrical, trachea midline   Lungs:   Clear to auscultation bilaterally; no rales, rhonchi or wheezing; respirations unlabored    Heart[de-identified]   Regular rate and rhythm; no murmur, rub, or gallop  Abdomen:   Soft, non-tender, non-distended; normal bowel sounds; no masses, no organomegaly    Genitalia:   Deferred    Rectal:   Deferred    Extremities:  No cyanosis, clubbing or edema    Pulses:  2+ and symmetric    Skin:  No jaundice, rashes, or lesions    Lymph nodes:  No palpable cervical lymphadenopathy        Lab Results:   No visits with results within 1 Day(s) from this visit     Latest known visit with results is:   Admission on 06/13/2022, Discharged on 06/15/2022   Component Date Value    Sodium 06/13/2022 137     Potassium 06/13/2022 4 7     Chloride 06/13/2022 110 (A)    CO2 06/13/2022 24     ANION GAP 06/13/2022 3 (A)    BUN 06/13/2022 14     Creatinine 06/13/2022 1 09     Glucose 06/13/2022 108     Calcium 06/13/2022 9 2     eGFR 06/13/2022 69     Magnesium 06/13/2022 2 3     WBC 06/13/2022 5 86     RBC 06/13/2022 5 39     Hemoglobin 06/13/2022 16 8     Hematocrit 06/13/2022 50 0 (A)    MCV 06/13/2022 93     MCH 06/13/2022 31 2     MCHC 06/13/2022 33 6     RDW 06/13/2022 12 7     MPV 06/13/2022 10 0     Platelets 50/84/3774 203     nRBC 06/13/2022 0     Neutrophils Relative 06/13/2022 54     Immat GRANS % 06/13/2022 0     Lymphocytes Relative 06/13/2022 31     Monocytes Relative 06/13/2022 13 (A)    Eosinophils Relative 06/13/2022 1     Basophils Relative 06/13/2022 1     Neutrophils Absolute 06/13/2022 3 15     Immature Grans Absolute 06/13/2022 0 02     Lymphocytes Absolute 06/13/2022 1 83     Monocytes Absolute 06/13/2022 0 73     Eosinophils Absolute 06/13/2022 0 07     Basophils Absolute 06/13/2022 0 06     Sodium 06/14/2022 140     Potassium 06/14/2022 4 0     Chloride 06/14/2022 111 (A)    CO2 06/14/2022 24     ANION GAP 06/14/2022 5     BUN 06/14/2022 14     Creatinine 06/14/2022 0 97     Glucose 06/14/2022 99     Calcium 06/14/2022 9 3     eGFR 06/14/2022 80     Magnesium 06/14/2022 2 1     Ventricular Rate 06/13/2022 60     Atrial Rate 06/13/2022 60     NJ Interval 06/13/2022 196     QRSD Interval 06/13/2022 182     QT Interval 06/13/2022 478     QTC Interval 06/13/2022 478     P Axis 06/13/2022 -19     QRS Axis 06/13/2022 -33     T Wave Axis 06/13/2022 59     Ventricular Rate 06/13/2022 70     Atrial Rate 06/13/2022 70     NJ Interval 06/13/2022 182     QRSD Interval 06/13/2022 178     QT Interval 06/13/2022 448     QTC Interval 06/13/2022 483     P Axis 06/13/2022 12     QRS Axis 06/13/2022 -43     T Wave Axis 06/13/2022 75     Ventricular Rate 06/14/2022 59     Atrial Rate 06/14/2022 59     NJ Interval 06/14/2022 188     QRSD Interval 06/14/2022 180     QT Interval 06/14/2022 496     QTC Interval 06/14/2022 491     P Axis 06/14/2022 -15     QRS Axis 06/14/2022 46     T Wave Axis 06/14/2022 65     Sodium 06/15/2022 139     Potassium 06/15/2022 4 2     Chloride 06/15/2022 109 (A)    CO2 06/15/2022 25     ANION GAP 06/15/2022 5     BUN 06/15/2022 17     Creatinine 06/15/2022 1 05     Glucose 06/15/2022 93     Calcium 06/15/2022 9 1     eGFR 06/15/2022 73     Magnesium 06/15/2022 2 1     Ventricular Rate 06/14/2022 56     Atrial Rate 06/14/2022 56     NJ Interval 06/14/2022 182     QRSD Interval 06/14/2022 182     QT Interval 06/14/2022 504     QTC Interval 06/14/2022 486     P Axis 06/14/2022 -2     QRS Axis 06/14/2022 56     T Wave Axis 06/14/2022 64     Ventricular Rate 06/15/2022 59     Atrial Rate 06/15/2022 59     GA Interval 06/15/2022 186     QRSD Interval 06/15/2022 176     QT Interval 06/15/2022 500     QTC Interval 06/15/2022 495     P Axis 06/15/2022 -19     QRS Axis 06/15/2022 -10     T Wave Axis 06/15/2022 63          Radiology Results:   XR spine cervical complete 6+ vw flex/ext/obl    Result Date: 6/9/2022  Narrative: CERVICAL SPINE INDICATION:   M54 2: Cervicalgia Z98 1: Arthrodesis status M54 12: Radiculopathy, cervical region  COMPARISON:  CT, 5/2/2011 VIEWS:  XR SPINE CERVICAL COMPLETE 6+ VW FLEX /EXT /OBL FINDINGS: No fracture  Status post ACDF from C5 through C7, hardware appears intact  There is no abnormal motion seen during flexion and extension  There is degenerative disc disease at C2-C3 with disc space narrowing and prominent anterior spurring, and to a lesser extent at C3-C4  Facet joint arthritis noted  The left C3-C4 neuroforamen is narrowed secondary to spurring There is a transvenous pacemaker  The lung apices are clear  Impression: Status post ACDF with intact hardware No abnormal motion with flexion and extension Degenerative disc disease at C2-C3, C3-C4 and facet joint arthritis with foraminal encroachment as above Workstation performed: [id number]     X-ray lumbar spine complete 4+ views    Result Date: 6/9/2022  Narrative: LUMBAR SPINE INDICATION:   M54 40: Lumbago with sciatica, unspecified side  COMPARISON:  None VIEWS:  XR SPINE LUMBAR MINIMUM 4 VIEWS NON INJURY FINDINGS: There are 5 non rib bearing lumbar vertebral bodies  There is no evidence of acute fracture or destructive osseous lesion  Alignment is unremarkable  There is multilevel discogenic degenerative change throughout the lumbar spine    Large endplate osteophytes are noted, at some levels creating bridging bone including L1-L2 and L5-S1  There is bilateral facet joint arthritis at L4-L5 and L5-S1  The pedicles appear intact  Soft tissues are unremarkable  Impression: Discogenic degenerative changes of the lumbar spine with prominent endplate osteophytes and facet joint osteoarthritis  There is no compression fracture or subluxation Workstation performed: [id number]     XR shoulder 2+ vw right    Result Date: 6/9/2022  Narrative: RIGHT SHOULDER INDICATION:   M25 511: Pain in right shoulder G89 29: Other chronic pain  COMPARISON:  None VIEWS:  XR SHOULDER 2+ VW RIGHT FINDINGS: There is no acute fracture or dislocation  There is mild osteoarthritis of the glenohumeral joint and moderate osteoarthritis of the acromioclavicular joint  No lytic or blastic osseous lesion  A transvenous pacemaker is present Soft tissue calcification is seen adjacent to the greater tuberosity     Impression: Right shoulder osteoarthritis Soft tissue calcification adjacent to the right humeral greater tuberosity suggesting calcific tendinitis Workstation performed: [id number]     CT cervical spine without contrast    Result Date: 6/24/2022  Narrative: CT CERVICAL SPINE - WITHOUT CONTRAST INDICATION:   Z98 1: Arthrodesis status M54 12: Radiculopathy, cervical region  COMPARISON:  None  TECHNIQUE:  CT examination of the cervical spine was performed without intravenous contrast   Contiguous axial images were obtained  Sagittal and coronal reconstructions were performed  Radiation dose length product (DLP) for this visit:  765 mGy-cm   This examination, like all CT scans performed in the [facility], was performed utilizing techniques to minimize radiation dose exposure, including the use of iterative reconstruction and automated exposure control  IMAGE QUALITY:  Diagnostic  FINDINGS: ALIGNMENT:  Patient status post ACDF C5-C7  There is straightening of the cervical lordosis   VERTEBRAL BODIES:  No fracture  DEGENERATIVE CHANGES:  C2-3: Moderate facet hypertrophy right with mild right foraminal narrowing  C3-C4: Severe facet hypertrophy with marginal osteophyte on the left results in moderate left foraminal narrowing and mild central stenosis  C4-C5: Uncinate osteophyte on the right with mild right foraminal narrowing  C5-C6: Status post ACDF  Uncinate osteophyte on the right with mild right foraminal narrowing  Beam hardening artifact limits evaluation  C6-C7: Status post ACDF  No significant osseous foraminal narrowing  Evaluation of the central pelvis limited by beam hardening artifact  C7-T1: Bony bar ridge on left without significant osseous central or foraminal narrowing  Beam hardening artifact limits evaluation  PREVERTEBRAL AND PARASPINAL SOFT TISSUES:  Unremarkable  THORACIC INLET:  Normal      Impression: Status post ACDF C5-C7  There is moderate osseous foraminal narrowing on the left at C3-4 with mild osseous foraminal narrowing at other levels as detailed above  Beam hardening artifact from metallic hardware limits evaluation  Workstation performed: [id number]     Cardiac EP device report    Result Date: 6/1/2022  Narrative: MDT-DUAL CHAMBER ICD (AAI-DDD MODE) / ACTIVE SYSTEM IS MRI CONDITIONAL NON-BILLABLE CARELINK TRANSMISSION: BATTERY VOLTAGE ADEQUATE (10 5 YRS)  AP: 18 1%  : 88 7% (>40%~MVP-ON/50)  ALL AVAILABLE LEAD PARAMETERS WITHIN NORMAL LIMITS  1 VT EPISODE NO AVAIL EGM  264 VT-NS EPISODES W/ AVAIL EGMS SHOWING NSVT 6 BEATS @ 150 BPM, 10 BEATS @ 156 BPM, 14 BEATS  @155 BPM, 21 BEATS @ 153 BPM & 15 BEATS @ 155 BPM  PT TAKES ELIQUIS, METOPROLOL SUCC, ASA 81MG  EF: 60% (ECHO 8/12/21)  PT IS TO BE ADMITTED FOR SOTALOL ON 6/13/22  OPTI-VOL WITHIN NORMAL LIMITS  APPROPRIATELY FUNCTIONING ICD  [address]     Echo complete w/ contrast if indicated    Result Date: 6/8/2022  Narrative: Blanca Carter  Left Ventricle: Left ventricular cavity size is normal  Wall thickness is mildly increased  There is mild concentric hypertrophy  The left ventricular ejection fraction is 60%  Systolic function is normal  Wall motion is normal  Diastolic function is mildly abnormal, consistent with grade I (abnormal) relaxation    Right Ventricle: Right ventricular cavity size is normal  Systolic function is normal    Left Atrium: The atrium is mildly dilated    Aortic Valve: There is mild regurgitation    Mitral Valve: There is mild regurgitation    Tricuspid Valve: There is mild regurgitation

## 2022-07-05 DIAGNOSIS — I47.20 VENTRICULAR TACHYCARDIA: ICD-10-CM

## 2022-07-05 DIAGNOSIS — I49.3 PREMATURE VENTRICULAR BEATS: ICD-10-CM

## 2022-07-05 DIAGNOSIS — I48.0 PAROXYSMAL ATRIAL FIBRILLATION (HCC): ICD-10-CM

## 2022-07-05 RX ORDER — SOTALOL HYDROCHLORIDE 80 MG/1
80 TABLET ORAL EVERY 12 HOURS SCHEDULED
Qty: 180 TABLET | Refills: 3 | Status: SHIPPED | OUTPATIENT
Start: 2022-07-05 | End: 2022-09-27

## 2022-07-06 ENCOUNTER — TELEPHONE (OUTPATIENT)
Dept: SLEEP CENTER | Facility: CLINIC | Age: 67
End: 2022-07-06

## 2022-07-06 NOTE — TELEPHONE ENCOUNTER
Patient left message stating his CPAP failed and he needs replacement as soon as possible  Spoke to patient who states his issue has been resolved  He reached out to [facility] 83 support and he thinks the outlet the machine was plugged into was the problem  He plugged it into a different outlet last night and it has been working fine

## 2022-07-18 ENCOUNTER — OFFICE VISIT (OUTPATIENT)
Dept: CARDIOLOGY CLINIC | Facility: CLINIC | Age: 67
End: 2022-07-18
Payer: MEDICARE

## 2022-07-18 VITALS
BODY MASS INDEX: 29.32 KG/M2 | SYSTOLIC BLOOD PRESSURE: 112 MMHG | DIASTOLIC BLOOD PRESSURE: 72 MMHG | WEIGHT: 216.5 LBS | HEART RATE: 63 BPM | HEIGHT: 72 IN

## 2022-07-18 DIAGNOSIS — I47.29 NON-SUSTAINED VENTRICULAR TACHYCARDIA: ICD-10-CM

## 2022-07-18 DIAGNOSIS — I49.3 PREMATURE VENTRICULAR BEATS: ICD-10-CM

## 2022-07-18 DIAGNOSIS — I49.5 SICK SINUS SYNDROME (HCC): Primary | ICD-10-CM

## 2022-07-18 PROCEDURE — 99215 OFFICE O/P EST HI 40 MIN: CPT | Performed by: PHYSICIAN ASSISTANT

## 2022-07-18 PROCEDURE — 93000 ELECTROCARDIOGRAM COMPLETE: CPT | Performed by: PHYSICIAN ASSISTANT

## 2022-07-18 NOTE — PROGRESS NOTES
Electrophysiology Office Note    Link Altaf  1955  [id number]  [facility] [place] [address]        Assessment/Plan   Primary diagnosis:   1  NSVT   * patient presents to B EP office for post sotalol initiation    * s/p sotalol 80mg BID initiation  Some fatigue but no major side effects  He is in ASVP rhythm today with QTc 490ms  I interrogated his device he had 2 seconds of NSVT this AM for about 6 beats otherwise no recurrent NSVT    * EF is 60% on recent echocardiogram    * f/u in 6 months with Dr Roxy Kim for further management    2  Paroxysmal atrial fibrillation, symptomatic    * in April 2022 patient had episode of symptomatic atrial fibrillation w/ RVR lating about 2 5 hours before spontaneously terminating  No recurrence since this time  Afib burden is 0%  * [id number] is 3 (HTN, Age >70, hx CAD)  He was on eliquis for a short time but due to constipation from inpatient stay for sotalol went home had a hemorrhoidal bleeding event leading to hemorrhoidal banding by his GI physician  He stopped eliquis due to this  I tried to explain to patient although hemorrhoidal bleeding looks horrible this is not medically classified as a major GIB  This upset the patient, he feels the episode was a major bleeding event as he never had this amount of bleeding prior to being on eliquis  I definitely understand where he is coming from as the [facility] is contributing to worsening bleeding  * patient does not want to be on [facility] due to his recurrent hemorrhoidal bleeding events as this has been a waxing and waning issue for him for a long time  He understands his risk of CVA being off of AC and accepts this risk    * I think we can keep him off of AC and monitor his device close for afib   His ICD is set to alert the device clinic when he has more then 1 hour of AF at a HR above 100bpm  During his last episode his HR was in the 150's on device interrogation  * also, being on sotalol will lower his burden of afib    * I turned on r-ATP on his device today as well    * he is also going to get a [employer] mobile to assess his heart rhythm at home  If he feels he is having afib he is going to take an eliquis and call our office  * Today we discussed non cardiac modifiable AF risk factors to prevent further substrate formation    1  HTN - controlled      2  T2DM - does not have      3  LENY - compliant with CPAP      4  Alcohol intake- does consume 1 5oz glass of wine with dinner at night  Discussed atrial fibrillations correlation with alcohol intake and current AHA recommendations to keep to <2 drinks per day for men  He does this  5  Obesity - weight today is 216lbs, BMI is 29 36     6  Tobacco use - does not use    * given no afib recurrence since April, his low risk factors for afib recurrence, sotalol use, device alerts and r-ATP I think it is reasonable to monitor for recurrent aifb and keep him off of eliquis to reduce hemorrhoidal bleeding issues  Will review with Dr Marlys Smith  3  Pre op risk stratification: patient may have upcoming carpel tunnel / trigger finger surgery  From an EP standpoint he is low risk for perioperative CV complications  Continue current medication regimen  No contraindications to proceeding with any procedure he may need on his hand  Secondary diagnosis:   1  Hx VT during EPS for SVT s/p MDT DC ICD    * he  90% of the time with QRS >170ms, need to monitor routinely for drop in EF  2  HLD   3  CAD w/ 50-70% ostial LCX disease from [facility] in 2017   4  Internal and external hemorrhoids s/p hemorrhoidal banding   5  SSS  6   LENY on CPAP                 Rhythm History:   Atrial fibrillation:     Atrial flutter:     SVT:     VT/VF/PVC:     Device history:   Pacemaker:    Defibrillator:    BIV PPM:    BIV ICD:    ILR:      Cardiac Testing:     ECHO: Results for orders placed during the hospital encounter of 21    Echo complete with contrast if indicated    Narrative  [facility], [address] [facility]  [phone]    Transthoracic Echocardiogram  2D, M-mode, Doppler, and Color Doppler    Study date:  12-Aug-2021    Patient: Leona Craft  MR number: [id number]  Account number: [de-identified]  : 1955  Age: 76 years  Gender: Male  Status: Outpatient  Location: [address] [place] [address]  Height: 72 in  Weight: 221 5 lb  BP: 152/ 62 mmHg    Indications: Assess left ventricular function  Diagnoses: I47 2 - Ventricular tachycardia    Sonographer:  CHRISTI Fermin  Primary Physician:  Mario Mora DO  Referring Physician:  Katharine Small MD  Group:  [facility]  Interpreting Physician:  Collette Nail, MD    SUMMARY    LEFT VENTRICLE:  Systolic function was normal  Ejection fraction was estimated to be 60 %  There were no regional wall motion abnormalities  AORTIC VALVE:  There was mild regurgitation  HISTORY: PRIOR HISTORY: NSVT, Non-obtructive coronary artery disease, Hyperlipidemia, LENY    PROCEDURE: The study was performed in the [address] [place] [address]  This was a routine study  The transthoracic approach was used  The study included complete 2D imaging, M-mode, complete spectral Doppler, and color Doppler  The  heart rate was 54 bpm, at the start of the study  Images were obtained from the parasternal, apical, subcostal, and suprasternal notch acoustic windows  Image quality was adequate  LEFT VENTRICLE: Size was normal  Systolic function was normal  Ejection fraction was estimated to be 60 %  There were no regional wall motion abnormalities  Wall thickness was normal  DOPPLER: There was an increased relative contribution  of atrial contraction to ventricular filling  Left ventricular diastolic function parameters were normal for the patient's age      RIGHT VENTRICLE: The size was normal  Systolic function was normal  Wall thickness was normal     LEFT ATRIUM: Size was normal     RIGHT ATRIUM: Size was normal     MITRAL VALVE: Valve structure was normal  There was normal leaflet separation  DOPPLER: The transmitral velocity was within the normal range  There was no evidence for stenosis  There was trace regurgitation  AORTIC VALVE: The valve was trileaflet  Leaflets exhibited normal thickness and normal cuspal separation  DOPPLER: Transaortic velocity was within the normal range  There was no evidence for stenosis  There was mild regurgitation  TRICUSPID VALVE: The valve structure was normal  There was normal leaflet separation  DOPPLER: The transtricuspid velocity was within the normal range  There was no evidence for stenosis  There was trace regurgitation  PULMONIC VALVE: Leaflets exhibited normal thickness, no calcification, and normal cuspal separation  DOPPLER: The transpulmonic velocity was within the normal range  There was trace regurgitation  PERICARDIUM: There was no pericardial effusion  The pericardium was normal in appearance  AORTA: The root exhibited normal size  SYSTEMIC VEINS: IVC: The inferior vena cava was normal in size  Respirophasic changes were normal     SYSTEM MEASUREMENT TABLES    2D  %FS: 21 05 %  Ao Diam: 3 02 cm  EDV(Teich): 117 73 ml  EF(Teich): 42 64 %  ESV(Teich): 67 53 ml  IVSd: 0 78 cm  LA Area: 19 18 cm2  LA Diam: 4 cm  LVEDV MOD A4C: 167 49 ml  LVEF MOD A4C: 52 37 %  LVESV MOD A4C: 79 77 ml  LVIDd: 4 99 cm  LVIDs: 3 94 cm  LVLd A4C: 9 72 cm  LVLs A4C: 8 4 cm  LVPWd: 0 88 cm  RA Area: 14 03 cm2  RVIDd: 3 7 cm  SV MOD A4C: 87 71 ml  SV(Teich): 50 2 ml    MM  TAPSE: 2 85 cm    PW  LVOT Env  Ti: 315 89 ms  LVOT VTI: 14 53 cm  LVOT Vmax: 0 63 m/s  LVOT Vmean: 0 46 m/s  LVOT maxP 57 mmHg  LVOT meanP 93 mmHg  MV A Bi: 0 81 m/s  MV Dec [place]: 2 25 m/s2  MV DecT: 247 18 ms  MV E Bi: 0 56 m/s  MV E/A Ratio: 0 69  MV PHT: 71 68 ms  MVA By PHT: 3 07 cm2    [employer] Commission Accredited Echocardiography Laboratory    Prepared and electronically signed by    Oral Silverman MD  Signed 12-Aug-2021 15:00:09        History of Present Illness     HPI/INTERVAL HISTORY: Va Wagner is a 78 y o  male with history as above who presents to B EP office today for  Post sotalol initiation follow up  Since returning home from sotalol hospitalization he does find himself being a little bit more fatigued in the PM but no major LEE/SOB  Recently had a vacation where he walked more then he has on vacation before and was able to do so without major limitations  Unfortunately he did have a "major bleeding event" from hemorrhoidal bleeding in the past few weeks  He needed to use a rag to hold onto his rectum to help stop the bleeding  Due to this he has discontinued elqiuis  He understands his risks of CVA off of the medication but does not want to be on [facility] given his recurrent hemorrhoids  Review of Systems  ROS as noted above, otherwise 12 point review of systems was performed and is negative  Historical Information   Social History     Socioeconomic History    Marital status: /Civil Union     Spouse name: Not on file    Number of children: Not on file    Years of education: Not on file    Highest education level: Not on file   Occupational History    Not on file   Tobacco Use    Smoking status: Former Smoker     Quit date:      Years since quittin 5    Smokeless tobacco: Never Used   Vaping Use    Vaping Use: Never used   Substance and Sexual Activity    Alcohol use:  Yes     Alcohol/week: 7 0 standard drinks     Types: 7 Glasses of wine per week     Comment: Once in a while with a meal    Drug use: No    Sexual activity: Not Currently   Other Topics Concern    Not on file   Social History Narrative    Not on file     Social Determinants of Health     Financial Resource Strain: Not on file   Food Insecurity: No Food Insecurity    Worried About Running Out of Food in the Last Year: Never true    Ran Out of Food in the Last Year: Never true   Transportation Needs: No Transportation Needs    Lack of Transportation (Medical): No    Lack of Transportation (Non-Medical): No   Physical Activity: Not on file   Stress: Not on file   Social Connections: Not on file   Intimate Partner Violence: Not on file   Housing Stability: Low Risk     Unable to Pay for Housing in the Last Year: No    Number of Places Lived in the Last Year: 1    Unstable Housing in the Last Year: No     Past Medical History:   Diagnosis Date    Abrasion of left foot     LAST ASSESSED: 9/23/13    Achilles tendinitis, unspecified leg     LAST ASSESSED: 11/30/12    Allergic rhinitis     LAST ASSESSED: 11/25/13    Allergic rhinitis 06/25/2008    LAST ASSESSED: 6/25/08    Cervicalgia 04/22/2011    LAST ASSESSED: 4/22/11    Coronary artery disease     CPAP (continuous positive airway pressure) dependence     Dysfunction of left eustachian tube     LAST ASSESSED: 9/23/13    Epistaxis     LAST ASSESSED: 5/27/15    First degree atrioventricular block     LAST ASSESSED: 7/10/17    Gastric ulcer 10/07/2011    LAST ASSESSED: 3/9/15    GERD (gastroesophageal reflux disease)     Hemorrhoids 05/13/2011    LAST ASSESSED: 5/13/11    Long term use of drug     LAST ASSESSED: 10/11/12    Myalgia 12/21/2007    LAST ASSESSED: 12/21/07    Myositis 12/21/2007    LAST ASSESSED: 12/21/07    Numbness and tingling of foot     LAST ASSESSED: 3/9/15    Premature ventricular beats     states cardiologist ordered Zio ambulatory cardiac monitor    Sleep apnea      Past Surgical History:   Procedure Laterality Date    CARDIAC CATHETERIZATION  2017    CARDIAC ELECTROPHYSIOLOGY PROCEDURE N/A 10/25/2021    Procedure: CARDIAC EPS;  Surgeon: Hood Wei MD;  [facility];   Service: Cardiology    CARDIAC ELECTROPHYSIOLOGY PROCEDURE Left 10/25/2021    Procedure: Cardiac icd implant; Surgeon: Taiwo Perez MD;  [facility]; Service: Cardiology    CARDIOVERSION N/A 10/25/2021    Procedure: Cardioversion;  Surgeon: Taiwo Perez MD;  [facility];   Service: Cardiology    CATARACT EXTRACTION Right     CERVICAL FUSION      pt thinks C4-C5    CHOLECYSTECTOMY      LAPAROSCOPIC; LAST ASSESSED: 10/17/17    COLONOSCOPY      COMPLETE; 3-4 YEARS AGO BY [facility]; LAST ASSESSED: 14    MYRINGOTOMY W/ TUBES      WITH VENTILATING TUBE INSERTION    SD REVISE MEDIAN N/CARPAL TUNNEL SURG Left 2021    Procedure: RELEASE CARPAL TUNNEL;  Surgeon: Danny Crockett MD;  Location: [facility];  Service: Orthopedics    VASECTOMY       Social History     Substance and Sexual Activity   Alcohol Use Yes    Alcohol/week: 7 0 standard drinks    Types: 7 Glasses of wine per week    Comment: Once in a while with a meal     Social History     Substance and Sexual Activity   Drug Use No     Social History     Tobacco Use   Smoking Status Former Smoker    Quit date:     Years since quittin 5   Smokeless Tobacco Never Used     Family History   Problem Relation Age of Onset    Cancer Mother         SOFT TISSUE CANCER ON RT SIDE CHEST WALL AND     Prostate cancer Maternal Uncle        Meds/Allergies       Current Outpatient Medications:     celecoxib (CeleBREX) 200 mg capsule, Take 1 capsule (200 mg total) by mouth daily (Patient taking differently: Take 200 mg by mouth daily as needed), Disp: 90 capsule, Rfl: 1    Coenzyme Q10 (COQ10) 200 MG CAPS, Take 1 tablet by mouth daily, Disp: , Rfl:     finasteride (PROSCAR) 5 mg tablet, Take 1 tablet (5 mg total) by mouth daily, Disp: 90 tablet, Rfl: 1    fluticasone (VERAMYST) 27 5 MCG/SPRAY nasal spray, 2 sprays into each nostril daily, Disp: , Rfl:     Glucosamine-Chondroitin (GLUCOSAMINE CHONDR COMPLEX PO), Take 1 tablet by mouth daily, Disp: , Rfl:     Omega-3 Fatty Acids (FISH OIL) 1000 MG CPDR, Take by mouth daily, Disp: , Rfl:     pantoprazole (PROTONIX) 40 mg tablet, Take 1 tablet (40 mg total) by mouth daily, Disp: 90 tablet, Rfl: 1    rosuvastatin (CRESTOR) 10 MG tablet, Take 1 tablet (10 mg total) by mouth daily, Disp: 90 tablet, Rfl: 1    sotalol (BETAPACE) 80 mg tablet, Take 1 tablet (80 mg total) by mouth every 12 (twelve) hours, Disp: 180 tablet, Rfl: 3    Allergies   Allergen Reactions    Amoxicillin Anaphylaxis    Acetaminophen      Rapid heart rate    Apple - Food Allergy     Cherry Flavor - Food Allergy Other (See Comments)     States allergy is to raw cherries    Pollen Extract     Augmentin [Amoxicillin-Pot Clavulanate]     Dye [Iodinated Diagnostic Agents] Itching       Objective   Vitals: Blood pressure 112/72, pulse 63, height 6' (1 829 m), weight 98 2 kg (216 lb 8 oz)  Physical Exam  Constitutional:       Appearance: He is well-developed  HENT:      Head: Normocephalic and atraumatic  Eyes:      Pupils: Pupils are equal, round, and reactive to light  Cardiovascular:      Rate and Rhythm: Normal rate and regular rhythm  Pulmonary:      Effort: Pulmonary effort is normal       Breath sounds: Normal breath sounds  Abdominal:      General: Bowel sounds are normal       Palpations: Abdomen is soft  Musculoskeletal:         General: Normal range of motion  Cervical back: Normal range of motion and neck supple  Skin:     General: Skin is warm and dry  Neurological:      Mental Status: He is alert and oriented to person, place, and time             Labs:  Admission on 06/13/2022, Discharged on 06/15/2022   Component Date Value    Sodium 06/13/2022 137     Potassium 06/13/2022 4 7     Chloride 06/13/2022 110 (A)    CO2 06/13/2022 24     ANION GAP 06/13/2022 3 (A)    BUN 06/13/2022 14     Creatinine 06/13/2022 1 09     Glucose 06/13/2022 108     Calcium 06/13/2022 9 2     eGFR 06/13/2022 69     Magnesium 06/13/2022 2 3     WBC 06/13/2022 5 86     RBC 06/13/2022 5 39     Hemoglobin 06/13/2022 16 8     Hematocrit 06/13/2022 50 0 (A)    MCV 06/13/2022 93     MCH 06/13/2022 31 2     MCHC 06/13/2022 33 6     RDW 06/13/2022 12 7     MPV 06/13/2022 10 0     Platelets 06/89/1166 203     nRBC 06/13/2022 0     Neutrophils Relative 06/13/2022 54     Immat GRANS % 06/13/2022 0     Lymphocytes Relative 06/13/2022 31     Monocytes Relative 06/13/2022 13 (A)    Eosinophils Relative 06/13/2022 1     Basophils Relative 06/13/2022 1     Neutrophils Absolute 06/13/2022 3 15     Immature Grans Absolute 06/13/2022 0 02     Lymphocytes Absolute 06/13/2022 1 83     Monocytes Absolute 06/13/2022 0 73     Eosinophils Absolute 06/13/2022 0 07     Basophils Absolute 06/13/2022 0 06     Sodium 06/14/2022 140     Potassium 06/14/2022 4 0     Chloride 06/14/2022 111 (A)    CO2 06/14/2022 24     ANION GAP 06/14/2022 5     BUN 06/14/2022 14     Creatinine 06/14/2022 0 97     Glucose 06/14/2022 99     Calcium 06/14/2022 9 3     eGFR 06/14/2022 80     Magnesium 06/14/2022 2 1     Ventricular Rate 06/13/2022 60     Atrial Rate 06/13/2022 60     MI Interval 06/13/2022 196     QRSD Interval 06/13/2022 182     QT Interval 06/13/2022 478     QTC Interval 06/13/2022 478     P Axis 06/13/2022 -19     QRS Axis 06/13/2022 -33     T Wave Axis 06/13/2022 59     Ventricular Rate 06/13/2022 70     Atrial Rate 06/13/2022 70     MI Interval 06/13/2022 182     QRSD Interval 06/13/2022 178     QT Interval 06/13/2022 448     QTC Interval 06/13/2022 483     P Axis 06/13/2022 12     QRS Axis 06/13/2022 -42     T Wave Axis 06/13/2022 75     Ventricular Rate 06/14/2022 59     Atrial Rate 06/14/2022 59     MI Interval 06/14/2022 188     QRSD Interval 06/14/2022 180     QT Interval 06/14/2022 496     QTC Interval 06/14/2022 491     P Axis 06/14/2022 -15     QRS Axis 06/14/2022 46     T Wave Axis 06/14/2022 65     Sodium 06/15/2022 139     Potassium 06/15/2022 4 2     Chloride 06/15/2022 109 (A)    CO2 06/15/2022 25     ANION GAP 06/15/2022 5     BUN 06/15/2022 17     Creatinine 06/15/2022 1 05     Glucose 06/15/2022 93     Calcium 06/15/2022 9 1     eGFR 06/15/2022 73     Magnesium 06/15/2022 2 1     Ventricular Rate 06/14/2022 56     Atrial Rate 06/14/2022 56     NE Interval 06/14/2022 182     QRSD Interval 06/14/2022 182     QT Interval 06/14/2022 504     QTC Interval 06/14/2022 486     P Axis 06/14/2022 -2     QRS Axis 06/14/2022 56     T Wave Axis 06/14/2022 64     Ventricular Rate 06/15/2022 59     Atrial Rate 06/15/2022 59     NE Interval 06/15/2022 186     QRSD Interval 06/15/2022 176     QT Interval 06/15/2022 500     QTC Interval 06/15/2022 495     P Axis 06/15/2022 -19     QRS Axis 06/15/2022 -10     T Wave Lee 06/15/2022 63    Hospital Outpatient Visit on 06/08/2022   Component Date Value    LA size 06/08/2022 4 1     LVPWd 06/08/2022 1 20     Left Atrium Area-systoli* 06/08/2022 15 8     Left Atrium Area-systoli* 06/08/2022 17 5     MV E' Tissue Velocity Se* 06/08/2022 7     IVSd 06/08/2022 1 20     LV DIASTOLIC VOLUME (MOD* 47/43/9997 131     LEFT VENTRICLE SYSTOLIC * 35/31/8322 62     Left ventricular stroke * 06/08/2022 69 00     EF 06/08/2022 49     A4C EF 06/08/2022 47     LA length (A2C) 06/08/2022 3 70     LVIDd 06/08/2022 5 20     IVS 06/08/2022 1 2     LVIDS 06/08/2022 3 80     FS 06/08/2022 27     Asc Ao 06/08/2022 3 4     Ao root 06/08/2022 3 80     RVID d 06/08/2022 3 8     MV valve area p 1/2 meth* 06/08/2022 2 65     E wave deceleration time 06/08/2022 285     MV Peak E Bi 06/08/2022 55     MV Peak A Bi 06/08/2022 6 88     LV Systolic Volume (BP) 56/33/2722 360     LV Diastolic Volume (BP) 72/71/3489 240     MILENA A4C 06/08/2022 17     RAA A4C 06/08/2022 13 4     MV stenosis pressure 1/2* 06/08/2022 83     LVSV, 2D 06/08/2022 69     LV EF 06/08/2022 60        Imaging: I have personally reviewed pertinent reports

## 2022-07-19 ENCOUNTER — TELEPHONE (OUTPATIENT)
Dept: OBGYN CLINIC | Facility: MEDICAL CENTER | Age: 67
End: 2022-07-19

## 2022-07-19 NOTE — TELEPHONE ENCOUNTER
Patient has seen Dr Aquilino Painting for his right hand and had injections, patient looking for an appointment for the right hand    Sent to hand pool    Patient: Price Sanchez    MRN: [id number]    : 1955    Phone:  [phone]    Location:  [facility] , Dr Aquilino Painting

## 2022-07-21 ENCOUNTER — REMOTE DEVICE CLINIC VISIT (OUTPATIENT)
Dept: CARDIOLOGY CLINIC | Facility: CLINIC | Age: 67
End: 2022-07-21
Payer: MEDICARE

## 2022-07-21 DIAGNOSIS — Z95.810 AICD (AUTOMATIC CARDIOVERTER/DEFIBRILLATOR) PRESENT: Primary | ICD-10-CM

## 2022-07-21 PROCEDURE — 93296 REM INTERROG EVL PM/IDS: CPT | Performed by: INTERNAL MEDICINE

## 2022-07-21 PROCEDURE — 93295 DEV INTERROG REMOTE 1/2/MLT: CPT | Performed by: INTERNAL MEDICINE

## 2022-07-21 NOTE — PROGRESS NOTES
Results for orders placed or performed in visit on 07/21/22   Cardiac EP device report    Narrative    MDT-DUAL CHAMBER ICD (AAI-DDD MODE) / 05856 [address] MRI CONDITIONAL  NB/ARRHYTHMIAS-CARELINK TRANSMISSION: BATTERY STATUS "10 YRS " AP 17%  100%  ALL AVAILABLE LEAD PARAMETERS WITHIN NORMAL LIMITS  NO SIGNIFICANT HIGH RATE EPISODES  OPTI-VOL WITHIN NORMAL LIMITS  NORMAL DEVICE FUNCTION   NC

## 2022-07-27 ENCOUNTER — HOSPITAL ENCOUNTER (OUTPATIENT)
Dept: RADIOLOGY | Facility: HOSPITAL | Age: 67
Discharge: HOME | End: 2022-07-27
Payer: MEDICARE

## 2022-07-27 ENCOUNTER — OFFICE VISIT (OUTPATIENT)
Dept: PAIN MEDICINE | Facility: CLINIC | Age: 67
End: 2022-07-27
Payer: MEDICARE

## 2022-07-27 VITALS
HEART RATE: 62 BPM | WEIGHT: 218 LBS | HEIGHT: 72 IN | DIASTOLIC BLOOD PRESSURE: 62 MMHG | BODY MASS INDEX: 29.53 KG/M2 | SYSTOLIC BLOOD PRESSURE: 143 MMHG

## 2022-07-27 DIAGNOSIS — G89.29 CHRONIC PAIN OF BOTH KNEES: ICD-10-CM

## 2022-07-27 DIAGNOSIS — M25.562 CHRONIC PAIN OF BOTH KNEES: ICD-10-CM

## 2022-07-27 DIAGNOSIS — Z98.1 STATUS POST CERVICAL SPINAL FUSION: ICD-10-CM

## 2022-07-27 DIAGNOSIS — M25.511 CHRONIC RIGHT SHOULDER PAIN: ICD-10-CM

## 2022-07-27 DIAGNOSIS — G89.29 CHRONIC RIGHT SHOULDER PAIN: ICD-10-CM

## 2022-07-27 DIAGNOSIS — M54.12 CERVICAL RADICULOPATHY: Primary | ICD-10-CM

## 2022-07-27 DIAGNOSIS — M25.561 CHRONIC PAIN OF BOTH KNEES: ICD-10-CM

## 2022-07-27 DIAGNOSIS — M54.2 NECK PAIN: ICD-10-CM

## 2022-07-27 PROCEDURE — 73562 X-RAY EXAM OF KNEE 3: CPT

## 2022-07-27 PROCEDURE — 99214 OFFICE O/P EST MOD 30 MIN: CPT | Performed by: NURSE PRACTITIONER

## 2022-07-27 NOTE — PROGRESS NOTES
Assessment:  1  Cervical radiculopathy    2  Neck pain    3  Status post cervical spinal fusion    4  Chronic right shoulder pain    5  Chronic pain of both knees        Plan:  1  I will order x-rays of the bilateral knees  2  Patient may continue Celebrex as prescribed as long as cleared through cardiology   Patient was counseled on not combining NSAIDs at the same time  3  Patient will continue to follow with Orthopedics regarding carpal tunnel symptoms  4  Patient will follow-up pending results of x-rays or sooner if needed    History of Present Illness: The patient is a 78 y o  male with a history of CAD, PAF status post ICD placement, Last's esophagus, peptic ulcer disease, previous cervical fusion and LENY last seen on 6/6/22 who presents for a follow up office visit in regards to chronic neck and low back pain  Patient's main complaint today is his bilateral knee pain  He states he does not really have any significant low back and neck pain after he returned to taking Celebrex 200 mg daily which provides him with 75% improvement of his pain  He stop taking Eliquis, which cardiology is aware  He has done some physical therapy with some relief  I do not have any imaging of bilateral knees to review  The patient rates his pain a 6/10 on the numeric pain rating scale  He intermittently has pain throughout the day which is described as sharp, numbness and pins and needles    I have personally reviewed and/or updated the patient's past medical history, past surgical history, family history, social history, current medications, allergies, and vital signs today  Review of Systems:    Review of Systems   Respiratory: Negative for shortness of breath  Cardiovascular: Negative for chest pain  Gastrointestinal: Negative for constipation, diarrhea, nausea and vomiting  Musculoskeletal: Negative for arthralgias, gait problem, joint swelling and myalgias  Skin: Negative for rash  Neurological: Negative for dizziness, seizures and weakness  All other systems reviewed and are negative  Past Medical History:   Diagnosis Date    Abrasion of left foot     LAST ASSESSED: 9/23/13    Achilles tendinitis, unspecified leg     LAST ASSESSED: 11/30/12    Allergic rhinitis     LAST ASSESSED: 11/25/13    Allergic rhinitis 06/25/2008    LAST ASSESSED: 6/25/08    Cervicalgia 04/22/2011    LAST ASSESSED: 4/22/11    Coronary artery disease     CPAP (continuous positive airway pressure) dependence     Dysfunction of left eustachian tube     LAST ASSESSED: 9/23/13    Epistaxis     LAST ASSESSED: 5/27/15    First degree atrioventricular block     LAST ASSESSED: 7/10/17    Gastric ulcer 10/07/2011    LAST ASSESSED: 3/9/15    GERD (gastroesophageal reflux disease)     Hemorrhoids 05/13/2011    LAST ASSESSED: 5/13/11    Long term use of drug     LAST ASSESSED: 10/11/12    Myalgia 12/21/2007    LAST ASSESSED: 12/21/07    Myositis 12/21/2007    LAST ASSESSED: 12/21/07    Numbness and tingling of foot     LAST ASSESSED: 3/9/15    Premature ventricular beats     states cardiologist ordered Zio ambulatory cardiac monitor    Sleep apnea        Past Surgical History:   Procedure Laterality Date    CARDIAC CATHETERIZATION  2017    CARDIAC ELECTROPHYSIOLOGY PROCEDURE N/A 10/25/2021    Procedure: CARDIAC EPS;  Surgeon: Conchis Johnson MD;  [facility]; Service: Cardiology    CARDIAC ELECTROPHYSIOLOGY PROCEDURE Left 10/25/2021    Procedure: Cardiac icd implant;  Surgeon: Conchis Johnson MD;  [facility]; Service: Cardiology    CARDIOVERSION N/A 10/25/2021    Procedure: Cardioversion;  Surgeon: Conchis Johnson MD;  [facility];   Service: Cardiology    CATARACT EXTRACTION Right     CERVICAL FUSION      pt thinks C4-C5    CHOLECYSTECTOMY      LAPAROSCOPIC; LAST ASSESSED: 10/17/17    COLONOSCOPY      COMPLETE; 3-4 YEARS AGO BY [facility]; LAST ASSESSED: 14    MYRINGOTOMY W/ TUBES      WITH VENTILATING TUBE INSERTION    ND REVISE MEDIAN N/CARPAL TUNNEL SURG Left 2021    Procedure: RELEASE CARPAL TUNNEL;  Surgeon: Denny Moran MD;  [facility];  Service: Orthopedics    VASECTOMY         Family History   Problem Relation Age of Onset    Cancer Mother         SOFT TISSUE CANCER ON RT SIDE CHEST WALL AND     Prostate cancer Maternal Uncle        Social History     Occupational History    Not on file   Tobacco Use    Smoking status: Former Smoker     Quit date:      Years since quittin 5    Smokeless tobacco: Never Used   Vaping Use    Vaping Use: Never used   Substance and Sexual Activity    Alcohol use:  Yes     Alcohol/week: 7 0 standard drinks     Types: 7 Glasses of wine per week     Comment: Once in a while with a meal    Drug use: No    Sexual activity: Not Currently         Current Outpatient Medications:     celecoxib (CeleBREX) 200 mg capsule, Take 1 capsule (200 mg total) by mouth daily (Patient taking differently: Take 200 mg by mouth daily as needed), Disp: 90 capsule, Rfl: 1    finasteride (PROSCAR) 5 mg tablet, Take 1 tablet (5 mg total) by mouth daily, Disp: 90 tablet, Rfl: 1    Glucosamine-Chondroitin (GLUCOSAMINE CHONDR COMPLEX PO), Take 1 tablet by mouth daily, Disp: , Rfl:     Omega-3 Fatty Acids (FISH OIL) 1000 MG CPDR, Take by mouth daily, Disp: , Rfl:     pantoprazole (PROTONIX) 40 mg tablet, Take 1 tablet (40 mg total) by mouth daily, Disp: 90 tablet, Rfl: 1    rosuvastatin (CRESTOR) 10 MG tablet, Take 1 tablet (10 mg total) by mouth daily, Disp: 90 tablet, Rfl: 1    sotalol (BETAPACE) 80 mg tablet, Take 1 tablet (80 mg total) by mouth every 12 (twelve) hours, Disp: 180 tablet, Rfl: 3    Coenzyme Q10 (COQ10) 200 MG CAPS, Take 1 tablet by mouth daily, Disp: , Rfl:     fluticasone (VERAMYST) 27 5 MCG/SPRAY nasal spray, 2 sprays into each nostril daily, Disp: , Rfl: Allergies   Allergen Reactions    Amoxicillin Anaphylaxis    Acetaminophen      Rapid heart rate    Apple - Food Allergy     Cherry Flavor - Food Allergy Other (See Comments)     States allergy is to raw cherries    Pollen Extract     Augmentin [Amoxicillin-Pot Clavulanate]     Dye [Iodinated Diagnostic Agents] Itching       Physical Exam:    /62   Pulse 62   Ht 6' (1 829 m)   Wt 98 9 kg (218 lb)   BMI 29 57 kg/m²     Constitutional:normal, well developed, well nourished, alert, in no distress and non-toxic and no overt pain behavior    Eyes:anicteric  HEENT:grossly intact  Neck:supple, symmetric, trachea midline and no masses   Pulmonary:even and unlabored  Cardiovascular:No edema or pitting edema present  Skin:Normal without rashes or lesions and well hydrated  Psychiatric:Mood and affect appropriate  Neurologic:Cranial Nerves II-XII grossly intact  Musculoskeletal:Slightly antalgic gait but steady without assistive devices      Imaging  XR knee 3 vw left non injury    (Results Pending)   XR knee 3 vw right non injury    (Results Pending)         Orders Placed This Encounter   Procedures    XR knee 3 vw left non injury    XR knee 3 vw right non injury

## 2022-08-22 ENCOUNTER — REMOTE DEVICE CLINIC VISIT (OUTPATIENT)
Dept: CARDIOLOGY CLINIC | Facility: CLINIC | Age: 67
End: 2022-08-22
Payer: MEDICARE

## 2022-08-22 DIAGNOSIS — Z95.810 PRESENCE OF IMPLANTABLE CARDIOVERTER-DEFIBRILLATOR (ICD): Primary | ICD-10-CM

## 2022-08-22 PROCEDURE — 93295 DEV INTERROG REMOTE 1/2/MLT: CPT | Performed by: INTERNAL MEDICINE

## 2022-08-22 PROCEDURE — 93296 REM INTERROG EVL PM/IDS: CPT | Performed by: INTERNAL MEDICINE

## 2022-08-22 NOTE — PROGRESS NOTES
MDT-DUAL CHAMBER ICD (AAI-DDD MODE) / ACTIVE SYSTEM IS MRI CONDITIONAL   CARELINK TRANSMISSION:  BATTERY VOLTAGE ADEQUATE (10 1 YR )   AP 29 6%  98 6% (>40%/AVB/MVP ON)   ALL LEAD PARAMETERS WITHIN NORMAL LIMITS   4 NSVT EPISODES (8 @ 167 BPM, 13 @ 176 BPM, 7 @ 171 BPM, 9 @ 155 BPM)   OPTI-VOL WITHIN NORMAL LIMITS   NORMAL DEVICE FUNCTION   RG

## 2022-09-07 ENCOUNTER — OFFICE VISIT (OUTPATIENT)
Dept: OBGYN CLINIC | Facility: CLINIC | Age: 67
End: 2022-09-07
Payer: MEDICARE

## 2022-09-07 VITALS
DIASTOLIC BLOOD PRESSURE: 79 MMHG | HEIGHT: 72 IN | HEART RATE: 83 BPM | RESPIRATION RATE: 16 BRPM | BODY MASS INDEX: 29.59 KG/M2 | WEIGHT: 218.5 LBS | SYSTOLIC BLOOD PRESSURE: 125 MMHG

## 2022-09-07 DIAGNOSIS — M65.331 TRIGGER FINGER, RIGHT MIDDLE FINGER: ICD-10-CM

## 2022-09-07 DIAGNOSIS — G56.01 CARPAL TUNNEL SYNDROME ON RIGHT: Primary | ICD-10-CM

## 2022-09-07 DIAGNOSIS — M65.341 TRIGGER FINGER, RIGHT RING FINGER: ICD-10-CM

## 2022-09-07 PROCEDURE — 99215 OFFICE O/P EST HI 40 MIN: CPT | Performed by: SURGERY

## 2022-09-07 RX ORDER — CHLORHEXIDINE GLUCONATE 0.12 MG/ML
15 RINSE ORAL ONCE
Status: CANCELLED | OUTPATIENT
Start: 2022-09-07 | End: 2022-09-07

## 2022-09-07 NOTE — PROGRESS NOTES
ASSESSMENT/PLAN:      78 y o  male with right carpal tunnel syndrome, right ring trigger finger and a right long trigger finger  Right carpal tunnel release, right long and right ring trigger finger release was discussed at length including risks and benefits  Risks of surgery consist of but not limited to bleeding, infection, stiffness, pain, injury to nerves and surrounding structures, pillar pain, incomplete resolution of paraesthesia's, etc  Landen Rai elected to proceed with surgical intervention and informed surgical consent was signed  Post operative instructions/expectations were reviewed and provided in AVS  Lab work and EKG will be ordered prior to surgical intervention  He was advised to work on full PIP extension of the right long finger as a trigger finger release will not resolve the slight PIP flexion contracture  Pillar pain can last 4-8 months following a carpal tunnel release, advised the best way to combat this is will scar massage post operatively  Follow up in the office 10-14 days after surgery for suture removal        The patient verbalized understanding of exam findings and treatment plan  We engaged in the shared decision-making process and treatment options were discussed at length with the patient  Surgical and conservative management discussed today along with risks and benefits  Diagnoses and all orders for this visit:    Carpal tunnel syndrome on right    Trigger finger, right middle finger    Trigger finger, right ring finger    Other orders  -     COLLAGEN-CHOND-HYALURONIC ACID PO; Take 1 capsule by mouth in the morning      Open Carpal Tunnel Release: The anatomy and physiology of carpal tunnel syndrome was discussed with the patient today  Increase pressure localized under the transverse carpal ligament can cause pain, numbness, tingling, or dysesthesias within the median nerve distribution as well as feelings of fatigue, clumsiness, or awkwardness    These symptoms typically occur at night and worse in the morning upon waking  Eventually, untreated carpal tunnel syndrome can result in weakness and permanent loss of muscle within the thenar compartment of the hand  Treatment options were discussed with the patient  Conservative treatment includes nocturnal resting splints to keep the nerve in a neutral position, ergonomic changes within the work or home environment, activity modification, and tendon gliding exercises  Vitamin B6 one tablet daily over the counter may helpful to reduce symptoms  Steroid injections within the carpal canal can help a majority of patients, however this is often self-limited in a majority of patients  Surgical intervention to divide the transverse carpal ligament typically results in a long-lasting relief of the patient's complaints, with the recurrence rate of less than 1%  The patient has elected to undergo an open carpal tunnel release  The palmar incision technique was discussed in the office with the patient today  In the postoperative period, light activities are allowed immediately, driving is allowed when narcotic medication has stopped, and the bandages may be removed and incision may get wet after 2 days  Heavy activities (lifting more than approximately 10 pounds) will be allowed after follow up appointment in 1-2 weeks  While night symptoms (waking from sleep, pain, and discomfort in the hands) generally improves rapidly, the numbness and tingling as well as the strength will slowly improve over weeks to months depending on the chronicity and severity of the carpal tunnel syndrome  Pillar pain and scar discomfort were discussed with the patient which are self-limiting conditions  The risks of bleeding and infection from the surgery are less than 1%  Risk of recurrence is approximately 0 5%  The risks of nerve injury or nerve damage or damage to the blood vessels is approximately 1 in 1200   The patient has an understanding of the above mentioned discussion  The risks and benefits of the procedure were explained to the patient, which include, but are not limited to: Bleeding, infection, recurrence, pain, scar, damage to tendons, damage to nerves, and damage to blood vessels, failure to give desired results and complications related to anesthesia  These risks, along with alternative conservative treatment options, and postoperative protocols were voiced back and understood by the patient  All questions were answered to the patient's satisfaction  The patient agrees to comply with a standard postoperative protocol, and is willing to proceed  Education was provided via written and auditory forms  There were no barriers to learning  Written handouts regarding wound care, incision and scar care, and general preoperative information was provided to the patient  Prior to surgery, the patient may be requested to stop all anti-inflammatory medications  Prophylactic aspirin, Plavix, and Coumadin may be allowed to be continued  Medications including vitamin E , ginkgo, and fish oil are requested to be stopped approximately one week prior to surgery  Trigger Finger Release: The anatomy and physiology of trigger finger was discussed with the patient today in the office  Edema and increased contact pressure within the flexor tendons at the A1 pulley can cause pain, crepitation, and limitation of function  Treatment options include resting MP blocking splints to decrease edema, oral anti-inflammatory medications, home or formal therapy exercises, up to 2 steroid injections or surgical release  While majority of patients do respond to conservative treatment, up to 20% may require surgical release  The patient has elected release of the trigger finger  The patient has elected to undergo a release of the A1 pulley (trigger finger)    A small incision will be made over the palmar aspect of the hand, the tendon sheath holding the flexor tendons will be released  In the postoperative period, light activities are allowed immediately, driving is allowed when narcotic medication has stopped, and the incision may get wet after 2 days  Heavy activities (lifting more than approximately 10 pounds) will be allowed after the follow up appointment in 1-2 weeks  While the pain and discomfort within the wrist typically improves rapidly, some residual discomfort may be present for up to 6 weeks  The nodule that is typically palpable in the palmar aspect of the hand will not be removed, as this would necessitate removal of a portion of the flexor tendon, however the catching, clicking, and locking should resolve  Approximate success rate is 98%  The risks and benefits of the procedure were explained to the patient, which include, but are not limited to: Bleeding, infection, recurrence, pain, scar, damage to tendons, damage to nerves, and damage to blood vessels, need for future surgery and complications related to anesthesia  If bony work is done, risks also include malunion and nonunion  These risks, along with alternative conservative treatment options, and postoperative protocols were voiced back and understood by the patient  All questions were answered to the patient's satisfaction  The patient agrees to comply with a standard postoperative protocol, and is willing to proceed  Education was provided via written and auditory forms  There were no barriers to learning  Written handouts regarding wound care, incision and scar care, and general preoperative information, as well as risks and benefits were provided to the patient  Follow Up:  Return for 10-14 days , post op        To Do Next Visit:  Re-evaluation of current issue and Sutures out    ____________________________________________________________________________________________________________________________________________      CHIEF COMPLAINT:  Chief Complaint   Patient presents with    Right Hand - Follow-up, Numbness, Carpal Tunnel     Right middle TF #2 12/10/20    Right Ring Finger - Clicking, Follow-up, Locking, Pain    Right Middle Finger - Clicking, Locking, Pain       SUBJECTIVE:  Candida Mckay is a 78y o  year old RHD male who presents to the office today for a follow up regarding right carpal tunnel syndrome, right ring and right long trigger fingers  He underwent 2 trigger finger CSI's in the past for the right long and right ring trigger fingers, with some improvement in his symptoms for a while  He notes his right ring finger actively locks on him, to the point of having to use his other hand to unlock the finger  His right long finger is clicking and catching, but is not locking at this time  He notes constant numbness/tingling to his right radial 3 digits  Denies numbness/tingling to his right ring or small fingers  Numbness/tingling will worsen at night when he is laying on his right side  He is not currently bracing at night as he feels the wrist braces was too small for him  I have personally reviewed all the relevant PMH, PSH, SH, FH, Medications and allergies       PAST MEDICAL HISTORY:  Past Medical History:   Diagnosis Date    Abrasion of left foot     LAST ASSESSED: 9/23/13    Achilles tendinitis, unspecified leg     LAST ASSESSED: 11/30/12    Allergic rhinitis     LAST ASSESSED: 11/25/13    Allergic rhinitis 06/25/2008    LAST ASSESSED: 6/25/08    Cervicalgia 04/22/2011    LAST ASSESSED: 4/22/11    Coronary artery disease     CPAP (continuous positive airway pressure) dependence     Dysfunction of left eustachian tube     LAST ASSESSED: 9/23/13    Epistaxis     LAST ASSESSED: 5/27/15    First degree atrioventricular block     LAST ASSESSED: 7/10/17    Gastric ulcer 10/07/2011    LAST ASSESSED: 3/9/15    GERD (gastroesophageal reflux disease)     Hemorrhoids 05/13/2011    LAST ASSESSED: 5/13/11    Long term use of drug     LAST ASSESSED: 10/11/12    Myalgia 2007    LAST ASSESSED: 07    Myositis 2007    LAST ASSESSED: 07    Numbness and tingling of foot     LAST ASSESSED: 3/9/15    Pacemaker     Premature ventricular beats     states cardiologist ordered Zio ambulatory cardiac monitor    Sleep apnea        PAST SURGICAL HISTORY:  Past Surgical History:   Procedure Laterality Date    CARDIAC CATHETERIZATION      CARDIAC ELECTROPHYSIOLOGY PROCEDURE N/A 10/25/2021    Procedure: CARDIAC EPS;  Surgeon: Baudilio Frye MD;  [facility]; Service: Cardiology    CARDIAC ELECTROPHYSIOLOGY PROCEDURE Left 10/25/2021    Procedure: Cardiac icd implant;  Surgeon: Baudilio Frye MD;  [facility]; Service: Cardiology    CARDIAC PACEMAKER PLACEMENT      CARDIOVERSION N/A 10/25/2021    Procedure: Cardioversion;  Surgeon: Baudilio Frye MD;  [facility]; Service: Cardiology    CATARACT EXTRACTION Right     CERVICAL FUSION      pt thinks C4-C5    CHOLECYSTECTOMY      LAPAROSCOPIC; LAST ASSESSED: 10/17/17    COLONOSCOPY      COMPLETE; 3-4 YEARS AGO BY [facility]; LAST ASSESSED: 14    MYRINGOTOMY W/ TUBES      WITH VENTILATING TUBE INSERTION    CO REVISE MEDIAN N/CARPAL TUNNEL SURG Left 2021    Procedure: RELEASE CARPAL TUNNEL;  Surgeon: Cesar Varma MD;  [facility];  Service: Orthopedics    VASECTOMY         FAMILY HISTORY:  Family History   Problem Relation Age of Onset    Cancer Mother         SOFT TISSUE CANCER ON RT SIDE CHEST WALL AND     Prostate cancer Maternal Uncle        SOCIAL HISTORY:  Social History     Tobacco Use    Smoking status: Former Smoker     Quit date:      Years since quittin 7    Smokeless tobacco: Never Used   Vaping Use    Vaping Use: Never used   Substance Use Topics    Alcohol use:  Yes     Alcohol/week: 7 0 standard drinks     Types: 7 Glasses of wine per week     Comment: Once in a while with a meal    Drug use: No       MEDICATIONS:    Current Outpatient Medications:     celecoxib (CeleBREX) 200 mg capsule, Take 1 capsule (200 mg total) by mouth daily (Patient taking differently: Take 200 mg by mouth daily as needed), Disp: 90 capsule, Rfl: 1    Coenzyme Q10 (COQ10) 200 MG CAPS, Take 1 tablet by mouth daily, Disp: , Rfl:     COLLAGEN-CHOND-HYALURONIC ACID PO, Take 1 capsule by mouth in the morning, Disp: , Rfl:     finasteride (PROSCAR) 5 mg tablet, Take 1 tablet (5 mg total) by mouth daily, Disp: 90 tablet, Rfl: 1    fluticasone (VERAMYST) 27 5 MCG/SPRAY nasal spray, 2 sprays into each nostril daily, Disp: , Rfl:     Glucosamine-Chondroitin (GLUCOSAMINE CHONDR COMPLEX PO), Take 1 tablet by mouth daily, Disp: , Rfl:     Omega-3 Fatty Acids (FISH OIL) 1000 MG CPDR, Take by mouth daily, Disp: , Rfl:     pantoprazole (PROTONIX) 40 mg tablet, Take 1 tablet (40 mg total) by mouth daily, Disp: 90 tablet, Rfl: 1    rosuvastatin (CRESTOR) 10 MG tablet, Take 1 tablet (10 mg total) by mouth daily, Disp: 90 tablet, Rfl: 1    sotalol (BETAPACE) 80 mg tablet, Take 1 tablet (80 mg total) by mouth every 12 (twelve) hours, Disp: 180 tablet, Rfl: 3    ALLERGIES:  Allergies   Allergen Reactions    Amoxicillin Anaphylaxis    Acetaminophen      Rapid heart rate    Apple - Food Allergy     Cherry Flavor - Food Allergy Other (See Comments)     States allergy is to raw cherries    Pollen Extract     Augmentin [Amoxicillin-Pot Clavulanate]     Dye [Iodinated Diagnostic Agents] Itching       REVIEW OF SYSTEMS:  Review of Systems   Constitutional: Negative for chills, fever and unexpected weight change  HENT: Negative for hearing loss, nosebleeds and sore throat  Eyes: Negative for pain, redness and visual disturbance  Respiratory: Negative for cough, shortness of breath and wheezing  Cardiovascular: Negative for chest pain, palpitations and leg swelling     Gastrointestinal: Negative for abdominal pain, nausea and vomiting  Endocrine: Negative for polydipsia and polyuria  Genitourinary: Negative for difficulty urinating and hematuria  Musculoskeletal: Negative for arthralgias, joint swelling and myalgias  Skin: Negative for rash and wound  Neurological: Positive for numbness  Negative for dizziness and headaches  Psychiatric/Behavioral: Negative for decreased concentration, dysphoric mood and suicidal ideas  The patient is not nervous/anxious  VITALS:  Vitals:    09/07/22 0915   BP: 125/79   Pulse: 83   Resp: 16       LABS:  HgA1c:   Lab Results   Component Value Date    HGBA1C 5 4 04/06/2022     BMP:   Lab Results   Component Value Date    CALCIUM 9 1 06/15/2022     09/29/2017    K 4 2 06/15/2022    CO2 25 06/15/2022     (H) 06/15/2022    BUN 17 06/15/2022    CREATININE 1 05 06/15/2022       _____________________________________________________  PHYSICAL EXAMINATION:  General: well developed and well nourished, alert, oriented times 3 and appears comfortable  Psychiatric: Normal  HEENT: Normocephalic, Atraumatic Trachea Midline, No torticollis  Pulmonary: No audible wheezing or respiratory distress   Cardiovascular: No pitting edema, 2+ radial pulse   Abdominal/GI: abdomen non tender, non distended   Skin: No masses, erythema, lacerations, fluctation, ulcerations  Neurovascular: Sensation intact to the Ulnar Nerve, Sensation Intact to the Radial Nerve, Decreased Sensation to  the Median Nerve, Motor Intact to the Median, Ulnar, Radial Nerve and Pulses Intact  Musculoskeletal: Normal, except as noted in detailed exam and in HPI  MUSCULOSKELETAL EXAMINATION:    Right Carpal Tunnel Exam:  Negative thenar atrophy  Positive phalen's test  Positive carpal tunnel compression  Negative tinels over median nerve at the wrist   Opposition strength 5/5  Abduction strength 5/5       right long finger:  Positive palpable nodule over the A1 pulley  Positive tenderness to palpation over A1 pulley  Negative catching  Positive clicking  Slight PIP flexion contracture  right ring finger:  Positive palpable nodule over the A1 pulley  Positive tenderness to palpation over A1 pulley  Positive catching  Positive clicking           ___________________________________________________  STUDIES REVIEWED:  No new imaging to review           PROCEDURES PERFORMED:  Procedures  No Procedures performed today    _____________________________________________________      Vashti Limb    I,:  Law Herring am acting as a scribe while in the presence of the attending physician :       I,:  Zoila Wilkes MD personally performed the services described in this documentation    as scribed in my presence :

## 2022-09-07 NOTE — H&P
ASSESSMENT/PLAN:      78 y o  male with right carpal tunnel syndrome, right ring trigger finger and a right long trigger finger  Right carpal tunnel release, right long and right ring trigger finger release was discussed at length including risks and benefits  Risks of surgery consist of but not limited to bleeding, infection, stiffness, pain, injury to nerves and surrounding structures, pillar pain, incomplete resolution of paraesthesia's, etc  Reese Crane elected to proceed with surgical intervention and informed surgical consent was signed  Post operative instructions/expectations were reviewed and provided in AVS  Lab work and EKG will be ordered prior to surgical intervention  He was advised to work on full PIP extension of the right long finger as a trigger finger release will not resolve the slight PIP flexion contracture  Pillar pain can last 4-8 months following a carpal tunnel release, advised the best way to combat this is will scar massage post operatively  Follow up in the office 10-14 days after surgery for suture removal        The patient verbalized understanding of exam findings and treatment plan  We engaged in the shared decision-making process and treatment options were discussed at length with the patient  Surgical and conservative management discussed today along with risks and benefits  Diagnoses and all orders for this visit:    Carpal tunnel syndrome on right    Trigger finger, right middle finger    Trigger finger, right ring finger    Other orders  -     COLLAGEN-CHOND-HYALURONIC ACID PO; Take 1 capsule by mouth in the morning      Open Carpal Tunnel Release: The anatomy and physiology of carpal tunnel syndrome was discussed with the patient today  Increase pressure localized under the transverse carpal ligament can cause pain, numbness, tingling, or dysesthesias within the median nerve distribution as well as feelings of fatigue, clumsiness, or awkwardness    These symptoms typically occur at night and worse in the morning upon waking  Eventually, untreated carpal tunnel syndrome can result in weakness and permanent loss of muscle within the thenar compartment of the hand  Treatment options were discussed with the patient  Conservative treatment includes nocturnal resting splints to keep the nerve in a neutral position, ergonomic changes within the work or home environment, activity modification, and tendon gliding exercises  Vitamin B6 one tablet daily over the counter may helpful to reduce symptoms  Steroid injections within the carpal canal can help a majority of patients, however this is often self-limited in a majority of patients  Surgical intervention to divide the transverse carpal ligament typically results in a long-lasting relief of the patient's complaints, with the recurrence rate of less than 1%  The patient has elected to undergo an open carpal tunnel release  The palmar incision technique was discussed in the office with the patient today  In the postoperative period, light activities are allowed immediately, driving is allowed when narcotic medication has stopped, and the bandages may be removed and incision may get wet after 2 days  Heavy activities (lifting more than approximately 10 pounds) will be allowed after follow up appointment in 1-2 weeks  While night symptoms (waking from sleep, pain, and discomfort in the hands) generally improves rapidly, the numbness and tingling as well as the strength will slowly improve over weeks to months depending on the chronicity and severity of the carpal tunnel syndrome  Pillar pain and scar discomfort were discussed with the patient which are self-limiting conditions  The risks of bleeding and infection from the surgery are less than 1%  Risk of recurrence is approximately 0 5%  The risks of nerve injury or nerve damage or damage to the blood vessels is approximately 1 in 1200   The patient has an understanding of the above mentioned discussion  The risks and benefits of the procedure were explained to the patient, which include, but are not limited to: Bleeding, infection, recurrence, pain, scar, damage to tendons, damage to nerves, and damage to blood vessels, failure to give desired results and complications related to anesthesia  These risks, along with alternative conservative treatment options, and postoperative protocols were voiced back and understood by the patient  All questions were answered to the patient's satisfaction  The patient agrees to comply with a standard postoperative protocol, and is willing to proceed  Education was provided via written and auditory forms  There were no barriers to learning  Written handouts regarding wound care, incision and scar care, and general preoperative information was provided to the patient  Prior to surgery, the patient may be requested to stop all anti-inflammatory medications  Prophylactic aspirin, Plavix, and Coumadin may be allowed to be continued  Medications including vitamin E , ginkgo, and fish oil are requested to be stopped approximately one week prior to surgery  Trigger Finger Release: The anatomy and physiology of trigger finger was discussed with the patient today in the office  Edema and increased contact pressure within the flexor tendons at the A1 pulley can cause pain, crepitation, and limitation of function  Treatment options include resting MP blocking splints to decrease edema, oral anti-inflammatory medications, home or formal therapy exercises, up to 2 steroid injections or surgical release  While majority of patients do respond to conservative treatment, up to 20% may require surgical release  The patient has elected release of the trigger finger  The patient has elected to undergo a release of the A1 pulley (trigger finger)    A small incision will be made over the palmar aspect of the hand, the tendon sheath holding the flexor tendons will be released  In the postoperative period, light activities are allowed immediately, driving is allowed when narcotic medication has stopped, and the incision may get wet after 2 days  Heavy activities (lifting more than approximately 10 pounds) will be allowed after the follow up appointment in 1-2 weeks  While the pain and discomfort within the wrist typically improves rapidly, some residual discomfort may be present for up to 6 weeks  The nodule that is typically palpable in the palmar aspect of the hand will not be removed, as this would necessitate removal of a portion of the flexor tendon, however the catching, clicking, and locking should resolve  Approximate success rate is 98%  The risks and benefits of the procedure were explained to the patient, which include, but are not limited to: Bleeding, infection, recurrence, pain, scar, damage to tendons, damage to nerves, and damage to blood vessels, need for future surgery and complications related to anesthesia  If bony work is done, risks also include malunion and nonunion  These risks, along with alternative conservative treatment options, and postoperative protocols were voiced back and understood by the patient  All questions were answered to the patient's satisfaction  The patient agrees to comply with a standard postoperative protocol, and is willing to proceed  Education was provided via written and auditory forms  There were no barriers to learning  Written handouts regarding wound care, incision and scar care, and general preoperative information, as well as risks and benefits were provided to the patient  Follow Up:  Return for 10-14 days , post op        To Do Next Visit:  Re-evaluation of current issue and Sutures out    ____________________________________________________________________________________________________________________________________________      CHIEF COMPLAINT:  Chief Complaint   Patient presents with    Right Hand - Follow-up, Numbness, Carpal Tunnel     Right middle TF #2 12/10/20    Right Ring Finger - Clicking, Follow-up, Locking, Pain    Right Middle Finger - Clicking, Locking, Pain       SUBJECTIVE:  Rigoberto Carter is a 78y o  year old RHD male who presents to the office today for a follow up regarding right carpal tunnel syndrome, right ring and right long trigger fingers  He underwent 2 trigger finger CSI's in the past for the right long and right ring trigger fingers, with some improvement in his symptoms for a while  He notes his right ring finger actively locks on him, to the point of having to use his other hand to unlock the finger  His right long finger is clicking and catching, but is not locking at this time  He notes constant numbness/tingling to his right radial 3 digits  Denies numbness/tingling to his right ring or small fingers  Numbness/tingling will worsen at night when he is laying on his right side  He is not currently bracing at night as he feels the wrist braces was too small for him  I have personally reviewed all the relevant PMH, PSH, SH, FH, Medications and allergies       PAST MEDICAL HISTORY:  Past Medical History:   Diagnosis Date    Abrasion of left foot     LAST ASSESSED: 9/23/13    Achilles tendinitis, unspecified leg     LAST ASSESSED: 11/30/12    Allergic rhinitis     LAST ASSESSED: 11/25/13    Allergic rhinitis 06/25/2008    LAST ASSESSED: 6/25/08    Cervicalgia 04/22/2011    LAST ASSESSED: 4/22/11    Coronary artery disease     CPAP (continuous positive airway pressure) dependence     Dysfunction of left eustachian tube     LAST ASSESSED: 9/23/13    Epistaxis     LAST ASSESSED: 5/27/15    First degree atrioventricular block     LAST ASSESSED: 7/10/17    Gastric ulcer 10/07/2011    LAST ASSESSED: 3/9/15    GERD (gastroesophageal reflux disease)     Hemorrhoids 05/13/2011    LAST ASSESSED: 5/13/11    Long term use of drug     LAST ASSESSED: 10/11/12    Myalgia 2007    LAST ASSESSED: 07    Myositis 2007    LAST ASSESSED: 07    Numbness and tingling of foot     LAST ASSESSED: 3/9/15    Pacemaker     Premature ventricular beats     states cardiologist ordered Zio ambulatory cardiac monitor    Sleep apnea        PAST SURGICAL HISTORY:  Past Surgical History:   Procedure Laterality Date    CARDIAC CATHETERIZATION      CARDIAC ELECTROPHYSIOLOGY PROCEDURE N/A 10/25/2021    Procedure: CARDIAC EPS;  Surgeon: Severiano Santee, MD;  [facility]: [facility]; Service: Cardiology    CARDIAC ELECTROPHYSIOLOGY PROCEDURE Left 10/25/2021    Procedure: Cardiac icd implant;  Surgeon: Severiano Santee, MD;  [facility]: [facility]; Service: Cardiology    CARDIAC PACEMAKER PLACEMENT      CARDIOVERSION N/A 10/25/2021    Procedure: Cardioversion;  Surgeon: Severiano Santee, MD;  [facility]: [facility]; Service: Cardiology    CATARACT EXTRACTION Right     CERVICAL FUSION      pt thinks C4-C5    CHOLECYSTECTOMY      LAPAROSCOPIC; LAST ASSESSED: 10/17/17    COLONOSCOPY      COMPLETE; 3-4 YEARS AGO BY [facility]; LAST ASSESSED: 14    MYRINGOTOMY W/ TUBES      WITH VENTILATING TUBE INSERTION    KS REVISE MEDIAN N/CARPAL TUNNEL SURG Left 2021    Procedure: RELEASE CARPAL TUNNEL;  Surgeon: Beau Bowers MD;  Location: [facility];  Service: Orthopedics    VASECTOMY         FAMILY HISTORY:  Family History   Problem Relation Age of Onset    Cancer Mother         SOFT TISSUE CANCER ON RT SIDE CHEST WALL AND     Prostate cancer Maternal Uncle        SOCIAL HISTORY:  Social History     Tobacco Use    Smoking status: Former Smoker     Quit date:      Years since quittin 7    Smokeless tobacco: Never Used   Vaping Use    Vaping Use: Never used   Substance Use Topics    Alcohol use:  Yes     Alcohol/week: 7 0 standard drinks     Types: 7 Glasses of wine per week     Comment: Once in a while with a meal    Drug use: No       MEDICATIONS:    Current Outpatient Medications:     celecoxib (CeleBREX) 200 mg capsule, Take 1 capsule (200 mg total) by mouth daily (Patient taking differently: Take 200 mg by mouth daily as needed), Disp: 90 capsule, Rfl: 1    Coenzyme Q10 (COQ10) 200 MG CAPS, Take 1 tablet by mouth daily, Disp: , Rfl:     COLLAGEN-CHOND-HYALURONIC ACID PO, Take 1 capsule by mouth in the morning, Disp: , Rfl:     finasteride (PROSCAR) 5 mg tablet, Take 1 tablet (5 mg total) by mouth daily, Disp: 90 tablet, Rfl: 1    fluticasone (VERAMYST) 27 5 MCG/SPRAY nasal spray, 2 sprays into each nostril daily, Disp: , Rfl:     Glucosamine-Chondroitin (GLUCOSAMINE CHONDR COMPLEX PO), Take 1 tablet by mouth daily, Disp: , Rfl:     Omega-3 Fatty Acids (FISH OIL) 1000 MG CPDR, Take by mouth daily, Disp: , Rfl:     pantoprazole (PROTONIX) 40 mg tablet, Take 1 tablet (40 mg total) by mouth daily, Disp: 90 tablet, Rfl: 1    rosuvastatin (CRESTOR) 10 MG tablet, Take 1 tablet (10 mg total) by mouth daily, Disp: 90 tablet, Rfl: 1    sotalol (BETAPACE) 80 mg tablet, Take 1 tablet (80 mg total) by mouth every 12 (twelve) hours, Disp: 180 tablet, Rfl: 3    ALLERGIES:  Allergies   Allergen Reactions    Amoxicillin Anaphylaxis    Acetaminophen      Rapid heart rate    Apple - Food Allergy     Cherry Flavor - Food Allergy Other (See Comments)     States allergy is to raw cherries    Pollen Extract     Augmentin [Amoxicillin-Pot Clavulanate]     Dye [Iodinated Diagnostic Agents] Itching       REVIEW OF SYSTEMS:  Review of Systems   Constitutional: Negative for chills, fever and unexpected weight change  HENT: Negative for hearing loss, nosebleeds and sore throat  Eyes: Negative for pain, redness and visual disturbance  Respiratory: Negative for cough, shortness of breath and wheezing  Cardiovascular: Negative for chest pain, palpitations and leg swelling     Gastrointestinal: Negative for abdominal pain, nausea and vomiting  Endocrine: Negative for polydipsia and polyuria  Genitourinary: Negative for difficulty urinating and hematuria  Musculoskeletal: Negative for arthralgias, joint swelling and myalgias  Skin: Negative for rash and wound  Neurological: Positive for numbness  Negative for dizziness and headaches  Psychiatric/Behavioral: Negative for decreased concentration, dysphoric mood and suicidal ideas  The patient is not nervous/anxious  VITALS:  Vitals:    09/07/22 0915   BP: 125/79   Pulse: 83   Resp: 16       LABS:  HgA1c:   Lab Results   Component Value Date    HGBA1C 5 4 04/06/2022     BMP:   Lab Results   Component Value Date    CALCIUM 9 1 06/15/2022     09/29/2017    K 4 2 06/15/2022    CO2 25 06/15/2022     (H) 06/15/2022    BUN 17 06/15/2022    CREATININE 1 05 06/15/2022       _____________________________________________________  PHYSICAL EXAMINATION:  General: well developed and well nourished, alert, oriented times 3 and appears comfortable  Psychiatric: Normal  HEENT: Normocephalic, Atraumatic Trachea Midline, No torticollis  Pulmonary: No audible wheezing or respiratory distress   Cardiovascular: No pitting edema, 2+ radial pulse   Abdominal/GI: abdomen non tender, non distended   Skin: No masses, erythema, lacerations, fluctation, ulcerations  Neurovascular: Sensation intact to the Ulnar Nerve, Sensation Intact to the Radial Nerve, Decreased Sensation to  the Median Nerve, Motor Intact to the Median, Ulnar, Radial Nerve and Pulses Intact  Musculoskeletal: Normal, except as noted in detailed exam and in HPI  MUSCULOSKELETAL EXAMINATION:    Right Carpal Tunnel Exam:  Negative thenar atrophy  Positive phalen's test  Positive carpal tunnel compression  Negative tinels over median nerve at the wrist   Opposition strength 5/5  Abduction strength 5/5       right long finger:  Positive palpable nodule over the A1 pulley  Positive tenderness to palpation over A1 pulley  Negative catching  Positive clicking  Slight PIP flexion contracture  right ring finger:  Positive palpable nodule over the A1 pulley  Positive tenderness to palpation over A1 pulley  Positive catching  Positive clicking           ___________________________________________________  STUDIES REVIEWED:  No new imaging to review           PROCEDURES PERFORMED:  Procedures  No Procedures performed today    _____________________________________________________      Sonna Asper    I,:  Carolyn Herring am acting as a scribe while in the presence of the attending physician :       I,:  Sammy Frederick MD personally performed the services described in this documentation    as scribed in my presence :

## 2022-09-13 ENCOUNTER — PATIENT OUTREACH (OUTPATIENT)
Dept: FAMILY MEDICINE CLINIC | Facility: CLINIC | Age: 67
End: 2022-09-13

## 2022-09-21 ENCOUNTER — REMOTE DEVICE CLINIC VISIT (OUTPATIENT)
Dept: CARDIOLOGY CLINIC | Facility: CLINIC | Age: 67
End: 2022-09-21

## 2022-09-21 DIAGNOSIS — Z95.810 PRESENCE OF AUTOMATIC CARDIOVERTER/DEFIBRILLATOR (AICD): Primary | ICD-10-CM

## 2022-09-21 PROCEDURE — RECHECK: Performed by: INTERNAL MEDICINE

## 2022-09-21 NOTE — PROGRESS NOTES
Results for orders placed or performed in visit on 09/21/22   Cardiac EP device report    Narrative    MDT-DUAL CHAMBER ICD (AAI-DDD MODE) / ACTIVE SYSTEM IS MRI CONDITIONAL  CARELINK TRANSMISSION: MONTHLY RMT PER [facility]---BATTERY VOLTAGE ADEQUATE  (10 YRS) AP 37%  98%  ALL AVAILABLE LEAD PARAMETERS WITHIN NORMAL LIMITS  NO SIGNIFICANT HIGH RATE EPISODES  OPTI-VOL WITHIN NORMAL LIMITS  NORMAL DEVICE FUNCTION  ---ESPOSITO

## 2022-09-27 DIAGNOSIS — I48.0 PAROXYSMAL ATRIAL FIBRILLATION (HCC): ICD-10-CM

## 2022-09-27 DIAGNOSIS — I49.3 PREMATURE VENTRICULAR BEATS: ICD-10-CM

## 2022-09-27 DIAGNOSIS — I47.20 VENTRICULAR TACHYCARDIA: ICD-10-CM

## 2022-09-27 RX ORDER — SOTALOL HYDROCHLORIDE 80 MG/1
TABLET ORAL
Qty: 180 TABLET | Refills: 3 | Status: SHIPPED | OUTPATIENT
Start: 2022-09-27

## 2022-10-04 ENCOUNTER — OFFICE VISIT (OUTPATIENT)
Dept: LAB | Facility: CLINIC | Age: 67
End: 2022-10-04
Payer: MEDICARE

## 2022-10-04 ENCOUNTER — APPOINTMENT (OUTPATIENT)
Dept: LAB | Facility: CLINIC | Age: 67
End: 2022-10-04
Payer: MEDICARE

## 2022-10-04 ENCOUNTER — TELEPHONE (OUTPATIENT)
Dept: CARDIOLOGY CLINIC | Facility: CLINIC | Age: 67
End: 2022-10-04

## 2022-10-04 DIAGNOSIS — E78.2 MIXED HYPERLIPIDEMIA: ICD-10-CM

## 2022-10-04 DIAGNOSIS — G56.01 CARPAL TUNNEL SYNDROME ON RIGHT: ICD-10-CM

## 2022-10-04 DIAGNOSIS — M65.341 TRIGGER FINGER, RIGHT RING FINGER: ICD-10-CM

## 2022-10-04 DIAGNOSIS — R73.01 IMPAIRED FASTING GLUCOSE: ICD-10-CM

## 2022-10-04 DIAGNOSIS — I25.10 CORONARY ARTERY DISEASE INVOLVING NATIVE CORONARY ARTERY OF NATIVE HEART WITHOUT ANGINA PECTORIS: ICD-10-CM

## 2022-10-04 DIAGNOSIS — I47.29 NSVT (NONSUSTAINED VENTRICULAR TACHYCARDIA): ICD-10-CM

## 2022-10-04 DIAGNOSIS — I49.3 PREMATURE VENTRICULAR BEATS: ICD-10-CM

## 2022-10-04 DIAGNOSIS — M65.331 TRIGGER FINGER, RIGHT MIDDLE FINGER: ICD-10-CM

## 2022-10-04 DIAGNOSIS — R00.2 INTERMITTENT PALPITATIONS: ICD-10-CM

## 2022-10-04 DIAGNOSIS — I44.2 INTERMITTENT COMPLETE HEART BLOCK (HCC): ICD-10-CM

## 2022-10-04 DIAGNOSIS — I49.5 SICK SINUS SYNDROME (HCC): ICD-10-CM

## 2022-10-04 LAB
ALBUMIN SERPL BCP-MCNC: 4.3 G/DL (ref 3.5–5)
ALP SERPL-CCNC: 60 U/L (ref 34–104)
ALT SERPL W P-5'-P-CCNC: 35 U/L (ref 7–52)
ANION GAP SERPL CALCULATED.3IONS-SCNC: 6 MMOL/L (ref 4–13)
AST SERPL W P-5'-P-CCNC: 26 U/L (ref 13–39)
ATRIAL RATE: 56 BPM
BASOPHILS # BLD AUTO: 0.06 THOUSANDS/ΜL (ref 0–0.1)
BASOPHILS NFR BLD AUTO: 1 % (ref 0–1)
BILIRUB SERPL-MCNC: 1.71 MG/DL (ref 0.2–1)
BUN SERPL-MCNC: 21 MG/DL (ref 5–25)
CALCIUM SERPL-MCNC: 9.6 MG/DL (ref 8.4–10.2)
CHLORIDE SERPL-SCNC: 105 MMOL/L (ref 96–108)
CHOLEST SERPL-MCNC: 128 MG/DL
CO2 SERPL-SCNC: 28 MMOL/L (ref 21–32)
CREAT SERPL-MCNC: 0.92 MG/DL (ref 0.6–1.3)
EOSINOPHIL # BLD AUTO: 0.08 THOUSAND/ΜL (ref 0–0.61)
EOSINOPHIL NFR BLD AUTO: 2 % (ref 0–6)
ERYTHROCYTE [DISTWIDTH] IN BLOOD BY AUTOMATED COUNT: 13.2 % (ref 11.6–15.1)
EST. AVERAGE GLUCOSE BLD GHB EST-MCNC: 111 MG/DL
GFR SERPL CREATININE-BSD FRML MDRD: 85 ML/MIN/1.73SQ M
GLUCOSE P FAST SERPL-MCNC: 102 MG/DL (ref 65–99)
HBA1C MFR BLD: 5.5 %
HCT VFR BLD AUTO: 48.4 % (ref 36.5–49.3)
HDLC SERPL-MCNC: 36 MG/DL
HGB BLD-MCNC: 16 G/DL (ref 12–17)
IMM GRANULOCYTES # BLD AUTO: 0.01 THOUSAND/UL (ref 0–0.2)
IMM GRANULOCYTES NFR BLD AUTO: 0 % (ref 0–2)
LDLC SERPL CALC-MCNC: 65 MG/DL (ref 0–100)
LYMPHOCYTES # BLD AUTO: 2.27 THOUSANDS/ΜL (ref 0.6–4.47)
LYMPHOCYTES NFR BLD AUTO: 42 % (ref 14–44)
MCH RBC QN AUTO: 30.3 PG (ref 26.8–34.3)
MCHC RBC AUTO-ENTMCNC: 33.1 G/DL (ref 31.4–37.4)
MCV RBC AUTO: 92 FL (ref 82–98)
MONOCYTES # BLD AUTO: 0.59 THOUSAND/ΜL (ref 0.17–1.22)
MONOCYTES NFR BLD AUTO: 11 % (ref 4–12)
NEUTROPHILS # BLD AUTO: 2.36 THOUSANDS/ΜL (ref 1.85–7.62)
NEUTS SEG NFR BLD AUTO: 44 % (ref 43–75)
NONHDLC SERPL-MCNC: 92 MG/DL
NRBC BLD AUTO-RTO: 0 /100 WBCS
P AXIS: 41 DEGREES
PLATELET # BLD AUTO: 181 THOUSANDS/UL (ref 149–390)
PMV BLD AUTO: 9.5 FL (ref 8.9–12.7)
POTASSIUM SERPL-SCNC: 4.5 MMOL/L (ref 3.5–5.3)
PR INTERVAL: 186 MS
PROT SERPL-MCNC: 7.1 G/DL (ref 6.4–8.4)
QRS AXIS: -37 DEGREES
QRSD INTERVAL: 180 MS
QT INTERVAL: 504 MS
QTC INTERVAL: 486 MS
RBC # BLD AUTO: 5.28 MILLION/UL (ref 3.88–5.62)
SODIUM SERPL-SCNC: 139 MMOL/L (ref 135–147)
T WAVE AXIS: 57 DEGREES
TRIGL SERPL-MCNC: 135 MG/DL
TSH SERPL DL<=0.05 MIU/L-ACNC: 2.05 UIU/ML (ref 0.45–4.5)
VENTRICULAR RATE: 56 BPM
WBC # BLD AUTO: 5.37 THOUSAND/UL (ref 4.31–10.16)

## 2022-10-04 PROCEDURE — 80053 COMPREHEN METABOLIC PANEL: CPT

## 2022-10-04 PROCEDURE — 93005 ELECTROCARDIOGRAM TRACING: CPT

## 2022-10-04 PROCEDURE — 83036 HEMOGLOBIN GLYCOSYLATED A1C: CPT

## 2022-10-04 PROCEDURE — 93010 ELECTROCARDIOGRAM REPORT: CPT | Performed by: INTERNAL MEDICINE

## 2022-10-04 PROCEDURE — 84443 ASSAY THYROID STIM HORMONE: CPT

## 2022-10-04 PROCEDURE — 80061 LIPID PANEL: CPT

## 2022-10-04 PROCEDURE — 85025 COMPLETE CBC W/AUTO DIFF WBC: CPT

## 2022-10-04 PROCEDURE — 36415 COLL VENOUS BLD VENIPUNCTURE: CPT

## 2022-10-04 NOTE — TELEPHONE ENCOUNTER
----- Message from Christiana Montanez MD sent at 10/4/2022 10:02 AM EDT -----  Patient blood test reviewed  They are acceptable  Will continue same medication  Patient should keep his appointment for follow-up

## 2022-10-04 NOTE — TELEPHONE ENCOUNTER
----- Message from Bryson Nur MD sent at 10/4/2022  9:57 AM EDT -----  Blood work shows CBC is acceptable

## 2022-10-10 RX ORDER — ASPIRIN 81 MG/1
81 TABLET ORAL DAILY
COMMUNITY

## 2022-10-10 NOTE — PRE-PROCEDURE INSTRUCTIONS
Pre-Surgery Instructions:   Medication Instructions   • aspirin (ECOTRIN LOW STRENGTH) 81 mg EC tablet Stop taking 5 days prior to surgery  • celecoxib (CeleBREX) 200 mg capsule Stop taking 3 days prior to surgery  • Coenzyme Q10 (COQ10) 200 MG CAPS Stop taking 7 days prior to surgery  • COLLAGEN-CHOND-HYALURONIC ACID PO Stop taking 7 days prior to surgery  • finasteride (PROSCAR) 5 mg tablet Take day of surgery  • fluticasone (VERAMYST) 27 5 MCG/SPRAY nasal spray Uses PRN- OK to take day of surgery   • Glucosamine-Chondroitin (GLUCOSAMINE CHONDR COMPLEX PO) Stop taking 7 days prior to surgery  • Omega-3 Fatty Acids (FISH OIL) 1000 MG CPDR Stop taking 7 days prior to surgery  • pantoprazole (PROTONIX) 40 mg tablet Take day of surgery  • rosuvastatin (CRESTOR) 10 MG tablet Take day of surgery  • sotalol (BETAPACE) 80 mg tablet Take day of surgery       Pt verbalizes understanding of the following:    - Bathing instructions, has chg, neck down, no genitals  - No lotions, powders, sprays, deodorant, jewelry  - No shaving within 24hrs    - NPO after MN  - Avoid OTC non-directed Vit/ Suppl/ Herbals 7 days prior to surgery to ensure no drug interactions with perioperative surgical/ anesthetic meds  - Avoid NSAIDs 3 days prior  - Avoid ASA containing products 5 days prior  - Continue statin medication up through and including the day of surgery    - Bring list of meds with last dose noted  - FigCard cards & photo id

## 2022-10-11 ENCOUNTER — ANESTHESIA EVENT (OUTPATIENT)
Dept: PERIOP | Facility: HOSPITAL | Age: 67
End: 2022-10-11
Payer: MEDICARE

## 2022-10-12 PROBLEM — Z12.5 PROSTATE CANCER SCREENING: Status: RESOLVED | Noted: 2021-10-20 | Resolved: 2022-10-12

## 2022-10-12 PROBLEM — Z00.00 MEDICARE ANNUAL WELLNESS VISIT, SUBSEQUENT: Status: RESOLVED | Noted: 2020-10-08 | Resolved: 2022-10-12

## 2022-10-14 ENCOUNTER — HOSPITAL ENCOUNTER (OUTPATIENT)
Facility: HOSPITAL | Age: 67
Setting detail: OUTPATIENT SURGERY
Discharge: HOME | End: 2022-10-14
Attending: SURGERY | Admitting: SURGERY
Payer: MEDICARE

## 2022-10-14 ENCOUNTER — ANESTHESIA (OUTPATIENT)
Dept: PERIOP | Facility: HOSPITAL | Age: 67
End: 2022-10-14
Payer: MEDICARE

## 2022-10-14 ENCOUNTER — RA CDI HCC (OUTPATIENT)
Dept: OTHER | Facility: HOSPITAL | Age: 67
End: 2022-10-14

## 2022-10-14 VITALS
SYSTOLIC BLOOD PRESSURE: 146 MMHG | HEIGHT: 72 IN | WEIGHT: 218 LBS | TEMPERATURE: 96.7 F | HEART RATE: 50 BPM | BODY MASS INDEX: 29.53 KG/M2 | DIASTOLIC BLOOD PRESSURE: 69 MMHG | RESPIRATION RATE: 18 BRPM | OXYGEN SATURATION: 96 %

## 2022-10-14 DIAGNOSIS — Z47.89 ORTHOPEDIC AFTERCARE: ICD-10-CM

## 2022-10-14 DIAGNOSIS — M65.341 TRIGGER RING FINGER OF RIGHT HAND: ICD-10-CM

## 2022-10-14 DIAGNOSIS — G56.01 CARPAL TUNNEL SYNDROME ON RIGHT: Primary | ICD-10-CM

## 2022-10-14 DIAGNOSIS — M65.331 TRIGGER MIDDLE FINGER OF RIGHT HAND: ICD-10-CM

## 2022-10-14 PROCEDURE — NC001 PR NO CHARGE: Performed by: SURGERY

## 2022-10-14 PROCEDURE — 26055 INCISE FINGER TENDON SHEATH: CPT | Performed by: SURGERY

## 2022-10-14 PROCEDURE — 64721 CARPAL TUNNEL SURGERY: CPT | Performed by: SURGERY

## 2022-10-14 PROCEDURE — 26055 INCISE FINGER TENDON SHEATH: CPT

## 2022-10-14 PROCEDURE — 64721 CARPAL TUNNEL SURGERY: CPT

## 2022-10-14 RX ORDER — SODIUM CHLORIDE, SODIUM LACTATE, POTASSIUM CHLORIDE, CALCIUM CHLORIDE 600; 310; 30; 20 MG/100ML; MG/100ML; MG/100ML; MG/100ML
20 INJECTION, SOLUTION INTRAVENOUS CONTINUOUS
Status: DISCONTINUED | OUTPATIENT
Start: 2022-10-14 | End: 2022-10-14 | Stop reason: HOSPADM

## 2022-10-14 RX ORDER — FENTANYL CITRATE 50 UG/ML
INJECTION, SOLUTION INTRAMUSCULAR; INTRAVENOUS AS NEEDED
Status: DISCONTINUED | OUTPATIENT
Start: 2022-10-14 | End: 2022-10-14

## 2022-10-14 RX ORDER — PROPOFOL 10 MG/ML
INJECTION, EMULSION INTRAVENOUS CONTINUOUS PRN
Status: DISCONTINUED | OUTPATIENT
Start: 2022-10-14 | End: 2022-10-14

## 2022-10-14 RX ORDER — FENTANYL CITRATE/PF 50 MCG/ML
25 SYRINGE (ML) INJECTION
Status: DISCONTINUED | OUTPATIENT
Start: 2022-10-14 | End: 2022-10-14 | Stop reason: HOSPADM

## 2022-10-14 RX ORDER — CLINDAMYCIN PHOSPHATE 900 MG/50ML
900 INJECTION INTRAVENOUS ONCE
Status: COMPLETED | OUTPATIENT
Start: 2022-10-14 | End: 2022-10-14

## 2022-10-14 RX ORDER — DIPHENHYDRAMINE HYDROCHLORIDE 50 MG/ML
12.5 INJECTION INTRAMUSCULAR; INTRAVENOUS ONCE AS NEEDED
Status: DISCONTINUED | OUTPATIENT
Start: 2022-10-14 | End: 2022-10-14 | Stop reason: HOSPADM

## 2022-10-14 RX ORDER — CHLORHEXIDINE GLUCONATE 0.12 MG/ML
15 RINSE ORAL ONCE
Status: DISCONTINUED | OUTPATIENT
Start: 2022-10-14 | End: 2022-10-14

## 2022-10-14 RX ORDER — MIDAZOLAM HYDROCHLORIDE 2 MG/2ML
INJECTION, SOLUTION INTRAMUSCULAR; INTRAVENOUS AS NEEDED
Status: DISCONTINUED | OUTPATIENT
Start: 2022-10-14 | End: 2022-10-14

## 2022-10-14 RX ORDER — HYDROMORPHONE HCL IN WATER/PF 6 MG/30 ML
0.2 PATIENT CONTROLLED ANALGESIA SYRINGE INTRAVENOUS
Status: DISCONTINUED | OUTPATIENT
Start: 2022-10-14 | End: 2022-10-14 | Stop reason: HOSPADM

## 2022-10-14 RX ORDER — OXYCODONE HYDROCHLORIDE 5 MG/1
5 TABLET ORAL EVERY 6 HOURS PRN
Qty: 5 TABLET | Refills: 0 | Status: SHIPPED | OUTPATIENT
Start: 2022-10-14 | End: 2022-10-24

## 2022-10-14 RX ORDER — ONDANSETRON 2 MG/ML
INJECTION INTRAMUSCULAR; INTRAVENOUS AS NEEDED
Status: DISCONTINUED | OUTPATIENT
Start: 2022-10-14 | End: 2022-10-14

## 2022-10-14 RX ORDER — ONDANSETRON 2 MG/ML
4 INJECTION INTRAMUSCULAR; INTRAVENOUS ONCE AS NEEDED
Status: DISCONTINUED | OUTPATIENT
Start: 2022-10-14 | End: 2022-10-14

## 2022-10-14 RX ORDER — PROPOFOL 10 MG/ML
INJECTION, EMULSION INTRAVENOUS AS NEEDED
Status: DISCONTINUED | OUTPATIENT
Start: 2022-10-14 | End: 2022-10-14

## 2022-10-14 RX ADMIN — FENTANYL CITRATE 50 MCG: 50 INJECTION INTRAMUSCULAR; INTRAVENOUS at 08:29

## 2022-10-14 RX ADMIN — CLINDAMYCIN PHOSPHATE 900 MG: 900 INJECTION, SOLUTION INTRAVENOUS at 08:30

## 2022-10-14 RX ADMIN — SODIUM CHLORIDE, SODIUM LACTATE, POTASSIUM CHLORIDE, AND CALCIUM CHLORIDE: .6; .31; .03; .02 INJECTION, SOLUTION INTRAVENOUS at 08:44

## 2022-10-14 RX ADMIN — MIDAZOLAM 2 MG: 1 INJECTION INTRAMUSCULAR; INTRAVENOUS at 08:29

## 2022-10-14 RX ADMIN — PROPOFOL 60 MCG/KG/MIN: 10 INJECTION, EMULSION INTRAVENOUS at 08:29

## 2022-10-14 RX ADMIN — PROPOFOL 20 MG: 10 INJECTION, EMULSION INTRAVENOUS at 08:29

## 2022-10-14 RX ADMIN — ONDANSETRON 4 MG: 2 INJECTION INTRAMUSCULAR; INTRAVENOUS at 08:29

## 2022-10-14 RX ADMIN — FENTANYL CITRATE 50 MCG: 50 INJECTION INTRAMUSCULAR; INTRAVENOUS at 09:33

## 2022-10-14 RX ADMIN — SODIUM CHLORIDE, SODIUM LACTATE, POTASSIUM CHLORIDE, AND CALCIUM CHLORIDE 20 ML/HR: .6; .31; .03; .02 INJECTION, SOLUTION INTRAVENOUS at 08:14

## 2022-10-14 NOTE — ANESTHESIA PREPROCEDURE EVALUATION
Procedure:  RELEASE CARPAL TUNNEL (Right Wrist)  RELEASE TRIGGER FINGER (Right Hand)    Relevant Problems   CARDIO   (+) Coronary artery disease involving native coronary artery of native heart without angina pectoris   (+) Intermittent complete heart block (HCC)   (+) Junctional rhythm   (+) Mixed hyperlipidemia   (+) Paroxysmal atrial fibrillation (HCC)   (+) Premature ventricular beats   (+) Sick sinus syndrome (HCC)      GI/HEPATIC   (+) Gastro-esophageal reflux disease without esophagitis   (+) PUD (peptic ulcer disease)      MUSCULOSKELETAL   (+) Chronic back pain   (+) Low back pain with sciatica   (+) Osteoarthritis of fingers of both hands   (+) Primary osteoarthritis of left knee      NEURO/PSYCH   (+) Chronic back pain   (+) Chronic right shoulder pain   (+) S/P ICD (internal cardiac defibrillator) procedure      PULMONARY   (+) LENY and COPD overlap syndrome ([address] )      [employer] AICD    6/2022: normal biventricular systolic function, mid AI, mild MR, mild TR    8/2021: dLM 10, mLAD 35, LCx 60, dRCA 30 (non-obstructive)         Anesthesia Plan  ASA Score- 3     Anesthesia Type- IV sedation with anesthesia with ASA Monitors  Additional Monitors:   Airway Plan:     Comment: IV sedation, GA back up; standard ASA monitors  Risks and benefits discussed with patient; patient consented and agrees to proceed  I saw and evaluated the patient  If seen with CRNA, we have discussed the anesthetic plan and I am in agreement that the plan is appropriate for the patient   magnet  Plan Factors-    Chart reviewed  Existing labs reviewed  Induction- intravenous  Postoperative Plan-     Informed Consent- Anesthetic plan and risks discussed with patient  I personally reviewed this patient with the CRNA  Discussed and agreed on the Anesthesia Plan with the CRNA  Shan Garcia

## 2022-10-14 NOTE — H&P
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 ([facility])    Chronic bilateral low back pain with bilateral sciatica    Pain of right lower extremity    Acute renal failure superimposed on stage 3 chronic kidney disease ([facility])    Left arm cellulitis    [facility]epsis ([facility] )    Accident due to mechanical fall without injury    Type 2 diabetes mellitus with hyperglycemia, with long-term current use of insulin ([facility])     Past Medical History  Past Medical History:   Diagnosis Date    COPD (chronic obstructive pulmonary disease) ([facility] )     Diabetes mellitus ([facility] )     Hyperlipidemia     Hyperlipidemia     Hypertension     Kidney stones     Osteoporosis      Past Surgical History  Past Surgical History:   Procedure Laterality Date    APPENDECTOMY      HAND SURGERY Right     LITHOTRIPSY      MASS EXCISION      [place] of back wall mass    TONSILLECTOMY      TONSILLECTOMY        12/31/18 1050   Note Type   Note type Eval/Treat   Restrictions/Precautions   Weight Bearing Precautions Per Order Yes   LUE Weight Bearing Per Order NWB   Other Precautions Fall Risk;Telemetry;Multiple lines;WBS   Pain Assessment   Pain Assessment 0-10   Pain Score 7   Pain Type Chronic pain   Pain Location Hip   Pain Orientation Bilateral   Home Living   Type of Home Apartment   Home Layout One level  (0 HERBER)   Bathroom Shower/Tub Tub/shower unit   Bathroom Toilet Standard   Bathroom Equipment (none)   [address] (none)   Prior Function   Level of [place] Independent with ADLs and functional ASSESSMENT/PLAN:       78 y o  male with right carpal tunnel syndrome, right ring trigger finger and a right long trigger finger       Right carpal tunnel release, right long and right ring trigger finger release was discussed at length including risks and benefits  Risks of surgery consist of but not limited to bleeding, infection, stiffness, pain, injury to nerves and surrounding structures, pillar pain, incomplete resolution of paraesthesia's, etc  Baylee Oliva elected to proceed with surgical intervention and informed surgical consent was signed  Post operative instructions/expectations were reviewed and provided in AVS  Lab work and EKG will be ordered prior to surgical intervention  He was advised to work on full PIP extension of the right long finger as a trigger finger release will not resolve the slight PIP flexion contracture  Pillar pain can last 4-8 months following a carpal tunnel release, advised the best way to combat this is will scar massage post operatively  Follow up in the office 10-14 days after surgery for suture removal         The patient verbalized understanding of exam findings and treatment plan  We engaged in the shared decision-making process and treatment options were discussed at length with the patient  Surgical and conservative management discussed today along with risks and benefits      Diagnoses and all orders for this visit:     Carpal tunnel syndrome on right     Trigger finger, right middle finger     Trigger finger, right ring finger     Other orders  -     COLLAGEN-CHOND-HYALURONIC ACID PO; Take 1 capsule by mouth in the morning        Open Carpal Tunnel Release: The anatomy and physiology of carpal tunnel syndrome was discussed with the patient today  Increase pressure localized under the transverse carpal ligament can cause pain, numbness, tingling, or dysesthesias within the median nerve distribution as well as feelings of fatigue, clumsiness, or awkwardness    These symptoms mobility   Lives With Daughter   [place] 31 in the last 6 months 1 to 4   Vocational Retired   Comments Pt is primarily [address] speaking and recently moved here from [place]  She is staying w/ her dtr    Lifestyle   Autonomy Pt I w/ ADLs, IADLs, and mobility  No DME use  Reciprocal Relationships Pt lives w/ dtr   Service to Others retired   Intrinsic Gratification Enjoys cooking and cleaning    Psychosocial   Psychosocial (WDL) WDL   ADL   Where Assessed Edge of bed   Eating Assistance 6  Modified independent   Eating Deficit Setup   Grooming Assistance 3  Moderate Assistance   [address] 3  Moderate Assistance   LB Bathing Assistance 2  Maximal Assistance   [address] 3  Moderate Assistance   LB Dressing Assistance 2  Maximal Assistance   Toileting Assistance  3  Moderate Assistance   Bed Mobility   Supine to Sit 3  Moderate assistance   Additional items Assist x 1   Transfers   Sit to Stand 4  Minimal assistance   Additional items Assist x 1   Stand to Sit 4  Minimal assistance   Additional items Assist x 1   Stand pivot 4  Minimal assistance   Additional items Assist x 1   Balance   Static Sitting Fair   Dynamic Sitting Fair   Static Standing Poor +   Dynamic Standing Poor +   Activity Tolerance   Activity Tolerance Patient limited by pain   Nurse Made Aware Okay to see per RN   RUE Assessment   RUE Assessment WFL   LUE Assessment   LUE Assessment X  (NWB)   Hand Function   Gross Motor Coordination Functional   Fine Motor Coordination Functional   Cognition   Overall Cognitive Status Impaired   Arousal/Participation Responsive; Cooperative   Attention Within functional limits   Orientation Level Oriented to person;Oriented to situation;Disoriented to time;Disoriented to place   Memory Decreased recall of precautions   Following Commands Follows one step commands with increased time or repetition   Comments Pt typically occur at night and worse in the morning upon waking  Eventually, untreated carpal tunnel syndrome can result in weakness and permanent loss of muscle within the thenar compartment of the hand  Treatment options were discussed with the patient  Conservative treatment includes nocturnal resting splints to keep the nerve in a neutral position, ergonomic changes within the work or home environment, activity modification, and tendon gliding exercises  Vitamin B6 one tablet daily over the counter may helpful to reduce symptoms  Steroid injections within the carpal canal can help a majority of patients, however this is often self-limited in a majority of patients  Surgical intervention to divide the transverse carpal ligament typically results in a long-lasting relief of the patient's complaints, with the recurrence rate of less than 1%  The patient has elected to undergo an open carpal tunnel release  The palmar incision technique was discussed in the office with the patient today  In the postoperative period, light activities are allowed immediately, driving is allowed when narcotic medication has stopped, and the bandages may be removed and incision may get wet after 2 days  Heavy activities (lifting more than approximately 10 pounds) will be allowed after follow up appointment in 1-2 weeks  While night symptoms (waking from sleep, pain, and discomfort in the hands) generally improves rapidly, the numbness and tingling as well as the strength will slowly improve over weeks to months depending on the chronicity and severity of the carpal tunnel syndrome  Pillar pain and scar discomfort were discussed with the patient which are self-limiting conditions  The risks of bleeding and infection from the surgery are less than 1%  Risk of recurrence is approximately 0 5%  The risks of nerve injury or nerve damage or damage to the blood vessels is approximately 1 in 1200   The patient has an understanding demonstrated poor carry over of NWB status, w/ vc  Has decreased judgement/insight into safety    Assessment   Limitation Decreased ADL status; Decreased Safe judgement during ADL;Decreased endurance;Decreased self-care trans;Decreased high-level ADLs   Prognosis Good   Assessment Pt is a 79 y o  female who was admitted to [facility] on 12/30/2018 with Sepsis ([facility] )  Pt presented s/p a fall  Ct of LUE and R hip (-) for acute fx  Imaging of L humerus revealed edema over the elbow and forearm w/ cellulitis  Pt is NWB on LUE, per ortho note  Pt's comorbidities include DM2, acute metabolic encephalopathy, peripheral neuropathy, retinopathy 2* DM, chronic low back pain w/ B/L sciatica, and CKD III  At baseline pt was I w/ ADLs, IADLs, and mobility, per pt  Pt is a questionable historian  According to dtr in chart review, pt uses a quad cane at home and family assists w/ IADLs  Pt lives w/ dtr in one level apt, per pt, however chart review reports that pt lives w/ dtr in a 2 SH w/ 8 HERBER  Currently pt requires mod-max A for overall ADLS and min-mod Ax1 for functional mobility/transfers  Educated pt on NWB status of LUE but pt demonstrated poor carry over t/o session  Pt currently presents with impairments in the following categories -limited home support, difficulty performing ADLS, difficulty performing IADLS, limited insight into deficits, activity tolerance, endurance, standing balance/tolerance, sitting balance/tolerance, memory, insight, safety  and judgement   These impairments, as well as pt's fatigue, pain, WBS , decreased caregiver support and risk for falls  limit pt's ability to safely engage in all baseline areas of occupation, including grooming, bathing, dressing, toileting, functional mobility/transfers and leisure activities  From OT standpoint, recommend inpatient rehab vs home w/ home OT pending progress upon D/C  OT will continue to follow to address the below stated goals      Goals   Patient of the above mentioned discussion  The risks and benefits of the procedure were explained to the patient, which include, but are not limited to: Bleeding, infection, recurrence, pain, scar, damage to tendons, damage to nerves, and damage to blood vessels, failure to give desired results and complications related to anesthesia  These risks, along with alternative conservative treatment options, and postoperative protocols were voiced back and understood by the patient  All questions were answered to the patient's satisfaction  The patient agrees to comply with a standard postoperative protocol, and is willing to proceed  Education was provided via written and auditory forms  There were no barriers to learning  Written handouts regarding wound care, incision and scar care, and general preoperative information was provided to the patient  Prior to surgery, the patient may be requested to stop all anti-inflammatory medications  Prophylactic aspirin, Plavix, and Coumadin may be allowed to be continued  Medications including vitamin E , ginkgo, and fish oil are requested to be stopped approximately one week prior to surgery        Trigger Finger Release: The anatomy and physiology of trigger finger was discussed with the patient today in the office  Edema and increased contact pressure within the flexor tendons at the A1 pulley can cause pain, crepitation, and limitation of function  Treatment options include resting MP blocking splints to decrease edema, oral anti-inflammatory medications, home or formal therapy exercises, up to 2 steroid injections or surgical release  While majority of patients do respond to conservative treatment, up to 20% may require surgical release  The patient has elected release of the trigger finger  The patient has elected to undergo a release of the A1 pulley (trigger finger)    A small incision will be made over the palmar aspect of the hand, the tendon sheath holding the flexor Goals to have less pain   LTG Time Frame 7-10   Long Term Goal see below goals    Plan   Treatment Interventions ADL retraining;Functional transfer training; Endurance training;Patient/family training;Equipment evaluation/education; Compensatory technique education; Energy conservation; Activityengagement   Goal Expiration Date 01/10/19   OT Frequency 3-5x/wk   Recommendation   OT Discharge Recommendation Other (Comment)  (STR vs home pending progress)   Barthel Index   Feeding 5   Bathing 0   Grooming Score 0   Dressing Score 5   Bladder Score 10   Bowels Score 10   Toilet Use Score 5   Transfers (Bed/Chair) Score 10   Mobility (Level Surface) Score 0   Stairs Score 0   Barthel Index Score 45   Modified Ware Scale   Modified Ware Scale 4     LTG (7-10 DAYS)  Pt will improve activity tolerance to G for min 30-45 min txment sessions to increase participation in ADLs    Pt will complete ADLs/self care w/ mod I using adaptive device and DME as needed    Pt will complete toileting w/ mod I w/ G hygiene/thoroughness using DME as needed    Pt will improve functional transfers on/off all surfaces to mod I using DME as needed w/ G balance/safety  Pt will improve functional mobility during ADL/IADL/leisure tasks to mod I using DME as needed w/ G balance/safety  Pt will improve standing balance to G for 8-10 minutes of purposeful activity w/ G endurance      Pt will engage in ongoing cognitive assessment w/ G participation w/ mod I to assist w/ safe d/c planning/recommendations    Pt will demonstrate G carryover of pt/caregiver education and training as appropriate w/ mod I w/o cues w/ good tolerance    Pt will demonstrate 100% carryover of energy conservation techniques and WBS w/ mod I t/o functional I/ADL/leisure tasks w/o cues s/p skilled education         Jyoti Pickering, OTR/L tendons will be released  In the postoperative period, light activities are allowed immediately, driving is allowed when narcotic medication has stopped, and the incision may get wet after 2 days  Heavy activities (lifting more than approximately 10 pounds) will be allowed after the follow up appointment in 1-2 weeks  While the pain and discomfort within the wrist typically improves rapidly, some residual discomfort may be present for up to 6 weeks  The nodule that is typically palpable in the palmar aspect of the hand will not be removed, as this would necessitate removal of a portion of the flexor tendon, however the catching, clicking, and locking should resolve  Approximate success rate is 98%  The risks and benefits of the procedure were explained to the patient, which include, but are not limited to: Bleeding, infection, recurrence, pain, scar, damage to tendons, damage to nerves, and damage to blood vessels, need for future surgery and complications related to anesthesia  If bony work is done, risks also include malunion and nonunion  These risks, along with alternative conservative treatment options, and postoperative protocols were voiced back and understood by the patient  All questions were answered to the patient's satisfaction  The patient agrees to comply with a standard postoperative protocol, and is willing to proceed  Education was provided via written and auditory forms  There were no barriers to learning   Written handouts regarding wound care, incision and scar care, and general preoperative information, as well as risks and benefits were provided to the patient      Follow Up:  Return for 10-14 days , post op         To Do Next Visit:  Re-evaluation of current issue and Sutures out     ____________________________________________________________________________________________________________________________________________        CHIEF COMPLAINT:       Chief Complaint   Patient presents with   • Right Hand - Follow-up, Numbness, Carpal Tunnel       Right middle TF #2 12/10/20   • Right Ring Finger - Clicking, Follow-up, Locking, Pain   • Right Middle Finger - Clicking, Locking, Pain         SUBJECTIVE:  Wanda Bravo is a 78y o  year old RHD male who presents to the office today for a follow up regarding right carpal tunnel syndrome, right ring and right long trigger fingers  He underwent 2 trigger finger CSI's in the past for the right long and right ring trigger fingers, with some improvement in his symptoms for a while  He notes his right ring finger actively locks on him, to the point of having to use his other hand to unlock the finger  His right long finger is clicking and catching, but is not locking at this time  He notes constant numbness/tingling to his right radial 3 digits  Denies numbness/tingling to his right ring or small fingers  Numbness/tingling will worsen at night when he is laying on his right side   He is not currently bracing at night as he feels the wrist braces was too small for him           I have personally reviewed all the relevant PMH, PSH, SH, FH, Medications and allergies       PAST MEDICAL HISTORY:       Past Medical History:   Diagnosis Date   • Abrasion of left foot       LAST ASSESSED: 9/23/13   • Achilles tendinitis, unspecified leg       LAST ASSESSED: 11/30/12   • Allergic rhinitis       LAST ASSESSED: 11/25/13   • Allergic rhinitis 06/25/2008     LAST ASSESSED: 6/25/08   • Cervicalgia 04/22/2011     LAST ASSESSED: 4/22/11   • Coronary artery disease     • CPAP (continuous positive airway pressure) dependence     • Dysfunction of left eustachian tube       LAST ASSESSED: 9/23/13   • Epistaxis       LAST ASSESSED: 5/27/15   • First degree atrioventricular block       LAST ASSESSED: 7/10/17   • Gastric ulcer 10/07/2011     LAST ASSESSED: 3/9/15   • GERD (gastroesophageal reflux disease)     • Hemorrhoids 05/13/2011     LAST ASSESSED: 5/13/11   • Long term use of drug       LAST ASSESSED: 10/11/12   • Myalgia 2007     LAST ASSESSED: 07   • Myositis 2007     LAST ASSESSED: 07   • Numbness and tingling of foot       LAST ASSESSED: 3/9/15   • Pacemaker     • Premature ventricular beats       states cardiologist ordered Zio ambulatory cardiac monitor   • Sleep apnea           PAST SURGICAL HISTORY:        Past Surgical History:   Procedure Laterality Date   • CARDIAC CATHETERIZATION      • CARDIAC ELECTROPHYSIOLOGY PROCEDURE N/A 10/25/2021     Procedure: CARDIAC EPS;  Surgeon: Jadon Herrera MD;  [facility]: [facility]; Service: Cardiology   • CARDIAC ELECTROPHYSIOLOGY PROCEDURE Left 10/25/2021     Procedure: Cardiac icd implant;  Surgeon: Jadon Herrera MD;  [facility]: [facility]; Service: Cardiology   • CARDIAC PACEMAKER PLACEMENT       • CARDIOVERSION N/A 10/25/2021     Procedure: Cardioversion;  Surgeon: Jadon Herrera MD;  [facility]: [facility]; Service: Cardiology   • CATARACT EXTRACTION Right     • CERVICAL FUSION         pt thinks C4-C5   • CHOLECYSTECTOMY         LAPAROSCOPIC; LAST ASSESSED: 10/17/17   • COLONOSCOPY         COMPLETE; 3-4 YEARS AGO BY [facility]; LAST ASSESSED: 14   • MYRINGOTOMY W/ TUBES         WITH VENTILATING TUBE INSERTION   • MA REVISE MEDIAN N/CARPAL TUNNEL SURG Left 2021     Procedure: RELEASE CARPAL TUNNEL;  Surgeon: Marycruz Matthews MD;  Location: [facility];  Service: Orthopedics   • VASECTOMY             FAMILY HISTORY:  Family History   Problem Relation Age of Onset   • Cancer Mother           SOFT TISSUE CANCER ON RT SIDE CHEST WALL AND    • Prostate cancer Maternal Uncle           SOCIAL HISTORY:  Social History            Tobacco Use   • Smoking status: Former Smoker       Quit date: 200       Years since quittin 7   • Smokeless tobacco: Never Used   Vaping Use   • Vaping Use: Never used   Substance Use Topics   • Alcohol use:  Yes       Alcohol/week: 7 0 standard drinks       Types: 7 Glasses of wine per week       Comment: Once in a while with a meal   • Drug use: No         MEDICATIONS:     Current Outpatient Medications:   •  celecoxib (CeleBREX) 200 mg capsule, Take 1 capsule (200 mg total) by mouth daily (Patient taking differently: Take 200 mg by mouth daily as needed), Disp: 90 capsule, Rfl: 1  •  Coenzyme Q10 (COQ10) 200 MG CAPS, Take 1 tablet by mouth daily, Disp: , Rfl:   •  COLLAGEN-CHOND-HYALURONIC ACID PO, Take 1 capsule by mouth in the morning, Disp: , Rfl:   •  finasteride (PROSCAR) 5 mg tablet, Take 1 tablet (5 mg total) by mouth daily, Disp: 90 tablet, Rfl: 1  •  fluticasone (VERAMYST) 27 5 MCG/SPRAY nasal spray, 2 sprays into each nostril daily, Disp: , Rfl:   •  Glucosamine-Chondroitin (GLUCOSAMINE CHONDR COMPLEX PO), Take 1 tablet by mouth daily, Disp: , Rfl:   •  Omega-3 Fatty Acids (FISH OIL) 1000 MG CPDR, Take by mouth daily, Disp: , Rfl:   •  pantoprazole (PROTONIX) 40 mg tablet, Take 1 tablet (40 mg total) by mouth daily, Disp: 90 tablet, Rfl: 1  •  rosuvastatin (CRESTOR) 10 MG tablet, Take 1 tablet (10 mg total) by mouth daily, Disp: 90 tablet, Rfl: 1  •  sotalol (BETAPACE) 80 mg tablet, Take 1 tablet (80 mg total) by mouth every 12 (twelve) hours, Disp: 180 tablet, Rfl: 3     ALLERGIES:        Allergies   Allergen Reactions   • Amoxicillin Anaphylaxis   • Acetaminophen         Rapid heart rate   • Apple - Food Allergy     • Cherry Flavor - Food Allergy Other (See Comments)       States allergy is to raw cherries   • Pollen Extract     • Augmentin [Amoxicillin-Pot Clavulanate]     • Dye [Iodinated Diagnostic Agents] Itching         REVIEW OF SYSTEMS:  Review of Systems   Constitutional: Negative for chills, fever and unexpected weight change  HENT: Negative for hearing loss, nosebleeds and sore throat  Eyes: Negative for pain, redness and visual disturbance     Respiratory: Negative for cough, shortness of breath and wheezing  Cardiovascular: Negative for chest pain, palpitations and leg swelling  Gastrointestinal: Negative for abdominal pain, nausea and vomiting  Endocrine: Negative for polydipsia and polyuria  Genitourinary: Negative for difficulty urinating and hematuria  Musculoskeletal: Negative for arthralgias, joint swelling and myalgias  Skin: Negative for rash and wound  Neurological: Positive for numbness  Negative for dizziness and headaches  Psychiatric/Behavioral: Negative for decreased concentration, dysphoric mood and suicidal ideas  The patient is not nervous/anxious           VITALS:      Vitals:     09/07/22 0915   BP: 125/79   Pulse: 83   Resp: 16         LABS:  HgA1c:         Lab Results   Component Value Date     HGBA1C 5 4 04/06/2022      BMP:         Lab Results   Component Value Date     CALCIUM 9 1 06/15/2022      09/29/2017     K 4 2 06/15/2022     CO2 25 06/15/2022      (H) 06/15/2022     BUN 17 06/15/2022     CREATININE 1 05 06/15/2022         _____________________________________________________  PHYSICAL EXAMINATION:  General: well developed and well nourished, alert, oriented times 3 and appears comfortable  Psychiatric: Normal  HEENT: Normocephalic, Atraumatic Trachea Midline, No torticollis  Pulmonary: No audible wheezing or respiratory distress   Cardiovascular: No pitting edema, 2+ radial pulse   Abdominal/GI: abdomen non tender, non distended   Skin: No masses, erythema, lacerations, fluctation, ulcerations  Neurovascular: Sensation intact to the Ulnar Nerve, Sensation Intact to the Radial Nerve, Decreased Sensation to  the Median Nerve, Motor Intact to the Median, Ulnar, Radial Nerve and Pulses Intact  Musculoskeletal: Normal, except as noted in detailed exam and in HPI         MUSCULOSKELETAL EXAMINATION:     Right Carpal Tunnel Exam:  Negative thenar atrophy  Positive phalen's test  Positive carpal tunnel compression   Negative tinels over median nerve at the wrist   Opposition strength 5/5  Abduction strength 5/5        right long finger:  Positive palpable nodule over the A1 pulley  Positive tenderness to palpation over A1 pulley  Negative catching  Positive clicking  Slight PIP flexion contracture        right ring finger:  Positive palpable nodule over the A1 pulley  Positive tenderness to palpation over A1 pulley  Positive catching  Positive clicking            ___________________________________________________  STUDIES REVIEWED:  No new imaging to review

## 2022-10-14 NOTE — PROGRESS NOTES
[address]  coding opportunities       Chart reviewed, no opportunity found: CHART REVIEWED, NO OPPORTUNITY FOUND        Patients Insurance     Medicare Insurance: Medicare

## 2022-10-14 NOTE — DISCHARGE INSTRUCTIONS
Post Operative Instructions    You have had surgery on your arm today, please read and follow the information below:  Elevate your hand above your elbow during the next 24-48 hours to help with swelling  Place your hand and arm over your head with motion at your shoulder three times a day  Do not apply any cream/ointment/oil to your incisions including antibiotics (I e Neosporin)  Do not use peroxide to clean your wound   Do not soak your hands in standing water (dishwater, tubs, Jacuzzi's, pools, etc ) until given permission (typically 2-3 weeks after injury)    Call the office if you notice any:  Increased numbness or tingling of your hand or fingers that is not relieved with elevation  Increasing pain that is not controlled with medication  Difficulty chewing, breathing, swallowing  Chest pains or shortness of breath  Fever over 101 4 degrees  Bandage: Remove bandage after 5 days  Motion: Move fingers into a fist 5 times a day, DO NOT move any splinted fingers  Weight bearing status: Avoid heavy lifting (>5 pounds) with the extremity that was operated on until follow up appointment  Normal activities of daily living are OK  Ice: Ice for 10 minutes every hour as needed for swelling x 24 hours  Sling: No sling necessary  Pain medication:   Naproxen 220 mg two times a day   Oxycodone one tab every 6 hours AS NEEDED for pain     Follow-up Appointment: 10-14 days  Please call the office if you have any questions or concerns regarding your post-operative care

## 2022-10-14 NOTE — ANESTHESIA POSTPROCEDURE EVALUATION
Post-Op Assessment Note    CV Status:  Stable    Pain management: adequate     Mental Status:  Alert and awake   Hydration Status:  Euvolemic and stable   PONV Controlled:  None   Airway Patency:  Patent   There is a medical reason for not screening for obstructive sleep apnea and/or for not using two or more mitigation strategies   Post Op Vitals Reviewed: Yes      Staff: Anesthesiologist, CRNA         No complications documented      BP   118/62   Temp   97 6   Pulse  54   Resp   14   SpO2   98

## 2022-10-14 NOTE — OP NOTE
OPERATIVE REPORT  PATIENT NAME: Jacqui Phoenix  :  1955  MRN: [id number]  Pt Location: [facility]    SURGERY DATE: 10/14/22    Surgeon(s) and Role:     * Brayan Jha MD - Primary     * Azul Dean PA-C - Assisting    Pre-Op Diagnosis:  Carpal tunnel syndrome on right [G56 01]  Trigger finger, right middle finger [M65 331]  Trigger finger, right ring finger [M65 341]    Post-Op Diagnosis Codes:     * Carpal tunnel syndrome on right [G56 01]     * Trigger finger, right middle finger [M65 331]     * Trigger finger, right ring finger [M65 341]    Procedure(s):  RELEASE CARPAL TUNNEL (Right)  RELEASE TRIGGER FINGER,RING AND LONG TENOSYNOVECTOMY FDS +FDP (Right)    Specimen(s):  * No orders in the log *    Estimated Blood Loss:   Minimal    Anesthesia Type:   Conscious Sedation     IMPLANTS:  * No implants in log *    PERIOPERATIVE ANTIBIOTICS:    clindamycin, 900 mg    Tourniquet Time: 9 min  at 250 mmHg          Operative Indications: The patient has a history of Carpal Tunnel Syndrome  right and Trigger Finger  right  long finger, ring finger that was recalcitrant to conservative management  The decision was made to bring the patient to the operating room for Open Carpal Tunnel Release  right and Trigger Finger Release  right  long finger, ring finger  Risks of the procedure were explained which include, but are not limited to bleeding; infection; damage to nerves, arteries,veins, tendons; scar; pain; need for reoperation; failure to give desired result; and risks of anaesthesia  All questions were answered to satisfaction and they were willing to proceed  Operative Findings:  Hypertrophy TCL  Abrasion FDS long  Hypertrophy A1 pulleys long and rin g    Complications:   None    Procedure and Technique:  After the patient, site, and procedure were identified, the patient was brought into the operating room in a supine position  Local anaesthesia and sedation were provided    A well padded tourniquet was applied to the extremity, set at 250 mmHg  The  right upper extremity was then prepped and drapped in a normal, sterile, orthopedic fashion  An anterior approach to the carpal tunnel was undertaken  A 2 cm incision was made in line with the fourth digit, proximal to Esteban's line  The skin and the subcutaneous tissue were sharply incised  Under loupe magnification, dissection was carried down to the palmar fascia and it was incised  The transverse carpal ligament was visualized and  Released distally with a #64 blade  A freer was was passed above and below the TCL in a retrograde fashion, freeing up any adhesions  A Knife Lite was used to release the proximal portion of the transverse carpal ligament under direct visualization  Distally the superficial arch was identified  A complete release was carried out and exploration of the carpal canal revealed no significant tenosynovitis  The median nerve and its branches were intact  A  longitudinal  incision is made in line with the long finger overlying the A1 pulley    Dissection was  carried down under loupe magnification  Skin and subcutaneous tissues were sharply incised  The tissue was elevated off the A1 pulley  The A1 pulley was  identified and incised  There was no retinacular cyst noted  Tenosynovectomy was carried out  The FDS and FDP were noted to have independent glide after tenosynovectomy  The superficial small tendon tear of the FDS tendon was debrided  A  longitudinal  incision is made in line with the ring finger overlying the A1 pulley    Dissection was  carried down under loupe magnification  Skin and subcutaneous tissues were sharply incised  The tissue was elevated off the A1 pulley  The A1 pulley was  identified and incised  There was no  retinacular cyst noted  Tenosynovectomy was carried out  The FDS and FDP were noted to have independent glide after release    The patient was able to fully flex and extend without any triggering  At the completion of the procedure, hemostasis was obtained with cautery and direct pressure  The wounds were copiously irrigated with sterile solution  The wounds were closed with Prolene  Sterile dressings were applied, including Xeroform, gauze, tweeners, webril, ACE  Please note, all sponge, needle, and instrument counts were correct prior to closure  Loupe magnification was utilized  The patient tolerated the procedure well       I was present for the entire procedure, A qualified resident physician was not available and A physician assistant was required during the procedure for retraction tissue handling,dissection and suturing    Patient Disposition:  PACU     SIGNATURE: Mamie Rangel  DATE: 10/14/22  TIME: 8:57 AM

## 2022-10-16 DIAGNOSIS — K21.9 GASTROESOPHAGEAL REFLUX DISEASE, UNSPECIFIED WHETHER ESOPHAGITIS PRESENT: ICD-10-CM

## 2022-10-17 RX ORDER — PANTOPRAZOLE SODIUM 40 MG/1
TABLET, DELAYED RELEASE ORAL
Qty: 90 TABLET | Refills: 1 | Status: SHIPPED | OUTPATIENT
Start: 2022-10-17

## 2022-10-21 ENCOUNTER — OFFICE VISIT (OUTPATIENT)
Dept: FAMILY MEDICINE CLINIC | Facility: CLINIC | Age: 67
End: 2022-10-21
Payer: MEDICARE

## 2022-10-21 VITALS
OXYGEN SATURATION: 97 % | BODY MASS INDEX: 29.53 KG/M2 | SYSTOLIC BLOOD PRESSURE: 130 MMHG | HEIGHT: 72 IN | DIASTOLIC BLOOD PRESSURE: 80 MMHG | WEIGHT: 218 LBS | HEART RATE: 63 BPM

## 2022-10-21 DIAGNOSIS — I49.5 SICK SINUS SYNDROME (HCC): ICD-10-CM

## 2022-10-21 DIAGNOSIS — I48.0 PAROXYSMAL ATRIAL FIBRILLATION (HCC): ICD-10-CM

## 2022-10-21 DIAGNOSIS — I25.10 CORONARY ARTERY DISEASE INVOLVING NATIVE CORONARY ARTERY OF NATIVE HEART WITHOUT ANGINA PECTORIS: ICD-10-CM

## 2022-10-21 DIAGNOSIS — Z12.5 PROSTATE CANCER SCREENING: ICD-10-CM

## 2022-10-21 DIAGNOSIS — Z23 NEED FOR VACCINATION: ICD-10-CM

## 2022-10-21 DIAGNOSIS — R35.1 BENIGN PROSTATIC HYPERPLASIA WITH NOCTURIA: ICD-10-CM

## 2022-10-21 DIAGNOSIS — Z00.00 MEDICARE ANNUAL WELLNESS VISIT, SUBSEQUENT: Primary | ICD-10-CM

## 2022-10-21 DIAGNOSIS — E78.2 MIXED HYPERLIPIDEMIA: ICD-10-CM

## 2022-10-21 DIAGNOSIS — N40.1 BENIGN PROSTATIC HYPERPLASIA WITH NOCTURIA: ICD-10-CM

## 2022-10-21 DIAGNOSIS — R73.01 IMPAIRED FASTING GLUCOSE: ICD-10-CM

## 2022-10-21 DIAGNOSIS — J44.9 OSA AND COPD OVERLAP SYNDROME (HCC): ICD-10-CM

## 2022-10-21 DIAGNOSIS — G47.33 OSA AND COPD OVERLAP SYNDROME (HCC): ICD-10-CM

## 2022-10-21 PROBLEM — G89.29 CHRONIC RIGHT SHOULDER PAIN: Status: RESOLVED | Noted: 2022-06-06 | Resolved: 2022-10-21

## 2022-10-21 PROBLEM — M25.511 CHRONIC RIGHT SHOULDER PAIN: Status: RESOLVED | Noted: 2022-06-06 | Resolved: 2022-10-21

## 2022-10-21 PROCEDURE — 99214 OFFICE O/P EST MOD 30 MIN: CPT | Performed by: FAMILY MEDICINE

## 2022-10-21 PROCEDURE — 90662 IIV NO PRSV INCREASED AG IM: CPT | Performed by: FAMILY MEDICINE

## 2022-10-21 PROCEDURE — G0008 ADMIN INFLUENZA VIRUS VAC: HCPCS | Performed by: FAMILY MEDICINE

## 2022-10-21 PROCEDURE — G0439 PPPS, SUBSEQ VISIT: HCPCS | Performed by: FAMILY MEDICINE

## 2022-10-21 RX ORDER — ROSUVASTATIN CALCIUM 10 MG/1
TABLET, COATED ORAL
Qty: 90 TABLET | Refills: 1 | Status: SHIPPED | OUTPATIENT
Start: 2022-10-21

## 2022-10-21 RX ORDER — FINASTERIDE 5 MG/1
5 TABLET, FILM COATED ORAL DAILY
Qty: 90 TABLET | Refills: 1 | Status: SHIPPED | OUTPATIENT
Start: 2022-10-21

## 2022-10-21 NOTE — ASSESSMENT & PLAN NOTE
Best cholesterol that he has had in years  Continue on Crestor 10 mg once daily    Repeat lipid panel in 6 months

## 2022-10-21 NOTE — ASSESSMENT & PLAN NOTE
Patient did have interrogation of pacemaker  No episodes of atrial fibrillation in the months of July, August and September    Patient remains on sotalol

## 2022-10-21 NOTE — PATIENT INSTRUCTIONS
Medicare Preventive Visit Patient Instructions  Thank you for completing your Welcome to Medicare Visit or Medicare Annual Wellness Visit today  Your next wellness visit will be due in one year (10/22/2023)  The screening/preventive services that you may require over the next 5-10 years are detailed below  Some tests may not apply to you based off risk factors and/or age  Screening tests ordered at today's visit but not completed yet may show as past due  Also, please note that scanned in results may not display below  Preventive Screenings:  Service Recommendations Previous Testing/Comments   Colorectal Cancer Screening  · Colonoscopy    · Fecal Occult Blood Test (FOBT)/Fecal Immunochemical Test (FIT)  · Fecal DNA/Cologuard Test  · Flexible Sigmoidoscopy Age: 39-70 years old   Colonoscopy: every 10 years (May be performed more frequently if at higher risk)  OR  FOBT/FIT: every 1 year  OR  Cologuard: every 3 years  OR  Sigmoidoscopy: every 5 years  Screening may be recommended earlier than age 39 if at higher risk for colorectal cancer  Also, an individualized decision between you and your healthcare provider will decide whether screening between the ages of 74-80 would be appropriate   Colonoscopy: 04/01/2019  FOBT/FIT: Not on file  Cologuard: Not on file  Sigmoidoscopy: Not on file    Screening Current     Prostate Cancer Screening Individualized decision between patient and health care provider in men between ages of 53-78   Medicare will cover every 12 months beginning on the day after your 50th birthday PSA: 0 69 ng/mL     Screening Current     Hepatitis C Screening Once for adults born between 1945 and 1965  More frequently in patients at high risk for Hepatitis C Hep C Antibody: 03/10/2020    Screening Current   Diabetes Screening 1-2 times per year if you're at risk for diabetes or have pre-diabetes Fasting glucose: 102 mg/dL (10/4/2022)  A1C: 5 5 % (10/4/2022)  Screening Current   Cholesterol Screening Once every 5 years if you don't have a lipid disorder  May order more often based on risk factors  Lipid panel: 10/04/2022  Screening Not Indicated  History Lipid Disorder      Other Preventive Screenings Covered by Medicare:  1  Abdominal Aortic Aneurysm (AAA) Screening: covered once if your at risk  You're considered to be at risk if you have a family history of AAA or a male between the age of 73-68 who smoking at least 100 cigarettes in your lifetime  2  Lung Cancer Screening: covers low dose CT scan once per year if you meet all of the following conditions: (1) Age 50-69; (2) No signs or symptoms of lung cancer; (3) Current smoker or have quit smoking within the last 15 years; (4) You have a tobacco smoking history of at least 20 pack years (packs per day x number of years you smoked); (5) You get a written order from a healthcare provider  3  Glaucoma Screening: covered annually if you're considered high risk: (1) You have diabetes OR (2) Family history of glaucoma OR (3)  aged 48 and older OR (3)  American aged 72 and older  3  Osteoporosis Screening: covered every 2 years if you meet one of the following conditions: (1) Have a vertebral abnormality; (2) On glucocorticoid therapy for more than 3 months; (3) Have primary hyperparathyroidism; (4) On osteoporosis medications and need to assess response to drug therapy  5  HIV Screening: covered annually if you're between the age of 12-76  Also covered annually if you are younger than 13 and older than 72 with risk factors for HIV infection  For pregnant patients, it is covered up to 3 times per pregnancy      Immunizations:  Immunization Recommendations   Influenza Vaccine Annual influenza vaccination during flu season is recommended for all persons aged >= 6 months who do not have contraindications   Pneumococcal Vaccine   * Pneumococcal conjugate vaccine = PCV13 (Prevnar 13), PCV15 (Vaxneuvance), PCV20 (Prevnar 20)  * Pneumococcal polysaccharide vaccine = PPSV23 (Pneumovax) Adults 2364 years old: 1-3 doses may be recommended based on certain risk factors  Adults 72 years old: 1-2 doses may be recommended based off what pneumonia vaccine you previously received   Hepatitis B Vaccine 3 dose series if at intermediate or high risk (ex: diabetes, end stage renal disease, liver disease)   Tetanus (Td) Vaccine - COST NOT COVERED BY MEDICARE PART B Following completion of primary series, a booster dose should be given every 10 years to maintain immunity against tetanus  Td may also be given as tetanus wound prophylaxis  Tdap Vaccine - COST NOT COVERED BY MEDICARE PART B Recommended at least once for all adults  For pregnant patients, recommended with each pregnancy  Shingles Vaccine (Shingrix) - COST NOT COVERED BY MEDICARE PART B  2 shot series recommended in those aged 48 and above     Health Maintenance Due:      Topic Date Due   • Colorectal Cancer Screening  04/01/2029   • Hepatitis C Screening  Completed     Immunizations Due:      Topic Date Due   • Influenza Vaccine (1) 09/01/2022     Advance Directives   What are advance directives? Advance directives are legal documents that state your wishes and plans for medical care  These plans are made ahead of time in case you lose your ability to make decisions for yourself  Advance directives can apply to any medical decision, such as the treatments you want, and if you want to donate organs  What are the types of advance directives? There are many types of advance directives, and each state has rules about how to use them  You may choose a combination of any of the following:  · Living will: This is a written record of the treatment you want  You can also choose which treatments you do not want, which to limit, and which to stop at a certain time  This includes surgery, medicine, IV fluid, and tube feedings  · Durable power of  for healthcare [facility]):   This is a written record that states who you want to make healthcare choices for you when you are unable to make them for yourself  This person, called a proxy, is usually a family member or a friend  You may choose more than 1 proxy  · Do not resuscitate (DNR) order:  A DNR order is used in case your heart stops beating or you stop breathing  It is a request not to have certain forms of treatment, such as CPR  A DNR order may be included in other types of advance directives  · Medical directive: This covers the care that you want if you are in a coma, near death, or unable to make decisions for yourself  You can list the treatments you want for each condition  Treatment may include pain medicine, surgery, blood transfusions, dialysis, IV or tube feedings, and a ventilator (breathing machine)  · Values history: This document has questions about your views, beliefs, and how you feel and think about life  This information can help others choose the care that you would choose  Why are advance directives important? An advance directive helps you control your care  Although spoken wishes may be used, it is better to have your wishes written down  Spoken wishes can be misunderstood, or not followed  Treatments may be given even if you do not want them  An advance directive may make it easier for your family to make difficult choices about your care  Weight Management   Why it is important to manage your weight:  Being overweight increases your risk of health conditions such as heart disease, high blood pressure, type 2 diabetes, and certain types of cancer  It can also increase your risk for osteoarthritis, sleep apnea, and other respiratory problems  Aim for a slow, steady weight loss  Even a small amount of weight loss can lower your risk of health problems  How to lose weight safely:  A safe and healthy way to lose weight is to eat fewer calories and get regular exercise   You can lose up about 1 pound a week by decreasing the number of calories you eat by 500 calories each day  Healthy meal plan for weight management:  A healthy meal plan includes a variety of foods, contains fewer calories, and helps you stay healthy  A healthy meal plan includes the following:  · Eat whole-grain foods more often  A healthy meal plan should contain fiber  Fiber is the part of grains, fruits, and vegetables that is not broken down by your body  Whole-grain foods are healthy and provide extra fiber in your diet  Some examples of whole-grain foods are whole-wheat breads and pastas, oatmeal, brown rice, and bulgur  · Eat a variety of vegetables every day  Include dark, leafy greens such as spinach, kale, fer greens, and mustard greens  Eat yellow and orange vegetables such as carrots, sweet potatoes, and winter squash  · Eat a variety of fruits every day  Choose fresh or canned fruit (canned in its own juice or light syrup) instead of juice  Fruit juice has very little or no fiber  · Eat low-fat dairy foods  Drink fat-free (skim) milk or 1% milk  Eat fat-free yogurt and low-fat cottage cheese  Try low-fat cheeses such as mozzarella and other reduced-fat cheeses  · Choose meat and other protein foods that are low in fat  Choose beans or other legumes such as split peas or lentils  Choose fish, skinless poultry (chicken or turkey), or lean cuts of red meat (beef or pork)  Before you cook meat or poultry, cut off any visible fat  · Use less fat and oil  Try baking foods instead of frying them  Add less fat, such as margarine, sour cream, regular salad dressing and mayonnaise to foods  Eat fewer high-fat foods  Some examples of high-fat foods include french fries, doughnuts, ice cream, and cakes  · Eat fewer sweets  Limit foods and drinks that are high in sugar  This includes candy, cookies, regular soda, and sweetened drinks  Exercise:  Exercise at least 30 minutes per day on most days of the week   Some examples of exercise include walking, biking, dancing, and swimming  You can also fit in more physical activity by taking the stairs instead of the elevator or parking farther away from stores  Ask your healthcare provider about the best exercise plan for you  © Copyright [employer] 2018 Information is for End User's use only and may not be sold, redistributed or otherwise used for commercial purposes   All illustrations and images included in CareNotes® are the copyrighted property of [employer]  or [address] [place], [place] [address]pe

## 2022-10-21 NOTE — ASSESSMENT & PLAN NOTE
Control risk factors including hypertension, hyperlipidemia  Continue on Crestor, beta-blocker, aspirin    Follow-up with Cardiology

## 2022-10-21 NOTE — PROGRESS NOTES
Assessment and Plan:     Problem List Items Addressed This Visit        Endocrine    Impaired fasting glucose     Hemoglobin A1c of 5 5%  Continue watch dietary intake of carbohydrates  Continues to exercise  Relevant Orders    Hemoglobin A1C       Respiratory    LENY and COPD overlap syndrome (HCC)     Uses CPAP  Follow-up with pulmonology            Cardiovascular and Mediastinum    Coronary artery disease involving native coronary artery of native heart without angina pectoris     Control risk factors including hypertension, hyperlipidemia  Continue on Crestor, beta-blocker, aspirin  Follow-up with Cardiology         Relevant Orders    CBC and differential    TSH, 3rd generation with Free T4 reflex    Paroxysmal atrial fibrillation ([facility] [address] )     Patient did have interrogation of pacemaker  No episodes of atrial fibrillation in the months of July, August and September  Patient remains on sotalol         Relevant Orders    TSH, 3rd generation with Free T4 reflex    Sick sinus syndrome ([facility] [address] )     Pacemaker placed  Patient is doing well  Patient does have follow-up with Cardiology            Other    Benign prostatic hyperplasia with nocturia    Relevant Orders    PSA, Total Screen    Medicare annual wellness visit, subsequent - Primary     Subsequent annual wellness visit completed         Mixed hyperlipidemia     Best cholesterol that he has had in years  Continue on Crestor 10 mg once daily  Repeat lipid panel in 6 months         Relevant Orders    Comprehensive metabolic panel    Lipid panel    Prostate cancer screening     Screening PSA with next set of labs in April         Relevant Orders    PSA, Total Screen      Other Visit Diagnoses     Need for vaccination        Relevant Orders    influenza vaccine, high-dose, PF 0 7 mL (FLUZONE HIGH-DOSE)        BMI Counseling: Body mass index is 29 57 kg/m²   The BMI is above normal  Nutrition recommendations include reducing intake of saturated and trans fat and reducing intake of cholesterol  Exercise recommendations include exercising 3-5 times per week  Rationale for BMI follow-up plan is due to patient being overweight or obese  Depression Screening and Follow-up Plan: Patient was screened for depression during today's encounter  They screened negative with a PHQ-2 score of 0  Preventive health issues were discussed with patient, and age appropriate screening tests were ordered as noted in patient's After Visit Summary  Personalized health advice and appropriate referrals for health education or preventive services given if needed, as noted in patient's After Visit Summary       History of Present Illness:     Patient presents for Medicare Annual Wellness visit    Patient Care Team:  Alicia Vargas DO as PCP - General  Jeanetta Hazy, DO Marylene Czech, MD (Gastroenterology)  MD Karma Harris MD (Ophthalmology)  Era Hart MD as Cardiologist (Cardiology)     Problem List:     Patient Active Problem List   Diagnosis   • 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   • External hemorrhoids   • Gastro-esophageal reflux disease without esophagitis   • Mixed hyperlipidemia   • Environmental and seasonal allergies   • Coronary artery disease involving native coronary artery of native heart without angina pectoris   • Chronic back pain   • Organic impotence   • Primary osteoarthritis of left knee   • Impaired fasting glucose   • Benign prostatic hyperplasia with nocturia   • Osteoarthritis of fingers of both hands   • Need for malaria prophylaxis   • Cataract   • LENY and COPD overlap syndrome ([address] )   • Medicare annual wellness visit, subsequent   • Premature ventricular beats   • Trigger thumb of right hand   • Trigger finger, right ring finger   • Carpal tunnel syndrome, bilateral   • Intermittent palpitations   • CPAP (continuous positive airway pressure) dependence   • PUD (peptic ulcer disease)   • Sick sinus syndrome (HCC)   • Intermittent complete heart block (HCC)   • Junctional rhythm   • Prostate cancer screening   • S/P ICD (internal cardiac defibrillator) procedure   • Non-sustained ventricular tachycardia   • Paroxysmal atrial fibrillation (HCC)   • Neck pain   • Status post cervical spinal fusion   • Cervical radiculopathy   • Low back pain with sciatica      Past Medical and Surgical History:     Past Medical History:   Diagnosis Date   • Abrasion of left foot     LAST ASSESSED: 9/23/13   • Achilles tendinitis, unspecified leg     LAST ASSESSED: 11/30/12   • Allergic rhinitis     LAST ASSESSED: 11/25/13   • Allergic rhinitis 06/25/2008    LAST ASSESSED: 6/25/08   • Arthritis    • Cervicalgia 04/22/2011    LAST ASSESSED: 4/22/11   • Chronic pain disorder    • Colon polyp    • Coronary artery disease    • CPAP (continuous positive airway pressure) dependence    • Dysfunction of left eustachian tube     LAST ASSESSED: 9/23/13   • Epistaxis     LAST ASSESSED: 5/27/15   • First degree atrioventricular block     LAST ASSESSED: 7/10/17   • Gastric ulcer 10/07/2011    LAST ASSESSED: 3/9/15   • GERD (gastroesophageal reflux disease)    • Hearing aid worn    • Hemorrhoids 05/13/2011    LAST ASSESSED: 5/13/11   • Hyperlipidemia    • Hypertension    • Irregular heart beat    • Long term use of drug     LAST ASSESSED: 10/11/12   • Myalgia 12/21/2007    LAST ASSESSED: 12/21/07   • Myositis 12/21/2007    LAST ASSESSED: 12/21/07   • Numbness and tingling of foot     LAST ASSESSED: 3/9/15   • Pacemaker    • Premature ventricular beats     states cardiologist ordered Zio ambulatory cardiac monitor   • Sleep apnea    • Wears glasses      Past Surgical History:   Procedure Laterality Date   • CARDIAC CATHETERIZATION  2017   • CARDIAC ELECTROPHYSIOLOGY PROCEDURE N/A 10/25/2021    Procedure: CARDIAC EPS;  Surgeon: Sania Ramos MD;  [facility]: [facility];   Service: Cardiology   • CARDIAC ELECTROPHYSIOLOGY PROCEDURE Left 10/25/2021    Procedure: Cardiac icd implant; Surgeon: John Lopez MD;  Location: [facility]; Service: Cardiology   • CARDIAC PACEMAKER PLACEMENT     • CARDIOVERSION N/A 10/25/2021    Procedure: Cardioversion;  Surgeon: John Lopez MD;  [facility]: [facility]; Service: Cardiology   • CATARACT EXTRACTION Right    • CERVICAL FUSION      pt thinks C4-C5   • CHOLECYSTECTOMY      LAPAROSCOPIC; LAST ASSESSED: 10/17/17   • COLONOSCOPY      COMPLETE; 3-4 YEARS AGO BY [facility]; LAST ASSESSED: 14   • MYRINGOTOMY W/ TUBES      WITH VENTILATING TUBE INSERTION   • PA INCISE FINGER TENDON SHEATH Right 10/14/2022    Procedure: RELEASE TRIGGER FINGER,RING AND LONG TENOSYNOVECTOMY FDS +FDP; Surgeon: Ramandeep Jesus MD;  Location: [facility];  Service: Orthopedics   • PA REVISE MEDIAN N/CARPAL TUNNEL SURG Left 2021    Procedure: RELEASE CARPAL TUNNEL;  Surgeon: Ramandeep Jesus MD;  [facility]: [facility];  Service: Orthopedics   • PA REVISE MEDIAN N/CARPAL TUNNEL SURG Right 10/14/2022    Procedure: RELEASE CARPAL TUNNEL;  Surgeon: Ramandeep Jesus MD;  Location: [facility];  Service: Orthopedics   • VASECTOMY        Family History:     Family History   Problem Relation Age of Onset   • Cancer Mother         SOFT TISSUE CANCER ON RT SIDE CHEST WALL AND    • Prostate cancer Maternal Uncle       Social History:     Social History     Socioeconomic History   • Marital status: /Civil Union     Spouse name: None   • Number of children: None   • Years of education: None   • Highest education level: None   Occupational History   • None   Tobacco Use   • Smoking status: Former Smoker     Quit date:      Years since quittin 8   • Smokeless tobacco: Never Used   Vaping Use   • Vaping Use: Never used   Substance and Sexual Activity   • Alcohol use:  Yes     Alcohol/week: 7 0 standard drinks     Types: 7 Glasses of wine per week     Comment: Once in a while with a meal   • Drug use: No   • Sexual activity: Not Currently Other Topics Concern   • None   Social History Narrative   • None     Social Determinants of Health     Financial Resource Strain: Low Risk    • Difficulty of Paying Living Expenses: Not very hard   Food Insecurity: No Food Insecurity   • Worried About Running Out of Food in the Last Year: Never true   • Ran Out of Food in the Last Year: Never true   Transportation Needs: No Transportation Needs   • Lack of Transportation (Medical): No   • Lack of Transportation (Non-Medical):  No   Physical Activity: Not on file   Stress: Not on file   Social Connections: Not on file   Intimate Partner Violence: Not on file   Housing Stability: Low Risk    • Unable to Pay for Housing in the Last Year: No   • Number of Places Lived in the Last Year: 1   • Unstable Housing in the Last Year: No      Medications and Allergies:     Current Outpatient Medications   Medication Sig Dispense Refill   • aspirin (ECOTRIN LOW STRENGTH) 81 mg EC tablet Take 81 mg by mouth daily     • celecoxib (CeleBREX) 200 mg capsule Take 1 capsule (200 mg total) by mouth daily (Patient taking differently: Take 200 mg by mouth daily as needed) 90 capsule 1   • Coenzyme Q10 (COQ10) 200 MG CAPS Take 1 tablet by mouth daily     • COLLAGEN-CHOND-HYALURONIC ACID PO Take 1 capsule by mouth in the morning     • finasteride (PROSCAR) 5 mg tablet Take 1 tablet (5 mg total) by mouth daily 90 tablet 1   • fluticasone (VERAMYST) 27 5 MCG/SPRAY nasal spray 2 sprays into each nostril daily     • Glucosamine-Chondroitin (GLUCOSAMINE CHONDR COMPLEX PO) Take 1 tablet by mouth daily     • Omega-3 Fatty Acids (FISH OIL) 1000 MG CPDR Take by mouth daily     • oxyCODONE (Roxicodone) 5 immediate release tablet Take 1 tablet (5 mg total) by mouth every 6 (six) hours as needed for severe pain for up to 10 days Max Daily Amount: 20 mg 5 tablet 0   • pantoprazole (PROTONIX) 40 mg tablet TAKE 1 TABLET BY MOUTH EVERY DAY 90 tablet 1   • rosuvastatin (CRESTOR) 10 MG tablet Take 1 tablet (10 mg total) by mouth daily 90 tablet 1   • sotalol (BETAPACE) 80 mg tablet TAKE 1 TABLET BY MOUTH EVERY 12 HOURS  180 tablet 3     No current facility-administered medications for this visit  Allergies   Allergen Reactions   • Amoxicillin Anaphylaxis   • Augmentin [Amoxicillin-Pot Clavulanate] Anaphylaxis   • Acetaminophen      Rapid heart rate   • Apple - Food Allergy    • Cherry Flavor - Food Allergy Other (See Comments)     States allergy is to raw cherries difficulty breathing   • Pollen Extract    • Dye [Iodinated Diagnostic Agents] Itching      Immunizations:     Immunization History   Administered Date(s) Administered   • COVID-19 PFIZER VACCINE 0 3 ML IM 02/19/2021, 03/11/2021, 10/02/2021   • COVID-19 Pfizer vac (Brown-sucrose, gray cap) 12 yr+ IM 06/23/2022   • INFLUENZA 10/18/2015, 09/30/2017, 10/09/2017, 09/28/2018, 09/07/2021   • Influenza Quadrivalent Preservative Free 3 years and older IM 10/09/2017   • Influenza Quadrivalent, 6-35 Months IM 09/23/2016   • Influenza, high dose seasonal 0 7 mL 10/02/2020, 09/07/2021   • Influenza, recombinant, quadrivalent,injectable, preservative free 09/09/2019   • Influenza, seasonal, injectable 12/21/2007, 12/12/2008, 01/14/2010   • Pneumococcal Conjugate 13-Valent 01/27/2018   • Pneumococcal Polysaccharide PPV23 09/09/2019   • Td (adult), adsorbed 02/15/2010      Health Maintenance:         Topic Date Due   • Colorectal Cancer Screening  04/01/2029   • Hepatitis C Screening  Completed         Topic Date Due   • Influenza Vaccine (1) 09/01/2022      Medicare Health Risk Assessment:     /80   Pulse 63   Ht 6' (1 829 m)   Wt 98 9 kg (218 lb)   SpO2 97%   BMI 29 57 kg/m²      Alison Christiansen is here for his Initial Wellness and Subsequent Wellness visit  Last Medicare Wellness visit information reviewed, patient interviewed, no change since last AWV  Health Risk Assessment:   Patient rates overall health as very good   Patient feels that their physical health rating is same  Patient is satisfied with their life  Eyesight was rated as same  Hearing was rated as same  Patient feels that their emotional and mental health rating is same  Patients states they are never, rarely angry  Patient states they are never, rarely unusually tired/fatigued  Pain experienced in the last 7 days has been none  Patient states that he has experienced no weight loss or gain in last 6 months  Depression Screening:   PHQ-2 Score: 0      Fall Risk Screening: In the past year, patient has experienced: no history of falling in past year      Home Safety:  Patient does not have trouble with stairs inside or outside of their home  Patient has working smoke alarms and has working carbon monoxide detector  Home safety hazards include: none  Nutrition:   Current diet is Regular  Medications:   Patient is not currently taking any over-the-counter supplements  Patient is able to manage medications  Activities of Daily Living (ADLs)/Instrumental Activities of Daily Living (IADLs):   Walk and transfer into and out of bed and chair?: Yes  Dress and groom yourself?: Yes    Bathe or shower yourself?: Yes    Feed yourself? Yes  Do your laundry/housekeeping?: Yes  Manage your money, pay your bills and track your expenses?: Yes  Make your own meals?: Yes    Do your own shopping?: Yes    Previous Hospitalizations:   Any hospitalizations or ED visits within the last 12 months?: No      Advance Care Planning:   Living will: Yes    Durable POA for healthcare:  Yes    Advanced directive: Yes    Advanced directive counseling given: No    Five wishes given: No    Patient declined ACP directive: No    End of Life Decisions reviewed with patient: Yes    Provider agrees with end of life decisions: Yes      PREVENTIVE SCREENINGS      Cardiovascular Screening:    General: Screening Not Indicated and History Lipid Disorder      Diabetes Screening:     General: Screening Current      Colorectal Cancer Screening:     General: Screening Current      Prostate Cancer Screening:    General: Screening Current      Osteoporosis Screening:    General: Screening Not Indicated      Abdominal Aortic Aneurysm (AAA) Screening:    Risk factors include: age between 73-69 yo and tobacco use        Lung Cancer Screening:     General: Screening Not Indicated      Hepatitis C Screening:    General: Screening Current    Screening, Brief Intervention, and Referral to Treatment (SBIRT)    Screening  Typical number of drinks in a day: 1  Typical number of drinks in a week: 5  Interpretation: Low risk drinking behavior  AUDIT-C Screenin) How often did you have a drink containing alcohol in the past year? never  2) How many drinks did you have on a typical day when you were drinking in the past year? 0  3) How often did you have 6 or more drinks on one occasion in the past year? never    AUDIT-C Score: 0  Interpretation: Score 0-3 (male): Negative screen for alcohol misuse    Single Item Drug Screening:  How often have you used an illegal drug (including marijuana) or a prescription medication for non-medical reasons in the past year? never    Single Item Drug Screen Score: 0  Interpretation: Negative screen for possible drug use disorder    Brief Intervention  Alcohol & drug use screenings were reviewed  No concerns regarding substance use disorder identified  Carrillo Hernandes DO    68-year-old male with past medical history of heart disease, hypertension, hyperlipidemia presents for subsequent annual wellness visit and follow-up of chronic conditions  Patient does follow up closely with Cardiology  He did have adjustment of anti arithmetic medication  He is presently on sotalol  Patient does complain of feeling somewhat winded when he is walking up hills  If he is walking on treadmill or walking on flat ground he has no issues  He has been exercising regularly  Pacemaker was interrogated    No episodes of ventricular tachycardia or atrial fibrillation  Labs reviewed which showed best cholesterol that he has had in years  Patient would like to receive flu vaccination      Review of Systems   Constitutional: Negative  HENT: Negative  Eyes: Negative  Respiratory: Negative  Cardiovascular: Negative  Gastrointestinal: Negative  Endocrine: Negative  Genitourinary: Negative  Musculoskeletal: Negative  Skin: Negative  Allergic/Immunologic: Negative  Neurological: Negative  Hematological: Negative  Psychiatric/Behavioral: Negative  All other systems reviewed and are negative  Physical Exam  Vitals and nursing note reviewed  Constitutional:       Appearance: Normal appearance  He is well-developed and normal weight  HENT:      Head: Normocephalic and atraumatic  Right Ear: External ear normal       Left Ear: External ear normal       Nose: Nose normal    Eyes:      General: No scleral icterus  Right eye: No discharge  Left eye: No discharge  Extraocular Movements: Extraocular movements intact  Conjunctiva/sclera: Conjunctivae normal       Pupils: Pupils are equal, round, and reactive to light  Cardiovascular:      Rate and Rhythm: Normal rate and regular rhythm  Heart sounds: Normal heart sounds  Pulmonary:      Effort: Pulmonary effort is normal       Breath sounds: Normal breath sounds  Abdominal:      General: Abdomen is flat  Bowel sounds are normal       Palpations: Abdomen is soft  Genitourinary:     Penis: Normal        Prostate: Normal       Rectum: Normal    Musculoskeletal:         General: Normal range of motion  Cervical back: Normal range of motion and neck supple  Comments: Incisions on right hand from carpal tunnel release are clean dry and intact   Skin:     General: Skin is warm and dry  Neurological:      General: No focal deficit present  Mental Status: He is alert and oriented to person, place, and time  Mental status is at baseline  Psychiatric:         Mood and Affect: Mood normal          Behavior: Behavior normal          Thought Content: Thought content normal          Judgment: Judgment normal        Recent Results (from the past 1008 hour(s))   CBC and differential    Collection Time: 10/04/22  8:52 AM   Result Value Ref Range    WBC 5 37 4 31 - 10 16 Thousand/uL    RBC 5 28 3 88 - 5 62 Million/uL    Hemoglobin 16 0 12 0 - 17 0 g/dL    Hematocrit 48 4 36 5 - 49 3 %    MCV 92 82 - 98 fL    MCH 30 3 26 8 - 34 3 pg    MCHC 33 1 31 4 - 37 4 g/dL    RDW 13 2 11 6 - 15 1 %    MPV 9 5 8 9 - 12 7 fL    Platelets 182 174 - 259 Thousands/uL    nRBC 0 /100 WBCs    Neutrophils Relative 44 43 - 75 %    Immat GRANS % 0 0 - 2 %    Lymphocytes Relative 42 14 - 44 %    Monocytes Relative 11 4 - 12 %    Eosinophils Relative 2 0 - 6 %    Basophils Relative 1 0 - 1 %    Neutrophils Absolute 2 36 1 85 - 7 62 Thousands/µL    Immature Grans Absolute 0 01 0 00 - 0 20 Thousand/uL    Lymphocytes Absolute 2 27 0 60 - 4 47 Thousands/µL    Monocytes Absolute 0 59 0 17 - 1 22 Thousand/µL    Eosinophils Absolute 0 08 0 00 - 0 61 Thousand/µL    Basophils Absolute 0 06 0 00 - 0 10 Thousands/µL   Comprehensive metabolic panel    Collection Time: 10/04/22  8:52 AM   Result Value Ref Range    Sodium 139 135 - 147 mmol/L    Potassium 4 5 3 5 - 5 3 mmol/L    Chloride 105 96 - 108 mmol/L    CO2 28 21 - 32 mmol/L    ANION GAP 6 4 - 13 mmol/L    BUN 21 5 - 25 mg/dL    Creatinine 0 92 0 60 - 1 30 mg/dL    Glucose, Fasting 102 (H) 65 - 99 mg/dL    Calcium 9 6 8 4 - 10 2 mg/dL    AST 26 13 - 39 U/L    ALT 35 7 - 52 U/L    Alkaline Phosphatase 60 34 - 104 U/L    Total Protein 7 1 6 4 - 8 4 g/dL    Albumin 4 3 3 5 - 5 0 g/dL    Total Bilirubin 1 71 (H) 0 20 - 1 00 mg/dL    eGFR 85 ml/min/1 73sq m   Hemoglobin A1C    Collection Time: 10/04/22  8:52 AM   Result Value Ref Range    Hemoglobin A1C 5 5 Normal 3 8-5 6%; PreDiabetic 5 7-6 4%;  Diabetic >=6 5%; Glycemic control for adults with diabetes <7 0% %     mg/dl   Lipid panel    Collection Time: 10/04/22  8:52 AM   Result Value Ref Range    Cholesterol 128 See Comment mg/dL    Triglycerides 135 See Comment mg/dL    HDL, Direct 36 (L) >=40 mg/dL    LDL Calculated 65 0 - 100 mg/dL    Non-HDL-Chol (CHOL-HDL) 92 mg/dl   TSH, 3rd generation with Free T4 reflex    Collection Time: 10/04/22  8:52 AM   Result Value Ref Range    TSH 3RD GENERATON 2 052 0 450 - 4 500 uIU/mL   ECG 12 lead    Collection Time: 10/04/22  9:05 AM   Result Value Ref Range    Ventricular Rate 56 BPM    Atrial Rate 56 BPM    CO Interval 186 ms    QRSD Interval 180 ms    QT Interval 504 ms    QTC Interval 486 ms    P Axis 41 degrees    QRS Axis -37 degrees    T Wave Axis 57 degrees

## 2022-10-27 ENCOUNTER — OFFICE VISIT (OUTPATIENT)
Dept: OBGYN CLINIC | Facility: CLINIC | Age: 67
End: 2022-10-27

## 2022-10-27 ENCOUNTER — OFFICE VISIT (OUTPATIENT)
Dept: GASTROENTEROLOGY | Facility: CLINIC | Age: 67
End: 2022-10-27
Payer: MEDICARE

## 2022-10-27 VITALS
BODY MASS INDEX: 29.59 KG/M2 | DIASTOLIC BLOOD PRESSURE: 73 MMHG | HEART RATE: 78 BPM | SYSTOLIC BLOOD PRESSURE: 163 MMHG | WEIGHT: 218.5 LBS | HEIGHT: 72 IN

## 2022-10-27 VITALS — WEIGHT: 218 LBS | BODY MASS INDEX: 29.53 KG/M2 | RESPIRATION RATE: 16 BRPM | HEIGHT: 72 IN

## 2022-10-27 DIAGNOSIS — I48.91 ATRIAL FIBRILLATION, UNSPECIFIED TYPE (HCC): ICD-10-CM

## 2022-10-27 DIAGNOSIS — Z98.890 S/P TRIGGER FINGER RELEASE: Primary | ICD-10-CM

## 2022-10-27 DIAGNOSIS — K64.1 GRADE II HEMORRHOIDS: Primary | ICD-10-CM

## 2022-10-27 DIAGNOSIS — Z98.890 S/P CARPAL TUNNEL RELEASE: ICD-10-CM

## 2022-10-27 DIAGNOSIS — K22.70 BARRETT'S ESOPHAGUS WITHOUT DYSPLASIA: ICD-10-CM

## 2022-10-27 PROCEDURE — 99213 OFFICE O/P EST LOW 20 MIN: CPT | Performed by: INTERNAL MEDICINE

## 2022-10-27 PROCEDURE — 99024 POSTOP FOLLOW-UP VISIT: CPT | Performed by: SURGERY

## 2022-10-27 NOTE — PROGRESS NOTES
Assessment/Plan:  Patient ID: Vielka Jama 78 y o  male   Surgery: Release Carpal Tunnel - Right and Release Trigger Finger,ring And Long Tenosynovectomy Fds +fdp - Right  Date of Surgery: 10/14/2022    Sutures removed today   Begin scar massage  Overall patient has significant improvement in CTS and complete resolution of trigger fingers   May resume activity as tolerated     Follow Up:  PRN    To Do Next Visit:         CHIEF COMPLAINT:  Chief Complaint   Patient presents with   • Right Wrist - Post-op   • Right Ring Finger - Post-op   • Right Middle Finger - Post-op         SUBJECTIVE:  Vielka Jama is a 78y o  year old male who presents for follow up after Release Carpal Tunnel - Right and Release Trigger Finger,ring And Long Tenosynovectomy Fds +fdp - Right  Today patient has significant improvement of CTS, still with some mild numbness in the index and thumb finger tips but this is gradually getting better  TF resolved  Overall happy with his results          PHYSICAL EXAMINATION:  General: well developed and well nourished, alert, oriented times 3 and appears comfortable  Psychiatric: Normal    MUSCULOSKELETAL EXAMINATION:  Incision: Clean, dry, intact  Surgery Site: normal, no evidence of infection   Range of Motion: opposition intact and full composite fist possible  Neurovascular status: Neuro intact, good cap refill  Activity Restrictions: No restrictions  Done today: Sutures out      STUDIES REVIEWED:  No Studies to review      PROCEDURES PERFORMED:  Procedures  No Procedures performed today      Boo Orozco

## 2022-10-27 NOTE — PROGRESS NOTES
[facility] - Outpatient Follow-up Note  Robina Novoa 78 y o  male MRN: [id number]  Encounter: [id number]          ASSESSMENT AND PLAN:      1  Grade II hemorrhoids  Patient is doing well from the hemorrhoid perspective  He had hemorrhoidal banding done  He has not had any rectal bleeding since then  Denies any change in bowel habits  He is trying to eat high-fiber diet  I have advised him not to lift heavy weights and squat  2  Last's esophagus without dysplasia  Is history of Barretts esophagus  It has been stable  He had endoscopy in 2021  Next endoscopy is due in one year  He denies any dysphagia odynophagia  There is no chest pain, cough or wheezing  There is no palpitation orthopnea  He is following with cardiology  He does have history of atrial fibrillation  3  Atrial fibrillation, unspecified type (HCC)  Stable atrial fibrillation  Patient is following with primary care    ______________________________________________________________________    SUBJECTIVE:  Denies any abdominal pain or discomfort there is a nausea vomiting  There is no chest pain  He is currently on proton pump inhibitor  He is also maintaining reflux precautions  There is no dysphagia or odynophagia  REVIEW OF SYSTEMS IS OTHERWISE NEGATIVE        Historical Information   Past Medical History:   Diagnosis Date   • Abrasion of left foot     LAST ASSESSED: 9/23/13   • Achilles tendinitis, unspecified leg     LAST ASSESSED: 11/30/12   • Allergic rhinitis     LAST ASSESSED: 11/25/13   • Allergic rhinitis 06/25/2008    LAST ASSESSED: 6/25/08   • Arthritis    • Cervicalgia 04/22/2011    LAST ASSESSED: 4/22/11   • Chronic pain disorder    • Colon polyp    • Coronary artery disease    • CPAP (continuous positive airway pressure) dependence    • Dysfunction of left eustachian tube     LAST ASSESSED: 9/23/13   • Epistaxis     LAST ASSESSED: 5/27/15   • First degree atrioventricular block LAST ASSESSED: 7/10/17   • Gastric ulcer 10/07/2011    LAST ASSESSED: 3/9/15   • GERD (gastroesophageal reflux disease)    • Hearing aid worn    • Hemorrhoids 05/13/2011    LAST ASSESSED: 5/13/11   • Hyperlipidemia    • Hypertension    • Irregular heart beat    • Long term use of drug     LAST ASSESSED: 10/11/12   • Myalgia 12/21/2007    LAST ASSESSED: 12/21/07   • Myositis 12/21/2007    LAST ASSESSED: 12/21/07   • Numbness and tingling of foot     LAST ASSESSED: 3/9/15   • Pacemaker    • Premature ventricular beats     states cardiologist ordered [employer] ambulatory cardiac monitor   • Sleep apnea    • Wears glasses      Past Surgical History:   Procedure Laterality Date   • CARDIAC CATHETERIZATION  2017   • CARDIAC ELECTROPHYSIOLOGY PROCEDURE N/A 10/25/2021    Procedure: CARDIAC EPS;  Surgeon: Karishma Sher MD;  [facility]; Service: Cardiology   • CARDIAC ELECTROPHYSIOLOGY PROCEDURE Left 10/25/2021    Procedure: Cardiac icd implant;  Surgeon: Karishma Sher MD;  [facility]; Service: Cardiology   • CARDIAC PACEMAKER PLACEMENT     • CARDIOVERSION N/A 10/25/2021    Procedure: Cardioversion;  Surgeon: Kraishma Sher MD;  [facility]; Service: Cardiology   • CATARACT EXTRACTION Right    • CERVICAL FUSION      pt thinks C4-C5   • CHOLECYSTECTOMY      LAPAROSCOPIC; LAST ASSESSED: 10/17/17   • COLONOSCOPY      COMPLETE; 3-4 YEARS AGO BY [facility]; LAST ASSESSED: 8/18/14   • MYRINGOTOMY W/ TUBES      WITH VENTILATING TUBE INSERTION   • VA INCISE FINGER TENDON SHEATH Right 10/14/2022    Procedure: RELEASE TRIGGER FINGER,RING AND LONG TENOSYNOVECTOMY FDS +FDP;   Surgeon: Whitney Brown MD;  [facility];  Service: Orthopedics   • VA REVISE MEDIAN N/CARPAL TUNNEL SURG Left 07/16/2021    Procedure: RELEASE CARPAL TUNNEL;  Surgeon: Whitney Brown MD;  [facility];  Service: Orthopedics   • VA REVISE MEDIAN N/CARPAL TUNNEL SURG Right 10/14/2022 Procedure: RELEASE CARPAL TUNNEL;  Surgeon: Minerva Salas MD;  [facility];  Service: Orthopedics   • VASECTOMY       Social History   Social History     Substance and Sexual Activity   Alcohol Use Yes   • Alcohol/week: 7 0 standard drinks   • Types: 7 Glasses of wine per week    Comment: Once in a while with a meal     Social History     Substance and Sexual Activity   Drug Use No     Social History     Tobacco Use   Smoking Status Former Smoker   • Quit date:    • Years since quittin 8   Smokeless Tobacco Never Used     Family History   Problem Relation Age of Onset   • Cancer Mother         SOFT TISSUE CANCER ON RT SIDE CHEST WALL AND    • Prostate cancer Maternal Uncle        Meds/Allergies       Current Outpatient Medications:   •  aspirin (ECOTRIN LOW STRENGTH) 81 mg EC tablet  •  celecoxib (CeleBREX) 200 mg capsule  •  Coenzyme Q10 (COQ10) 200 MG CAPS  •  COLLAGEN-CHOND-HYALURONIC ACID PO  •  finasteride (PROSCAR) 5 mg tablet  •  fluticasone (VERAMYST) 27 5 MCG/SPRAY nasal spray  •  Glucosamine-Chondroitin (GLUCOSAMINE CHONDR COMPLEX PO)  •  Omega-3 Fatty Acids (FISH OIL) 1000 MG CPDR  •  pantoprazole (PROTONIX) 40 mg tablet  •  rosuvastatin (CRESTOR) 10 MG tablet  •  sotalol (BETAPACE) 80 mg tablet    Allergies   Allergen Reactions   • Amoxicillin Anaphylaxis   • Augmentin [Amoxicillin-Pot Clavulanate] Anaphylaxis   • Acetaminophen      Rapid heart rate   • Apple - Food Allergy    • Cherry Flavor - Food Allergy Other (See Comments)     States allergy is to raw cherries difficulty breathing   • Pollen Extract    • Dye [Iodinated Diagnostic Agents] Itching           Objective     Blood pressure 163/73, pulse 78, height 6' (1 829 m), weight 99 1 kg (218 lb 8 oz)  Body mass index is 29 63 kg/m²        PHYSICAL EXAM:      General Appearance:   Alert, cooperative, no distress   HEENT:   Normocephalic, atraumatic, anicteric      Neck:  Supple, symmetrical, trachea midline   Lungs:   Clear to auscultation bilaterally; no rales, rhonchi or wheezing; respirations unlabored    Heart[de-identified]   Regular rate and rhythm; no murmur, rub, or gallop  Abdomen:   Soft, non-tender, non-distended; normal bowel sounds; no masses, no organomegaly    Genitalia:   Deferred    Rectal:   Deferred    Extremities:  No cyanosis, clubbing or edema    Pulses:  2+ and symmetric    Skin:  No jaundice, rashes, or lesions    Lymph nodes:  No palpable cervical lymphadenopathy        Lab Results:   No visits with results within 1 Day(s) from this visit     Latest known visit with results is:   Appointment on 10/04/2022   Component Date Value   • WBC 10/04/2022 5 37    • RBC 10/04/2022 5 28    • Hemoglobin 10/04/2022 16 0    • Hematocrit 10/04/2022 48 4    • MCV 10/04/2022 92    • MCH 10/04/2022 30 3    • MCHC 10/04/2022 33 1    • RDW 10/04/2022 13 2    • MPV 10/04/2022 9 5    • Platelets 95/49/3283 181    • nRBC 10/04/2022 0    • Neutrophils Relative 10/04/2022 44    • Immat GRANS % 10/04/2022 0    • Lymphocytes Relative 10/04/2022 42    • Monocytes Relative 10/04/2022 11    • Eosinophils Relative 10/04/2022 2    • Basophils Relative 10/04/2022 1    • Neutrophils Absolute 10/04/2022 2 36    • Immature Grans Absolute 10/04/2022 0 01    • Lymphocytes Absolute 10/04/2022 2 27    • Monocytes Absolute 10/04/2022 0 59    • Eosinophils Absolute 10/04/2022 0 08    • Basophils Absolute 10/04/2022 0 06    • Sodium 10/04/2022 139    • Potassium 10/04/2022 4 5    • Chloride 10/04/2022 105    • CO2 10/04/2022 28    • ANION GAP 10/04/2022 6    • BUN 10/04/2022 21    • Creatinine 10/04/2022 0 92    • Glucose, Fasting 10/04/2022 102 (A)   • Calcium 10/04/2022 9 6    • AST 10/04/2022 26    • ALT 10/04/2022 35    • Alkaline Phosphatase 10/04/2022 60    • Total Protein 10/04/2022 7 1    • Albumin 10/04/2022 4 3    • Total Bilirubin 10/04/2022 1 71 (A)   • eGFR 10/04/2022 85    • Hemoglobin A1C 10/04/2022 5 5    • EAG 10/04/2022 111    • Cholesterol 10/04/2022 128    • Triglycerides 10/04/2022 135    • HDL, Direct 10/04/2022 36 (A)   • LDL Calculated 10/04/2022 65    • Non-HDL-Chol (CHOL-HDL) 10/04/2022 92    • TSH 3RD GENERATON 10/04/2022 2 052          Radiology Results:   No results found

## 2022-11-01 ENCOUNTER — REMOTE DEVICE CLINIC VISIT (OUTPATIENT)
Dept: CARDIOLOGY CLINIC | Facility: CLINIC | Age: 67
End: 2022-11-01

## 2022-11-01 DIAGNOSIS — Z95.810 AICD (AUTOMATIC CARDIOVERTER/DEFIBRILLATOR) PRESENT: Primary | ICD-10-CM

## 2022-11-01 NOTE — PROGRESS NOTES
MDT-DUAL CHAMBER ICD (AAI-DDD MODE) / ACTIVE SYSTEM IS MRI CONDITIONAL   CARELINK TRANSMISSION: BATTERY STATUS "10 YRS " AP 25%  100%  ALL AVAILABLE LEAD PARAMETERS WITHIN NORMAL LIMITS  5 NSVT NOTED; NO THERAPIES GIVEN  RATES 150-190 BPM  PT ON SOTALOL & EF 60% (2022 ECHO)  OPTI-VOL WITHIN NORMAL LIMITS  NORMAL DEVICE FUNCTION   NC

## 2022-11-07 DIAGNOSIS — T36.0X5A ALLERGIC REACTION TO PENICILLIN, INITIAL ENCOUNTER: Primary | ICD-10-CM

## 2022-11-07 RX ORDER — EPINEPHRINE 0.3 MG/.3ML
0.3 INJECTION SUBCUTANEOUS ONCE
Qty: 0.6 ML | Refills: 0 | Status: SHIPPED | OUTPATIENT
Start: 2022-11-07 | End: 2023-02-02

## 2022-11-12 ENCOUNTER — NURSE TRIAGE (OUTPATIENT)
Dept: OTHER | Facility: OTHER | Age: 67
End: 2022-11-12

## 2022-11-12 DIAGNOSIS — M79.10 MYALGIA: ICD-10-CM

## 2022-11-12 DIAGNOSIS — R50.9 FEVER AND CHILLS: ICD-10-CM

## 2022-11-12 DIAGNOSIS — J34.89 RHINORRHEA: ICD-10-CM

## 2022-11-12 DIAGNOSIS — R09.81 NASAL CONGESTION: ICD-10-CM

## 2022-11-12 DIAGNOSIS — U07.1 COVID-19: Primary | ICD-10-CM

## 2022-11-12 RX ORDER — NIRMATRELVIR AND RITONAVIR 300-100 MG
3 KIT ORAL 2 TIMES DAILY
Qty: 30 TABLET | Refills: 0 | Status: SHIPPED | OUTPATIENT
Start: 2022-11-12 | End: 2022-11-17

## 2022-11-12 NOTE — TELEPHONE ENCOUNTER
Reason for Disposition  • HIGH RISK for severe COVID complications (e g , weak immune system, age > 59 years, obesity with BMI > 22, pregnant, chronic lung disease or other chronic medical condition)  (Exception: Already seen by PCP and no new or worsening symptoms )    Answer Assessment - Initial Assessment Questions  1  COVID-19 DIAGNOSIS: "Who made your COVID-19 diagnosis?" "Was it confirmed by a positive lab test or self-test?" If not diagnosed by a doctor (or NP/PA), ask "Are there lots of cases (community spread) where you live?" Note: See public health department website, if unsure  Positive home test    2  COVID-19 EXPOSURE: "Was there any known exposure to COVID before the symptoms began?" CDC Definition of close contact: within 6 feet (2 meters) for a total of 15 minutes or more over a 24-hour period  Unknown    3  ONSET: "When did the COVID-19 symptoms start?"    Wednesday    4  WORST SYMPTOM: "What is your worst symptom?" (e g , cough, fever, shortness of breath, muscle aches)      Cough    5  COUGH: "Do you have a cough?" If Yes, ask: "How bad is the cough?"        Moderate cough    6  FEVER: "Do you have a fever?" If Yes, ask: "What is your temperature, how was it measured, and when did it start?"      Denies     7  RESPIRATORY STATUS: "Describe your breathing?" (e g , shortness of breath, wheezing, unable to speak)   Denies    8  BETTER-SAME-WORSE: "Are you getting better, staying the same or getting worse compared to yesterday?"  If getting worse, ask, "In what way?"  Same    9  HIGH RISK DISEASE: "Do you have any chronic medical problems?" (e g , asthma, heart or lung disease, weak immune system, obesity, etc )      CAD  Has pacemaker placed    10  VACCINE: "Have you had the COVID-19 vaccine?" If Yes, ask: "Which one, how many shots, when did you get it?"    Yes fully vaccinated-pfizer    11   BOOSTER: "Have you received your COVID-19 booster?" If Yes, ask: "Which one and when did you get it?"       Yes pfizer    12  PREGNANCY: "Is there any chance you are pregnant?" "When was your last menstrual period?"    N/a    13  OTHER SYMPTOMS: "Do you have any other symptoms?"  (e g , chills, fatigue, headache, loss of smell or taste, muscle pain, sore throat)   body aches, headache, congestion    14  O2 SATURATION MONITOR:  "Do you use an oxygen saturation monitor (pulse oximeter) at home?" If Yes, ask "What is your reading (oxygen level) today?" "What is your usual oxygen saturation reading?" (e g , 95%)  No    Protocols used: CORONAVIRUS (COVID-19) DIAGNOSED OR SUSPECTED-ADULT-AH    Symptoms started Wednesday 11/9  Covid positive on home test 11/11/      Paged on call provider of initial assessment  Provider will call the patient directly and review med list  Patient made aware

## 2022-11-12 NOTE — TELEPHONE ENCOUNTER
Regarding: covid positive concerns  ----- Message from Jamal Hebert sent at 11/12/2022 12:08 PM EST -----  Patient is calling back in again stating no one has called him back yet  Please advise  ----- Message from [facility] sent at 11/12/2022  9:24 AM EST -----  "I tested positive for covid    I would like to know what I need to do next "

## 2022-11-12 NOTE — TELEPHONE ENCOUNTER
Called patient about 30-40 mins ago to review symptoms and see if he is a candidate for Paxlovid  Due to risk factors and current symptoms he is a candidate  He will have to hold his Crestor for the next 5 days  Both pros and cons of medications were reviewed and possible side effects as well  With med review and interaction review performed through [facility] I have sent medication to the pharmacy

## 2022-11-23 ENCOUNTER — OFFICE VISIT (OUTPATIENT)
Dept: SLEEP CENTER | Facility: CLINIC | Age: 67
End: 2022-11-23

## 2022-11-23 VITALS
SYSTOLIC BLOOD PRESSURE: 138 MMHG | HEART RATE: 65 BPM | OXYGEN SATURATION: 95 % | BODY MASS INDEX: 29.53 KG/M2 | WEIGHT: 218 LBS | HEIGHT: 72 IN | DIASTOLIC BLOOD PRESSURE: 70 MMHG

## 2022-11-23 DIAGNOSIS — G47.33 OSA (OBSTRUCTIVE SLEEP APNEA): Primary | ICD-10-CM

## 2022-11-23 NOTE — PROGRESS NOTES
Progress Note - Sleep Center   Yessica Bridges :1955 MRN: [id number]      Reason for Visit:  78 y o male here for PAP compliance check    Assessment:  Doing well with new PAP device  Sleep quality is improved and the patient feels less drowsy  Compliance data show utilization for greater than or equal to 70% of nights, for greater than or equal to 4 hours per night  Plan:  Adequate compliance and successful treatment    Follow up: One year    History of Present Illness:  History of LENY on PAP therapy  Meets adequate compliance          Review of Systems      Genitourinary difficulty with erection  Cardiology none  Gastrointestinal none  Neurology none  Constitutional fatigue  Integumentary none  Psychiatry none  Musculoskeletal joint pain  Pulmonary none  ENT ringing in ears  Endocrine none  Hematological none      I have reviewed and updated the review of systems as necessary    Historical Information    Past Medical History:   Diagnosis Date   • Abrasion of left foot     LAST ASSESSED: 13   • Achilles tendinitis, unspecified leg     LAST ASSESSED: 12   • Allergic rhinitis     LAST ASSESSED: 13   • Allergic rhinitis 2008    LAST ASSESSED: 08   • Arthritis    • Cervicalgia 2011    LAST ASSESSED: 11   • Chronic pain disorder    • Colon polyp    • Coronary artery disease    • CPAP (continuous positive airway pressure) dependence    • Dysfunction of left eustachian tube     LAST ASSESSED: 13   • Epistaxis     LAST ASSESSED: 5/27/15   • First degree atrioventricular block     LAST ASSESSED: 7/10/17   • Gastric ulcer 10/07/2011    LAST ASSESSED: 3/9/15   • GERD (gastroesophageal reflux disease)    • Hearing aid worn    • Hemorrhoids 2011    LAST ASSESSED: 11   • Hyperlipidemia    • Hypertension    • Irregular heart beat    • Long term use of drug     LAST ASSESSED: 10/11/12   • Myalgia 2007    LAST ASSESSED: 07   • Myositis 2007 LAST ASSESSED: 12/21/07   • Numbness and tingling of foot     LAST ASSESSED: 3/9/15   • Pacemaker    • Premature ventricular beats     states cardiologist ordered [place] ambulatory cardiac monitor   • Sleep apnea    • Wears glasses          Past Surgical History:   Procedure Laterality Date   • CARDIAC CATHETERIZATION  2017   • CARDIAC ELECTROPHYSIOLOGY PROCEDURE N/A 10/25/2021    Procedure: CARDIAC EPS;  Surgeon: Mahesh Davenport MD;  [facility]; Service: Cardiology   • CARDIAC ELECTROPHYSIOLOGY PROCEDURE Left 10/25/2021    Procedure: Cardiac icd implant;  Surgeon: Mahesh Davenport MD;  [facility]; Service: Cardiology   • CARDIAC PACEMAKER PLACEMENT     • CARDIOVERSION N/A 10/25/2021    Procedure: Cardioversion;  Surgeon: Mahesh Davenport MD;  [facility]; Service: Cardiology   • CATARACT EXTRACTION Right    • CERVICAL FUSION      pt thinks C4-C5   • CHOLECYSTECTOMY      LAPAROSCOPIC; LAST ASSESSED: 10/17/17   • COLONOSCOPY      COMPLETE; 3-4 YEARS AGO BY [facility]; LAST ASSESSED: 8/18/14   • MYRINGOTOMY W/ TUBES      WITH VENTILATING TUBE INSERTION   • NC INCISE FINGER TENDON SHEATH Right 10/14/2022    Procedure: RELEASE TRIGGER FINGER,RING AND LONG TENOSYNOVECTOMY FDS +FDP;   Surgeon: Belem Lopez MD;  [facility];  Service: Orthopedics   • NC REVISE MEDIAN N/CARPAL TUNNEL SURG Left 07/16/2021    Procedure: RELEASE CARPAL TUNNEL;  Surgeon: Belem Lopez MD;  [facility];  Service: Orthopedics   • NC REVISE MEDIAN N/CARPAL TUNNEL SURG Right 10/14/2022    Procedure: RELEASE CARPAL TUNNEL;  Surgeon: Belem Lopez MD;  [facility];  Service: Orthopedics   • VASECTOMY           Social History     Socioeconomic History   • Marital status: /Civil Union     Spouse name: None   • Number of children: None   • Years of education: None   • Highest education level: None   Occupational History   • None   Tobacco Use   • Smoking status: Former     Types: Cigarettes     Quit date:      Years since quittin 9   • Smokeless tobacco: Never   Vaping Use   • Vaping Use: Never used   Substance and Sexual Activity   • Alcohol use: Yes     Alcohol/week: 7 0 standard drinks     Types: 7 Glasses of wine per week     Comment: Once in a while with a meal   • Drug use: No   • Sexual activity: Not Currently   Other Topics Concern   • None   Social History Narrative   • None     Social Determinants of Health     Financial Resource Strain: Low Risk    • Difficulty of Paying Living Expenses: Not very hard   Food Insecurity: No Food Insecurity   • Worried About Running Out of Food in the Last Year: Never true   • Ran Out of Food in the Last Year: Never true   Transportation Needs: No Transportation Needs   • Lack of Transportation (Medical): No   • Lack of Transportation (Non-Medical): No   Physical Activity: Not on file   Stress: Not on file   Social Connections: Not on file   Intimate Partner Violence: Not on file   Housing Stability: Low Risk    • Unable to Pay for Housing in the Last Year: No   • Number of Places Lived in the Last Year: 1   • Unstable Housing in the Last Year: No         Family History   Problem Relation Age of Onset   • Cancer Mother         SOFT TISSUE CANCER ON RT SIDE CHEST WALL AND    • Prostate cancer Maternal Uncle        Medications/Allergies:      Current Outpatient Medications:   •  aspirin (ECOTRIN LOW STRENGTH) 81 mg EC tablet, Take 81 mg by mouth daily, Disp: , Rfl:   •  celecoxib (CeleBREX) 200 mg capsule, Take 1 capsule (200 mg total) by mouth daily (Patient taking differently: Take 200 mg by mouth daily as needed), Disp: 90 capsule, Rfl: 1  •  Coenzyme Q10 (COQ10) 200 MG CAPS, Take 1 tablet by mouth daily, Disp: , Rfl:   •  COLLAGEN-CHOND-HYALURONIC ACID PO, Take 1 capsule by mouth in the morning, Disp: , Rfl:   •  finasteride (PROSCAR) 5 mg tablet, TAKE 1 TABLET (5 MG TOTAL) BY MOUTH DAILY  , Disp: 90 tablet, Rfl: 1  •  fluticasone (VERAMYST) 27 5 MCG/SPRAY nasal spray, 2 sprays into each nostril daily, Disp: , Rfl:   •  Glucosamine-Chondroitin (GLUCOSAMINE CHONDR COMPLEX PO), Take 1 tablet by mouth daily, Disp: , Rfl:   •  Omega-3 Fatty Acids (FISH OIL) 1000 MG CPDR, Take by mouth daily, Disp: , Rfl:   •  pantoprazole (PROTONIX) 40 mg tablet, TAKE 1 TABLET BY MOUTH EVERY DAY, Disp: 90 tablet, Rfl: 1  •  rosuvastatin (CRESTOR) 10 MG tablet, TAKE 1 TABLET BY MOUTH EVERY DAY, Disp: 90 tablet, Rfl: 1  •  sotalol (BETAPACE) 80 mg tablet, TAKE 1 TABLET BY MOUTH EVERY 12 HOURS , Disp: 180 tablet, Rfl: 3  •  EPINEPHrine (EpiPen 2-Leroy) 0 3 mg/0 3 mL SOAJ, Inject 0 3 mL (0 3 mg total) into a muscle once for 1 dose, Disp: 0 6 mL, Rfl: 0      Objective    Vital Signs:   Vitals:    11/23/22 1519   BP: 138/70   Pulse: 65   SpO2: 95%     [place] Sleepiness Scale:          Physical Exam:    General: Alert, appropriate, cooperative, overweight    Head: NC/AT    Skin: Warm, dry    Neuro: No motor abnormalities, cranial nerves appear intact    Extremity: No clubbing, cyanosis    PAP setting:   APAP 4 to 20 cm  DME Provider: [employer]  Test results:  AHI = 17 7    Counseling / Coordination of Care  Total clinic time spent today 15 minutes  A description of the counseling / coordination of care: Maintain compliance  Discussed equipment  WILFRED Pittman    Board Certified Sleep Specialist

## 2022-11-25 ENCOUNTER — TELEPHONE (OUTPATIENT)
Dept: SLEEP CENTER | Facility: CLINIC | Age: 67
End: 2022-11-25

## 2022-11-25 LAB
DME PARACHUTE DELIVERY DATE REQUESTED: NORMAL
DME PARACHUTE ITEM DESCRIPTION: NORMAL
DME PARACHUTE ORDER STATUS: NORMAL
DME PARACHUTE SUPPLIER NAME: NORMAL
DME PARACHUTE SUPPLIER PHONE: NORMAL

## 2022-11-29 ENCOUNTER — OFFICE VISIT (OUTPATIENT)
Dept: CARDIOLOGY CLINIC | Facility: CLINIC | Age: 67
End: 2022-11-29

## 2022-11-29 VITALS
BODY MASS INDEX: 29.59 KG/M2 | SYSTOLIC BLOOD PRESSURE: 140 MMHG | OXYGEN SATURATION: 97 % | HEART RATE: 70 BPM | WEIGHT: 218.5 LBS | HEIGHT: 72 IN | DIASTOLIC BLOOD PRESSURE: 82 MMHG

## 2022-11-29 DIAGNOSIS — Z95.810 S/P ICD (INTERNAL CARDIAC DEFIBRILLATOR) PROCEDURE: ICD-10-CM

## 2022-11-29 DIAGNOSIS — I48.0 PAROXYSMAL ATRIAL FIBRILLATION (HCC): ICD-10-CM

## 2022-11-29 DIAGNOSIS — R53.83 OTHER FATIGUE: ICD-10-CM

## 2022-11-29 DIAGNOSIS — E78.2 MIXED HYPERLIPIDEMIA: ICD-10-CM

## 2022-11-29 DIAGNOSIS — I25.10 CORONARY ARTERY DISEASE INVOLVING NATIVE CORONARY ARTERY OF NATIVE HEART WITHOUT ANGINA PECTORIS: ICD-10-CM

## 2022-11-29 DIAGNOSIS — I47.20 VENTRICULAR TACHYCARDIA: ICD-10-CM

## 2022-11-29 DIAGNOSIS — G47.33 OSA AND COPD OVERLAP SYNDROME (HCC): ICD-10-CM

## 2022-11-29 DIAGNOSIS — G47.33 OBSTRUCTIVE SLEEP APNEA SYNDROME: ICD-10-CM

## 2022-11-29 DIAGNOSIS — J44.9 OSA AND COPD OVERLAP SYNDROME (HCC): ICD-10-CM

## 2022-11-29 LAB

## 2022-11-29 NOTE — PROGRESS NOTES
Progress note - Cardiology Office   [facility]  Yessica Bridges 78 y o  male MRN: [id number]  : 1955  Unit/Bed#:  Encounter: [id number]      Assessment:     1  Paroxysmal atrial fibrillation (HCC)    2  Ventricular tachycardia    3  Coronary artery disease involving native coronary artery of native heart without angina pectoris    4  Mixed hyperlipidemia    5  S/P ICD (internal cardiac defibrillator) procedure    6  LENY and COPD overlap syndrome ([address] )    7  Obstructive sleep apnea syndrome    8  Other fatigue        Discussion summary and Plan:       1  Coronary artery disease status post catheterization in 2017 has moderate ostial circ disease and nonobstructive disease in other arteries  He has repeat cardiac catheterization done  Cath was done in 2021  Patient has 50-70% ostial circumflex stenosis which is non dominant artery and IFR is 0 96 other arteries have nonobstructive disease  Continue statins and aspirin  Advised to take at least 162 mg aspirin     2  Dyslipidemia  Continue statins he is tolerating it very well  LFTs are acceptable from 2022     3  History of intermittent AV block, and SVT with production of VT during EP study status post Medtronic dual-chamber ICD which is functioning adequately  Recent device interrogation shows few episodes of NSVT which are asymptomatic  He was evaluated by EP and put on sotalol since then number of episode have decreased  Last interrogation from October reviewed with patient     4  Obstructive sleep apnea  Continue using CPAP machine  He is pretty compliant     5  Paroxysmal atrial fibrillation  Patient is noted to have episodes of atrial fibrillation on the device  AFib burden was 2 5%  He is already on beta-blocker  No more reoccurrence of AFib is noted  If he has significant AFib we will increase beta-blockers  He has stopped taking his Eliquis    He says he did have any episode of atrial fibrillation he will hold off on it  He is scheduled to follow with EP      6  Sick sinus syndrome with episodes of NSVT  Device interrogation reviewed in detail      7  Fatigue and tiredness  Patient feels that since he has device he was more fatigue and tired  Spoke to [employer] to adjust the them per protocol for increasing heart rate  Patient will come to our office tomorrow around 3:00 a m       Follow-up 6 months  Thank you for your consultation  If you have any question please call me at [phone]    Counseling :  A description of the counseling  Goals and Barriers  Patient's ability to self care: Yes  Medication side effect reviewed with patient in detail and all their questions answered to their satisfaction  Primary Care Physician : Makayla Teague,     HPI :     Yessica Bridges is a 78y o  year old male who was referred by primary care doctor for for his history of cardiac disease  Patient who used to follow up with [facility] ([facility] and has been evaluated by [facility] cardiology in 2017 for 2nd opinion has medical history significant for coronary artery disease, dyslipidemia, DJD and GERD along with obstructive sleep apnea  He had a catheterization done in June of 2017 which shows he had a moderate ostial circumflex disease and he chose to have medical therapy  At that time he was seen by  [facility] cardiology for 2nd opinion and various options were discussed with him including continue medical Rx, angioplasty of ostial circumflex which could be done but slightly high risk or single-vessel bypass surgery  At that time he was asymptomatic and he was continued medical therapy  Patient was recently in [place] where he had an episode of palpitation and fast heartbeat  He was kept overnight ruled out for acute coronary syndrome and he was advised to follow up with Cardiology here  He is known to have history of irregular heartbeat    EKG shows normal sinus with first-degree AV block and frequent PVCs  PVCs are also in the form of bigeminy and fusion beats are noted  He is otherwise very active he denies any chest pain any shortness of breath  He is able to do his activities without any problems  No nausea no vomiting no PND no orthopnea no syncope no other issues  No recent surgery  He has a previous history of surgery of gallbladder removal as well as neck fusion    He does not smoke but occasionally he will smoke a cigar maybe once every 3 months        09/01/2021  Above reviewed  Patient came for follow-up he is doing well he underwent cardiac catheterization we found he has ostial circumflex 50- 70% visually but IFR was 0 96  It was felt patient does not need stenting  His nuclear stress test also shows no ischemia  He denies any symptoms he is very active he does exercise without any problems  But he does have history of palpitations and he was found to have episode of NSVT, as well as intermittent third-degree AV block and junctional escape  He was evaluated by EP and was recommended dual-chamber pacemaker but they would like to have him cardiac catheterization done fast   His medications reviewed today EKG shows heart rate 64 beats per minute rare PVCs  No nausea no vomiting he is not on any AV node blocking drug because of his episodes of bradycardia  He has multiple questions regarding his angiogram and related to his rhythm problem and pacemaker which were discussed openly  No other cardiovascular issues at this time  01/18/2022  Above reviewed  Patient came for follow-up  He is doing well now  He had Medtronic dual-chamber ICD paced after EP study as he was having intermittent AV block and episodes of NSVT  He had an episode of VT during EP study  He does have history of coronary artery disease with ostial circumflex 50-70% stenosis visually but IFR was 0 96    It was felt that patient does not need stent and he has no symptoms  He denies any new complaints  Actually since his procedure he is feeling lot better he has no nausea no vomiting no fever no chills no other issues  He is compliant with medications he is taking aspirin statins and metoprolol  He is tolerating his Crestor well no other cardiovascular issues  His vitals has been stable  He has blood test done on October 2021 where cholesterol profile was acceptable electrolytes were acceptable his ALT was slightly elevated but still less than 2 times the normal   His device was interrogated in November 2021  He had episode of NSVT but otherwise device is functioning adequately  His low rate is set at 50     05/03/2022  Above reviewed  Patient came for follow-up  He had a medical history significant for dual-chamber ICD which was placed after EP study as he was found to have intermittent AV block and episode of NSVT  Recently he was noted to have episode of atrial fibrillation and started on antithrombotic therapy  He had history of coronary artery disease status post cardiac catheterization which shows ostial circumflex 50-70% stenosis but IFR was 0 96  Patient did not need stent and he has no symptoms  He has blood test done in April 2022 which has been acceptable  His other medical history significant for dyslipidemia, hypertension, and peptic ulcer disease but no recent bleeding  His device interrogation shows patient has AFib burden of 2 5% which was new  He does have history of obstructive sleep apnea and he is compliant with his CPAP machine he is overall feeling well  He has multiple questions regarding blood thinners which were answered he does have history of hemorrhoid but not active at this time  No issues at this time his device interrogation reviewed with him in detail  11/29/2022  Above reviewed  Patient came for follow-up    He had a medical history significant for dual-chamber ICD which was placed after EP study add he was found to have intermittent AV block and episode of NSVT  He still continues have brief episode of NSVT  Has been evaluated by EP  He is noted to have atrial fibrillation and started on antithrombotic therapy  He does have history of coronary artery disease status post catheterization which shows ostial circumflex 50-70% stenosis where IFR was 0 96  He did not need any stent and he has no symptoms  He had blood test done on 10/04/2022 which has been acceptable cholesterol profile is stable blood pressure has been stable  He has other medical history significant for dyslipidemia, hypertension, peptic ulcer disease with no recent bleeding  His device interrogation was done in October 2022  He had 5 episode of NSVT  He was put on sotalol by the EP  And he did not need any therapy  No AFib was noted at distal interrogation  Patient is complaining about fatigue and tiredness since he has device placed  He feels that when he exercise he cannot ramp up his heart rate  Spoke to [employer] they will interrogated tomorrow  Otherwise no other issues  Review of Systems   Constitutional: Positive for fatigue  Negative for activity change, chills, diaphoresis, fever and unexpected weight change  HENT: Negative for congestion  Eyes: Negative for discharge and redness  Respiratory: Negative for cough, chest tightness, shortness of breath and wheezing  Cardiovascular: Negative  Negative for chest pain, palpitations and leg swelling  Gastrointestinal: Negative for abdominal pain, diarrhea and nausea  Endocrine: Negative  Genitourinary: Negative for decreased urine volume and urgency  Musculoskeletal: Positive for arthralgias  Negative for back pain and gait problem  Skin: Negative for rash and wound  Allergic/Immunologic: Negative  Neurological: Negative for dizziness, seizures, syncope, weakness, light-headedness and headaches  Hematological: Negative      Psychiatric/Behavioral: Negative for agitation and confusion  The patient is nervous/anxious  Historical Information   Past Medical History:   Diagnosis Date   • Abrasion of left foot     LAST ASSESSED: 9/23/13   • Achilles tendinitis, unspecified leg     LAST ASSESSED: 11/30/12   • Allergic rhinitis     LAST ASSESSED: 11/25/13   • Allergic rhinitis 06/25/2008    LAST ASSESSED: 6/25/08   • Arthritis    • Cervicalgia 04/22/2011    LAST ASSESSED: 4/22/11   • Chronic pain disorder    • Colon polyp    • Coronary artery disease    • CPAP (continuous positive airway pressure) dependence    • Dysfunction of left eustachian tube     LAST ASSESSED: 9/23/13   • Epistaxis     LAST ASSESSED: 5/27/15   • First degree atrioventricular block     LAST ASSESSED: 7/10/17   • Gastric ulcer 10/07/2011    LAST ASSESSED: 3/9/15   • GERD (gastroesophageal reflux disease)    • Hearing aid worn    • Hemorrhoids 05/13/2011    LAST ASSESSED: 5/13/11   • Hyperlipidemia    • Hypertension    • Irregular heart beat    • Long term use of drug     LAST ASSESSED: 10/11/12   • Myalgia 12/21/2007    LAST ASSESSED: 12/21/07   • Myositis 12/21/2007    LAST ASSESSED: 12/21/07   • Numbness and tingling of foot     LAST ASSESSED: 3/9/15   • Pacemaker    • Premature ventricular beats     states cardiologist ordered Zio ambulatory cardiac monitor   • Sleep apnea    • Wears glasses      Past Surgical History:   Procedure Laterality Date   • CARDIAC CATHETERIZATION  2017   • CARDIAC ELECTROPHYSIOLOGY PROCEDURE N/A 10/25/2021    Procedure: CARDIAC EPS;  Surgeon: Funmilayo Jones MD;  [facility]: [facility]; Service: Cardiology   • CARDIAC ELECTROPHYSIOLOGY PROCEDURE Left 10/25/2021    Procedure: Cardiac icd implant;  Surgeon: Funmilayo Jones MD;  [facility]; Service: Cardiology   • CARDIAC PACEMAKER PLACEMENT     • CARDIOVERSION N/A 10/25/2021    Procedure: Cardioversion;  Surgeon: Funmilayo Jones MD;  Location[facility];   Service: Cardiology   • CATARACT EXTRACTION Right    • CERVICAL FUSION      pt thinks C4-C5   • CHOLECYSTECTOMY      LAPAROSCOPIC; LAST ASSESSED: 10/17/17   • COLONOSCOPY      COMPLETE; 3-4 YEARS AGO BY [facility]; LAST ASSESSED: 14   • MYRINGOTOMY W/ TUBES      WITH VENTILATING TUBE INSERTION   • FL INCISE FINGER TENDON SHEATH Right 10/14/2022    Procedure: RELEASE TRIGGER FINGER,RING AND LONG TENOSYNOVECTOMY FDS +FDP;   Surgeon: Jluis Staples MD;  Location: [facility];  Service: Orthopedics   • FL REVISE MEDIAN N/CARPAL TUNNEL SURG Left 2021    Procedure: RELEASE CARPAL TUNNEL;  Surgeon: Jluis Staples MD;  Location: [facility];  Service: Orthopedics   • FL REVISE MEDIAN N/CARPAL TUNNEL SURG Right 10/14/2022    Procedure: RELEASE CARPAL TUNNEL;  Surgeon: Jluis Staples MD;  Location: [facility];  Service: Orthopedics   • VASECTOMY       Social History     Substance and Sexual Activity   Alcohol Use Yes   • Alcohol/week: 7 0 standard drinks   • Types: 7 Glasses of wine per week    Comment: few times a week     Social History     Substance and Sexual Activity   Drug Use No     Social History     Tobacco Use   Smoking Status Former   • Types: Cigarettes   • Quit date:    • Years since quittin 9   Smokeless Tobacco Never     Family History:   Family History   Problem Relation Age of Onset   • Cancer Mother         SOFT TISSUE CANCER ON RT SIDE CHEST WALL AND    • Prostate cancer Maternal Uncle        Meds/Allergies     Allergies   Allergen Reactions   • Amoxicillin Anaphylaxis   • Augmentin [Amoxicillin-Pot Clavulanate] Anaphylaxis   • Acetaminophen      Rapid heart rate   • Apple - Food Allergy    • Cherry Flavor - Food Allergy Other (See Comments)     States allergy is to raw cherries difficulty breathing   • Pollen Extract    • Dye [Iodinated Diagnostic Agents] Itching       Current Outpatient Medications:   •  aspirin (ECOTRIN LOW STRENGTH) 81 mg EC tablet, Take 81 mg by mouth daily, Disp: , Rfl:   • celecoxib (CeleBREX) 200 mg capsule, Take 1 capsule (200 mg total) by mouth daily (Patient taking differently: Take 200 mg by mouth daily as needed), Disp: 90 capsule, Rfl: 1  •  Coenzyme Q10 (COQ10) 200 MG CAPS, Take 1 tablet by mouth daily, Disp: , Rfl:   •  COLLAGEN-CHOND-HYALURONIC ACID PO, Take 1 capsule by mouth in the morning, Disp: , Rfl:   •  finasteride (PROSCAR) 5 mg tablet, TAKE 1 TABLET (5 MG TOTAL) BY MOUTH DAILY  , Disp: 90 tablet, Rfl: 1  •  fluticasone (VERAMYST) 27 5 MCG/SPRAY nasal spray, 2 sprays into each nostril daily, Disp: , Rfl:   •  Glucosamine-Chondroitin (GLUCOSAMINE CHONDR COMPLEX PO), Take 1 tablet by mouth daily, Disp: , Rfl:   •  Omega-3 Fatty Acids (FISH OIL) 1000 MG CPDR, Take by mouth daily, Disp: , Rfl:   •  pantoprazole (PROTONIX) 40 mg tablet, TAKE 1 TABLET BY MOUTH EVERY DAY, Disp: 90 tablet, Rfl: 1  •  rosuvastatin (CRESTOR) 10 MG tablet, TAKE 1 TABLET BY MOUTH EVERY DAY, Disp: 90 tablet, Rfl: 1  •  sotalol (BETAPACE) 80 mg tablet, TAKE 1 TABLET BY MOUTH EVERY 12 HOURS , Disp: 180 tablet, Rfl: 3  •  EPINEPHrine (EpiPen 2-Leroy) 0 3 mg/0 3 mL SOAJ, Inject 0 3 mL (0 3 mg total) into a muscle once for 1 dose, Disp: 0 6 mL, Rfl: 0    Vitals: Blood pressure 140/82, pulse 70, height 6' (1 829 m), weight 99 1 kg (218 lb 8 oz), SpO2 97 %  ?  Body mass index is 29 63 kg/m²  Wt Readings from Last 3 Encounters:   11/29/22 99 1 kg (218 lb 8 oz)   11/23/22 98 9 kg (218 lb)   10/27/22 99 1 kg (218 lb 8 oz)     Vitals:    11/29/22 1513   Weight: 99 1 kg (218 lb 8 oz)     BP Readings from Last 3 Encounters:   11/29/22 140/82   11/23/22 138/70   10/27/22 163/73         Physical Exam  Constitutional:       General: He is not in acute distress  Appearance: He is well-developed  He is not diaphoretic  Neck:      Thyroid: No thyromegaly  Vascular: No JVD  Trachea: No tracheal deviation  Cardiovascular:      Rate and Rhythm: Normal rate and regular rhythm        Heart sounds: S1 normal and S2 normal  Heart sounds not distant  Murmur heard  Systolic (ejection) murmur is present with a grade of 2/6  No friction rub  No gallop  No S3 or S4 sounds  Pulmonary:      Effort: Pulmonary effort is normal  No respiratory distress  Breath sounds: Normal breath sounds  No wheezing or rales  Chest:      Chest wall: No tenderness  Abdominal:      General: Bowel sounds are normal  There is no distension  Palpations: Abdomen is soft  Tenderness: There is no abdominal tenderness  Musculoskeletal:         General: No deformity  Cervical back: Neck supple  Skin:     General: Skin is warm and dry  Coloration: Skin is not pale  Findings: No rash  Neurological:      Mental Status: He is alert and oriented to person, place, and time  Psychiatric:         Behavior: Behavior normal          Judgment: Judgment normal              Diagnostic Studies Review Cardio:   cardiac catheterization done in June 2017 at [facility] shows he had moderate stenosis of his ostial circumflex  Had a nonobstructive disease in other arteries and had a normal LV systolic function  Repeat cardiac catheterization done 08/19/2021 shows patient has nonobstructive disease involving RCA and LAD  50- 70% ostial circumflex and IFR was 0 95 EF is acceptable  Nuclear stress test done in October 2020  Nuclear stress done in October 2020 patient exercised for about 9 minutes  Fixed defect was seen no ischemia noted  EF was 47%      Event monitor has shows episodes of pauses, intermittent 3 AV block, episode of NSVT was evaluated by EP  EKG:  Twelve lead EKG done 06/30/2021 shows normal sinus rhythm first-degree AV block with PVCs  No significant change from previous EKG done at his primary care doctor's office     12 lead EKG done 09/01/2021 shows Normal sinus rhythm first-degree AV block heart rate 64 beats per minute  Rare PVCs noted      Device interrogation from October  reviewed          Results for orders placed during the hospital encounter of 10/12/20    NM Myocardial Perfusion Spect (Exercise Induced Stress and/or Rest)    Narrative  [address]  [facility], [address] [facility]  [phone]    Rest/Stress Gated SPECT Myocardial Perfusion Imaging After Exercise    Patient: Keiko German  MR number: [id number]  Account number: [de-identified]  : 1955  Age: 71 years  Gender: Male  Status: Inpatient  Location: Stress lab  Height: 73 in  Weight: 202 lb  BP: 138/ 80 mmHg    Allergies: AMOXICILLIN, ACETAMINOPHEN, APPLE, CHERRY FLAVOR, POLLEN EXTRACT, AMOXICILLIN-POT CLAVULANATE, IODINATED DIAGNOSTIC AGENTS    Diagnosis: R07 9 - Chest pain, unspecified    Interpreting Physician:  Galileo Boland MD  Referring Physician:  Carole Grajeda DO  Technician:  Lorna Dean  RN:  Gonzalez Lockhart RN BSN  Group:  [facility]  Report Prepared by[de-identified]  Gonzalez Lockhart RN BSN    INDICATIONS: Coronary artery disease  HISTORY: The patient is a 71year old  male  Chest pain status: chest pain, radiation to neck and jaw area, radiation to arm(s)  Coronary artery disease risk factors: dyslipidemia  Cardiovascular history: coronary artery disease  and arrhythmia  Co-morbidity: obesity  PHYSICAL EXAM: Baseline physical exam screening: no evidence of significant aortic stenosis and no wheezes audible  REST ECG: Normal sinus rhythm  The ECG showed 1ï¾° AV block  PROCEDURE: The study was performed in the the Stress lab  Treadmill exercise testing was performed, using the Ángel protocol  Gated SPECT myocardial perfusion imaging was performed after stress  Systolic blood pressure was 138 mmHg, at the  start of the study  Diastolic blood pressure was 80 mmHg, at the start of the study  The heart rate was 75 bpm, at the start of the study  IV double checked      ÁNGEL PROTOCOL:  HR bpm SBP mmHg DBP mmHg Symptoms  Baseline 75 138 80 none  Stage 1 129 178 80 none  Stage 2 129 184 72 mild dyspnea  Stage 3 141 -- -- --  Immediate 141 205 70 moderate dyspnea  Recovery 1 92 192 80 subsiding  Recovery 2 93 150 78 none    STRESS SUMMARY: Duration of exercise was 9 min  The patient exercised to protocol stage 3  Maximal work rate was 10 1 METs  Functional capacity was normal  Maximal heart rate during stress was 141 bpm ( 90 % of maximal predicted heart  rate)  The heart rate response to stress was normal  There was normal resting blood pressure with a hypertensive response to stress  The rate-pressure product for the peak heart rate and blood pressure was 80806  There was no chest pain  during stress  The stress test was terminated due to moderate dyspnea  Pre oxygen saturation: 99 %  Peak oxygen saturation: 98 %  The stress ECG was equivocal for ischemia  Arrhythmia during stress: isolated premature ventricular beats  ISOTOPE ADMINISTRATION:  Resting isotope administration Stress isotope administration  Agent Tetrofosmin Tetrofosmin  Dose 11 mCi 30 9 mCi  Date 10/14/2020 10/14/2020  Injection time 08:55 10:38  Injection-image interval 37 min 52 min    Radiopharmaceutical was administered 6 min, 30 sec into the stress protocol  There was 2 min and 30 sec of exercise after the injection  MYOCARDIAL PERFUSION IMAGING:  The image quality was fair  Rotating projection images reveal mild diaphragmatic attenuation and mild subdiaphragmatic activity  Left ventricular size was normal  The TID ratio was 0 97  PERFUSION DEFECTS:  -  There was a moderate-sized, mildly severe, fixed myocardial perfusion defect of the basal inferior wall likely due to diaphragm attenuation  GATED SPECT:  The calculated left ventricular ejection fraction was 47 %  Left ventricular ejection fraction was within normal limits by visual estimate  There was no left ventricular regional abnormality      SUMMARY:  -  Stress results: Duration of exercise was 9 min  Target heart rate was achieved  There was normal resting blood pressure with a hypertensive response to stress  There was no chest pain during stress  The stress test was terminated due to  moderate dyspnea  -  ECG conclusions: The stress ECG was equivocal for ischemia  -  Perfusion imaging: There was a moderate-sized, mildly severe, fixed myocardial perfusion defect of the basal inferior wall likely due to diaphragm attenuation   -  Gated SPECT: The calculated left ventricular ejection fraction was 47 %  Left ventricular ejection fraction was within normal limits by visual estimate  There was no left ventricular regional abnormality  IMPRESSIONS: Normal study after maximal exercise  There was image artifact, without diagnostic evidence for perfusion abnormality  Prepared and signed by    Galo Hayes MD  Signed 10/14/2020 15:31:45    No results found for this or any previous visit  Imaging:  Chest X-Ray:   No Chest XR results available for this patient  CT-scan of the chest:     CTA dissection protocol chest abdomen pelvis w wo contrast    Result Date: 10/12/2020  Impression 1  No aortic dissection or aneurysm  2   No acute finding within chest, abdomen, and pelvis  3   Colonic diverticulosis  4   Incidentally noted 1 5 cm right paravertebral simple fluid attenuating cystic lesion at the level of T6  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   Recommend correlation/comparison with prior outside imaging   Workstation performed: [id number]     Lab Review   Lab Results   Component Value Date    WBC 5 37 10/04/2022    HGB 16 0 10/04/2022    HCT 48 4 10/04/2022    MCV 92 10/04/2022    RDW 13 2 10/04/2022     10/04/2022     BMP:  Lab Results   Component Value Date    SODIUM 139 10/04/2022    K 4 5 10/04/2022     10/04/2022    CO2 28 10/04/2022    BUN 21 10/04/2022    CREATININE 0 92 10/04/2022    GLUC 93 06/15/2022    GLUF 102 (H) 10/04/2022    CALCIUM 9 6 10/04/2022    EGFR 85 10/04/2022    MG 2 1 06/15/2022     LFT:  Lab Results   Component Value Date    AST 26 10/04/2022    ALT 35 10/04/2022    ALKPHOS 60 10/04/2022    TP 7 1 10/04/2022    ALB 4 3 10/04/2022      Lab Results   Component Value Date    YLR2XVBQEDFL 2 052 10/04/2022     No components found for: [employer] [facility]  Lab Results   Component Value Date    HGBA1C 5 5 10/04/2022     Lipid Profile:   Lab Results   Component Value Date    CHOLESTEROL 128 10/04/2022    HDL 36 (L) 10/04/2022    LDLCALC 65 10/04/2022    TRIG 135 10/04/2022     Lab Results   Component Value Date    CHOLESTEROL 128 10/04/2022    CHOLESTEROL 137 04/06/2022     Lab Results   Component Value Date    TROPONINI <0 02 10/13/2020     Device interrogation from 04/17/2022 shows episode of atrial fibrillation      Dr Tiesha Ackerman MD [facility]      "This note has been constructed using a voice recognition system  Therefore there may be syntax, spelling, and/or grammatical errors   Please call if you have any questions  "

## 2022-12-20 PROBLEM — Z00.00 MEDICARE ANNUAL WELLNESS VISIT, SUBSEQUENT: Status: RESOLVED | Noted: 2020-10-08 | Resolved: 2022-12-20

## 2022-12-20 PROBLEM — Z12.5 PROSTATE CANCER SCREENING: Status: RESOLVED | Noted: 2021-10-20 | Resolved: 2022-12-20

## 2023-02-02 ENCOUNTER — TELEPHONE (OUTPATIENT)
Dept: CARDIOLOGY CLINIC | Facility: CLINIC | Age: 68
End: 2023-02-02

## 2023-02-02 ENCOUNTER — IN-CLINIC DEVICE VISIT (OUTPATIENT)
Dept: CARDIOLOGY CLINIC | Facility: CLINIC | Age: 68
End: 2023-02-02

## 2023-02-02 ENCOUNTER — OFFICE VISIT (OUTPATIENT)
Dept: CARDIOLOGY CLINIC | Facility: CLINIC | Age: 68
End: 2023-02-02

## 2023-02-02 VITALS
BODY MASS INDEX: 29.53 KG/M2 | DIASTOLIC BLOOD PRESSURE: 90 MMHG | HEIGHT: 72 IN | WEIGHT: 218 LBS | HEART RATE: 63 BPM | SYSTOLIC BLOOD PRESSURE: 142 MMHG

## 2023-02-02 DIAGNOSIS — Z95.810 AICD (AUTOMATIC CARDIOVERTER/DEFIBRILLATOR) PRESENT: Primary | ICD-10-CM

## 2023-02-02 DIAGNOSIS — R07.89 CHEST TIGHTNESS: Primary | ICD-10-CM

## 2023-02-02 DIAGNOSIS — I48.0 PAROXYSMAL ATRIAL FIBRILLATION (HCC): ICD-10-CM

## 2023-02-02 RX ORDER — VIT C/B6/B5/MAGNESIUM/HERB 173 50-5-6-5MG
CAPSULE ORAL 3 TIMES WEEKLY
COMMUNITY

## 2023-02-02 NOTE — PROGRESS NOTES
Results for orders placed or performed in visit on 02/02/23   Cardiac EP device report    Narrative    MDT-DUAL CHAMBER ICD (AAI-DDD MODE) / ACTIVE SYSTEM IS MRI CONDITIONAL  DEVICE INTERROGATED IN THE [place] OFFICE  BATTERY VOLTAGE ADEQUATE (9 5 YRS)  AP-47%, -97% (>40% [email or web address])  ALL LEAD PARAMETERS WITHIN NORMAL LIMITS  5 NSVT EPISODES, MOST RECENT WAS 10 BEATS, AVG CL~400MS  PT ON ASA 162MG & SOTALOL  OPTI-VOL WITHIN NORMAL LIMITS  PT C/O RIGHT LOWER CHEST "CRAMPING" AFTER EXERCISING FOR 7 MINS, GOES AWAY W/ REST & THEN ABLE TO RESUME EXERCISING  INCREASED UPPER TRACKING & SENSOR RATES  BPM FROM 130 BPM; VT MONITOR INCREASED  BPM IN ORDER TO MAKE CHANGE  APPT W/ RF, PA-C & REVIEWED W/ RF  NORMAL DEVICE FUNCTION   GV

## 2023-02-02 NOTE — PROGRESS NOTES
Electrophysiology Office Note    Adele Chand  1955  [id number]  HEART & VASCULAR [facility] [phone]        Assessment/Plan     Primary diagnosis:   1  NSVT              * patient presents to B EP office for routine post hospital follow up  * s/p sotalol 80mg BID initiation  He is in ASVP rhythm today with QTc 470ms  Does have intermittent NSVT but episodes are brief and self limiting  No ICD shocks  * EF is 60% on recent echocardiogram               * f/u in 6 months with Dr Sarah Krishnan for further management     2  Paroxysmal atrial fibrillation, symptomatic               * in April 2022 patient had episode of symptomatic atrial fibrillation w/ RVR lating about 2 5 hours before spontaneously terminating  No recurrence since this time  Afib burden is 0%  * [id number] is 3 (HTN, Age >70, hx CAD)  He is not on eliquis due to issue with hemorrhoidal bleeding requiring banding in 2022 and due to his only episode of afib being <3 hours without any recurrence on device interrogation thus far  * I think we can keep him off of AC and continue monitoring his device close for afib  His ICD is set to alert the device clinic when he has more then 1 hour of AF at a HR above 100bpm  During his last episode his HR was in the 150's on device interrogation  * r-ATP turned on              * Today we discussed non cardiac modifiable AF risk factors to prevent further substrate formation                          1  HTN - controlled                            2  T2DM - does not have                            3  LENY - compliant with CPAP                            4  Alcohol intake- has cut down on intake, he maybe consume once 5oz glass of wine a night but not routinely anymore                             5  Obesity - weight today is 218lbs, BMI is 29 57                           6  Tobacco use - does not use               * continue monitoring for recurrence of afib  3  RUQ/diaphragmatic discomfort    * he is having RUQ/right diaphragm area discomfort which began towards the end of 2022 but did not happen prior the entire year  This occurs when he tries to walk on the treadmill or performs higher aerobic exercise like walking faster  When he stops walking and rests for a brief time his symptoms subside and he can resume aerobic exercise  * device clinic checked his device today we did find he does reach his upper tracking limit a lot during exercise  We increase upper tracking limit to 135bpm  He does have known 50-70% lesion which has been present since 2017  Had FFR on last [facility] in 2021 without lesion being intervened upon  * unclear if he is having some angina or he is reaching upper tracking limit and having symptoms from this  Patient has been in discussion with Dr Lois Payne about repeating NM stress  Will plan for exercise NM stress when I am in the office and can observe the study  Want to see what his HR is when he develops symptoms  Also can assess for ischemia at this time  Secondary diagnosis:   1  Hx VT during EPS for SVT s/p MDT DC ICD               * he  90% of the time with QRS >170ms, need to monitor routinely for drop in EF  2  HLD   3  CAD w/ 50-70% ostial LCX disease from [facility] in 2017   4  Internal and external hemorrhoids s/p hemorrhoidal banding   5  SSS  6   LENY on CPAP               Rhythm History:   Atrial fibrillation:     Atrial flutter:     SVT:     VT/VF/PVC:     Device history:   Pacemaker:    Defibrillator:    BIV PPM:    BIV ICD:    ILR:      Cardiac Testing:     ECHO: Results for orders placed during the hospital encounter of 08/12/21    Echo complete with contrast if indicated    Narrative  [address], [facility]  [phone]    Transthoracic Echocardiogram  2D, M-mode, Doppler, and Color Doppler    Study date: 12-Aug-2021    Patient: Irish Peres  MR number: [id number]  Account number: [de-identified]  : 1955  Age: 76 years  Gender: Male  Status: Outpatient  Location: [address] [place] [address]  Height: 72 in  Weight: 221 5 lb  BP: 152/ 62 mmHg    Indications: Assess left ventricular function  Diagnoses: I47 2 - Ventricular tachycardia    Sonographer:  CHRISTI Baxter  Primary Physician:  Cari Morrissey DO  Referring Physician:  Yessica Carrera MD  Group:  [facility]  Interpreting Physician:  Voncile Burkitt, MD    SUMMARY    LEFT VENTRICLE:  Systolic function was normal  Ejection fraction was estimated to be 60 %  There were no regional wall motion abnormalities  AORTIC VALVE:  There was mild regurgitation  HISTORY: PRIOR HISTORY: NSVT, Non-obtructive coronary artery disease, Hyperlipidemia, LENY    PROCEDURE: The study was performed in the [address] [place] [address]  This was a routine study  The transthoracic approach was used  The study included complete 2D imaging, M-mode, complete spectral Doppler, and color Doppler  The  heart rate was 54 bpm, at the start of the study  Images were obtained from the parasternal, apical, subcostal, and suprasternal notch acoustic windows  Image quality was adequate  LEFT VENTRICLE: Size was normal  Systolic function was normal  Ejection fraction was estimated to be 60 %  There were no regional wall motion abnormalities  Wall thickness was normal  DOPPLER: There was an increased relative contribution  of atrial contraction to ventricular filling  Left ventricular diastolic function parameters were normal for the patient's age  RIGHT VENTRICLE: The size was normal  Systolic function was normal  Wall thickness was normal     LEFT ATRIUM: Size was normal     RIGHT ATRIUM: Size was normal     MITRAL VALVE: Valve structure was normal  There was normal leaflet separation   DOPPLER: The transmitral velocity was within the normal range  There was no evidence for stenosis  There was trace regurgitation  AORTIC VALVE: The valve was trileaflet  Leaflets exhibited normal thickness and normal cuspal separation  DOPPLER: Transaortic velocity was within the normal range  There was no evidence for stenosis  There was mild regurgitation  TRICUSPID VALVE: The valve structure was normal  There was normal leaflet separation  DOPPLER: The transtricuspid velocity was within the normal range  There was no evidence for stenosis  There was trace regurgitation  PULMONIC VALVE: Leaflets exhibited normal thickness, no calcification, and normal cuspal separation  DOPPLER: The transpulmonic velocity was within the normal range  There was trace regurgitation  PERICARDIUM: There was no pericardial effusion  The pericardium was normal in appearance  AORTA: The root exhibited normal size  SYSTEMIC VEINS: IVC: The inferior vena cava was normal in size  Respirophasic changes were normal     SYSTEM MEASUREMENT TABLES    2D  %FS: 21 05 %  Ao Diam: 3 02 cm  EDV(Teich): 117 73 ml  EF(Teich): 42 64 %  ESV(Teich): 67 53 ml  IVSd: 0 78 cm  LA Area: 19 18 cm2  LA Diam: 4 cm  LVEDV MOD A4C: 167 49 ml  LVEF MOD A4C: 52 37 %  LVESV MOD A4C: 79 77 ml  LVIDd: 4 99 cm  LVIDs: 3 94 cm  LVLd A4C: 9 72 cm  LVLs A4C: 8 4 cm  LVPWd: 0 88 cm  RA Area: 14 03 cm2  RVIDd: 3 7 cm  SV MOD A4C: 87 71 ml  SV(Teich): 50 2 ml    MM  TAPSE: 2 85 cm    PW  LVOT Env  Ti: 315 89 ms  LVOT VTI: 14 53 cm  LVOT Vmax: 0 63 m/s  LVOT Vmean: 0 46 m/s  LVOT maxP 57 mmHg  LVOT meanP 93 mmHg  MV A Bi: 0 81 m/s  MV Dec [place]: 2 25 m/s2  MV DecT: 247 18 ms  MV E Bi: 0 56 m/s  MV E/A Ratio: 0 69  MV PHT: 71 68 ms  MVA By PHT: 3 07 cm2    [employer] Accredited Echocardiography Laboratory    Prepared and electronically signed by    Swapnil Montano MD  Signed 12-Aug-2021 15:00:09        History of Present Illness     HPI/INTERVAL HISTORY:  He is having RUQ/right diaphragm area discomfort which began towards the end of  but did not happen prior the entire year  This occurs when he tries to walk on the treadmill or performs higher aerobic exercise like walking faster  When he stops walking and rests for a brief time his symptoms subside and he can resume aerobic exercise  Other then this he has no major concerns or complaints  Review of Systems  ROS as noted above, otherwise 12 point review of systems was performed and is negative  Historical Information   Social History     Socioeconomic History   • Marital status: /Civil Union     Spouse name: Not on file   • Number of children: Not on file   • Years of education: Not on file   • Highest education level: Not on file   Occupational History   • Not on file   Tobacco Use   • Smoking status: Former     Types: Cigarettes     Quit date:      Years since quittin 1   • Smokeless tobacco: Never   Vaping Use   • Vaping Use: Never used   Substance and Sexual Activity   • Alcohol use: Yes     Alcohol/week: 7 0 standard drinks     Types: 7 Glasses of wine per week     Comment: few times a week   • Drug use: No   • Sexual activity: Not Currently   Other Topics Concern   • Not on file   Social History Narrative   • Not on file     Social Determinants of Health     Financial Resource Strain: Low Risk    • Difficulty of Paying Living Expenses: Not very hard   Food Insecurity: No Food Insecurity   • Worried About Running Out of Food in the Last Year: Never true   • Ran Out of Food in the Last Year: Never true   Transportation Needs: No Transportation Needs   • Lack of Transportation (Medical): No   • Lack of Transportation (Non-Medical):  No   Physical Activity: Not on file   Stress: Not on file   Social Connections: Not on file   Intimate Partner Violence: Not on file   Housing Stability: Low Risk    • Unable to Pay for Housing in the Last Year: No   • Number of Places Lived in the Last Year: 1   • Unstable Housing in the Last Year: No     Past Medical History:   Diagnosis Date   • Abrasion of left foot     LAST ASSESSED: 9/23/13   • Achilles tendinitis, unspecified leg     LAST ASSESSED: 11/30/12   • Allergic rhinitis     LAST ASSESSED: 11/25/13   • Allergic rhinitis 06/25/2008    LAST ASSESSED: 6/25/08   • Arthritis    • Cervicalgia 04/22/2011    LAST ASSESSED: 4/22/11   • Chronic pain disorder    • Colon polyp    • Coronary artery disease    • CPAP (continuous positive airway pressure) dependence    • Dysfunction of left eustachian tube     LAST ASSESSED: 9/23/13   • Epistaxis     LAST ASSESSED: 5/27/15   • First degree atrioventricular block     LAST ASSESSED: 7/10/17   • Gastric ulcer 10/07/2011    LAST ASSESSED: 3/9/15   • GERD (gastroesophageal reflux disease)    • Hearing aid worn    • Hemorrhoids 05/13/2011    LAST ASSESSED: 5/13/11   • Hyperlipidemia    • Hypertension    • Irregular heart beat    • Long term use of drug     LAST ASSESSED: 10/11/12   • Myalgia 12/21/2007    LAST ASSESSED: 12/21/07   • Myositis 12/21/2007    LAST ASSESSED: 12/21/07   • Numbness and tingling of foot     LAST ASSESSED: 3/9/15   • Pacemaker    • Premature ventricular beats     states cardiologist ordered Zio ambulatory cardiac monitor   • Sleep apnea    • Wears glasses      Past Surgical History:   Procedure Laterality Date   • CARDIAC CATHETERIZATION  2017   • CARDIAC ELECTROPHYSIOLOGY PROCEDURE N/A 10/25/2021    Procedure: CARDIAC EPS;  Surgeon: Dev Oh MD;  [facility]: [facility]; Service: Cardiology   • CARDIAC ELECTROPHYSIOLOGY PROCEDURE Left 10/25/2021    Procedure: Cardiac icd implant;  Surgeon: Dev Oh MD;  [facility]: [facility]; Service: Cardiology   • CARDIAC PACEMAKER PLACEMENT     • CARDIOVERSION N/A 10/25/2021    Procedure: Cardioversion;  Surgeon: Dev Oh MD;  Location: [facility];   Service: Cardiology   • CATARACT EXTRACTION Right    • CERVICAL FUSION      pt thinks C4-C5   • CHOLECYSTECTOMY      LAPAROSCOPIC; LAST ASSESSED: 10/17/17   • COLONOSCOPY      COMPLETE; 3-4 YEARS AGO BY [facility]; LAST ASSESSED: 14   • MYRINGOTOMY W/ TUBES      WITH VENTILATING TUBE INSERTION   • WI NEUROPLASTY &/TRANSPOS MEDIAN NRV CARPAL BRIDGET Left 2021    Procedure: RELEASE CARPAL TUNNEL;  Surgeon: Kimberley Boggs MD;  Location: [facility];  Service: Orthopedics   • WI NEUROPLASTY &/TRANSPOS MEDIAN NRV CARPAL Shaquille Wilkinson Right 10/14/2022    Procedure: RELEASE CARPAL TUNNEL;  Surgeon: Kimberley Boggs MD;  Location: [facility];  Service: Orthopedics   • WI TENDON SHEATH INCISION Right 10/14/2022    Procedure: RELEASE TRIGGER FINGER,RING AND LONG TENOSYNOVECTOMY FDS +FDP; Surgeon: Kimberley Boggs MD;  Location: [facility];  Service: Orthopedics   • VASECTOMY       Social History     Substance and Sexual Activity   Alcohol Use Yes   • Alcohol/week: 7 0 standard drinks   • Types: 7 Glasses of wine per week    Comment: few times a week     Social History     Substance and Sexual Activity   Drug Use No     Social History     Tobacco Use   Smoking Status Former   • Types: Cigarettes   • Quit date:    • Years since quittin 1   Smokeless Tobacco Never     Family History   Problem Relation Age of Onset   • Cancer Mother         SOFT TISSUE CANCER ON RT SIDE CHEST WALL AND    • Prostate cancer Maternal Uncle        Meds/Allergies       Current Outpatient Medications:   •  aspirin (ECOTRIN LOW STRENGTH) 81 mg EC tablet, Take 81 mg by mouth daily Pt taking 2 a day, Disp: , Rfl:   •  Coenzyme Q10 (COQ10) 200 MG CAPS, Take 1 tablet by mouth daily, Disp: , Rfl:   •  COLLAGEN-CHOND-HYALURONIC ACID PO, Take 1 capsule by mouth in the morning, Disp: , Rfl:   •  EPINEPHrine (EpiPen 2-Leroy) 0 3 mg/0 3 mL SOAJ, Inject 0 3 mL (0 3 mg total) into a muscle once for 1 dose, Disp: 0 6 mL, Rfl: 0  •  finasteride (PROSCAR) 5 mg tablet, TAKE 1 TABLET (5 MG TOTAL) BY MOUTH DAILY  , Disp: 90 tablet, Rfl: 1  •  fluticasone (VERAMYST) 27 5 MCG/SPRAY nasal spray, 2 sprays into each nostril daily, Disp: , Rfl:   •  Glucosamine-Chondroitin (GLUCOSAMINE CHONDR COMPLEX PO), Take 1 tablet by mouth daily, Disp: , Rfl:   •  Omega-3 Fatty Acids (FISH OIL) 1000 MG CPDR, Take by mouth daily, Disp: , Rfl:   •  pantoprazole (PROTONIX) 40 mg tablet, TAKE 1 TABLET BY MOUTH EVERY DAY, Disp: 90 tablet, Rfl: 1  •  rosuvastatin (CRESTOR) 10 MG tablet, TAKE 1 TABLET BY MOUTH EVERY DAY, Disp: 90 tablet, Rfl: 1  •  sotalol (BETAPACE) 80 mg tablet, TAKE 1 TABLET BY MOUTH EVERY 12 HOURS , Disp: 180 tablet, Rfl: 3  •  Turmeric (QC Tumeric Complex) 500 MG CAPS, Take by mouth 3 (three) times a week, Disp: , Rfl:     Allergies   Allergen Reactions   • Amoxicillin Anaphylaxis   • Augmentin [Amoxicillin-Pot Clavulanate] Anaphylaxis   • Acetaminophen      Rapid heart rate   • Apple - Food Allergy    • Cherry Flavor - Food Allergy Other (See Comments)     States allergy is to raw cherries difficulty breathing   • Pollen Extract    • Dye [Iodinated Contrast Media] Itching       Objective   Vitals: Blood pressure 142/90, pulse 63, height 6' (1 829 m), weight 98 9 kg (218 lb)  Physical Exam  Constitutional:       Appearance: He is well-developed  HENT:      Head: Normocephalic and atraumatic  Eyes:      Pupils: Pupils are equal, round, and reactive to light  Cardiovascular:      Rate and Rhythm: Normal rate and regular rhythm  Pulmonary:      Effort: Pulmonary effort is normal       Breath sounds: Normal breath sounds  Abdominal:      General: Bowel sounds are normal       Palpations: Abdomen is soft  Musculoskeletal:         General: Normal range of motion  Cervical back: Normal range of motion and neck supple  Skin:     General: Skin is warm and dry  Neurological:      Mental Status: He is alert and oriented to person, place, and time  Labs:  No visits with results within 2 Month(s) from this visit     Latest known visit with results is:   Telephone on 11/25/2022   Component Date Value   • Supplier Name 11/25/2022 [employer]    • Supplier Phone Number 11/25/2022 [phone]    • Order Status 11/25/2022 Completed    • Delivery Request Date 11/25/2022 11/25/2022    • Date Delivered  11/25/2022 11/25/2022    • Item Description 11/25/2022 PAP Accessory    • Item Description 11/25/2022 PAP Mask, Nasal Pillow, Fit Upon Setup, N/A, 1 per 3 months    • Item Description 11/25/2022 Humidifier Water Chamber, 1 per 6 months    • Item Description 11/25/2022 PAP Headgear, 1 per 6 months    • Item Description 11/25/2022 PAP Chinstrap, 1 per 6 months    • Item Description 11/25/2022 PAP Humidifier, Heated    • Item Description 11/25/2022 PAP Monitoring Modem    • Item Description 11/25/2022 Heated PAP Tubing, 1 per 3 months    • Item Description 11/25/2022 Disposable PAP Filter, 2 per 1 month    • Item Description 11/25/2022 Non-Disposable PAP Filter, 1 per 6 months    • Item Description 11/25/2022 PAP Mask Interface Cushion, Nasal Pillow, 2 per 1 month        Imaging: I have personally reviewed pertinent reports

## 2023-02-02 NOTE — TELEPHONE ENCOUNTER
----- Message from Cedric Robertson MD sent at 2/2/2023  3:25 PM EST -----  Patient device is functioning adequately  I agree with the changes made to the device  I have spoken to the EP representative    Patient has been scheduled for nuclear stress test after discussion with me I agree with that continue current Rx patient should keep appointment with

## 2023-02-10 ENCOUNTER — HOSPITAL ENCOUNTER (OUTPATIENT)
Dept: NON INVASIVE DIAGNOSTICS | Facility: CLINIC | Age: 68
Discharge: HOME | End: 2023-02-10

## 2023-02-10 ENCOUNTER — TELEPHONE (OUTPATIENT)
Dept: CARDIOLOGY CLINIC | Facility: CLINIC | Age: 68
End: 2023-02-10

## 2023-02-10 VITALS
SYSTOLIC BLOOD PRESSURE: 170 MMHG | OXYGEN SATURATION: 98 % | DIASTOLIC BLOOD PRESSURE: 86 MMHG | HEART RATE: 59 BPM | HEIGHT: 72 IN | WEIGHT: 218 LBS | BODY MASS INDEX: 29.53 KG/M2

## 2023-02-10 DIAGNOSIS — R07.89 CHEST TIGHTNESS: ICD-10-CM

## 2023-02-10 LAB
CHEST PAIN STATEMENT: NORMAL
MAX DIASTOLIC BP: 90 MMHG
MAX HEART RATE: 134 BPM
MAX HR PERCENT: 88 %
MAX HR: 134 BPM
MAX PREDICTED HEART RATE: 152 BPM
MAX. SYSTOLIC BP: 170 MMHG
NUC STRESS EJECTION FRACTION: 38 %
PROTOCOL NAME: NORMAL
RATE PRESSURE PRODUCT: NORMAL
REASON FOR TERMINATION: NORMAL
SL CV REST NUCLEAR ISOTOPE DOSE: 10.12 MCI
SL CV STRESS NUCLEAR ISOTOPE DOSE: 30.4 MCI
SL CV STRESS RECOVERY BP: NORMAL MMHG
SL CV STRESS RECOVERY HR: 72 BPM
SL CV STRESS RECOVERY O2 SAT: 98 %
SL CV STRESS STAGE REACHED: 2
STRESS ANGINA INDEX: 1
STRESS BASELINE BP: NORMAL MMHG
STRESS BASELINE HR: 59 BPM
STRESS O2 SAT REST: 98 %
STRESS PEAK HR: 129 BPM
STRESS POST ESTIMATED WORKLOAD: 7 METS
STRESS POST EXERCISE DUR MIN: 6 MIN
STRESS POST EXERCISE DUR SEC: 0 SEC
STRESS POST O2 SAT PEAK: 98 %
STRESS POST PEAK BP: 160 MMHG
TARGET HR FORMULA: NORMAL
TEST INDICATION: NORMAL
TIME IN EXERCISE PHASE: NORMAL

## 2023-02-10 NOTE — TELEPHONE ENCOUNTER
----- Message from Mychal Robbins MD sent at 2/10/2023  4:22 PM EST -----  Patient's nuclear stress test is normal and he exercised for 6 minutes  His last cardiac catheterization shows no obstructive disease  It is hard to explain his episodes of chest pain  We will continue same medication  EP note also noted  His EF is noted to be lower around 38% atrial paced beat and LBBB    As per study read as possible poor imaging quality

## 2023-02-10 NOTE — TELEPHONE ENCOUNTER
SW patient, made aware of results  _ he would like to speak to you personally   Phone number [phone]

## 2023-04-05 ENCOUNTER — HOSPITAL ENCOUNTER (OUTPATIENT)
Facility: HOSPITAL | Age: 68
Setting detail: OBSERVATION
Discharge: HOME | End: 2023-04-06
Attending: EMERGENCY MEDICINE | Admitting: STUDENT IN AN ORGANIZED HEALTH CARE EDUCATION/TRAINING PROGRAM

## 2023-04-05 ENCOUNTER — APPOINTMENT (EMERGENCY)
Dept: RADIOLOGY | Facility: HOSPITAL | Age: 68
End: 2023-04-05

## 2023-04-05 DIAGNOSIS — K22.70 BARRETT'S ESOPHAGUS WITHOUT DYSPLASIA: ICD-10-CM

## 2023-04-05 DIAGNOSIS — K64.4 EXTERNAL HEMORRHOIDS: ICD-10-CM

## 2023-04-05 DIAGNOSIS — R07.9 CHEST PAIN: Primary | ICD-10-CM

## 2023-04-05 PROBLEM — R51.9 HEADACHE: Status: ACTIVE | Noted: 2023-04-05

## 2023-04-05 LAB
2HR DELTA HS TROPONIN: 2 NG/L
ALBUMIN SERPL BCP-MCNC: 3.5 G/DL (ref 3.5–5)
ALP SERPL-CCNC: 60 U/L (ref 46–116)
ALT SERPL W P-5'-P-CCNC: 40 U/L (ref 12–78)
ANION GAP SERPL CALCULATED.3IONS-SCNC: 1 MMOL/L (ref 4–13)
AST SERPL W P-5'-P-CCNC: 27 U/L (ref 5–45)
ATRIAL RATE: 68 BPM
BASOPHILS # BLD AUTO: 0.04 THOUSANDS/ÂΜL (ref 0–0.1)
BASOPHILS NFR BLD AUTO: 1 % (ref 0–1)
BILIRUB SERPL-MCNC: 1.06 MG/DL (ref 0.2–1)
BUN SERPL-MCNC: 14 MG/DL (ref 5–25)
CALCIUM SERPL-MCNC: 9.3 MG/DL (ref 8.3–10.1)
CARDIAC TROPONIN I PNL SERPL HS: 12 NG/L
CARDIAC TROPONIN I PNL SERPL HS: 14 NG/L
CHLORIDE SERPL-SCNC: 111 MMOL/L (ref 96–108)
CO2 SERPL-SCNC: 25 MMOL/L (ref 21–32)
CREAT SERPL-MCNC: 0.91 MG/DL (ref 0.6–1.3)
EOSINOPHIL # BLD AUTO: 0.13 THOUSAND/ÂΜL (ref 0–0.61)
EOSINOPHIL NFR BLD AUTO: 2 % (ref 0–6)
ERYTHROCYTE [DISTWIDTH] IN BLOOD BY AUTOMATED COUNT: 12.7 % (ref 11.6–15.1)
GFR SERPL CREATININE-BSD FRML MDRD: 86 ML/MIN/1.73SQ M
GLUCOSE SERPL-MCNC: 88 MG/DL (ref 65–140)
HCT VFR BLD AUTO: 46 % (ref 36.5–49.3)
HGB BLD-MCNC: 15.3 G/DL (ref 12–17)
IMM GRANULOCYTES # BLD AUTO: 0.02 THOUSAND/UL (ref 0–0.2)
IMM GRANULOCYTES NFR BLD AUTO: 0 % (ref 0–2)
LYMPHOCYTES # BLD AUTO: 2.42 THOUSANDS/ÂΜL (ref 0.6–4.47)
LYMPHOCYTES NFR BLD AUTO: 39 % (ref 14–44)
MCH RBC QN AUTO: 30.8 PG (ref 26.8–34.3)
MCHC RBC AUTO-ENTMCNC: 33.3 G/DL (ref 31.4–37.4)
MCV RBC AUTO: 93 FL (ref 82–98)
MONOCYTES # BLD AUTO: 0.89 THOUSAND/ÂΜL (ref 0.17–1.22)
MONOCYTES NFR BLD AUTO: 14 % (ref 4–12)
NEUTROPHILS # BLD AUTO: 2.73 THOUSANDS/ÂΜL (ref 1.85–7.62)
NEUTS SEG NFR BLD AUTO: 44 % (ref 43–75)
NRBC BLD AUTO-RTO: 0 /100 WBCS
P AXIS: 24 DEGREES
PLATELET # BLD AUTO: 194 THOUSANDS/UL (ref 149–390)
PMV BLD AUTO: 9.9 FL (ref 8.9–12.7)
POTASSIUM SERPL-SCNC: 4.3 MMOL/L (ref 3.5–5.3)
PROT SERPL-MCNC: 6.7 G/DL (ref 6.4–8.4)
QRS AXIS: -43 DEGREES
QRSD INTERVAL: 176 MS
QT INTERVAL: 448 MS
QTC INTERVAL: 476 MS
RBC # BLD AUTO: 4.97 MILLION/UL (ref 3.88–5.62)
SODIUM SERPL-SCNC: 137 MMOL/L (ref 135–147)
T WAVE AXIS: 78 DEGREES
VENTRICULAR RATE: 68 BPM
WBC # BLD AUTO: 6.23 THOUSAND/UL (ref 4.31–10.16)

## 2023-04-05 RX ORDER — CHOLECALCIFEROL (VITAMIN D3) 125 MCG
200 CAPSULE ORAL DAILY
Status: DISCONTINUED | OUTPATIENT
Start: 2023-04-06 | End: 2023-04-06 | Stop reason: HOSPADM

## 2023-04-05 RX ORDER — SOTALOL HYDROCHLORIDE 80 MG/1
80 TABLET ORAL EVERY 12 HOURS
Status: DISCONTINUED | OUTPATIENT
Start: 2023-04-05 | End: 2023-04-06 | Stop reason: HOSPADM

## 2023-04-05 RX ORDER — NITROGLYCERIN 0.4 MG/1
0.4 TABLET SUBLINGUAL ONCE
Status: COMPLETED | OUTPATIENT
Start: 2023-04-05 | End: 2023-04-05

## 2023-04-05 RX ORDER — ENOXAPARIN SODIUM 100 MG/ML
40 INJECTION SUBCUTANEOUS DAILY
Status: DISCONTINUED | OUTPATIENT
Start: 2023-04-06 | End: 2023-04-06 | Stop reason: HOSPADM

## 2023-04-05 RX ORDER — PRAVASTATIN SODIUM 80 MG/1
80 TABLET ORAL DAILY
Status: DISCONTINUED | OUTPATIENT
Start: 2023-04-05 | End: 2023-04-06 | Stop reason: HOSPADM

## 2023-04-05 RX ORDER — ASPIRIN 325 MG
325 TABLET ORAL DAILY
Status: DISCONTINUED | OUTPATIENT
Start: 2023-04-06 | End: 2023-04-06 | Stop reason: HOSPADM

## 2023-04-05 RX ORDER — SODIUM CHLORIDE 9 MG/ML
3 INJECTION INTRAVENOUS
Status: DISCONTINUED | OUTPATIENT
Start: 2023-04-05 | End: 2023-04-06 | Stop reason: HOSPADM

## 2023-04-05 RX ORDER — CHLORAL HYDRATE 500 MG
1000 CAPSULE ORAL DAILY
Status: DISCONTINUED | OUTPATIENT
Start: 2023-04-06 | End: 2023-04-06 | Stop reason: HOSPADM

## 2023-04-05 RX ORDER — FINASTERIDE 5 MG/1
5 TABLET, FILM COATED ORAL DAILY
Status: DISCONTINUED | OUTPATIENT
Start: 2023-04-06 | End: 2023-04-06 | Stop reason: HOSPADM

## 2023-04-05 RX ORDER — PANTOPRAZOLE SODIUM 40 MG/1
40 TABLET, DELAYED RELEASE ORAL
Status: DISCONTINUED | OUTPATIENT
Start: 2023-04-06 | End: 2023-04-06 | Stop reason: HOSPADM

## 2023-04-05 RX ORDER — ASPIRIN 81 MG/1
81 TABLET, CHEWABLE ORAL DAILY
Status: CANCELLED | OUTPATIENT
Start: 2023-04-06

## 2023-04-05 RX ADMIN — SOTALOL HYDROCHLORIDE 80 MG: 80 TABLET ORAL at 22:20

## 2023-04-05 RX ADMIN — NITROGLYCERIN 0.4 MG: 0.4 TABLET SUBLINGUAL at 16:23

## 2023-04-05 NOTE — ED NOTES
Tiger Text sent to [facility] 211 notifying of patient's arrival       Lisa Almeida RN  04/05/23 [id number]

## 2023-04-05 NOTE — ED PROVIDER NOTES
"History  Chief Complaint   Patient presents with   • Chest Pain     Chest pain radiating to neck/ SOB after walking briskly to the mailbox Monday at 1800,  now ongoing \"discomfort\" in my chest, no N/V/ diaphoresis     Pt is a 76 YO M, PMHx including CAD, HLD, HTN, and LENY, who presents to the ED for chest pain  Pain started about 2 days ago after walking to the mailbox (pt states about 500 feet)  He developed sudden onset excruating pain that radiated up the left side of his neck  After resting the pain improved  He attempted to finish walking to the mailbox but the pain returned  Eventually he made it back inside to his chair  He states after several minutes the pain improved again  He now states that the pain has mostly resolved, but he still has a persistent 2/10 \"discomfort\" in his chest  No other modifying factors  Associated symtpoms include SOB around symptom onset but that has since resolved, as well as a headache that has persisted (pt states this is abnormal and rarely gets headaches)  Denies any diaphreosis  No other concerns or complaints at this time  Chart reviewed  Pt underwent an exercise stress test on 2/10/2023  It was essentially normal            Prior to Admission Medications   Prescriptions Last Dose Informant Patient Reported? Taking?    COLLAGEN-CHOND-HYALURONIC ACID PO 4/5/2023  Yes Yes   Sig: Take 1 capsule by mouth in the morning   Coenzyme Q10 (COQ10) 200 MG CAPS 4/5/2023  Yes Yes   Sig: Take 1 tablet by mouth daily   EPINEPHrine (EpiPen 2-Leroy) 0 3 mg/0 3 mL SOAJ   No No   Sig: Inject 0 3 mL (0 3 mg total) into a muscle once for 1 dose   Glucosamine-Chondroitin (GLUCOSAMINE CHONDR COMPLEX PO)   Yes No   Sig: Take 1 tablet by mouth daily   Omega-3 Fatty Acids (FISH OIL) 1000 MG CPDR   Yes No   Sig: Take by mouth daily   Turmeric (QC Tumeric Complex) 500 MG CAPS   Yes No   Sig: Take by mouth 3 (three) times a week   aspirin (ECOTRIN LOW STRENGTH) 81 mg EC tablet 4/5/2023  Yes Yes   Sig: " Take 81 mg by mouth daily Pt taking 2 a day   finasteride (PROSCAR) 5 mg tablet 2023  No Yes   Sig: TAKE 1 TABLET (5 MG TOTAL) BY MOUTH DAILY  fluticasone (VERAMYST) 27 5 MCG/SPRAY nasal spray   Yes No   Si sprays into each nostril daily   pantoprazole (PROTONIX) 40 mg tablet   No No   Sig: TAKE 1 TABLET BY MOUTH EVERY DAY   rosuvastatin (CRESTOR) 10 MG tablet   No No   Sig: TAKE 1 TABLET BY MOUTH EVERY DAY   sotalol (BETAPACE) 80 mg tablet   No No   Sig: TAKE 1 TABLET BY MOUTH EVERY 12 HOURS        Facility-Administered Medications: None       Past Medical History:   Diagnosis Date   • Abrasion of left foot     LAST ASSESSED: 13   • Achilles tendinitis, unspecified leg     LAST ASSESSED: 12   • Allergic rhinitis     LAST ASSESSED: 13   • Allergic rhinitis 2008    LAST ASSESSED: 08   • Arthritis    • Cervicalgia 2011    LAST ASSESSED: 11   • Chronic pain disorder    • Colon polyp    • Coronary artery disease    • CPAP (continuous positive airway pressure) dependence    • Dysfunction of left eustachian tube     LAST ASSESSED: 13   • Epistaxis     LAST ASSESSED: 5/27/15   • First degree atrioventricular block     LAST ASSESSED: 7/10/17   • Gastric ulcer 10/07/2011    LAST ASSESSED: 3/9/15   • GERD (gastroesophageal reflux disease)    • Hearing aid worn    • Hemorrhoids 2011    LAST ASSESSED: 11   • Hyperlipidemia    • Hypertension    • Irregular heart beat    • Long term use of drug     LAST ASSESSED: 10/11/12   • Myalgia 2007    LAST ASSESSED: 07   • Myositis 2007    LAST ASSESSED: 07   • Numbness and tingling of foot     LAST ASSESSED: 3/9/15   • Pacemaker    • Premature ventricular beats     states cardiologist ordered Zio ambulatory cardiac monitor   • Sleep apnea    • Wears glasses        Past Surgical History:   Procedure Laterality Date   • CARDIAC CATHETERIZATION  2017   • CARDIAC ELECTROPHYSIOLOGY PROCEDURE N/A 10/25/2021 Procedure: CARDIAC EPS;  Surgeon: Theresa Rios MD;  [facility]; Service: Cardiology   • CARDIAC ELECTROPHYSIOLOGY PROCEDURE Left 10/25/2021    Procedure: Cardiac icd implant;  Surgeon: Theresa Rios MD;  [facility]; Service: Cardiology   • CARDIAC PACEMAKER PLACEMENT     • CARDIOVERSION N/A 10/25/2021    Procedure: Cardioversion;  Surgeon: Theresa Rios MD;  [facility]; Service: Cardiology   • CATARACT EXTRACTION Right    • CERVICAL FUSION      pt thinks C4-C5   • CHOLECYSTECTOMY      LAPAROSCOPIC; LAST ASSESSED: 10/17/17   • COLONOSCOPY      COMPLETE; 3-4 YEARS AGO BY [facility]; LAST ASSESSED: 14   • MYRINGOTOMY W/ TUBES      WITH VENTILATING TUBE INSERTION   • AR NEUROPLASTY &/TRANSPOS MEDIAN NRV CARPAL TUNNE Left 2021    Procedure: RELEASE CARPAL TUNNEL;  Surgeon: Hortencia Figueroa MD;  Location: [facility];  Service: Orthopedics   • AR NEUROPLASTY &/TRANSPOS MEDIAN NRV CARPAL [place] Right 10/14/2022    Procedure: RELEASE CARPAL TUNNEL;  Surgeon: Hortencia Figueroa MD;  [facility];  Service: Orthopedics   • AR TENDON SHEATH INCISION Right 10/14/2022    Procedure: RELEASE TRIGGER FINGER,RING AND LONG TENOSYNOVECTOMY FDS +FDP; Surgeon: Hortencia Figueroa MD;  Location: [facility];  Service: Orthopedics   • VASECTOMY         Family History   Problem Relation Age of Onset   • Cancer Mother         SOFT TISSUE CANCER ON RT SIDE CHEST WALL AND    • Prostate cancer Maternal Uncle      I have reviewed and agree with the history as documented      E-Cigarette/Vaping   • E-Cigarette Use Never User      E-Cigarette/Vaping Substances   • Nicotine No    • THC No    • CBD No    • Flavoring No    • Other No    • Unknown No      Social History     Tobacco Use   • Smoking status: Former     Types: Cigarettes     Quit date:      Years since quittin 2   • Smokeless tobacco: Never   Vaping Use   • Vaping Use: Never used Substance Use Topics   • Alcohol use: Yes     Alcohol/week: 7 0 standard drinks     Types: 7 Glasses of wine per week     Comment: few times a week   • Drug use: No        Review of Systems   Constitutional: Negative for chills, diaphoresis and fever  Respiratory: Positive for shortness of breath  Cardiovascular: Positive for chest pain  Gastrointestinal: Negative for nausea and vomiting  Neurological: Positive for headaches  All other systems reviewed and are negative  Physical Exam  ED Triage Vitals   Temperature Pulse Respirations Blood Pressure SpO2   04/05/23 1514 04/05/23 1514 04/05/23 1514 04/05/23 1514 04/05/23 1514   98 2 °F (36 8 °C) 70 22 (!) 172/86 98 %      Temp Source Heart Rate Source Patient Position - Orthostatic VS BP Location FiO2 (%)   04/05/23 1514 04/05/23 1514 04/05/23 1514 04/05/23 1514 --   Temporal Monitor Lying Left arm       Pain Score       04/05/23 2024       No Pain             Orthostatic Vital Signs  Vitals:    04/05/23 2319 04/06/23 0310 04/06/23 0701 04/06/23 1137   BP: 118/56 137/72 138/79 156/75   Pulse: 57 59 85 62   Patient Position - Orthostatic VS: Lying Lying Lying Lying       Physical Exam  Vitals and nursing note reviewed  Constitutional:       General: He is not in acute distress  Appearance: He is well-developed  He is not diaphoretic  HENT:      Head: Normocephalic and atraumatic  Right Ear: External ear normal       Left Ear: External ear normal       Nose: Nose normal    Eyes:      General: Lids are normal  No scleral icterus  Cardiovascular:      Rate and Rhythm: Normal rate and regular rhythm  Heart sounds: Normal heart sounds  No murmur heard  No friction rub  No gallop  Pulmonary:      Effort: Pulmonary effort is normal  No respiratory distress  Breath sounds: Normal breath sounds  No wheezing or rales  Abdominal:      Palpations: Abdomen is soft  Tenderness: There is no abdominal tenderness   There is no guarding or rebound  Musculoskeletal:         General: No deformity  Normal range of motion  Cervical back: Normal range of motion and neck supple  Skin:     General: Skin is warm and dry  Neurological:      General: No focal deficit present  Mental Status: He is alert     Psychiatric:         Mood and Affect: Mood normal          Behavior: Behavior normal          ED Medications  Medications   nitroglycerin (NITROSTAT) SL tablet 0 4 mg (0 4 mg Sublingual Given 4/5/23 1623)       Diagnostic Studies  Results Reviewed     Procedure Component Value Units Date/Time    Hemoglobin A1C w/ EAG Estimation [[id number]] Collected: 04/05/23 1618    Lab Status: Final result Specimen: Blood from Arm, Left Updated: 04/06/23 0821     Hemoglobin A1C 5 5 %       mg/dl     Lipid panel [[id number]]  (Abnormal) Collected: 04/05/23 1618    Lab Status: Final result Specimen: Blood from Arm, Left Updated: 04/06/23 0706     Cholesterol 118 mg/dL      Triglycerides 103 mg/dL      HDL, Direct 36 mg/dL      LDL Calculated 61 mg/dL      Non-HDL-Chol (CHOL-HDL) 82 mg/dl     LDL cholesterol, direct [[id number]]  (Normal) Collected: 04/05/23 1618    Lab Status: Final result Specimen: Blood from Arm, Left Updated: 04/06/23 0706     LDL Direct 22 mg/dl     Narrative:      LDL Cholesterol:        Optimal           <100 mg/dl      Near Optimal      100-129 mg/dl      Above Optimal       Borderline High  130-159 mg/dl       High             160-189 mg/dl       Very High        >189 mg/dl    HS Troponin I 2hr [[id number]]  (Normal) Collected: 04/05/23 1828    Lab Status: Final result Specimen: Blood from Arm, Left Updated: 04/05/23 1933     hs TnI 2hr 14 ng/L      Delta 2hr hsTnI 2 ng/L     HS Troponin 0hr (reflex protocol) [[id number]]  (Normal) Collected: 04/05/23 1618    Lab Status: Final result Specimen: Blood from Arm, Left Updated: 04/05/23 1651     hs TnI 0hr 12 ng/L     Comprehensive metabolic panel [[id number]]  (Abnormal) Collected: 04/05/23 1618    Lab Status: Final result Specimen: Blood from Arm, Left Updated: 04/05/23 1645     Sodium 137 mmol/L      Potassium 4 3 mmol/L      Chloride 111 mmol/L      CO2 25 mmol/L      ANION GAP 1 mmol/L      BUN 14 mg/dL      Creatinine 0 91 mg/dL      Glucose 88 mg/dL      Calcium 9 3 mg/dL      AST 27 U/L      ALT 40 U/L      Alkaline Phosphatase 60 U/L      Total Protein 6 7 g/dL      Albumin 3 5 g/dL      Total Bilirubin 1 06 mg/dL      eGFR 86 ml/min/1 73sq m     Narrative:      [facility] guidelines for Chronic Kidney Disease (CKD):   •  Stage 1 with normal or high GFR (GFR > 90 mL/min/1 73 square meters)  •  Stage 2 Mild CKD (GFR = 60-89 mL/min/1 73 square meters)  •  Stage 3A Moderate CKD (GFR = 45-59 mL/min/1 73 square meters)  •  Stage 3B Moderate CKD (GFR = 30-44 mL/min/1 73 square meters)  •  Stage 4 Severe CKD (GFR = 15-29 mL/min/1 73 square meters)  •  Stage 5 End Stage CKD (GFR <15 mL/min/1 73 square meters)  Note: GFR calculation is accurate only with a steady state creatinine    CBC and differential [[id number]]  (Abnormal) Collected: 04/05/23 1618    Lab Status: Final result Specimen: Blood from Arm, Left Updated: 04/05/23 1643     WBC 6 23 Thousand/uL      RBC 4 97 Million/uL      Hemoglobin 15 3 g/dL      Hematocrit 46 0 %      MCV 93 fL      MCH 30 8 pg      MCHC 33 3 g/dL      RDW 12 7 %      MPV 9 9 fL      Platelets 605 Thousands/uL      nRBC 0 /100 WBCs      Neutrophils Relative 44 %      Immat GRANS % 0 %      Lymphocytes Relative 39 %      Monocytes Relative 14 %      Eosinophils Relative 2 %      Basophils Relative 1 %      Neutrophils Absolute 2 73 Thousands/µL      Immature Grans Absolute 0 02 Thousand/uL      Lymphocytes Absolute 2 42 Thousands/µL      Monocytes Absolute 0 89 Thousand/µL      Eosinophils Absolute 0 13 Thousand/µL      Basophils Absolute 0 04 Thousands/µL                  CT head without contrast   Final Result by Jose Calero Clayton Land DO (04/05 1820)   No acute intracranial abnormality  Workstation performed: [id number]         X-ray chest 1 view portable   ED Interpretation by Sydna Gosselin, DO (04/05 1724)   No acute cardiopulmonary disease      Final Result by Carla Contreras MD (04/06 0813)      No acute cardiopulmonary disease  Workstation performed: [id number]               Procedures  ECG 12 Lead Documentation Only    Date/Time: 4/6/2023 10:06 PM  Performed by: Sydna Gosselin, DO  Authorized by: Sydna Gosselin, DO     ECG reviewed by me, the ED Provider: yes    Patient location:  ED  Interpretation:     Interpretation: abnormal    Rate:     ECG rate:  68    ECG rate assessment: normal    Rhythm:     Rhythm: paced    Pacing:     Capture:  Complete    Type of pacing:  Ventricular  Ectopy:     Ectopy: none    QRS:     QRS axis:  Normal  Conduction:     Conduction: abnormal      Abnormal conduction: complete LBBB    ST segments:     ST segments:  Normal  T waves:     T waves: normal            ED Course  ED Course as of 04/06/23 2210   Wed Apr 05, 2023   1644 WBC: 6 23   1644 Hemoglobin: 15 3   1644 Platelet Count: 652   1654 hs TnI 0hr: 12   1934 Delta 2hr hsTnI: 2   1934 Discussed with JATIN  Accepts admission             HEART Risk Score    Flowsheet Row Most Recent Value   Heart Score Risk Calculator    History 1 Filed at: 04/05/2023 1927   ECG 1 Filed at: 04/05/2023 1927   Age 2 Filed at: 04/05/2023 1927   Risk Factors 2 Filed at: 04/05/2023 1927   Troponin 0 Filed at: 04/05/2023 1927   HEART Score 6 Filed at: 04/05/2023 1927        Identification of Seniors at [address] [place] [address] Most Recent Value   (ISAR) Identification of Seniors at Risk    Before the illness or injury that brought you to the Emergency, did you need someone to help you on a regular basis?  0 Filed at: 04/05/2023 1514   In the last 24 hours, have you needed more help than usual? 0 Filed at: 04/05/2023 1514   Have you "been hospitalized for one or more nights during the past 6 months? 1 Filed at: 04/05/2023 1514   In general, do you see well? 0 Filed at: 04/05/2023 1514   In general, do you have serious problems with your memory? 0 Filed at: 04/05/2023 1514   Do you take more than three different medications every day? 1 Filed at: 04/05/2023 1514   ISAR Score 2 Filed at: 04/05/2023 1514                    SBIRT 22yo+    Flowsheet Row Most Recent Value   SBIRT (25 yo +)    In order to provide better care to our patients, we are screening all of our patients for alcohol and drug use  Would it be okay to ask you these screening questions? No Filed at: 04/05/2023 1631        JAJA Risk Score    Flowsheet Row Most Recent Value   Age >= 72 1 Filed at: 04/06/2023 4470   Known CAD (stenosis >= 50%) 1 Filed at: 04/06/2023 0304   Recent (<=24 hrs) Service Angina 0 Filed at: 04/06/2023 0831   ST Deviation >= 0 5 mm 0 Filed at: 04/06/2023 0831   3+ CAD Risk Factors (FHx, HTN, HLP, DM, Smoker) --   Aspirin Use Past 7 Days 1 Filed at: 04/06/2023 0831   Elevated Cardiac Markers 0 Filed at: 04/06/2023 0831   JAJA Risk Score (Calculated) --              Medical Decision Making  Patient is a 75 y o  male who presented to the ED for chest pain  Pt is non-toxic, well appearing  Vitals are stable  Physical exam is unremarkable  No evidence of volume overload or shock on exam  EKG without signs of active ischemia/STEMI  Differential includes angina, msk pain, ACS/NSTEMI  Low suspicion for acute PE, pneumothorax, thoracic aortic dissection, cardiac effusion / tamponade  Overall, ACS is being considered given higher risk features, PMHx, and history & physical     Plan: Labs, EKG, troponins,CXR, pain control, reassess, admission  Will obtain CTH given no prior hx of headaches   Portions of the record may have been created with voice recognition software   Occasional wrong word or \"sound a like\" substitutions may have occurred due to the " inherent limitations of voice recognition software  Read the chart carefully and recognize, using context, where substitutions have occurred  Chest pain: acute illness or injury  Amount and/or Complexity of Data Reviewed  External Data Reviewed: radiology and notes  Labs: ordered  Decision-making details documented in ED Course  Radiology: ordered and independent interpretation performed  Risk  OTC drugs  Prescription drug management  Decision regarding hospitalization              Disposition  Final diagnoses:   Chest pain     Time reflects when diagnosis was documented in both MDM as applicable and the Disposition within this note     Time User Action Codes Description Comment    4/5/2023  6:41 PM Yulia Wilson Add [R07 9] Chest pain     4/6/2023  1:42 PM Minesh Began Add [K22 70] Last's esophagus without dysplasia     4/6/2023  1:42 PM Minesh Began Add [K64 4] External hemorrhoids       ED Disposition     ED Disposition   Admit    Condition   Stable    Date/Time   Wed Apr 5, 2023  6:41 PM    Comment   Case was discussed with and the patient's admission status was agreed to be Admission Status: inpatient status to the service of Dr Rika Caldwell up With Specialties Details Why Contact Info    Josseline Lea DO Family Medicine Schedule an appointment as soon as possible for a visit in 1 week(s)  [id number] [facility]  [address] Cardiology, Multidisciplinary Follow up  [place] [address] [address] [facility]  [phone]            Discharge Medication List as of 4/6/2023  1:54 PM      START taking these medications    Details   ranolazine (RANEXA) 500 mg 12 hr tablet Take 1 tablet (500 mg total) by mouth 2 (two) times a day, Starting Thu 4/6/2023, Normal         CONTINUE these medications which have NOT CHANGED    Details   aspirin (ECOTRIN LOW STRENGTH) 81 mg EC tablet Take 81 mg by mouth daily Pt taking 2 a day, Historical Med Coenzyme Q10 (COQ10) 200 MG CAPS Take 1 tablet by mouth daily, Historical Med      COLLAGEN-CHOND-HYALURONIC ACID PO Take 1 capsule by mouth in the morning, Historical Med      finasteride (PROSCAR) 5 mg tablet TAKE 1 TABLET (5 MG TOTAL) BY MOUTH DAILY  , Starting Fri 10/21/2022, Normal      EPINEPHrine (EpiPen 2-Leroy) 0 3 mg/0 3 mL SOAJ Inject 0 3 mL (0 3 mg total) into a muscle once for 1 dose, Starting Mon 11/7/2022, Normal      fluticasone (VERAMYST) 27 5 MCG/SPRAY nasal spray 2 sprays into each nostril daily, Historical Med      Glucosamine-Chondroitin (GLUCOSAMINE CHONDR COMPLEX PO) Take 1 tablet by mouth daily, Historical Med      Omega-3 Fatty Acids (FISH OIL) 1000 MG CPDR Take by mouth daily, Historical Med      pantoprazole (PROTONIX) 40 mg tablet TAKE 1 TABLET BY MOUTH EVERY DAY, Normal      rosuvastatin (CRESTOR) 10 MG tablet TAKE 1 TABLET BY MOUTH EVERY DAY, Normal      sotalol (BETAPACE) 80 mg tablet TAKE 1 TABLET BY MOUTH EVERY 12 HOURS , Normal      Turmeric (QC Tumeric Complex) 500 MG CAPS Take by mouth 3 (three) times a week, Historical Med           Outpatient Discharge Orders   Ambulatory referral to Gastroenterology   Standing Status: Future Standing Exp  Date: 04/06/24      Ambulatory referral to Cardiology   Standing Status: Future Standing Exp  Date: 04/06/24      Discharge Diet     Call provider for:  persistent nausea or vomiting     Call provider for:  severe uncontrolled pain     Call provider for:  redness, tenderness, or signs of infection (pain, swelling, redness, odor or green/yellow discharge around incision site)     Call provider for: active or persistent bleeding       PDMP Review       Value Time User    PDMP Reviewed  Yes 7/16/2021  8:01 AM Gilberto Zapata PA-C           ED Provider  Attending physically available and evaluated Gonzales Rosenbaum  DANIELLE managed the patient along with the ED Attending      Electronically Signed by         Garrett Womack DO  04/06/23 8177

## 2023-04-05 NOTE — ED ATTENDING ATTESTATION
"Sintia Davidson MD, saw and evaluated the patient  I have discussed the patient with the resident and agree with the resident's findings, Plan of Care, and MDM as documented in the resident's note, except where noted  All available labs and Radiology studies were reviewed  I was present for key portions of any procedure(s) performed by the resident and I was immediately available to provide assistance  At this point I agree with the current assessment done in the Emergency Department  I have conducted an independent evaluation of this patient a history and physical is as follows:    74 yo male with a complicated past medical history including GERD, LENY, CAD, HTN, hyperlipidemia, indwelling pacer, and osteoarthritis presents to the ED complaining of chest pain x 2 days  The patient reports a severe left sided chest pain that radiated to the neck while walking to the mailbox on Monday evening  The pain transiently improved with rest then returned when he walked back to the house  The pain subsided again after he sat down  Since that time he has been experiencing a constant \"discomfort\" in his chest  (+) Shortness of breath with initial chest pains but this has since subsided  No nausea, vomiting, or diaphoresis  He denies dizziness/lightheadedness  (+) Mild global \"throbbing\" headache  No other specific complaints  ROS: per resident physician note    Gen: NAD, AA&Ox3  HEENT: PERRL, EOMI  Neck: supple  CV: RRR  Lungs: CTA B/L  Abdomen: soft, NT/ND  Ext: no swelling or deformity  Neuro: 5/5 strength all extremities, sensation grossly intact  Skin: no rash    ED Course  The patient is comfortable appearing with a benign physical examination  Vital signs are remarkable for a mildly elevated BP  Unclear etiology of chest pain  Angina vs ACS vs musculoskeletal pain? Lower clinical suspicion for PE, TAD, and PTX  (+) Multiple cardiac risk factors  Will check EKG, CXR, basic labs, troponin, and CT head   SL nitro " administered  Will continue to monitor in the ED  Disposition per workup and reassessment        Critical Care Time  Procedures

## 2023-04-06 VITALS
DIASTOLIC BLOOD PRESSURE: 75 MMHG | BODY MASS INDEX: 29.39 KG/M2 | OXYGEN SATURATION: 97 % | SYSTOLIC BLOOD PRESSURE: 156 MMHG | RESPIRATION RATE: 19 BRPM | HEART RATE: 62 BPM | WEIGHT: 217 LBS | TEMPERATURE: 98 F | HEIGHT: 72 IN

## 2023-04-06 PROBLEM — R51.9 HEADACHE: Status: RESOLVED | Noted: 2023-04-05 | Resolved: 2023-04-06

## 2023-04-06 LAB
ATRIAL RATE: 57 BPM
ATRIAL RATE: 60 BPM
CHOLEST SERPL-MCNC: 118 MG/DL
EST. AVERAGE GLUCOSE BLD GHB EST-MCNC: 111 MG/DL
HBA1C MFR BLD: 5.5 %
HDLC SERPL-MCNC: 36 MG/DL
LDLC SERPL CALC-MCNC: 61 MG/DL (ref 0–100)
LDLC SERPL DIRECT ASSAY-MCNC: 22 MG/DL (ref 0–100)
NONHDLC SERPL-MCNC: 82 MG/DL
P AXIS: -51 DEGREES
PR INTERVAL: 186 MS
PR INTERVAL: 188 MS
QRS AXIS: -28 DEGREES
QRS AXIS: -57 DEGREES
QRSD INTERVAL: 182 MS
QRSD INTERVAL: 184 MS
QT INTERVAL: 482 MS
QT INTERVAL: 484 MS
QTC INTERVAL: 471 MS
QTC INTERVAL: 482 MS
T WAVE AXIS: 63 DEGREES
T WAVE AXIS: 79 DEGREES
TRIGL SERPL-MCNC: 103 MG/DL
VENTRICULAR RATE: 57 BPM
VENTRICULAR RATE: 60 BPM

## 2023-04-06 RX ORDER — RANOLAZINE 500 MG/1
500 TABLET, EXTENDED RELEASE ORAL 2 TIMES DAILY
Qty: 60 TABLET | Refills: 0 | Status: SHIPPED | OUTPATIENT
Start: 2023-04-06

## 2023-04-06 RX ORDER — DOCUSATE SODIUM 100 MG/1
100 CAPSULE, LIQUID FILLED ORAL DAILY PRN
Status: DISCONTINUED | OUTPATIENT
Start: 2023-04-06 | End: 2023-04-06 | Stop reason: HOSPADM

## 2023-04-06 RX ADMIN — PANTOPRAZOLE SODIUM 40 MG: 40 TABLET, DELAYED RELEASE ORAL at 06:25

## 2023-04-06 RX ADMIN — SOTALOL HYDROCHLORIDE 80 MG: 80 TABLET ORAL at 08:05

## 2023-04-06 RX ADMIN — PRAVASTATIN SODIUM 80 MG: 80 TABLET ORAL at 08:05

## 2023-04-06 RX ADMIN — Medication 200 MG: at 08:05

## 2023-04-06 RX ADMIN — ASPIRIN 325 MG ORAL TABLET 325 MG: 325 PILL ORAL at 08:05

## 2023-04-06 RX ADMIN — OMEGA-3 FATTY ACIDS CAP 1000 MG 1000 MG: 1000 CAP at 08:05

## 2023-04-06 RX ADMIN — FINASTERIDE 5 MG: 5 TABLET, FILM COATED ORAL at 08:05

## 2023-04-06 NOTE — ASSESSMENT & PLAN NOTE
· Patient endorses a headache that seem to coincide with his episode of chest pain on Monday, occasionally has some persistent right-sided headache  · Supportive care and expectant monitoring  Tylenol as needed   If failing to improve could consider neurology referral

## 2023-04-06 NOTE — ASSESSMENT & PLAN NOTE
Emergency Medicine Resident Note      Patient: Annamaria Charles Age: 82 year old Sex: female   MRN: [id number] : 1939 Encounter Date: 2022       Chief Complaint   Patient presents with   • Weakness       History Obtained from: Patient   []  services used.     ED Caveat: ED Caveat Invoked due to patient's dementia. Patient is unable to participate in his/her care or provide adequate information regarding history, review of systems, physical exam or shared medical decision making.     HPI:   82 year old female with PMH dementia (alert and oriented x2 at baseline), hypertension presented to the emergency department for generalized weakness.  History obtained from EMS report as patient is unable to provide history at this time.  Reports generalized weakness with difficulties ambulating this morning.  Patient uses walker at baseline and was noted to have generalized weakness.  Patient denies any current pain, fever, cough, sick contacts, any other complaints at this time.    ROS:   Review of Systems   Unable to perform ROS: Dementia             PAST HISTORY:         Past Medical History:   Diagnosis Date   • Dementia (CMS/HCC)    • Fracture    • HTN (hypertension)     Past Surgical History:   Procedure Laterality Date   • APPENDECTOMY     • HYSTERECTOMY      25 yrs ago   • JOINT REPLACEMENT        Additional PMH:As above [x] Denies []  Additional PSH:As above [] Denies [x]           Social History     Tobacco Use   • Smoking status: Never Smoker   • Smokeless tobacco: Never Used   Substance Use Topics   • Alcohol use: Yes     Comment: occassionally   • Drug use: Never    Family History   Problem Relation Age of Onset   • Patient is unaware of any medical problems Mother    • Patient is unaware of any medical problems Father       Additional SH:  Denies ETOH []   Denies Tobacco []   Denies Recreational Drug Use []   Lives in Nursing home/Rehab facility []  Additional FH: Denies [x]          · Pt presented with central chest tightness radiating to his neck on 4/3 while ambulating to the mailbox  It was relieved with rest and recurred when he started ambulating again  · Concerning for typical angina  · Symptoms have since abated, however patient has increased concerns and decided be evaluated in the ED  · Troponins 12/14  · EKG paced  · JAJA score of 3  · Cardiology evaluated,  · Pt with known 60% proximal left circumflex stenosis from 2021  · Plan was to be cardiac catheterization, however if patient would undergo stent placement would need DAPT and concerns of this with intermittent rectal bleeding in setting of hemorrhoids, previous polyp  Recommendations for patient to see GI provider and get this evaluated prior to cardiac cath, continue follow up with DR Stevan Kingsley in the near future  · In the meantime, patient started on Ranexa 500 mg BID   · Stable for discharge from cardiac perspective with close outpatient follow up  · Continue aspirin and statin    · ICD was interrogated in the ED, no significant findings   Current Discharge Medication List      Prior to Admission Medications    Details   hydroCHLOROthiazide (HYDRODIURIL) 25 MG tablet Take 25 mg by mouth daily.      magnesium hydroxide (Milk of Magnesia) 400 MG/5ML suspension Take 30 mLs by mouth daily as needed for Constipation.      pantoprazole (Protonix) 40 MG tablet Take 1 tablet by mouth daily.  Qty: 30 tablet, Refills: 0      aspirin (ECOTRIN) 81 MG EC tablet Take 81 mg by mouth daily. Do not start before September 30, 2021.      amLODIPine (NORVASC) 10 MG tablet Take 1 tablet by mouth daily.  Qty: 90 tablet, Refills: 0           No Known Allergies       PHYSICAL EXAMINATION:   ED Triage Vitals   ED Triage Vitals Group      Temp 07/18/22 1146 99.5 °F (37.5 °C)      Heart Rate 07/18/22 1116 75      Resp 07/18/22 1116 19      BP 07/18/22 1116 (!) 154/80      SpO2 07/18/22 1146 96 %      EtCO2 mmHg --       Height --       Weight 07/18/22 1101 152 lb 5.4 oz (69.1 kg)      Weight Scale Used 07/18/22 1101 Scale in bed      BMI (Calculated) --       IBW/kg (Calculated) --      Physical Exam  Constitutional:       General: She is not in acute distress.     Appearance: She is not ill-appearing.   HENT:      Head: Normocephalic and atraumatic.      Right Ear: External ear normal.      Left Ear: External ear normal.      Nose: Nose normal.      Mouth/Throat:      Mouth: Mucous membranes are moist.      Pharynx: Oropharynx is clear.      Neck: Normal range of motion.   Eyes:      Extraocular Movements: Extraocular movements intact.      Pupils: Pupils are equal, round, and reactive to light.   Cardiovascular:      Rate and Rhythm: Normal rate and regular rhythm.      Heart sounds: Normal heart sounds.   Pulmonary:      Effort: Pulmonary effort is normal.      Breath sounds: Normal breath sounds.   Chest:      Chest wall: No tenderness or crepitus.   Abdominal:      General: Bowel sounds are normal.      Palpations: Abdomen is soft.      Tenderness: There is abdominal  tenderness (suprapubic).   Musculoskeletal:         General: Normal range of motion.   Skin:     General: Skin is warm.   Neurological:      General: No focal deficit present.      Mental Status: She is alert and oriented to person, place, and time.      Cranial Nerves: No cranial nerve deficit.      Sensory: No sensory deficit.      Motor: Weakness (BL LE weakness 2/5, unable to lift legs off of bed, BL UE 4/5 equally) present.   Psychiatric:         Mood and Affect: Mood normal.         Behavior: Behavior normal.         MDM:   Based on above most concerned for broad differential for generalized weakness we will evaluate for infectious versus cardiac versus rule out acute intracranial mass or hemorrhage versus metabolic  Plan for CBC CMP, UA, chest x-ray, EKG, CT head, CT abdomen pelvis with contrast  EMR reviewed for all available information from previous encounters, results, data, and notes. Of note patient was last admitted approximately a year prior in 2021 for DONG and GI bleed noted alert and oriented to name on previous admission    Patient and/or family were updated on results and treatment plan.  All questions were answered to satisfaction.  The patient and/or family verbalized understanding and agreement with the plan.    ED Course as of 22 1800   Mon 2022   1249 Attempted to contact patient's caretaker (Mckinley) no answer. [SK]   1438   Primary admitting team updated:  Patient ED work up is completed and the patient care will be transitioned to primary team.    [SK]      ED Course User Index  [SK] Andrew Jewell        Patient: Annamaria Charles Age: 82 year old Sex: female   MRN: [id number] : 1939      82 year old female with PMH dementia (A&O x2 at baseline), HTN presenting for generalized weakness.  Patient unable to provide history per EMS patient uses walker at baseline and having difficulty using walker with caregiver.  Attempted to call caregiver who did not answer  the phone.    VSS, afebrile  Physical exam BL LE weakness, no focal neuro deficits, mild lower abdominal tenderness to palpation  Labs grossly unremarkable aside from mildly elevated troponin at 73, repeat pending, EKG nonischemic, hypokalemia  Imaging chest x-ray negative, CTH negative, CT abdomen pelvis demonstrating Slight interval progression of compression deformity involving the superior endplate of L1  After discussion with Dr. Groves recommendations for no acute intervention and repeat CT T lumbar spine in approximately 2 months  Planning to admit med tele   UA negative for nitrate/LE, + RBC, leukocytes and bacteria, holding off on abx tx      Dr Ferny Jewell ([phone])   Emergency Medicine Resident     Results:   Labs:   Results for orders placed or performed during the hospital encounter of 07/18/22   Comprehensive Metabolic Panel   Result Value Ref Range    Fasting Status      Sodium 141 135 - 145 mmol/L    Potassium 3.1 (L) 3.4 - 5.1 mmol/L    Chloride 107 97 - 110 mmol/L    Carbon Dioxide 28 21 - 32 mmol/L    Anion Gap 9 7 - 19 mmol/L    Glucose 136 (H) 70 - 99 mg/dL    BUN 26 (H) 6 - 20 mg/dL    Creatinine 0.84 0.51 - 0.95 mg/dL    Glomerular Filtration Rate 69 >=60    BUN/ Creatinine Ratio 31 (H) 7 - 25    Calcium 8.4 8.4 - 10.2 mg/dL    Bilirubin, Total 0.4 0.2 - 1.0 mg/dL    GOT/AST 31 <=37 Units/L    GPT/ALT 34 <64 Units/L    Alkaline Phosphatase 89 45 - 117 Units/L    Albumin 3.0 (L) 3.6 - 5.1 g/dL    Protein, Total 7.2 6.4 - 8.2 g/dL    Globulin 4.2 (H) 2.0 - 4.0 g/dL    A/G Ratio 0.7 (L) 1.0 - 2.4   TROPONIN I, HIGH SENSITIVITY   Result Value Ref Range    Troponin I, High Sensitivity 73 (HH) <52 ng/L   NT proBNP   Result Value Ref Range    NT-proBNP 1,409 (H) <=450 pg/mL   Magnesium   Result Value Ref Range    Magnesium 2.1 1.7 - 2.4 mg/dL   Urinalysis & Reflex Microscopy With Culture If Indicated   Result Value Ref Range    COLOR, URINALYSIS Yellow     APPEARANCE, URINALYSIS Clear      GLUCOSE, URINALYSIS Negative Negative mg/dL    BILIRUBIN, URINALYSIS Negative Negative    KETONES, URINALYSIS Negative Negative mg/dL    SPECIFIC GRAVITY, URINALYSIS 1.026 1.005 - 1.030    OCCULT BLOOD, URINALYSIS Small (A) Negative    PH, URINALYSIS 5.0 5.0 - 7.0    PROTEIN, URINALYSIS 100  (A) Negative mg/dL    UROBILINOGEN, URINALYSIS 2.0 (A) 0.2, 1.0 mg/dL    NITRITE, URINALYSIS Negative Negative    LEUKOCYTE ESTERASE, URINALYSIS Negative Negative    SQUAMOUS EPITHELIAL, URINALYSIS 1 to 5 None Seen, 1 to 5 /hpf    ERYTHROCYTES, URINALYSIS 3 to 5 (A) None Seen, 1 to 2 /hpf    LEUKOCYTES, URINALYSIS 6 to 10 (A) None Seen, 1 to 5 /hpf    BACTERIA, URINALYSIS Few (A) None Seen /hpf    HYALINE CASTS, URINALYSIS 1 to 5 None Seen, 1 to 5 /lpf    MUCUS Present    Lipase   Result Value Ref Range    Lipase 85 73 - 393 Units/L   CBC with Automated Differential (performable only)   Result Value Ref Range    WBC 5.6 4.2 - 11.0 K/mcL    RBC 4.90 4.00 - 5.20 mil/mcL    HGB 14.2 12.0 - 15.5 g/dL    HCT 42.0 36.0 - 46.5 %    MCV 85.7 78.0 - 100.0 fl    MCH 29.0 26.0 - 34.0 pg    MCHC 33.8 32.0 - 36.5 g/dL    RDW-CV 14.7 11.0 - 15.0 %    RDW-SD 45.9 39.0 - 50.0 fL     140 - 450 K/mcL    NRBC 0 <=0 /100 WBC    Neutrophil, Percent 71 %    Lymphocytes, Percent 10 %    Mono, Percent 18 %    Eosinophils, Percent 0 %    Basophils, Percent 1 %    Immature Granulocytes 0 %    Absolute Neutrophils 4.0 1.8 - 7.7 K/mcL    Absolute Lymphocytes 0.6 (L) 1.0 - 4.0 K/mcL    Absolute Monocytes 1.0 (H) 0.3 - 0.9 K/mcL    Absolute Eosinophils  0.0 0.0 - 0.5 K/mcL    Absolute Basophils 0.0 0.0 - 0.3 K/mcL    Absolute Immmature Granulocytes 0.0 0.0 - 0.2 K/mcL   COVID/Flu/RSV panel   Result Value Ref Range    Rapid SARS-COV-2 by PCR Detected (A) Not Detected / Detected / Presumptive Positive / Inhibitors present    Influenza A by PCR Not Detected Not Detected    Influenza B by PCR Not Detected Not Detected    RSV BY PCR Not Detected Not  Detected    Procedural Comment      SARS-CoV-2 Ct Value 14.4    C Reactive Protein   Result Value Ref Range    C-Reactive Protein 1.4 (H) <=1.0 mg/dL   D Dimer, Quantitative   Result Value Ref Range    D Dimer, Quantitative 1.15 (H) <0.57 mg/L (FEU)   Sedimentation Rate Westergren   Result Value Ref Range    RBC Sedimentation Rate 43 (H) 0 - 20 mm/hr   TROPONIN I, HIGH SENSITIVITY   Result Value Ref Range    Troponin I, High Sensitivity 82 (HH) <52 ng/L     Imaging:   CT HEAD WO CONTRAST   Final Result      No evidence of acute intracranial hemorrhage, hydrocephalus, or midline   shift.  Moderate generalized atrophy. Moderate periventricular and   subcortical white matter chronic small vessel ischemic changes. Old left   basal ganglia lacunar infarcts.       Electronically Signed by: DERRICK CHAVEZ MD    Signed on: 7/18/2022 12:26 PM          CT ABDOMEN PELVIS W CONTRAST - IV contrast only   Final Result      1.  Aneurysmal dilatation involving the infrarenal abdominal aorta   (measuring 33 mm in greatest transverse dimension); a follow-up examination   is suggested to monitor stability.      2.  Predominantly left-sided colonic diverticulosis.      3.  Post-surgical absence of the uterus.      4.  Tiny hiatal and midline mid abdominal ventral (umbilical) herniae.      5.  Slight interval progression of compression deformity involving the   superior endplate of L1.      6.  Other findings, as noted.                        Electronically Signed by: NASIMA MCKEON MD    Signed on: 7/18/2022 12:34 PM          XR CHEST PA OR AP 1 VIEW   Final Result   FINDINGS/IMPRESSION:      There are minimal degenerative changes of the thoracic spine.  The heart   size is normal.  There are tortuosity of the great vessels and   calcification of the thoracic aorta.      The lungs are clear.      Electronically Signed by: NASIMA MCKEON MD    Signed on: 7/18/2022 12:09 PM          US VASC LOWER EXTREMITY VENOUS DUPLEX BILATERAL     (Results Pending)   CTA CHEST PULMONARY EMBOLISM W CONTRAST    (Results Pending)     EKG: EKG reviewed and showed ER resident interpretation normal sinus, left axis deviation, normal intervals, no significant ST elevation, or ST depression    ED Diagnoses     Diagnosis Comment Associated Orders       Final diagnoses    COVID-19 virus infection -- --    Weakness -- --           Disposition:   Admit 7/18/2022  1:14 PM  Telemetry Bed?: Yes  Admitting Physician: RON SEARS [[id number]]  Is this a telephone or verbal order?: This is a telephone order from the admitting physician  Transferring Patient to? Only adjust [facility] [[id number]]          ED Medication Orders (From admission, onward)    Ordered Start     Status Ordering Provider    07/18/22 1407 07/18/22 1408  potassium CHLORIDE (KLOR-CON M) stanislav ER tablet 40 mEq  ONCE         Last MAR action: Given JERAD DAVIS          Current Discharge Medication List          Follow-up Information    None         If discharged advised to return to the ED if clinical status does not improve or worsens in any way and follow-up with their PCP as well as any recommended consultants in the next 24 to 48 hours.        _____________  Andrew Jewell MD  [facility] EM PGY-2       Andrew Jewell  Resident  07/18/22 [id number]

## 2023-04-06 NOTE — ASSESSMENT & PLAN NOTE
Continue rosuvastatin, substituted to pravachol 80mg Tazorac Counseling:  Patient advised that medication is irritating and drying.  Patient may need to apply sparingly and wash off after an hour before eventually leaving it on overnight.  The patient verbalized understanding of the proper use and possible adverse effects of tazorac.  All of the patient's questions and concerns were addressed.

## 2023-04-06 NOTE — ASSESSMENT & PLAN NOTE
· Patient endorses central chest tightness rating to his neck on 4/3 while ambulating to the mailbox  It was relieved with rest and recurred when he started ambulating again  · Concerning for typical angina  · Symptoms have since abated, however patient has increased concerns and decided be evaluated in the ED  · Troponin is generally unremarkable and EKG paced, patient was referred for admission due to cardiac risk factors and concern for story  Will place on observation and reach out to cardiology in the morning  Monitor on telemetry, continue aspirin and statin    · Noted recent nuclear stress test in February which did show some left ventricular perfusion defects and decreased LVEF, however these were presumed to be artifactual   · ICD was interrogated in the ED, awaiting report

## 2023-04-06 NOTE — DISCHARGE SUMMARY
[address] [facility]  Discharge- Wei Mancini 1955, 75 y o  male MRN: [id number]  Unit/Bed#: CW2 211-01 Encounter: [id number]  Primary Care Provider: Alex Camargo DO   Date and time admitted to hospital: 4/5/2023  3:35 PM      DOS: 4/6/2023  * Chest pain  Assessment & Plan  · Pt presented with central chest tightness radiating to his neck on 4/3 while ambulating to the mailbox  It was relieved with rest and recurred when he started ambulating again  · Concerning for typical angina  · Symptoms have since abated, however patient has increased concerns and decided be evaluated in the ED  · Troponins 12/14  · EKG paced  · JAJA score of 3  · Cardiology evaluated,  · Pt with known 60% proximal left circumflex stenosis from 2021  · Plan was to be cardiac catheterization, however if patient would undergo stent placement would need DAPT and concerns of this with intermittent rectal bleeding in setting of hemorrhoids, previous polyp  Recommendations for patient to see GI provider and get this evaluated prior to cardiac cath, continue follow up with DR Dodie Olivares in the near future  · In the meantime, patient started on Ranexa 500 mg BID   · Stable for discharge from cardiac perspective with close outpatient follow up  · Continue aspirin and statin    · ICD was interrogated in the ED, no significant findings    Paroxysmal atrial fibrillation (HCC)  Assessment & Plan  · Not currently on anticoagulation in setting of prior hemorrhoidal bleed/polyp  · Has ICD in place and follows with EP (also with prior hx of NSVT)  · Continue Sotalol 80 mg BID for rate control   · Stable for discharge from cardiology standpoint      Sleep apnea  Assessment & Plan  · CPAP nightly    Mixed hyperlipidemia  Assessment & Plan  · Continue home Crestor 10 mg daily     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  Assessment & Plan  · Continue pantoprazole 40 mg daily       Medical Problems     Resolved Problems  Date Reviewed: 4/6/2023 Resolved    Headache 4/6/2023     Resolved by  Twila Mortimer, PA-C        Discharging Physician / Practitioner: Berenice Burgos PA-C  PCP: Cristela Frazier DO  Admission Date:   Admission Orders (From admission, onward)     Ordered        04/05/23 1934  Place in Observation  Once                      Discharge Date: 04/06/23    Consultations During Hospital Stay:  · Cardiology     Procedures Performed:   · CXR 4/5 - No acute cardiopulmonary disease  · CT head 4/5 - No acute intracranial abnormality   · EKG paced     Significant Findings / Test Results:   · Troponins x 2 negative   · HgbA1c 5 5%  · T  Bili 1 06    Incidental Findings:   · None   · N/A    Test Results Pending at Discharge (will require follow up): · None     Outpatient Tests Requested:  · Per PCP/cardiology     Complications:  None    Reason for Admission: Chest pain     Hospital Course:   Cesar Desai is a 75 y o  male patient with significant past medical history of a  Fib/NSVT with ICD in place, HLD, monk esophagus, LENY, CAD who originally presented to the hospital on 4/5/2023 due to chest pain  Pt's symptoms started on 4/3 when he was ambulating to his mailbox and improved with rest  Symptoms were concerning for angina  Therefore pt was admitted and seen by cardiology  There was consideration for cardiac catheterization, however due to patient's intermittent rectal bleeding secondary to hemorrhoids, there was concerns about possible stent placement and being started on DAPT  Therefore it was recommended that patient follow up with GI for evaluation of this prior to cardiac catheterization  He was placed on Ranexa on discharge for chest pain symptoms and advised close outpatient cardiology follow up  Pt was discharged in stable condition  For additional information please refer to medical records  Medication changes include: Addition of Ranexa 500 mg BID       Please see above list of diagnoses and related plan for additional information  Condition at Discharge: stable    Discharge Day Visit / Exam:   * Please refer to separate progress note for these details *    Discussion with Family: Discussed with patient  Reports he already spoke with his wife        Discharge instructions/Information to patient and family:   See after visit summary for information provided to patient and family  Provisions for Follow-Up Care:  See after visit summary for information related to follow-up care and any pertinent home health orders  Disposition:   Home    Planned Readmission: None      Discharge Statement:  I spent 40 minutes discharging the patient  This time was spent on the day of discharge  I had direct contact with the patient on the day of discharge  Greater than 50% of the total time was spent examining patient, answering all patient questions, arranging and discussing plan of care with patient as well as directly providing post-discharge instructions  Additional time then spent on discharge activities  Discharge Medications:  See after visit summary for reconciled discharge medications provided to patient and/or family        **Please Note: This note may have been constructed using a voice recognition system**

## 2023-04-06 NOTE — H&P
[facility]  H&P  Name: Andrew Steele  MRN: [id number]  Unit/Bed#: DH8 211-01 I Date of Admission: 4/5/2023   Date of Service: 4/5/2023 I Hospital Day: 0      Assessment/Plan   * Chest pain  Assessment & Plan  · Patient endorses central chest tightness rating to his neck on 4/3 while ambulating to the mailbox  It was relieved with rest and recurred when he started ambulating again  · Concerning for typical angina  · Symptoms have since abated, however patient has increased concerns and decided be evaluated in the ED  · Troponin is generally unremarkable and EKG paced, patient was referred for admission due to cardiac risk factors and concern for story  Will place on observation and reach out to cardiology in the morning  Monitor on telemetry, continue aspirin and statin  · Noted recent nuclear stress test in February which did show some left ventricular perfusion defects and decreased LVEF, however these were presumed to be artifactual   · ICD was interrogated in the ED, awaiting report    Headache  Assessment & Plan  · Patient endorses a headache that seem to coincide with his episode of chest pain on Monday, occasionally has some persistent right-sided headache  · Supportive care and expectant monitoring  Tylenol as needed  If failing to improve could consider neurology referral    Mixed hyperlipidemia  Assessment & Plan  Continue rosuvastatin, substituted to pravachol 80mg    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  Assessment & Plan  Continue pantoprazole         VTE Pharmacologic Prophylaxis: VTE Score: 3 Moderate Risk (Score 3-4) - Pharmacological DVT Prophylaxis Ordered: enoxaparin (Lovenox)  Code Status: Level 1 - Full Code   Discussion with family: Patient declined call to   Anticipated Length of Stay: Patient will be admitted on an observation basis with an anticipated length of stay of less than 2 midnights secondary to Chest pain      Total Time Spent on Date of Encounter in care of patient: 55 minutes This time was spent on one or more of the following: performing physical exam; counseling and coordination of care; obtaining or reviewing history; documenting in the medical record; reviewing/ordering tests, medications or procedures; communicating with other healthcare professionals and discussing with patient's family/caregivers  He states that he had been in a normal state of health until Monday 3/3 when he had sudden onset of chest pain on exertion  He states he left his home to walk the ~700 feet to his mailbox when he developed sudden onset central chest tightness that radiated to his neck  He had never had a sensation like this  It was not associated with any other symptoms  He rested and the pain resolved, however it returned when he was walking back from the mailbox  He again rested, leaning on his fence and pain resolved completely  He has not had any more chest pain since then but does endorse an occasional headache  He reached out to his cardiologist who recommended that he should be evaluated if the symptoms return  His index of suspicion became significant enough that he decided to be evaluated in the ED, given concerning risk factors he was subsequently referred to internal medicine for observation  At present patient is resting comfortably and denies any chest pain or any other significant issues    Chief Complaint: Chest pain    History of Present Illness:  Solomon Medrano is a 75 y o  male with a PMH of CAD, Vtach s/p ICD, LENY among others who presents with Chest pain  Review of Systems:  Review of Systems   Constitutional: Negative for chills, fatigue and fever  Respiratory: Negative for cough, shortness of breath and wheezing  Cardiovascular: Positive for chest pain  Negative for palpitations and leg swelling  Gastrointestinal: Negative for abdominal pain, blood in stool, nausea and vomiting  Genitourinary: Negative for dysuria  Neurological: Positive for headaches  All other systems reviewed and are negative  Past Medical and Surgical History:   Past Medical History:   Diagnosis Date   • Abrasion of left foot     LAST ASSESSED: 9/23/13   • Achilles tendinitis, unspecified leg     LAST ASSESSED: 11/30/12   • Allergic rhinitis     LAST ASSESSED: 11/25/13   • Allergic rhinitis 06/25/2008    LAST ASSESSED: 6/25/08   • Arthritis    • Cervicalgia 04/22/2011    LAST ASSESSED: 4/22/11   • Chronic pain disorder    • Colon polyp    • Coronary artery disease    • CPAP (continuous positive airway pressure) dependence    • Dysfunction of left eustachian tube     LAST ASSESSED: 9/23/13   • Epistaxis     LAST ASSESSED: 5/27/15   • First degree atrioventricular block     LAST ASSESSED: 7/10/17   • Gastric ulcer 10/07/2011    LAST ASSESSED: 3/9/15   • GERD (gastroesophageal reflux disease)    • Hearing aid worn    • Hemorrhoids 05/13/2011    LAST ASSESSED: 5/13/11   • Hyperlipidemia    • Hypertension    • Irregular heart beat    • Long term use of drug     LAST ASSESSED: 10/11/12   • Myalgia 12/21/2007    LAST ASSESSED: 12/21/07   • Myositis 12/21/2007    LAST ASSESSED: 12/21/07   • Numbness and tingling of foot     LAST ASSESSED: 3/9/15   • Pacemaker    • Premature ventricular beats     states cardiologist ordered Zio ambulatory cardiac monitor   • Sleep apnea    • Wears glasses        Past Surgical History:   Procedure Laterality Date   • CARDIAC CATHETERIZATION  2017   • CARDIAC ELECTROPHYSIOLOGY PROCEDURE N/A 10/25/2021    Procedure: CARDIAC EPS;  Surgeon: Shana Laughlin MD;  Location: [facility]; Service: Cardiology   • CARDIAC ELECTROPHYSIOLOGY PROCEDURE Left 10/25/2021    Procedure: Cardiac icd implant;  Surgeon: Shana Laughlin MD;  [facility];   Service: Cardiology   • CARDIAC PACEMAKER PLACEMENT     • CARDIOVERSION N/A 10/25/2021    Procedure: Cardioversion;  Surgeon: Shana Laughlin MD;  [facility]: [facility];  Service: Cardiology   • CATARACT EXTRACTION Right    • CERVICAL FUSION      pt thinks C4-C5   • CHOLECYSTECTOMY      LAPAROSCOPIC; LAST ASSESSED: 10/17/17   • COLONOSCOPY      COMPLETE; 3-4 YEARS AGO BY [facility]; LAST ASSESSED: 8/18/14   • MYRINGOTOMY W/ TUBES      WITH VENTILATING TUBE INSERTION   • DC NEUROPLASTY &/TRANSPOS MEDIAN NRV CARPAL BRIDGET Left 07/16/2021    Procedure: RELEASE CARPAL TUNNEL;  Surgeon: Bailey Bell MD;  Location: [facility];  Service: Orthopedics   • DC NEUROPLASTY &/TRANSPOS MEDIAN NRV CARPAL Niya Garcia Right 10/14/2022    Procedure: RELEASE CARPAL TUNNEL;  Surgeon: Bailey Bell MD;  Location: [facility];  Service: Orthopedics   • DC TENDON SHEATH INCISION Right 10/14/2022    Procedure: RELEASE TRIGGER FINGER,RING AND LONG TENOSYNOVECTOMY FDS +FDP; Surgeon: Bailey Bell MD;  Location: [facility];  Service: Orthopedics   • VASECTOMY         Meds/Allergies:  Prior to Admission medications    Medication Sig Start Date End Date Taking? Authorizing Provider   aspirin (ECOTRIN LOW STRENGTH) 81 mg EC tablet Take 81 mg by mouth daily Pt taking 2 a day    Historical Provider, MD   Coenzyme Q10 (COQ10) 200 MG CAPS Take 1 tablet by mouth daily    Historical Provider, MD   COLLAGEN-CHOND-HYALURONIC ACID PO Take 1 capsule by mouth in the morning    Historical Provider, MD   EPINEPHrine (EpiPen 2-Leroy) 0 3 mg/0 3 mL SOAJ Inject 0 3 mL (0 3 mg total) into a muscle once for 1 dose 11/7/22 2/2/23  Alex Camargo DO   finasteride (PROSCAR) 5 mg tablet TAKE 1 TABLET (5 MG TOTAL) BY MOUTH DAILY   10/21/22   Alex Camargo DO   fluticasone (VERAMYST) 27 5 MCG/SPRAY nasal spray 2 sprays into each nostril daily    Historical Provider, MD   Glucosamine-Chondroitin (GLUCOSAMINE CHONDR COMPLEX PO) Take 1 tablet by mouth daily    Historical Provider, MD   Omega-3 Fatty Acids (FISH OIL) 1000 MG CPDR Take by mouth daily    Historical Provider, MD   pantoprazole (PROTONIX) 40 mg tablet TAKE 1 TABLET BY MOUTH EVERY DAY 10/17/22   David Sanders, DO   rosuvastatin (CRESTOR) 10 MG tablet TAKE 1 TABLET BY MOUTH EVERY DAY 10/21/22   monique Sanders, DO   sotalol (BETAPACE) 80 mg tablet TAKE 1 TABLET BY MOUTH EVERY 12 HOURS  22   Kin Kelley PA-C   Turmeric (QC Tumeric Complex) 500 MG CAPS Take by mouth 3 (three) times a week    Historical Provider, MD     I have reviewed home medications with patient personally  Allergies:    Allergies   Allergen Reactions   • Amoxicillin Anaphylaxis   • Augmentin [Amoxicillin-Pot Clavulanate] Anaphylaxis   • Acetaminophen      Rapid heart rate   • Cherry Flavor - Food Allergy Other (See Comments)     States allergy is to raw cherries difficulty breathing   • Pollen Extract    • Dye [Iodinated Contrast Media] Itching       Social History:  Marital Status: /Civil Union   Occupation: Not on file  Patient Pre-hospital Living Situation: Home  Patient Pre-hospital Level of Mobility: walks  Patient Pre-hospital Diet Restrictions: N/A  Substance Use History:   Social History     Substance and Sexual Activity   Alcohol Use Yes   • Alcohol/week: 7 0 standard drinks   • Types: 7 Glasses of wine per week    Comment: few times a week     Social History     Tobacco Use   Smoking Status Former   • Types: Cigarettes   • Quit date:    • Years since quittin 2   Smokeless Tobacco Never     Social History     Substance and Sexual Activity   Drug Use No       Family History:  Family History   Problem Relation Age of Onset   • Cancer Mother         SOFT TISSUE CANCER ON RT SIDE CHEST WALL AND    • Prostate cancer Maternal Uncle        Physical Exam:     Vitals:   Blood Pressure: 169/85 (23)  Pulse: 59 (23)  Temperature: 97 6 °F (36 4 °C) (23)  Temp Source: Temporal (23 151)  Respirations: 22 (23)  Weight - Scale: 98 4 kg (217 lb) (23)  SpO2: 98 % (23)    Physical Exam  Vitals and nursing note reviewed  Constitutional:       General: He is not in acute distress  Appearance: He is well-developed  He is not toxic-appearing or diaphoretic  HENT:      Head: Normocephalic and atraumatic  Mouth/Throat:      Mouth: Mucous membranes are moist    Eyes:      General: No scleral icterus  Extraocular Movements: Extraocular movements intact  Conjunctiva/sclera: Conjunctivae normal    Cardiovascular:      Rate and Rhythm: Normal rate and regular rhythm  Pulses: Normal pulses  Heart sounds: No murmur heard  No friction rub  No gallop  Pulmonary:      Effort: Pulmonary effort is normal  No respiratory distress  Breath sounds: Normal breath sounds  No wheezing, rhonchi or rales  Abdominal:      General: Abdomen is flat  Bowel sounds are normal  There is no distension  Palpations: Abdomen is soft  There is no mass  Tenderness: There is no abdominal tenderness  There is no guarding  Musculoskeletal:         General: No swelling  Cervical back: Neck supple  Right lower leg: No edema  Left lower leg: No edema  Lymphadenopathy:      Cervical: No cervical adenopathy  Skin:     General: Skin is warm and dry  Capillary Refill: Capillary refill takes less than 2 seconds  Coloration: Skin is not jaundiced  Findings: No rash  Neurological:      General: No focal deficit present  Mental Status: He is alert and oriented to person, place, and time  Sensory: No sensory deficit  Motor: No weakness     Psychiatric:         Mood and Affect: Mood normal           Additional Data:     Lab Results:  Results from last 7 days   Lab Units 04/05/23  1618   WBC Thousand/uL 6 23   HEMOGLOBIN g/dL 15 3   HEMATOCRIT % 46 0   PLATELETS Thousands/uL 194   NEUTROS PCT % 44   LYMPHS PCT % 39   MONOS PCT % 14*   EOS PCT % 2     Results from last 7 days   Lab Units 04/05/23  1618   SODIUM mmol/L 137   POTASSIUM mmol/L 4 3   CHLORIDE mmol/L 111*   CO2 mmol/L 25   BUN mg/dL 14   CREATININE mg/dL 0 91   ANION GAP mmol/L 1*   CALCIUM mg/dL 9 3   ALBUMIN g/dL 3 5   TOTAL BILIRUBIN mg/dL 1 06*   ALK PHOS U/L 60   ALT U/L 40   AST U/L 27   GLUCOSE RANDOM mg/dL 88                       Lines/Drains:  Invasive Devices     None                     Imaging: Reviewed radiology reports from this admission including: CT head and Personally reviewed the following imaging: chest xray  CT head without contrast   Final Result by Lorenzo Ortega DO (04/05 1820)   No acute intracranial abnormality  Workstation performed: [id number]         X-ray chest 1 view portable   ED Interpretation by Abdoulaye Garcia DO (04/05 1724)   No acute cardiopulmonary disease          EKG and Other Studies Reviewed on Admission:   · EKG: Paced rhythm  HR 68     ** Please Note: This note has been constructed using a voice recognition system   **

## 2023-04-06 NOTE — ASSESSMENT & PLAN NOTE
· Not currently on anticoagulation in setting of prior hemorrhoidal bleed  · Has ICD in place and follows with EP (also with prior hx of NSVT)  · Continue Sotalol 80 mg BID for rate control   · Monitor on telemetry

## 2023-04-06 NOTE — PROGRESS NOTES
[facility]  Progress Note  Name: Iliana Elliott  MRN: [id number]  Unit/Bed#: CW2 211-01 I Date of Admission: 4/5/2023   Date of Service: 4/6/2023 I Hospital Day: 0  DOS: 4/6/2023  Assessment/Plan   * Chest pain  Assessment & Plan  · Pt presented with central chest tightness radiating to his neck on 4/3 while ambulating to the mailbox  It was relieved with rest and recurred when he started ambulating again  · Concerning for typical angina  · Symptoms have since abated, however patient has increased concerns and decided be evaluated in the ED  · Troponins 12/14  · EKG paced  · JAJA score of 3  · Cardiology consultation pending, appreciate recommendations  · Continue aspirin and statin  · Last heart cath in 2021, moderate lesion in proximal ostial circumflex, does have contrast allergy   · ICD was interrogated in the ED, no significant findings    Headache  Assessment & Plan  · Patient endorsed a headache that seem to coincide with his episode of chest pain on Monday, occasionally has some persistent right-sided headache  · CT head on admission negative   · Supportive care and PRN tylenol     Paroxysmal atrial fibrillation (HCC)  Assessment & Plan  · Not currently on anticoagulation in setting of prior hemorrhoidal bleed  · Has ICD in place and follows with EP (also with prior hx of NSVT)  · Continue Sotalol 80 mg BID for rate control   · Monitor on telemetry     Sleep apnea  Assessment & Plan  · CPAP at night, wife to bring into the hospital     Mixed hyperlipidemia  Assessment & Plan  · Continue Pravastatin 80 mg daily in place of home Crestor due to hospital non formulary     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  Assessment & Plan  · Continue pantoprazole 40 mg daily        VTE Pharmacologic Prophylaxis: VTE Score: 3 Moderate Risk (Score 3-4) - Pharmacological DVT Prophylaxis Ordered: enoxaparin (Lovenox)      Patient Centered Rounds: I evaluated the patient without nursing staff present due to speaking to nurse outside patient's room   Discussions with Specialists or Other Care Team Provider: Discussed with cardiology, RN, CM and reviewed previous notes    Education and Discussions with Family / Patient: Will update patient's wife per patient request       Total Time Spent on Date of Encounter in care of patient: 35 minutes This time was spent on one or more of the following: performing physical exam; counseling and coordination of care; obtaining or reviewing history; documenting in the medical record; reviewing/ordering tests, medications or procedures; communicating with other healthcare professionals and discussing with patient's family/caregivers  Current Length of Stay: 0 day(s)  Current Patient Status: Observation   Certification Statement: The patient is admitted under observation status, awaiting formal cardiology recommendations for dispo planning, possible cardiac cath   Discharge Plan: Anticipate discharge in 24-48 hrs to home  Code Status: Level 1 - Full Code    Subjective:   Pt reports that he has been dealing with cardiac issues for the last 7 years  States that he exercises regularly and goes on walks  States that at home on Monday he went out to walk to the mailbox which is about 700 feet  States that he developed left sided chest pain with radiation into the jaw which stopped when he rested  Reports this happened again after walking back up to the house  He reports that he was worried about this and came in a few days later  With some mild shortness of breath  No nausea, vomiting or abdominal pain  Reports he has a contrast allergy if a cath will be pursued inpatient  Objective:     Vitals:   Temp (24hrs), Av 9 °F (36 6 °C), Min:97 6 °F (36 4 °C), Max:98 2 °F (36 8 °C)    Temp:  [97 6 °F (36 4 °C)-98 2 °F (36 8 °C)] 97 6 °F (36 4 °C)  HR:  [56-85] 85  Resp:  [14-22] 20  BP: (118-172)/(56-86) 138/79  SpO2:  [95 %-98 %] 95 %  Body mass index is 29 43 kg/m²       Input and Output Summary (last 24 hours): Intake/Output Summary (Last 24 hours) at 4/6/2023 1005  Last data filed at 4/6/2023 0803  Gross per 24 hour   Intake 360 ml   Output --   Net 360 ml       Physical Exam:   Physical Exam  Vitals reviewed  Constitutional:       General: He is not in acute distress  Appearance: He is not toxic-appearing  Comments: Pt is in no acute distress lying in his hospital bed resting comfortably    HENT:      Head: Normocephalic and atraumatic  Cardiovascular:      Rate and Rhythm: Normal rate and regular rhythm  Comments: ICD in place   Pulmonary:      Effort: No respiratory distress  Breath sounds: Normal breath sounds  No wheezing  Abdominal:      General: Bowel sounds are normal  There is no distension  Palpations: Abdomen is soft  Musculoskeletal:      Right lower leg: No edema  Left lower leg: No edema  Skin:     General: Skin is warm and dry  Neurological:      Mental Status: He is alert     Psychiatric:         Mood and Affect: Mood normal           Additional Data:     Labs:  Results from last 7 days   Lab Units 04/05/23  1618   WBC Thousand/uL 6 23   HEMOGLOBIN g/dL 15 3   HEMATOCRIT % 46 0   PLATELETS Thousands/uL 194   NEUTROS PCT % 44   LYMPHS PCT % 39   MONOS PCT % 14*   EOS PCT % 2     Results from last 7 days   Lab Units 04/05/23  1618   SODIUM mmol/L 137   POTASSIUM mmol/L 4 3   CHLORIDE mmol/L 111*   CO2 mmol/L 25   BUN mg/dL 14   CREATININE mg/dL 0 91   ANION GAP mmol/L 1*   CALCIUM mg/dL 9 3   ALBUMIN g/dL 3 5   TOTAL BILIRUBIN mg/dL 1 06*   ALK PHOS U/L 60   ALT U/L 40   AST U/L 27   GLUCOSE RANDOM mg/dL 88             Results from last 7 days   Lab Units 04/05/23  1618   HEMOGLOBIN A1C % 5 5           Lines/Drains:  Invasive Devices     Peripheral Intravenous Line  Duration           Peripheral IV 04/05/23 Distal;Left;Upper;Ventral (anterior) Arm <1 day                  Telemetry:  Telemetry Orders (From admission, onward) 48 Hour Telemetry Monitoring  Continuous x 48 hours        References:    Telemetry Guidelines   Question:  Reason for 48 Hour Telemetry  Answer:  Acute MI, chest pain - R/O MI, or unstable angina                 Telemetry Reviewed: paced   Indication for Continued Telemetry Use: Acute MI/Unstable Angina/Rule out ACS             Imaging: Reviewed radiology reports from this admission including: chest xray and CT head    Recent Cultures (last 7 days):         Last 24 Hours Medication List:   Current Facility-Administered Medications   Medication Dose Route Frequency Provider Last Rate   • aspirin  325 mg Oral Daily Alla Henry     • co-enzyme Q-10  200 mg Oral Daily Alla Henry     • enoxaparin  40 mg Subcutaneous Daily Alla Henry     • finasteride  5 mg Oral Daily Alla Henry     • fish oil  1,000 mg Oral Daily Alla Henry     • pantoprazole  40 mg Oral Early Morning Alla Henry     • pravastatin  80 mg Oral Daily Alla Henry     • sodium chloride (PF)  3 mL Intravenous Q1H PRN Trinidad Cantu DO     • sotalol  80 mg Oral Q12H Alla Henry          Today, Patient Was Seen By: Aracely Serrano PA-C    **Please Note: This note may have been constructed using a voice recognition system  **

## 2023-04-06 NOTE — ASSESSMENT & PLAN NOTE
· Pt presented with central chest tightness radiating to his neck on 4/3 while ambulating to the mailbox  It was relieved with rest and recurred when he started ambulating again  · Concerning for typical angina  · Symptoms have since abated, however patient has increased concerns and decided be evaluated in the ED  · Troponins 12/14  · EKG paced  · JAJA score of 3  · Cardiology consultation pending, appreciate recommendations  · Continue aspirin and statin    · Last heart cath in 2021, moderate lesion in proximal ostial circumflex, does have contrast allergy   · ICD was interrogated in the ED, no significant findings

## 2023-04-06 NOTE — ASSESSMENT & PLAN NOTE
· Patient endorsed a headache that seem to coincide with his episode of chest pain on Monday, occasionally has some persistent right-sided headache    · CT head on admission negative   · Supportive care and PRN tylenol

## 2023-04-06 NOTE — DISCHARGE INSTR - AVS FIRST PAGE
Please follow up with cardiology and GI  If you have any worsening chest pain, shortness of breath please come back to the hospital for further evaluation

## 2023-04-06 NOTE — ASSESSMENT & PLAN NOTE
· Not currently on anticoagulation in setting of prior hemorrhoidal bleed/polyp  · Has ICD in place and follows with EP (also with prior hx of NSVT)  · Continue Sotalol 80 mg BID for rate control   · Stable for discharge from cardiology standpoint

## 2023-04-06 NOTE — CONSULTS
Consultation - General Cardiology Team 2  Dinah Carbajal 75 y o  male MRN: [id number]  Unit/Bed#: 2 211-01 Encounter: [id number]            Inpatient consult to Cardiology     Performed by  Jagjit Lara MD     Authorized by Dimitry Ovalles            PCP: Karis Thomason DO   Outpatient Cardiologist: Valerie Aguillon MD and Art Abdi DO    History of Present Illness   Physician Requesting Consult: Jaime Aggarwal DO  Reason for Consult / Principal Problem: Chest pain    Assessment/Plan     Assessment:  1  Cardiac angina  - Presented with exertional chest pain, 2 episodes on Monday/4/23  - Known history of nonocclusive ostial circumflex disease with IFR of 0 96    2  CAD-nonobstructive disease  Left heart cath (8/2021)-moderate lesion noted in the proximal ostial circumflex with IFR of 0 96       3  Nonsustained VT=  Patient was found to have short episodes of intermittent NSVT that were brief and self-limiting  -Has history of VT during EPS for SVT and status post [employer] dual-chamber ICD  -Patient is V paced 90% of the time with QRS greater than 170 m/s, will evaluate for drop in EF    4  Paroxysmal atrial fibrillation-   - Not on anticoagulation due to hemorrhoidal bleed  - ICD is set to alarm if patient remains in atrial fibrillation for more than 1 hour with heart rates above 100 bpm     5  Obstructive sleep apnea-compliant with CPAP  6  Grade II Hemorrhoids with a polyp?- s/p hemorrhoidal banding    Plan:  1  Considering typical exertional chest pain and cath proven history of nonocclusive CAD disease in 2021, it is appropriate to proceed with a left heart cath to further evaluate for obstructive coronary disease  However he had an episode of hematochezia today and is reluctant to initiate antiplatelet therapy prior to discussing with his gastroenterologist   This is a valid concern and since he is currently not having active chest pain at rest, will defer cardiac cath to a later time    Patient has an appointment with Dr Ana Ramires and agreed to see his gastroenterologist prior to that visit  Risks and benefits were discussed with the patient, he verbalized understanding and agreement    2  We will initiate Ranexa 500 mg twice daily for exertional chest pain  3  Further evaluation and management once patient is seen by gastroenterology      Case discussed and reviewed with Dr Annabel Fairbanks who agrees with my assessment and plan  Thank you for involving us in the care of your patient  Carmelo Langley MD  Cardiology Fellow   PGY-4      HPI: Joel Abraham is a 75y o  year old male with history of NSVT status post ICD, paroxysmal atrial fibrillation who presented with sudden onset of chest tightness while walking to his mailbox on 4/3/23  Chest tightness radiated to his neck, with no associated symptoms  Pain improved with rest and return when he exerted himself  Symptoms have since abated  Patient had an abnormal stress test and February 2023 which showed perfusion defects, likely due to artifacts  Patient had a exercise nuclear med stress test which showed a large fixed perfusion defect in the inferior septum that improved on prone imaging  Defect was thought to be caused by diaphragmatic activity  In the emergency room-troponins were negative x2, ECG suggestive of atrial sensed ventricular paced rhythm with occasional PVCs  Review of Systems  Review of system was conducted and was negative except for as stated in the HPI        Historical Information   Past Medical History:   Diagnosis Date   • Abrasion of left foot     LAST ASSESSED: 9/23/13   • Achilles tendinitis, unspecified leg     LAST ASSESSED: 11/30/12   • Allergic rhinitis     LAST ASSESSED: 11/25/13   • Allergic rhinitis 06/25/2008    LAST ASSESSED: 6/25/08   • Arthritis    • Cervicalgia 04/22/2011    LAST ASSESSED: 4/22/11   • Chronic pain disorder    • Colon polyp    • Coronary artery disease    • CPAP (continuous positive airway pressure) dependence    • Dysfunction of left eustachian tube     LAST ASSESSED: 9/23/13   • Epistaxis     LAST ASSESSED: 5/27/15   • First degree atrioventricular block     LAST ASSESSED: 7/10/17   • Gastric ulcer 10/07/2011    LAST ASSESSED: 3/9/15   • GERD (gastroesophageal reflux disease)    • Hearing aid worn    • Hemorrhoids 05/13/2011    LAST ASSESSED: 5/13/11   • Hyperlipidemia    • Hypertension    • Irregular heart beat    • Long term use of drug     LAST ASSESSED: 10/11/12   • Myalgia 12/21/2007    LAST ASSESSED: 12/21/07   • Myositis 12/21/2007    LAST ASSESSED: 12/21/07   • Numbness and tingling of foot     LAST ASSESSED: 3/9/15   • Pacemaker    • Premature ventricular beats     states cardiologist ordered Zio ambulatory cardiac monitor   • Sleep apnea    • Wears glasses      Past Surgical History:   Procedure Laterality Date   • CARDIAC CATHETERIZATION  2017   • CARDIAC ELECTROPHYSIOLOGY PROCEDURE N/A 10/25/2021    Procedure: CARDIAC EPS;  Surgeon: Apurva Slater MD;  [facility]; Service: Cardiology   • CARDIAC ELECTROPHYSIOLOGY PROCEDURE Left 10/25/2021    Procedure: Cardiac icd implant;  Surgeon: Apurva Slater MD;  [facility]; Service: Cardiology   • CARDIAC PACEMAKER PLACEMENT     • CARDIOVERSION N/A 10/25/2021    Procedure: Cardioversion;  Surgeon: Apurva Slater MD;  [facility];   Service: Cardiology   • CATARACT EXTRACTION Right    • CERVICAL FUSION      pt thinks C4-C5   • CHOLECYSTECTOMY      LAPAROSCOPIC; LAST ASSESSED: 10/17/17   • COLONOSCOPY      COMPLETE; 3-4 YEARS AGO BY [facility] [place] ; LAST ASSESSED: 8/18/14   • MYRINGOTOMY W/ TUBES      WITH VENTILATING TUBE INSERTION   • NE NEUROPLASTY &/TRANSPOS MEDIAN NRV CARPAL TUNNE Left 07/16/2021    Procedure: RELEASE CARPAL TUNNEL;  Surgeon: Nataliia Roper MD;  Location: [facility];  Service: Orthopedics   • NE NEUROPLASTY &/TRANSPOS MEDIAN NRV CARPAL Lisa Holts Right 10/14/2022    Procedure: RELEASE CARPAL TUNNEL;  Surgeon: Gricelda Abernathy MD;  Location: [facility];  Service: Orthopedics   • KS TENDON SHEATH INCISION Right 10/14/2022    Procedure: RELEASE TRIGGER FINGER,RING AND LONG TENOSYNOVECTOMY FDS +FDP;   Surgeon: Gricelda Abernathy MD;  Location[facility];  Service: Orthopedics   • VASECTOMY       Social History     Substance and Sexual Activity   Alcohol Use Yes   • Alcohol/week: 7 0 standard drinks   • Types: 7 Glasses of wine per week    Comment: few times a week     Social History     Substance and Sexual Activity   Drug Use No     Social History     Tobacco Use   Smoking Status Former   • Types: Cigarettes   • Quit date:    • Years since quittin 2   Smokeless Tobacco Never     Family History: non-contributory    Meds/Allergies   Hospital Medications:   Current Facility-Administered Medications   Medication Dose Route Frequency   • aspirin tablet 325 mg  325 mg Oral Daily   • co-enzyme Q-10 capsule 200 mg  200 mg Oral Daily   • enoxaparin (LOVENOX) subcutaneous injection 40 mg  40 mg Subcutaneous Daily   • finasteride (PROSCAR) tablet 5 mg  5 mg Oral Daily   • fish oil capsule 1,000 mg  1,000 mg Oral Daily   • pantoprazole (PROTONIX) EC tablet 40 mg  40 mg Oral Early Morning   • pravastatin (PRAVACHOL) tablet 80 mg  80 mg Oral Daily   • sodium chloride (PF) 0 9 % injection 3 mL  3 mL Intravenous Q1H PRN   • sotalol (BETAPACE) tablet 80 mg  80 mg Oral Q12H     Home Medications:   Medications Prior to Admission   Medication   • aspirin (ECOTRIN LOW STRENGTH) 81 mg EC tablet   • Coenzyme Q10 (COQ10) 200 MG CAPS   • COLLAGEN-CHOND-HYALURONIC ACID PO   • finasteride (PROSCAR) 5 mg tablet   • EPINEPHrine (EpiPen 2-Leroy) 0 3 mg/0 3 mL SOAJ   • fluticasone (VERAMYST) 27 5 MCG/SPRAY nasal spray   • Glucosamine-Chondroitin (GLUCOSAMINE CHONDR COMPLEX PO)   • Omega-3 Fatty Acids (FISH OIL) 1000 MG CPDR   • pantoprazole (PROTONIX) 40 mg tablet   • rosuvastatin (CRESTOR) 10 MG tablet   • sotalol (BETAPACE) 80 mg tablet   • Turmeric (QC Tumeric Complex) 500 MG CAPS       Allergies   Allergen Reactions   • Amoxicillin Anaphylaxis   • Augmentin [Amoxicillin-Pot Clavulanate] Anaphylaxis   • Acetaminophen      Rapid heart rate   • Cherry Flavor - Food Allergy Other (See Comments)     States allergy is to raw cherries difficulty breathing   • Pollen Extract    • Dye [Iodinated Contrast Media] Itching       Objective   Vitals: Blood pressure 138/79, pulse 85, temperature 97 6 °F (36 4 °C), temperature source Oral, resp  rate 20, height 6' (1 829 m), weight 98 4 kg (217 lb), SpO2 95 %  Orthostatic Blood Pressures    Flowsheet Row Most Recent Value   Blood Pressure 138/79 filed at 04/06/2023 0701   Patient Position - Orthostatic VS Lying filed at 04/06/2023 0701            Invasive Devices     Peripheral Intravenous Line  Duration           Peripheral IV 04/05/23 Distal;Left;Upper;Ventral (anterior) Arm <1 day                Physical Exam  GEN: Halie Richter appears well, alert and oriented x 3, pleasant and cooperative   NECK: No JVD or carotid bruits   HEART: Regular rhythm, normal rate, normal S1 and S2, no murmurs, clicks, gallops or rubs   LUNGS: Clear to auscultation bilaterally; no wheezes, rales, or rhonchi; respiration nonlabored   ABDOMEN:  Normoactive bowel sounds, soft, no tenderness, distended secondary to obesity   EXTREMITIES: peripheral pulses palpable; no edema    Lab Results: I have personally reviewed pertinent lab results      Results from last 7 days   Lab Units 04/05/23  1828 04/05/23  1618   HS TNI 0HR ng/L  --  12   HS TNI 2HR ng/L 14  --          Results from last 7 days   Lab Units 04/05/23  1618   POTASSIUM mmol/L 4 3   CO2 mmol/L 25   CHLORIDE mmol/L 111*   BUN mg/dL 14   CREATININE mg/dL 0 91     Results from last 7 days   Lab Units 04/05/23  1618   HEMOGLOBIN g/dL 15 3   HEMATOCRIT % 46 0   PLATELETS Thousands/uL 194     Results from last 7 days   Lab Units 04/05/23  1618 TRIGLYCERIDES mg/dL 103   HDL mg/dL 36*   LDL CALC mg/dL 61   LDL DIRECT mg/dl 22         Imaging: I have personally reviewed pertinent reports  Telemetry: NSR    EKG:   Date: 4/6/23  Interpretation: Atrial sensed, ventricular paced       Previous STRESS TEST:  No results found for this or any previous visit  Results for orders placed during the hospital encounter of 02/10/23    NM myocardial perfusion spect (stress and/or rest)    Interpretation Summary  •  Perfusion: There is a left ventricular perfusion defect that is large in size with moderate reduction in uptake present in the entire inferoseptal location(s) that is fixed which improved on prone imaging  The defect appears to be an artifact caused by diaphragmatic activity  There is a left ventricular perfusion defect that is small to medium in size with mild reduction in uptake present in the basal anteroseptal location(s) that is predominantly fixed, and appears to be artifactual   •  Stress Function: Left ventricular function post-stress is abnormal  Post-stress ejection fraction is 38 %  •  Stress Combined Conclusion: Left ventricular perfusion is probably abnormal   •  Stress ECG: The ECG was uninterpretable due to left bundle branch block  Essentially normal exercise nuclear stress test   ECG portion non-diagnostic due to LBBB  No obvious myocardial ischemia noted on perfusion imaging  EF decreased, however this appears likely due to poor image quality  No results found for this or any previous visit  Previous Cath/PCI:  No results found for this or any previous visit  No results found for this or any previous visit  No results found for this or any previous visit        ECHO:  Results for orders placed during the hospital encounter of 08/12/21    Echo complete with contrast if indicated    Narrative  [place] [address], [address] [place] [address]  [phone]    Transthoracic Echocardiogram  2D, M-mode, Doppler, and Color Doppler    Study date:  12-Aug-2021    Patient: Belle Joseph  MR number: [id number]  Account number: [de-identified]  : 1955  Age: 76 years  Gender: Male  Status: Outpatient  Location: [address] [place] [address]  Height: 72 in  Weight: 221 5 lb  BP: 152/ 62 mmHg    Indications: Assess left ventricular function  Diagnoses: I47 2 - Ventricular tachycardia    Sonographer:  CHRISTI Sykes  Primary Physician:  Anthony Harper DO  Referring Physician:  Brenda Painting MD  Group:  [facility]  Interpreting Physician:  Gabriele Boyd MD    SUMMARY    LEFT VENTRICLE:  Systolic function was normal  Ejection fraction was estimated to be 60 %  There were no regional wall motion abnormalities  AORTIC VALVE:  There was mild regurgitation  HISTORY: PRIOR HISTORY: NSVT, Non-obtructive coronary artery disease, Hyperlipidemia, LENY    PROCEDURE: The study was performed in the [address] [place] [address]  This was a routine study  The transthoracic approach was used  The study included complete 2D imaging, M-mode, complete spectral Doppler, and color Doppler  The  heart rate was 54 bpm, at the start of the study  Images were obtained from the parasternal, apical, subcostal, and suprasternal notch acoustic windows  Image quality was adequate  LEFT VENTRICLE: Size was normal  Systolic function was normal  Ejection fraction was estimated to be 60 %  There were no regional wall motion abnormalities  Wall thickness was normal  DOPPLER: There was an increased relative contribution  of atrial contraction to ventricular filling  Left ventricular diastolic function parameters were normal for the patient's age  RIGHT VENTRICLE: The size was normal  Systolic function was normal  Wall thickness was normal     LEFT ATRIUM: Size was normal     RIGHT ATRIUM: Size was normal     MITRAL VALVE: Valve structure was normal  There was normal leaflet separation   DOPPLER: The transmitral velocity was within the normal range  There was no evidence for stenosis  There was trace regurgitation  AORTIC VALVE: The valve was trileaflet  Leaflets exhibited normal thickness and normal cuspal separation  DOPPLER: Transaortic velocity was within the normal range  There was no evidence for stenosis  There was mild regurgitation  TRICUSPID VALVE: The valve structure was normal  There was normal leaflet separation  DOPPLER: The transtricuspid velocity was within the normal range  There was no evidence for stenosis  There was trace regurgitation  PULMONIC VALVE: Leaflets exhibited normal thickness, no calcification, and normal cuspal separation  DOPPLER: The transpulmonic velocity was within the normal range  There was trace regurgitation  PERICARDIUM: There was no pericardial effusion  The pericardium was normal in appearance  AORTA: The root exhibited normal size  SYSTEMIC VEINS: IVC: The inferior vena cava was normal in size  Respirophasic changes were normal     SYSTEM MEASUREMENT TABLES    2D  %FS: 21 05 %  Ao Diam: 3 02 cm  EDV(Teich): 117 73 ml  EF(Teich): 42 64 %  ESV(Teich): 67 53 ml  IVSd: 0 78 cm  LA Area: 19 18 cm2  LA Diam: 4 cm  LVEDV MOD A4C: 167 49 ml  LVEF MOD A4C: 52 37 %  LVESV MOD A4C: 79 77 ml  LVIDd: 4 99 cm  LVIDs: 3 94 cm  LVLd A4C: 9 72 cm  LVLs A4C: 8 4 cm  LVPWd: 0 88 cm  RA Area: 14 03 cm2  RVIDd: 3 7 cm  SV MOD A4C: 87 71 ml  SV(Teich): 50 2 ml    MM  TAPSE: 2 85 cm    PW  LVOT Env  Ti: 315 89 ms  LVOT VTI: 14 53 cm  LVOT Vmax: 0 63 m/s  LVOT Vmean: 0 46 m/s  LVOT maxP 57 mmHg  LVOT meanP 93 mmHg  MV A Bi: 0 81 m/s  MV Dec Humphreys: 2 25 m/s2  MV DecT: 247 18 ms  MV E Bi: 0 56 m/s  MV E/A Ratio: 0 69  MV PHT: 71 68 ms  MVA By PHT: 3 07 cm2    [employer] Accredited [facility]    Prepared and electronically signed by    Jarvis Mcallister MD  Signed 12-Aug-2021 15:00:09    Results for orders placed during the hospital encounter of 06/08/22    Echo complete w/ contrast if indicated    Interpretation Summary  •  Left Ventricle: Left ventricular cavity size is normal  Wall thickness is mildly increased  There is mild concentric hypertrophy  The left ventricular ejection fraction is 60%  Systolic function is normal  Wall motion is normal  Diastolic function is mildly abnormal, consistent with grade I (abnormal) relaxation  •  Right Ventricle: Right ventricular cavity size is normal  Systolic function is normal   •  Left Atrium: The atrium is mildly dilated  •  Aortic Valve: There is mild regurgitation  •  Mitral Valve: There is mild regurgitation  •  Tricuspid Valve: There is mild regurgitation

## 2023-04-07 ENCOUNTER — TRANSITIONAL CARE MANAGEMENT (OUTPATIENT)
Dept: FAMILY MEDICINE CLINIC | Facility: CLINIC | Age: 68
End: 2023-04-07

## 2023-04-07 DIAGNOSIS — K64.1 GRADE II HEMORRHOIDS: ICD-10-CM

## 2023-04-07 DIAGNOSIS — K64.4 EXTERNAL HEMORRHOID: Primary | ICD-10-CM

## 2023-04-22 DIAGNOSIS — E78.2 MIXED HYPERLIPIDEMIA: ICD-10-CM

## 2023-04-24 RX ORDER — ROSUVASTATIN CALCIUM 10 MG/1
TABLET, COATED ORAL
Qty: 90 TABLET | Refills: 1 | Status: SHIPPED | OUTPATIENT
Start: 2023-04-24

## 2023-05-02 DIAGNOSIS — R07.9 CHEST PAIN: ICD-10-CM

## 2023-05-02 RX ORDER — RANOLAZINE 500 MG/1
500 TABLET, EXTENDED RELEASE ORAL 2 TIMES DAILY
Qty: 60 TABLET | Refills: 11 | Status: SHIPPED | OUTPATIENT
Start: 2023-05-02

## 2023-05-08 ENCOUNTER — TELEPHONE (OUTPATIENT)
Dept: CARDIOLOGY CLINIC | Facility: CLINIC | Age: 68
End: 2023-05-08

## 2023-05-08 ENCOUNTER — REMOTE DEVICE CLINIC VISIT (OUTPATIENT)
Dept: CARDIOLOGY CLINIC | Facility: CLINIC | Age: 68
End: 2023-05-08

## 2023-05-08 DIAGNOSIS — Z95.810 AICD (AUTOMATIC CARDIOVERTER/DEFIBRILLATOR) PRESENT: Primary | ICD-10-CM

## 2023-05-08 NOTE — TELEPHONE ENCOUNTER
----- Message from Ivet Butler MD sent at 5/8/2023 12:55 PM EDT -----  Patient device interrogation shows that he continues to have episodes of NSVT  Continue sotalol continue current Rx

## 2023-05-08 NOTE — PROGRESS NOTES
Results for orders placed or performed in visit on 05/08/23   Cardiac EP device report    Narrative    MDT-DUAL CHAMBER ICD (AAI-DDD MODE) / ACTIVE SYSTEM IS MRI CONDITIONAL  CARELINK TRANSMISSION: BATTERY VOLTAGE ADEQUATE (9 2 YRS)  AP-40%, -99% (>40% [email or web address])  ALL AVAILABLW LEAD PARAMETERS WITHIN NORMAL LIMITS  2 NSVT EPISODES- 13 BEATS, AVG CL~400MS & 16 BEATS, AVG CL~390MS  PT ON ASA 325MG & SOTALOL  OPTI-VOL WITHIN NORMAL LIMITS  NORMAL DEVICE FUNCTION   GV

## 2023-05-24 DIAGNOSIS — R07.9 CHEST PAIN: ICD-10-CM

## 2023-05-24 RX ORDER — RANOLAZINE 500 MG/1
TABLET, EXTENDED RELEASE ORAL
Qty: 180 TABLET | Refills: 4 | Status: SHIPPED | OUTPATIENT
Start: 2023-05-24

## 2023-05-26 NOTE — PROGRESS NOTES
Colon and Rectal Surgery   Jerrell Patel 75 y o  male MRN: [id number]   Encounter: [id number]  05/30/23   9:57 AM        ASSESSMENT:    Caroline Ugarte is a 64yo male, multiple cardiac history, planning for stent placement and will require Plavix for 6 months  He has been having on/off bright red blood per rectum  Colonoscopy 2019 with 10-year recall, we discussed possible repeat prior to hemorrhoidal therapy however he defers this  On anorectal exam/anoscopy he has right anterior lateral engorged internal/external hemorrhoidal complex, discussed formal hemorrhoidectomy as the most durable procedure, as rubber band ligature contraindicated on his aspirin as well as would not risk needing repeated procedures given the above history and planning  Discussed exam under anesthesia, internal/external hemorrhoidectomy, and at least single column, multiple columns based on intraoperative judgment may be required, anorectal surgery and in a face to face, personal informed consent the alternatives,benefits risks including not limited to bleeding, infection, risks of anesthesia, damage to sphincter/incontinence, possibility staged surgery, formally discussed the pain of hemorrhoidectomy procedure, also consented for lidocaine versus viscous lidocaine IRB study  They understood these risks, signed informed consent, and wish to proceed  PLAN:  Exam under anesthesia, internal/external hemorrhoidectomy to be scheduled at [facility] given cardiac history  Will request ASA hold from Dr Brayden Raymond, at the very least downgrade to baby ASA  CC:  Dr Kendall Hsieh   Dr Brayden Raymond      [facility]  Jerrell Patel is a 75 y o  male referred for evaluation today by Dr Darnell Gramajo for evaluation of external hemorrhoid; grade ll hemorrhoids  The patient notes anal lumps and bright red blood with bowel movement, gets larger and ruptures then shrinks, some pain at times, uses over the counter creams, Recticare        His most recent colonoscopy on 4/1/2019 with Dr Emily Bhandari showed extensive left sided diverticulosis, a small sigmoid polyp and internal hemorrhoid  Pathology reported hyperplastic polyps  10 year recall  Historical Information   Past Medical History:   Diagnosis Date   • Abrasion of left foot     LAST ASSESSED: 9/23/13   • Achilles tendinitis, unspecified leg     LAST ASSESSED: 11/30/12   • Allergic rhinitis     LAST ASSESSED: 11/25/13   • Allergic rhinitis 06/25/2008    LAST ASSESSED: 6/25/08   • Arthritis    • Cervicalgia 04/22/2011    LAST ASSESSED: 4/22/11   • Chronic pain disorder    • Colon polyp    • Coronary artery disease    • CPAP (continuous positive airway pressure) dependence    • Dysfunction of left eustachian tube     LAST ASSESSED: 9/23/13   • Epistaxis     LAST ASSESSED: 5/27/15   • First degree atrioventricular block     LAST ASSESSED: 7/10/17   • Gastric ulcer 10/07/2011    LAST ASSESSED: 3/9/15   • GERD (gastroesophageal reflux disease)    • Hearing aid worn    • Hemorrhoids 05/13/2011    LAST ASSESSED: 5/13/11   • Hyperlipidemia    • Hypertension    • Irregular heart beat    • Long term use of drug     LAST ASSESSED: 10/11/12   • Myalgia 12/21/2007    LAST ASSESSED: 12/21/07   • Myositis 12/21/2007    LAST ASSESSED: 12/21/07   • Numbness and tingling of foot     LAST ASSESSED: 3/9/15   • Pacemaker    • Premature ventricular beats     states cardiologist ordered Zio ambulatory cardiac monitor   • Sleep apnea    • Wears glasses      Past Surgical History:   Procedure Laterality Date   • CARDIAC CATHETERIZATION  2017   • CARDIAC ELECTROPHYSIOLOGY PROCEDURE N/A 10/25/2021    Procedure: CARDIAC EPS;  Surgeon: Cesar Rossi MD;  [facility]; Service: Cardiology   • CARDIAC ELECTROPHYSIOLOGY PROCEDURE Left 10/25/2021    Procedure: Cardiac icd implant;  Surgeon: Cesar Rossi MD;  [facility];   Service: Cardiology   • CARDIAC PACEMAKER PLACEMENT     • CARDIOVERSION N/A 10/25/2021 Procedure: Cardioversion;  Surgeon: Drew Conrad MD;  [facility]; Service: Cardiology   • CATARACT EXTRACTION Right    • CERVICAL FUSION      pt thinks C4-C5   • CHOLECYSTECTOMY      LAPAROSCOPIC; LAST ASSESSED: 10/17/17   • COLONOSCOPY      COMPLETE; 3-4 YEARS AGO BY [facility]; LAST ASSESSED: 8/18/14   • MYRINGOTOMY W/ TUBES      WITH VENTILATING TUBE INSERTION   • IN NEUROPLASTY &/TRANSPOS MEDIAN NRV CARPAL TUNNE Left 07/16/2021    Procedure: RELEASE CARPAL TUNNEL;  Surgeon: Philomena Sofia MD;  Location: [facility];  Service: Orthopedics   • IN NEUROPLASTY &/TRANSPOS MEDIAN NRV CARPAL Georgiann Coho Right 10/14/2022    Procedure: RELEASE CARPAL TUNNEL;  Surgeon: Philomena Sofia MD;  Location[facility];  Service: Orthopedics   • IN TENDON SHEATH INCISION Right 10/14/2022    Procedure: RELEASE TRIGGER FINGER,RING AND LONG TENOSYNOVECTOMY FDS +FDP; Surgeon: Philomena Sofia MD;  Location[facility];  Service: Orthopedics   • VASECTOMY         Meds/Allergies       Current Outpatient Medications:   •  aspirin (ECOTRIN LOW STRENGTH) 81 mg EC tablet, Take 81 mg by mouth daily Pt taking 2 a day, Disp: , Rfl:   •  aspirin 325 mg tablet, Take 325 mg by mouth daily, Disp: , Rfl:   •  Coenzyme Q10 (COQ10) 200 MG CAPS, Take 1 tablet by mouth daily, Disp: , Rfl:   •  COLLAGEN-CHOND-HYALURONIC ACID PO, Take 1 capsule by mouth in the morning, Disp: , Rfl:   •  EPINEPHrine (EpiPen 2-Leroy) 0 3 mg/0 3 mL SOAJ, Inject 0 3 mL (0 3 mg total) into a muscle once for 1 dose, Disp: 0 6 mL, Rfl: 0  •  finasteride (PROSCAR) 5 mg tablet, TAKE 1 TABLET (5 MG TOTAL) BY MOUTH DAILY  , Disp: 90 tablet, Rfl: 1  •  fluticasone (VERAMYST) 27 5 MCG/SPRAY nasal spray, 2 sprays into each nostril daily, Disp: , Rfl:   •  Glucosamine-Chondroitin (GLUCOSAMINE CHONDR COMPLEX PO), Take 1 tablet by mouth daily, Disp: , Rfl:   •  Omega-3 Fatty Acids (FISH OIL) 1000 MG CPDR, Take by mouth daily, Disp: , Rfl:   •  pantoprazole (PROTONIX) 40 mg tablet, TAKE 1 TABLET BY MOUTH EVERY DAY, Disp: 90 tablet, Rfl: 1  •  ranolazine (RANEXA) 500 mg 12 hr tablet, TAKE 1 TABLET BY MOUTH TWICE A DAY, Disp: 180 tablet, Rfl: 4  •  rosuvastatin (CRESTOR) 10 MG tablet, TAKE 1 TABLET BY MOUTH EVERY DAY, Disp: 90 tablet, Rfl: 1  •  sotalol (BETAPACE) 80 mg tablet, TAKE 1 TABLET BY MOUTH EVERY 12 HOURS , Disp: 180 tablet, Rfl: 3  •  Turmeric 500 MG CAPS, Take by mouth 3 (three) times a week, Disp: , Rfl:       Allergies   Allergen Reactions   • Amoxicillin Anaphylaxis   • Augmentin [Amoxicillin-Pot Clavulanate] Anaphylaxis   • Acetaminophen      Rapid heart rate   • Cherry Flavor - Food Allergy Other (See Comments)     States allergy is to raw cherries difficulty breathing   • Pollen Extract    • Dye [Iodinated Contrast Media] Itching     Social History   Social History     Substance and Sexual Activity   Alcohol Use Yes   • Alcohol/week: 7 0 standard drinks of alcohol   • Types: 7 Glasses of wine per week    Comment: few times a week     Social History     Substance and Sexual Activity   Drug Use No     Social History     Tobacco Use   Smoking Status Former   • Types: Cigarettes   • Quit date:    • Years since quittin 4   Smokeless Tobacco Never     Family History:   Family History   Problem Relation Age of Onset   • Cancer Mother         SOFT TISSUE CANCER ON RT SIDE CHEST WALL AND    • Prostate cancer Maternal Uncle        Review of Systems   Constitutional: Negative  HENT: Negative  Eyes: Negative  Respiratory: Negative  Cardiovascular: Negative  Gastrointestinal: Positive for anal bleeding  Endocrine: Negative  Genitourinary: Negative  Musculoskeletal: Negative  Skin: Negative  Allergic/Immunologic: Negative  Neurological: Negative  Hematological: Negative  Psychiatric/Behavioral: Negative        Objective   Current Vitals:   Vitals:    23 0916   Weight: 97 5 kg (215 lb)   Height: 6' (1 829 m) Physical Exam:  General:no distress  ENT:moist mucus membranes  Neck:supple  Pulm:no increased work of breathing, clear bilateral  CV:sinus  Abdomen:soft,nontender  Rectal:normal perianal skin/sphincter tone, mild prolapse right anterolateral hemorrhoidal complex  Extremities:no edema    Anoscopy    Date/Time: 5/30/2023 9:30 AM    Performed by: Meghan Mena MD  Authorized by: Meghan Mena MD    Verbal consent obtained?: Yes    Consent given by:  Patient  Patient identity confirmed:  Verbally with patient  Indications: rectal bleeding    Scope type: Anoscope   Right anterolateral internal/external hemorrhoidal complex with engorgement/irritation, remainder appears normal, normal anorectal mucosa

## 2023-05-30 ENCOUNTER — OFFICE VISIT (OUTPATIENT)
Age: 68
End: 2023-05-30

## 2023-05-30 VITALS — WEIGHT: 215 LBS | BODY MASS INDEX: 29.12 KG/M2 | HEIGHT: 72 IN

## 2023-05-30 DIAGNOSIS — K64.4 EXTERNAL HEMORRHOID: ICD-10-CM

## 2023-05-30 DIAGNOSIS — K64.1 GRADE II HEMORRHOIDS: ICD-10-CM

## 2023-05-30 DIAGNOSIS — K64.8 INTERNAL BLEEDING HEMORRHOIDS: Primary | ICD-10-CM

## 2023-05-30 NOTE — PATIENT INSTRUCTIONS
ASSESSMENT:    Stephie Mathias is a 64yo male, multiple cardiac history, planning for stent placement and will require Plavix for 6 months  He has been having on/off bright red blood per rectum  Colonoscopy 2019 with 10-year recall, we discussed possible repeat prior to hemorrhoidal therapy however he defers this  On anorectal exam/anoscopy he has right anterior lateral engorged internal/external hemorrhoidal complex, discussed formal hemorrhoidectomy as the most durable procedure, as rubber band ligature contraindicated on his aspirin as well as would not risk needing repeated procedures given the above history and planning  Discussed exam under anesthesia, internal/external hemorrhoidectomy, and at least single column, multiple columns based on intraoperative judgment may be required, anorectal surgery and in a face to face, personal informed consent the alternatives,benefits risks including not limited to bleeding, infection, risks of anesthesia, damage to sphincter/incontinence, possibility staged surgery, formally discussed the pain of hemorrhoidectomy procedure, also consented for lidocaine versus viscous lidocaine IRB study  They understood these risks, signed informed consent, and wish to proceed      PLAN:  Exam under anesthesia, internal/external hemorrhoidectomy to be scheduled at [facility] given cardiac history  Will request ASA hold from Dr Deborah Miles, at the very least downgrade to baby ASA  CC:  Dr Laurie Vázquez Dmstephany Miles

## 2023-06-16 ENCOUNTER — TELEPHONE (OUTPATIENT)
Age: 68
End: 2023-06-16

## 2023-06-16 NOTE — LETTER
[address] [id number]        6/16/2023    Dear Ulsies Patrick,    Your surgery is scheduled for: July 27, 2023   The hospital will call the evening prior to your surgery with your expected arrival time  Location:  [address] , [facility] [address]    CHECK LIST PRIOR TO OUTPATIENT SURGERY  It is your responsibility to obtain any/all referrals needed for your surgery if required by your insurance  Our office will contact you to discuss your insurance coverage for this procedure  Special instructions required if you are taking any blood thinners  Please verify with the prescribing physician  Examples include Coumadin, Plavix, Xarelto, Eliquis, Pradaxa, etc      Please check with your family physician if you are taking the following medications: Aspirin or any Aspirin containing medication, Gingko biloba, Ginseng, Feverfew, and/or St  Ramiro’s Wort  We suggest stopping these for 3 days  The night before and the day of your surgery, wash from your neck to groin with chlorhexidine soap  This soap is available at most retail pharmacies under such brand names as Hibiclens, Endure or Aplicare  Pre-admission testing Required:  NO X     NOTHING TO EAT OR DRINK AFTER MIDNIGHT PRIOR TO SURGERY  Take ONE FLEET ENEMA one hour prior to leaving for the hospital      Please do not hesitate to call our office with any questions regarding your surgery  POSTOPERATIVE INSTRUCTIONS FOR ANAL & RECTAL SURGERY      Your recovery following surgery will continue at home  This post-op instruction sheet is designed to help in that process  CARE OF YOUR INCISION:  Wash anal area after bowel movements with plain warm water  Use a sitz bath or sit in a tub of warm water several times a day  Sutures need not be removed  They will dissolve  BASIC INSTRUCTIONS:  Maintain a regular diet    Drink plenty of fluids  Take Metamucil ONCE daily  Call the physician if you have a fever, vomiting, or chills  Bowel movements should occur within 48 hours after surgery  If not, 2 Tablespoons of MINERAL OIL or MILK OF MAGNESIA can be taken up to 3 times a day until the first bowel movement  Avoid straining with bowel movements  Please do not hesitate to call our office to speak to one of our nurses or doctors if you have any questions regarding your surgery or recovery  Aline Mock

## 2023-06-16 NOTE — TELEPHONE ENCOUNTER
Patient scheduled for surgery  7/27  at [facility]  65  Instructions and PAT's gone over with and mailed to the patient      Per Dr Yenifer Stephens- Schedule at [facility]     ASA hold or downgrade to 81mg - pending response from Dr Sangeeta Jackson

## 2023-06-16 NOTE — LETTER
[facility] COLON AND RECTAL SURGERY [address] [address]  Phone#  [phone]                Dear Dr Sabrina Larsen,    Please advise the number of days Maranda Tobias [birth year] can safely hold 325mg Asprin prior to hemorrhoidectomy  If patient is unable to hold 325mg Asprin would it be acceptable for patient to downgrade to 81mg Asprin if deemed appropriate by you       Date of Surgery: 7/27/23  Type of Surgery: hemorrhoidectomy      Thank you,         Aleta Palomo MD   CC: Cathi Farrell

## 2023-06-19 DIAGNOSIS — K21.9 GASTROESOPHAGEAL REFLUX DISEASE, UNSPECIFIED WHETHER ESOPHAGITIS PRESENT: ICD-10-CM

## 2023-06-19 RX ORDER — PANTOPRAZOLE SODIUM 40 MG/1
40 TABLET, DELAYED RELEASE ORAL DAILY
Qty: 90 TABLET | Refills: 0 | Status: SHIPPED | OUTPATIENT
Start: 2023-06-19

## 2023-06-21 NOTE — TELEPHONE ENCOUNTER
Per Dr Montez Ny - Patient definitely can cut back to aspirin 81 mg every other day for the procedure time as well as a 5 to 7 days before the procedure as you requested  Yaima Jenkins is no cardiac contraindication  Ilesmer Louisa does have a history of CAD but no recent stenting was done  Patient aware

## 2023-07-11 ENCOUNTER — TELEPHONE (OUTPATIENT)
Dept: FAMILY MEDICINE CLINIC | Facility: CLINIC | Age: 68
End: 2023-07-11

## 2023-07-11 NOTE — TELEPHONE ENCOUNTER
Patient called and stated his sister has a blood condition and may need platelet or whole blood transfusions. He would like to donate but will need to know his blood type. He would like to know if it is possible to get an order to find out his blood type.

## 2023-07-17 DIAGNOSIS — K21.9 GASTROESOPHAGEAL REFLUX DISEASE, UNSPECIFIED WHETHER ESOPHAGITIS PRESENT: ICD-10-CM

## 2023-07-17 RX ORDER — PANTOPRAZOLE SODIUM 40 MG/1
40 TABLET, DELAYED RELEASE ORAL DAILY
Qty: 90 TABLET | Refills: 0 | Status: SHIPPED | OUTPATIENT
Start: 2023-07-17

## 2023-07-20 ENCOUNTER — ANESTHESIA EVENT (OUTPATIENT)
Dept: PERIOP | Facility: HOSPITAL | Age: 68
End: 2023-07-20
Payer: MEDICARE

## 2023-07-20 NOTE — PRE-PROCEDURE INSTRUCTIONS
Pre-Surgery Instructions:   Medication Instructions   • aspirin 325 mg tablet Instructions provided by MD   • Coenzyme Q10 (COQ10) 200 MG CAPS Stop taking 7 days prior to surgery. • COLLAGEN-CHOND-HYALURONIC ACID PO Stop taking 7 days prior to surgery. • finasteride (PROSCAR) 5 mg tablet Take day of surgery. • fluticasone (VERAMYST) 27.5 MCG/SPRAY nasal spray Uses PRN- OK to take day of surgery   • Glucosamine-Chondroitin (GLUCOSAMINE CHONDR COMPLEX PO) Stop taking 7 days prior to surgery. • Omega-3 Fatty Acids (FISH OIL) 1000 MG CPDR Stop taking 7 days prior to surgery. • pantoprazole (PROTONIX) 40 mg tablet Take day of surgery. • ranolazine (RANEXA) 500 mg 12 hr tablet Take day of surgery. • rosuvastatin (CRESTOR) 10 MG tablet Take day of surgery. • sotalol (BETAPACE) 80 mg tablet Take day of surgery. • Turmeric 500 MG CAPS Stop taking 7 days prior to surgery. Medication instructions for day surgery reviewed. Please use only a sip of water to take your instructed medications. Avoid all over the counter vitamins, supplements and NSAIDS for one week prior to surgery per anesthesia guidelines. Tylenol is ok to take as needed. ASA instructions per cards (telephone note 6/16). You will receive a call one business day prior to surgery with an arrival time and hospital directions. If your surgery is scheduled on a Monday, the hospital will be calling you on the Friday prior to your surgery. If you have not heard from anyone by 8pm, please call the hospital supervisor through the hospital  at [phone]. Matt Escobedo [phone]). Do not eat or drink anything after midnight the night before your surgery, including candy, mints, lifesavers, or chewing gum. Do not drink alcohol 24hrs before your surgery. Try not to smoke at least 24hrs before your surgery.        Follow the pre surgery showering instructions as listed in the [facility] Surgical Experience Booklet” or otherwise provided by your surgeon's office. Do not shave the surgical area 24 hours before surgery. Do not apply any lotions, creams, including makeup, cologne, deodorant, or perfumes after showering on the day of your surgery. No contact lenses, eye make-up, or artificial eyelashes. Remove nail polish, including gel polish, and any artificial, gel, or acrylic nails if possible. Remove all jewelry including rings and body piercing jewelry. Wear causal clothing that is easy to take on and off. Consider your type of surgery. Keep any valuables, jewelry, piercings at home. Please bring any specially ordered equipment (sling, braces) if indicated. Arrange for a responsible person to drive you to and from the hospital on the day of your surgery. Visitor Guidelines discussed. Call the surgeon's office with any new illnesses, exposures, or additional questions prior to surgery. Please reference your [facility] Surgical Experience Booklet” for additional information to prepare for your upcoming surgery.

## 2023-07-26 ENCOUNTER — OFFICE VISIT (OUTPATIENT)
Dept: CARDIOLOGY CLINIC | Facility: CLINIC | Age: 68
End: 2023-07-26
Payer: MEDICARE

## 2023-07-26 VITALS
WEIGHT: 214 LBS | HEART RATE: 77 BPM | DIASTOLIC BLOOD PRESSURE: 70 MMHG | OXYGEN SATURATION: 97 % | SYSTOLIC BLOOD PRESSURE: 120 MMHG | HEIGHT: 72 IN | BODY MASS INDEX: 28.99 KG/M2

## 2023-07-26 DIAGNOSIS — I47.20 VENTRICULAR TACHYCARDIA (HCC): ICD-10-CM

## 2023-07-26 DIAGNOSIS — E78.2 MIXED HYPERLIPIDEMIA: ICD-10-CM

## 2023-07-26 DIAGNOSIS — I49.5 SICK SINUS SYNDROME (HCC): ICD-10-CM

## 2023-07-26 DIAGNOSIS — J44.9 OSA AND COPD OVERLAP SYNDROME (HCC): ICD-10-CM

## 2023-07-26 DIAGNOSIS — Z95.810 S/P ICD (INTERNAL CARDIAC DEFIBRILLATOR) PROCEDURE: ICD-10-CM

## 2023-07-26 DIAGNOSIS — R07.89 CHEST TIGHTNESS: ICD-10-CM

## 2023-07-26 DIAGNOSIS — G47.33 OSA AND COPD OVERLAP SYNDROME (HCC): ICD-10-CM

## 2023-07-26 DIAGNOSIS — I25.10 CORONARY ARTERY DISEASE INVOLVING NATIVE CORONARY ARTERY OF NATIVE HEART WITHOUT ANGINA PECTORIS: ICD-10-CM

## 2023-07-26 DIAGNOSIS — I48.0 PAROXYSMAL ATRIAL FIBRILLATION (HCC): ICD-10-CM

## 2023-07-26 PROCEDURE — 93000 ELECTROCARDIOGRAM COMPLETE: CPT | Performed by: INTERNAL MEDICINE

## 2023-07-26 PROCEDURE — 99214 OFFICE O/P EST MOD 30 MIN: CPT | Performed by: INTERNAL MEDICINE

## 2023-07-26 NOTE — H&P (VIEW-ONLY)
Progress note - Cardiology Office  [facility]. Dilma Braxton 75 y.o. male MRN: [id number]  : 1955  Unit/Bed#:  Encounter: [id number]      Assessment:     1. Coronary artery disease involving native coronary artery of native heart without angina pectoris    2. Paroxysmal atrial fibrillation (HCC)    3. Sick sinus syndrome ([address])    4. Mixed hyperlipidemia    5. S/P ICD (internal cardiac defibrillator) procedure    6. Chest tightness    7. Ventricular tachycardia ([address])    8. LENY and COPD overlap syndrome ([address])        Discussion summary and Plan:       1. Coronary artery disease status post catheterization in 2017 has moderate ostial circ disease and nonobstructive disease in other arteries. He has repeat cardiac catheterization done. Cath was done in 2021. Patient has 50-70% ostial circumflex stenosis which is non dominant artery and IFR is 0.96 other arteries have nonobstructive disease. Continue statins and aspirin. Advised to take at least 162 mg aspirin     2. Dyslipidemia. Continue statins he is tolerating it very well. History of profile acceptable on 2023     3. History of intermittent AV block, and SVT with production of VT during EP study status post Medtronic dual-chamber ICD which is functioning adequately. Recent device interrogation shows few episodes of NSVT which are asymptomatic. He was evaluated by EP and put on sotalol since then number of episode have decreased. Vice interrogation reviewed. 4. Obstructive sleep apnea. Continue using CPAP machine. He is pretty compliant     5. Paroxysmal atrial fibrillation. Patient is noted to have episodes of atrial fibrillation on the device. AFib burden was 2.5%. He is already on beta-blocker. No more reoccurrence of AFib is noted. If he has significant AFib we will increase beta-blockers. He has stopped taking his Eliquis.   He says he did have any episode of atrial fibrillation he will hold off on it. Device interrogation shows no evidence of recurrence of atrial fibrillation. 6. Sick sinus syndrome with episodes of NSVT. Interrogation reviewed. From June 2023.      7. Fatigue and tiredness. Fatigue and  tiredness has improved with adjustment of device and Ranexa. 8.  Chronic stable angina. Patient has responded very well to Ranexa we will continue Ranexa 500 twice a day at this time. Also issue related to Ranexa discussed with him. Follow-up 6 months. Thank you for your consultation. If you have any question please call me at [phone]    Counseling :  A description of the counseling. Goals and Barriers. Patient's ability to self care: Yes  Medication side effect reviewed with patient in detail and all their questions answered to their satisfaction. Primary Care Physician : Cruz Nava DO    HPI :     Abdoulaye Carmona is a 75y.o. year old male who was referred by primary care doctor for for his history of cardiac disease. Patient who used to follow up with [facility] ([facility]) and has been evaluated by [facility] cardiology in 2017 for 2nd opinion has medical history significant for coronary artery disease, dyslipidemia, DJD and GERD along with obstructive sleep apnea. He had a catheterization done in June of 2017 which shows he had a moderate ostial circumflex disease and he chose to have medical therapy. At that time he was seen by  [facility] cardiology for 2nd opinion and various options were discussed with him including continue medical Rx, angioplasty of ostial circumflex which could be done but slightly high risk or single-vessel bypass surgery. At that time he was asymptomatic and he was continued medical therapy. Patient was recently in [facility] where he had an episode of palpitation and fast heartbeat. He was kept overnight ruled out for acute coronary syndrome and he was advised to follow up with Cardiology here.   He is known to have history of irregular heartbeat. EKG shows normal sinus with first-degree AV block and frequent PVCs. PVCs are also in the form of bigeminy and fusion beats are noted. He is otherwise very active he denies any chest pain any shortness of breath. He is able to do his activities without any problems. No nausea no vomiting no PND no orthopnea no syncope no other issues. No recent surgery. He has a previous history of surgery of gallbladder removal as well as neck fusion    He does not smoke but occasionally he will smoke a cigar maybe once every 3 months        09/01/2021. Above reviewed. Patient came for follow-up he is doing well he underwent cardiac catheterization we found he has ostial circumflex 50- 70% visually but IFR was 0.96. It was felt patient does not need stenting. His nuclear stress test also shows no ischemia. He denies any symptoms he is very active he does exercise without any problems. But he does have history of palpitations and he was found to have episode of NSVT, as well as intermittent third-degree AV block and junctional escape. He was evaluated by EP and was recommended dual-chamber pacemaker but they would like to have him cardiac catheterization done fast.  His medications reviewed today EKG shows heart rate 64 beats per minute rare PVCs. No nausea no vomiting he is not on any AV node blocking drug because of his episodes of bradycardia. He has multiple questions regarding his angiogram and related to his rhythm problem and pacemaker which were discussed openly. No other cardiovascular issues at this time. 01/18/2022. Above reviewed. Patient came for follow-up. He is doing well now. He had Medtronic dual-chamber ICD paced after EP study as he was having intermittent AV block and episodes of NSVT. He had an episode of VT during EP study. He does have history of coronary artery disease with ostial circumflex 50-70% stenosis visually but IFR was 0.96. It was felt that patient does not need stent and he has no symptoms. He denies any new complaints. Actually since his procedure he is feeling lot better he has no nausea no vomiting no fever no chills no other issues. He is compliant with medications he is taking aspirin statins and metoprolol. He is tolerating his Crestor well no other cardiovascular issues. His vitals has been stable. He has blood test done on October 2021 where cholesterol profile was acceptable electrolytes were acceptable his ALT was slightly elevated but still less than 2 times the normal.  His device was interrogated in November 2021. He had episode of NSVT but otherwise device is functioning adequately. His low rate is set at 50     05/03/2022. Above reviewed. Patient came for follow-up. He had a medical history significant for dual-chamber ICD which was placed after EP study as he was found to have intermittent AV block and episode of NSVT. Recently he was noted to have episode of atrial fibrillation and started on antithrombotic therapy. He had history of coronary artery disease status post cardiac catheterization which shows ostial circumflex 50-70% stenosis but IFR was 0.96. Patient did not need stent and he has no symptoms. He has blood test done in April 2022 which has been acceptable. His other medical history significant for dyslipidemia, hypertension, and peptic ulcer disease but no recent bleeding. His device interrogation shows patient has AFib burden of 2.5% which was new. He does have history of obstructive sleep apnea and he is compliant with his CPAP machine he is overall feeling well. He has multiple questions regarding blood thinners which were answered he does have history of hemorrhoid but not active at this time. No issues at this time his device interrogation reviewed with him in detail. 11/29/2022. Above reviewed. Patient came for follow-up.   He had a medical history significant for dual-chamber ICD which was placed after EP study add he was found to have intermittent AV block and episode of NSVT. He still continues have brief episode of NSVT. Has been evaluated by EP. He is noted to have atrial fibrillation and started on antithrombotic therapy. He does have history of coronary artery disease status post catheterization which shows ostial circumflex 50-70% stenosis where IFR was 0.96. He did not need any stent and he has no symptoms. He had blood test done on 10/04/2022 which has been acceptable cholesterol profile is stable blood pressure has been stable. He has other medical history significant for dyslipidemia, hypertension, peptic ulcer disease with no recent bleeding. His device interrogation was done in October 2022. He had 5 episode of NSVT. He was put on sotalol by the EP. And he did not need any therapy. No AFib was noted at distal interrogation. Patient is complaining about fatigue and tiredness since he has device placed. He feels that when he exercise he cannot ramp up his heart rate. Spoke to [employer] they will interrogated tomorrow. Otherwise no other issues. 7/26/2023. Above reviewed. Patient came for follow-up he is doing well. He was evaluated for second opinion for his coronary artery disease with a intermediate 50 to 70% ostial circumflex stenosis. And he was put on Ranexa which really helped him. He has medical history significant for dual-chamber ICD for NSVT, intermittent AV block, coronary artery disease, dyslipidemia and hypertension. He was put on sotalol by EP. He still have occasional episodes of NSVT with every cycle length 400 ms. Today EKG shows he is AV paced. His vitals are stable his exercise capacity has improved. He is able to do his activities without any chest tightness. He has a blood work done 4/5/2023 which shows his electrolytes are stable cholesterol profile is acceptable no other cardiovascular issues.   His device is of [address] [place] [address]. Review of Systems   Constitutional: Negative for activity change, chills, diaphoresis, fever and unexpected weight change. HENT: Negative for congestion. Eyes: Negative for discharge and redness. Respiratory: Negative for cough, chest tightness, shortness of breath and wheezing. Cardiovascular: Negative. Negative for chest pain, palpitations and leg swelling. Gastrointestinal: Negative for abdominal pain, diarrhea and nausea. Endocrine: Negative. Genitourinary: Negative for decreased urine volume and urgency. Musculoskeletal: Positive for arthralgias. Negative for back pain and gait problem. Skin: Negative for rash and wound. Allergic/Immunologic: Negative. Neurological: Negative for dizziness, seizures, syncope, weakness, light-headedness and headaches. Hematological: Negative. Psychiatric/Behavioral: Negative for agitation and confusion. The patient is nervous/anxious.         Historical Information   Past Medical History:   Diagnosis Date   • Abrasion of left foot     LAST ASSESSED: 9/23/13   • Achilles tendinitis, unspecified leg     LAST ASSESSED: 11/30/12   • Allergic rhinitis     LAST ASSESSED: 11/25/13   • Allergic rhinitis 06/25/2008    LAST ASSESSED: 6/25/08   • Arthritis    • Cervicalgia 04/22/2011    LAST ASSESSED: 4/22/11   • Chronic pain disorder    • Colon polyp    • Coronary artery disease    • CPAP (continuous positive airway pressure) dependence    • Dysfunction of left eustachian tube     LAST ASSESSED: 9/23/13   • Epistaxis     LAST ASSESSED: 5/27/15   • First degree atrioventricular block     LAST ASSESSED: 7/10/17   • Gastric ulcer 10/07/2011    LAST ASSESSED: 3/9/15   • GERD (gastroesophageal reflux disease)    • Hearing aid worn    • Hemorrhoids 05/13/2011    LAST ASSESSED: 5/13/11   • Hyperlipidemia    • Hypertension    • Irregular heart beat    • Long term use of drug     LAST ASSESSED: 10/11/12   • Myalgia 12/21/2007    LAST ASSESSED: 12/21/07   • Myositis 12/21/2007    LAST ASSESSED: 12/21/07   • Numbness and tingling of foot     LAST ASSESSED: 3/9/15   • Pacemaker    • Premature ventricular beats     states cardiologist ordered [facility] [place]   • Sleep apnea    • Wears glasses      Past Surgical History:   Procedure Laterality Date   • CARDIAC CATHETERIZATION  2017   • CARDIAC ELECTROPHYSIOLOGY PROCEDURE N/A 10/25/2021    Procedure: CARDIAC EPS;  Surgeon: Olive Carranza MD;  Location: [facility]; Service: Cardiology   • CARDIAC ELECTROPHYSIOLOGY PROCEDURE Left 10/25/2021    Procedure: Cardiac icd implant;  Surgeon: Olive Carranza MD;  Location: [facility]; Service: Cardiology   • CARDIAC PACEMAKER PLACEMENT     • CARDIOVERSION N/A 10/25/2021    Procedure: Cardioversion;  Surgeon: Olive Carranza MD;  Location: [facility]; Service: Cardiology   • CATARACT EXTRACTION Right    • CERVICAL FUSION      pt thinks C4-C5   • CHOLECYSTECTOMY      LAPAROSCOPIC; LAST ASSESSED: 10/17/17   • COLONOSCOPY      COMPLETE; 3-4 YEARS AGO BY [facility]; LAST ASSESSED: 8/18/14   • MYRINGOTOMY W/ TUBES      WITH VENTILATING TUBE INSERTION   • DC NEUROPLASTY &/TRANSPOS MEDIAN NRV CARPAL TUNNE Left 07/16/2021    Procedure: RELEASE CARPAL TUNNEL;  Surgeon: Jessica Siddiqui MD;  Location: [facility];  Service: Orthopedics   • DC NEUROPLASTY &/TRANSPOS MEDIAN NRV CARPAL Tahmina Armida Right 10/14/2022    Procedure: RELEASE CARPAL TUNNEL;  Surgeon: Jessica Siddiqui MD;  Location: [facility];  Service: Orthopedics   • DC TENDON SHEATH INCISION Right 10/14/2022    Procedure: RELEASE TRIGGER FINGER,RING AND LONG TENOSYNOVECTOMY FDS +FDP;   Surgeon: Jessica Siddiqui MD;  Location: [facility];  Service: Orthopedics   • VASECTOMY       Social History     Substance and Sexual Activity   Alcohol Use Yes   • Alcohol/week: 7.0 standard drinks of alcohol   • Types: 7 Glasses of wine per week    Comment: few times a week     Social History     Substance and Sexual Activity   Drug Use No     Social History     Tobacco Use   Smoking Status Former   • Types: Cigarettes   • Quit date: 200   • Years since quittin.5   Smokeless Tobacco Never     Family History:   Family History   Problem Relation Age of Onset   • Cancer Mother         SOFT TISSUE CANCER ON RT SIDE CHEST WALL AND    • Prostate cancer Maternal Uncle        Meds/Allergies     Allergies   Allergen Reactions   • Amoxicillin Anaphylaxis   • Augmentin [Amoxicillin-Pot Clavulanate] Anaphylaxis   • Acetaminophen      Rapid heart rate   • Cherry Flavor - Food Allergy Other (See Comments)     States allergy is to raw cherries difficulty breathing   • Pollen Extract    • Dye [Iodinated Contrast Media] Itching       Current Outpatient Medications:   •  aspirin 325 mg tablet, Take 325 mg by mouth daily, Disp: , Rfl:   •  Coenzyme Q10 (COQ10) 200 MG CAPS, Take 1 tablet by mouth daily, Disp: , Rfl:   •  COLLAGEN-CHOND-HYALURONIC ACID PO, Take 1 capsule by mouth in the morning, Disp: , Rfl:   •  finasteride (PROSCAR) 5 mg tablet, TAKE 1 TABLET (5 MG TOTAL) BY MOUTH DAILY. , Disp: 90 tablet, Rfl: 1  •  fluticasone (VERAMYST) 27.5 MCG/SPRAY nasal spray, 2 sprays into each nostril daily, Disp: , Rfl:   •  Glucosamine-Chondroitin (GLUCOSAMINE CHONDR COMPLEX PO), Take 1 tablet by mouth daily, Disp: , Rfl:   •  Omega-3 Fatty Acids (FISH OIL) 1000 MG CPDR, Take by mouth daily, Disp: , Rfl:   •  pantoprazole (PROTONIX) 40 mg tablet, Take 1 tablet (40 mg total) by mouth daily, Disp: 90 tablet, Rfl: 0  •  ranolazine (RANEXA) 500 mg 12 hr tablet, TAKE 1 TABLET BY MOUTH TWICE A DAY, Disp: 180 tablet, Rfl: 4  •  rosuvastatin (CRESTOR) 10 MG tablet, TAKE 1 TABLET BY MOUTH EVERY DAY, Disp: 90 tablet, Rfl: 1  •  sotalol (BETAPACE) 80 mg tablet, TAKE 1 TABLET BY MOUTH EVERY 12 HOURS., Disp: 180 tablet, Rfl: 3  •  Turmeric 500 MG CAPS, Take by mouth 3 (three) times a week, Disp: , Rfl:   •  aspirin (ECOTRIN LOW STRENGTH) 81 mg EC tablet, Take 81 mg by mouth daily Pt taking 2 a day, Disp: , Rfl:   •  EPINEPHrine (EpiPen 2-Leroy) 0.3 mg/0.3 mL SOAJ, Inject 0.3 mL (0.3 mg total) into a muscle once for 1 dose, Disp: 0.6 mL, Rfl: 0    Vitals: Blood pressure 120/70, pulse 77, height 6' (1.829 m), weight 97.1 kg (214 lb), SpO2 97 %. ?  Body mass index is 29.02 kg/m². Wt Readings from Last 3 Encounters:   07/26/23 97.1 kg (214 lb)   05/30/23 97.5 kg (215 lb)   04/17/23 97.1 kg (214 lb)     Vitals:    07/26/23 1116   Weight: 97.1 kg (214 lb)     BP Readings from Last 3 Encounters:   07/26/23 120/70   04/19/23 132/82   04/17/23 126/74         Physical Exam  Constitutional:       General: He is not in acute distress. Appearance: He is well-developed. He is not diaphoretic. Neck:      Thyroid: No thyromegaly. Vascular: No JVD. Trachea: No tracheal deviation. Cardiovascular:      Rate and Rhythm: Normal rate and regular rhythm. Heart sounds: S1 normal and S2 normal. Heart sounds not distant. Murmur heard. Systolic (ejection) murmur is present with a grade of 2/6. No friction rub. No gallop. No S3 or S4 sounds. Pulmonary:      Effort: Pulmonary effort is normal. No respiratory distress. Breath sounds: Normal breath sounds. No wheezing or rales. Chest:      Chest wall: No tenderness. Abdominal:      General: Bowel sounds are normal. There is no distension. Palpations: Abdomen is soft. Tenderness: There is no abdominal tenderness. Musculoskeletal:         General: No deformity. Cervical back: Neck supple. Skin:     General: Skin is warm and dry. Coloration: Skin is not pale. Findings: No rash. Neurological:      Mental Status: He is alert and oriented to person, place, and time.    Psychiatric:         Behavior: Behavior normal.         Judgment: Judgment normal.             Diagnostic Studies Review Cardio:   cardiac catheterization done in June 2017 at [facility] shows he had moderate stenosis of his ostial circumflex. Had a nonobstructive disease in other arteries and had a normal LV systolic function. Repeat cardiac catheterization done 2021 shows patient has nonobstructive disease involving RCA and LAD. 50- 70% ostial circumflex and IFR was 0.95 EF is acceptable. Nuclear stress test done in 2020. Nuclear stress done in 2020 patient exercised for about 9 minutes. Fixed defect was seen no ischemia noted. EF was 47%      Event monitor has shows episodes of pauses, intermittent 3 AV block, episode of NSVT was evaluated by EP. EKG:  Twelve lead EKG done 2021 shows normal sinus rhythm first-degree AV block with PVCs. No significant change from previous EKG done at his primary care doctor's office     12 lead EKG done 2021 shows Normal sinus rhythm first-degree AV block heart rate 64 beats per minute. Rare PVCs noted.     Twelve-lead EKG done 2023 shows AV paced heart rate 77 bpm.    Device interrogation from 2022 reviewed          Results for orders placed during the hospital encounter of 10/12/20    NM Myocardial Perfusion Spect (Exercise Induced Stress and/or Rest)    [place] [id number]  [address] [facility],  [facility]  [phone]    Rest/Stress Gated SPECT Myocardial Perfusion Imaging After Exercise    Patient: Beryl Fu  MR number: [id number]  Account number: [de-identified]  : 1955  Age: 71 years  Gender: Male  Status: Inpatient  Location: Stress lab  Height: 73 in  Weight: 202 lb  BP: 138/ 80 mmHg    Allergies: AMOXICILLIN, ACETAMINOPHEN, APPLE, CHERRY FLAVOR, POLLEN EXTRACT, AMOXICILLIN-POT CLAVULANATE, IODINATED DIAGNOSTIC AGENTS    Diagnosis: R07.9 - Chest pain, unspecified    Interpreting Physician:  Chris Oates MD  Referring Physician:  Job Cervantes DO  Technician:  Fantasma Proctor  RN:  Min Jha RN BSN  Group: [facility]  Report Prepared by[de-identified]  Julissa Butler RN BSN    INDICATIONS: Coronary artery disease. HISTORY: The patient is a 71year old  male. Chest pain status: chest pain, radiation to neck and jaw area, radiation to arm(s). Coronary artery disease risk factors: dyslipidemia. Cardiovascular history: coronary artery disease  and arrhythmia. Co-morbidity: obesity. PHYSICAL EXAM: Baseline physical exam screening: no evidence of significant aortic stenosis and no wheezes audible. REST ECG: Normal sinus rhythm. The ECG showed 1ï¾° AV block. PROCEDURE: The study was performed in the the Stress lab. Treadmill exercise testing was performed, using the Ángel protocol. Gated SPECT myocardial perfusion imaging was performed after stress. Systolic blood pressure was 138 mmHg, at the  start of the study. Diastolic blood pressure was 80 mmHg, at the start of the study. The heart rate was 75 bpm, at the start of the study. IV double checked. ÁNGEL PROTOCOL:  HR bpm SBP mmHg DBP mmHg Symptoms  Baseline 75 138 80 none  Stage 1 129 178 80 none  Stage 2 129 184 72 mild dyspnea  Stage 3 141 -- -- --  Immediate 141 205 70 moderate dyspnea  Recovery 1 92 192 80 subsiding  Recovery 2 93 150 78 none    STRESS SUMMARY: Duration of exercise was 9 min. The patient exercised to protocol stage 3. Maximal work rate was 10.1 METs. Functional capacity was normal. Maximal heart rate during stress was 141 bpm ( 90 % of maximal predicted heart  rate). The heart rate response to stress was normal. There was normal resting blood pressure with a hypertensive response to stress. The rate-pressure product for the peak heart rate and blood pressure was 01520. There was no chest pain  during stress. The stress test was terminated due to moderate dyspnea. Pre oxygen saturation: 99 %. Peak oxygen saturation: 98 %. The stress ECG was equivocal for ischemia.  Arrhythmia during stress: isolated premature ventricular beats. ISOTOPE ADMINISTRATION:  Resting isotope administration Stress isotope administration  Agent Tetrofosmin Tetrofosmin  Dose 11 mCi 30.9 mCi  Date 10/14/2020 10/14/2020  Injection time 08:55 10:38  Injection-image interval 37 min 52 min    Radiopharmaceutical was administered 6 min, 30 sec into the stress protocol. There was 2 min and 30 sec of exercise after the injection. MYOCARDIAL PERFUSION IMAGING:  The image quality was fair. Rotating projection images reveal mild diaphragmatic attenuation and mild subdiaphragmatic activity. Left ventricular size was normal. The TID ratio was 0.97. PERFUSION DEFECTS:  -  There was a moderate-sized, mildly severe, fixed myocardial perfusion defect of the basal inferior wall likely due to diaphragm attenuation. GATED SPECT:  The calculated left ventricular ejection fraction was 47 %. Left ventricular ejection fraction was within normal limits by visual estimate. There was no left ventricular regional abnormality. SUMMARY:  -  Stress results: Duration of exercise was 9 min. Target heart rate was achieved. There was normal resting blood pressure with a hypertensive response to stress. There was no chest pain during stress. The stress test was terminated due to  moderate dyspnea. -  ECG conclusions: The stress ECG was equivocal for ischemia. -  Perfusion imaging: There was a moderate-sized, mildly severe, fixed myocardial perfusion defect of the basal inferior wall likely due to diaphragm attenuation.  -  Gated SPECT: The calculated left ventricular ejection fraction was 47 %. Left ventricular ejection fraction was within normal limits by visual estimate. There was no left ventricular regional abnormality. IMPRESSIONS: Normal study after maximal exercise. There was image artifact, without diagnostic evidence for perfusion abnormality.     Prepared and signed by    Chris Oates MD  Signed 10/14/2020 15:31:45    No results found for this or any previous visit. Imaging:  Chest X-Ray:   No Chest XR results available for this patient. CT-scan of the chest:     CTA dissection protocol chest abdomen pelvis w wo contrast    Result Date: 10/12/2020  Impression 1. No aortic dissection or aneurysm. 2.  No acute finding within chest, abdomen, and pelvis. 3.  Colonic diverticulosis. 4.  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  Recommend correlation/comparison with prior outside imaging. Workstation performed: [id number]     Lab Review   Lab Results   Component Value Date    WBC 6.23 04/05/2023    HGB 15.3 04/05/2023    HCT 46.0 04/05/2023    MCV 93 04/05/2023    RDW 12.7 04/05/2023     04/05/2023     BMP:  Lab Results   Component Value Date    SODIUM 137 04/05/2023    K 4.3 04/05/2023     (H) 04/05/2023    CO2 25 04/05/2023    BUN 14 04/05/2023    CREATININE 0.91 04/05/2023    GLUC 88 04/05/2023    GLUF 102 (H) 10/04/2022    CALCIUM 9.3 04/05/2023    EGFR 86 04/05/2023    MG 2.1 06/15/2022     LFT:  Lab Results   Component Value Date    AST 27 04/05/2023    ALT 40 04/05/2023    ALKPHOS 60 04/05/2023    TP 6.7 04/05/2023    ALB 3.5 04/05/2023      Lab Results   Component Value Date    [id number] 2.052 10/04/2022     No components found for: "TSH3"  Lab Results   Component Value Date    HGBA1C 5.5 04/05/2023     Lipid Profile:   Lab Results   Component Value Date    CHOLESTEROL 118 04/05/2023    HDL 36 (L) 04/05/2023    LDLCALC 61 04/05/2023    TRIG 103 04/05/2023     Lab Results   Component Value Date    CHOLESTEROL 118 04/05/2023    CHOLESTEROL 128 10/04/2022     Lab Results   Component Value Date    TROPONINI <0.02 10/13/2020     Device interrogation from 04/17/2022 shows episode of atrial fibrillation      Dr. Juany Simpson MD [facility] [place]      "This note has been constructed using a voice recognition system. Therefore there may be syntax, spelling, and/or grammatical errors.  Please call if you have any questions. " (3) walks occasionally

## 2023-07-26 NOTE — PROGRESS NOTES
Progress note - Cardiology Office  [facility]. Joseph Reno 75 y.o. male MRN: [id number]  : 1955  Unit/Bed#:  Encounter: [id number]      Assessment:     1. Coronary artery disease involving native coronary artery of native heart without angina pectoris    2. Paroxysmal atrial fibrillation (HCC)    3. Sick sinus syndrome ([address])    4. Mixed hyperlipidemia    5. S/P ICD (internal cardiac defibrillator) procedure    6. Chest tightness    7. Ventricular tachycardia ([address])    8. LEYN and COPD overlap syndrome ([address])        Discussion summary and Plan:       1. Coronary artery disease status post catheterization in 2017 has moderate ostial circ disease and nonobstructive disease in other arteries. He has repeat cardiac catheterization done. Cath was done in 2021. Patient has 50-70% ostial circumflex stenosis which is non dominant artery and IFR is 0.96 other arteries have nonobstructive disease. Continue statins and aspirin. Advised to take at least 162 mg aspirin     2. Dyslipidemia. Continue statins he is tolerating it very well. History of profile acceptable on 2023     3. History of intermittent AV block, and SVT with production of VT during EP study status post Medtronic dual-chamber ICD which is functioning adequately. Recent device interrogation shows few episodes of NSVT which are asymptomatic. He was evaluated by EP and put on sotalol since then number of episode have decreased. Vice interrogation reviewed. 4. Obstructive sleep apnea. Continue using CPAP machine. He is pretty compliant     5. Paroxysmal atrial fibrillation. Patient is noted to have episodes of atrial fibrillation on the device. AFib burden was 2.5%. He is already on beta-blocker. No more reoccurrence of AFib is noted. If he has significant AFib we will increase beta-blockers. He has stopped taking his Eliquis.   He says he did have any episode of atrial fibrillation he will hold off on it. Device interrogation shows no evidence of recurrence of atrial fibrillation. 6. Sick sinus syndrome with episodes of NSVT. Interrogation reviewed. From June 2023.      7. Fatigue and tiredness. Fatigue and  tiredness has improved with adjustment of device and Ranexa. 8.  Chronic stable angina. Patient has responded very well to Ranexa we will continue Ranexa 500 twice a day at this time. Also issue related to Ranexa discussed with him. Follow-up 6 months. Thank you for your consultation. If you have any question please call me at [phone]    Counseling :  A description of the counseling. Goals and Barriers. Patient's ability to self care: Yes  Medication side effect reviewed with patient in detail and all their questions answered to their satisfaction. Primary Care Physician : Benja Spencer DO    HPI :     Loco Schulte is a 75y.o. year old male who was referred by primary care doctor for for his history of cardiac disease. Patient who used to follow up with [facility] ([facility]) and has been evaluated by [facility] cardiology in 2017 for 2nd opinion has medical history significant for coronary artery disease, dyslipidemia, DJD and GERD along with obstructive sleep apnea. He had a catheterization done in June of 2017 which shows he had a moderate ostial circumflex disease and he chose to have medical therapy. At that time he was seen by  [facility] cardiology for 2nd opinion and various options were discussed with him including continue medical Rx, angioplasty of ostial circumflex which could be done but slightly high risk or single-vessel bypass surgery. At that time he was asymptomatic and he was continued medical therapy. Patient was recently in [place] where he had an episode of palpitation and fast heartbeat. He was kept overnight ruled out for acute coronary syndrome and he was advised to follow up with Cardiology here.   He is known to have history of irregular heartbeat. EKG shows normal sinus with first-degree AV block and frequent PVCs. PVCs are also in the form of bigeminy and fusion beats are noted. He is otherwise very active he denies any chest pain any shortness of breath. He is able to do his activities without any problems. No nausea no vomiting no PND no orthopnea no syncope no other issues. No recent surgery. He has a previous history of surgery of gallbladder removal as well as neck fusion    He does not smoke but occasionally he will smoke a cigar maybe once every 3 months        09/01/2021. Above reviewed. Patient came for follow-up he is doing well he underwent cardiac catheterization we found he has ostial circumflex 50- 70% visually but IFR was 0.96. It was felt patient does not need stenting. His nuclear stress test also shows no ischemia. He denies any symptoms he is very active he does exercise without any problems. But he does have history of palpitations and he was found to have episode of NSVT, as well as intermittent third-degree AV block and junctional escape. He was evaluated by EP and was recommended dual-chamber pacemaker but they would like to have him cardiac catheterization done fast.  His medications reviewed today EKG shows heart rate 64 beats per minute rare PVCs. No nausea no vomiting he is not on any AV node blocking drug because of his episodes of bradycardia. He has multiple questions regarding his angiogram and related to his rhythm problem and pacemaker which were discussed openly. No other cardiovascular issues at this time. 01/18/2022. Above reviewed. Patient came for follow-up. He is doing well now. He had Medtronic dual-chamber ICD paced after EP study as he was having intermittent AV block and episodes of NSVT. He had an episode of VT during EP study. He does have history of coronary artery disease with ostial circumflex 50-70% stenosis visually but IFR was 0.96. It was felt that patient does not need stent and he has no symptoms. He denies any new complaints. Actually since his procedure he is feeling lot better he has no nausea no vomiting no fever no chills no other issues. He is compliant with medications he is taking aspirin statins and metoprolol. He is tolerating his Crestor well no other cardiovascular issues. His vitals has been stable. He has blood test done on October 2021 where cholesterol profile was acceptable electrolytes were acceptable his ALT was slightly elevated but still less than 2 times the normal.  His device was interrogated in November 2021. He had episode of NSVT but otherwise device is functioning adequately. His low rate is set at 50     05/03/2022. Above reviewed. Patient came for follow-up. He had a medical history significant for dual-chamber ICD which was placed after EP study as he was found to have intermittent AV block and episode of NSVT. Recently he was noted to have episode of atrial fibrillation and started on antithrombotic therapy. He had history of coronary artery disease status post cardiac catheterization which shows ostial circumflex 50-70% stenosis but IFR was 0.96. Patient did not need stent and he has no symptoms. He has blood test done in April 2022 which has been acceptable. His other medical history significant for dyslipidemia, hypertension, and peptic ulcer disease but no recent bleeding. His device interrogation shows patient has AFib burden of 2.5% which was new. He does have history of obstructive sleep apnea and he is compliant with his CPAP machine he is overall feeling well. He has multiple questions regarding blood thinners which were answered he does have history of hemorrhoid but not active at this time. No issues at this time his device interrogation reviewed with him in detail. 11/29/2022. Above reviewed. Patient came for follow-up.   He had a medical history significant for dual-chamber ICD which was placed after EP study add he was found to have intermittent AV block and episode of NSVT. He still continues have brief episode of NSVT. Has been evaluated by EP. He is noted to have atrial fibrillation and started on antithrombotic therapy. He does have history of coronary artery disease status post catheterization which shows ostial circumflex 50-70% stenosis where IFR was 0.96. He did not need any stent and he has no symptoms. He had blood test done on 10/04/2022 which has been acceptable cholesterol profile is stable blood pressure has been stable. He has other medical history significant for dyslipidemia, hypertension, peptic ulcer disease with no recent bleeding. His device interrogation was done in October 2022. He had 5 episode of NSVT. He was put on sotalol by the EP. And he did not need any therapy. No AFib was noted at distal interrogation. Patient is complaining about fatigue and tiredness since he has device placed. He feels that when he exercise he cannot ramp up his heart rate. Spoke to [employer] they will interrogated tomorrow. Otherwise no other issues. 7/26/2023. Above reviewed. Patient came for follow-up he is doing well. He was evaluated for second opinion for his coronary artery disease with a intermediate 50 to 70% ostial circumflex stenosis. And he was put on Ranexa which really helped him. He has medical history significant for dual-chamber ICD for NSVT, intermittent AV block, coronary artery disease, dyslipidemia and hypertension. He was put on sotalol by EP. He still have occasional episodes of NSVT with every cycle length 400 ms. Today EKG shows he is AV paced. His vitals are stable his exercise capacity has improved. He is able to do his activities without any chest tightness. He has a blood work done 4/5/2023 which shows his electrolytes are stable cholesterol profile is acceptable no other cardiovascular issues.   His device is of [address] [place] [address]. Review of Systems   Constitutional: Negative for activity change, chills, diaphoresis, fever and unexpected weight change. HENT: Negative for congestion. Eyes: Negative for discharge and redness. Respiratory: Negative for cough, chest tightness, shortness of breath and wheezing. Cardiovascular: Negative. Negative for chest pain, palpitations and leg swelling. Gastrointestinal: Negative for abdominal pain, diarrhea and nausea. Endocrine: Negative. Genitourinary: Negative for decreased urine volume and urgency. Musculoskeletal: Positive for arthralgias. Negative for back pain and gait problem. Skin: Negative for rash and wound. Allergic/Immunologic: Negative. Neurological: Negative for dizziness, seizures, syncope, weakness, light-headedness and headaches. Hematological: Negative. Psychiatric/Behavioral: Negative for agitation and confusion. The patient is nervous/anxious.         Historical Information   Past Medical History:   Diagnosis Date   • Abrasion of left foot     LAST ASSESSED: 9/23/13   • Achilles tendinitis, unspecified leg     LAST ASSESSED: 11/30/12   • Allergic rhinitis     LAST ASSESSED: 11/25/13   • Allergic rhinitis 06/25/2008    LAST ASSESSED: 6/25/08   • Arthritis    • Cervicalgia 04/22/2011    LAST ASSESSED: 4/22/11   • Chronic pain disorder    • Colon polyp    • Coronary artery disease    • CPAP (continuous positive airway pressure) dependence    • Dysfunction of left eustachian tube     LAST ASSESSED: 9/23/13   • Epistaxis     LAST ASSESSED: 5/27/15   • First degree atrioventricular block     LAST ASSESSED: 7/10/17   • Gastric ulcer 10/07/2011    LAST ASSESSED: 3/9/15   • GERD (gastroesophageal reflux disease)    • Hearing aid worn    • Hemorrhoids 05/13/2011    LAST ASSESSED: 5/13/11   • Hyperlipidemia    • Hypertension    • Irregular heart beat    • Long term use of drug     LAST ASSESSED: 10/11/12   • Myalgia 12/21/2007    LAST ASSESSED: 12/21/07   • Myositis 12/21/2007    LAST ASSESSED: 12/21/07   • Numbness and tingling of foot     LAST ASSESSED: 3/9/15   • Pacemaker    • Premature ventricular beats     states cardiologist ordered [facility] ambulatory cardiac monitor   • Sleep apnea    • Wears glasses      Past Surgical History:   Procedure Laterality Date   • CARDIAC CATHETERIZATION  2017   • CARDIAC ELECTROPHYSIOLOGY PROCEDURE N/A 10/25/2021    Procedure: CARDIAC EPS;  Surgeon: Loulou Muse MD;  Location: [facility]; Service: Cardiology   • CARDIAC ELECTROPHYSIOLOGY PROCEDURE Left 10/25/2021    Procedure: Cardiac icd implant;  Surgeon: Loulou Muse MD;  Location: [facility]; Service: Cardiology   • CARDIAC PACEMAKER PLACEMENT     • CARDIOVERSION N/A 10/25/2021    Procedure: Cardioversion;  Surgeon: Loulou Muse MD;  Location: [facility]; Service: Cardiology   • CATARACT EXTRACTION Right    • CERVICAL FUSION      pt thinks C4-C5   • CHOLECYSTECTOMY      LAPAROSCOPIC; LAST ASSESSED: 10/17/17   • COLONOSCOPY      COMPLETE; 3-4 YEARS AGO BY [facility]; LAST ASSESSED: 8/18/14   • MYRINGOTOMY W/ TUBES      WITH VENTILATING TUBE INSERTION   • WY NEUROPLASTY &/TRANSPOS MEDIAN NRV CARPAL TUNNE Left 07/16/2021    Procedure: RELEASE CARPAL TUNNEL;  Surgeon: Pamela Ponce MD;  Location: [facility];  Service: Orthopedics   • W[place] NEUROPLASTY &/TRANSPOS MEDIAN NRV CARPAL Fleta Sachin Right 10/14/2022    Procedure: RELEASE CARPAL TUNNEL;  Surgeon: Pamela Ponce MD;  Location: [facility];  Service: Orthopedics   • W[place] TENDON SHEATH INCISION Right 10/14/2022    Procedure: RELEASE TRIGGER FINGER,RING AND LONG TENOSYNOVECTOMY FDS +FDP;   Surgeon: Pamela Ponce MD;  Location: [facility];  Service: Orthopedics   • VASECTOMY       Social History     Substance and Sexual Activity   Alcohol Use Yes   • Alcohol/week: 7.0 standard drinks of alcohol   • Types: 7 Glasses of wine per week    Comment: few times a week     Social History     Substance and Sexual Activity   Drug Use No     Social History     Tobacco Use   Smoking Status Former   • Types: Cigarettes   • Quit date: 200   • Years since quittin.5   Smokeless Tobacco Never     Family History:   Family History   Problem Relation Age of Onset   • Cancer Mother         SOFT TISSUE CANCER ON RT SIDE CHEST WALL AND    • Prostate cancer Maternal Uncle        Meds/Allergies     Allergies   Allergen Reactions   • Amoxicillin Anaphylaxis   • Augmentin [Amoxicillin-Pot Clavulanate] Anaphylaxis   • Acetaminophen      Rapid heart rate   • Cherry Flavor - Food Allergy Other (See Comments)     States allergy is to raw cherries difficulty breathing   • Pollen Extract    • Dye [Iodinated Contrast Media] Itching       Current Outpatient Medications:   •  aspirin 325 mg tablet, Take 325 mg by mouth daily, Disp: , Rfl:   •  Coenzyme Q10 (COQ10) 200 MG CAPS, Take 1 tablet by mouth daily, Disp: , Rfl:   •  COLLAGEN-CHOND-HYALURONIC ACID PO, Take 1 capsule by mouth in the morning, Disp: , Rfl:   •  finasteride (PROSCAR) 5 mg tablet, TAKE 1 TABLET (5 MG TOTAL) BY MOUTH DAILY. , Disp: 90 tablet, Rfl: 1  •  fluticasone (VERAMYST) 27.5 MCG/SPRAY nasal spray, 2 sprays into each nostril daily, Disp: , Rfl:   •  Glucosamine-Chondroitin (GLUCOSAMINE CHONDR COMPLEX PO), Take 1 tablet by mouth daily, Disp: , Rfl:   •  Omega-3 Fatty Acids (FISH OIL) 1000 MG CPDR, Take by mouth daily, Disp: , Rfl:   •  pantoprazole (PROTONIX) 40 mg tablet, Take 1 tablet (40 mg total) by mouth daily, Disp: 90 tablet, Rfl: 0  •  ranolazine (RANEXA) 500 mg 12 hr tablet, TAKE 1 TABLET BY MOUTH TWICE A DAY, Disp: 180 tablet, Rfl: 4  •  rosuvastatin (CRESTOR) 10 MG tablet, TAKE 1 TABLET BY MOUTH EVERY DAY, Disp: 90 tablet, Rfl: 1  •  sotalol (BETAPACE) 80 mg tablet, TAKE 1 TABLET BY MOUTH EVERY 12 HOURS., Disp: 180 tablet, Rfl: 3  •  Turmeric 500 MG CAPS, Take by mouth 3 (three) times a week, Disp: , Rfl:   •  aspirin (ECOTRIN LOW STRENGTH) 81 mg EC tablet, Take 81 mg by mouth daily Pt taking 2 a day, Disp: , Rfl:   •  EPINEPHrine (EpiPen 2-Leroy) 0.3 mg/0.3 mL SOAJ, Inject 0.3 mL (0.3 mg total) into a muscle once for 1 dose, Disp: 0.6 mL, Rfl: 0    Vitals: Blood pressure 120/70, pulse 77, height 6' (1.829 m), weight 97.1 kg (214 lb), SpO2 97 %. ?  Body mass index is 29.02 kg/m². Wt Readings from Last 3 Encounters:   07/26/23 97.1 kg (214 lb)   05/30/23 97.5 kg (215 lb)   04/17/23 97.1 kg (214 lb)     Vitals:    07/26/23 1116   Weight: 97.1 kg (214 lb)     BP Readings from Last 3 Encounters:   07/26/23 120/70   04/19/23 132/82   04/17/23 126/74         Physical Exam  Constitutional:       General: He is not in acute distress. Appearance: He is well-developed. He is not diaphoretic. Neck:      Thyroid: No thyromegaly. Vascular: No JVD. Trachea: No tracheal deviation. Cardiovascular:      Rate and Rhythm: Normal rate and regular rhythm. Heart sounds: S1 normal and S2 normal. Heart sounds not distant. Murmur heard. Systolic (ejection) murmur is present with a grade of 2/6. No friction rub. No gallop. No S3 or S4 sounds. Pulmonary:      Effort: Pulmonary effort is normal. No respiratory distress. Breath sounds: Normal breath sounds. No wheezing or rales. Chest:      Chest wall: No tenderness. Abdominal:      General: Bowel sounds are normal. There is no distension. Palpations: Abdomen is soft. Tenderness: There is no abdominal tenderness. Musculoskeletal:         General: No deformity. Cervical back: Neck supple. Skin:     General: Skin is warm and dry. Coloration: Skin is not pale. Findings: No rash. Neurological:      Mental Status: He is alert and oriented to person, place, and time.    Psychiatric:         Behavior: Behavior normal.         Judgment: Judgment normal.             Diagnostic Studies Review Cardio:   cardiac catheterization done in June 2017 at [facility] shows he had moderate stenosis of his ostial circumflex. Had a nonobstructive disease in other arteries and had a normal LV systolic function. Repeat cardiac catheterization done 2021 shows patient has nonobstructive disease involving RCA and LAD. 50- 70% ostial circumflex and IFR was 0.95 EF is acceptable. Nuclear stress test done in 2020. Nuclear stress done in 2020 patient exercised for about 9 minutes. Fixed defect was seen no ischemia noted. EF was 47%      Event monitor has shows episodes of pauses, intermittent 3 AV block, episode of NSVT was evaluated by EP. EKG:  Twelve lead EKG done 2021 shows normal sinus rhythm first-degree AV block with PVCs. No significant change from previous EKG done at his primary care doctor's office     12 lead EKG done 2021 shows Normal sinus rhythm first-degree AV block heart rate 64 beats per minute. Rare PVCs noted.     Twelve-lead EKG done 2023 shows AV paced heart rate 77 bpm.    Device interrogation from 2022 reviewed          Results for orders placed during the hospital encounter of 10/12/20    NM Myocardial Perfusion Spect (Exercise Induced Stress and/or Rest)    [address]  [phone]    Rest/Stress Gated SPECT Myocardial Perfusion Imaging After Exercise    Patient: Stevie Pagan  MR number: [id number]  Account number: [de-identified]  : 1955  Age: 71 years  Gender: Male  Status: Inpatient  Location: Stress lab  Height: 73 in  Weight: 202 lb  BP: 138/ 80 mmHg    Allergies: AMOXICILLIN, ACETAMINOPHEN, APPLE, CHERRY FLAVOR, POLLEN EXTRACT, AMOXICILLIN-POT CLAVULANATE, IODINATED DIAGNOSTIC AGENTS    Diagnosis: R07.9 - Chest pain, unspecified    Interpreting Physician:  Michelle Reid MD  Referring Physician:  Khushboo Diaz DO  Technician:  Tiffanie Velez  RN:  Felipe Martin RN BSN  Group: [facility]  Report Prepared by[de-identified]  Shahnaz Gonzáles, RN BSN    INDICATIONS: Coronary artery disease. HISTORY: The patient is a 71year old  male. Chest pain status: chest pain, radiation to neck and jaw area, radiation to arm(s). Coronary artery disease risk factors: dyslipidemia. Cardiovascular history: coronary artery disease  and arrhythmia. Co-morbidity: obesity. PHYSICAL EXAM: Baseline physical exam screening: no evidence of significant aortic stenosis and no wheezes audible. REST ECG: Normal sinus rhythm. The ECG showed 1ï¾° AV block. PROCEDURE: The study was performed in the the Stress lab. Treadmill exercise testing was performed, using the Linda protocol. Gated SPECT myocardial perfusion imaging was performed after stress. Systolic blood pressure was 138 mmHg, at the  start of the study. Diastolic blood pressure was 80 mmHg, at the start of the study. The heart rate was 75 bpm, at the start of the study. IV double checked. LINDA PROTOCOL:  HR bpm SBP mmHg DBP mmHg Symptoms  Baseline 75 138 80 none  Stage 1 129 178 80 none  Stage 2 129 184 72 mild dyspnea  Stage 3 141 -- -- --  Immediate 141 205 70 moderate dyspnea  Recovery 1 92 192 80 subsiding  Recovery 2 93 150 78 none    STRESS SUMMARY: Duration of exercise was 9 min. The patient exercised to protocol stage 3. Maximal work rate was 10.1 METs. Functional capacity was normal. Maximal heart rate during stress was 141 bpm ( 90 % of maximal predicted heart  rate). The heart rate response to stress was normal. There was normal resting blood pressure with a hypertensive response to stress. The rate-pressure product for the peak heart rate and blood pressure was 64195. There was no chest pain  during stress. The stress test was terminated due to moderate dyspnea. Pre oxygen saturation: 99 %. Peak oxygen saturation: 98 %. The stress ECG was equivocal for ischemia.  Arrhythmia during stress: isolated premature ventricular beats. ISOTOPE ADMINISTRATION:  Resting isotope administration Stress isotope administration  Agent Tetrofosmin Tetrofosmin  Dose 11 mCi 30.9 mCi  Date 10/14/2020 10/14/2020  Injection time 08:55 10:38  Injection-image interval 37 min 52 min    Radiopharmaceutical was administered 6 min, 30 sec into the stress protocol. There was 2 min and 30 sec of exercise after the injection. MYOCARDIAL PERFUSION IMAGING:  The image quality was fair. Rotating projection images reveal mild diaphragmatic attenuation and mild subdiaphragmatic activity. Left ventricular size was normal. The TID ratio was 0.97. PERFUSION DEFECTS:  -  There was a moderate-sized, mildly severe, fixed myocardial perfusion defect of the basal inferior wall likely due to diaphragm attenuation. GATED SPECT:  The calculated left ventricular ejection fraction was 47 %. Left ventricular ejection fraction was within normal limits by visual estimate. There was no left ventricular regional abnormality. SUMMARY:  -  Stress results: Duration of exercise was 9 min. Target heart rate was achieved. There was normal resting blood pressure with a hypertensive response to stress. There was no chest pain during stress. The stress test was terminated due to  moderate dyspnea. -  ECG conclusions: The stress ECG was equivocal for ischemia. -  Perfusion imaging: There was a moderate-sized, mildly severe, fixed myocardial perfusion defect of the basal inferior wall likely due to diaphragm attenuation.  -  Gated SPECT: The calculated left ventricular ejection fraction was 47 %. Left ventricular ejection fraction was within normal limits by visual estimate. There was no left ventricular regional abnormality. IMPRESSIONS: Normal study after maximal exercise. There was image artifact, without diagnostic evidence for perfusion abnormality.     Prepared and signed by    Benjamín Irwin MD  Signed 10/14/2020 15:31:45    No results found for this or any previous visit. Imaging:  Chest X-Ray:   No Chest XR results available for this patient. CT-scan of the chest:     CTA dissection protocol chest abdomen pelvis w wo contrast    Result Date: 10/12/2020  Impression 1. No aortic dissection or aneurysm. 2.  No acute finding within chest, abdomen, and pelvis. 3.  Colonic diverticulosis. 4.  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  Recommend correlation/comparison with prior outside imaging. Workstation performed: [id number]     Lab Review   Lab Results   Component Value Date    WBC 6.23 04/05/2023    HGB 15.3 04/05/2023    HCT 46.0 04/05/2023    MCV 93 04/05/2023    RDW 12.7 04/05/2023     04/05/2023     BMP:  Lab Results   Component Value Date    SODIUM 137 04/05/2023    K 4.3 04/05/2023     (H) 04/05/2023    CO2 25 04/05/2023    BUN 14 04/05/2023    CREATININE 0.91 04/05/2023    GLUC 88 04/05/2023    GLUF 102 (H) 10/04/2022    CALCIUM 9.3 04/05/2023    EGFR 86 04/05/2023    MG 2.1 06/15/2022     LFT:  Lab Results   Component Value Date    AST 27 04/05/2023    ALT 40 04/05/2023    ALKPHOS 60 04/05/2023    TP 6.7 04/05/2023    ALB 3.5 04/05/2023      Lab Results   Component Value Date    ZDA8EFXISFYL 2.052 10/04/2022     No components found for: "TSH3"  Lab Results   Component Value Date    HGBA1C 5.5 04/05/2023     Lipid Profile:   Lab Results   Component Value Date    CHOLESTEROL 118 04/05/2023    HDL 36 (L) 04/05/2023    LDLCALC 61 04/05/2023    TRIG 103 04/05/2023     Lab Results   Component Value Date    CHOLESTEROL 118 04/05/2023    CHOLESTEROL 128 10/04/2022     Lab Results   Component Value Date    TROPONINI <0.02 10/13/2020     Device interrogation from 04/17/2022 shows episode of atrial fibrillation      Dr. Mk Davenport MD [facility]      "This note has been constructed using a voice recognition system. Therefore there may be syntax, spelling, and/or grammatical errors.  Please call if you have any questions. "

## 2023-07-27 ENCOUNTER — ANESTHESIA (OUTPATIENT)
Dept: PERIOP | Facility: HOSPITAL | Age: 68
End: 2023-07-27
Payer: MEDICARE

## 2023-07-27 ENCOUNTER — HOSPITAL ENCOUNTER (OUTPATIENT)
Facility: HOSPITAL | Age: 68
Setting detail: OUTPATIENT SURGERY
Discharge: HOME | End: 2023-07-27
Attending: COLON & RECTAL SURGERY | Admitting: COLON & RECTAL SURGERY
Payer: MEDICARE

## 2023-07-27 VITALS
TEMPERATURE: 96.7 F | HEART RATE: 57 BPM | SYSTOLIC BLOOD PRESSURE: 144 MMHG | DIASTOLIC BLOOD PRESSURE: 72 MMHG | OXYGEN SATURATION: 96 % | RESPIRATION RATE: 20 BRPM

## 2023-07-27 DIAGNOSIS — K64.8 INTERNAL BLEEDING HEMORRHOIDS: Primary | ICD-10-CM

## 2023-07-27 PROCEDURE — C9290 INJ, BUPIVACAINE LIPOSOME: HCPCS | Performed by: ANESTHESIOLOGY

## 2023-07-27 PROCEDURE — 88304 TISSUE EXAM BY PATHOLOGIST: CPT | Performed by: PATHOLOGY

## 2023-07-27 PROCEDURE — 46255 REMOVE INT/EXT HEM 1 GROUP: CPT | Performed by: COLON & RECTAL SURGERY

## 2023-07-27 RX ORDER — DEXMEDETOMIDINE HYDROCHLORIDE 100 UG/ML
INJECTION, SOLUTION INTRAVENOUS AS NEEDED
Status: DISCONTINUED | OUTPATIENT
Start: 2023-07-27 | End: 2023-07-27

## 2023-07-27 RX ORDER — FENTANYL CITRATE/PF 50 MCG/ML
25 SYRINGE (ML) INJECTION
Status: DISCONTINUED | OUTPATIENT
Start: 2023-07-27 | End: 2023-07-27 | Stop reason: HOSPADM

## 2023-07-27 RX ORDER — FENTANYL CITRATE 50 UG/ML
INJECTION, SOLUTION INTRAMUSCULAR; INTRAVENOUS AS NEEDED
Status: DISCONTINUED | OUTPATIENT
Start: 2023-07-27 | End: 2023-07-27

## 2023-07-27 RX ORDER — OXYCODONE HYDROCHLORIDE 5 MG/1
5 TABLET ORAL EVERY 4 HOURS PRN
Status: DISCONTINUED | OUTPATIENT
Start: 2023-07-27 | End: 2023-07-27 | Stop reason: HOSPADM

## 2023-07-27 RX ORDER — PROPOFOL 10 MG/ML
INJECTION, EMULSION INTRAVENOUS AS NEEDED
Status: DISCONTINUED | OUTPATIENT
Start: 2023-07-27 | End: 2023-07-27

## 2023-07-27 RX ORDER — BUPIVACAINE HYDROCHLORIDE 2.5 MG/ML
INJECTION, SOLUTION EPIDURAL; INFILTRATION; INTRACAUDAL AS NEEDED
Status: DISCONTINUED | OUTPATIENT
Start: 2023-07-27 | End: 2023-07-27 | Stop reason: HOSPADM

## 2023-07-27 RX ORDER — PROPOFOL 10 MG/ML
INJECTION, EMULSION INTRAVENOUS CONTINUOUS PRN
Status: DISCONTINUED | OUTPATIENT
Start: 2023-07-27 | End: 2023-07-27

## 2023-07-27 RX ORDER — LIDOCAINE HYDROCHLORIDE 10 MG/ML
0.5 INJECTION, SOLUTION EPIDURAL; INFILTRATION; INTRACAUDAL; PERINEURAL ONCE
Status: COMPLETED | OUTPATIENT
Start: 2023-07-27 | End: 2023-07-27

## 2023-07-27 RX ORDER — MIDAZOLAM HYDROCHLORIDE 2 MG/2ML
INJECTION, SOLUTION INTRAMUSCULAR; INTRAVENOUS AS NEEDED
Status: DISCONTINUED | OUTPATIENT
Start: 2023-07-27 | End: 2023-07-27

## 2023-07-27 RX ORDER — KETAMINE HCL IN NACL, ISO-OSM 100MG/10ML
SYRINGE (ML) INJECTION AS NEEDED
Status: DISCONTINUED | OUTPATIENT
Start: 2023-07-27 | End: 2023-07-27

## 2023-07-27 RX ORDER — OXYCODONE HYDROCHLORIDE 5 MG/1
5 TABLET ORAL EVERY 4 HOURS PRN
Qty: 10 TABLET | Refills: 0 | Status: SHIPPED | OUTPATIENT
Start: 2023-07-27 | End: 2023-08-06

## 2023-07-27 RX ORDER — HYDROMORPHONE HCL IN WATER/PF 6 MG/30 ML
0.2 PATIENT CONTROLLED ANALGESIA SYRINGE INTRAVENOUS
Status: DISCONTINUED | OUTPATIENT
Start: 2023-07-27 | End: 2023-07-27 | Stop reason: HOSPADM

## 2023-07-27 RX ORDER — MAGNESIUM HYDROXIDE 1200 MG/15ML
LIQUID ORAL AS NEEDED
Status: DISCONTINUED | OUTPATIENT
Start: 2023-07-27 | End: 2023-07-27 | Stop reason: HOSPADM

## 2023-07-27 RX ORDER — ONDANSETRON 2 MG/ML
4 INJECTION INTRAMUSCULAR; INTRAVENOUS ONCE AS NEEDED
Status: DISCONTINUED | OUTPATIENT
Start: 2023-07-27 | End: 2023-07-27 | Stop reason: HOSPADM

## 2023-07-27 RX ORDER — SODIUM CHLORIDE, SODIUM LACTATE, POTASSIUM CHLORIDE, CALCIUM CHLORIDE 600; 310; 30; 20 MG/100ML; MG/100ML; MG/100ML; MG/100ML
125 INJECTION, SOLUTION INTRAVENOUS CONTINUOUS
Status: DISCONTINUED | OUTPATIENT
Start: 2023-07-27 | End: 2023-07-27 | Stop reason: HOSPADM

## 2023-07-27 RX ADMIN — PROPOFOL 40 MG: 10 INJECTION, EMULSION INTRAVENOUS at 12:21

## 2023-07-27 RX ADMIN — FENTANYL CITRATE 25 MCG: 50 INJECTION, SOLUTION INTRAMUSCULAR; INTRAVENOUS at 11:55

## 2023-07-27 RX ADMIN — PROPOFOL 40 MG: 10 INJECTION, EMULSION INTRAVENOUS at 12:10

## 2023-07-27 RX ADMIN — Medication 10 MG: at 12:04

## 2023-07-27 RX ADMIN — PROPOFOL 20 MG: 10 INJECTION, EMULSION INTRAVENOUS at 11:55

## 2023-07-27 RX ADMIN — MIDAZOLAM 2 MG: 1 INJECTION INTRAMUSCULAR; INTRAVENOUS at 11:51

## 2023-07-27 RX ADMIN — FENTANYL CITRATE 25 MCG: 50 INJECTION, SOLUTION INTRAMUSCULAR; INTRAVENOUS at 12:21

## 2023-07-27 RX ADMIN — LIDOCAINE HYDROCHLORIDE 0.5 ML: 10 INJECTION, SOLUTION EPIDURAL; INFILTRATION; INTRACAUDAL; PERINEURAL at 11:22

## 2023-07-27 RX ADMIN — OXYCODONE HYDROCHLORIDE 5 MG: 5 TABLET ORAL at 14:09

## 2023-07-27 RX ADMIN — PROPOFOL 20 MCG/KG/MIN: 10 INJECTION, EMULSION INTRAVENOUS at 11:56

## 2023-07-27 RX ADMIN — SODIUM CHLORIDE, SODIUM LACTATE, POTASSIUM CHLORIDE, AND CALCIUM CHLORIDE 125 ML/HR: .6; .31; .03; .02 INJECTION, SOLUTION INTRAVENOUS at 11:23

## 2023-07-27 RX ADMIN — DEXMEDETOMIDINE HCL 8 MCG: 100 INJECTION INTRAVENOUS at 12:10

## 2023-07-27 RX ADMIN — Medication 10 MG: at 12:06

## 2023-07-27 RX ADMIN — PROPOFOL 20 MG: 10 INJECTION, EMULSION INTRAVENOUS at 12:06

## 2023-07-27 RX ADMIN — FENTANYL CITRATE 25 MCG: 50 INJECTION, SOLUTION INTRAMUSCULAR; INTRAVENOUS at 12:06

## 2023-07-27 RX ADMIN — Medication 10 MG: at 12:10

## 2023-07-27 RX ADMIN — Medication 20 MG: at 11:55

## 2023-07-27 NOTE — DISCHARGE INSTR - AVS FIRST PAGE
May shower/tub bathe this evening. May restart Aspirin tomorrow evening  There are beige sutures left in place, okay if pop or fall out. There will be some bleeding/drainage, normal , may use dry gauze. Followup in office 6-8weeks Dr. Lisset Shen as prescribed, only as needed as it may constipate.   High fiber diet with metamucil or konsyl 1 tbsp 1-2x/day, increased hydration noncaffeinated beverages  May use Miralax as needed if no bowel movements in next 24-48hours  Call if any concerns [phone]

## 2023-07-27 NOTE — OP NOTE
OPERATIVE REPORT  PATIENT NAME: Raiza Larry    :  1955  MRN: [id number]  Pt Location: [facility] OR ROOM 05    SURGERY DATE: 2023    Surgeon(s) and Role:     * Irene Whitfield MD - Primary     * Deni Dominguez MD - Assisting    Preop Diagnosis:  Internal bleeding hemorrhoids [K64.8]    Post-Op Diagnosis Codes:     * Internal bleeding hemorrhoids [K64.8]    Procedure(s):  Exam under anesthesia  Anoscopy  HEMORRHOIDECTOMY EXCISION, internal/external x1 column, right anterior    Specimen(s):  * No specimens in log *    Estimated Blood Loss:   Minimal    Drains:  * No LDAs found *    Anesthesia Type:   IV Sedation with Anesthesia    Operative Indications:  Internal bleeding hemorrhoids [K64.8]    Operative Findings:  -Single engorged right anterior internal/external hemorrhoid, excisional hemorrhoidectomy with primary closure    Complications:   None    Procedure and Technique:  Sosa Hyatt is a 64yo male, multiple cardiac history, planning for stent placement and will require Plavix for 6 months. Annabel Trotter has been having on/off bright red blood per rectum.  Colonoscopy 2019 with 10-year recall, we discussed possible repeat prior to hemorrhoidal therapy however he defers this.   On anorectal exam/anoscopy he has right anterior lateral engorged internal/external hemorrhoidal complex, discussed formal hemorrhoidectomy as the most durable procedure, as rubber band ligature contraindicated on his aspirin as well as would not risk needing repeated procedures given the above history and planning.     Discussed exam under anesthesia, internal/external hemorrhoidectomy, and at least single column, multiple columns based on intraoperative judgment may be required, anorectal surgery and in a face to face, personal informed consent the alternatives,benefits risks including not limited to bleeding, infection, risks of anesthesia, damage to sphincter/incontinence, possibility staged surgery, formally discussed the pain of hemorrhoidectomy procedure, also consented for lidocaine versus viscous lidocaine IRB study. They understood these risks, signed informed consent, and wish to proceed.     He was brought to the operating room where MAC anesthesia was induced without event. Venodynes were on and running throughout the procedure. He received no antibiotics as none were medically indicated. He was placed in the prone jackknife position with attention to joints, extremities and genitalia. Buttocks were taped apart, he was Betadine prepped. He was sterile prepped and draped. After appropriate timeout per protocol procedure began with examination under anesthesia, digital rectal examination normal, right anterior internal/external hemorrhoidal complex with associated tag and prolapse as in office. Anoscopy revealed similar results with Ledesma bivalve anoscope, normal anorectal mucosa. The apex of the hemorrhoidal tag was grasped with Allis clamp, a racquet-shaped incision was made with needlepoint electrocautery dissecting the hemorrhoidal complex meticulously off of the sphincters. This was continued onto the internal related hemorrhoid complex until the proximal pedicle was clamped with mosquito clamp and tied with Vicryl suture free tie. Hemostasis was assured with additional figure-of-eight with 2-0 Vicryl suture, and the remaining defect was closed with 3-0 chromic suture in a running locked fashion leaving a distal small opening for drainage. Repeat digital examination and anoscopy revealed no stenosis, and good hemostasis. There was no packing placed. 4 x 4's antibiotic ointment, and mesh underwear were placed and patient was rotated supine and awakened brought to the recovery room in stable addition. All sponge, needle, instrument counts were correct. I was present for the entire procedure.     Patient Disposition:  PACU         SIGNATURE: Louann Louie MD  DATE: July 27, 2023  TIME: 12:33 PM

## 2023-07-27 NOTE — ANESTHESIA PREPROCEDURE EVALUATION
Procedure:  HEMORRHOIDECTOMY EXCISION (Anus)    Relevant Problems   CARDIO   (+) Chest pain   (+) Coronary artery disease involving native coronary artery of native heart without angina pectoris   (+) External hemorrhoids   (+) Intermittent complete heart block (HCC)   (+) Junctional rhythm   (+) Mixed hyperlipidemia   (+) Paroxysmal atrial fibrillation (HCC)   (+) Premature ventricular beats   (+) Sick sinus syndrome (HCC)      GI/HEPATIC   (+) Gastro-esophageal reflux disease without esophagitis   (+) PUD (peptic ulcer disease)      MUSCULOSKELETAL   (+) Chronic back pain   (+) Low back pain with sciatica   (+) Osteoarthritis of fingers of both hands   (+) Primary osteoarthritis of left knee      NEURO/PSYCH   (+) Chronic back pain      PULMONARY   (+) LENY and COPD overlap syndrome (HCC)   (+) Sleep apnea      Cardiovascular and Mediastinum   (+) Non-sustained ventricular tachycardia (HCC)      Digestive   (+) 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 The defect appears to be an artifact caused by diaphragmatic activity. There is a left ventricular perfusion defect that is small to medium in size with mild reduction in uptake present in the basal anteroseptal location(s) that is predominantly fixed, and appears to be artifactual.  •  Stress Function: Left ventricular function post-stress is abnormal. Post-stress ejection fraction is 38 %. •  Stress Combined Conclusion: Left ventricular perfusion is probably abnormal.  •  Stress ECG: The ECG was uninterpretable due to left bundle branch block.     Physical Exam    Airway    Mallampati score: IV  TM Distance: >3 FB  Neck ROM: full     Dental   No notable dental hx     Cardiovascular  Cardiovascular exam normal    Pulmonary  Pulmonary exam normal     Other Findings        Anesthesia Plan  ASA Score- 3     Anesthesia Type- IV sedation with anesthesia with ASA Monitors. Additional Monitors:   Airway Plan:           Plan Factors-Exercise tolerance (METS): >4 METS. Chart reviewed. EKG reviewed. Existing labs reviewed. Patient summary reviewed. Induction- intravenous. Postoperative Plan-     Informed Consent- Anesthetic plan and risks discussed with patient. I personally reviewed this patient with the CRNA. Discussed and agreed on the Anesthesia Plan with the CRNA. Ty Anne

## 2023-07-27 NOTE — INTERVAL H&P NOTE
ASSESSMENT:    Toby Heller is a 64yo male, multiple cardiac history, planning for stent placement and will require Plavix for 6 months. He has been having on/off bright red blood per rectum. Colonoscopy 2019 with 10-year recall, we discussed possible repeat prior to hemorrhoidal therapy however he defers this. On anorectal exam/anoscopy he has right anterior lateral engorged internal/external hemorrhoidal complex, discussed formal hemorrhoidectomy as the most durable procedure, as rubber band ligature contraindicated on his aspirin as well as would not risk needing repeated procedures given the above history and planning. Discussed exam under anesthesia, internal/external hemorrhoidectomy, and at least single column, multiple columns based on intraoperative judgment may be required, anorectal surgery and in a face to face, personal informed consent the alternatives,benefits risks including not limited to bleeding, infection, risks of anesthesia, damage to sphincter/incontinence, possibility staged surgery, formally discussed the pain of hemorrhoidectomy procedure, also consented for lidocaine versus viscous lidocaine IRB study. They understood these risks, signed informed consent, and wish to proceed. PLAN:  Exam under anesthesia, internal/external hemorrhoidectomy       H&P reviewed. After examining the patient I find no changes in the patients condition since the H&P had been written.     Vitals:    07/27/23 1059   BP: 162/81   Pulse: 76   Resp: 16   Temp: 98.8 °F (37.1 °C)   SpO2: 99%

## 2023-07-27 NOTE — ANESTHESIA POSTPROCEDURE EVALUATION
Post-Op Assessment Note    CV Status:  Stable  Pain Score: 0    Pain management: adequate     Mental Status:  Sleepy and awake   Hydration Status:  Stable   PONV Controlled:  None   Airway Patency:  Patent      Post Op Vitals Reviewed: Yes      Staff: CRNA         No notable events documented.     BP   121/70   Temp 97.9 °F (36.6 °C) (07/27/23 1230)    Pulse 70   Resp   16   SpO2   98

## 2023-07-31 PROCEDURE — 88304 TISSUE EXAM BY PATHOLOGIST: CPT | Performed by: PATHOLOGY

## 2023-08-07 ENCOUNTER — REMOTE DEVICE CLINIC VISIT (OUTPATIENT)
Dept: CARDIOLOGY CLINIC | Facility: CLINIC | Age: 68
End: 2023-08-07
Payer: MEDICARE

## 2023-08-07 DIAGNOSIS — Z95.810 PRESENCE OF AUTOMATIC CARDIOVERTER/DEFIBRILLATOR (AICD): Primary | ICD-10-CM

## 2023-08-07 PROCEDURE — 93296 REM INTERROG EVL PM/IDS: CPT | Performed by: INTERNAL MEDICINE

## 2023-08-07 PROCEDURE — 93295 DEV INTERROG REMOTE 1/2/MLT: CPT | Performed by: INTERNAL MEDICINE

## 2023-08-31 NOTE — PROGRESS NOTES
Colon and Rectal Surgery   Vijay Fernandes 75 y.o. male MRN: [id number]   Encounter: [id number]  08/31/23   8:02 AM        ASSESSMENT:        PLAN:        HPI  Vijay Fernandes is a 75 y.o. male who is here today for a post operative evaluation. The patient is status post exam under anesthesia, internal and external hemorrhoidectomy excision on 7/27/2023. Surgical pathology:  A.  Hemorrhoids (excision):  - Colonic mucosa with focal erosion, reactive changes  - Benign squamous mucosa  - Submucosal dilated vessels consistent with hemorrhoids       Historical Information   Past Medical History:   Diagnosis Date   • Abrasion of left foot     LAST ASSESSED: 9/23/13   • Achilles tendinitis, unspecified leg     LAST ASSESSED: 11/30/12   • Allergic rhinitis     LAST ASSESSED: 11/25/13   • Allergic rhinitis 06/25/2008    LAST ASSESSED: 6/25/08   • Arthritis    • Cervicalgia 04/22/2011    LAST ASSESSED: 4/22/11   • Chronic pain disorder    • Colon polyp    • Coronary artery disease    • CPAP (continuous positive airway pressure) dependence    • Dysfunction of left eustachian tube     LAST ASSESSED: 9/23/13   • Epistaxis     LAST ASSESSED: 5/27/15   • First degree atrioventricular block     LAST ASSESSED: 7/10/17   • Gastric ulcer 10/07/2011    LAST ASSESSED: 3/9/15   • GERD (gastroesophageal reflux disease)    • Hearing aid worn    • Hemorrhoids 05/13/2011    LAST ASSESSED: 5/13/11   • Hyperlipidemia    • Hypertension    • Irregular heart beat    • Long term use of drug     LAST ASSESSED: 10/11/12   • Myalgia 12/21/2007    LAST ASSESSED: 12/21/07   • Myositis 12/21/2007    LAST ASSESSED: 12/21/07   • Numbness and tingling of foot     LAST ASSESSED: 3/9/15   • Pacemaker    • Premature ventricular beats     states cardiologist ordered [employer] ambulatory cardiac monitor   • Sleep apnea    • Wears glasses      Past Surgical History:   Procedure Laterality Date   • CARDIAC CATHETERIZATION  2017   • CARDIAC ELECTROPHYSIOLOGY PROCEDURE N/A 10/25/2021    Procedure: CARDIAC EPS;  Surgeon: Holly Orozco MD;  [facility]; Service: Cardiology   • CARDIAC ELECTROPHYSIOLOGY PROCEDURE Left 10/25/2021    Procedure: Cardiac icd implant;  Surgeon: Holly Orozco MD;  [facility]; Service: Cardiology   • CARDIAC PACEMAKER PLACEMENT     • CARDIOVERSION N/A 10/25/2021    Procedure: Cardioversion;  Surgeon: Holly Orozco MD;  Location[facility]; Service: Cardiology   • CATARACT EXTRACTION Right    • CERVICAL FUSION      pt thinks C4-C5   • CHOLECYSTECTOMY      LAPAROSCOPIC; LAST ASSESSED: 10/17/17   • COLONOSCOPY      COMPLETE; 3-4 YEARS AGO BY [facility]; LAST ASSESSED: 8/18/14   • MYRINGOTOMY W/ TUBES      WITH VENTILATING TUBE INSERTION   • RI HEMORRHOIDECTOMY INT & XTRNL 2/> COLUMN/GERBER N/A 7/27/2023    Procedure: HEMORRHOIDECTOMY EXCISION;  Surgeon: Mario Arteaga MD;  Location: [facility];  Service: Colorectal   • RI NEUROPLASTY &/TRANSPOS MEDIAN NRV CARPAL Jarred Caldwell Left 07/16/2021    Procedure: RELEASE CARPAL TUNNEL;  Surgeon: Marie Giron MD;  Location: [facility];  Service: Orthopedics   • RI NEUROPLASTY &/TRANSPOS MEDIAN NRV CARPAL Jarred Caldwell Right 10/14/2022    Procedure: RELEASE CARPAL TUNNEL;  Surgeon: Marie Giron MD;  Location: [facility];  Service: Orthopedics   • RI TENDON SHEATH INCISION Right 10/14/2022    Procedure: RELEASE TRIGGER FINGER,RING AND LONG TENOSYNOVECTOMY FDS +FDP;   Surgeon: Marie Giron MD;  Location: [facility];  Service: Orthopedics   • VASECTOMY         Meds/Allergies       Current Outpatient Medications:   •  aspirin (ECOTRIN LOW STRENGTH) 81 mg EC tablet, Take 81 mg by mouth daily Pt taking 2 a day, Disp: , Rfl:   •  aspirin 325 mg tablet, Take 325 mg by mouth daily, Disp: , Rfl:   •  Coenzyme Q10 (COQ10) 200 MG CAPS, Take 1 tablet by mouth daily, Disp: , Rfl:   •  COLLAGEN-CHOND-HYALURONIC ACID PO, Take 1 capsule by mouth in the morning, Disp: , Rfl:   •  EPINEPHrine (EpiPen 2-Leroy) 0.3 mg/0.3 mL SOAJ, Inject 0.3 mL (0.3 mg total) into a muscle once for 1 dose, Disp: 0.6 mL, Rfl: 0  •  finasteride (PROSCAR) 5 mg tablet, TAKE 1 TABLET (5 MG TOTAL) BY MOUTH DAILY. , Disp: 90 tablet, Rfl: 1  •  fluticasone (VERAMYST) 27.5 MCG/SPRAY nasal spray, 2 sprays into each nostril daily, Disp: , Rfl:   •  Glucosamine-Chondroitin (GLUCOSAMINE CHONDR COMPLEX PO), Take 1 tablet by mouth daily, Disp: , Rfl:   •  Omega-3 Fatty Acids (FISH OIL) 1000 MG CPDR, Take by mouth daily, Disp: , Rfl:   •  pantoprazole (PROTONIX) 40 mg tablet, Take 1 tablet (40 mg total) by mouth daily, Disp: 90 tablet, Rfl: 0  •  ranolazine (RANEXA) 500 mg 12 hr tablet, TAKE 1 TABLET BY MOUTH TWICE A DAY (Patient taking differently: 500 mg 2 (two) times a day), Disp: 180 tablet, Rfl: 4  •  rosuvastatin (CRESTOR) 10 MG tablet, TAKE 1 TABLET BY MOUTH EVERY DAY, Disp: 90 tablet, Rfl: 1  •  sotalol (BETAPACE) 80 mg tablet, TAKE 1 TABLET BY MOUTH EVERY 12 HOURS., Disp: 180 tablet, Rfl: 3  •  Turmeric 500 MG CAPS, Take by mouth 3 (three) times a week, Disp: , Rfl:       Allergies   Allergen Reactions   • Amoxicillin Anaphylaxis   • Augmentin [Amoxicillin-Pot Clavulanate] Anaphylaxis   • Acetaminophen      Rapid heart rate   • Cherry Flavor - Food Allergy Other (See Comments)     States allergy is to raw cherries difficulty breathing   • Pollen Extract    • Dye [Iodinated Contrast Media] Itching         Social History   Social History     Substance and Sexual Activity   Alcohol Use Yes   • Alcohol/week: 7.0 standard drinks of alcohol   • Types: 7 Glasses of wine per week    Comment: few times a week     Social History     Substance and Sexual Activity   Drug Use No     Social History     Tobacco Use   Smoking Status Former   • Types: Cigarettes   • Quit date:    • Years since quittin.6   Smokeless Tobacco Never         Family History:   Family History   Problem Relation Age of Onset   • Cancer Mother SOFT TISSUE CANCER ON RT SIDE CHEST WALL AND    • Prostate cancer Maternal Uncle        Review of Systems    Objective     Current Vitals: There were no vitals filed for this visit.       Physical Exam:  General:no distress  Eyes:perrla/eomi  ENT:moist mucus membranes  Neck:supple  Pulm:no increased work of breathing  CV:sinus  Abdomen:soft,nontender  Rectal:normal perianal skin/sphincter tone, no masses palpated  Extremities:no edema  Lymphatics:no neck/axillary/groin lymphadenopathy

## 2023-09-01 NOTE — PROGRESS NOTES
Colon and Rectal Surgery   Juan J Santiago 75 y.o. male MRN: [id number]   Encounter: [id number]  09/05/23   8:20 AM        ASSESSMENT:    Shantel Holder returns today, he is doing well, no troubles with bleeding or bulging since hemorrhoidectomy, states that it was a 'success.'  Anorectal exam shows well-healed right anterior internal/external hemorrhoidectomy site, no stenosis or other problem. We discussed ongoing dietary fiber and hydration as he is doing. PLAN:  High fiber diet/increased hydration, 20-30grams fiber per day, increased fruits/vegetables/psyllium(metamucil or konsyl 1 tbsp 1-2x/day)  Colonoscopy next year to resume screening schedule         HPI  Juan J Santiago is a 75 y.o. male who is here today for a post operative evaluation. He offers no complaints today. He is using metamucil daily.      The patient is status post exam under anesthesia, internal and external hemorrhoidectomy excision on 7/27/2023, pathology was benign.      Historical Information   Past Medical History:   Diagnosis Date   • Abrasion of left foot     LAST ASSESSED: 9/23/13   • Achilles tendinitis, unspecified leg     LAST ASSESSED: 11/30/12   • Allergic rhinitis     LAST ASSESSED: 11/25/13   • Allergic rhinitis 06/25/2008    LAST ASSESSED: 6/25/08   • Arthritis    • Cervicalgia 04/22/2011    LAST ASSESSED: 4/22/11   • Chronic pain disorder    • Colon polyp    • Coronary artery disease    • CPAP (continuous positive airway pressure) dependence    • Dysfunction of left eustachian tube     LAST ASSESSED: 9/23/13   • Epistaxis     LAST ASSESSED: 5/27/15   • First degree atrioventricular block     LAST ASSESSED: 7/10/17   • Gastric ulcer 10/07/2011    LAST ASSESSED: 3/9/15   • GERD (gastroesophageal reflux disease)    • Hearing aid worn    • Hemorrhoids 05/13/2011    LAST ASSESSED: 5/13/11   • Hyperlipidemia    • Hypertension    • Irregular heart beat    • Long term use of drug     LAST ASSESSED: 10/11/12   • Myalgia 12/21/2007    LAST ASSESSED: 12/21/07   • Myositis 12/21/2007    LAST ASSESSED: 12/21/07   • Numbness and tingling of foot     LAST ASSESSED: 3/9/15   • Pacemaker    • Premature ventricular beats     states cardiologist ordered [facility] ambulatory cardiac monitor   • Sleep apnea    • Wears glasses      Past Surgical History:   Procedure Laterality Date   • CARDIAC CATHETERIZATION  2017   • CARDIAC ELECTROPHYSIOLOGY PROCEDURE N/A 10/25/2021    Procedure: CARDIAC EPS;  Surgeon: Lexi Lopez MD;  [facility]; Service: Cardiology   • CARDIAC ELECTROPHYSIOLOGY PROCEDURE Left 10/25/2021    Procedure: Cardiac icd implant;  Surgeon: Lexi Lopez MD;  [facility]; Service: Cardiology   • CARDIAC PACEMAKER PLACEMENT     • CARDIOVERSION N/A 10/25/2021    Procedure: Cardioversion;  Surgeon: Lexi Lopez MD;  [facility]; Service: Cardiology   • CATARACT EXTRACTION Right    • CERVICAL FUSION      pt thinks C4-C5   • CHOLECYSTECTOMY      LAPAROSCOPIC; LAST ASSESSED: 10/17/17   • COLONOSCOPY      COMPLETE; 3-4 YEARS AGO BY [facility]; LAST ASSESSED: 8/18/14   • MYRINGOTOMY W/ TUBES      WITH VENTILATING TUBE INSERTION   • TN HEMORRHOIDECTOMY INT & XTRNL 2/> COLUMN/GERBER N/A 7/27/2023    Procedure: HEMORRHOIDECTOMY EXCISION;  Surgeon: Mireya Beth MD;  [facility];  Service: Colorectal   • TN NEUROPLASTY &/TRANSPOS MEDIAN NRV CARPAL Arsalan Kimberly Left 07/16/2021    Procedure: RELEASE CARPAL TUNNEL;  Surgeon: Chauncey Byers MD;  [facility];  Service: Orthopedics   • TN NEUROPLASTY &/TRANSPOS MEDIAN NRV CARPAL Arsalan Kimberly Right 10/14/2022    Procedure: RELEASE CARPAL TUNNEL;  Surgeon: Chauncey Byers MD;  [facility];  Service: Orthopedics   • TN TENDON SHEATH INCISION Right 10/14/2022    Procedure: RELEASE TRIGGER FINGER,RING AND LONG TENOSYNOVECTOMY FDS +FDP;   Surgeon: Chauncey Byers MD;  Location[facility];  Service: Orthopedics   • VASECTOMY Meds/Allergies       Current Outpatient Medications:   •  aspirin (ECOTRIN LOW STRENGTH) 81 mg EC tablet, Take 81 mg by mouth daily Pt taking 2 a day, Disp: , Rfl:   •  aspirin 325 mg tablet, Take 325 mg by mouth daily, Disp: , Rfl:   •  Coenzyme Q10 (COQ10) 200 MG CAPS, Take 1 tablet by mouth daily, Disp: , Rfl:   •  COLLAGEN-CHOND-HYALURONIC ACID PO, Take 1 capsule by mouth in the morning, Disp: , Rfl:   •  EPINEPHrine (EpiPen 2-Leroy) 0.3 mg/0.3 mL SOAJ, Inject 0.3 mL (0.3 mg total) into a muscle once for 1 dose, Disp: 0.6 mL, Rfl: 0  •  finasteride (PROSCAR) 5 mg tablet, TAKE 1 TABLET (5 MG TOTAL) BY MOUTH DAILY. , Disp: 90 tablet, Rfl: 1  •  fluticasone (VERAMYST) 27.5 MCG/SPRAY nasal spray, 2 sprays into each nostril daily, Disp: , Rfl:   •  Glucosamine-Chondroitin (GLUCOSAMINE CHONDR COMPLEX PO), Take 1 tablet by mouth daily, Disp: , Rfl:   •  Omega-3 Fatty Acids (FISH OIL) 1000 MG CPDR, Take by mouth daily, Disp: , Rfl:   •  pantoprazole (PROTONIX) 40 mg tablet, Take 1 tablet (40 mg total) by mouth daily, Disp: 90 tablet, Rfl: 0  •  ranolazine (RANEXA) 500 mg 12 hr tablet, TAKE 1 TABLET BY MOUTH TWICE A DAY (Patient taking differently: 500 mg 2 (two) times a day), Disp: 180 tablet, Rfl: 4  •  rosuvastatin (CRESTOR) 10 MG tablet, TAKE 1 TABLET BY MOUTH EVERY DAY, Disp: 90 tablet, Rfl: 1  •  sotalol (BETAPACE) 80 mg tablet, TAKE 1 TABLET BY MOUTH EVERY 12 HOURS., Disp: 180 tablet, Rfl: 3  •  Turmeric 500 MG CAPS, Take by mouth 3 (three) times a week, Disp: , Rfl:       Allergies   Allergen Reactions   • Amoxicillin Anaphylaxis   • Augmentin [Amoxicillin-Pot Clavulanate] Anaphylaxis   • Acetaminophen      Rapid heart rate   • Cherry Flavor - Food Allergy Other (See Comments)     States allergy is to raw cherries difficulty breathing   • Pollen Extract    • Dye [Iodinated Contrast Media] Itching         Social History   Social History     Substance and Sexual Activity   Alcohol Use Yes   • Alcohol/week: 7.0 standard drinks of alcohol   • Types: 7 Glasses of wine per week    Comment: few times a week     Social History     Substance and Sexual Activity   Drug Use No     Social History     Tobacco Use   Smoking Status Former   • Types: Cigarettes   • Quit date:    • Years since quittin.6   Smokeless Tobacco Never         Family History:   Family History   Problem Relation Age of Onset   • Cancer Mother         SOFT TISSUE CANCER ON RT SIDE CHEST WALL AND    • Prostate cancer Maternal Uncle        Review of Systems    Objective   Current Vitals:   Vitals:    23 0805   Weight: 97.5 kg (215 lb)   Height: 6' (1.829 m)         Physical Exam:  General:no distress  Pulm:no increased work of breathing  CV:sinus  Rectal:normal perianal skin/sphincter tone, no masses palpated, well healed right anterior hemorrhoidectomy site  Extremities:no edema

## 2023-09-05 ENCOUNTER — OFFICE VISIT (OUTPATIENT)
Age: 68
End: 2023-09-05

## 2023-09-05 VITALS — WEIGHT: 215 LBS | HEIGHT: 72 IN | BODY MASS INDEX: 29.12 KG/M2

## 2023-09-05 DIAGNOSIS — K64.8 INTERNAL BLEEDING HEMORRHOIDS: Primary | ICD-10-CM

## 2023-09-05 PROCEDURE — 99024 POSTOP FOLLOW-UP VISIT: CPT | Performed by: COLON & RECTAL SURGERY

## 2023-09-05 NOTE — PATIENT INSTRUCTIONS
ASSESSMENT:    Madisyn Kong returns today, he is doing well, no troubles with bleeding or bulging since hemorrhoidectomy, states that it was a 'success.'  Anorectal exam shows well-healed right anterior internal/external hemorrhoidectomy site, no stenosis or other problem. We discussed ongoing dietary fiber and hydration as he is doing.     PLAN:  High fiber diet/increased hydration, 20-30grams fiber per day, increased fruits/vegetables/psyllium(metamucil or konsyl 1 tbsp 1-2x/day)  Colonoscopy next year to resume screening schedule

## 2023-10-13 ENCOUNTER — RA CDI HCC (OUTPATIENT)
Dept: OTHER | Facility: HOSPITAL | Age: 68
End: 2023-10-13

## 2023-10-13 NOTE — PROGRESS NOTES
[address] [facility] [place] coding opportunities       Chart reviewed, no opportunity found: CHART REVIEWED, NO OPPORTUNITY FOUND        Patients Insurance     Medicare Insurance: Medicare

## 2023-10-17 DIAGNOSIS — E78.2 MIXED HYPERLIPIDEMIA: ICD-10-CM

## 2023-10-17 RX ORDER — ROSUVASTATIN CALCIUM 10 MG/1
TABLET, COATED ORAL
Qty: 90 TABLET | Refills: 1 | Status: SHIPPED | OUTPATIENT
Start: 2023-10-17

## 2023-10-19 RX ORDER — TRIAMCINOLONE ACETONIDE 1 MG/G
CREAM TOPICAL
COMMUNITY
Start: 2023-08-12 | End: 2023-10-20

## 2023-10-19 RX ORDER — HYDROXYZINE HYDROCHLORIDE 25 MG/1
TABLET, FILM COATED ORAL
COMMUNITY
Start: 2023-08-12 | End: 2023-10-20

## 2023-10-20 ENCOUNTER — OFFICE VISIT (OUTPATIENT)
Dept: FAMILY MEDICINE CLINIC | Facility: CLINIC | Age: 68
End: 2023-10-20

## 2023-10-20 VITALS
DIASTOLIC BLOOD PRESSURE: 78 MMHG | BODY MASS INDEX: 29.12 KG/M2 | HEIGHT: 72 IN | SYSTOLIC BLOOD PRESSURE: 124 MMHG | WEIGHT: 215 LBS | HEART RATE: 82 BPM | OXYGEN SATURATION: 98 % | RESPIRATION RATE: 16 BRPM

## 2023-10-20 DIAGNOSIS — I25.10 CORONARY ARTERY DISEASE INVOLVING NATIVE CORONARY ARTERY OF NATIVE HEART WITHOUT ANGINA PECTORIS: ICD-10-CM
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
## 2023-10-20 DIAGNOSIS — E78.2 MIXED HYPERLIPIDEMIA: ICD-10-CM

## 2023-10-20 DIAGNOSIS — N40.1 BENIGN PROSTATIC HYPERPLASIA WITH NOCTURIA: ICD-10-CM

## 2023-10-20 DIAGNOSIS — R73.01 IMPAIRED FASTING GLUCOSE: ICD-10-CM

## 2023-10-20 DIAGNOSIS — I44.2 INTERMITTENT COMPLETE HEART BLOCK (HCC): ICD-10-CM

## 2023-10-20 DIAGNOSIS — I49.5 SICK SINUS SYNDROME (HCC): ICD-10-CM

## 2023-10-20 DIAGNOSIS — Z00.00 MEDICARE ANNUAL WELLNESS VISIT, SUBSEQUENT: Primary | ICD-10-CM

## 2023-10-20 DIAGNOSIS — R35.1 BENIGN PROSTATIC HYPERPLASIA WITH NOCTURIA: ICD-10-CM

## 2023-10-20 DIAGNOSIS — I48.0 PAROXYSMAL ATRIAL FIBRILLATION (HCC): ICD-10-CM

## 2023-10-20 DIAGNOSIS — S30.860A TICK BITE OF LOWER BACK, INITIAL ENCOUNTER: ICD-10-CM

## 2023-10-20 DIAGNOSIS — Z29.89 NEED FOR MALARIA PROPHYLAXIS: ICD-10-CM

## 2023-10-20 PROBLEM — I47.29 NON-SUSTAINED VENTRICULAR TACHYCARDIA (HCC): Status: RESOLVED | Noted: 2022-05-05 | Resolved: 2023-10-20

## 2023-10-20 PROBLEM — R07.9 CHEST PAIN: Status: RESOLVED | Noted: 2020-10-12 | Resolved: 2023-10-20

## 2023-10-20 RX ORDER — DOXYCYCLINE HYCLATE 100 MG
100 TABLET ORAL 2 TIMES DAILY
Qty: 10 TABLET | Refills: 0 | Status: SHIPPED | OUTPATIENT
Start: 2023-10-20 | End: 2023-10-25

## 2023-10-20 RX ORDER — MEFLOQUINE HYDROCHLORIDE 250 MG/1
TABLET ORAL
Qty: 3 TABLET | Refills: 0 | Status: SHIPPED | OUTPATIENT
Start: 2023-11-26 | End: 2023-12-04

## 2023-10-20 NOTE — ASSESSMENT & PLAN NOTE
Patient did remove engorged tick that was on his skin for less than 24 hours.   Prescription provided for doxycycline 100 mg twice daily for 5 days

## 2023-10-20 NOTE — ASSESSMENT & PLAN NOTE
Patient remains on sotalol 80 mg twice daily for rate control.   Patient is not on full anticoagulation    Remains on aspirin 325 mg once daily.    -Follow-up with cardiology as scheduled

## 2023-10-20 NOTE — ASSESSMENT & PLAN NOTE
Has had follow-up with cardiology. Remains on beta-blocker, aspirin, statin therapy as well as Ranexa and has been doing well on this regimen.

## 2023-10-20 NOTE — PATIENT INSTRUCTIONS
Medicare Preventive Visit Patient Instructions  Thank you for completing your Welcome to Medicare Visit or Medicare Annual Wellness Visit today. Your next wellness visit will be due in one year (10/20/2024). The screening/preventive services that you may require over the next 5-10 years are detailed below. Some tests may not apply to you based off risk factors and/or age. Screening tests ordered at today's visit but not completed yet may show as past due. Also, please note that scanned in results may not display below. Preventive Screenings:  Service Recommendations Previous Testing/Comments   Colorectal Cancer Screening  Colonoscopy    Fecal Occult Blood Test (FOBT)/Fecal Immunochemical Test (FIT)  Fecal DNA/Cologuard Test  Flexible Sigmoidoscopy Age: 43-73 years old   Colonoscopy: every 10 years (May be performed more frequently if at higher risk)  OR  FOBT/FIT: every 1 year  OR  Cologuard: every 3 years  OR  Sigmoidoscopy: every 5 years  Screening may be recommended earlier than age 39 if at higher risk for colorectal cancer. Also, an individualized decision between you and your healthcare provider will decide whether screening between the ages of 77-80 would be appropriate.  Colonoscopy: 04/01/2019  FOBT/FIT: Not on file  Cologuard: Not on file  Sigmoidoscopy: Not on file    Screening Current     Prostate Cancer Screening Individualized decision between patient and health care provider in men between ages of 53-66   Medicare will cover every 12 months beginning on the day after your 50th birthday PSA: 0.66 ng/mL     Screening Current     Hepatitis C Screening Once for adults born between 1945 and 1965  More frequently in patients at high risk for Hepatitis C Hep C Antibody: 03/10/2020    Screening Current   Diabetes Screening 1-2 times per year if you're at risk for diabetes or have pre-diabetes Fasting glucose: 102 mg/dL (10/4/2022)  A1C: 5.5 % (4/5/2023)  Screening Current   Cholesterol Screening Once every 5 years if you don't have a lipid disorder. May order more often based on risk factors. Lipid panel: 04/05/2023  Screening Not Indicated  History Lipid Disorder      Other Preventive Screenings Covered by Medicare:  Abdominal Aortic Aneurysm (AAA) Screening: covered once if your at risk. You're considered to be at risk if you have a family history of AAA or a male between the age of 70-76 who smoking at least 100 cigarettes in your lifetime. Lung Cancer Screening: covers low dose CT scan once per year if you meet all of the following conditions: (1) Age 48-67; (2) No signs or symptoms of lung cancer; (3) Current smoker or have quit smoking within the last 15 years; (4) You have a tobacco smoking history of at least 20 pack years (packs per day x number of years you smoked); (5) You get a written order from a healthcare provider. Glaucoma Screening: covered annually if you're considered high risk: (1) You have diabetes OR (2) Family history of glaucoma OR (3)  aged 48 and older OR (3)  American aged 72 and older  Osteoporosis Screening: covered every 2 years if you meet one of the following conditions: (1) Have a vertebral abnormality; (2) On glucocorticoid therapy for more than 3 months; (3) Have primary hyperparathyroidism; (4) On osteoporosis medications and need to assess response to drug therapy. HIV Screening: covered annually if you're between the age of 14-79. Also covered annually if you are younger than 13 and older than 72 with risk factors for HIV infection. For pregnant patients, it is covered up to 3 times per pregnancy.     Immunizations:  Immunization Recommendations   Influenza Vaccine Annual influenza vaccination during flu season is recommended for all persons aged >= 6 months who do not have contraindications   Pneumococcal Vaccine   * Pneumococcal conjugate vaccine = PCV13 (Prevnar 13), PCV15 (Vaxneuvance), PCV20 (Prevnar 20)  * Pneumococcal polysaccharide vaccine = PPSV23 (Pneumovax) Adults 89-17 yo with certain risk factors or if 69+ yo  If never received any pneumonia vaccine: recommend Prevnar 20 (PCV20)  Give PCV20 if previously received 1 dose of PCV13 or PPSV23   Hepatitis B Vaccine 3 dose series if at intermediate or high risk (ex: diabetes, end stage renal disease, liver disease)   Respiratory syncytial virus (RSV) Vaccine - COVERED BY MEDICARE PART D  * RSVPreF3 (Arexvy) CDC recommends that adults 61years of age and older may receive a single dose of RSV vaccine using shared clinical decision-making (SCDM)   Tetanus (Td) Vaccine - COST NOT COVERED BY MEDICARE PART B Following completion of primary series, a booster dose should be given every 10 years to maintain immunity against tetanus. Td may also be given as tetanus wound prophylaxis. Tdap Vaccine - COST NOT COVERED BY MEDICARE PART B Recommended at least once for all adults. For pregnant patients, recommended with each pregnancy. Shingles Vaccine (Shingrix) - COST NOT COVERED BY MEDICARE PART B  2 shot series recommended in those 19 years and older who have or will have weakened immune systems or those 50 years and older     Health Maintenance Due:      Topic Date Due   • Colorectal Cancer Screening  09/05/2024   • Hepatitis C Screening  Completed     Immunizations Due:      Topic Date Due   • Influenza Vaccine (1) 09/01/2023     Advance Directives   What are advance directives? Advance directives are legal documents that state your wishes and plans for medical care. These plans are made ahead of time in case you lose your ability to make decisions for yourself. Advance directives can apply to any medical decision, such as the treatments you want, and if you want to donate organs. What are the types of advance directives? There are many types of advance directives, and each state has rules about how to use them. You may choose a combination of any of the following:  Living will:   This is a written record of the treatment you want. You can also choose which treatments you do not want, which to limit, and which to stop at a certain time. This includes surgery, medicine, IV fluid, and tube feedings. Durable power of  for [facility]): This is a written record that states who you want to make healthcare choices for you when you are unable to make them for yourself. This person, called a proxy, is usually a family member or a friend. You may choose more than 1 proxy. Do not resuscitate (DNR) order:  A DNR order is used in case your heart stops beating or you stop breathing. It is a request not to have certain forms of treatment, such as CPR. A DNR order may be included in other types of advance directives. Medical directive: This covers the care that you want if you are in a coma, near death, or unable to make decisions for yourself. You can list the treatments you want for each condition. Treatment may include pain medicine, surgery, blood transfusions, dialysis, IV or tube feedings, and a ventilator (breathing machine). Values history: This document has questions about your views, beliefs, and how you feel and think about life. This information can help others choose the care that you would choose. Why are advance directives important? An advance directive helps you control your care. Although spoken wishes may be used, it is better to have your wishes written down. Spoken wishes can be misunderstood, or not followed. Treatments may be given even if you do not want them. An advance directive may make it easier for your family to make difficult choices about your care. Weight Management   Why it is important to manage your weight:  Being overweight increases your risk of health conditions such as heart disease, high blood pressure, type 2 diabetes, and certain types of cancer. It can also increase your risk for osteoarthritis, sleep apnea, and other respiratory problems.  Aim for a slow, steady weight loss. Even a small amount of weight loss can lower your risk of health problems. How to lose weight safely:  A safe and healthy way to lose weight is to eat fewer calories and get regular exercise. You can lose up about 1 pound a week by decreasing the number of calories you eat by 500 calories each day. Healthy meal plan for weight management:  A healthy meal plan includes a variety of foods, contains fewer calories, and helps you stay healthy. A healthy meal plan includes the following:  Eat whole-grain foods more often. A healthy meal plan should contain fiber. Fiber is the part of grains, fruits, and vegetables that is not broken down by your body. Whole-grain foods are healthy and provide extra fiber in your diet. Some examples of whole-grain foods are whole-wheat breads and pastas, oatmeal, brown rice, and bulgur. Eat a variety of vegetables every day. Include dark, leafy greens such as spinach, kale, fer greens, and mustard greens. Eat yellow and orange vegetables such as carrots, sweet potatoes, and winter squash. Eat a variety of fruits every day. Choose fresh or canned fruit (canned in its own juice or light syrup) instead of juice. Fruit juice has very little or no fiber. Eat low-fat dairy foods. Drink fat-free (skim) milk or 1% milk. Eat fat-free yogurt and low-fat cottage cheese. Try low-fat cheeses such as mozzarella and other reduced-fat cheeses. Choose meat and other protein foods that are low in fat. Choose beans or other legumes such as split peas or lentils. Choose fish, skinless poultry (chicken or turkey), or lean cuts of red meat (beef or pork). Before you cook meat or poultry, cut off any visible fat. Use less fat and oil. Try baking foods instead of frying them. Add less fat, such as margarine, sour cream, regular salad dressing and mayonnaise to foods. Eat fewer high-fat foods. Some examples of high-fat foods include french fries, doughnuts, ice cream, and cakes.   Eat fewer sweets. Limit foods and drinks that are high in sugar. This includes candy, cookies, regular soda, and sweetened drinks. Exercise:  Exercise at least 30 minutes per day on most days of the week. Some examples of exercise include walking, biking, dancing, and swimming. You can also fit in more physical activity by taking the stairs instead of the elevator or parking farther away from stores. Ask your healthcare provider about the best exercise plan for you. © Copyright [address] 2018 Information is for End User's use only and may not be sold, redistributed or otherwise used for commercial purposes.  All illustrations and images included in CareNotes® are the copyrighted property of [employer] or  [address]

## 2023-10-20 NOTE — PROGRESS NOTES
Assessment and Plan:     Problem List Items Addressed This Visit        Endocrine    Impaired fasting glucose     Patient will be due for hemoglobin A1c. Order provided. Watch dietary intake of carbohydrates. Relevant Orders    Hemoglobin A1C       Cardiovascular and Mediastinum    Coronary artery disease involving native coronary artery of native heart without angina pectoris     Has had follow-up with cardiology. Remains on beta-blocker, aspirin, statin therapy as well as Ranexa and has been doing well on this regimen. Relevant Orders    CBC and differential    TSH, 3rd generation with Free T4 reflex    RESOLVED: Intermittent complete heart block (HCC)    Paroxysmal atrial fibrillation ([address])     Patient remains on sotalol 80 mg twice daily for rate control. Patient is not on full anticoagulation    Remains on aspirin 325 mg once daily.    -Follow-up with cardiology as scheduled         Sick sinus syndrome [facility])     Patient has pacemaker in place. He does follow-up with cardiology. Relevant Orders    TSH, 3rd generation with Free T4 reflex       Musculoskeletal and Integument    Tick bite of lower back     Patient did remove engorged tick that was on his skin for less than 24 hours. Prescription provided for doxycycline 100 mg twice daily for 5 days         Relevant Medications    doxycycline hyclate (VIBRA-TABS) 100 mg tablet       Other    Benign prostatic hyperplasia with nocturia    Medicare annual wellness visit, subsequent - Primary     Subsequent annual wellness visit completed         Mixed hyperlipidemia     Remains on Crestor 10 mg once daily. Continue same. Reevaluate lipid panel         Relevant Orders    Lipid panel    Comprehensive metabolic panel    Need for malaria prophylaxis     Mefloquine prior to travel to [facility]         Relevant Medications    mefloquine (LARIAM) 250 MG tablet (Start on 11/26/2023)     BMI Counseling: Body mass index is 29.16 kg/m².  The BMI is above normal. Nutrition recommendations include reducing intake of saturated and trans fat and reducing intake of cholesterol. Exercise recommendations include exercising 3-5 times per week. Rationale for BMI follow-up plan is due to patient being overweight or obese. Depression Screening and Follow-up Plan: Patient was screened for depression during today's encounter. They screened negative with a PHQ-2 score of 0. Preventive health issues were discussed with patient, and age appropriate screening tests were ordered as noted in patient's After Visit Summary. Personalized health advice and appropriate referrals for health education or preventive services given if needed, as noted in patient's After Visit Summary.      History of Present Illness:     Patient presents for Medicare Annual Wellness visit    Patient Care Team:  Thiago Steven DO as PCP - General  DO Jerilyn Shi MD (Gastroenterology)  MD Ambrocio Carlson MD (Ophthalmology)  Tiffanie Ortiz MD as Cardiologist (Cardiology)     Problem List:     Patient Active Problem List   Diagnosis   • 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   • External hemorrhoids   • Gastro-esophageal reflux disease without esophagitis   • Mixed hyperlipidemia   • Environmental and seasonal allergies   • Coronary artery disease involving native coronary artery of native heart without angina pectoris   • Chronic back pain   • Organic impotence   • Sleep apnea   • Primary osteoarthritis of left knee   • Impaired fasting glucose   • Benign prostatic hyperplasia with nocturia   • Tick bite of lower back   • Osteoarthritis of fingers of both hands   • Need for malaria prophylaxis   • Cataract   • LENY and COPD overlap syndrome ([address])   • Medicare annual wellness visit, subsequent   • Premature ventricular beats   • Trigger thumb of right hand   • Trigger finger, right ring finger   • Carpal tunnel syndrome, bilateral   • Intermittent palpitations   • CPAP (continuous positive airway pressure) dependence   • PUD (peptic ulcer disease)   • Sick sinus syndrome (HCC)   • Junctional rhythm   • S/P ICD (internal cardiac defibrillator) procedure   • Paroxysmal atrial fibrillation (HCC)   • Neck pain   • Status post cervical spinal fusion   • Cervical radiculopathy   • Low back pain with sciatica   • Allergic reaction to penicillin   • Internal bleeding hemorrhoids      Past Medical and Surgical History:     Past Medical History:   Diagnosis Date   • Abrasion of left foot     LAST ASSESSED: 9/23/13   • Achilles tendinitis, unspecified leg     LAST ASSESSED: 11/30/12   • Allergic rhinitis     LAST ASSESSED: 11/25/13   • Allergic rhinitis 06/25/2008    LAST ASSESSED: 6/25/08   • Arthritis    • Cervicalgia 04/22/2011    LAST ASSESSED: 4/22/11   • Chronic pain disorder    • Colon polyp    • Coronary artery disease    • CPAP (continuous positive airway pressure) dependence    • Dysfunction of left eustachian tube     LAST ASSESSED: 9/23/13   • Epistaxis     LAST ASSESSED: 5/27/15   • First degree atrioventricular block     LAST ASSESSED: 7/10/17   • Gastric ulcer 10/07/2011    LAST ASSESSED: 3/9/15   • GERD (gastroesophageal reflux disease)    • Hearing aid worn    • Hemorrhoids 05/13/2011    LAST ASSESSED: 5/13/11   • Hyperlipidemia    • Hypertension    • Irregular heart beat    • Long term use of drug     LAST ASSESSED: 10/11/12   • Myalgia 12/21/2007    LAST ASSESSED: 12/21/07   • Myositis 12/21/2007    LAST ASSESSED: 12/21/07   • Numbness and tingling of foot     LAST ASSESSED: 3/9/15   • Pacemaker    • Premature ventricular beats     states cardiologist ordered Robbin Osler ambulatory cardiac monitor   • Sleep apnea    • Wears glasses      Past Surgical History:   Procedure Laterality Date   • CARDIAC CATHETERIZATION  2017   • CARDIAC ELECTROPHYSIOLOGY PROCEDURE N/A 10/25/2021    Procedure: CARDIAC EPS;  Surgeon: Juan Richard MD;  [facility];   Service: Cardiology • CARDIAC ELECTROPHYSIOLOGY PROCEDURE Left 10/25/2021    Procedure: Cardiac icd implant;  Surgeon: Kathleen Juan MD;  [facility]; Service: Cardiology   • CARDIAC PACEMAKER PLACEMENT     • CARDIOVERSION N/A 10/25/2021    Procedure: Cardioversion;  Surgeon: Kathleen Juan MD;  [facility]; Service: Cardiology   • CATARACT EXTRACTION Right    • CERVICAL FUSION      pt thinks C4-C5   • CHOLECYSTECTOMY      LAPAROSCOPIC; LAST ASSESSED: 10/17/17   • COLONOSCOPY      COMPLETE; 3-4 YEARS AGO BY [facility]; LAST ASSESSED: 14   • MYRINGOTOMY W/ TUBES      WITH VENTILATING TUBE INSERTION   • DE HEMORRHOIDECTOMY INT & XTRNL 2/> COLUMN/GERBER N/A 2023    Procedure: HEMORRHOIDECTOMY EXCISION;  Surgeon: Oksana Zaman MD;  Location: [facility];  Service: Colorectal   • DE NEUROPLASTY &/TRANSPOS MEDIAN NRV CARPAL Oleta Fleischer Left 2021    Procedure: RELEASE CARPAL TUNNEL;  Surgeon: Eric Pearson MD;  [facility];  Service: Orthopedics   • DE NEUROPLASTY &/TRANSPOS MEDIAN NRV CARPAL Oleta Fleischer Right 10/14/2022    Procedure: RELEASE CARPAL TUNNEL;  Surgeon: Eric Pearson MD;  [facility];  Service: Orthopedics   • DE TENDON SHEATH INCISION Right 10/14/2022    Procedure: RELEASE TRIGGER FINGER,RING AND LONG TENOSYNOVECTOMY FDS +FDP;   Surgeon: Eric Pearson MD;  [facility];  Service: Orthopedics   • VASECTOMY        Family History:     Family History   Problem Relation Age of Onset   • Cancer Mother         SOFT TISSUE CANCER ON RT SIDE CHEST WALL AND    • Prostate cancer Maternal Uncle       Social History:     Social History     Socioeconomic History   • Marital status: /Civil Union     Spouse name: None   • Number of children: None   • Years of education: None   • Highest education level: None   Occupational History   • None   Tobacco Use   • Smoking status: Former     Types: Cigarettes     Quit date: 1990     Years since quittin.8   • Smokeless tobacco: Never   Vaping Use   • Vaping Use: Never used   Substance and Sexual Activity   • Alcohol use: Yes     Alcohol/week: 7.0 standard drinks of alcohol     Types: 7 Glasses of wine per week     Comment: few times a week   • Drug use: No   • Sexual activity: Not Currently   Other Topics Concern   • None   Social History Narrative   • None     Social Determinants of Health     Financial Resource Strain: Low Risk  (10/20/2023)    Overall Financial Resource Strain (CARDIA)    • Difficulty of Paying Living Expenses: Not hard at all   Food Insecurity: No Food Insecurity (2022)    Hunger Vital Sign    • Worried About Running Out of Food in the Last Year: Never true    • Ran Out of Food in the Last Year: Never true   Transportation Needs: No Transportation Needs (10/20/2023)    PRAPARE - Transportation    • Lack of Transportation (Medical): No    • Lack of Transportation (Non-Medical):  No   Physical Activity: Not on file   Stress: Not on file   Social Connections: Not on file   Intimate Partner Violence: Not on file   Housing Stability: Low Risk  (2022)    Housing Stability Vital Sign    • Unable to Pay for Housing in the Last Year: No    • Number of Places Lived in the Last Year: 1    • Unstable Housing in the Last Year: No      Medications and Allergies:     Current Outpatient Medications   Medication Sig Dispense Refill   • doxycycline hyclate (VIBRA-TABS) 100 mg tablet Take 1 tablet (100 mg total) by mouth 2 (two) times a day for 5 days 10 tablet 0   • [START ON 2023] mefloquine (LARIAM) 250 MG tablet Take 250 mg once weekly starting 2 days prior to travel Do not start before 2023. 3 tablet 0   • aspirin 325 mg tablet Take 325 mg by mouth daily     • Coenzyme Q10 (COQ10) 200 MG CAPS Take 1 tablet by mouth daily     • COLLAGEN-CHOND-HYALURONIC ACID PO Take 1 capsule by mouth in the morning     • EPINEPHrine (EpiPen 2-Leroy) 0.3 mg/0.3 mL SOAJ Inject 0.3 mL (0.3 mg total) into a muscle once for 1 dose 0.6 mL 0   • finasteride (PROSCAR) 5 mg tablet TAKE 1 TABLET (5 MG TOTAL) BY MOUTH DAILY. 90 tablet 1   • fluticasone (VERAMYST) 27.5 MCG/SPRAY nasal spray 2 sprays into each nostril daily     • Glucosamine-Chondroitin (GLUCOSAMINE CHONDR COMPLEX PO) Take 1 tablet by mouth daily     • Omega-3 Fatty Acids (FISH OIL) 1000 MG CPDR Take by mouth daily     • pantoprazole (PROTONIX) 40 mg tablet Take 1 tablet (40 mg total) by mouth daily 90 tablet 0   • ranolazine (RANEXA) 500 mg 12 hr tablet TAKE 1 TABLET BY MOUTH TWICE A DAY (Patient taking differently: 500 mg 2 (two) times a day) 180 tablet 4   • rosuvastatin (CRESTOR) 10 MG tablet TAKE 1 TABLET BY MOUTH EVERY DAY 90 tablet 1   • sotalol (BETAPACE) 80 mg tablet TAKE 1 TABLET BY MOUTH EVERY 12 HOURS. 180 tablet 3   • Turmeric 500 MG CAPS Take by mouth 3 (three) times a week       No current facility-administered medications for this visit.      Allergies   Allergen Reactions   • Amoxicillin Anaphylaxis   • Augmentin [Amoxicillin-Pot Clavulanate] Anaphylaxis   • Acetaminophen      Rapid heart rate   • Cherry Flavor - Food Allergy Other (See Comments)     States allergy is to raw cherries difficulty breathing   • Pollen Extract    • Dye [Iodinated Contrast Media] Itching      Immunizations:     Immunization History   Administered Date(s) Administered   • COVID-19 PFIZER VACCINE 0.3 ML IM 02/19/2021, 03/11/2021, 10/02/2021   • COVID-19 Pfizer Vac BIVALENT Brown-sucrose 12 Yr+ IM 02/28/2023   • COVID-19 Pfizer mRNA vacc PF brown-sucrose 12 yr and older (Comirnaty) 10/16/2023   • COVID-19 Pfizer vac (Brown-sucrose, gray cap) 12 yr+ IM 06/23/2022   • INFLUENZA 10/18/2015, 09/30/2017, 10/09/2017, 09/28/2018, 09/07/2021, 10/21/2022   • Influenza Quadrivalent Preservative Free 3 years and older IM 10/09/2017   • Influenza Quadrivalent, 6-35 Months IM 09/23/2016   • Influenza, high dose seasonal 0.7 mL 10/02/2020, 09/07/2021, 10/21/2022, 10/02/2023   • Influenza, recombinant, quadrivalent,injectable, preservative free 09/09/2019   • Influenza, seasonal, injectable 12/21/2007, 12/12/2008, 01/14/2010   • Pneumococcal Conjugate 13-Valent 01/27/2018   • Pneumococcal Polysaccharide PPV23 09/09/2019   • Td (adult), adsorbed 02/15/2010      Health Maintenance:         Topic Date Due   • Colorectal Cancer Screening  09/05/2024   • Hepatitis C Screening  Completed     There are no preventive care reminders to display for this patient. Medicare Health Risk Assessment:     /78   Pulse 82   Resp 16   Ht 6' (1.829 m)   Wt 97.5 kg (215 lb)   SpO2 98%   BMI 29.16 kg/m²      Vladislav Reyna is here for his Subsequent Wellness visit. Last Medicare Wellness visit information reviewed, patient interviewed, no change since last AWV. Health Risk Assessment:   Patient rates overall health as very good. Patient feels that their physical health rating is same. Patient is satisfied with their life. Eyesight was rated as same. Hearing was rated as same. Patient feels that their emotional and mental health rating is same. Patients states they are never, rarely angry. Patient states they are never, rarely unusually tired/fatigued. Pain experienced in the last 7 days has been none. Patient states that he has experienced no weight loss or gain in last 6 months. Depression Screening:   PHQ-2 Score: 0      Fall Risk Screening: In the past year, patient has experienced: no history of falling in past year      Home Safety:  Patient does not have trouble with stairs inside or outside of their home. Patient has working smoke alarms and has working carbon monoxide detector. Home safety hazards include: none. Nutrition:   Current diet is Regular. Medications:   Patient is not currently taking any over-the-counter supplements. Patient is able to manage medications.      Activities of Daily Living (ADLs)/Instrumental Activities of Daily Living (IADLs):   Walk and transfer into and out of bed and chair?: Yes  Dress and groom yourself?: Yes    Bathe or shower yourself?: Yes    Feed yourself? Yes  Do your laundry/housekeeping?: Yes  Manage your money, pay your bills and track your expenses?: Yes  Make your own meals?: Yes    Do your own shopping?: Yes    Previous Hospitalizations:   Any hospitalizations or ED visits within the last 12 months?: No      Advance Care Planning:   Living will: Yes    Durable POA for healthcare: Yes    Advanced directive: Yes    Advanced directive counseling given: No    Five wishes given: No    Patient declined ACP directive: No    End of Life Decisions reviewed with patient: Yes    Provider agrees with end of life decisions: Yes      PREVENTIVE SCREENINGS      Cardiovascular Screening:    General: Screening Not Indicated and History Lipid Disorder      Diabetes Screening:     General: Screening Current      Colorectal Cancer Screening:     General: Screening Current      Prostate Cancer Screening:    General: Screening Current      Osteoporosis Screening:    General: Screening Not Indicated      Abdominal Aortic Aneurysm (AAA) Screening:    Risk factors include: age between 70-77 yo and tobacco use        Lung Cancer Screening:     General: Screening Not Indicated      Hepatitis C Screening:    General: Screening Current    Screening, Brief Intervention, and Referral to Treatment (SBIRT)    Screening  Typical number of drinks in a day: 1  Typical number of drinks in a week: 5  Interpretation: Low risk drinking behavior.     AUDIT-C Screenin) How often did you have a drink containing alcohol in the past year? never  2) How many drinks did you have on a typical day when you were drinking in the past year? 0  3) How often did you have 6 or more drinks on one occasion in the past year? never    AUDIT-C Score: 0  Interpretation: Score 0-3 (male): Negative screen for alcohol misuse    Single Item Drug Screening:  How often have you used an illegal drug (including marijuana) or a prescription medication for non-medical reasons in the past year? never    Single Item Drug Screen Score: 0  Interpretation: Negative screen for possible drug use disorder    Brief Intervention  Alcohol & drug use screenings were reviewed. No concerns regarding substance use disorder identified. Brian Medina DO    68-year-old male with past medical history of heart disease, hypertension, hyperlipidemia presents for subsequent annual wellness visit and follow-up of chronic conditions. Patient does follow up closely with Cardiology. Patient was placed on Ranexa and feels the best that he has in years. He did move stones and redo his patio without any exertional chest pain or shortness of breath. Still gets some chest discomfort on occasion but is rather nonspecific. He did have a tick bite on his right back which is still slightly irritated. He will be traveling to [facility] and would like to have prophylactic medication for malaria which she has had in the past.  No labs were done prior to this appointment because I did not order any. His last set of labs were done in April        Review of Systems   Constitutional: Negative. HENT: Negative. Eyes: Negative. Respiratory: Negative. Cardiovascular: Negative. Gastrointestinal: Negative. Endocrine: Negative. Genitourinary: Negative. Musculoskeletal: Negative. Skin: Negative. Allergic/Immunologic: Negative. Neurological: Negative. Hematological: Negative. Psychiatric/Behavioral: Negative. All other systems reviewed and are negative. Physical Exam  Vitals and nursing note reviewed. Constitutional:       Appearance: Normal appearance. He is well-developed and normal weight. HENT:      Head: Normocephalic and atraumatic.       Right Ear: External ear normal.      Left Ear: External ear normal.      Nose: Nose normal.   Eyes:      General: No scleral icterus. Right eye: No discharge. Left eye: No discharge. Extraocular Movements: Extraocular movements intact. Conjunctiva/sclera: Conjunctivae normal.      Pupils: Pupils are equal, round, and reactive to light. Cardiovascular:      Rate and Rhythm: Normal rate and regular rhythm. Heart sounds: Normal heart sounds. Pulmonary:      Effort: Pulmonary effort is normal.      Breath sounds: Normal breath sounds. Abdominal:      General: Abdomen is flat. Bowel sounds are normal.      Palpations: Abdomen is soft. Genitourinary:     Penis: Normal.       Prostate: Normal.      Rectum: Normal.   Musculoskeletal:         General: Normal range of motion. Cervical back: Normal range of motion and neck supple. Comments: Incisions on right hand from carpal tunnel release are clean dry and intact   Skin:     General: Skin is warm and dry. Neurological:      General: No focal deficit present. Mental Status: He is alert and oriented to person, place, and time. Mental status is at baseline. Psychiatric:         Mood and Affect: Mood normal.         Behavior: Behavior normal.         Thought Content:  Thought content normal.         Judgment: Judgment normal.

## 2023-11-06 ENCOUNTER — REMOTE DEVICE CLINIC VISIT (OUTPATIENT)
Dept: CARDIOLOGY CLINIC | Facility: CLINIC | Age: 68
End: 2023-11-06
Payer: MEDICARE

## 2023-11-06 DIAGNOSIS — Z95.810 PRESENCE OF AUTOMATIC CARDIOVERTER/DEFIBRILLATOR (AICD): Primary | ICD-10-CM

## 2023-11-06 PROCEDURE — 93295 DEV INTERROG REMOTE 1/2/MLT: CPT | Performed by: INTERNAL MEDICINE

## 2023-11-06 PROCEDURE — 93296 REM INTERROG EVL PM/IDS: CPT | Performed by: INTERNAL MEDICINE

## 2023-11-06 NOTE — PROGRESS NOTES
Results for orders placed or performed in visit on 11/06/23   Cardiac EP device report    Narrative    MDT-DUAL CHAMBER ICD (AAI-DDD MODE) / ACTIVE SYSTEM IS MRI CONDITIONAL  CARELINK TRANSMISSION: BATTERY VOLTAGE ADEQUATE. (8.6 YRS) AP 33%  99%. ALL AVAILABLE LEAD PARAMETERS WITHIN NORMAL LIMITS. 6 NSVT EPISODES DETECTED MOST RECENT 8 BEATS @ 320ms. NO THERAPIES GIVEN. OPTI-VOL WITHIN NORMAL LIMITS. NORMAL DEVICE FUNCTION. ---ESPOSITO

## 2023-11-07 LAB
ALBUMIN SERPL-MCNC: 4.4 G/DL (ref 3.6–5.1)
ALBUMIN/GLOB SERPL: 1.8 (CALC) (ref 1–2.5)
ALP SERPL-CCNC: 67 U/L (ref 35–144)
ALT SERPL-CCNC: 38 U/L (ref 9–46)
AST SERPL-CCNC: 25 U/L (ref 10–35)
BASOPHILS # BLD AUTO: 41 CELLS/UL (ref 0–200)
BASOPHILS NFR BLD AUTO: 0.7 %
BILIRUB SERPL-MCNC: 1.3 MG/DL (ref 0.2–1.2)
BUN SERPL-MCNC: 17 MG/DL (ref 7–25)
BUN/CREAT SERPL: ABNORMAL (CALC) (ref 6–22)
CALCIUM SERPL-MCNC: 10.1 MG/DL (ref 8.6–10.3)
CHLORIDE SERPL-SCNC: 105 MMOL/L (ref 98–110)
CHOLEST SERPL-MCNC: 148 MG/DL
CHOLEST/HDLC SERPL: 3.6 (CALC)
CO2 SERPL-SCNC: 29 MMOL/L (ref 20–32)
CREAT SERPL-MCNC: 1.05 MG/DL (ref 0.7–1.35)
EOSINOPHIL # BLD AUTO: 99 CELLS/UL (ref 15–500)
EOSINOPHIL NFR BLD AUTO: 1.7 %
ERYTHROCYTE [DISTWIDTH] IN BLOOD BY AUTOMATED COUNT: 12.3 % (ref 11–15)
GFR/BSA.PRED SERPLBLD CYS-BASED-ARV: 77 ML/MIN/1.73M2
GLOBULIN SER CALC-MCNC: 2.4 G/DL (CALC) (ref 1.9–3.7)
GLUCOSE SERPL-MCNC: 105 MG/DL (ref 65–99)
HBA1C MFR BLD: 5.4 % OF TOTAL HGB
HCT VFR BLD AUTO: 48.6 % (ref 38.5–50)
HDLC SERPL-MCNC: 41 MG/DL
HGB BLD-MCNC: 16.6 G/DL (ref 13.2–17.1)
LDLC SERPL CALC-MCNC: 88 MG/DL (CALC)
LYMPHOCYTES # BLD AUTO: 2175 CELLS/UL (ref 850–3900)
LYMPHOCYTES NFR BLD AUTO: 37.5 %
MCH RBC QN AUTO: 32 PG (ref 27–33)
MCHC RBC AUTO-ENTMCNC: 34.2 G/DL (ref 32–36)
MCV RBC AUTO: 93.8 FL (ref 80–100)
MONOCYTES # BLD AUTO: 679 CELLS/UL (ref 200–950)
MONOCYTES NFR BLD AUTO: 11.7 %
NEUTROPHILS # BLD AUTO: 2807 CELLS/UL (ref 1500–7800)
NEUTROPHILS NFR BLD AUTO: 48.4 %
NONHDLC SERPL-MCNC: 107 MG/DL (CALC)
PLATELET # BLD AUTO: 183 THOUSAND/UL (ref 140–400)
PMV BLD REES-ECKER: 10.5 FL (ref 7.5–12.5)
POTASSIUM SERPL-SCNC: 5.3 MMOL/L (ref 3.5–5.3)
PROT SERPL-MCNC: 6.8 G/DL (ref 6.1–8.1)
RBC # BLD AUTO: 5.18 MILLION/UL (ref 4.2–5.8)
SODIUM SERPL-SCNC: 140 MMOL/L (ref 135–146)
TRIGL SERPL-MCNC: 94 MG/DL
TSH SERPL-ACNC: 1.77 MIU/L (ref 0.4–4.5)
WBC # BLD AUTO: 5.8 THOUSAND/UL (ref 3.8–10.8)

## 2023-11-08 NOTE — RESULT ENCOUNTER NOTE
Please call patient and notify that results are essentially normal.  Mild elevation of his fasting glucose at 105. Normal hemoglobin A1c at 5.4%. No changes in medications.  Please keep follow-up appointment as scheduled

## 2023-11-27 DIAGNOSIS — N40.1 BENIGN PROSTATIC HYPERPLASIA WITH NOCTURIA: ICD-10-CM

## 2023-11-27 DIAGNOSIS — R35.1 BENIGN PROSTATIC HYPERPLASIA WITH NOCTURIA: ICD-10-CM

## 2023-11-27 RX ORDER — FINASTERIDE 5 MG/1
5 TABLET, FILM COATED ORAL DAILY
Qty: 90 TABLET | Refills: 1 | Status: SHIPPED | OUTPATIENT
Start: 2023-11-27

## 2023-11-27 NOTE — TELEPHONE ENCOUNTER
Reason for call:   [x] Refill   [] Prior Auth  [] Other:     Office:   [x] PCP/Provider -   [] Specialty/Provider -     Medication: proscar    Dose/Frequency: 5 mg    Quantity: 90    Pharmacy: [facility]  [place], [place]    Does the patient have enough for 3 days?    [] Yes   [x] No - Send as HP to POD

## 2023-12-19 PROBLEM — Z00.00 MEDICARE ANNUAL WELLNESS VISIT, SUBSEQUENT: Status: RESOLVED | Noted: 2020-10-08 | Resolved: 2023-12-19

## 2023-12-22 ENCOUNTER — NURSE TRIAGE (OUTPATIENT)
Dept: OTHER | Facility: OTHER | Age: 68
End: 2023-12-22

## 2023-12-22 DIAGNOSIS — R07.9 CHEST PAIN: ICD-10-CM

## 2023-12-22 RX ORDER — RANOLAZINE 500 MG/1
500 TABLET, EXTENDED RELEASE ORAL 2 TIMES DAILY
Qty: 14 TABLET | Refills: 0 | Status: SHIPPED | OUTPATIENT
Start: 2023-12-22 | End: 2023-12-26 | Stop reason: SDUPTHER

## 2023-12-23 NOTE — TELEPHONE ENCOUNTER
Medication marked as essential. Courtesy emergency supply given per protocol. Sent to pharmacy (verified) of choice. Patient informed to contact office when open, verbalized understanding.

## 2023-12-23 NOTE — TELEPHONE ENCOUNTER
"Reason for Disposition  • [1] Caller requesting a prescription renewal (no refills left), no triage required, AND [2] triager able to renew prescription per department policy    Answer Assessment - Initial Assessment Questions  1. DRUG NAME: \"What medicine do you need to have refilled?\"     Name Ranexa   Dose/Frequency 500 mg 12 hr tablet/TAKE 1 TABLET BY MOUTH TWICE A DAY    2. REFILLS REMAINING: \"How many refills are remaining?\" (Note: The label on the medicine or pill bottle will show how many refills are remaining. If there are no refills remaining, then a renewal may be needed.)      Zero   3. EXPIRATION DATE: \"What is the expiration date?\" (Note: The label states when the prescription will , and thus can no longer be refilled.)      N/a  4. PRESCRIBING HCP: \"Who prescribed it?\" Reason: If prescribed by specialist, call should be referred to that group.      Cardiology   5. SYMPTOMS: \"Do you have any symptoms?\"      Denies    Protocols used: Medication Refill and Renewal Call-ADULT-    "

## 2023-12-23 NOTE — TELEPHONE ENCOUNTER
Regarding: Ranexa refill/sent to [facility] in [place]  ----- Message from Pinky Hassan sent at 12/22/2023  7:35 PM EST -----  Medication Refill Request     Name Ranexa   Dose/Frequency 500 mg 12 hr tablet/TAKE 1 TABLET BY MOUTH TWICE A DAY  Quantity 180  Verified pharmacy   [ x]  Verified ordering Provider   [x ]  Does patient have enough for the next 3 days? Yes [ ] No [x ]

## 2023-12-26 DIAGNOSIS — K21.9 GASTROESOPHAGEAL REFLUX DISEASE, UNSPECIFIED WHETHER ESOPHAGITIS PRESENT: ICD-10-CM

## 2023-12-26 DIAGNOSIS — I47.20 VENTRICULAR TACHYCARDIA (HCC): ICD-10-CM

## 2023-12-26 DIAGNOSIS — R07.9 CHEST PAIN: ICD-10-CM

## 2023-12-26 DIAGNOSIS — I49.3 PREMATURE VENTRICULAR BEATS: ICD-10-CM

## 2023-12-26 DIAGNOSIS — I48.0 PAROXYSMAL ATRIAL FIBRILLATION (HCC): ICD-10-CM

## 2023-12-26 RX ORDER — RANOLAZINE 500 MG/1
500 TABLET, EXTENDED RELEASE ORAL 2 TIMES DAILY
Qty: 180 TABLET | Refills: 1 | Status: SHIPPED | OUTPATIENT
Start: 2023-12-26

## 2023-12-26 RX ORDER — SOTALOL HYDROCHLORIDE 80 MG/1
TABLET ORAL
Qty: 180 TABLET | Refills: 3 | Status: SHIPPED | OUTPATIENT
Start: 2023-12-26

## 2023-12-27 RX ORDER — PANTOPRAZOLE SODIUM 40 MG/1
40 TABLET, DELAYED RELEASE ORAL DAILY
Qty: 90 TABLET | Refills: 0 | Status: SHIPPED | OUTPATIENT
Start: 2023-12-27

## 2024-01-16 NOTE — PROGRESS NOTES
[facility]  Sleep Medicine Follow up/ Established Patient Visit      Assessment/Plan:  Tucker was seen today for follow-up.    Diagnoses and all orders for this visit:    LENY (obstructive sleep apnea)  -     PAP DME Resupply/Reorder    Coronary artery disease involving native coronary artery of native heart without angina pectoris  -     PAP DME Resupply/Reorder        Tucker is a pleasant 68-year-old gentleman with a PMHx of GERD, PAF, SSS, cervical radiculopathy, HLD who presents in follow up for LENY/COPD overlap syndrome (AHI 17.7, supine AHI 40.5, O2 uday 88% 7/2020).  He continues to endorse significant benefit from PAP therapy, with no significant complaints or concerns for me today.    - Continue AutoPap at settings of 4-20 cm H2O  - Prescription for new supplies ordered today  - Reviewed CMS/insurance requirements and resupply guidelines  - Information provided on the above as well as general maintenance steps  - he is to see me back in ~12 months, however may reach out sooner if needed.        ________________________________________________________________________________________________    Per Last Visit Note (Date: 11/23/2022):  Reason for Visit:  66 y.o.male here for PAP compliance check     Assessment:  Doing well with new PAP device.   Sleep quality is improved and the patient feels less drowsy  Compliance data show utilization for greater than or equal to 70% of nights, for greater than or equal to 4 hours per night.     Plan:  Adequate compliance and successful treatment     Follow up:  One year      Sleep Studies:  -PSG 7/24/2020: .5 minutes, sleep efficiency 31.9% sleep onset latency 52.7 minutes, REM sleep latency 82.5 minutes.  Diminished stage N3 and REM sleep noted.  AHI 17.7, supine AHI 40.5, REM AHI 0.  O2 uday 88%.  PLM index 0.    ________________________________________________________________________________________________      Interval History: Keavin  SEAN Carvajal is a 68 y.o. male with a PMHx of GERD, PAF, SSS, cervical radiculopathy, HLD who presents in follow up for LENY/COPD overlap syndrome (AHI 17.7, supine AHI 40.5, O2 uday 88% 2020).    SDB:  -Current experience with PAP Therapy: Beneficial; better sleep, more refreshed  -Mask type: Nasal pillows  -Difficulties with mask: Denies   -Device: Obtained 4 years ago (2020)  -Difficulties with device: Buttons stick sometimes  -Compliance:            Morehead Sleepiness Scale:  What are your chances of dozing?   0= no chance  1= slight chance  2= moderate chance  3= high chance    Sitting and readin   Watching TV: 2  Sitting, inactive in a public place (e.g. a theatre or a meeting):1  As a passenger in a car for an hour without a break: 2  Lying down to rest in the afternoon when circumstances permit: 1   Sitting and talking to someone: 0  Sitting quietly after a lunch without alcohol: 1  In a car, while stopped for a few minutes in the traffic: 0       TOTAL  8/24  Greater or equal to 10 is positive for excessive daytime sleepiness        SLEEP HYGIENE QUESTIONS:  Bedtime: 2200/2300   Time it takes to fall sleep: 15 minutes  Wake up Time: 4953-4826   Number of times patient wakes up per night: 1  Reason (s) why patient wakes up during the night: restroom   Estimated total sleep time ( in a 24 hour period of time): 7-8 hours   Naps: rarely    Changes to PMH, PSH, SH: hemorrhoid removal     SLEEP RELATED ROS    Allergies   Allergen Reactions    Amoxicillin Anaphylaxis    Augmentin [Amoxicillin-Pot Clavulanate] Anaphylaxis    Acetaminophen      Rapid heart rate    Cherry Flavor - Food Allergy Other (See Comments)     States allergy is to raw cherries difficulty breathing    Pollen Extract     Dye [Iodinated Contrast Media] Itching       CURRENT MEDICATIONS:  Current Outpatient Medications   Medication Instructions    aspirin 325 mg, Oral, Daily    Coenzyme Q10 (COQ10) 200 MG CAPS 1 tablet, Oral, Daily     COLLAGEN-CHOND-HYALURONIC ACID PO 1 capsule, Oral, Daily    EPINEPHrine (EPIPEN 2-WM) 0.3 mg, Intramuscular, Once    finasteride (PROSCAR) 5 mg, Oral, Daily    fluticasone (VERAMYST) 27.5 MCG/SPRAY nasal spray 2 sprays, Nasal, Daily    Glucosamine-Chondroitin (GLUCOSAMINE CHONDR COMPLEX PO) 1 tablet, Oral, Daily    mefloquine (LARIAM) 250 MG tablet Take 250 mg once weekly starting 2 days prior to travel    Omega-3 Fatty Acids (FISH OIL) 1000 MG CPDR Oral, Daily    pantoprazole (PROTONIX) 40 mg, Oral, Daily    ranolazine (RANEXA) 500 mg, Oral, 2 times daily    rosuvastatin (CRESTOR) 10 MG tablet TAKE 1 TABLET BY MOUTH EVERY DAY    sotalol (BETAPACE) 80 mg tablet TAKE 1 TABLET BY MOUTH EVERY 12 HOURS    Turmeric 500 MG CAPS Oral, 3 times weekly           PHYSICAL EXAMINATION:  Vital Signs: /68 (BP Location: Right arm, Patient Position: Sitting, Cuff Size: Large)   Pulse 78   Ht 6' (1.829 m)   Wt 98 kg (216 lb)   SpO2 96%   BMI 29.29 kg/m²     Constitutional: NAD, well appearing   Mental Status: AAOx3  Skin: Warm, dry, no rashes noted   Eyes: PERRL, normal conjunctiva  ENT: Nasal congestion absent, nasal valve incompetence absent.  Posterior Airspace:   Mix Tongue Position: 4  Retrognathia: absent  Overbite: absent  High Arched Palate: absent  Tongue Scalloping/Ridging: absent  Uvula: normal  Chest: No evidence of respiratory distress, no accessory muscle use; no evidence of peripheral cyanosis  Abdomen: Soft, NT/ND  Extremities: No digital clubbing or pedal edema  Neuro: Strength 5/5 throughout, sensation grossly intact      I have spent a total time of 20-30 minutes on 01/17/24 in caring for this patient including Diagnostic results, Prognosis, Risks and benefits of tx options, Instructions for management, Patient and family education, Importance of tx compliance, Risk factor reductions, Documenting in the medical record, Reviewing / ordering tests, medicine, procedures  , and Obtaining or reviewing  history  .        Electronically signed by:    Jaime Barron DO  Board-Certified Neurology and Sleep Medicine  [facility]  01/17/24

## 2024-01-17 ENCOUNTER — OFFICE VISIT (OUTPATIENT)
Dept: SLEEP CENTER | Facility: CLINIC | Age: 69
End: 2024-01-17
Payer: MEDICARE

## 2024-01-17 VITALS
SYSTOLIC BLOOD PRESSURE: 122 MMHG | BODY MASS INDEX: 29.26 KG/M2 | HEIGHT: 72 IN | OXYGEN SATURATION: 96 % | WEIGHT: 216 LBS | DIASTOLIC BLOOD PRESSURE: 68 MMHG | HEART RATE: 78 BPM

## 2024-01-17 DIAGNOSIS — I25.10 CORONARY ARTERY DISEASE INVOLVING NATIVE CORONARY ARTERY OF NATIVE HEART WITHOUT ANGINA PECTORIS: ICD-10-CM

## 2024-01-17 DIAGNOSIS — G47.33 OSA (OBSTRUCTIVE SLEEP APNEA): Primary | ICD-10-CM

## 2024-01-17 PROCEDURE — 99213 OFFICE O/P EST LOW 20 MIN: CPT | Performed by: STUDENT IN AN ORGANIZED HEALTH CARE EDUCATION/TRAINING PROGRAM

## 2024-01-17 NOTE — PATIENT INSTRUCTIONS
Continue PAP Therapy  Continue AutoPAP at [id number].  Remember to clean your mask and equipment regularly, as directed.  You should be eligible for new supplies approximately every 3-6 months, depending on your insurance coverage. Contact your Durable Medical Equipment (DME) company for new supplies as needed.  Follow up in 12 months.      Care and Maintenance  Headgear should be washed as needed. Daily inspection and weekly washings are recommended. Do not disassemble the straps. Machine wash in warm water, making sure to attach Velcro hooks and tabs before washing. Line dry or machine dry on a low setting.  Masks should be washed every day. Daily inspection is recommended. Leave the mask and tubing attached. Gently wash the mask with a soft cloth using warm water and mild detergent, concentrating on the mask cushion flaps. DO NOT use alcohol or bleach. Rinse thoroughly and air dry.  Tubing and headgear should be washed weekly. Daily inspection is recommended. Wash in warm water and mild detergent and rinse thoroughly. Hook the tubing to the machine and blow until dry.  Humidifier should be washed daily and filled with DISTILLED water before use. Wash with warm water and mild detergent. Disinfect weekly by soaking with a solution of 1 part white vinegar and 3 parts water for 30 minutes. Rinse thoroughly and air dry.  Disposable filters should be replaced once a month. Wash reusable foam filters with warm water and mild detergent at least once a month. Rinse thoroughly and dry with paper towels.  Avoid  that contain fragrance or conditioners, as these will leave a residue.  NEVER iron any soft goods.      CMS Requirements    Your insurance requires a face-to-face follow up visit within a 31-90 day period after starting CPAP.  Your insurance requires compliance with CPAP, which is at least 4 hours per night for 70% of the time. This must be done over a 30 day period and must occur within the initial 31-90 day  period after starting CPAP.  Your insurance also requires at least yearly follow ups to continue to pay for CPAP supplies.       PAP Supply Guidelines    Below are the guidelines for reordering your supplies. You will be responsible for your deductible, co payments, and out of pocket expenses.    Item Frequency   Nasal Mask (no headgear) 1 every 3 months   Nasal Mask Cushion 1 every 2 weeks   Full Face Mask (no headgear) 1 every 3 months   Full Face Mask Cushion 1 every month   Nasal Pillows 1 every 2 weeks   Headgear 1 every 6 months   hin Strap 1 every 6 months   yash 1 every 3 months   Filters: Reusable 1 every 6 months   Filters: Disposable 1 every 2 weeks   Humidifier Chamber(disposable) 1 every 6 months

## 2024-01-19 ENCOUNTER — TELEPHONE (OUTPATIENT)
Dept: SLEEP CENTER | Facility: CLINIC | Age: 69
End: 2024-01-19

## 2024-01-22 LAB

## 2024-02-02 ENCOUNTER — TELEPHONE (OUTPATIENT)
Dept: CARDIOLOGY CLINIC | Facility: CLINIC | Age: 69
End: 2024-02-02

## 2024-02-02 ENCOUNTER — REMOTE DEVICE CLINIC VISIT (OUTPATIENT)
Dept: CARDIOLOGY CLINIC | Facility: CLINIC | Age: 69
End: 2024-02-02
Payer: MEDICARE

## 2024-02-02 DIAGNOSIS — Z95.810 PRESENCE OF AUTOMATIC CARDIOVERTER/DEFIBRILLATOR (AICD): Primary | ICD-10-CM

## 2024-02-02 PROCEDURE — 93295 DEV INTERROG REMOTE 1/2/MLT: CPT | Performed by: STUDENT IN AN ORGANIZED HEALTH CARE EDUCATION/TRAINING PROGRAM

## 2024-02-02 PROCEDURE — 93296 REM INTERROG EVL PM/IDS: CPT | Performed by: STUDENT IN AN ORGANIZED HEALTH CARE EDUCATION/TRAINING PROGRAM

## 2024-02-02 NOTE — TELEPHONE ENCOUNTER
----- Message from Robert Vences MD sent at 2/2/2024 10:46 AM EST -----  Device interrogation reviewed.  Patient had episode of NSVT but asymptomatic.  Continue sotalol.  OptiVol is also within normal.

## 2024-02-02 NOTE — PROGRESS NOTES
MDT-DUAL CHAMBER ICD (AAI-DDD MODE) / ACTIVE SYSTEM IS MRI CONDITIONAL   CARELINK TRANSMISSION: BATTERY VOLTAGE ADEQUATE (8.3 YRS). AP 31.3%  99.1% (>40%/AAIR-DDDR 50PPM); ALL AVAILABLE LEAD PARAMETERS WITHIN NORMAL LIMITS. 5 VT-NS EPISODES, NO THERAPIES DELIVERED (6 @ 154 BPM, 9 @ 162 BPM, 8 @ 169 BPM, 8 @ 164 BPM, 16 @ 157 BPM); PATIENT TAKING SOTALOL,  MG; OPTI-VOL WITHIN NORMAL LIMITS. NORMAL DEVICE FUNCTION.  ES

## 2024-02-12 ENCOUNTER — OFFICE VISIT (OUTPATIENT)
Dept: CARDIOLOGY CLINIC | Facility: CLINIC | Age: 69
End: 2024-02-12
Payer: MEDICARE

## 2024-02-12 VITALS
OXYGEN SATURATION: 98 % | SYSTOLIC BLOOD PRESSURE: 130 MMHG | DIASTOLIC BLOOD PRESSURE: 80 MMHG | HEIGHT: 72 IN | WEIGHT: 218.6 LBS | HEART RATE: 67 BPM | BODY MASS INDEX: 29.61 KG/M2

## 2024-02-12 DIAGNOSIS — E78.2 MIXED HYPERLIPIDEMIA: ICD-10-CM

## 2024-02-12 DIAGNOSIS — G47.33 OSA (OBSTRUCTIVE SLEEP APNEA): ICD-10-CM

## 2024-02-12 DIAGNOSIS — J44.9 OSA AND COPD OVERLAP SYNDROME (HCC): ICD-10-CM

## 2024-02-12 DIAGNOSIS — I25.10 CORONARY ARTERY DISEASE INVOLVING NATIVE CORONARY ARTERY OF NATIVE HEART WITHOUT ANGINA PECTORIS: ICD-10-CM

## 2024-02-12 DIAGNOSIS — I47.20 VENTRICULAR TACHYCARDIA (HCC): ICD-10-CM

## 2024-02-12 DIAGNOSIS — I48.0 PAROXYSMAL ATRIAL FIBRILLATION (HCC): ICD-10-CM

## 2024-02-12 DIAGNOSIS — Z99.89 CPAP (CONTINUOUS POSITIVE AIRWAY PRESSURE) DEPENDENCE: ICD-10-CM

## 2024-02-12 DIAGNOSIS — K21.9 GASTROESOPHAGEAL REFLUX DISEASE, UNSPECIFIED WHETHER ESOPHAGITIS PRESENT: ICD-10-CM

## 2024-02-12 DIAGNOSIS — Z95.810 S/P ICD (INTERNAL CARDIAC DEFIBRILLATOR) PROCEDURE: ICD-10-CM

## 2024-02-12 DIAGNOSIS — G47.33 OSA AND COPD OVERLAP SYNDROME (HCC): ICD-10-CM

## 2024-02-12 PROCEDURE — 93000 ELECTROCARDIOGRAM COMPLETE: CPT | Performed by: INTERNAL MEDICINE

## 2024-02-12 PROCEDURE — 99214 OFFICE O/P EST MOD 30 MIN: CPT | Performed by: INTERNAL MEDICINE

## 2024-02-12 RX ORDER — PANTOPRAZOLE SODIUM 40 MG/1
40 TABLET, DELAYED RELEASE ORAL DAILY
Qty: 90 TABLET | Refills: 0 | Status: SHIPPED | OUTPATIENT
Start: 2024-02-12

## 2024-02-12 RX ORDER — ROSUVASTATIN CALCIUM 10 MG/1
TABLET, COATED ORAL
Qty: 90 TABLET | Refills: 1 | Status: SHIPPED | OUTPATIENT
Start: 2024-02-12

## 2024-02-12 NOTE — TELEPHONE ENCOUNTER
Patient called requesting refill for Finasteride. Patient made aware medication was refilled on 11/27/23 for 90 with 1 refills to [facility] pharmacy. Patient instructed to contact the pharmacy to obtain refills of medication. Patient verbalized understanding.

## 2024-02-12 NOTE — PROGRESS NOTES
Progress note - Cardiology Office  [facility].    Tucker Carvajal 68 y.o. male MRN: [id number]  : 1955  Unit/Bed#:  Encounter: [id number]      Assessment:     1. S/P ICD (internal cardiac defibrillator) procedure    2. Paroxysmal atrial fibrillation (HCC)    3. Ventricular tachycardia (HCC)    4. Coronary artery disease involving native coronary artery of native heart without angina pectoris    5. LENY and COPD overlap syndrome (HCC)    6. Mixed hyperlipidemia    7. CPAP (continuous positive airway pressure) dependence    8. LENY (obstructive sleep apnea)        Discussion summary and Plan:       1.  Coronary artery disease status post catheterization in 2017 has moderate ostial circ disease and nonobstructive disease in other arteries.  He has repeat cardiac catheterization done.  Cath was done in 2021.  Patient has 50-70% ostial circumflex stenosis which is non dominant artery and IFR is 0.96 other arteries have nonobstructive disease.  Continue statins and aspirin.  No change in symptoms for now.     2. Dyslipidemia.  Continue statins he is tolerating it very well.  Cholesterol profile acceptable labs reviewed from .     3. History of intermittent AV block, and SVT with production of VT during EP study status post Medtronic dual-chamber ICD which is functioning adequately.  Recent device interrogation shows few episodes of NSVT which are asymptomatic.  He was evaluated by EP and put on sotalol since then number of episode have decreased.  Device interrogation from 2024 reviewed.  No significant change.     4. Obstructive sleep apnea.  Continue using CPAP machine.  He is pretty compliant     5. Paroxysmal atrial fibrillation.  Patient is noted to have episodes of atrial fibrillation on the device.  AFib burden was 2.5%.  He is already on beta-blocker.  No more reoccurrence of AFib is noted.  If he has significant AFib we will increase beta-blockers.  He has  stopped taking his Eliquis.  Repeat interrogation shows no further episodes of atrial fibrillation.  He is on aspirin.      6. Sick sinus syndrome with episodes of NSVT.  Currently asymptomatic.      7. Fatigue and tiredness.  Fatigue and  tiredness has improved with adjustment of device and Ranexa.  He is currently on 500 mg twice a day.    8.  Chronic stable angina.  Patient has responded very well to Ranexa we will continue Ranexa 500 twice a day at this time.  Also issue related to Ranexa discussed with him.    Follow-up 6 months.    Thank you for your consultation.  If you have any question please call me at [phone]    Counseling :  A description of the counseling.  Goals and Barriers.  Patient's ability to self care: Yes  Medication side effect reviewed with patient in detail and all their questions answered to their satisfaction.      Primary Care Physician : Shemar Shepard DO    HPI :     Tucker Carvajal is a 68 y.o. year old male who was referred by primary care doctor for for his history of cardiac disease.  Patient who used to follow up with [facility] and has been evaluated by [facility] in 2017 for 2nd opinion has medical history significant for coronary artery disease, dyslipidemia, DJD and GERD along with obstructive sleep apnea.  He had a catheterization done in June of 2017 which shows he had a moderate ostial circumflex disease and he chose to have medical therapy.  At that time he was seen by  [facility] for 2nd opinion and various options were discussed with him including continue medical Rx, angioplasty of ostial circumflex which could be done but slightly high risk or single-vessel bypass surgery.  At that time he was asymptomatic and he was continued medical therapy.    Patient was recently in [place] where he had an episode of palpitation and fast heartbeat.  He was kept overnight ruled out for acute coronary syndrome and he was advised  to follow up with Cardiology here.  He is known to have history of irregular heartbeat.  EKG shows normal sinus with first-degree AV block and frequent PVCs.  PVCs are also in the form of bigeminy and fusion beats are noted.  He is otherwise very active he denies any chest pain any shortness of breath.  He is able to do his activities without any problems.  No nausea no vomiting no PND no orthopnea no syncope no other issues.    No recent surgery.  He has a previous history of surgery of gallbladder removal as well as neck fusion    He does not smoke but occasionally he will smoke a cigar maybe once every 3 months        09/01/2021.      Above reviewed.  Patient came for follow-up he is doing well he underwent cardiac catheterization we found he has ostial circumflex 50- 70% visually but IFR was 0.96.  It was felt patient does not need stenting.  His nuclear stress test also shows no ischemia.  He denies any symptoms he is very active he does exercise without any problems.  But he does have history of palpitations and he was found to have episode of NSVT, as well as intermittent third-degree AV block and junctional escape.  He was evaluated by EP and was recommended dual-chamber pacemaker but they would like to have him cardiac catheterization done fast.  His medications reviewed today EKG shows heart rate 64 beats per minute rare PVCs.  No nausea no vomiting he is not on any AV node blocking drug because of his episodes of bradycardia.  He has multiple questions regarding his angiogram and related to his rhythm problem and pacemaker which were discussed openly.  No other cardiovascular issues at this time.    01/18/2022.    Above reviewed.  Patient came for follow-up.  He is doing well now.  He had Medtronic dual-chamber ICD paced after EP study as he was having intermittent AV block and episodes of NSVT.  He had an episode of VT during EP study.  He does have history of coronary artery disease with ostial  circumflex 50-70% stenosis visually but IFR was 0.96.  It was felt that patient does not need stent and he has no symptoms.  He denies any new complaints.  Actually since his procedure he is feeling lot better he has no nausea no vomiting no fever no chills no other issues.  He is compliant with medications he is taking aspirin statins and metoprolol.  He is tolerating his Crestor well no other cardiovascular issues.  His vitals has been stable.  He has blood test done on October 2021 where cholesterol profile was acceptable electrolytes were acceptable his ALT was slightly elevated but still less than 2 times the normal.  His device was interrogated in November 2021. He had episode of NSVT but otherwise device is functioning adequately.  His low rate is set at 50     05/03/2022.    Above reviewed.  Patient came for follow-up.  He had a medical history significant for dual-chamber ICD which was placed after EP study as he was found to have intermittent AV block and episode of NSVT.  Recently he was noted to have episode of atrial fibrillation and started on antithrombotic therapy.  He had history of coronary artery disease status post cardiac catheterization which shows ostial circumflex 50-70% stenosis but IFR was 0.96.  Patient did not need stent and he has no symptoms.  He has blood test done in April 2022 which has been acceptable.  His other medical history significant for dyslipidemia, hypertension, and peptic ulcer disease but no recent bleeding.  His device interrogation shows patient has AFib burden of 2.5% which was new.  He does have history of obstructive sleep apnea and he is compliant with his CPAP machine he is overall feeling well.  He has multiple questions regarding blood thinners which were answered he does have history of hemorrhoid but not active at this time.  No issues at this time his device interrogation reviewed with him in detail.    11/29/2022.    Above reviewed.  Patient came for  follow-up.  He had a medical history significant for dual-chamber ICD which was placed after EP study add he was found to have intermittent AV block and episode of NSVT.  He still continues have brief episode of NSVT.  Has been evaluated by EP.  He is noted to have atrial fibrillation and started on antithrombotic therapy.  He does have history of coronary artery disease status post catheterization which shows ostial circumflex 50-70% stenosis where IFR was 0.96.  He did not need any stent and he has no symptoms.  He had blood test done on 10/04/2022 which has been acceptable cholesterol profile is stable blood pressure has been stable.  He has other medical history significant for dyslipidemia, hypertension, peptic ulcer disease with no recent bleeding.  His device interrogation was done in October 2022.  He had 5 episode of NSVT.  He was put on sotalol by the EP.  And he did not need any therapy.  No AFib was noted at distal interrogation.  Patient is complaining about fatigue and tiredness since he has device placed.  He feels that when he exercise he cannot ramp up his heart rate.  Spoke to [facility] they will interrogated tomorrow.  Otherwise no other issues.    7/26/2023.    Above reviewed.  Patient came for follow-up he is doing well.  He was evaluated for second opinion for his coronary artery disease with a intermediate 50 to 70% ostial circumflex stenosis.  And he was put on Ranexa which really helped him.  He has medical history significant for dual-chamber ICD for NSVT, intermittent AV block, coronary artery disease, dyslipidemia and hypertension.  He was put on sotalol by EP.  He still have occasional episodes of NSVT with every cycle length 400 ms.  Today EKG shows he is AV paced.  His vitals are stable his exercise capacity has improved.  He is able to do his activities without any chest tightness.  He has a blood work done 4/5/2023 which shows his electrolytes are stable cholesterol profile is  acceptable no other cardiovascular issues.  His device is of [employer].    2/12/2024.    Above reviewed.  Patient came for follow-up he is doing well.  He has medical history significant for episodes of NSVT s/p Medtronic ICD and pacemaker, on sotalol, history of intermediate 50 to 70% ostial circumflex lesion being managed with medical therapy with Ranexa and cholesterol medications, dyslipidemia, intermittent AV block s/p pacemaker, hypertensive heart disease who came for follow-up.  He is doing well.  He still occasionally get episode of chest pain when he has cold air but otherwise he can do his exercise without any problem.  There are days he feels these episodes more frequently and there are days he can do in his normal level of exercise without any problems.  His blood test in November 2023 was acceptable and his medications reviewed.  Device interrogation shows that he had brief episodes of NSVT for 5-6 beat which is mostly asymptomatic.  No other cardiovascular issues.      Review of Systems   Constitutional:  Negative for activity change, chills, diaphoresis, fever and unexpected weight change.   HENT:  Negative for congestion.    Eyes:  Negative for discharge and redness.   Respiratory:  Negative for cough, chest tightness, shortness of breath and wheezing.    Cardiovascular: Negative.  Negative for chest pain, palpitations and leg swelling.        Occasional episodes of chest pain   Gastrointestinal:  Negative for abdominal pain, diarrhea and nausea.   Endocrine: Negative.    Genitourinary:  Negative for decreased urine volume and urgency.   Musculoskeletal:  Positive for arthralgias. Negative for back pain and gait problem.   Skin:  Negative for rash and wound.   Allergic/Immunologic: Negative.    Neurological:  Negative for dizziness, seizures, syncope, weakness, light-headedness and headaches.   Hematological: Negative.    Psychiatric/Behavioral:  Negative for agitation and confusion. The  patient is not nervous/anxious.        Historical Information   Past Medical History:   Diagnosis Date   • Abrasion of left foot     LAST ASSESSED: 9/23/13   • Achilles tendinitis, unspecified leg     LAST ASSESSED: 11/30/12   • Allergic rhinitis     LAST ASSESSED: 11/25/13   • Allergic rhinitis 06/25/2008    LAST ASSESSED: 6/25/08   • Arthritis    • Cervicalgia 04/22/2011    LAST ASSESSED: 4/22/11   • Chronic pain disorder    • Colon polyp    • Coronary artery disease    • CPAP (continuous positive airway pressure) dependence    • Dysfunction of left eustachian tube     LAST ASSESSED: 9/23/13   • Epistaxis     LAST ASSESSED: 5/27/15   • First degree atrioventricular block     LAST ASSESSED: 7/10/17   • Gastric ulcer 10/07/2011    LAST ASSESSED: 3/9/15   • GERD (gastroesophageal reflux disease)    • Hearing aid worn    • Hemorrhoids 05/13/2011    LAST ASSESSED: 5/13/11   • Hyperlipidemia    • Hypertension    • Irregular heart beat    • Long term use of drug     LAST ASSESSED: 10/11/12   • Myalgia 12/21/2007    LAST ASSESSED: 12/21/07   • Myositis 12/21/2007    LAST ASSESSED: 12/21/07   • Numbness and tingling of foot     LAST ASSESSED: 3/9/15   • Pacemaker    • Premature ventricular beats     states cardiologist ordered Zio ambulatory cardiac monitor   • Sleep apnea    • Wears glasses      Past Surgical History:   Procedure Laterality Date   • CARDIAC CATHETERIZATION  2017   • CARDIAC ELECTROPHYSIOLOGY PROCEDURE N/A 10/25/2021    Procedure: CARDIAC EPS;  Surgeon: Uday Desir MD;  Location: [facility];  Service: Cardiology   • CARDIAC ELECTROPHYSIOLOGY PROCEDURE Left 10/25/2021    Procedure: Cardiac icd implant;  Surgeon: Uday Desir MD;  Location: [facility];  Service: Cardiology   • CARDIAC PACEMAKER PLACEMENT     • CARDIOVERSION N/A 10/25/2021    Procedure: Cardioversion;  Surgeon: Uday Desir MD;  Location: [facility];  Service: Cardiology   • CATARACT EXTRACTION Right    • CERVICAL  FUSION      pt thinks C4-C5   • CHOLECYSTECTOMY      LAPAROSCOPIC; LAST ASSESSED: 10/17/17   • COLONOSCOPY      COMPLETE; 3-4 YEARS AGO BY [facility]; LAST ASSESSED: 14   • MYRINGOTOMY W/ TUBES      WITH VENTILATING TUBE INSERTION   • ND HEMORRHOIDECTOMY INT & XTRNL 2/> COLUMN/GERBER N/A 2023    Procedure: HEMORRHOIDECTOMY EXCISION;  Surgeon: Clovis Leos MD;  Location: [facility];  Service: Colorectal   • ND NEUROPLASTY &/TRANSPOS MEDIAN NRV CARPAL TUNNE Left 2021    Procedure: RELEASE CARPAL TUNNEL;  Surgeon: Luis Rubio MD;  Location: [facility];  Service: Orthopedics   • ND NEUROPLASTY &/TRANSPOS MEDIAN NRV CARPAL TUNNE Right 10/14/2022    Procedure: RELEASE CARPAL TUNNEL;  Surgeon: Luis Rubio MD;  Location: [facility];  Service: Orthopedics   • ND TENDON SHEATH INCISION Right 10/14/2022    Procedure: RELEASE TRIGGER FINGER,RING AND LONG TENOSYNOVECTOMY FDS +FDP;  Surgeon: Luis Rubio MD;  Location: [facility];  Service: Orthopedics   • VASECTOMY       Social History     Substance and Sexual Activity   Alcohol Use Yes   • Alcohol/week: 7.0 standard drinks of alcohol   • Types: 7 Glasses of wine per week    Comment: few times a week     Social History     Substance and Sexual Activity   Drug Use No     Social History     Tobacco Use   Smoking Status Former   • Current packs/day: 0.00   • Types: Cigarettes   • Quit date:    • Years since quittin.1   Smokeless Tobacco Never     Family History:   Family History   Problem Relation Age of Onset   • Cancer Mother         SOFT TISSUE CANCER ON RT SIDE CHEST WALL AND    • Prostate cancer Maternal Uncle        Meds/Allergies     Allergies   Allergen Reactions   • Amoxicillin Anaphylaxis   • Augmentin [Amoxicillin-Pot Clavulanate] Anaphylaxis   • Acetaminophen      Rapid heart rate   • Cherry Flavor - Food Allergy Other (See Comments)     States allergy is to raw cherries difficulty breathing   • Pollen  Extract    • Dye [Iodinated Contrast Media] Itching       Current Outpatient Medications:   •  aspirin 325 mg tablet, Take 325 mg by mouth daily, Disp: , Rfl:   •  Coenzyme Q10 (COQ10) 200 MG CAPS, Take 1 tablet by mouth daily, Disp: , Rfl:   •  COLLAGEN-CHOND-HYALURONIC ACID PO, Take 1 capsule by mouth in the morning, Disp: , Rfl:   •  finasteride (PROSCAR) 5 mg tablet, Take 1 tablet (5 mg total) by mouth daily, Disp: 90 tablet, Rfl: 1  •  fluticasone (VERAMYST) 27.5 MCG/SPRAY nasal spray, 2 sprays into each nostril daily, Disp: , Rfl:   •  Glucosamine-Chondroitin (GLUCOSAMINE CHONDR COMPLEX PO), Take 1 tablet by mouth daily, Disp: , Rfl:   •  Omega-3 Fatty Acids (FISH OIL) 1000 MG CPDR, Take by mouth daily, Disp: , Rfl:   •  pantoprazole (PROTONIX) 40 mg tablet, TAKE 1 TABLET BY MOUTH EVERY DAY, Disp: 90 tablet, Rfl: 0  •  ranolazine (RANEXA) 500 mg 12 hr tablet, Take 1 tablet (500 mg total) by mouth 2 (two) times a day, Disp: 180 tablet, Rfl: 1  •  rosuvastatin (CRESTOR) 10 MG tablet, TAKE 1 TABLET BY MOUTH EVERY DAY, Disp: 90 tablet, Rfl: 1  •  sotalol (BETAPACE) 80 mg tablet, TAKE 1 TABLET BY MOUTH EVERY 12 HOURS, Disp: 180 tablet, Rfl: 3  •  Turmeric 500 MG CAPS, Take by mouth 3 (three) times a week, Disp: , Rfl:   •  EPINEPHrine (EpiPen 2-Leroy) 0.3 mg/0.3 mL SOAJ, Inject 0.3 mL (0.3 mg total) into a muscle once for 1 dose, Disp: 0.6 mL, Rfl: 0  •  mefloquine (LARIAM) 250 MG tablet, Take 250 mg once weekly starting 2 days prior to travel Do not start before November 26, 2023., Disp: 3 tablet, Rfl: 0    Vitals: Blood pressure 130/80, pulse 67, height 6' (1.829 m), weight 99.2 kg (218 lb 9.6 oz), SpO2 98%.  ?  Body mass index is 29.65 kg/m².  Wt Readings from Last 3 Encounters:   02/12/24 99.2 kg (218 lb 9.6 oz)   01/17/24 98 kg (216 lb)   10/20/23 97.5 kg (215 lb)     Vitals:    02/12/24 1456   Weight: 99.2 kg (218 lb 9.6 oz)     BP Readings from Last 3 Encounters:   02/12/24 130/80   01/17/24 122/68   10/20/23  124/78         Physical Exam  Constitutional:       General: He is not in acute distress.     Appearance: He is well-developed. He is not diaphoretic.   Neck:      Thyroid: No thyromegaly.      Vascular: No JVD.      Trachea: No tracheal deviation.   Cardiovascular:      Rate and Rhythm: Normal rate and regular rhythm.      Heart sounds: S1 normal and S2 normal. Heart sounds not distant. Murmur heard.      Systolic (ejection) murmur is present with a grade of 2/6.      No friction rub. No gallop. No S3 or S4 sounds.   Pulmonary:      Effort: Pulmonary effort is normal. No respiratory distress.      Breath sounds: Normal breath sounds. No wheezing or rales.   Chest:      Chest wall: No tenderness.   Abdominal:      General: Bowel sounds are normal. There is no distension.      Palpations: Abdomen is soft.      Tenderness: There is no abdominal tenderness.   Musculoskeletal:         General: No deformity.      Cervical back: Neck supple.   Skin:     General: Skin is warm and dry.      Coloration: Skin is not pale.      Findings: No rash.   Neurological:      Mental Status: He is alert and oriented to person, place, and time.   Psychiatric:         Behavior: Behavior normal.         Judgment: Judgment normal.             Diagnostic Studies Review Cardio:   cardiac catheterization done in June 2017 at [facility] shows he had moderate stenosis of his ostial circumflex.  Had a nonobstructive disease in other arteries and had a normal LV systolic function.      Repeat cardiac catheterization done 08/19/2021 shows patient has nonobstructive disease involving RCA and LAD.  50- 70% ostial circumflex and IFR was 0.95 EF is acceptable.      Nuclear stress test done in October 2020.  Nuclear stress done in October 2020 patient exercised for about 9 minutes.  Fixed defect was seen no ischemia noted.  EF was 47%      Event monitor has shows episodes of pauses, intermittent 3 AV block, episode of NSVT was evaluated by  "EP.    EKG:  Twelve lead EKG done 06/30/2021 shows normal sinus rhythm first-degree AV block with PVCs.  No significant change from previous EKG done at his primary care doctor's office     12 lead EKG done 09/01/2021 shows Normal sinus rhythm first-degree AV block heart rate 64 beats per minute.  Rare PVCs noted.    Twelve-lead EKG done 7/26/2023 shows AV paced heart rate 77 bpm.    Twelve-lead EKG done 2/12/2024 shows atrial sensed ventricular paced heart rate 67 bpm.          Imaging:  Chest X-Ray:   No Chest XR results available for this patient.    CT-scan of the chest:     CTA dissection protocol chest abdomen pelvis w wo contrast    Result Date: 10/12/2020  Impression 1.  No aortic dissection or aneurysm. 2.  No acute finding within chest, abdomen, and pelvis. 3.  Colonic diverticulosis. 4.  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  Recommend correlation/comparison with prior outside imaging. Workstation performed: [id number]     Lab Review   Lab Results   Component Value Date    WBC 5.8 11/07/2023    HGB 16.6 11/07/2023    HCT 48.6 11/07/2023    MCV 93.8 11/07/2023    RDW 12.3 11/07/2023     11/07/2023     BMP:  Lab Results   Component Value Date    SODIUM 140 11/07/2023    K 5.3 11/07/2023     11/07/2023    CO2 29 11/07/2023    BUN 17 11/07/2023    CREATININE 1.05 11/07/2023    GLUC 105 (H) 11/07/2023    GLUF 102 (H) 10/04/2022    CALCIUM 10.1 11/07/2023    EGFR 77 11/07/2023    MG 2.1 06/15/2022     LFT:  Lab Results   Component Value Date    AST 25 11/07/2023    ALT 38 11/07/2023    ALKPHOS 67 11/07/2023    TP 6.8 11/07/2023    ALB 4.4 11/07/2023      Lab Results   Component Value Date    SKH4MOVKQMDX 2.052 10/04/2022     No components found for: \"TSH3\"  Lab Results   Component Value Date    HGBA1C 5.4 11/07/2023     Lipid Profile:   Lab Results   Component Value Date    CHOLESTEROL " "148 11/07/2023    HDL 41 11/07/2023    LDLCALC 88 11/07/2023    TRIG 94 11/07/2023     Lab Results   Component Value Date    CHOLESTEROL 148 11/07/2023    CHOLESTEROL 118 04/05/2023     Lab Results   Component Value Date    TROPONINI <0.02 10/13/2020           Dr. Robert Vences MD [facility]      \"This note has been constructed using a voice recognition system.Therefore there may be syntax, spelling, and/or grammatical errors. Please call if you have any questions. \"  "

## 2024-04-10 ENCOUNTER — TELEPHONE (OUTPATIENT)
Dept: NEUROLOGY | Facility: CLINIC | Age: 69
End: 2024-04-10

## 2024-04-10 NOTE — TELEPHONE ENCOUNTER
Sonal from [employer] trying to reach sleep center. Provided phone number and transferred. [phone].

## 2024-04-11 ENCOUNTER — TELEPHONE (OUTPATIENT)
Age: 69
End: 2024-04-11

## 2024-04-11 NOTE — TELEPHONE ENCOUNTER
Faxed labs to [facility] [phone] and explained to patient how to locate them within his MyChart.

## 2024-04-13 LAB
ALBUMIN SERPL-MCNC: 4.3 G/DL (ref 3.6–5.1)
ALBUMIN/GLOB SERPL: 1.9 (CALC) (ref 1–2.5)
ALP SERPL-CCNC: 59 U/L (ref 35–144)
ALT SERPL-CCNC: 30 U/L (ref 9–46)
AST SERPL-CCNC: 21 U/L (ref 10–35)
BASOPHILS # BLD AUTO: 38 CELLS/UL (ref 0–200)
BASOPHILS NFR BLD AUTO: 0.8 %
BILIRUB SERPL-MCNC: 1.2 MG/DL (ref 0.2–1.2)
BUN SERPL-MCNC: 12 MG/DL (ref 7–25)
BUN/CREAT SERPL: ABNORMAL (CALC) (ref 6–22)
CALCIUM SERPL-MCNC: 9.5 MG/DL (ref 8.6–10.3)
CHLORIDE SERPL-SCNC: 105 MMOL/L (ref 98–110)
CHOLEST SERPL-MCNC: 154 MG/DL
CHOLEST/HDLC SERPL: 3.4 (CALC)
CO2 SERPL-SCNC: 26 MMOL/L (ref 20–32)
CREAT SERPL-MCNC: 0.89 MG/DL (ref 0.7–1.35)
EOSINOPHIL # BLD AUTO: 72 CELLS/UL (ref 15–500)
EOSINOPHIL NFR BLD AUTO: 1.5 %
ERYTHROCYTE [DISTWIDTH] IN BLOOD BY AUTOMATED COUNT: 12.8 % (ref 11–15)
GFR/BSA.PRED SERPLBLD CYS-BASED-ARV: 93 ML/MIN/1.73M2
GLOBULIN SER CALC-MCNC: 2.3 G/DL (CALC) (ref 1.9–3.7)
GLUCOSE SERPL-MCNC: 117 MG/DL (ref 65–99)
HBA1C MFR BLD: 5.7 % OF TOTAL HGB
HCT VFR BLD AUTO: 46.8 % (ref 38.5–50)
HDLC SERPL-MCNC: 45 MG/DL
HGB BLD-MCNC: 16.2 G/DL (ref 13.2–17.1)
LDLC SERPL CALC-MCNC: 89 MG/DL (CALC)
LYMPHOCYTES # BLD AUTO: 1944 CELLS/UL (ref 850–3900)
LYMPHOCYTES NFR BLD AUTO: 40.5 %
MCH RBC QN AUTO: 32.4 PG (ref 27–33)
MCHC RBC AUTO-ENTMCNC: 34.6 G/DL (ref 32–36)
MCV RBC AUTO: 93.6 FL (ref 80–100)
MONOCYTES # BLD AUTO: 605 CELLS/UL (ref 200–950)
MONOCYTES NFR BLD AUTO: 12.6 %
NEUTROPHILS # BLD AUTO: 2141 CELLS/UL (ref 1500–7800)
NEUTROPHILS NFR BLD AUTO: 44.6 %
NONHDLC SERPL-MCNC: 109 MG/DL (CALC)
PLATELET # BLD AUTO: 179 THOUSAND/UL (ref 140–400)
PMV BLD REES-ECKER: 10.5 FL (ref 7.5–12.5)
POTASSIUM SERPL-SCNC: 4.9 MMOL/L (ref 3.5–5.3)
PROT SERPL-MCNC: 6.6 G/DL (ref 6.1–8.1)
RBC # BLD AUTO: 5 MILLION/UL (ref 4.2–5.8)
SODIUM SERPL-SCNC: 139 MMOL/L (ref 135–146)
TRIGL SERPL-MCNC: 107 MG/DL
TSH SERPL-ACNC: 3.87 MIU/L (ref 0.4–4.5)
WBC # BLD AUTO: 4.8 THOUSAND/UL (ref 3.8–10.8)

## 2024-04-16 ENCOUNTER — RA CDI HCC (OUTPATIENT)
Dept: OTHER | Facility: HOSPITAL | Age: 69
End: 2024-04-16

## 2024-04-23 ENCOUNTER — TELEPHONE (OUTPATIENT)
Dept: FAMILY MEDICINE CLINIC | Facility: CLINIC | Age: 69
End: 2024-04-23

## 2024-04-23 ENCOUNTER — OFFICE VISIT (OUTPATIENT)
Dept: FAMILY MEDICINE CLINIC | Facility: CLINIC | Age: 69
End: 2024-04-23
Payer: MEDICARE

## 2024-04-23 VITALS
WEIGHT: 215 LBS | OXYGEN SATURATION: 98 % | DIASTOLIC BLOOD PRESSURE: 78 MMHG | RESPIRATION RATE: 16 BRPM | HEIGHT: 72 IN | HEART RATE: 70 BPM | SYSTOLIC BLOOD PRESSURE: 126 MMHG | BODY MASS INDEX: 29.12 KG/M2

## 2024-04-23 DIAGNOSIS — I49.5 SICK SINUS SYNDROME (HCC): Primary | ICD-10-CM

## 2024-04-23 DIAGNOSIS — E78.2 MIXED HYPERLIPIDEMIA: ICD-10-CM

## 2024-04-23 DIAGNOSIS — G47.33 OSA AND COPD OVERLAP SYNDROME (HCC): ICD-10-CM

## 2024-04-23 DIAGNOSIS — J44.9 OSA AND COPD OVERLAP SYNDROME (HCC): ICD-10-CM

## 2024-04-23 DIAGNOSIS — K21.9 GASTROESOPHAGEAL REFLUX DISEASE, UNSPECIFIED WHETHER ESOPHAGITIS PRESENT: ICD-10-CM

## 2024-04-23 DIAGNOSIS — Z12.11 COLON CANCER SCREENING: ICD-10-CM

## 2024-04-23 DIAGNOSIS — Z12.5 PROSTATE CANCER SCREENING: ICD-10-CM

## 2024-04-23 DIAGNOSIS — I25.118 CORONARY ARTERY DISEASE OF NATIVE ARTERY OF NATIVE HEART WITH STABLE ANGINA PECTORIS (HCC): ICD-10-CM

## 2024-04-23 DIAGNOSIS — R73.01 IMPAIRED FASTING GLUCOSE: ICD-10-CM

## 2024-04-23 DIAGNOSIS — I48.0 PAROXYSMAL ATRIAL FIBRILLATION (HCC): ICD-10-CM

## 2024-04-23 PROBLEM — K64.8 INTERNAL BLEEDING HEMORRHOIDS: Status: RESOLVED | Noted: 2023-07-27 | Resolved: 2024-04-23

## 2024-04-23 PROBLEM — I49.8 JUNCTIONAL RHYTHM: Status: RESOLVED | Noted: 2021-08-16 | Resolved: 2024-04-23

## 2024-04-23 PROCEDURE — G2211 COMPLEX E/M VISIT ADD ON: HCPCS | Performed by: FAMILY MEDICINE

## 2024-04-23 PROCEDURE — 99215 OFFICE O/P EST HI 40 MIN: CPT | Performed by: FAMILY MEDICINE

## 2024-04-23 RX ORDER — PANTOPRAZOLE SODIUM 40 MG/1
40 TABLET, DELAYED RELEASE ORAL DAILY
Qty: 90 TABLET | Refills: 3 | Status: SHIPPED | OUTPATIENT
Start: 2024-04-23

## 2024-04-23 NOTE — PROGRESS NOTES
Subjective:      Patient ID: Tucker Carvajal is a 68 y.o. male.    68-year-old male with past medical history of heart disease, hypertension, hyperlipidemia, left ventricular ejection fraction of 38% presents for follow-up of chronic conditions.  Patient does follow up closely with Cardiology and had his follow-up appointment in February.  Patient remains on Ranexa.  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  Patient did not bring this up to cardiologist.  Cardiologist to discuss possibly increasing his dose of Ranexa.  Patient otherwise feels well.  No diaphoresis or nausea.  He did have nuclear stress testing performed in 2023 which did not show any areas of ischemia.  Labs reviewed which showed hemoglobin A1c of 5.7%, fasting glucose 108, slight increase in his cholesterol with LDL cholesterol of 89, HDL of 45.  He does have ICD in place        Past Medical History:   Diagnosis Date   • Abrasion of left foot     LAST ASSESSED: 9/23/13   • Achilles tendinitis, unspecified leg     LAST ASSESSED: 11/30/12   • Allergic rhinitis     LAST ASSESSED: 11/25/13   • Allergic rhinitis 06/25/2008    LAST ASSESSED: 6/25/08   • Arthritis    • Cervicalgia 04/22/2011    LAST ASSESSED: 4/22/11   • Chronic pain disorder    • Colon polyp    • Coronary artery disease    • CPAP (continuous positive airway pressure) dependence    • Dysfunction of left eustachian tube     LAST ASSESSED: 9/23/13   • Epistaxis     LAST ASSESSED: 5/27/15   • First degree atrioventricular block     LAST ASSESSED: 7/10/17   • Gastric ulcer 10/07/2011    LAST ASSESSED: 3/9/15   • GERD (gastroesophageal reflux disease)    • Hearing aid worn    • Hemorrhoids 05/13/2011    LAST ASSESSED: 5/13/11   • Hyperlipidemia    • Hypertension    • Irregular heart beat    • Long term use of drug     LAST ASSESSED: 10/11/12   • Myalgia 12/21/2007     LAST ASSESSED: 07   • Myositis 2007    LAST ASSESSED: 07   • Numbness and tingling of foot     LAST ASSESSED: 3/9/15   • Pacemaker    • Premature ventricular beats     states cardiologist ordered Zio ambulatory cardiac monitor   • Sleep apnea    • Wears glasses        Family History   Problem Relation Age of Onset   • Cancer Mother         SOFT TISSUE CANCER ON RT SIDE CHEST WALL AND    • Prostate cancer Maternal Uncle        Past Surgical History:   Procedure Laterality Date   • CARDIAC CATHETERIZATION     • CARDIAC ELECTROPHYSIOLOGY PROCEDURE N/A 10/25/2021    Procedure: CARDIAC EPS;  Surgeon: Uday Desir MD;  [facility]: [facility];  Service: Cardiology   • CARDIAC ELECTROPHYSIOLOGY PROCEDURE Left 10/25/2021    Procedure: Cardiac icd implant;  Surgeon: Uday Desir MD;  [facility]: [facility];  Service: Cardiology   • CARDIAC PACEMAKER PLACEMENT     • CARDIOVERSION N/A 10/25/2021    Procedure: Cardioversion;  Surgeon: Uday Desir MD;  Location: [facility];  Service: Cardiology   • CATARACT EXTRACTION Right    • CERVICAL FUSION      pt thinks C4-C5   • CHOLECYSTECTOMY      LAPAROSCOPIC; LAST ASSESSED: 10/17/17   • COLONOSCOPY      COMPLETE; 3-4 YEARS AGO BY [facility]; LAST ASSESSED: 14   • MYRINGOTOMY W/ TUBES      WITH VENTILATING TUBE INSERTION   • CO HEMORRHOIDECTOMY INT & XTRNL 2/> COLUMN/GERBER N/A 2023    Procedure: HEMORRHOIDECTOMY EXCISION;  Surgeon: Clovis Leos MD;  Location: [facility];  Service: Colorectal   • CO NEUROPLASTY &/TRANSPOS MEDIAN NRV CARPAL TUNNE Left 2021    Procedure: RELEASE CARPAL TUNNEL;  Surgeon: Luis Rubio MD;  Location: [facility];  Service: Orthopedics   • CO NEUROPLASTY &/TRANSPOS MEDIAN NRV CARPAL TUNNE Right 10/14/2022    Procedure: RELEASE CARPAL TUNNEL;  Surgeon: Luis Rubio MD;  Location: [facility];  Service: Orthopedics   • CO TENDON SHEATH INCISION Right 10/14/2022    Procedure:  RELEASE TRIGGER FINGER,RING AND LONG TENOSYNOVECTOMY FDS +FDP;  Surgeon: Luis Rubio MD;  Location: [facility];  Service: Orthopedics   • VASECTOMY          reports that he quit smoking about 34 years ago. His smoking use included cigarettes. He has never used smokeless tobacco. He reports current alcohol use of about 7.0 standard drinks of alcohol per week. He reports that he does not use drugs.      Current Outpatient Medications:   •  aspirin 325 mg tablet, Take 325 mg by mouth daily, Disp: , Rfl:   •  Coenzyme Q10 (COQ10) 200 MG CAPS, Take 1 tablet by mouth daily, Disp: , Rfl:   •  COLLAGEN-CHOND-HYALURONIC ACID PO, Take 1 capsule by mouth in the morning, Disp: , Rfl:   •  finasteride (PROSCAR) 5 mg tablet, Take 1 tablet (5 mg total) by mouth daily, Disp: 90 tablet, Rfl: 1  •  fluticasone (VERAMYST) 27.5 MCG/SPRAY nasal spray, 2 sprays into each nostril daily, Disp: , Rfl:   •  Glucosamine-Chondroitin (GLUCOSAMINE CHONDR COMPLEX PO), Take 1 tablet by mouth daily, Disp: , Rfl:   •  Omega-3 Fatty Acids (FISH OIL) 1000 MG CPDR, Take by mouth daily, Disp: , Rfl:   •  pantoprazole (PROTONIX) 40 mg tablet, Take 1 tablet (40 mg total) by mouth daily, Disp: 90 tablet, Rfl: 3  •  ranolazine (RANEXA) 500 mg 12 hr tablet, Take 1 tablet (500 mg total) by mouth 2 (two) times a day, Disp: 180 tablet, Rfl: 1  •  rosuvastatin (CRESTOR) 10 MG tablet, TAKE 1 TABLET BY MOUTH EVERY DAY, Disp: 90 tablet, Rfl: 1  •  sotalol (BETAPACE) 80 mg tablet, TAKE 1 TABLET BY MOUTH EVERY 12 HOURS, Disp: 180 tablet, Rfl: 3  •  Turmeric 500 MG CAPS, Take by mouth 3 (three) times a week, Disp: , Rfl:   •  EPINEPHrine (EpiPen 2-Leroy) 0.3 mg/0.3 mL SOAJ, Inject 0.3 mL (0.3 mg total) into a muscle once for 1 dose, Disp: 0.6 mL, Rfl: 0  •  mefloquine (LARIAM) 250 MG tablet, Take 250 mg once weekly starting 2 days prior to travel Do not start before November 26, 2023., Disp: 3 tablet, Rfl: 0    The following portions of the patient's history  were reviewed and updated as appropriate: allergies, current medications, past family history, past medical history, past social history, past surgical history and problem list.    Review of Systems   Constitutional: Negative.  Negative for unexpected weight change.   HENT: Negative.     Eyes: Negative.    Respiratory: Negative.  Negative for shortness of breath.    Cardiovascular:  Positive for chest pain (With exertion which is alleviated with rest). Negative for palpitations and leg swelling.   Gastrointestinal: Negative.  Negative for abdominal pain, blood in stool, constipation and rectal pain.   Endocrine: Negative.    Genitourinary: Negative.    Musculoskeletal: Negative.    Skin: Negative.    Allergic/Immunologic: Negative.    Neurological: Negative.    Hematological: Negative.    Psychiatric/Behavioral: Negative.     All other systems reviewed and are negative.          Objective:    /78   Pulse 70   Resp 16   Ht 6' (1.829 m)   Wt 97.5 kg (215 lb)   SpO2 98%   BMI 29.16 kg/m²      Physical Exam  Vitals and nursing note reviewed.   Constitutional:       General: He is not in acute distress.     Appearance: Normal appearance. He is well-developed and normal weight. He is not ill-appearing.   HENT:      Head: Normocephalic and atraumatic.      Right Ear: Tympanic membrane, ear canal and external ear normal.      Left Ear: Tympanic membrane, ear canal and external ear normal.      Nose: Nose normal.      Mouth/Throat:      Mouth: Mucous membranes are moist.   Eyes:      Extraocular Movements: Extraocular movements intact.      Conjunctiva/sclera: Conjunctivae normal.      Pupils: Pupils are equal, round, and reactive to light.   Cardiovascular:      Rate and Rhythm: Normal rate and regular rhythm.      Pulses: Normal pulses.      Heart sounds: Normal heart sounds. No murmur heard.  Pulmonary:      Effort: Pulmonary effort is normal.      Breath sounds: Normal breath sounds.   Abdominal:       General: Abdomen is flat. Bowel sounds are normal.      Palpations: Abdomen is soft.   Musculoskeletal:         General: Normal range of motion.      Cervical back: Normal range of motion and neck supple.   Skin:     General: Skin is warm and dry.   Neurological:      General: No focal deficit present.      Mental Status: He is alert and oriented to person, place, and time.   Psychiatric:         Mood and Affect: Mood normal.         Behavior: Behavior normal.         Thought Content: Thought content normal.         Judgment: Judgment normal.           Recent Results (from the past 1008 hour(s))   Lipid panel    Collection Time: 04/12/24  8:27 AM   Result Value Ref Range    Total Cholesterol 154 <200 mg/dL    HDL 45 > OR = 40 mg/dL    Triglycerides 107 <150 mg/dL    LDL Calculated 89 mg/dL (calc)    Chol HDLC Ratio 3.4 <5.0 (calc)    Non-HDL Cholesterol 109 <130 mg/dL (calc)   Comprehensive metabolic panel    Collection Time: 04/12/24  8:27 AM   Result Value Ref Range    Glucose, Random 117 (H) 65 - 99 mg/dL    BUN 12 7 - 25 mg/dL    Creatinine 0.89 0.70 - 1.35 mg/dL    eGFR 93 > OR = 60 mL/min/1.73m2    SL AMB BUN/CREATININE RATIO SEE NOTE: 6 - 22 (calc)    Sodium 139 135 - 146 mmol/L    Potassium 4.9 3.5 - 5.3 mmol/L    Chloride 105 98 - 110 mmol/L    CO2 26 20 - 32 mmol/L    Calcium 9.5 8.6 - 10.3 mg/dL    Protein, Total 6.6 6.1 - 8.1 g/dL    Albumin 4.3 3.6 - 5.1 g/dL    Globulin 2.3 1.9 - 3.7 g/dL (calc)    Albumin/Globulin Ratio 1.9 1.0 - 2.5 (calc)    TOTAL BILIRUBIN 1.2 0.2 - 1.2 mg/dL    Alkaline Phosphatase 59 35 - 144 U/L    AST 21 10 - 35 U/L    ALT 30 9 - 46 U/L   CBC and differential    Collection Time: 04/12/24  8:27 AM   Result Value Ref Range    White Blood Cell Count 4.8 3.8 - 10.8 Thousand/uL    Red Blood Cell Count 5.00 4.20 - 5.80 Million/uL    Hemoglobin 16.2 13.2 - 17.1 g/dL    HCT 46.8 38.5 - 50.0 %    MCV 93.6 80.0 - 100.0 fL    MCH 32.4 27.0 - 33.0 pg    MCHC 34.6 32.0 - 36.0 g/dL    RDW  12.8 11.0 - 15.0 %    Platelet Count 179 140 - 400 Thousand/uL    SL AMB MPV 10.5 7.5 - 12.5 fL    Neutrophils (Absolute) 2,141 1,500 - 7,800 cells/uL    Lymphocytes (Absolute) 1,944 850 - 3,900 cells/uL    Monocytes (Absolute) 605 200 - 950 cells/uL    Eosinophils (Absolute) 72 15 - 500 cells/uL    Basophils ABS 38 0 - 200 cells/uL    Neutrophils 44.6 %    Lymphocytes 40.5 %    Monocytes 12.6 %    Eosinophils 1.5 %    Basophils PCT 0.8 %   TSH, 3rd generation with Free T4 reflex    Collection Time: 04/12/24  8:27 AM   Result Value Ref Range    TSH W/RFX TO FREE T4 3.87 0.40 - 4.50 mIU/L   Hemoglobin A1c (w/out EAG)    Collection Time: 04/12/24  8:27 AM   Result Value Ref Range    Hemoglobin A1C 5.7 (H) <5.7 % of total Hgb       Assessment/Plan:    Sick sinus syndrome (HCC)  Pacemaker in place.  ICD in place.  Follow-up with cardiology who manages    Paroxysmal atrial fibrillation (HCC)  Patient remains on sotalol 80 mg twice daily for rate control.  Patient is not on full anticoagulation    Remains on aspirin 325 mg once daily.    -Follow-up with cardiology as scheduled    LENY and COPD overlap syndrome ([facility])  CPAP, follow-up with sleep specialist.    Impaired fasting glucose  Hemoglobin A1c of 5.7%.  Goal is less than 7%.  Fine.  Watch carbohydrates.  Repeat hemoglobin A1c in 6 months    Mixed hyperlipidemia  Goal LDL less than 70 given history of heart disease.  LDL cholesterol at 89.  Continue on Crestor 10 mg once daily    Coronary artery disease of native artery of native heart with stable angina pectoris (HCC)  Patient does follow-up with cardiology.  He remains on sotalol, aspirin, statin as well as Ranexa.  Question if it is worthwhile to increase his dose of Ranexa.  I did review his nuclear stress testing imaging with him.  No active evidence of ischemia on nuclear scan from 2023.  Sent message to cardiologist about possibly increasing his dose of Ranexa    Gastroesophageal reflux disease  Refill provided  of pantoprazole 40 mg once daily.  Prior gastroenterologist has retired    Prostate cancer screening  Screening PSA to be done with neck set of labs in 6 months    Colon cancer screening  Patient established with colorectal surgeon.  Message sent to colorectal surgery as the patient will be due for repeat colonoscopy.  Last colonoscopy did show hyperplastic polyp.  Last colonoscopy was done in 2019          Problem List Items Addressed This Visit        Cardiovascular and Mediastinum    Coronary artery disease of native artery of native heart with stable angina pectoris (HCC)     Patient does follow-up with cardiology.  He remains on sotalol, aspirin, statin as well as Ranexa.  Question if it is worthwhile to increase his dose of Ranexa.  I did review his nuclear stress testing imaging with him.  No active evidence of ischemia on nuclear scan from 2023.  Sent message to cardiologist about possibly increasing his dose of Ranexa         Paroxysmal atrial fibrillation (HCC)     Patient remains on sotalol 80 mg twice daily for rate control.  Patient is not on full anticoagulation    Remains on aspirin 325 mg once daily.    -Follow-up with cardiology as scheduled         Relevant Orders    CBC and differential    TSH, 3rd generation with Free T4 reflex    Sick sinus syndrome (HCC) - Primary     Pacemaker in place.  ICD in place.  Follow-up with cardiology who manages         Relevant Orders    CBC and differential       Respiratory    LENY and COPD overlap syndrome (HCC)     CPAP, follow-up with sleep specialist.            Digestive    Gastroesophageal reflux disease     Refill provided of pantoprazole 40 mg once daily.  Prior gastroenterologist has retired         Relevant Medications    pantoprazole (PROTONIX) 40 mg tablet       Endocrine    Impaired fasting glucose     Hemoglobin A1c of 5.7%.  Goal is less than 7%.  Fine.  Watch carbohydrates.  Repeat hemoglobin A1c in 6 months         Relevant Orders    Hemoglobin  A1C       Other    Colon cancer screening     Patient established with colorectal surgeon.  Message sent to colorectal surgery as the patient will be due for repeat colonoscopy.  Last colonoscopy did show hyperplastic polyp.  Last colonoscopy was done in 2019         Mixed hyperlipidemia     Goal LDL less than 70 given history of heart disease.  LDL cholesterol at 89.  Continue on Crestor 10 mg once daily         Relevant Orders    Lipid panel    Comprehensive metabolic panel    Prostate cancer screening     Screening PSA to be done with neck set of labs in 6 months         Relevant Orders    PSA, Total Screen       I have spent a total time of 44 minutes on 04/23/24 in caring for this patient including Diagnostic results, Prognosis, Risks and benefits of tx options, Instructions for management, Patient and family education, Importance of tx compliance, Risk factor reductions, Impressions, Counseling / Coordination of care, Documenting in the medical record, Reviewing / ordering tests, medicine, procedures  , Obtaining or reviewing history  , and Communicating with other healthcare professionals .

## 2024-04-23 NOTE — ASSESSMENT & PLAN NOTE
Patient established with colorectal surgeon.  Message sent to colorectal surgery as the patient will be due for repeat colonoscopy.  Last colonoscopy did show hyperplastic polyp.  Last colonoscopy was done in 2019

## 2024-04-23 NOTE — ASSESSMENT & PLAN NOTE
Patient does follow-up with cardiology.  He remains on sotalol, aspirin, statin as well as Ranexa.  Question if it is worthwhile to increase his dose of Ranexa.  I did review his nuclear stress testing imaging with him.  No active evidence of ischemia on nuclear scan from 2023.  Sent message to cardiologist about possibly increasing his dose of Ranexa

## 2024-04-23 NOTE — ASSESSMENT & PLAN NOTE
Hemoglobin A1c of 5.7%.  Goal is less than 7%.  Fine.  Watch carbohydrates.  Repeat hemoglobin A1c in 6 months

## 2024-04-23 NOTE — ASSESSMENT & PLAN NOTE
Goal LDL less than 70 given history of heart disease.  LDL cholesterol at 89.  Continue on Crestor 10 mg once daily

## 2024-04-23 NOTE — TELEPHONE ENCOUNTER
Please notify the patient that cardiologist responded that it should be fine for him to try taking 2 tablets of Ranexa during the day and 1 tablet in the evening    ----- Message from Robert Vences MD sent at 4/23/2024 10:18 AM EDT -----  Regarding: RE: ranexa      That should be okay  ----- Message -----  From: Shemar Shepard DO  Sent: 4/23/2024   9:50 AM EDT  To: Robert Vences MD  Subject: ranexa                                           Dr. Vences,    Still occasionally exertional chest discomfort.  You had follow-up in February and had discussed increasing dose of Ranexa.  Blood pressure is too good to support adding nitrate.  Can he double his dose of Ranexa in the day with just 1 at night?  Thanks    Justin

## 2024-04-24 NOTE — TELEPHONE ENCOUNTER
New Rx would have to come from cardiologist.  I do not prescribe Ranexa.  I told him that at the appointment

## 2024-05-02 ENCOUNTER — TELEPHONE (OUTPATIENT)
Age: 69
End: 2024-05-02

## 2024-05-02 NOTE — LETTER
Tucker Carvajal  [facility] [place] [address]        FLEXIBLE COLONOSCOPY INSTRUCTIONS  PLEASE NOTE...  AS OF JUNE 1, 2014, OUR OFFICE REQUIRES 72 HOURS NOTICE OF CANCELLATION/RESCHEDULE OF A PROCEDURE TO AVOID INCURRING A MISSED APPOINTMENT FEE.    Your Colonoscopy Procedure has been scheduled at:[address] [place] [address]     The Date of your Procedure is: September 6, 2024      The hospital facility will contact you the evening prior to your scheduled procedure with your arrival time.     Use the bowel preparation as directed.    Check with your family doctor if you are taking a blood thinner (Coumadin, Plavix, Xarelto, Pradaxa, Gingko biloba, Ginseng, Feverfew, [place]'s Wort).  We suggest stopping these for 3 days. Special instructions may be needed if you are taking aspirin or any aspirin-containing medication.  Check with your family physician.      If you are on DIABETIC MEDICATION (tablets or insulin) your doctor may make changes in your preparation.    Take all medications usual unless otherwise instructed.    As always, if you have any questions or concerns, feel free to call the office.  Our  staff will be happy to connect you with one of the nurses to answer any questions or address any concerns you have regarding your procedure.      Do not wear any jewelry the day of your procedure including wedding rings.    Arrangements must be made for a responsible party to drive you home from the procedure.  If you do not have a responsible family member or friend to drive you home, the procedure will not be done.  Vast or a taxi is not acceptable.    It is important you notify our office of any insurance changes prior to your procedure and, if necessary, supply us with referrals from your primary care physician.            COLONOSCOPY PREPARATION INSTRUCTIONS    Purchase (prescription not required):  · 238 gram bottle of Miralax®  (Glycolax®)  · 4 Dulcolax® (Bisacodyl) Laxative Tablets  · 64 oz. bottle of Gatorade® or your preference of a non-carbonated clear liquid - NOT RED OR PURPLE     One Day Prior to Colonoscopy Procedure  · Nothing to eat the day before your procedure, only clear liquids.  · It is important that you drink plenty of clear liquids throughout the day to prevent dehydration.    Clear Liquids include:  o Water/Iced Tea/Lemonade/Gatorade®/Black Coffee or tea (no milk or creamers  o Soft drinks: orange, ginger ale, cola, Pepsi®, Sprite®, 7Up®  o Nick-Aid® (lemonade or orange flavors only)  o Strained fruit juices without pulp such as apple, white grape, white cranberry  o Jell-O®, lemon, lime or orange (no fruit or toppings)  o Popsicles, Italian Ice (No Ice Cream, sherbets, or fruit bars)  o Chicken or beef bouillon/broth  DO NOT EAT OR DRINK ANYTHING RED OR PURPLE  DO NOT DRINK ANY ALCOHOLIC BEVERAGES  DIABETIC PATIENTS: Consult your physician    At 4:00 pm, take (2) Dulcolax® (Bisacodyl) Laxative Tablets. Swallow the tablets whole with an 8 oz. glass of water. At 8:00 pm, take the additional (2) Dulcolax® (Bisacodyl) Laxative Tablets with 8 oz. of water. The package may direct you not to exceed (2) tablets at any time but for the purpose of this examination, you should take (4) total.    Mix the 238 gm of Miralax® in 64 oz. of Gatorade® and shake the solution until the Miralax® is dissolved. You will drink half (32 oz) of this solution this evening, beginning between 4 and 6 o'clock. Drink 8 oz glassfuls at your own pace. It may take several hours to drink the solution.    Remember to stay close to toilet facilities.    DAY OF COLONOSCOPY PROCEDURE    Five (5) hours before your procedure, drink the other half (32 oz) of the Miralax®/Gatorade® mixture within a two (2) hour period. This may require you to get up very early if you are scheduled for an early procedure.    NOTHING IS TO BE TAKEN BY MOUTH 3 HOURS PRIOR TO  PROCEDURE.     If you use an inhaler, please bring it with you to your procedure.                            Medicine Instructions for Adults with Diabetes who Need a Bowel Prep       Follow these instructions when a BOWEL PREP is required for your procedure or surgery!    NOTE:   GLP-1 Agonists taken weekly: do not take in the 7 days before your procedure   SGLT-2 Inhibitors: do not take in the 4 days before your procedure     On the Day Before Surgery/Procedure  If you are having a procedure (e.g. Colonoscopy) or surgery that requires a bowel prep and you may have at least a clear liquid diet, follow the directions below based on the type of medicine you take for your diabetes.     Type of Medicine You Take Examples What to do   Pre-Mixed Insulin - Intermediate Acting Humalog® 75/25, Humulin® 70/30, Novolog® 70/30, Regular Insulin Take ½ your regular dose the evening before your procedure   Rapid/Fast Acting Insulin Humalog® U200, NovoLog®, Apidra®, Fiasp® Take ½ your regular dose the evening before your procedure.   Long-Acting Insulin Lantus®, Levemir®, Tresiba®, Toujeo®, Basaglar® Take your FULL regular dose the day before procedure   Oral Sulfonylurea Glipizide/Glimepiride/Glucotrol® Take ½ your regular dose the evening before your procedure   Other Oral Diabetes Medicines Metformin®, Glucophage®, Glucophage XR®, Riomet®, Glumetza®), Actose®, Avandia®, Glyset®, Prandin® Take your regular dose with dinner in the evening before your procedure   GLP-1 Agonists AdlyxinÒ, ByettaÒ, BydureonÒ, OzempicÒ, SoliquaÒ, TanzeumÒ, TrulicityÒ, VictozaÒ, Saxenda®, Rybelsus® If taken daily, take as normal    If taken weekly, do not take this medicine for 7 days before your procedure including the day of the procedure (resume taking after the procedure)   SGLT-2 Inhibitors Jardiance®, Invokana®, Farxiga®,   Steglatro®, Brenzavvy®, Qtern®, Segluromet®, Glyxambi®, Synjardy®, Synjardy XR®, Invokamet®, Invokamet XR®, Trijary XR®,  Xigduo XR®, Steglujan® Do not take for 4 days before your procedure including the day of the procedure (resume taking after the procedure)                More information continued on back                    Medicine Instructions for Adults with Diabetes who Need a Bowel Prep  Page 2      On the Day of Surgery/Procedure  Follow the directions below based on the type of medicine you take for your diabetes.     Type of Medicine You Take Examples What to do   Long-Acting Insulin Lantus®, Levemir®, Tresiba®, Toujeo®, Basaglar®, Semglee®   If you usually take your Long-Acting Insulin in the morning, take the full dose as scheduled.   GLP-1 Agonists AdlyxinÒ, ByettaÒ, BydureonÒ, OzempicÒ, SoliquaÒ, TanzeumÒ, TrulicityÒ, VictozaÒ, Saxenda®, Rybelsus® Do NOT take this medicine on the day of your procedure (resume taking after the procedure)       On the Day of Surgery/Procedure (continued)  Except for the morning Long-Acting Insulin, DO NOT take ANY diabetic medicine on the day of your procedure unless you were instructed by the doctor who manages your diabetes medicines.    Continue to check your blood sugars.  If you have an insulin pump, ask your endocrinologist for instructions at least 3 days before your procedure. NOTE: If you are not able to ask your endocrinologist in advance, on the day of the procedure set your insulin pump to your basal rate only. Bring your insulin pump supplies to the hospital.     If you have any questions about taking your diabetes medicines prior to your procedure, please contact the doctor who manages your diabetes medicines.

## 2024-05-02 NOTE — LETTER
Dear Dr. Vences,    Please advise the number of days Tucker Carvajal 1955 can safely hold Aspirin 325 mg prior to colonoscopy.    Date of Surgery: 9/5/2024  Type of Surgery: Colonoscopy      Thank you,    Clovis Leos MD

## 2024-05-02 NOTE — TELEPHONE ENCOUNTER
Last FC 4/1/2019 Dr. Garcia.   Pt scheduled for 9/5/2024, BETHEL CAO ICD.  Paperwork mailed to pt.

## 2024-05-02 NOTE — LETTER
Clyde Vences,    Please advise the number of days Tucker ESTRADA Wei 1955 can safely hold Aspirin 325 mg prior to colonoscopy.    Date of Surgery: 9/5/2024  Type of Surgery: Colonoscopy    Please note recommendations below and fax to [phone] or [phone]:     _________________________________________________________________________    _________________________________________________________________________    _______________________________________________      Thank you,    Clovis Leos MD

## 2024-05-02 NOTE — TELEPHONE ENCOUNTER
Please advise how many days prior to colonoscopy on 9/6/2024, Tucker Carvajal is able to hold Aspirin 325 mg for?

## 2024-05-02 NOTE — TELEPHONE ENCOUNTER
MD Shemar Cobos DO; P Colon And Rectal Surgery Coordinator  Thanks Justin, office please schedule him for recall, 9/2024.  DE          Previous Messages       ----- Message -----  From: Shemar Shepard DO  Sent: 4/23/2024  10:13 AM EDT  To: Clovis Leos MD  Subject: colonoscopy                                      Dr. Leos,    Due for colonoscopy this year and Radha did his last in 2019. You did anoscopy in 2023. Thanks.    Justin

## 2024-05-07 ENCOUNTER — IN-CLINIC DEVICE VISIT (OUTPATIENT)
Dept: CARDIOLOGY CLINIC | Facility: CLINIC | Age: 69
End: 2024-05-07
Payer: MEDICARE

## 2024-05-07 DIAGNOSIS — Z95.810 PRESENCE OF AUTOMATIC CARDIOVERTER/DEFIBRILLATOR (AICD): Primary | ICD-10-CM

## 2024-05-07 PROCEDURE — 93283 PRGRMG EVAL IMPLANTABLE DFB: CPT | Performed by: INTERNAL MEDICINE

## 2024-05-07 NOTE — PROGRESS NOTES
Results for orders placed or performed in visit on 05/07/24   Cardiac EP device report    Narrative    MDT-DUAL CHAMBER ICD (AAI-DDD MODE) / ACTIVE SYSTEM IS MRI CONDITIONAL  DEVICE INTERROGATED IN THE [facility] OFFICE. BATTERY VOLTAGE ADEQUATE (8.1 YRS). AP 32.3%  99.5% (>40%/DEPENDENT) ALL LEAD PARAMETERS WITHIN NORMAL LIMITS. 5 NEW VT-NS EPISODES W/ EGMS SHOWING NSVT, NO THERAPIES GIVEN (25 BEATS  BPM W/ NO TERM, 14 BEATS  BPM W/ NO TERM, 14 BEATS  BPM). TAKES SOTALOL. EF 60% (ECHO 6/8/22). NO PROGRAMMING CHANGES MADE TO DEVICE PARAMETERS. OPTI-VOL WITHIN NORMAL LIMITS. NORMAL DEVICE FUNCTION. AM

## 2024-05-11 DIAGNOSIS — N40.1 BENIGN PROSTATIC HYPERPLASIA WITH NOCTURIA: ICD-10-CM

## 2024-05-11 DIAGNOSIS — R35.1 BENIGN PROSTATIC HYPERPLASIA WITH NOCTURIA: ICD-10-CM

## 2024-05-12 RX ORDER — FINASTERIDE 5 MG/1
5 TABLET, FILM COATED ORAL DAILY
Qty: 90 TABLET | Refills: 1 | Status: SHIPPED | OUTPATIENT
Start: 2024-05-12

## 2024-05-15 ENCOUNTER — IN-CLINIC DEVICE VISIT (OUTPATIENT)
Dept: CARDIOLOGY CLINIC | Facility: CLINIC | Age: 69
End: 2024-05-15

## 2024-05-15 DIAGNOSIS — Z95.810 AICD (AUTOMATIC CARDIOVERTER/DEFIBRILLATOR) PRESENT: Primary | ICD-10-CM

## 2024-05-15 PROCEDURE — RECHECK: Performed by: INTERNAL MEDICINE

## 2024-05-15 NOTE — PROGRESS NOTES
Results for orders placed or performed in visit on 05/15/24   Cardiac EP device report    Narrative    MDT-DUAL CHAMBER ICD (AAI-DDD MODE) / ACTIVE SYSTEM IS MRI CONDITIONAL  DEVICE INTERROGATED IN THE [place] OFFICE FOR REPROGRAMMING PER DR. OLIVAREZ - NO TEST. BATTERY VOLTAGE ADEQUATE (8.1 YRS). AP 27%  99.1% ALL AVAILABLE LEAD PARAMETERS WITHIN NORMAL LIMITS. 1 VT-NS EPISODE W/ EGM SHOWING NSVT (7 BEATS  BPM). TAKES SOTALOL. EF 60% (ECHO 6/8/22). VT DETECTION INTERVAL CHANGED FROM 150 BPM  BPM; VT MONITOR INTERVAL DECREASED  BPM; UPPER TRACKING RATE & UPPER SENSOR RATE BOTH DECREASED  BPM. OPTI-VOL WITHIN NORMAL LIMITS. NORMAL DEVICE FUNCTION. AM

## 2024-05-23 PROBLEM — Z12.11 COLON CANCER SCREENING: Status: RESOLVED | Noted: 2024-04-23 | Resolved: 2024-05-23

## 2024-05-23 PROBLEM — Z12.5 PROSTATE CANCER SCREENING: Status: RESOLVED | Noted: 2021-10-20 | Resolved: 2024-05-23

## 2024-06-21 DIAGNOSIS — R07.9 CHEST PAIN: ICD-10-CM

## 2024-06-21 RX ORDER — RANOLAZINE 500 MG/1
500 TABLET, EXTENDED RELEASE ORAL 2 TIMES DAILY
Qty: 180 TABLET | Refills: 1 | Status: SHIPPED | OUTPATIENT
Start: 2024-06-21

## 2024-07-08 DIAGNOSIS — R25.1 TREMOR OF RIGHT HAND: Primary | ICD-10-CM

## 2024-07-17 ENCOUNTER — TELEPHONE (OUTPATIENT)
Dept: NEUROLOGY | Facility: CLINIC | Age: 69
End: 2024-07-17

## 2024-07-17 NOTE — PROGRESS NOTES
Neurology Consult  Subjective:    HPI  Mr.Keavin SEAN Carvajal is a 68 y.o. right handed male with pertinent PMHx of HTN, HLD, ventricular arrhythmia s/p pacemaker,  ACDF C5-C7 (1990s), CTS bilaterally, trigger fingers (middle, ring) release on the right hand in 2022 who presents to the neurology clinic for evaluation of tremor of R hand.    Tremor of R hand started in Jan 2024. Notices it at rest occasionally but mostly when attempting to do fine motor tasks (using a mouse, phone).   Has been progressively worsening.   Occurs multiple times daily,but most prominent in the morning after he gets out of bed and tries to use his cellphone. Has hx of low BP in the morning.   No clear aggravating/alleviating factors identified including alcohol use.  Sometimes, he can make the tremor stop if he squeezes an object really tightly.   No antecedent head/neck trauma, infections, medication changes. Had significant personal stress in Nov-Dec of 2023 (tremor started Jan 2024). His wife had multiple medical emergencies and required hospitalization.     Has LENY, uses CPAP regularly. Has not noticed any issues with sleeping.   Fell off steps in the Dec 2023.   Had some loss of sense of smell post COVID infection in Nov 2023, but his sense of smell returned back to baseline afterwards.   No difficulty walking or moving limbs otherwise. Walks w/out a walker, independently.   NO change in handwriting.     Family hx- no neurologic issues.     Social history: Patient lives with wife. Retired (manager). Former smoker- quit few decades ago, drug use -none.     The following portions of the patient's history were reviewed and updated as appropriate: allergies, current medications, past family history, past medical history, past social history, past surgical history and problem list.     Reviewed pertinent records from [facility].      Past Medical History:   Diagnosis Date    Abrasion of left foot     LAST ASSESSED: 9/23/13    Achilles  tendinitis, unspecified leg     LAST ASSESSED: 11/30/12    Allergic rhinitis     LAST ASSESSED: 11/25/13    Allergic rhinitis 06/25/2008    LAST ASSESSED: 6/25/08    Arthritis     Cervicalgia 04/22/2011    LAST ASSESSED: 4/22/11    Chronic pain disorder     Colon polyp     Coronary artery disease     CPAP (continuous positive airway pressure) dependence     Dysfunction of left eustachian tube     LAST ASSESSED: 9/23/13    Epistaxis     LAST ASSESSED: 5/27/15    First degree atrioventricular block     LAST ASSESSED: 7/10/17    Gastric ulcer 10/07/2011    LAST ASSESSED: 3/9/15    GERD (gastroesophageal reflux disease)     Hearing aid worn     Hemorrhoids 05/13/2011    LAST ASSESSED: 5/13/11    Hyperlipidemia     Hypertension     Irregular heart beat     Long term use of drug     LAST ASSESSED: 10/11/12    Myalgia 12/21/2007    LAST ASSESSED: 12/21/07    Myositis 12/21/2007    LAST ASSESSED: 12/21/07    Numbness and tingling of foot     LAST ASSESSED: 3/9/15    Pacemaker     Premature ventricular beats     states cardiologist ordered Zio ambulatory cardiac monitor    Sleep apnea     Wears glasses      Past Surgical History:   Procedure Laterality Date    CARDIAC CATHETERIZATION  2017    CARDIAC ELECTROPHYSIOLOGY PROCEDURE N/A 10/25/2021    Procedure: CARDIAC EPS;  Surgeon: Uday Desir MD;  Location: [facility];  Service: Cardiology    CARDIAC ELECTROPHYSIOLOGY PROCEDURE Left 10/25/2021    Procedure: Cardiac icd implant;  Surgeon: Uday Desir MD;  Location: [facility];  Service: Cardiology    CARDIAC PACEMAKER PLACEMENT      CARDIOVERSION N/A 10/25/2021    Procedure: Cardioversion;  Surgeon: Uday Desir MD;  Location: [facility];  Service: Cardiology    CATARACT EXTRACTION Right     CERVICAL FUSION      pt thinks C4-C5    CHOLECYSTECTOMY      LAPAROSCOPIC; LAST ASSESSED: 10/17/17    COLONOSCOPY      COMPLETE; 3-4 YEARS AGO BY [facility]; LAST ASSESSED: 8/18/14    MYRINGOTOMY W/ TUBES       WITH VENTILATING TUBE INSERTION    WY HEMORRHOIDECTOMY INT & XTRNL 2/> COLUMN/GERBER N/A 7/27/2023    Procedure: HEMORRHOIDECTOMY EXCISION;  Surgeon: Clovis Leos MD;  Location: [facility];  Service: Colorectal    WY NEUROPLASTY &/TRANSPOS MEDIAN NRV CARPAL TUNNE Left 07/16/2021    Procedure: RELEASE CARPAL TUNNEL;  Surgeon: Luis Rubio MD;  Location: [facility];  Service: Orthopedics    WY NEUROPLASTY &/TRANSPOS MEDIAN NRV CARPAL TUNNE Right 10/14/2022    Procedure: RELEASE CARPAL TUNNEL;  Surgeon: Luis Rubio MD;  [facility]: [facility];  Service: Orthopedics    WY TENDON SHEATH INCISION Right 10/14/2022    Procedure: RELEASE TRIGGER FINGER,RING AND LONG TENOSYNOVECTOMY FDS +FDP;  Surgeon: Luis Rubio MD;  Location: [facility];  Service: Orthopedics    VASECTOMY          Current Outpatient Medications   Medication Instructions    aspirin 325 mg, Oral, Daily    Coenzyme Q10 (COQ10) 200 MG CAPS 1 tablet, Oral, Daily    COLLAGEN-CHOND-HYALURONIC ACID PO 1 capsule, Oral, Daily    EPINEPHrine (EPIPEN 2-WM) 0.3 mg, Intramuscular, Once    finasteride (PROSCAR) 5 mg, Oral, Daily    fluticasone (VERAMYST) 27.5 MCG/SPRAY nasal spray 2 sprays, Nasal, Daily    Glucosamine-Chondroitin (GLUCOSAMINE CHONDR COMPLEX PO) 1 tablet, Oral, Daily    mefloquine (LARIAM) 250 MG tablet Take 250 mg once weekly starting 2 days prior to travel    Omega-3 Fatty Acids (FISH OIL) 1000 MG CPDR Oral, Daily    pantoprazole (PROTONIX) 40 mg, Oral, Daily    ranolazine (RANEXA) 500 mg, Oral, 2 times daily    rosuvastatin (CRESTOR) 10 MG tablet TAKE 1 TABLET BY MOUTH EVERY DAY    sotalol (BETAPACE) 80 mg tablet TAKE 1 TABLET BY MOUTH EVERY 12 HOURS    Turmeric 500 MG CAPS Oral, 3 times weekly          Objective:    Blood pressure 128/74, pulse 70, weight 98.9 kg (218 lb).    Physical Exam  Vitals reviewed  General Examination: No distress, cooperative.    Pulm: Normal effort.    Neurological Exam   Normal neck flexion and  ROM.  AAOx3. Speech fluent without errors. Normal naming and repetition (including low frequency objects). Follows cross-body commands.  Pupils equal, briskly reactive. VFF. EOMI. No nystagmus. Face symmetric. No dysarthria. Uvula midline. Tongue midline.  Normal tone and bulk throughout.  5/5 motor strength t/o.   Pt has a 3-4 Hz tremor in the R hand twice during the visit.  The direction of the tremor is different each time.  Tremor is also enhanced with attention.  Patient is able to make it stop voluntarily once, but not the second time.    Sensory  Symmetric LT sensation t/o.       Reflexes Right Left   Brachioradialis     +2  +2   Biceps                                   +2  +2   Triceps  +2  +2   Patellar   +1  +1     Coordination:   Intact FNF b/l.  No truncal ataxia    Gait: Casual gait is narrow based and symmetric w/ appropriate arm swing, step height, and stride length.     ROS:    Review of Systems  See Hpi.     Imaging:     MRI brain + Roman (6/17/2014)  IMPRESSION-   1.  A few small white matter lesions are seen within the frontal lobes   which may represent early chronic microvascular ischemic disease.   2.  Dedicated imaging of the orbits is unremarkable.     CTH w/o contrast (4/5/23): IMP No acute intracranial abnormality.    CT C-spine w/o  contrast (6/23/22): IMP Status post ACDF C5-C7.  There is moderate osseous foraminal narrowing on the left at C3-4 with mild osseous foraminal narrowing at other levels as detailed above.  Beam hardening artifact from metallic hardware limits evaluation.      Assessment/Plan:  68 y.o. R handed gentleman evaluated for tremor of the RUE that started in January 2024 progressively worsening (increased frequency). Pertinent PMHx:  ACDF C5-C7 (1990s), CTS bilaterally, trigger fingers (middle, ring) release on the right hand (2022).  Tremor is mostly action induced and very rarely occurs at rest.  No other features of PD identified in history.  Patient does however  report history of significant personal stress in Nov- Dec of 2023 when his wife had multiple medical emergencies requiring hospitalization.      Exam notable for a 3-4 Hz tremor in the R hand that occurred twice during the visit.  The direction of the tremor was different each time.  Tremor is also enhanced with attention.  Patient is able to make it stop voluntarily once, but not the second time.    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  Patient is happy to know that he does not currently exhibit any parkinsonian features and agrees to follow-up as needed.  We discussed in detail that if the tremor becomes worse at any point or is present in any other limbs or head and he experiences difficulty with gait etc., he is to follow-up with neurology again.  Otherwise, he can follow-up as needed.        Thank you for involving me in the care of your patient.  If you have any additional questions or would like to discuss further, please feel free to contact me.    NAYELI Parks.  PGY-4, Neurology     Staffed w/ attending neurologist: Dr. Turicos.

## 2024-07-17 NOTE — TELEPHONE ENCOUNTER
Spoke with patient and confirmed appointment with Dr. Bunch 7/18 at [facility].

## 2024-07-18 ENCOUNTER — CONSULT (OUTPATIENT)
Dept: NEUROLOGY | Facility: CLINIC | Age: 69
End: 2024-07-18
Payer: MEDICARE

## 2024-07-18 VITALS
BODY MASS INDEX: 29.57 KG/M2 | HEART RATE: 70 BPM | WEIGHT: 218 LBS | SYSTOLIC BLOOD PRESSURE: 128 MMHG | DIASTOLIC BLOOD PRESSURE: 74 MMHG

## 2024-07-18 DIAGNOSIS — R25.1 TREMOR OF RIGHT HAND: ICD-10-CM

## 2024-07-18 DIAGNOSIS — E78.2 MIXED HYPERLIPIDEMIA: ICD-10-CM

## 2024-07-18 PROCEDURE — 99204 OFFICE O/P NEW MOD 45 MIN: CPT | Performed by: STUDENT IN AN ORGANIZED HEALTH CARE EDUCATION/TRAINING PROGRAM

## 2024-07-18 RX ORDER — ROSUVASTATIN CALCIUM 10 MG/1
TABLET, COATED ORAL
Qty: 100 TABLET | Refills: 1 | Status: SHIPPED | OUTPATIENT
Start: 2024-07-18

## 2024-08-09 ENCOUNTER — REMOTE DEVICE CLINIC VISIT (OUTPATIENT)
Dept: CARDIOLOGY CLINIC | Facility: CLINIC | Age: 69
End: 2024-08-09
Payer: MEDICARE

## 2024-08-09 DIAGNOSIS — Z95.810 AICD (AUTOMATIC CARDIOVERTER/DEFIBRILLATOR) PRESENT: Primary | ICD-10-CM

## 2024-08-09 PROCEDURE — 93296 REM INTERROG EVL PM/IDS: CPT | Performed by: INTERNAL MEDICINE

## 2024-08-09 PROCEDURE — 93295 DEV INTERROG REMOTE 1/2/MLT: CPT | Performed by: INTERNAL MEDICINE

## 2024-08-09 NOTE — PROGRESS NOTES
Results for orders placed or performed in visit on 08/09/24   Cardiac EP device report    Narrative    MDT-DUAL CHAMBER ICD (AAI-DDD MODE) / ACTIVE SYSTEM IS MRI CONDITIONAL  CARELINK TRANSMISSION: BATTERY VOLTAGE ADEQUATE (7.8 YRS). AP 34.7%  99.7% ALL AVAILABLE LEAD PARAMETERS WITHIN NORMAL LIMITS. 7 VT-NS EPISODES W/ EGMS SHOWING NSVT (20 BEATS  BPM, 6 BEATS  BPM, 8 BEATS  BPM, 7 BEATS  BPM W/ NO TERM, 17 BEATS  BPM). TAKES SOTALOL. EF 60% (ECHO 6/8/22).  OPTI-VOL WITHIN NORMAL LIMITS. NORMAL DEVICE FUNCTION. AM

## 2024-09-06 ENCOUNTER — HOSPITAL ENCOUNTER (OUTPATIENT)
Dept: GASTROENTEROLOGY | Facility: HOSPITAL | Age: 69
Setting detail: OUTPATIENT SURGERY
End: 2024-09-06
Attending: COLON & RECTAL SURGERY
Payer: MEDICARE

## 2024-09-06 ENCOUNTER — ANESTHESIA (OUTPATIENT)
Dept: GASTROENTEROLOGY | Facility: HOSPITAL | Age: 69
End: 2024-09-06

## 2024-09-06 ENCOUNTER — ANESTHESIA EVENT (OUTPATIENT)
Dept: GASTROENTEROLOGY | Facility: HOSPITAL | Age: 69
End: 2024-09-06

## 2024-09-06 VITALS
HEART RATE: 67 BPM | BODY MASS INDEX: 29.53 KG/M2 | SYSTOLIC BLOOD PRESSURE: 127 MMHG | OXYGEN SATURATION: 95 % | TEMPERATURE: 97.3 F | DIASTOLIC BLOOD PRESSURE: 62 MMHG | RESPIRATION RATE: 17 BRPM | HEIGHT: 72 IN | WEIGHT: 218 LBS

## 2024-09-06 DIAGNOSIS — Z86.010 PERSONAL HISTORY OF COLONIC POLYPS: ICD-10-CM

## 2024-09-06 DIAGNOSIS — K57.90 DIVERTICULOSIS: ICD-10-CM

## 2024-09-06 PROCEDURE — 88305 TISSUE EXAM BY PATHOLOGIST: CPT | Performed by: PATHOLOGY

## 2024-09-06 PROCEDURE — 45380 COLONOSCOPY AND BIOPSY: CPT | Performed by: COLON & RECTAL SURGERY

## 2024-09-06 RX ORDER — LIDOCAINE HYDROCHLORIDE 10 MG/ML
INJECTION, SOLUTION EPIDURAL; INFILTRATION; INTRACAUDAL; PERINEURAL AS NEEDED
Status: DISCONTINUED | OUTPATIENT
Start: 2024-09-06 | End: 2024-09-06

## 2024-09-06 RX ORDER — PROPOFOL 10 MG/ML
INJECTION, EMULSION INTRAVENOUS AS NEEDED
Status: DISCONTINUED | OUTPATIENT
Start: 2024-09-06 | End: 2024-09-06

## 2024-09-06 RX ORDER — SODIUM CHLORIDE 9 MG/ML
INJECTION, SOLUTION INTRAVENOUS CONTINUOUS PRN
Status: DISCONTINUED | OUTPATIENT
Start: 2024-09-06 | End: 2024-09-06

## 2024-09-06 RX ORDER — GLYCOPYRROLATE 0.2 MG/ML
INJECTION INTRAMUSCULAR; INTRAVENOUS AS NEEDED
Status: DISCONTINUED | OUTPATIENT
Start: 2024-09-06 | End: 2024-09-06

## 2024-09-06 RX ADMIN — GLYCOPYRROLATE 0.1 MG: 0.2 INJECTION, SOLUTION INTRAMUSCULAR; INTRAVENOUS at 08:15

## 2024-09-06 RX ADMIN — PROPOFOL 50 MG: 10 INJECTION, EMULSION INTRAVENOUS at 08:13

## 2024-09-06 RX ADMIN — PROPOFOL 50 MG: 10 INJECTION, EMULSION INTRAVENOUS at 08:18

## 2024-09-06 RX ADMIN — PROPOFOL 100 MG: 10 INJECTION, EMULSION INTRAVENOUS at 08:10

## 2024-09-06 RX ADMIN — PROPOFOL 50 MG: 10 INJECTION, EMULSION INTRAVENOUS at 08:15

## 2024-09-06 RX ADMIN — SODIUM CHLORIDE: 9 INJECTION, SOLUTION INTRAVENOUS at 08:06

## 2024-09-06 RX ADMIN — PROPOFOL 50 MG: 10 INJECTION, EMULSION INTRAVENOUS at 08:22

## 2024-09-06 RX ADMIN — LIDOCAINE HYDROCHLORIDE 50 MG: 10 INJECTION, SOLUTION EPIDURAL; INFILTRATION; INTRACAUDAL; PERINEURAL at 08:10

## 2024-09-06 NOTE — ANESTHESIA PREPROCEDURE EVALUATION
Procedure:  COLONOSCOPY    Relevant Problems   CARDIO   (+) Coronary artery disease of native artery of native heart with stable angina pectoris (HCC)   (+) Mixed hyperlipidemia   (+) Paroxysmal atrial fibrillation (HCC)   (+) Premature ventricular beats   (+) Sick sinus syndrome (HCC)      GI/HEPATIC   (+) Gastroesophageal reflux disease      MUSCULOSKELETAL   (+) Chronic back pain   (+) Low back pain with sciatica   (+) Osteoarthritis of fingers of both hands   (+) Primary osteoarthritis of left knee      NEURO/PSYCH   (+) Chronic back pain      PULMONARY   (+) LENY (obstructive sleep apnea)   (+) LENY and COPD overlap syndrome (HCC)   (+) Sleep apnea        Physical Exam    Airway    Mallampati score: I  TM Distance: >3 FB  Neck ROM: full     Dental       Cardiovascular  Cardiovascular exam normal    Pulmonary  Pulmonary exam normal     Other Findings        Anesthesia Plan  ASA Score- 3     Anesthesia Type- IV sedation with anesthesia with ASA Monitors.         Additional Monitors:     Airway Plan:            Plan Factors-Exercise tolerance (METS): >4 METS.    Chart reviewed. EKG reviewed.  Existing labs reviewed. Patient summary reviewed.    Patient is not a current smoker.  Patient did not smoke on day of surgery.    Obstructive sleep apnea risk education given perioperatively.        Induction- intravenous.    Postoperative Plan-     Perioperative Resuscitation Plan - Level 1 - Full Code.       Informed Consent- Anesthetic plan and risks discussed with patient and spouse.  I personally reviewed this patient with the CRNA. Discussed and agreed on the Anesthesia Plan with the CRNA..

## 2024-09-06 NOTE — ANESTHESIA POSTPROCEDURE EVALUATION
Post-Op Assessment Note    CV Status:  Stable  Pain Score: 0    Pain management: adequate       Mental Status:  Alert and awake   Hydration Status:  Euvolemic   PONV Controlled:  Controlled   Airway Patency:  Patent     Post Op Vitals Reviewed: Yes    No anethesia notable event occurred.    Staff: Anesthesiologist, CRNA               /57 (09/06/24 0833)    Temp (!) 97.3 °F (36.3 °C) (09/06/24 0833)    Pulse 60 (09/06/24 0833)   Resp 16 (09/06/24 0833)    SpO2 96 % (09/06/24 0833)

## 2024-09-06 NOTE — H&P
History and Physical   Colon and Rectal Surgery   Tucker Carvajal 68 y.o. male MRN: [id number]  Unit/Bed#:  Encounter: [id number]  09/06/24   7:54 AM              ASSESSMENT:  Tucker Carvajal is a 68 y.o. male who presents for screening colonoscopy, last 2019, history colon polyps.    Screening colonoscopy,discussed in a face-to-face, personal, informed consent process, the benefits, alternatives, risks including not limited to bleeding, missed lesion, perforation requiring emergent surgery discussed/understood, signed consent.    PLAN:  Colonoscopy    No chief complaint on file.        History of Present Illness   HPI:  Tucker Carvajal is a 68 y.o. male who presents for screening colonoscopy.  Denies nausea/vomiting/abdominal pain, change in bowel habits, rectal bleeding, or other constitutional symptoms.       Historical Information   Past Medical History:   Diagnosis Date    Abrasion of left foot     LAST ASSESSED: 9/23/13    Achilles tendinitis, unspecified leg     LAST ASSESSED: 11/30/12    Allergic rhinitis     LAST ASSESSED: 11/25/13    Allergic rhinitis 06/25/2008    LAST ASSESSED: 6/25/08    Arthritis     Cervicalgia 04/22/2011    LAST ASSESSED: 4/22/11    Chronic pain disorder     Colon polyp     Coronary artery disease     CPAP (continuous positive airway pressure) dependence     Dysfunction of left eustachian tube     LAST ASSESSED: 9/23/13    Epistaxis     LAST ASSESSED: 5/27/15    First degree atrioventricular block     LAST ASSESSED: 7/10/17    Gastric ulcer 10/07/2011    LAST ASSESSED: 3/9/15    GERD (gastroesophageal reflux disease)     Hearing aid worn     Hemorrhoids 05/13/2011    LAST ASSESSED: 5/13/11    Hyperlipidemia     Hypertension     Irregular heart beat     Long term use of drug     LAST ASSESSED: 10/11/12    Myalgia 12/21/2007    LAST ASSESSED: 12/21/07    Myositis 12/21/2007    LAST ASSESSED: 12/21/07    Numbness and tingling of foot     LAST ASSESSED: 3/9/15    Pacemaker     Premature  ventricular beats     states cardiologist ordered Zio ambulatory cardiac monitor    Sleep apnea     Wears glasses      Past Surgical History:   Procedure Laterality Date    CARDIAC CATHETERIZATION  2017    CARDIAC ELECTROPHYSIOLOGY PROCEDURE N/A 10/25/2021    Procedure: CARDIAC EPS;  Surgeon: Uday Desir MD;  Location: [facility];  Service: Cardiology    CARDIAC ELECTROPHYSIOLOGY PROCEDURE Left 10/25/2021    Procedure: Cardiac icd implant;  Surgeon: Uday Desir MD;  Location: [facility];  Service: Cardiology    CARDIAC PACEMAKER PLACEMENT      CARDIOVERSION N/A 10/25/2021    Procedure: Cardioversion;  Surgeon: Uday Desir MD;  Location: [facility];  Service: Cardiology    CATARACT EXTRACTION Right     CERVICAL FUSION      pt thinks C4-C5    CHOLECYSTECTOMY      LAPAROSCOPIC; LAST ASSESSED: 10/17/17    COLONOSCOPY      COMPLETE; 3-4 YEARS AGO BY [facility]; LAST ASSESSED: 8/18/14    MYRINGOTOMY W/ TUBES      WITH VENTILATING TUBE INSERTION    ME HEMORRHOIDECTOMY INT & XTRNL 2/> COLUMN/GERBER N/A 7/27/2023    Procedure: HEMORRHOIDECTOMY EXCISION;  Surgeon: Clovis Leos MD;  Location: [facility];  Service: Colorectal    ME NEUROPLASTY &/TRANSPOS MEDIAN NRV CARPAL TUNNE Left 07/16/2021    Procedure: RELEASE CARPAL TUNNEL;  Surgeon: Luis Rubio MD;  Location: [facility];  Service: Orthopedics    ME NEUROPLASTY &/TRANSPOS MEDIAN NRV CARPAL TUNNE Right 10/14/2022    Procedure: RELEASE CARPAL TUNNEL;  Surgeon: Luis Rubio MD;  Location: [facility];  Service: Orthopedics    ME TENDON SHEATH INCISION Right 10/14/2022    Procedure: RELEASE TRIGGER FINGER,RING AND LONG TENOSYNOVECTOMY FDS +FDP;  Surgeon: Luis Rubio MD;  Location: [facility];  Service: Orthopedics    VASECTOMY         Meds/Allergies     Not in a hospital admission.      Current Outpatient Medications:     aspirin 325 mg tablet, Take 325 mg by mouth daily, Disp: , Rfl:     Coenzyme Q10 (COQ10) 200 MG  CAPS, Take 1 tablet by mouth daily, Disp: , Rfl:     COLLAGEN-CHOND-HYALURONIC ACID PO, Take 1 capsule by mouth in the morning, Disp: , Rfl:     finasteride (PROSCAR) 5 mg tablet, TAKE 1 TABLET (5 MG TOTAL) BY MOUTH DAILY., Disp: 90 tablet, Rfl: 1    Glucosamine-Chondroitin (GLUCOSAMINE CHONDR COMPLEX PO), Take 1 tablet by mouth daily, Disp: , Rfl:     Omega-3 Fatty Acids (FISH OIL) 1000 MG CPDR, Take by mouth daily, Disp: , Rfl:     pantoprazole (PROTONIX) 40 mg tablet, Take 1 tablet (40 mg total) by mouth daily, Disp: 90 tablet, Rfl: 3    ranolazine (RANEXA) 500 mg 12 hr tablet, TAKE 1 TABLET BY MOUTH TWICE A DAY, Disp: 180 tablet, Rfl: 1    rosuvastatin (CRESTOR) 10 MG tablet, TAKE 1 TABLET BY MOUTH EVERY DAY, Disp: 100 tablet, Rfl: 1    sotalol (BETAPACE) 80 mg tablet, TAKE 1 TABLET BY MOUTH EVERY 12 HOURS, Disp: 180 tablet, Rfl: 3    Turmeric 500 MG CAPS, Take by mouth 3 (three) times a week, Disp: , Rfl:     EPINEPHrine (EpiPen 2-Leroy) 0.3 mg/0.3 mL SOAJ, Inject 0.3 mL (0.3 mg total) into a muscle once for 1 dose, Disp: 0.6 mL, Rfl: 0    fluticasone (VERAMYST) 27.5 MCG/SPRAY nasal spray, 2 sprays into each nostril daily, Disp: , Rfl:     mefloquine (LARIAM) 250 MG tablet, Take 250 mg once weekly starting 2 days prior to travel Do not start before November 26, 2023., Disp: 3 tablet, Rfl: 0    Allergies   Allergen Reactions    Amoxicillin Anaphylaxis    Augmentin [Amoxicillin-Pot Clavulanate] Anaphylaxis    Acetaminophen      Rapid heart rate    Cherry Flavor - Food Allergy Other (See Comments)     States allergy is to raw cherries difficulty breathing    Pollen Extract     Dye [Iodinated Contrast Media] Itching         Social History   Social History     Substance and Sexual Activity   Alcohol Use Yes    Alcohol/week: 7.0 standard drinks of alcohol    Types: 7 Glasses of wine per week    Comment: few times a week     Social History     Substance and Sexual Activity   Drug Use No     Social History     Tobacco  Use   Smoking Status Former    Current packs/day: 0.00    Types: Cigarettes    Quit date:     Years since quittin.7   Smokeless Tobacco Never         Family History:   Family History   Problem Relation Age of Onset    Cancer Mother         SOFT TISSUE CANCER ON RT SIDE CHEST WALL AND     Prostate cancer Maternal Uncle          Objective     Current Vitals:   Blood Pressure: 164/86 (24)  Pulse: 78 (24)  Temperature: (!) 97.3 °F (36.3 °C) (24)  Temp Source: Tympanic (24)  Respirations: 17 (24)  Height: 6' (182.9 cm) (24)  Weight - Scale: 98.9 kg (218 lb) (24)  SpO2: 98 % (24)  No intake or output data in the 24 hours ending 24 0754    Physical Exam:  General:no distress  Eyes:perrla/eomi  ENT:moist mucus membranes  Neck:supple  Pulm:no increased work of breathing  CV:sinus  Abdomen:soft,nontender  Extremities:no edema

## 2024-09-12 PROCEDURE — 88305 TISSUE EXAM BY PATHOLOGIST: CPT | Performed by: PATHOLOGY

## 2024-09-25 DIAGNOSIS — R07.9 CHEST PAIN: ICD-10-CM

## 2024-09-25 RX ORDER — RANOLAZINE 500 MG/1
500 TABLET, EXTENDED RELEASE ORAL 2 TIMES DAILY
Qty: 180 TABLET | Refills: 1 | Status: SHIPPED | OUTPATIENT
Start: 2024-09-25

## 2024-10-02 LAB
ALBUMIN SERPL-MCNC: 4.3 G/DL (ref 3.6–5.1)
ALBUMIN/GLOB SERPL: 1.7 (CALC) (ref 1–2.5)
ALP SERPL-CCNC: 65 U/L (ref 35–144)
ALT SERPL-CCNC: 37 U/L (ref 9–46)
AST SERPL-CCNC: 22 U/L (ref 10–35)
BASOPHILS # BLD AUTO: 31 CELLS/UL (ref 0–200)
BASOPHILS NFR BLD AUTO: 0.6 %
BILIRUB SERPL-MCNC: 1.3 MG/DL (ref 0.2–1.2)
BUN SERPL-MCNC: 15 MG/DL (ref 7–25)
BUN/CREAT SERPL: ABNORMAL (CALC) (ref 6–22)
CALCIUM SERPL-MCNC: 9.5 MG/DL (ref 8.6–10.3)
CHLORIDE SERPL-SCNC: 103 MMOL/L (ref 98–110)
CHOLEST SERPL-MCNC: 146 MG/DL
CHOLEST/HDLC SERPL: 3.7 (CALC)
CO2 SERPL-SCNC: 27 MMOL/L (ref 20–32)
CREAT SERPL-MCNC: 1.05 MG/DL (ref 0.7–1.35)
EOSINOPHIL # BLD AUTO: 61 CELLS/UL (ref 15–500)
EOSINOPHIL NFR BLD AUTO: 1.2 %
ERYTHROCYTE [DISTWIDTH] IN BLOOD BY AUTOMATED COUNT: 12.5 % (ref 11–15)
GFR/BSA.PRED SERPLBLD CYS-BASED-ARV: 77 ML/MIN/1.73M2
GLOBULIN SER CALC-MCNC: 2.5 G/DL (CALC) (ref 1.9–3.7)
GLUCOSE SERPL-MCNC: 98 MG/DL (ref 65–99)
HBA1C MFR BLD: 5.8 % OF TOTAL HGB
HCT VFR BLD AUTO: 48.1 % (ref 38.5–50)
HDLC SERPL-MCNC: 40 MG/DL
HGB BLD-MCNC: 16.1 G/DL (ref 13.2–17.1)
LDLC SERPL CALC-MCNC: 82 MG/DL (CALC)
LYMPHOCYTES # BLD AUTO: 1989 CELLS/UL (ref 850–3900)
LYMPHOCYTES NFR BLD AUTO: 39 %
MCH RBC QN AUTO: 32.3 PG (ref 27–33)
MCHC RBC AUTO-ENTMCNC: 33.5 G/DL (ref 32–36)
MCV RBC AUTO: 96.4 FL (ref 80–100)
MONOCYTES # BLD AUTO: 612 CELLS/UL (ref 200–950)
MONOCYTES NFR BLD AUTO: 12 %
NEUTROPHILS # BLD AUTO: 2407 CELLS/UL (ref 1500–7800)
NEUTROPHILS NFR BLD AUTO: 47.2 %
NONHDLC SERPL-MCNC: 106 MG/DL (CALC)
PLATELET # BLD AUTO: 174 THOUSAND/UL (ref 140–400)
PMV BLD REES-ECKER: 10.2 FL (ref 7.5–12.5)
POTASSIUM SERPL-SCNC: 4.5 MMOL/L (ref 3.5–5.3)
PROT SERPL-MCNC: 6.8 G/DL (ref 6.1–8.1)
PSA SERPL-MCNC: 0.57 NG/ML
RBC # BLD AUTO: 4.99 MILLION/UL (ref 4.2–5.8)
SODIUM SERPL-SCNC: 139 MMOL/L (ref 135–146)
TRIGL SERPL-MCNC: 139 MG/DL
TSH SERPL-ACNC: 3.15 MIU/L (ref 0.4–4.5)
WBC # BLD AUTO: 5.1 THOUSAND/UL (ref 3.8–10.8)

## 2024-10-09 ENCOUNTER — TELEPHONE (OUTPATIENT)
Age: 69
End: 2024-10-09

## 2024-10-09 NOTE — TELEPHONE ENCOUNTER
Pt. Is on the wait list and he would like a return call if he can be seen sooner than Oct. 30th.

## 2024-10-30 ENCOUNTER — OFFICE VISIT (OUTPATIENT)
Dept: FAMILY MEDICINE CLINIC | Facility: CLINIC | Age: 69
End: 2024-10-30
Payer: MEDICARE

## 2024-10-30 VITALS
BODY MASS INDEX: 29.66 KG/M2 | WEIGHT: 219 LBS | OXYGEN SATURATION: 98 % | DIASTOLIC BLOOD PRESSURE: 78 MMHG | RESPIRATION RATE: 16 BRPM | HEIGHT: 72 IN | SYSTOLIC BLOOD PRESSURE: 126 MMHG | HEART RATE: 76 BPM

## 2024-10-30 DIAGNOSIS — Z23 ENCOUNTER FOR IMMUNIZATION: ICD-10-CM

## 2024-10-30 DIAGNOSIS — E78.2 MIXED HYPERLIPIDEMIA: ICD-10-CM

## 2024-10-30 DIAGNOSIS — I25.118 CORONARY ARTERY DISEASE OF NATIVE ARTERY OF NATIVE HEART WITH STABLE ANGINA PECTORIS (HCC): ICD-10-CM

## 2024-10-30 DIAGNOSIS — N40.1 BENIGN PROSTATIC HYPERPLASIA WITH NOCTURIA: ICD-10-CM

## 2024-10-30 DIAGNOSIS — R35.1 BENIGN PROSTATIC HYPERPLASIA WITH NOCTURIA: ICD-10-CM

## 2024-10-30 DIAGNOSIS — I48.0 PAROXYSMAL ATRIAL FIBRILLATION (HCC): ICD-10-CM

## 2024-10-30 DIAGNOSIS — R73.01 IMPAIRED FASTING GLUCOSE: ICD-10-CM

## 2024-10-30 DIAGNOSIS — Z00.00 MEDICARE ANNUAL WELLNESS VISIT, SUBSEQUENT: Primary | ICD-10-CM

## 2024-10-30 PROCEDURE — 99214 OFFICE O/P EST MOD 30 MIN: CPT | Performed by: FAMILY MEDICINE

## 2024-10-30 PROCEDURE — G0439 PPPS, SUBSEQ VISIT: HCPCS | Performed by: FAMILY MEDICINE

## 2024-10-30 PROCEDURE — 90677 PCV20 VACCINE IM: CPT | Performed by: FAMILY MEDICINE

## 2024-10-30 PROCEDURE — G0009 ADMIN PNEUMOCOCCAL VACCINE: HCPCS | Performed by: FAMILY MEDICINE

## 2024-10-30 RX ORDER — FINASTERIDE 5 MG/1
5 TABLET, FILM COATED ORAL DAILY
Qty: 90 TABLET | Refills: 1 | Status: SHIPPED | OUTPATIENT
Start: 2024-10-30

## 2024-10-30 RX ORDER — ROSUVASTATIN CALCIUM 10 MG/1
10 TABLET, COATED ORAL DAILY
Qty: 100 TABLET | Refills: 1 | Status: SHIPPED | OUTPATIENT
Start: 2024-10-30

## 2024-10-30 NOTE — ASSESSMENT & PLAN NOTE
Goal LDL less than 70 with history of heart disease.  On Crestor 10 mg once daily.  Not quite at goal but reluctant about increasing his dose

## 2024-10-30 NOTE — ASSESSMENT & PLAN NOTE
Patient remains on sotalol 80 mg twice daily for rate control.  Patient is not on full anticoagulation    Remains on aspirin 325 mg once daily.    -Follow-up with cardiology as scheduled.  Message sent to cardiology about 6-month visit which should have been in August

## 2024-10-30 NOTE — PROGRESS NOTES
Assessment and Plan:     Problem List Items Addressed This Visit    None        Depression Screening and Follow-up Plan: Patient was screened for depression during today's encounter. They screened negative with a PHQ-2 score of 0.      Preventive health issues were discussed with patient, and age appropriate screening tests were ordered as noted in patient's After Visit Summary.  Personalized health advice and appropriate referrals for health education or preventive services given if needed, as noted in patient's After Visit Summary.     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    External hemorrhoids    Mixed hyperlipidemia    Environmental and seasonal allergies    Coronary artery disease of native artery of native heart with stable angina pectoris (HCC)    Chronic back pain    Organic impotence    Sleep apnea    Primary osteoarthritis of left knee    Impaired fasting glucose    Benign prostatic hyperplasia with nocturia    Tick bite of lower back    Osteoarthritis of fingers of both hands    Need for malaria prophylaxis    Cataract    LENY and COPD overlap syndrome (HCC)    Premature ventricular beats    Trigger thumb of right hand    Trigger finger, right ring finger    Carpal tunnel syndrome, bilateral    Intermittent palpitations    CPAP (continuous positive airway pressure) dependence    Sick sinus syndrome (HCC)    S/P ICD (internal cardiac defibrillator) procedure    Paroxysmal atrial fibrillation (HCC)    Neck pain    Status post cervical spinal fusion    Cervical radiculopathy    Low back pain with sciatica    Allergic reaction to penicillin    LENY  (obstructive sleep apnea)    Gastroesophageal reflux disease    Tremor of right hand      Past Medical and Surgical History:     Past Medical History:   Diagnosis Date    Abrasion of left foot     LAST ASSESSED: 9/23/13    Achilles tendinitis, unspecified leg     LAST ASSESSED: 11/30/12    Allergic rhinitis     LAST ASSESSED: 11/25/13    Allergic rhinitis 06/25/2008    LAST ASSESSED: 6/25/08    Arthritis     Cervicalgia 04/22/2011    LAST ASSESSED: 4/22/11    Chronic pain disorder     Colon polyp     Coronary artery disease     CPAP (continuous positive airway pressure) dependence     Dysfunction of left eustachian tube     LAST ASSESSED: 9/23/13    Epistaxis     LAST ASSESSED: 5/27/15    First degree atrioventricular block     LAST ASSESSED: 7/10/17    Gastric ulcer 10/07/2011    LAST ASSESSED: 3/9/15    GERD (gastroesophageal reflux disease)     Hearing aid worn     Hemorrhoids 05/13/2011    LAST ASSESSED: 5/13/11    Hyperlipidemia     Hypertension     Irregular heart beat     Long term use of drug     LAST ASSESSED: 10/11/12    Myalgia 12/21/2007    LAST ASSESSED: 12/21/07    Myositis 12/21/2007    LAST ASSESSED: 12/21/07    Numbness and tingling of foot     LAST ASSESSED: 3/9/15    Pacemaker     Premature ventricular beats     states cardiologist ordered Zio ambulatory cardiac monitor    Sleep apnea     Wears glasses      Past Surgical History:   Procedure Laterality Date    CARDIAC CATHETERIZATION  2017    CARDIAC ELECTROPHYSIOLOGY PROCEDURE N/A 10/25/2021    Procedure: CARDIAC EPS;  Surgeon: Uday Desir MD;  [facility]: [facility];  Service: Cardiology    CARDIAC ELECTROPHYSIOLOGY PROCEDURE Left 10/25/2021    Procedure: Cardiac icd implant;  Surgeon: Uday Desir MD;  [facility];  Service: Cardiology    CARDIAC PACEMAKER PLACEMENT      CARDIOVERSION N/A 10/25/2021    Procedure: Cardioversion;  Surgeon: Uday Desir MD;  [facility]: [facility];  Service: Cardiology    CATARACT  EXTRACTION Right     CERVICAL FUSION      pt thinks C4-C5    CHOLECYSTECTOMY      LAPAROSCOPIC; LAST ASSESSED: 10/17/17    COLONOSCOPY      COMPLETE; 3-4 YEARS AGO BY [facility]; LAST ASSESSED: 14    MYRINGOTOMY W/ TUBES      WITH VENTILATING TUBE INSERTION    UT HEMORRHOIDECTOMY INT & XTRNL 2/> COLUMN/GERBER N/A 2023    Procedure: HEMORRHOIDECTOMY EXCISION;  Surgeon: Clovis Leos MD;  Location: [facility];  Service: Colorectal    UT NEUROPLASTY &/TRANSPOS MEDIAN NRV CARPAL TUNNE Left 2021    Procedure: RELEASE CARPAL TUNNEL;  Surgeon: Luis Rubio MD;  Location: [facility];  Service: Orthopedics    UT NEUROPLASTY &/TRANSPOS MEDIAN NRV CARPAL TUNNE Right 10/14/2022    Procedure: RELEASE CARPAL TUNNEL;  Surgeon: Luis Rubio MD;  [facility];  Service: Orthopedics    UT TENDON SHEATH INCISION Right 10/14/2022    Procedure: RELEASE TRIGGER FINGER,RING AND LONG TENOSYNOVECTOMY FDS +FDP;  Surgeon: Luis Rubio MD;  [facility];  Service: Orthopedics    VASECTOMY        Family History:     Family History   Problem Relation Age of Onset    Cancer Mother         SOFT TISSUE CANCER ON RT SIDE CHEST WALL AND     Prostate cancer Maternal Uncle       Social History:     Social History     Socioeconomic History    Marital status: /Civil Union     Spouse name: None    Number of children: None    Years of education: None    Highest education level: None   Occupational History    None   Tobacco Use    Smoking status: Former     Current packs/day: 0.00     Types: Cigarettes     Quit date:      Years since quittin.8    Smokeless tobacco: Never   Vaping Use    Vaping status: Never Used   Substance and Sexual Activity    Alcohol use: Yes     Alcohol/week: 7.0 standard drinks of alcohol     Types: 7 Glasses of wine per week     Comment: few times a week    Drug use: No    Sexual activity: Not Currently   Other Topics Concern    None   Social History  Narrative    None     Social Determinants of Health     Financial Resource Strain: Low Risk  (10/20/2023)    Overall Financial Resource Strain (CARDIA)     Difficulty of Paying Living Expenses: Not hard at all   Food Insecurity: No Food Insecurity (6/13/2022)    Hunger Vital Sign     Worried About Running Out of Food in the Last Year: Never true     Ran Out of Food in the Last Year: Never true   Transportation Needs: No Transportation Needs (10/20/2023)    PRAPARE - Transportation     Lack of Transportation (Medical): No     Lack of Transportation (Non-Medical): No   Physical Activity: Not on file   Stress: Not on file   Social Connections: Not on file   Intimate Partner Violence: Not on file   Housing Stability: Low Risk  (6/13/2022)    Housing Stability Vital Sign     Unable to Pay for Housing in the Last Year: No     Number of Places Lived in the Last Year: 1     Unstable Housing in the Last Year: No      Medications and Allergies:     Current Outpatient Medications   Medication Sig Dispense Refill    aspirin 325 mg tablet Take 325 mg by mouth daily      Coenzyme Q10 (COQ10) 200 MG CAPS Take 1 tablet by mouth daily      COLLAGEN-CHOND-HYALURONIC ACID PO Take 1 capsule by mouth in the morning      finasteride (PROSCAR) 5 mg tablet TAKE 1 TABLET (5 MG TOTAL) BY MOUTH DAILY. 90 tablet 1    fluticasone (VERAMYST) 27.5 MCG/SPRAY nasal spray 2 sprays into each nostril daily      Glucosamine-Chondroitin (GLUCOSAMINE CHONDR COMPLEX PO) Take 1 tablet by mouth daily      Omega-3 Fatty Acids (FISH OIL) 1000 MG CPDR Take by mouth daily      pantoprazole (PROTONIX) 40 mg tablet Take 1 tablet (40 mg total) by mouth daily 90 tablet 3    ranolazine (RANEXA) 500 mg 12 hr tablet TAKE 1 TABLET BY MOUTH TWICE A  tablet 1    rosuvastatin (CRESTOR) 10 MG tablet TAKE 1 TABLET BY MOUTH EVERY  tablet 1    sotalol (BETAPACE) 80 mg tablet TAKE 1 TABLET BY MOUTH EVERY 12 HOURS 180 tablet 3    Turmeric 500 MG CAPS Take by mouth  3 (three) times a week      EPINEPHrine (EpiPen 2-Leroy) 0.3 mg/0.3 mL SOAJ Inject 0.3 mL (0.3 mg total) into a muscle once for 1 dose 0.6 mL 0    mefloquine (LARIAM) 250 MG tablet Take 250 mg once weekly starting 2 days prior to travel Do not start before November 26, 2023. 3 tablet 0     No current facility-administered medications for this visit.     Allergies   Allergen Reactions    Amoxicillin Anaphylaxis    Augmentin [Amoxicillin-Pot Clavulanate] Anaphylaxis    Acetaminophen      Rapid heart rate    Cherry Flavor - Food Allergy Other (See Comments)     States allergy is to raw cherries difficulty breathing    Pollen Extract     Dye [Iodinated Contrast Media] Itching      Immunizations:     Immunization History   Administered Date(s) Administered    COVID-19 PFIZER VACCINE 0.3 ML IM 02/19/2021, 03/11/2021, 10/02/2021    COVID-19 Pfizer Vac BIVALENT Brown-sucrose 12 Yr+ IM 02/28/2023    COVID-19 Pfizer mRNA vacc PF brown-sucrose 12 yr and older (Comirnaty) 10/16/2023, 09/25/2024    COVID-19 Pfizer vac (Brown-sucrose, gray cap) 12 yr+ IM 06/23/2022    INFLUENZA 10/18/2015, 09/30/2017, 10/09/2017, 09/28/2018, 09/07/2021, 10/21/2022    Influenza Quadrivalent Preservative Free 3 years and older IM 10/09/2017    Influenza Quadrivalent, 6-35 Months IM 09/23/2016    Influenza, high dose seasonal 0.7 mL 10/02/2020, 09/07/2021, 10/21/2022, 10/02/2023    Influenza, recombinant, quadrivalent,injectable, preservative free 09/09/2019    Influenza, seasonal, injectable 12/21/2007, 12/12/2008, 01/14/2010    Pneumococcal Conjugate 13-Valent 01/27/2018    Pneumococcal Polysaccharide PPV23 09/09/2019    Respiratory Syncytial Virus Vaccine (Recombinant) 09/25/2023    Td (adult), adsorbed 02/15/2010    influenza, trivalent, adjuvanted 09/25/2024      Health Maintenance:         Topic Date Due    Colorectal Cancer Screening  09/05/2029    Hepatitis C Screening  Completed         Topic Date Due    Pneumococcal Vaccine: 65+ Years (3 of 3 -  PPSV23 or PCV20) 09/09/2024        Medicare Health Risk Assessment:     /78   Pulse 76   Resp 16   Ht 6' (1.829 m)   Wt 99.3 kg (219 lb)   SpO2 98%   BMI 29.70 kg/m²      Tucker is here for his Subsequent Wellness visit. Last Medicare Wellness visit information reviewed, patient interviewed, no change since last AWV.     Health Risk Assessment:   Patient rates overall health as very good. Patient feels that their physical health rating is same. Patient is satisfied with their life. Eyesight was rated as same. Hearing was rated as same. Patient feels that their emotional and mental health rating is same. Patients states they are never, rarely angry. Patient states they are never, rarely unusually tired/fatigued. Pain experienced in the last 7 days has been none. Patient states that he has experienced no weight loss or gain in last 6 months.     Depression Screening:   PHQ-2 Score: 0      Fall Risk Screening:   In the past year, patient has experienced: no history of falling in past year      Home Safety:  Patient does not have trouble with stairs inside or outside of their home. Patient has working smoke alarms and has working carbon monoxide detector. Home safety hazards include: none.     Nutrition:   Current diet is Regular.     Medications:   Patient is not currently taking any over-the-counter supplements. Patient is able to manage medications.     Activities of Daily Living (ADLs)/Instrumental Activities of Daily Living (IADLs):   Walk and transfer into and out of bed and chair?: Yes  Dress and groom yourself?: Yes    Bathe or shower yourself?: Yes    Feed yourself? Yes  Do your laundry/housekeeping?: Yes  Manage your money, pay your bills and track your expenses?: Yes  Make your own meals?: Yes    Do your own shopping?: Yes    Previous Hospitalizations:   Any hospitalizations or ED visits within the last 12 months?: No      Advance Care Planning:   Living will: Yes    Durable POA for healthcare:  Yes    Advanced directive: Yes    Advanced directive counseling given: No    Five wishes given: No    Patient declined ACP directive: No    End of Life Decisions reviewed with patient: Yes    Provider agrees with end of life decisions: Yes      PREVENTIVE SCREENINGS      Cardiovascular Screening:    General: Screening Not Indicated and History Lipid Disorder      Diabetes Screening:     General: Screening Current      Colorectal Cancer Screening:     General: Screening Current      Prostate Cancer Screening:    General: Screening Current      Osteoporosis Screening:    General: Screening Not Indicated      Abdominal Aortic Aneurysm (AAA) Screening:    Risk factors include: age between 65-74 yo and tobacco use        Lung Cancer Screening:     General: Screening Not Indicated      Hepatitis C Screening:    General: Screening Current    Screening, Brief Intervention, and Referral to Treatment (SBIRT)    Screening  Typical number of drinks in a day: 1  Typical number of drinks in a week: 5  Interpretation: Low risk drinking behavior.    AUDIT-C Screenin) How often did you have a drink containing alcohol in the past year? never  2) How many drinks did you have on a typical day when you were drinking in the past year? 0  3) How often did you have 6 or more drinks on one occasion in the past year? never    AUDIT-C Score: 0  Interpretation: Score 0-3 (male): Negative screen for alcohol misuse    Single Item Drug Screening:  How often have you used an illegal drug (including marijuana) or a prescription medication for non-medical reasons in the past year? never    Single Item Drug Screen Score: 0  Interpretation: Negative screen for possible drug use disorder    Brief Intervention  Alcohol & drug use screenings were reviewed. No concerns regarding substance use disorder identified.       Shemar Shepard DO    68-year-old male with past medical history of heart disease, hypertension, hyperlipidemia presents for subsequent  annual wellness visit and follow-up of chronic conditions.  Patient does follow up closely with Cardiology.  Patient did last have follow-up with cardiology in February and was supposed to have a 6-month follow-up but schedule was not available.  He has electrophysiologist as well as general cardiologist.  Patient did have colonoscopy with removal of 3 tubular adenomas.  Labs reviewed which showed hemoglobin A1c of 5.8%, adequately controlled cholesterol.  Patient did see neurology in regards to tremor of his right hand which is a benign essential tremor.        Review of Systems   Constitutional: Negative.    HENT: Negative.     Eyes: Negative.    Respiratory: Negative.     Cardiovascular: Negative.    Gastrointestinal: Negative.    Endocrine: Negative.    Genitourinary: Negative.    Musculoskeletal: Negative.    Skin: Negative.    Allergic/Immunologic: Negative.    Neurological:  Positive for tremors.   Hematological: Negative.    Psychiatric/Behavioral: Negative.     All other systems reviewed and are negative.      Physical Exam  Vitals and nursing note reviewed.   Constitutional:       Appearance: Normal appearance. He is well-developed and normal weight.   HENT:      Head: Normocephalic and atraumatic.      Right Ear: Tympanic membrane, ear canal and external ear normal.      Left Ear: Tympanic membrane, ear canal and external ear normal.      Ears:      Comments: Hearing aids     Nose: Nose normal.      Mouth/Throat:      Mouth: Mucous membranes are moist.      Pharynx: Oropharynx is clear.   Eyes:      General: No scleral icterus.        Right eye: No discharge.         Left eye: No discharge.      Extraocular Movements: Extraocular movements intact.      Conjunctiva/sclera: Conjunctivae normal.      Pupils: Pupils are equal, round, and reactive to light.   Cardiovascular:      Rate and Rhythm: Normal rate and regular rhythm.      Heart sounds: Normal heart sounds.   Pulmonary:      Effort: Pulmonary effort  is normal.      Breath sounds: Normal breath sounds.   Abdominal:      General: Abdomen is flat. Bowel sounds are normal.      Palpations: Abdomen is soft.   Genitourinary:     Penis: Normal.       Prostate: Normal.      Rectum: Normal.   Musculoskeletal:         General: Normal range of motion.      Cervical back: Normal range of motion and neck supple.   Skin:     General: Skin is warm and dry.   Neurological:      General: No focal deficit present.      Mental Status: He is alert and oriented to person, place, and time. Mental status is at baseline.   Psychiatric:         Mood and Affect: Mood normal.         Behavior: Behavior normal.         Thought Content: Thought content normal.         Judgment: Judgment normal.       Recent Results (from the past 1512 hour(s))   Tissue Exam    Collection Time: 09/06/24  8:18 AM   Result Value Ref Range    Case Report       Surgical Pathology Report                         Case: [id number]                                  Authorizing Provider:  Clovis Leos MD      Collected:           09/06/2024 0818              Ordering Location:     [facility]      Received:            09/06/2024 [address] [place] [address] Endoscopy                                                           Pathologist:           Chiquita Marquez MD                                                         Specimens:   A) - Polyp, Colorectal, ascending x 2 - cold biopsy                                                 B) - Polyp, Colorectal, transverse polyp x 1- cold biopsy                                  Final Diagnosis       A. Polyp, Colorectal, ascending colon polyps x 2 - cold biopsy:  - Tubular adenoma.    -- Negative for high grade dysplasia and malignancy.   - Polypoid portions of benign colonic mucosa.     -- Negative for dysplasia and malignancy on multiple examined levels.       B. Polyp, Colorectal, transverse colon polyp x 1- cold biopsy:  - Tubular  "adenoma.  - Negative for high grade dysplasia and malignancy.      Additional Information       All reported additional testing was performed with appropriately reactive controls.  These tests were developed and their performance characteristics determined by [facility] or appropriate performing facility, though some tests may be performed on tissues which have not been validated for performance characteristics (such as staining performed on alcohol exposed cell blocks and decalcified tissues).  Results should be interpreted with caution and in the context of the patients’ clinical condition. These tests may not be cleared or approved by the U.S. Food and Drug Administration, though the FDA has determined that such clearance or approval is not necessary. These tests are used for clinical purposes and they should not be regarded as investigational or for research. This laboratory has been approved by [facility], designated as a high-complexity laboratory and is qualified to perform these tests.  Interpretation performed at [facility], [address] [facility] [address].      Synoptic Checklist          COLON/RECTUM POLYP FORM - GI - All Specimens          :    Adenoma(s)      Gross Description       A. The specimen is received in formalin, labeled with the patient's name and hospital number, and is designated \"ascending x 2 cold biopsy\".  It consists of two 0.2 cm-0.4 cm tan-pink tissue fragments.  Entirely submitted in a screen cassette.  B. The specimen is received in formalin, labeled with the patient's name and hospital number, and is designated \"transverse polyp x 1 cold biopsy\".  It consists of two 0.1 cm tan-pink tissue fragments.  Entirely submitted in a screen cassette.    Note: The estimated total formalin fixation time based upon information provided by the submitting clinician and the standard processing schedule is under 72 hours.  ESorrentino      Clinical Information       " · Three sessile and benign-appearing polyps in the ascending colon and transverse colon; performed cold forceps biopsy with complete en bloc removal   Lipid panel    Collection Time: 10/01/24  8:19 AM   Result Value Ref Range    Total Cholesterol 146 <200 mg/dL    HDL 40 > OR = 40 mg/dL    Triglycerides 139 <150 mg/dL    LDL Calculated 82 mg/dL (calc)    Chol HDLC Ratio 3.7 <5.0 (calc)    Non-HDL Cholesterol 106 <130 mg/dL (calc)   Comprehensive metabolic panel    Collection Time: 10/01/24  8:19 AM   Result Value Ref Range    Glucose, Random 98 65 - 99 mg/dL    BUN 15 7 - 25 mg/dL    Creatinine 1.05 0.70 - 1.35 mg/dL    eGFR 77 > OR = 60 mL/min/1.73m2    SL AMB BUN/CREATININE RATIO SEE NOTE: 6 - 22 (calc)    Sodium 139 135 - 146 mmol/L    Potassium 4.5 3.5 - 5.3 mmol/L    Chloride 103 98 - 110 mmol/L    CO2 27 20 - 32 mmol/L    Calcium 9.5 8.6 - 10.3 mg/dL    Protein, Total 6.8 6.1 - 8.1 g/dL    Albumin 4.3 3.6 - 5.1 g/dL    Globulin 2.5 1.9 - 3.7 g/dL (calc)    Albumin/Globulin Ratio 1.7 1.0 - 2.5 (calc)    TOTAL BILIRUBIN 1.3 (H) 0.2 - 1.2 mg/dL    Alkaline Phosphatase 65 35 - 144 U/L    AST 22 10 - 35 U/L    ALT 37 9 - 46 U/L   CBC and differential    Collection Time: 10/01/24  8:19 AM   Result Value Ref Range    White Blood Cell Count 5.1 3.8 - 10.8 Thousand/uL    Red Blood Cell Count 4.99 4.20 - 5.80 Million/uL    Hemoglobin 16.1 13.2 - 17.1 g/dL    HCT 48.1 38.5 - 50.0 %    MCV 96.4 80.0 - 100.0 fL    MCH 32.3 27.0 - 33.0 pg    MCHC 33.5 32.0 - 36.0 g/dL    RDW 12.5 11.0 - 15.0 %    Platelet Count 174 140 - 400 Thousand/uL    SL AMB MPV 10.2 7.5 - 12.5 fL    Neutrophils (Absolute) 2,407 1,500 - 7,800 cells/uL    Lymphocytes (Absolute) 1,989 850 - 3,900 cells/uL    Monocytes (Absolute) 612 200 - 950 cells/uL    Eosinophils (Absolute) 61 15 - 500 cells/uL    Basophils ABS 31 0 - 200 cells/uL    Neutrophils 47.2 %    Lymphocytes 39.0 %    Monocytes 12.0 %    Eosinophils 1.2 %    Basophils PCT 0.6 %   PSA, Total  Screen    Collection Time: 10/01/24  8:19 AM   Result Value Ref Range    Prostate Specific Antigen Total 0.57 < OR = 4.00 ng/mL   TSH, 3rd generation with Free T4 reflex    Collection Time: 10/01/24  8:19 AM   Result Value Ref Range    TSH W/RFX TO FREE T4 3.15 0.40 - 4.50 mIU/L   Hemoglobin A1c (w/out EAG)    Collection Time: 10/01/24  8:19 AM   Result Value Ref Range    Hemoglobin A1C 5.8 (H) <5.7 % of total Hgb

## 2024-10-30 NOTE — ASSESSMENT & PLAN NOTE
Chief Complaint   Patient presents with     Physical     physical (9 year well)        Deisi Jackson MA, at 7:00 AM on 6/7/2021.    Has not increased his dose of Ranexa.  Remains on rosuvastatin, aspirin, sotalol.  Follow-up with cardiology.  Message sent to cardiology about follow-up appointment

## 2024-10-30 NOTE — PATIENT INSTRUCTIONS
Medicare Preventive Visit Patient Instructions  Thank you for completing your Welcome to Medicare Visit or Medicare Annual Wellness Visit today. Your next wellness visit will be due in one year (10/31/2025).  The screening/preventive services that you may require over the next 5-10 years are detailed below. Some tests may not apply to you based off risk factors and/or age. Screening tests ordered at today's visit but not completed yet may show as past due. Also, please note that scanned in results may not display below.  Preventive Screenings:  Service Recommendations Previous Testing/Comments   Colorectal Cancer Screening  Colonoscopy    Fecal Occult Blood Test (FOBT)/Fecal Immunochemical Test (FIT)  Fecal DNA/Cologuard Test  Flexible Sigmoidoscopy Age: 45-75 years old   Colonoscopy: every 10 years (May be performed more frequently if at higher risk)  OR  FOBT/FIT: every 1 year  OR  Cologuard: every 3 years  OR  Sigmoidoscopy: every 5 years  Screening may be recommended earlier than age 45 if at higher risk for colorectal cancer. Also, an individualized decision between you and your healthcare provider will decide whether screening between the ages of 76-85 would be appropriate. Colonoscopy: 09/06/2024  FOBT/FIT: Not on file  Cologuard: Not on file  Sigmoidoscopy: Not on file    Screening Current     Prostate Cancer Screening Individualized decision between patient and health care provider in men between ages of 55-69   Medicare will cover every 12 months beginning on the day after your 50th birthday PSA: 0.57 ng/mL     Screening Current     Hepatitis C Screening Once for adults born between 1945 and 1965  More frequently in patients at high risk for Hepatitis C Hep C Antibody: 03/10/2020    Screening Current   Diabetes Screening 1-2 times per year if you're at risk for diabetes or have pre-diabetes Fasting glucose: 102 mg/dL (10/4/2022)  A1C: 5.8 % of total Hgb (10/1/2024)  Screening Current   Cholesterol  Screening Once every 5 years if you don't have a lipid disorder. May order more often based on risk factors. Lipid panel: 10/01/2024  Screening Not Indicated  History Lipid Disorder      Other Preventive Screenings Covered by Medicare:  Abdominal Aortic Aneurysm (AAA) Screening: covered once if your at risk. You're considered to be at risk if you have a family history of AAA or a male between the age of 65-75 who smoking at least 100 cigarettes in your lifetime.  Lung Cancer Screening: covers low dose CT scan once per year if you meet all of the following conditions: (1) Age 55-77; (2) No signs or symptoms of lung cancer; (3) Current smoker or have quit smoking within the last 15 years; (4) You have a tobacco smoking history of at least 20 pack years (packs per day x number of years you smoked); (5) You get a written order from a healthcare provider.  Glaucoma Screening: covered annually if you're considered high risk: (1) You have diabetes OR (2) Family history of glaucoma OR (3)  aged 50 and older OR (4)  American aged 65 and older  Osteoporosis Screening: covered every 2 years if you meet one of the following conditions: (1) Have a vertebral abnormality; (2) On glucocorticoid therapy for more than 3 months; (3) Have primary hyperparathyroidism; (4) On osteoporosis medications and need to assess response to drug therapy.  HIV Screening: covered annually if you're between the age of 15-65. Also covered annually if you are younger than 15 and older than 65 with risk factors for HIV infection. For pregnant patients, it is covered up to 3 times per pregnancy.    Immunizations:  Immunization Recommendations   Influenza Vaccine Annual influenza vaccination during flu season is recommended for all persons aged >= 6 months who do not have contraindications   Pneumococcal Vaccine   * Pneumococcal conjugate vaccine = PCV13 (Prevnar 13), PCV15 (Vaxneuvance), PCV20 (Prevnar 20)  * Pneumococcal  polysaccharide vaccine = PPSV23 (Pneumovax) Adults 19-65 yo with certain risk factors or if 65+ yo  If never received any pneumonia vaccine: recommend Prevnar 20 (PCV20)  Give PCV20 if previously received 1 dose of PCV13 or PPSV23   Hepatitis B Vaccine 3 dose series if at intermediate or high risk (ex: diabetes, end stage renal disease, liver disease)   Respiratory syncytial virus (RSV) Vaccine - COVERED BY MEDICARE PART D  * RSVPreF3 (Arexvy) CDC recommends that adults 60 years of age and older may receive a single dose of RSV vaccine using shared clinical decision-making (SCDM)   Tetanus (Td) Vaccine - COST NOT COVERED BY MEDICARE PART B Following completion of primary series, a booster dose should be given every 10 years to maintain immunity against tetanus. Td may also be given as tetanus wound prophylaxis.   Tdap Vaccine - COST NOT COVERED BY MEDICARE PART B Recommended at least once for all adults. For pregnant patients, recommended with each pregnancy.   Shingles Vaccine (Shingrix) - COST NOT COVERED BY MEDICARE PART B  2 shot series recommended in those 19 years and older who have or will have weakened immune systems or those 50 years and older     Health Maintenance Due:      Topic Date Due   • Colorectal Cancer Screening  09/05/2029   • Hepatitis C Screening  Completed     Immunizations Due:      Topic Date Due   • Pneumococcal Vaccine: 65+ Years (3 of 3 - PPSV23 or PCV20) 09/09/2024     Advance Directives   What are advance directives?  Advance directives are legal documents that state your wishes and plans for medical care. These plans are made ahead of time in case you lose your ability to make decisions for yourself. Advance directives can apply to any medical decision, such as the treatments you want, and if you want to donate organs.   What are the types of advance directives?  There are many types of advance directives, and each state has rules about how to use them. You may choose a combination of  any of the following:  Living will:  This is a written record of the treatment you want. You can also choose which treatments you do not want, which to limit, and which to stop at a certain time. This includes surgery, medicine, IV fluid, and tube feedings.   Durable power of  for healthcare (DPAHC):  This is a written record that states who you want to make healthcare choices for you when you are unable to make them for yourself. This person, called a proxy, is usually a family member or a friend. You may choose more than 1 proxy.  Do not resuscitate (DNR) order:  A DNR order is used in case your heart stops beating or you stop breathing. It is a request not to have certain forms of treatment, such as CPR. A DNR order may be included in other types of advance directives.  Medical directive:  This covers the care that you want if you are in a coma, near death, or unable to make decisions for yourself. You can list the treatments you want for each condition. Treatment may include pain medicine, surgery, blood transfusions, dialysis, IV or tube feedings, and a ventilator (breathing machine).  Values history:  This document has questions about your views, beliefs, and how you feel and think about life. This information can help others choose the care that you would choose.  Why are advance directives important?  An advance directive helps you control your care. Although spoken wishes may be used, it is better to have your wishes written down. Spoken wishes can be misunderstood, or not followed. Treatments may be given even if you do not want them. An advance directive may make it easier for your family to make difficult choices about your care.   Weight Management   Why it is important to manage your weight:  Being overweight increases your risk of health conditions such as heart disease, high blood pressure, type 2 diabetes, and certain types of cancer. It can also increase your risk for osteoarthritis, sleep  apnea, and other respiratory problems. Aim for a slow, steady weight loss. Even a small amount of weight loss can lower your risk of health problems.  How to lose weight safely:  A safe and healthy way to lose weight is to eat fewer calories and get regular exercise. You can lose up about 1 pound a week by decreasing the number of calories you eat by 500 calories each day.   Healthy meal plan for weight management:  A healthy meal plan includes a variety of foods, contains fewer calories, and helps you stay healthy. A healthy meal plan includes the following:  Eat whole-grain foods more often.  A healthy meal plan should contain fiber. Fiber is the part of grains, fruits, and vegetables that is not broken down by your body. Whole-grain foods are healthy and provide extra fiber in your diet. Some examples of whole-grain foods are whole-wheat breads and pastas, oatmeal, brown rice, and bulgur.  Eat a variety of vegetables every day.  Include dark, leafy greens such as spinach, kale, fer greens, and mustard greens. Eat yellow and orange vegetables such as carrots, sweet potatoes, and winter squash.   Eat a variety of fruits every day.  Choose fresh or canned fruit (canned in its own juice or light syrup) instead of juice. Fruit juice has very little or no fiber.  Eat low-fat dairy foods.  Drink fat-free (skim) milk or 1% milk. Eat fat-free yogurt and low-fat cottage cheese. Try low-fat cheeses such as mozzarella and other reduced-fat cheeses.  Choose meat and other protein foods that are low in fat.  Choose beans or other legumes such as split peas or lentils. Choose fish, skinless poultry (chicken or turkey), or lean cuts of red meat (beef or pork). Before you cook meat or poultry, cut off any visible fat.   Use less fat and oil.  Try baking foods instead of frying them. Add less fat, such as margarine, sour cream, regular salad dressing and mayonnaise to foods. Eat fewer high-fat foods. Some examples of high-fat  foods include french fries, doughnuts, ice cream, and cakes.  Eat fewer sweets.  Limit foods and drinks that are high in sugar. This includes candy, cookies, regular soda, and sweetened drinks.  Exercise:  Exercise at least 30 minutes per day on most days of the week. Some examples of exercise include walking, biking, dancing, and swimming. You can also fit in more physical activity by taking the stairs instead of the elevator or parking farther away from stores. Ask your healthcare provider about the best exercise plan for you.      © Copyright [employer] 2018 Information is for End User's use only and may not be sold, redistributed or otherwise used for commercial purposes. All illustrations and images included in CareNotes® are the copyrighted property of [employer] or [employer]

## 2024-10-30 NOTE — ASSESSMENT & PLAN NOTE
Hemoglobin A1c of 5.8%.  Goal is less than 7%.  Continue to watch dietary intake of carbohydrates.  Repeat A1c in 6 months

## 2024-10-30 NOTE — ASSESSMENT & PLAN NOTE
Subsequent annual wellness visit completed    -Patient is current with screenings    Given Prevnar 20 in the office

## 2024-11-14 ENCOUNTER — REMOTE DEVICE CLINIC VISIT (OUTPATIENT)
Dept: CARDIOLOGY CLINIC | Facility: CLINIC | Age: 69
End: 2024-11-14
Payer: MEDICARE

## 2024-11-14 DIAGNOSIS — I47.20 VENTRICULAR TACHYCARDIA (HCC): Primary | ICD-10-CM

## 2024-11-14 PROCEDURE — 93296 REM INTERROG EVL PM/IDS: CPT | Performed by: INTERNAL MEDICINE

## 2024-11-14 PROCEDURE — 93295 DEV INTERROG REMOTE 1/2/MLT: CPT | Performed by: INTERNAL MEDICINE

## 2024-11-14 NOTE — PROGRESS NOTES
Results for orders placed or performed in visit on 11/14/24   Cardiac EP device report    Narrative    MDT-DUAL CHAMBER ICD (AAI-DDD MODE) / ACTIVE SYSTEM IS MRI CONDITIONAL  CARELINK TRANSMISSION: BATTERY VOLTAGE ADEQUATE (7.5 YRS). AP: 41.5%. : 96.9% (>40%~MVP-ON/50). ALL AVAILABLE LEAD PARAMETERS WITHIN NORMAL LIMITS. 14 VT-NS EPISODES W/ AVAIL EGMS SHOWING NSVT 7 BEATS @ 158 BPM, 13 BEATS @ 153 BPM, 8 BEATS @ 150 BPM, 9 BEATS @ 146 BPM & PAT EPISODE # 505 6 BEATS @ 156 BPM. PT TAKES SOTALOL, ASA 325MG. EF: 38% (NUC ST TX 2/10/23). OPTI-VOL WITHIN NORMAL LIMITS. NORMAL DEVICE FUNCTION. CH

## 2024-11-15 ENCOUNTER — RESULTS FOLLOW-UP (OUTPATIENT)
Dept: CARDIOLOGY CLINIC | Facility: CLINIC | Age: 69
End: 2024-11-15

## 2024-11-15 NOTE — RESULT ENCOUNTER NOTE
Normal Device Function  Brief NSVT       Last nuclear study, February 2023, EF 38%  Device is 99% V paced  Last EKG, February 2024,  ms  Echo in June 2022 EF was 60%    I last saw the patient in May 2022      Requesting primary and primary cardiology  -Check an echocardiogram to assess EF  -Check an EKG to assess QRS with  -If EF is low and QRS is wide patient will benefit from BiV upgrade  Get bilateral venograms done if meets above criteria

## 2024-11-29 PROBLEM — Z00.00 MEDICARE ANNUAL WELLNESS VISIT, SUBSEQUENT: Status: RESOLVED | Noted: 2020-10-08 | Resolved: 2024-11-29

## 2024-12-16 NOTE — PROGRESS NOTES
Cardiology  Follow Up   Office Visit Note  Tucker Carvajal   68 y.o.   male   MRN: [id number]  [facility] [place]  [address] [facility]  [address]  [place] [address] [phone]    PCP: Shemar Shepard DO  Cardiologist: Dr. JEANNETTE Vences  EP: Dr. Desir         Summary of Plan:  Heart healthy diet: Mediterranean or DASH.  Education provided  SPECT given increased VT on his interrogation.  His EKG shows a V paced rhythm,  ms  Echocardiogram-if he has a low EF he may benefit from a BiV upgrade  BMP, BNP, magnesium  Rec:  he avoid exertional activities   Rec:  he not go on his hunting trip to [facility] in January until cleared to do so by Dr NEGAR Vences  Increase rosuvastatin to 20 mg/d.  Repeat lipids 4-12 weeks  Colon Ca screenin2024, up-to-date  Follow-up reports interval with Dr. JEANNETTE Vences  Last Cologuard: Not on file           Assessment/Plan  PVCs/NSVT  On sotalol 80 mg every 12 hours  Interrogation recent identified 14 episodes of VT 2024, per Dr Guzman report  EKG today: NSR.  LBBB 67 bpm.   ms Paced (AP: 41.5%. : 96.9 % on his Interro)  Will get an updated echocardiogram to reevaluate ejection fraction  CAD with chronic stable angina  [facility] :50-70% ostial circumflex stenosis which is non dominant artery and IFR is 0.96 other arteries have nonobstructive disease.  Medical management recommended  SPECT 2/10/2023: No ischemia  On aspirin 325 mg daily, statin, Ranexa 500 mg twice daily  SSS, S/P MDT DC ICD ICD   History of intermittent AV block, and SVT with production of VT during EP study status post Medtronic dual-chamber ICD which is functioning adequately.    Interrogation 2024: AP 41.5%.   96.9%.(>40%~MVP-ON/50).  All available lead parameters are within normal limits.  14 VT/NS episodes.  OptiVol within normal limits.  Mixed hyperlipidemia  On rosuvastatin 10 mg daily+ coenzyme Q10 200 mg daily  10/1/2024 calculated LDL 82 non-.  Goal LDL:  Less than 70.  Recommend either increasing rosuvastatin  to 20 mg/d.  Recheck lipids 4-12 weeks   Latest Reference Range & Units 04/12/24 08:27 10/01/24 08:19   Cholesterol <200 mg/dL 154 146   Triglycerides <150 mg/dL 107 139   HDL > OR = 40 mg/dL 45 40   Non-HDL Cholesterol <130 mg/dL (calc) 109 106   LDL Calculated mg/dL (calc) 89 82   Chol/HDL Ratio <5.0 (calc) 3.4 3.7   PAF.  A-fib burden on his device was 2.5%.  On a beta-blocker.  Per his cardiologist note, the patient stopped taking Eliquis.  He is on aspirin 325 mg- per the pt.  He does not desire to reduce  LENY and COPD overlap, on CPAP  [facility] 2021.  Left main: The vessel was normal sized and mildly calcified. Angiography showed mild atherosclerosis. Distal left main has tapering around 10% stenosis.  LAD: The vessel was medium to large sized. Angiography showed mild atherosclerosis. It has mild luminal irregularities proximally. Mid there has around 35% stenosis distal branches has mild luminal irregularities. It gives a diagonal which  has mild luminal irregularities with no focal stenosis. Circumflex: The vessel was normal sized. Angiography showed moderate atherosclerosis. It has ostial around 60% stenosis. It gives a obtuse marginal which is high obtuse marginal and  run in a tertiary of ramus intermedius. Mid circumflex has around 35% stenosis. No other focal stenosis seen. And IFR of ostial circumflex lesion was performed IFR was 0.96.  Ostial circumflex: There was a discrete around 60 % stenosis. There was JAJA grade 3 flow through the vessel (brisk flow). IFR 0.96  RCA: The vessel was large sized. Angiography showed mild atherosclerosis. It has mild luminal irregularities causing about 25% stenosis in the mid and around 25% to 30% distally no other focal stenosis seen.  IMPRESSIONS:  Patient has moderate stenosis of ostial/proximal circumflex with calcification. IFR of this lesion was performed. IFR was 0.96. There was mild nonobstructive disease  "involving left main, right coronary artery and LAD is noted.  RECOMMENDATIONS:  Patient has nonobstructive disease. Moderate lesion noted in the proximal stressed ostial circumflex shows IFR of 0.96 at this time continue aggressive medical Rx.  TTE 10/8/2022.  EF 60%.  Mild LVH.  Grade 1 DD.  RV normal size and function.  Mild LAE.  Mild AI.  Mild MR.  Mild TR.  SPECT 2/10/2023 \"essentially normal exercise nuclear stress test.\"  ECG portion non-diagnostic due to LBBB.  No obvious myocardial ischemia noted on perfusion imaging.  EF decreased, however this appears likely due to poor image quality.                 HPI  Tucker Carvajal is a 68 y.o.year old male with CAD status post PCI with chronic stable angina, HTN, HLD, PAF, LENY, COPD, mixed dyslipidemia, NSVT status post ICD.  He previously followed with [facility] and established care with Dr. JEANNETTE Vences in 2017.      2/12/24 OV Dr NEGAR Vences  Per his note:   Tucker Carvajal is a 68 y.o. year old male who was referred by primary care doctor for for his history of cardiac disease.  Patient who used to follow up with [facility] and has been evaluated by [facility] in 2017 for 2nd opinion has medical history significant for coronary artery disease, dyslipidemia, DJD and GERD along with obstructive sleep apnea.  He had a catheterization done in June of 2017 which shows he had a moderate ostial circumflex disease and he chose to have medical therapy.  At that time he was seen by  [facility] for 2nd opinion and various options were discussed with him including continue medical Rx, angioplasty of ostial circumflex which could be done but slightly high risk or single-vessel bypass surgery.  At that time he was asymptomatic and he was continued medical therapy.     Patient was recently in [place] where he had an episode of palpitation and fast heartbeat.  He was kept overnight ruled out for acute coronary syndrome and " he was advised to follow up with Cardiology here.  He is known to have history of irregular heartbeat.  EKG shows normal sinus with first-degree AV block and frequent PVCs.  PVCs are also in the form of bigeminy and fusion beats are noted.  He is otherwise very active he denies any chest pain any shortness of breath.  He is able to do his activities without any problems.  No nausea no vomiting no PND no orthopnea no syncope no other issues.     No recent surgery.  He has a previous history of surgery of gallbladder removal as well as neck fusion     He does not smoke but occasionally he will smoke a cigar maybe once every 3 months  Today  Discussion summary and Plan:        1.  Coronary artery disease status post catheterization in June 2017 has moderate ostial circ disease and nonobstructive disease in other arteries.  He has repeat cardiac catheterization done.  Cath was done in August 2021.  Patient has 50-70% ostial circumflex stenosis which is non dominant artery and IFR is 0.96 other arteries have nonobstructive disease.  Continue statins and aspirin.  No change in symptoms for now.      2. Dyslipidemia.  Continue statins he is tolerating it very well.  Cholesterol profile acceptable labs reviewed from 2023.      3. History of intermittent AV block, and SVT with production of VT during EP study status post Medtronic dual-chamber ICD which is functioning adequately.  Recent device interrogation shows few episodes of NSVT which are asymptomatic.  He was evaluated by EP and put on sotalol since then number of episode have decreased.  Device interrogation from February 2024 reviewed.  No significant change.      4. Obstructive sleep apnea.  Continue using CPAP machine.  He is pretty compliant      5. Paroxysmal atrial fibrillation.  Patient is noted to have episodes of atrial fibrillation on the device.  AFib burden was 2.5%.  He is already on beta-blocker.  No more reoccurrence of AFib is noted.  If he has  "significant AFib we will increase beta-blockers.  He has stopped taking his Eliquis.  Repeat interrogation shows no further episodes of atrial fibrillation.  He is on aspirin.       6. Sick sinus syndrome with episodes of NSVT.  Currently asymptomatic.        7. Fatigue and tiredness.  Fatigue and  tiredness has improved with adjustment of device and Ranexa.  He is currently on 500 mg twice a day.     8.  Chronic stable angina.  Patient has responded very well to Ranexa we will continue Ranexa 500 twice a day at this time.  Also issue related to Ranexa discussed with him.     Follow-up 6 months.        11/15/2024  Chart note per Dr Desir:   Recent ICD interrogation: Normal device function.  Brief NSVT.    Last nuclear study, February 2023, EF 38%  Device is 99% V paced  Last EKG, February 2024,  ms  Echo in June 2022 EF was 60%     I last saw the patient in May 2022        Requesting primary and primary cardiology  -Check an echocardiogram to assess EF  -Check an EKG to assess QRS with  -If EF is low and QRS is wide patient will benefit from BiV upgrade  Get bilateral venograms done if meets above criteria    12/17/24  Acute OV  Abnormal ICD interrogation- VT/ NS :AP 41.5%.   96.9%.(>40%~MVP-ON/50).  All available lead parameters are within normal limits.  14 VT/NS episodes.  OptiVol within normal limits.  Patient tells me he exercises regularly, 40 to 60 minutes/day, treadmill weightlifting, etc. he has a home gym.  With this activity he denies any chest pain shortness of breath lightheadedness or dizziness.  He denies palpitations.  However, with significant exertion/chills he does have some chest \"ache\".  I reviewed with him his most recent interrogation, which demonstrated 14 episodes of ventricular tachycardia.  He was surprised as he tells me he does not feel anything --he feels great.  EKG today normal sinus rhythm 67 bpm.  HI interval 96 ms.  V paced ( EKG reading was ammended)    He wears his CPAP " faithfully  I conveyed with him his most recent interrogation events  Per Dr. Desir, testing is recommended  Will pursue a pharmacologic nuclear stress test and echocardiogram and some labs  The patient reports he is going on a hunting trip to [place] in January.  I recommend he refrain from exercise, and get clearance from his cardiologist before traveling        Review of Systems   Constitutional: Negative for chills.   Cardiovascular:  Negative for chest pain, claudication, cyanosis, dyspnea on exertion, irregular heartbeat, leg swelling, near-syncope, orthopnea, palpitations, paroxysmal nocturnal dyspnea and syncope.   Respiratory:  Negative for cough and shortness of breath.    Gastrointestinal:  Negative for heartburn and nausea.   Neurological:  Negative for dizziness, focal weakness, headaches, light-headedness and weakness.   All other systems reviewed and are negative.      Assessment  Diagnoses and all orders for this visit:    Coronary artery disease of native artery of native heart with stable angina pectoris (HCC)  -     POCT ECG  -     Echo complete w/ contrast if indicated; Future  -     NM myocardial perfusion spect (rx stress and/or rest); Future  -     Basic metabolic panel; Future  -     rosuvastatin (CRESTOR) 20 MG tablet; Take 1 tablet (20 mg total) by mouth daily  -     Cancel: Basic metabolic panel    Paroxysmal atrial fibrillation (HCC)  -     NM myocardial perfusion spect (rx stress and/or rest); Future    Sick sinus syndrome (HCC)    LENY and COPD overlap syndrome (HCC)    Mixed hyperlipidemia    S/P ICD (internal cardiac defibrillator) procedure    VT (ventricular tachycardia) (HCC)  -     Echo complete w/ contrast if indicated; Future  -     NM myocardial perfusion spect (rx stress and/or rest); Future  -     B-Type Natriuretic Peptide(BNP); Future  -     Basic metabolic panel; Future  -     Magnesium; Future  -     Cancel: B-Type Natriuretic Peptide(BNP)  -     Cancel: Basic metabolic  panel  -     Cancel: Magnesium    Dyslipidemia  -     rosuvastatin (CRESTOR) 20 MG tablet; Take 1 tablet (20 mg total) by mouth daily  -     Lipid panel; Future  -     Lipid panel        Past Medical History:   Diagnosis Date    Abrasion of left foot     LAST ASSESSED: 9/23/13    Achilles tendinitis, unspecified leg     LAST ASSESSED: 11/30/12    Allergic rhinitis     LAST ASSESSED: 11/25/13    Allergic rhinitis 06/25/2008    LAST ASSESSED: 6/25/08    Arthritis     Cervicalgia 04/22/2011    LAST ASSESSED: 4/22/11    Chronic pain disorder     Colon polyp     Coronary artery disease     CPAP (continuous positive airway pressure) dependence     Dysfunction of left eustachian tube     LAST ASSESSED: 9/23/13    Epistaxis     LAST ASSESSED: 5/27/15    First degree atrioventricular block     LAST ASSESSED: 7/10/17    Gastric ulcer 10/07/2011    LAST ASSESSED: 3/9/15    GERD (gastroesophageal reflux disease)     Hearing aid worn     Hemorrhoids 05/13/2011    LAST ASSESSED: 5/13/11    Hyperlipidemia     Hypertension     Irregular heart beat     Long term use of drug     LAST ASSESSED: 10/11/12    Myalgia 12/21/2007    LAST ASSESSED: 12/21/07    Myositis 12/21/2007    LAST ASSESSED: 12/21/07    Numbness and tingling of foot     LAST ASSESSED: 3/9/15    Pacemaker     Premature ventricular beats     states cardiologist ordered Zio ambulatory cardiac monitor    Sleep apnea     Wears glasses        Past Surgical History:   Procedure Laterality Date    CARDIAC CATHETERIZATION  2017    CARDIAC ELECTROPHYSIOLOGY PROCEDURE N/A 10/25/2021    Procedure: CARDIAC EPS;  Surgeon: Uday Desir MD;  Location: [facility];  Service: Cardiology    CARDIAC ELECTROPHYSIOLOGY PROCEDURE Left 10/25/2021    Procedure: Cardiac icd implant;  Surgeon: Uday Desir MD;  [facility];  Service: Cardiology    CARDIAC PACEMAKER PLACEMENT      CARDIOVERSION N/A 10/25/2021    Procedure: Cardioversion;  Surgeon: Uday Desir MD;   Location: [facility];  Service: Cardiology    CATARACT EXTRACTION Right     CERVICAL FUSION      pt thinks C4-C5    CHOLECYSTECTOMY      LAPAROSCOPIC; LAST ASSESSED: 10/17/17    COLONOSCOPY      COMPLETE; 3-4 YEARS AGO BY [facility]; LAST ASSESSED: 8/18/14    MYRINGOTOMY W/ TUBES      WITH VENTILATING TUBE INSERTION    HI HEMORRHOIDECTOMY INT & XTRNL 2/> COLUMN/GERBER N/A 7/27/2023    Procedure: HEMORRHOIDECTOMY EXCISION;  Surgeon: Clovis Leos MD;  Location: [facility];  Service: Colorectal    HI NEUROPLASTY &/TRANSPOS MEDIAN NRV CARPAL TUNNE Left 07/16/2021    Procedure: RELEASE CARPAL TUNNEL;  Surgeon: Luis Rubio MD;  Location: [facility];  Service: Orthopedics    HI NEUROPLASTY &/TRANSPOS MEDIAN NRV CARPAL TUNNE Right 10/14/2022    Procedure: RELEASE CARPAL TUNNEL;  Surgeon: Luis Rubio MD;  Location: [facility];  Service: Orthopedics    HI TENDON SHEATH INCISION Right 10/14/2022    Procedure: RELEASE TRIGGER FINGER,RING AND LONG TENOSYNOVECTOMY FDS +FDP;  Surgeon: Luis Rubio MD;  Location: [facility];  Service: Orthopedics    VASECTOMY             Allergies  Allergies   Allergen Reactions    Amoxicillin Anaphylaxis    Augmentin [Amoxicillin-Pot Clavulanate] Anaphylaxis    Acetaminophen      Rapid heart rate    Cherry Flavor - Food Allergy Other (See Comments)     States allergy is to raw cherries difficulty breathing    Pollen Extract     Dye [Iodinated Contrast Media] Itching         Medications    Current Outpatient Medications:     rosuvastatin (CRESTOR) 20 MG tablet, Take 1 tablet (20 mg total) by mouth daily, Disp: , Rfl:     aspirin 325 mg tablet, Take 325 mg by mouth daily, Disp: , Rfl:     Coenzyme Q10 (COQ10) 200 MG CAPS, Take 1 tablet by mouth daily, Disp: , Rfl:     COLLAGEN-CHOND-HYALURONIC ACID PO, Take 1 capsule by mouth in the morning, Disp: , Rfl:     EPINEPHrine (EpiPen 2-Leroy) 0.3 mg/0.3 mL SOAJ, Inject 0.3 mL (0.3 mg total) into a muscle once for 1  dose, Disp: 0.6 mL, Rfl: 0    finasteride (PROSCAR) 5 mg tablet, Take 1 tablet (5 mg total) by mouth daily, Disp: 90 tablet, Rfl: 1    fluticasone (VERAMYST) 27.5 MCG/SPRAY nasal spray, 2 sprays into each nostril daily, Disp: , Rfl:     Glucosamine-Chondroitin (GLUCOSAMINE CHONDR COMPLEX PO), Take 1 tablet by mouth daily, Disp: , Rfl:     mefloquine (LARIAM) 250 MG tablet, Take 250 mg once weekly starting 2 days prior to travel Do not start before 2023., Disp: 3 tablet, Rfl: 0    Omega-3 Fatty Acids (FISH OIL) 1000 MG CPDR, Take by mouth daily, Disp: , Rfl:     pantoprazole (PROTONIX) 40 mg tablet, Take 1 tablet (40 mg total) by mouth daily, Disp: 90 tablet, Rfl: 3    ranolazine (RANEXA) 500 mg 12 hr tablet, TAKE 1 TABLET BY MOUTH TWICE A DAY, Disp: 180 tablet, Rfl: 1    sotalol (BETAPACE) 80 mg tablet, TAKE 1 TABLET BY MOUTH EVERY 12 HOURS, Disp: 180 tablet, Rfl: 3    Turmeric 500 MG CAPS, Take by mouth 3 (three) times a week, Disp: , Rfl:       Social History     Socioeconomic History    Marital status: /Civil Union     Spouse name: Not on file    Number of children: Not on file    Years of education: Not on file    Highest education level: Not on file   Occupational History    Not on file   Tobacco Use    Smoking status: Former     Current packs/day: 0.00     Types: Cigarettes     Quit date:      Years since quittin.9    Smokeless tobacco: Never   Vaping Use    Vaping status: Never Used   Substance and Sexual Activity    Alcohol use: Yes     Alcohol/week: 7.0 standard drinks of alcohol     Types: 7 Glasses of wine per week     Comment: few times a week    Drug use: No    Sexual activity: Not Currently   Other Topics Concern    Not on file   Social History Narrative    Not on file     Social Drivers of Health     Financial Resource Strain: Low Risk  (10/20/2023)    Overall Financial Resource Strain (CARDIA)     Difficulty of Paying Living Expenses: Not hard at all   Food Insecurity: No  Food Insecurity (10/30/2024)    Hunger Vital Sign     Worried About Running Out of Food in the Last Year: Never true     Ran Out of Food in the Last Year: Never true   Transportation Needs: No Transportation Needs (10/30/2024)    PRAPARE - Transportation     Lack of Transportation (Medical): No     Lack of Transportation (Non-Medical): No   Physical Activity: Not on file   Stress: Not on file   Social Connections: Not on file   Intimate Partner Violence: Not on file   Housing Stability: Low Risk  (10/30/2024)    Housing Stability Vital Sign     Unable to Pay for Housing in the Last Year: No     Number of Times Moved in the Last Year: 0     Homeless in the Last Year: No       Family History   Problem Relation Age of Onset    Cancer Mother         SOFT TISSUE CANCER ON RT SIDE CHEST WALL AND     Prostate cancer Maternal Uncle        Physical Exam  Vitals and nursing note reviewed.   Constitutional:       General: He is not in acute distress.  HENT:      Head: Normocephalic and atraumatic.   Eyes:      Extraocular Movements: Extraocular movements intact.      Conjunctiva/sclera: Conjunctivae normal.   Cardiovascular:      Rate and Rhythm: Normal rate and regular rhythm.      Pulses: Intact distal pulses.      Heart sounds: Normal heart sounds.   Pulmonary:      Effort: Pulmonary effort is normal.      Breath sounds: Normal breath sounds.   Abdominal:      General: Bowel sounds are normal.      Palpations: Abdomen is soft.   Musculoskeletal:         General: Normal range of motion.      Cervical back: Normal range of motion and neck supple.   Skin:     General: Skin is warm and dry.   Neurological:      Mental Status: He is alert and oriented to person, place, and time.   Psychiatric:         Mood and Affect: Mood normal.         Vitals: Blood pressure 140/70, pulse 67, weight 98.9 kg (218 lb), SpO2 98%.   Wt Readings from Last 3 Encounters:   24 98.9 kg (218 lb)   10/30/24 99.3 kg (219 lb)   24 98.9  "kg (218 lb)           Labs & Results:  Lab Results   Component Value Date    WBC 5.1 10/01/2024    HGB 16.1 10/01/2024    HCT 48.1 10/01/2024    MCV 96.4 10/01/2024     10/01/2024     No results found for: \"BNP\"  No components found for: \"CHEM\"  Troponin I   Date Value Ref Range Status   10/13/2020 <0.02 <=0.04 ng/mL Final     Comment:     [employer] Chemistry analyzer 99% cutoff is > 0.04 ng/mL in network labs     o cTnI 99% cutoff is useful only when applied to patients in the clinical setting of myocardial ischemia   o cTnI 99% cutoff should be interpreted in the context of clinical history, ECG findings and possibly cardiac imaging to establish correct diagnosis.   o cTnI 99% cutoff may be suggestive but clearly not indicative of a coronary event without the clinical setting of myocardial ischemia.     10/12/2020 <0.02 <=0.04 ng/mL Final     Comment:     S[employer]ens Chemistry analyzer 99% cutoff is > 0.04 ng/mL in network labs     o cTnI 99% cutoff is useful only when applied to patients in the clinical setting of myocardial ischemia   o cTnI 99% cutoff should be interpreted in the context of clinical history, ECG findings and possibly cardiac imaging to establish correct diagnosis.   o cTnI 99% cutoff may be suggestive but clearly not indicative of a coronary event without the clinical setting of myocardial ischemia.     10/12/2020 <0.02 <=0.04 ng/mL Final     Comment:     Siemens Chemistry analyzer 99% cutoff is > 0.04 ng/mL in network labs     o cTnI 99% cutoff is useful only when applied to patients in the clinical setting of myocardial ischemia   o cTnI 99% cutoff should be interpreted in the context of clinical history, ECG findings and possibly cardiac imaging to establish correct diagnosis.   o cTnI 99% cutoff may be suggestive but clearly not indicative of a coronary event without the clinical setting of myocardial ischemia.       Results for orders placed during the hospital encounter of " 21    Echo complete with contrast if indicated    [facility]  [address], [place] [id number] [phone]    Transthoracic Echocardiogram  2D, M-mode, Doppler, and Color Doppler    Study date:  12-Aug-2021    Patient: SHALINI RAMEY  MR number: [id number]  Account number: [id number]  : 1955  Age: 65 years  Gender: Male  Status: Outpatient  Location: [facility]  Height: 72 in  Weight: 221.5 lb  BP: 152/ 62 mmHg    Indications: Assess left ventricular function.    Diagnoses: I47.2 - Ventricular tachycardia    Sonographer:  CHRISTI Lofton  Primary Physician:  Shemar Shepard DO  Referring Physician:  Robert Vences MD  Group:  [facility]  Interpreting Physician:  Dorina Harris MD    SUMMARY    LEFT VENTRICLE:  Systolic function was normal. Ejection fraction was estimated to be 60 %.  There were no regional wall motion abnormalities.    AORTIC VALVE:  There was mild regurgitation.    HISTORY: PRIOR HISTORY: NSVT, Non-obtructive coronary artery disease, Hyperlipidemia, LENY    PROCEDURE: The study was performed in the [facility]. This was a routine study. The transthoracic approach was used. The study included complete 2D imaging, M-mode, complete spectral Doppler, and color Doppler. The  heart rate was 54 bpm, at the start of the study. Images were obtained from the parasternal, apical, subcostal, and suprasternal notch acoustic windows. Image quality was adequate.    LEFT VENTRICLE: Size was normal. Systolic function was normal. Ejection fraction was estimated to be 60 %. There were no regional wall motion abnormalities. Wall thickness was normal. DOPPLER: There was an increased relative contribution  of atrial contraction to ventricular filling. Left ventricular diastolic function parameters were normal for the patient's age.    RIGHT VENTRICLE: The size was normal. Systolic function was  normal. Wall thickness was normal.    LEFT ATRIUM: Size was normal.    RIGHT ATRIUM: Size was normal.    MITRAL VALVE: Valve structure was normal. There was normal leaflet separation. DOPPLER: The transmitral velocity was within the normal range. There was no evidence for stenosis. There was trace regurgitation.    AORTIC VALVE: The valve was trileaflet. Leaflets exhibited normal thickness and normal cuspal separation. DOPPLER: Transaortic velocity was within the normal range. There was no evidence for stenosis. There was mild regurgitation.    TRICUSPID VALVE: The valve structure was normal. There was normal leaflet separation. DOPPLER: The transtricuspid velocity was within the normal range. There was no evidence for stenosis. There was trace regurgitation.    PULMONIC VALVE: Leaflets exhibited normal thickness, no calcification, and normal cuspal separation. DOPPLER: The transpulmonic velocity was within the normal range. There was trace regurgitation.    PERICARDIUM: There was no pericardial effusion. The pericardium was normal in appearance.    AORTA: The root exhibited normal size.    SYSTEMIC VEINS: IVC: The inferior vena cava was normal in size. Respirophasic changes were normal.    SYSTEM MEASUREMENT TABLES    2D  %FS: 21.05 %  Ao Diam: 3.02 cm  EDV(Teich): 117.73 ml  EF(Teich): 42.64 %  ESV(Teich): 67.53 ml  IVSd: 0.78 cm  LA Area: 19.18 cm2  LA Diam: 4 cm  LVEDV MOD A4C: 167.49 ml  LVEF MOD A4C: 52.37 %  LVESV MOD A4C: 79.77 ml  LVIDd: 4.99 cm  LVIDs: 3.94 cm  LVLd A4C: 9.72 cm  LVLs A4C: 8.4 cm  LVPWd: 0.88 cm  RA Area: 14.03 cm2  RVIDd: 3.7 cm  SV MOD A4C: 87.71 ml  SV(Teich): 50.2 ml    MM  TAPSE: 2.85 cm    PW  LVOT Env.Ti: 315.89 ms  LVOT VTI: 14.53 cm  LVOT Vmax: 0.63 m/s  LVOT Vmean: 0.46 m/s  LVOT maxP.57 mmHg  LVOT meanP.93 mmHg  MV A Bi: 0.81 m/s  MV Dec [place]: 2.25 m/s2  MV DecT: 247.18 ms  MV E Bi: 0.56 m/s  MV E/A Ratio: 0.69  MV PHT: 71.68 ms  MVA By PHT: 3.07 cm2    [employer]  [employer] Accredited Echocardiography Laboratory    Prepared and electronically signed by    Dorina Harris MD  Signed 12-Aug-2021 15:00:09    No results found for this or any previous visit.    No valid procedures specified.  Results for orders placed during the hospital encounter of 02/10/23    NM myocardial perfusion spect (stress and/or rest)    Interpretation Summary    Perfusion: There is a left ventricular perfusion defect that is large in size with moderate reduction in uptake present in the entire inferoseptal location(s) that is fixed which improved on prone imaging. The defect appears to be an artifact caused by diaphragmatic activity. There is a left ventricular perfusion defect that is small to medium in size with mild reduction in uptake present in the basal anteroseptal location(s) that is predominantly fixed, and appears to be artifactual.    Stress Function: Left ventricular function post-stress is abnormal. Post-stress ejection fraction is 38 %.    Stress Combined Conclusion: Left ventricular perfusion is probably abnormal.    Stress ECG: The ECG was uninterpretable due to left bundle branch block.    Essentially normal exercise nuclear stress test.  ECG portion non-diagnostic due to LBBB.  No obvious myocardial ischemia noted on perfusion imaging.  EF decreased, however this appears likely due to poor image quality.                This note was completed in part utilizing [employer] direct voice recognition software.   Grammatical errors, random word insertion, spelling mistakes, and incomplete sentences may be an occasional consequence of the system secondary to software limitations, ambient noise and hardware issues. At the time of dictation, efforts were made to edit, clarify and /or correct errors.  Please read the chart carefully and recognize, using context, where substitutions have occurred.  If you have any questions or concerns about the context, text or information contained  within the body of this dictation, please contact myself, the provider, for further clarification

## 2024-12-17 ENCOUNTER — RESULTS FOLLOW-UP (OUTPATIENT)
Dept: CARDIOLOGY CLINIC | Facility: CLINIC | Age: 69
End: 2024-12-17

## 2024-12-17 ENCOUNTER — APPOINTMENT (OUTPATIENT)
Dept: LAB | Facility: CLINIC | Age: 69
End: 2024-12-17
Payer: MEDICARE

## 2024-12-17 ENCOUNTER — OFFICE VISIT (OUTPATIENT)
Dept: CARDIOLOGY CLINIC | Facility: CLINIC | Age: 69
End: 2024-12-17
Payer: MEDICARE

## 2024-12-17 VITALS
WEIGHT: 218 LBS | BODY MASS INDEX: 29.57 KG/M2 | OXYGEN SATURATION: 98 % | SYSTOLIC BLOOD PRESSURE: 140 MMHG | HEART RATE: 67 BPM | DIASTOLIC BLOOD PRESSURE: 70 MMHG

## 2024-12-17 DIAGNOSIS — I49.5 SICK SINUS SYNDROME (HCC): ICD-10-CM

## 2024-12-17 DIAGNOSIS — E78.2 MIXED HYPERLIPIDEMIA: ICD-10-CM

## 2024-12-17 DIAGNOSIS — I47.20 VENTRICULAR TACHYARRHYTHMIA (HCC): ICD-10-CM

## 2024-12-17 DIAGNOSIS — J44.9 OSA AND COPD OVERLAP SYNDROME (HCC): ICD-10-CM

## 2024-12-17 DIAGNOSIS — Z12.5 PROSTATE CANCER SCREENING: ICD-10-CM

## 2024-12-17 DIAGNOSIS — I25.118 ATHSCL HEART DISEASE OF NATIVE COR ART W OTH ANG PCTRS (HCC): ICD-10-CM

## 2024-12-17 DIAGNOSIS — G47.33 OSA AND COPD OVERLAP SYNDROME (HCC): ICD-10-CM

## 2024-12-17 DIAGNOSIS — I25.118 CORONARY ARTERY DISEASE OF NATIVE ARTERY OF NATIVE HEART WITH STABLE ANGINA PECTORIS (HCC): Primary | ICD-10-CM

## 2024-12-17 DIAGNOSIS — I47.20 VT (VENTRICULAR TACHYCARDIA) (HCC): ICD-10-CM

## 2024-12-17 DIAGNOSIS — I48.0 PAROXYSMAL ATRIAL FIBRILLATION (HCC): ICD-10-CM

## 2024-12-17 DIAGNOSIS — Z95.810 S/P ICD (INTERNAL CARDIAC DEFIBRILLATOR) PROCEDURE: ICD-10-CM

## 2024-12-17 DIAGNOSIS — E78.5 DYSLIPIDEMIA: ICD-10-CM

## 2024-12-17 LAB
ANION GAP SERPL CALCULATED.3IONS-SCNC: 4 MMOL/L (ref 4–13)
BNP SERPL-MCNC: 42 PG/ML (ref 0–100)
BUN SERPL-MCNC: 15 MG/DL (ref 5–25)
CALCIUM SERPL-MCNC: 9.7 MG/DL (ref 8.4–10.2)
CHLORIDE SERPL-SCNC: 104 MMOL/L (ref 96–108)
CO2 SERPL-SCNC: 30 MMOL/L (ref 21–32)
CREAT SERPL-MCNC: 0.88 MG/DL (ref 0.6–1.3)
GFR SERPL CREATININE-BSD FRML MDRD: 87 ML/MIN/1.73SQ M
GLUCOSE SERPL-MCNC: 95 MG/DL (ref 65–140)
MAGNESIUM SERPL-MCNC: 2 MG/DL (ref 1.9–2.7)
POTASSIUM SERPL-SCNC: 4.6 MMOL/L (ref 3.5–5.3)
SODIUM SERPL-SCNC: 138 MMOL/L (ref 135–147)

## 2024-12-17 PROCEDURE — 80048 BASIC METABOLIC PNL TOTAL CA: CPT

## 2024-12-17 PROCEDURE — 83735 ASSAY OF MAGNESIUM: CPT

## 2024-12-17 PROCEDURE — 99215 OFFICE O/P EST HI 40 MIN: CPT | Performed by: NURSE PRACTITIONER

## 2024-12-17 PROCEDURE — 36415 COLL VENOUS BLD VENIPUNCTURE: CPT

## 2024-12-17 PROCEDURE — 93000 ELECTROCARDIOGRAM COMPLETE: CPT | Performed by: NURSE PRACTITIONER

## 2024-12-17 PROCEDURE — 83880 ASSAY OF NATRIURETIC PEPTIDE: CPT

## 2024-12-17 RX ORDER — ROSUVASTATIN CALCIUM 20 MG/1
20 TABLET, COATED ORAL DAILY
Start: 2024-12-17

## 2024-12-17 NOTE — LETTER
2024     Shemar Shepard DO   [facility] [place] [address]    Patient: Tucker Carvajal   YOB: 1955   Date of Visit: 2024       Dear Dr. Shepard:    Thank you for referring Tucker Carvajal to me for evaluation. Below are my notes for this consultation.    If you have questions, please do not hesitate to call me. I look forward to following your patient along with you.         Sincerely,        DARIUS Jean        CC: MD Minerva Arthur CRNP Carla Weidner, CRNP  2024  3:21 PM  Sign when Signing Visit  Cardiology  Follow Up   Office Visit Note  Tucker Carvajal   68 y.o.   male   MRN: [id number]  [facility] [place]  [address]  [address]  [place] [address]  [phone]    PCP: Shemar Shepard DO  Cardiologist: Dr. JEANNETTE Vences  EP: Dr. Karlee corrigan        Summary of Plan:  Heart healthy diet: Mediterranean or DASH.  Education provided  SPECT given increased VT on his interrogation.  His EKG shows a V paced rhythm,  ms  Echocardiogram-if he has a low EF he may benefit from a BiV upgrade  BMP, BNP, magnesium  Rec:  he avoid exertional activities   Rec:  he not go on his hunting trip to [facility] in January until cleared to do so by Dr NEGAR Vences  Increase rosuvastatin to 20 mg/d.  Repeat lipids 4-12 weeks  Colon Ca screenin2024, up-to-date  Follow-up reports interval with Dr. JEANNETTE Vences  Last Cologuard: Not on file           Assessment/Plan  PVCs/NSVT  On sotalol 80 mg every 12 hours  Interrogation recent identified 14 episodes of VT 2024, per Dr Guzman report  EKG today: NSR.  LBBB 67 bpm.   ms Paced (AP: 41.5%. : 96.9 % on his Interro)  Will get an updated echocardiogram to reevaluate ejection fraction  CAD with chronic stable angina  [facility] :50-70% ostial circumflex stenosis which is non dominant artery and IFR is 0.96 other arteries have nonobstructive disease.  Medical management  recommended  SPECT 2/10/2023: No ischemia  On aspirin 325 mg daily, statin, Ranexa 500 mg twice daily  SSS, S/P MDT DC ICD ICD   History of intermittent AV block, and SVT with production of VT during EP study status post Medtronic dual-chamber ICD which is functioning adequately.    Interrogation 11/14/2024: AP 41.5%.   96.9%.(>40%~MVP-ON/50).  All available lead parameters are within normal limits.  14 VT/NS episodes.  OptiVol within normal limits.  Mixed hyperlipidemia  On rosuvastatin 10 mg daily+ coenzyme Q10 200 mg daily  10/1/2024 calculated LDL 82 non-.  Goal LDL: Less than 70.  Recommend either increasing rosuvastatin  to 20 mg/d.  Recheck lipids 4-12 weeks   Latest Reference Range & Units 04/12/24 08:27 10/01/24 08:19   Cholesterol <200 mg/dL 154 146   Triglycerides <150 mg/dL 107 139   HDL > OR = 40 mg/dL 45 40   Non-HDL Cholesterol <130 mg/dL (calc) 109 106   LDL Calculated mg/dL (calc) 89 82   Chol/HDL Ratio <5.0 (calc) 3.4 3.7   PAF.  A-fib burden on his device was 2.5%.  On a beta-blocker.  Per his cardiologist note, the patient stopped taking Eliquis.  He is on aspirin 325 mg- per the pt.  He does not desire to reduce  LENY and COPD overlap, on CPAP  [facility] 2021.  Left main: The vessel was normal sized and mildly calcified. Angiography showed mild atherosclerosis. Distal left main has tapering around 10% stenosis.  LAD: The vessel was medium to large sized. Angiography showed mild atherosclerosis. It has mild luminal irregularities proximally. Mid there has around 35% stenosis distal branches has mild luminal irregularities. It gives a diagonal which  has mild luminal irregularities with no focal stenosis. Circumflex: The vessel was normal sized. Angiography showed moderate atherosclerosis. It has ostial around 60% stenosis. It gives a obtuse marginal which is high obtuse marginal and  run in a tertiary of ramus intermedius. Mid circumflex has around 35% stenosis. No other focal stenosis seen.  "And IFR of ostial circumflex lesion was performed IFR was 0.96.  Ostial circumflex: There was a discrete around 60 % stenosis. There was JAJA grade 3 flow through the vessel (brisk flow). IFR 0.96  RCA: The vessel was large sized. Angiography showed mild atherosclerosis. It has mild luminal irregularities causing about 25% stenosis in the mid and around 25% to 30% distally no other focal stenosis seen.  IMPRESSIONS:  Patient has moderate stenosis of ostial/proximal circumflex with calcification. IFR of this lesion was performed. IFR was 0.96. There was mild nonobstructive disease involving left main, right coronary artery and LAD is noted.  RECOMMENDATIONS:  Patient has nonobstructive disease. Moderate lesion noted in the proximal stressed ostial circumflex shows IFR of 0.96 at this time continue aggressive medical Rx.  TTE 10/8/2022.  EF 60%.  Mild LVH.  Grade 1 DD.  RV normal size and function.  Mild LAE.  Mild AI.  Mild MR.  Mild TR.  SPECT 2/10/2023 \"essentially normal exercise nuclear stress test.\"  ECG portion non-diagnostic due to LBBB.  No obvious myocardial ischemia noted on perfusion imaging.  EF decreased, however this appears likely due to poor image quality.                 HPI  Tucker Carvajal is a 68 y.o.year old male with CAD status post PCI with chronic stable angina, HTN, HLD, PAF, LENY, COPD, mixed dyslipidemia, NSVT status post ICD.  He previously followed with [facility] and established care with Dr. JEANNETTE Vences in 2017.      2/12/24 OV Dr NEGAR Vences  Per his note:   Tucker Carvajal is a 68 y.o. year old male who was referred by primary care doctor for for his history of cardiac disease.  Patient who used to follow up with [facility] and has been evaluated by [facility] in 2017 for 2nd opinion has medical history significant for coronary artery disease, dyslipidemia, DJD and GERD along with obstructive sleep apnea.  He had a catheterization done in June of " 2017 which shows he had a moderate ostial circumflex disease and he chose to have medical therapy.  At that time he was seen by  [facility] for 2nd opinion and various options were discussed with him including continue medical Rx, angioplasty of ostial circumflex which could be done but slightly high risk or single-vessel bypass surgery.  At that time he was asymptomatic and he was continued medical therapy.     Patient was recently in [place] where he had an episode of palpitation and fast heartbeat.  He was kept overnight ruled out for acute coronary syndrome and he was advised to follow up with Cardiology here.  He is known to have history of irregular heartbeat.  EKG shows normal sinus with first-degree AV block and frequent PVCs.  PVCs are also in the form of bigeminy and fusion beats are noted.  He is otherwise very active he denies any chest pain any shortness of breath.  He is able to do his activities without any problems.  No nausea no vomiting no PND no orthopnea no syncope no other issues.     No recent surgery.  He has a previous history of surgery of gallbladder removal as well as neck fusion     He does not smoke but occasionally he will smoke a cigar maybe once every 3 months  Today  Discussion summary and Plan:        1.  Coronary artery disease status post catheterization in June 2017 has moderate ostial circ disease and nonobstructive disease in other arteries.  He has repeat cardiac catheterization done.  Cath was done in August 2021.  Patient has 50-70% ostial circumflex stenosis which is non dominant artery and IFR is 0.96 other arteries have nonobstructive disease.  Continue statins and aspirin.  No change in symptoms for now.      2. Dyslipidemia.  Continue statins he is tolerating it very well.  Cholesterol profile acceptable labs reviewed from 2023.      3. History of intermittent AV block, and SVT with production of VT during EP study status post Medtronic dual-chamber  ICD which is functioning adequately.  Recent device interrogation shows few episodes of NSVT which are asymptomatic.  He was evaluated by EP and put on sotalol since then number of episode have decreased.  Device interrogation from February 2024 reviewed.  No significant change.      4. Obstructive sleep apnea.  Continue using CPAP machine.  He is pretty compliant      5. Paroxysmal atrial fibrillation.  Patient is noted to have episodes of atrial fibrillation on the device.  AFib burden was 2.5%.  He is already on beta-blocker.  No more reoccurrence of AFib is noted.  If he has significant AFib we will increase beta-blockers.  He has stopped taking his Eliquis.  Repeat interrogation shows no further episodes of atrial fibrillation.  He is on aspirin.       6. Sick sinus syndrome with episodes of NSVT.  Currently asymptomatic.        7. Fatigue and tiredness.  Fatigue and  tiredness has improved with adjustment of device and Ranexa.  He is currently on 500 mg twice a day.     8.  Chronic stable angina.  Patient has responded very well to Ranexa we will continue Ranexa 500 twice a day at this time.  Also issue related to Ranexa discussed with him.     Follow-up 6 months.        11/15/2024  Chart note per Dr Desir:   Recent ICD interrogation: Normal device function.  Brief NSVT.    Last nuclear study, February 2023, EF 38%  Device is 99% V paced  Last EKG, February 2024,  ms  Echo in June 2022 EF was 60%     I last saw the patient in May 2022        Requesting primary and primary cardiology  -Check an echocardiogram to assess EF  -Check an EKG to assess QRS with  -If EF is low and QRS is wide patient will benefit from BiV upgrade  Get bilateral venograms done if meets above criteria    12/17/24  Acute OV  Abnormal ICD interrogation- VT/ NS :AP 41.5%.   96.9%.(>40%~MVP-ON/50).  All available lead parameters are within normal limits.  14 VT/NS episodes.  OptiVol within normal limits.  Patient tells me he  "exercises regularly, 40 to 60 minutes/day, treadmill weightlifting, etc. he has a home gym.  With this activity he denies any chest pain shortness of breath lightheadedness or dizziness.  He denies palpitations.  However, with significant exertion/chills he does have some chest \"ache\".  I reviewed with him his most recent interrogation, which demonstrated 14 episodes of ventricular tachycardia.  He was surprised as he tells me he does not feel anything --he feels great.  EKG today normal sinus rhythm 67 bpm.  AZ interval 96 ms.  V paced ( EKG reading was ammended)    He wears his CPAP faithfully  I conveyed with him his most recent interrogation events  Per Dr. Desir, testing is recommended  Will pursue a pharmacologic nuclear stress test and echocardiogram and some labs  The patient reports he is going on a hunting trip to [place] in January.  I recommend he refrain from exercise, and get clearance from his cardiologist before traveling        Review of Systems   Constitutional: Negative for chills.   Cardiovascular:  Negative for chest pain, claudication, cyanosis, dyspnea on exertion, irregular heartbeat, leg swelling, near-syncope, orthopnea, palpitations, paroxysmal nocturnal dyspnea and syncope.   Respiratory:  Negative for cough and shortness of breath.    Gastrointestinal:  Negative for heartburn and nausea.   Neurological:  Negative for dizziness, focal weakness, headaches, light-headedness and weakness.   All other systems reviewed and are negative.      Assessment  Diagnoses and all orders for this visit:    Coronary artery disease of native artery of native heart with stable angina pectoris (HCC)  -     POCT ECG  -     Echo complete w/ contrast if indicated; Future  -     NM myocardial perfusion spect (rx stress and/or rest); Future  -     Basic metabolic panel; Future  -     rosuvastatin (CRESTOR) 20 MG tablet; Take 1 tablet (20 mg total) by mouth daily  -     Cancel: Basic metabolic panel    Paroxysmal " atrial fibrillation (HCC)  -     NM myocardial perfusion spect (rx stress and/or rest); Future    Sick sinus syndrome (HCC)    LENY and COPD overlap syndrome (HCC)    Mixed hyperlipidemia    S/P ICD (internal cardiac defibrillator) procedure    VT (ventricular tachycardia) ([facility])  -     Echo complete w/ contrast if indicated; Future  -     NM myocardial perfusion spect (rx stress and/or rest); Future  -     B-Type Natriuretic Peptide(BNP); Future  -     Basic metabolic panel; Future  -     Magnesium; Future  -     Cancel: B-Type Natriuretic Peptide(BNP)  -     Cancel: Basic metabolic panel  -     Cancel: Magnesium    Dyslipidemia  -     rosuvastatin (CRESTOR) 20 MG tablet; Take 1 tablet (20 mg total) by mouth daily  -     Lipid panel; Future  -     Lipid panel        Past Medical History:   Diagnosis Date   • Abrasion of left foot     LAST ASSESSED: 9/23/13   • Achilles tendinitis, unspecified leg     LAST ASSESSED: 11/30/12   • Allergic rhinitis     LAST ASSESSED: 11/25/13   • Allergic rhinitis 06/25/2008    LAST ASSESSED: 6/25/08   • Arthritis    • Cervicalgia 04/22/2011    LAST ASSESSED: 4/22/11   • Chronic pain disorder    • Colon polyp    • Coronary artery disease    • CPAP (continuous positive airway pressure) dependence    • Dysfunction of left eustachian tube     LAST ASSESSED: 9/23/13   • Epistaxis     LAST ASSESSED: 5/27/15   • First degree atrioventricular block     LAST ASSESSED: 7/10/17   • Gastric ulcer 10/07/2011    LAST ASSESSED: 3/9/15   • GERD (gastroesophageal reflux disease)    • Hearing aid worn    • Hemorrhoids 05/13/2011    LAST ASSESSED: 5/13/11   • Hyperlipidemia    • Hypertension    • Irregular heart beat    • Long term use of drug     LAST ASSESSED: 10/11/12   • Myalgia 12/21/2007    LAST ASSESSED: 12/21/07   • Myositis 12/21/2007    LAST ASSESSED: 12/21/07   • Numbness and tingling of foot     LAST ASSESSED: 3/9/15   • Pacemaker    • Premature ventricular beats     states cardiologist  ordered Zio ambulatory cardiac monitor   • Sleep apnea    • Wears glasses        Past Surgical History:   Procedure Laterality Date   • CARDIAC CATHETERIZATION  2017   • CARDIAC ELECTROPHYSIOLOGY PROCEDURE N/A 10/25/2021    Procedure: CARDIAC EPS;  Surgeon: Uday Desir MD;  [facility]: [facility];  Service: Cardiology   • CARDIAC ELECTROPHYSIOLOGY PROCEDURE Left 10/25/2021    Procedure: Cardiac icd implant;  Surgeon: Uday Desir MD;  [facility];  Service: Cardiology   • CARDIAC PACEMAKER PLACEMENT     • CARDIOVERSION N/A 10/25/2021    Procedure: Cardioversion;  Surgeon: Uday Desir MD;  Location: [facility];  Service: Cardiology   • CATARACT EXTRACTION Right    • CERVICAL FUSION      pt thinks C4-C5   • CHOLECYSTECTOMY      LAPAROSCOPIC; LAST ASSESSED: 10/17/17   • COLONOSCOPY      COMPLETE; 3-4 YEARS AGO BY [facility]; LAST ASSESSED: 8/18/14   • MYRINGOTOMY W/ TUBES      WITH VENTILATING TUBE INSERTION   • VA HEMORRHOIDECTOMY INT & XTRNL 2/> COLUMN/GERBER N/A 7/27/2023    Procedure: HEMORRHOIDECTOMY EXCISION;  Surgeon: Clovis Leos MD;  Location: [facility];  Service: Colorectal   • VA NEUROPLASTY &/TRANSPOS MEDIAN NRV CARPAL TUNNE Left 07/16/2021    Procedure: RELEASE CARPAL TUNNEL;  Surgeon: Luis Rubio MD;  Location: [facility];  Service: Orthopedics   • VA NEUROPLASTY &/TRANSPOS MEDIAN NRV CARPAL TUNNE Right 10/14/2022    Procedure: RELEASE CARPAL TUNNEL;  Surgeon: Luis Rubio MD;  Location: [facility];  Service: Orthopedics   • VA TENDON SHEATH INCISION Right 10/14/2022    Procedure: RELEASE TRIGGER FINGER,RING AND LONG TENOSYNOVECTOMY FDS +FDP;  Surgeon: Luis Rubio MD;  Location: [facility];  Service: Orthopedics   • VASECTOMY             Allergies  Allergies   Allergen Reactions   • Amoxicillin Anaphylaxis   • Augmentin [Amoxicillin-Pot Clavulanate] Anaphylaxis   • Acetaminophen      Rapid heart rate   • Cherry Flavor - Food Allergy Other  (See Comments)     States allergy is to raw cherries difficulty breathing   • Pollen Extract    • Dye [Iodinated Contrast Media] Itching         Medications    Current Outpatient Medications:   •  rosuvastatin (CRESTOR) 20 MG tablet, Take 1 tablet (20 mg total) by mouth daily, Disp: , Rfl:   •  aspirin 325 mg tablet, Take 325 mg by mouth daily, Disp: , Rfl:   •  Coenzyme Q10 (COQ10) 200 MG CAPS, Take 1 tablet by mouth daily, Disp: , Rfl:   •  COLLAGEN-CHOND-HYALURONIC ACID PO, Take 1 capsule by mouth in the morning, Disp: , Rfl:   •  EPINEPHrine (EpiPen 2-Leroy) 0.3 mg/0.3 mL SOAJ, Inject 0.3 mL (0.3 mg total) into a muscle once for 1 dose, Disp: 0.6 mL, Rfl: 0  •  finasteride (PROSCAR) 5 mg tablet, Take 1 tablet (5 mg total) by mouth daily, Disp: 90 tablet, Rfl: 1  •  fluticasone (VERAMYST) 27.5 MCG/SPRAY nasal spray, 2 sprays into each nostril daily, Disp: , Rfl:   •  Glucosamine-Chondroitin (GLUCOSAMINE CHONDR COMPLEX PO), Take 1 tablet by mouth daily, Disp: , Rfl:   •  mefloquine (LARIAM) 250 MG tablet, Take 250 mg once weekly starting 2 days prior to travel Do not start before November 26, 2023., Disp: 3 tablet, Rfl: 0  •  Omega-3 Fatty Acids (FISH OIL) 1000 MG CPDR, Take by mouth daily, Disp: , Rfl:   •  pantoprazole (PROTONIX) 40 mg tablet, Take 1 tablet (40 mg total) by mouth daily, Disp: 90 tablet, Rfl: 3  •  ranolazine (RANEXA) 500 mg 12 hr tablet, TAKE 1 TABLET BY MOUTH TWICE A DAY, Disp: 180 tablet, Rfl: 1  •  sotalol (BETAPACE) 80 mg tablet, TAKE 1 TABLET BY MOUTH EVERY 12 HOURS, Disp: 180 tablet, Rfl: 3  •  Turmeric 500 MG CAPS, Take by mouth 3 (three) times a week, Disp: , Rfl:       Social History     Socioeconomic History   • Marital status: /Civil Union     Spouse name: Not on file   • Number of children: Not on file   • Years of education: Not on file   • Highest education level: Not on file   Occupational History   • Not on file   Tobacco Use   • Smoking status: Former     Current packs/day:  0.00     Types: Cigarettes     Quit date:      Years since quittin.9   • Smokeless tobacco: Never   Vaping Use   • Vaping status: Never Used   Substance and Sexual Activity   • Alcohol use: Yes     Alcohol/week: 7.0 standard drinks of alcohol     Types: 7 Glasses of wine per week     Comment: few times a week   • Drug use: No   • Sexual activity: Not Currently   Other Topics Concern   • Not on file   Social History Narrative   • Not on file     Social Drivers of Health     Financial Resource Strain: Low Risk  (10/20/2023)    Overall Financial Resource Strain (CARDIA)    • Difficulty of Paying Living Expenses: Not hard at all   Food Insecurity: No Food Insecurity (10/30/2024)    Hunger Vital Sign    • Worried About Running Out of Food in the Last Year: Never true    • Ran Out of Food in the Last Year: Never true   Transportation Needs: No Transportation Needs (10/30/2024)    PRAPARE - Transportation    • Lack of Transportation (Medical): No    • Lack of Transportation (Non-Medical): No   Physical Activity: Not on file   Stress: Not on file   Social Connections: Not on file   Intimate Partner Violence: Not on file   Housing Stability: Low Risk  (10/30/2024)    Housing Stability Vital Sign    • Unable to Pay for Housing in the Last Year: No    • Number of Times Moved in the Last Year: 0    • Homeless in the Last Year: No       Family History   Problem Relation Age of Onset   • Cancer Mother         SOFT TISSUE CANCER ON RT SIDE CHEST WALL AND    • Prostate cancer Maternal Uncle        Physical Exam  Vitals and nursing note reviewed.   Constitutional:       General: He is not in acute distress.  HENT:      Head: Normocephalic and atraumatic.   Eyes:      Extraocular Movements: Extraocular movements intact.      Conjunctiva/sclera: Conjunctivae normal.   Cardiovascular:      Rate and Rhythm: Normal rate and regular rhythm.      Pulses: Intact distal pulses.      Heart sounds: Normal heart sounds.  "  Pulmonary:      Effort: Pulmonary effort is normal.      Breath sounds: Normal breath sounds.   Abdominal:      General: Bowel sounds are normal.      Palpations: Abdomen is soft.   Musculoskeletal:         General: Normal range of motion.      Cervical back: Normal range of motion and neck supple.   Skin:     General: Skin is warm and dry.   Neurological:      Mental Status: He is alert and oriented to person, place, and time.   Psychiatric:         Mood and Affect: Mood normal.         Vitals: Blood pressure 140/70, pulse 67, weight 98.9 kg (218 lb), SpO2 98%.   Wt Readings from Last 3 Encounters:   12/17/24 98.9 kg (218 lb)   10/30/24 99.3 kg (219 lb)   09/06/24 98.9 kg (218 lb)           Labs & Results:  Lab Results   Component Value Date    WBC 5.1 10/01/2024    HGB 16.1 10/01/2024    HCT 48.1 10/01/2024    MCV 96.4 10/01/2024     10/01/2024     No results found for: \"BNP\"  No components found for: \"CHEM\"  Troponin I   Date Value Ref Range Status   10/13/2020 <0.02 <=0.04 ng/mL Final     Comment:     [employer]ens Chemistry analyzer 99% cutoff is > 0.04 ng/mL in network labs     o cTnI 99% cutoff is useful only when applied to patients in the clinical setting of myocardial ischemia   o cTnI 99% cutoff should be interpreted in the context of clinical history, ECG findings and possibly cardiac imaging to establish correct diagnosis.   o cTnI 99% cutoff may be suggestive but clearly not indicative of a coronary event without the clinical setting of myocardial ischemia.     10/12/2020 <0.02 <=0.04 ng/mL Final     Comment:     Siemens Chemistry analyzer 99% cutoff is > 0.04 ng/mL in network labs     o cTnI 99% cutoff is useful only when applied to patients in the clinical setting of myocardial ischemia   o cTnI 99% cutoff should be interpreted in the context of clinical history, ECG findings and possibly cardiac imaging to establish correct diagnosis.   o cTnI 99% cutoff may be suggestive but clearly not " indicative of a coronary event without the clinical setting of myocardial ischemia.     10/12/2020 <0.02 <=0.04 ng/mL Final     Comment:     Siemens Chemistry analyzer 99% cutoff is > 0.04 ng/mL in network labs     o cTnI 99% cutoff is useful only when applied to patients in the clinical setting of myocardial ischemia   o cTnI 99% cutoff should be interpreted in the context of clinical history, ECG findings and possibly cardiac imaging to establish correct diagnosis.   o cTnI 99% cutoff may be suggestive but clearly not indicative of a coronary event without the clinical setting of myocardial ischemia.       Results for orders placed during the hospital encounter of 21    Echo complete with contrast if indicated    [facility]  [address], [place] [id number] [phone]    Transthoracic Echocardiogram  2D, M-mode, Doppler, and Color Doppler    Study date:  12-Aug-2021    Patient: SHALINI RAMEY  MR number: [id number]  Account number: [id number]  : 1955  Age: 65 years  Gender: Male  Status: Outpatient  Location: [facility]  Height: 72 in  Weight: 221.5 lb  BP: 152/ 62 mmHg    Indications: Assess left ventricular function.    Diagnoses: I47.2 - Ventricular tachycardia    Sonographer:  CHRISTI Lofton  Primary Physician:  Shemar Shepard DO  Referring Physician:  Robert Vences MD  Group:  [facility]  Interpreting Physician:  Dorina Harris MD    SUMMARY    LEFT VENTRICLE:  Systolic function was normal. Ejection fraction was estimated to be 60 %.  There were no regional wall motion abnormalities.    AORTIC VALVE:  There was mild regurgitation.    HISTORY: PRIOR HISTORY: NSVT, Non-obtructive coronary artery disease, Hyperlipidemia, LENY    PROCEDURE: The study was performed in the [facility] This was a routine study. The transthoracic approach was used. The study included complete 2D imaging,  M-mode, complete spectral Doppler, and color Doppler. The  heart rate was 54 bpm, at the start of the study. Images were obtained from the parasternal, apical, subcostal, and suprasternal notch acoustic windows. Image quality was adequate.    LEFT VENTRICLE: Size was normal. Systolic function was normal. Ejection fraction was estimated to be 60 %. There were no regional wall motion abnormalities. Wall thickness was normal. DOPPLER: There was an increased relative contribution  of atrial contraction to ventricular filling. Left ventricular diastolic function parameters were normal for the patient's age.    RIGHT VENTRICLE: The size was normal. Systolic function was normal. Wall thickness was normal.    LEFT ATRIUM: Size was normal.    RIGHT ATRIUM: Size was normal.    MITRAL VALVE: Valve structure was normal. There was normal leaflet separation. DOPPLER: The transmitral velocity was within the normal range. There was no evidence for stenosis. There was trace regurgitation.    AORTIC VALVE: The valve was trileaflet. Leaflets exhibited normal thickness and normal cuspal separation. DOPPLER: Transaortic velocity was within the normal range. There was no evidence for stenosis. There was mild regurgitation.    TRICUSPID VALVE: The valve structure was normal. There was normal leaflet separation. DOPPLER: The transtricuspid velocity was within the normal range. There was no evidence for stenosis. There was trace regurgitation.    PULMONIC VALVE: Leaflets exhibited normal thickness, no calcification, and normal cuspal separation. DOPPLER: The transpulmonic velocity was within the normal range. There was trace regurgitation.    PERICARDIUM: There was no pericardial effusion. The pericardium was normal in appearance.    AORTA: The root exhibited normal size.    SYSTEMIC VEINS: IVC: The inferior vena cava was normal in size. Respirophasic changes were normal.    SYSTEM MEASUREMENT TABLES    2D  %FS: 21.05 %  Ao Diam: 3.02  cm  EDV(Teich): 117.73 ml  EF(Teich): 42.64 %  ESV(Teich): 67.53 ml  IVSd: 0.78 cm  LA Area: 19.18 cm2  LA Diam: 4 cm  LVEDV MOD A4C: 167.49 ml  LVEF MOD A4C: 52.37 %  LVESV MOD A4C: 79.77 ml  LVIDd: 4.99 cm  LVIDs: 3.94 cm  LVLd A4C: 9.72 cm  LVLs A4C: 8.4 cm  LVPWd: 0.88 cm  RA Area: 14.03 cm2  RVIDd: 3.7 cm  SV MOD A4C: 87.71 ml  SV(Teich): 50.2 ml    MM  TAPSE: 2.85 cm    PW  LVOT Env.Ti: 315.89 ms  LVOT VTI: 14.53 cm  LVOT Vmax: 0.63 m/s  LVOT Vmean: 0.46 m/s  LVOT maxP.57 mmHg  LVOT meanP.93 mmHg  MV A Bi: 0.81 m/s  MV Dec [place]: 2.25 m/s2  MV DecT: 247.18 ms  MV E Bi: 0.56 m/s  MV E/A Ratio: 0.69  MV PHT: 71.68 ms  MVA By PHT: 3.07 cm2    [employer] Accredited Echocardiography Laboratory    Prepared and electronically signed by    Dorina Harris MD  Signed 12-Aug-2021 15:00:09    No results found for this or any previous visit.    No valid procedures specified.  Results for orders placed during the hospital encounter of 02/10/23    NM myocardial perfusion spect (stress and/or rest)    Interpretation Summary  •  Perfusion: There is a left ventricular perfusion defect that is large in size with moderate reduction in uptake present in the entire inferoseptal location(s) that is fixed which improved on prone imaging. The defect appears to be an artifact caused by diaphragmatic activity. There is a left ventricular perfusion defect that is small to medium in size with mild reduction in uptake present in the basal anteroseptal location(s) that is predominantly fixed, and appears to be artifactual.  •  Stress Function: Left ventricular function post-stress is abnormal. Post-stress ejection fraction is 38 %.  •  Stress Combined Conclusion: Left ventricular perfusion is probably abnormal.  •  Stress ECG: The ECG was uninterpretable due to left bundle branch block.    Essentially normal exercise nuclear stress test.  ECG portion non-diagnostic due to LBBB.  No obvious myocardial ischemia noted  on perfusion imaging.  EF decreased, however this appears likely due to poor image quality.                This note was completed in part utilizing [employer] direct voice recognition software.   Grammatical errors, random word insertion, spelling mistakes, and incomplete sentences may be an occasional consequence of the system secondary to software limitations, ambient noise and hardware issues. At the time of dictation, efforts were made to edit, clarify and /or correct errors.  Please read the chart carefully and recognize, using context, where substitutions have occurred.  If you have any questions or concerns about the context, text or information contained within the body of this dictation, please contact myself, the provider, for further clarification

## 2024-12-20 DIAGNOSIS — I49.3 PREMATURE VENTRICULAR BEATS: ICD-10-CM

## 2024-12-20 DIAGNOSIS — I48.0 PAROXYSMAL ATRIAL FIBRILLATION (HCC): ICD-10-CM

## 2024-12-20 DIAGNOSIS — I47.20 VENTRICULAR TACHYCARDIA (HCC): ICD-10-CM

## 2024-12-20 RX ORDER — SOTALOL HYDROCHLORIDE 80 MG/1
80 TABLET ORAL EVERY 12 HOURS
Qty: 180 TABLET | Refills: 3 | Status: SHIPPED | OUTPATIENT
Start: 2024-12-20

## 2024-12-22 ENCOUNTER — RESULTS FOLLOW-UP (OUTPATIENT)
Dept: CARDIOLOGY CLINIC | Facility: CLINIC | Age: 69
End: 2024-12-22

## 2024-12-27 ENCOUNTER — HOSPITAL ENCOUNTER (OUTPATIENT)
Dept: RADIOLOGY | Facility: HOSPITAL | Age: 69
Discharge: HOME | End: 2024-12-27
Payer: MEDICARE

## 2024-12-27 ENCOUNTER — HOSPITAL ENCOUNTER (OUTPATIENT)
Dept: NON INVASIVE DIAGNOSTICS | Facility: HOSPITAL | Age: 69
Discharge: HOME | End: 2024-12-27
Payer: MEDICARE

## 2024-12-27 VITALS
WEIGHT: 218 LBS | SYSTOLIC BLOOD PRESSURE: 138 MMHG | HEIGHT: 72 IN | DIASTOLIC BLOOD PRESSURE: 81 MMHG | HEART RATE: 62 BPM | BODY MASS INDEX: 29.53 KG/M2

## 2024-12-27 VITALS
SYSTOLIC BLOOD PRESSURE: 138 MMHG | RESPIRATION RATE: 20 BRPM | DIASTOLIC BLOOD PRESSURE: 90 MMHG | HEART RATE: 67 BPM | OXYGEN SATURATION: 98 %

## 2024-12-27 DIAGNOSIS — I48.0 PAROXYSMAL ATRIAL FIBRILLATION (HCC): ICD-10-CM

## 2024-12-27 DIAGNOSIS — I47.20 VT (VENTRICULAR TACHYCARDIA) (HCC): ICD-10-CM

## 2024-12-27 DIAGNOSIS — I25.118 CORONARY ARTERY DISEASE OF NATIVE ARTERY OF NATIVE HEART WITH STABLE ANGINA PECTORIS (HCC): ICD-10-CM

## 2024-12-27 LAB
CHEST PAIN STATEMENT: NORMAL
MAX DIASTOLIC BP: 80 MMHG
MAX HR PERCENT: 54 %
MAX HR: 82 BPM
MAX PREDICTED HEART RATE: 150 BPM
NUC STRESS EJECTION FRACTION: 34 %
PROTOCOL NAME: NORMAL
RATE PRESSURE PRODUCT: 9840
REASON FOR TERMINATION: NORMAL
SL CV REST NUCLEAR ISOTOPE DOSE: 10.89 MCI
SL CV STRESS NUCLEAR ISOTOPE DOSE: 33 MCI
SL CV STRESS RECOVERY BP: NORMAL MMHG
SL CV STRESS RECOVERY HR: 66 BPM
SL CV STRESS RECOVERY O2 SAT: 100 %
STRESS ANGINA INDEX: 0
STRESS BASELINE BP: NORMAL MMHG
STRESS BASELINE HR: 67 BPM
STRESS O2 SAT REST: 98 %
STRESS PEAK HR: 82 BPM
STRESS POST ESTIMATED WORKLOAD: 1.7 METS
STRESS POST EXERCISE DUR MIN: 3 MIN
STRESS POST EXERCISE DUR MIN: 3 MIN
STRESS POST EXERCISE DUR SEC: 0 SEC
STRESS POST O2 SAT PEAK: 99 %
STRESS POST PEAK BP: 120 MMHG
STRESS POST PEAK HR: 82 BPM
STRESS POST PEAK SYSTOLIC BP: 154 MMHG
STRESS/REST PERFUSION RATIO: 1.1
TARGET HR FORMULA: NORMAL
TEST INDICATION: NORMAL

## 2024-12-27 PROCEDURE — 78452 HT MUSCLE IMAGE SPECT MULT: CPT

## 2024-12-27 PROCEDURE — 93018 CV STRESS TEST I&R ONLY: CPT | Performed by: INTERNAL MEDICINE

## 2024-12-27 PROCEDURE — 93306 TTE W/DOPPLER COMPLETE: CPT

## 2024-12-27 PROCEDURE — 93016 CV STRESS TEST SUPVJ ONLY: CPT | Performed by: INTERNAL MEDICINE

## 2024-12-27 PROCEDURE — 93017 CV STRESS TEST TRACING ONLY: CPT

## 2024-12-27 PROCEDURE — A9502 TC99M TETROFOSMIN: HCPCS

## 2024-12-27 PROCEDURE — 93306 TTE W/DOPPLER COMPLETE: CPT | Performed by: INTERNAL MEDICINE

## 2024-12-27 PROCEDURE — 78452 HT MUSCLE IMAGE SPECT MULT: CPT | Performed by: INTERNAL MEDICINE

## 2024-12-27 RX ORDER — REGADENOSON 0.08 MG/ML
0.4 INJECTION, SOLUTION INTRAVENOUS ONCE
Status: COMPLETED | OUTPATIENT
Start: 2024-12-27 | End: 2024-12-27

## 2024-12-27 RX ADMIN — REGADENOSON 0.4 MG: 0.08 INJECTION, SOLUTION INTRAVENOUS at 09:58

## 2024-12-29 LAB
AORTIC ROOT: 3.3 CM
AV REGURGITATION PRESSURE HALF TIME: 815 MS
E WAVE DECELERATION TIME: 230 MS
E/A RATIO: 0.82
FRACTIONAL SHORTENING: 29 (ref 28–44)
INTERVENTRICULAR SEPTUM IN DIASTOLE (PARASTERNAL SHORT AXIS VIEW): 1 CM
INTERVENTRICULAR SEPTUM: 1 CM (ref 0.6–1.1)
LAAS-AP2: 22.4 CM2
LAAS-AP4: 20.8 CM2
LEFT ATRIUM SIZE: 4.3 CM
LEFT ATRIUM VOLUME (MOD BIPLANE): 70 ML
LEFT ATRIUM VOLUME INDEX (MOD BIPLANE): 31.7 ML/M2
LEFT INTERNAL DIMENSION IN SYSTOLE: 4 CM (ref 2.1–4)
LEFT VENTRICULAR INTERNAL DIMENSION IN DIASTOLE: 5.6 CM (ref 3.5–6)
LEFT VENTRICULAR POSTERIOR WALL IN END DIASTOLE: 1.1 CM
LEFT VENTRICULAR STROKE VOLUME: 87 ML
LVSV (TEICH): 87 ML
MV E'TISSUE VEL-LAT: 7 CM/S
MV E'TISSUE VEL-SEP: 5 CM/S
MV PEAK A VEL: 0.73 M/S
MV PEAK E VEL: 60 CM/S
MV STENOSIS PRESSURE HALF TIME: 67 MS
MV VALVE AREA P 1/2 METHOD: 3.28
SL CV AV DECELERATION TIME RETROGRADE: 2812 MS
SL CV AV PEAK GRADIENT RETROGRADE: 47 MMHG
SL CV LEFT ATRIUM LENGTH A2C: 5.2 CM
SL CV LV EF: 45
SL CV PED ECHO LEFT VENTRICLE DIASTOLIC VOLUME (MOD BIPLANE) 2D: 156 ML
SL CV PED ECHO LEFT VENTRICLE SYSTOLIC VOLUME (MOD BIPLANE) 2D: 70 ML
TR MAX PG: 17 MMHG
TR PEAK VELOCITY: 2 M/S
TRICUSPID ANNULAR PLANE SYSTOLIC EXCURSION: 2.1 CM
TRICUSPID VALVE PEAK REGURGITATION VELOCITY: 2.04 M/S

## 2025-01-08 ENCOUNTER — OFFICE VISIT (OUTPATIENT)
Dept: CARDIOLOGY CLINIC | Facility: CLINIC | Age: 70
End: 2025-01-08
Payer: MEDICARE

## 2025-01-08 ENCOUNTER — TELEPHONE (OUTPATIENT)
Dept: CARDIOLOGY CLINIC | Facility: CLINIC | Age: 70
End: 2025-01-08

## 2025-01-08 VITALS
SYSTOLIC BLOOD PRESSURE: 130 MMHG | OXYGEN SATURATION: 98 % | HEART RATE: 100 BPM | BODY MASS INDEX: 29.57 KG/M2 | WEIGHT: 218 LBS | DIASTOLIC BLOOD PRESSURE: 80 MMHG

## 2025-01-08 DIAGNOSIS — I49.5 SICK SINUS SYNDROME (HCC): Primary | ICD-10-CM

## 2025-01-08 DIAGNOSIS — E78.2 MIXED HYPERLIPIDEMIA: ICD-10-CM

## 2025-01-08 DIAGNOSIS — I49.5 SICK SINUS SYNDROME (HCC): ICD-10-CM

## 2025-01-08 DIAGNOSIS — I47.29 NSVT (NONSUSTAINED VENTRICULAR TACHYCARDIA) (HCC): ICD-10-CM

## 2025-01-08 DIAGNOSIS — I48.0 PAROXYSMAL ATRIAL FIBRILLATION (HCC): ICD-10-CM

## 2025-01-08 DIAGNOSIS — I25.118 CORONARY ARTERY DISEASE OF NATIVE ARTERY OF NATIVE HEART WITH STABLE ANGINA PECTORIS (HCC): ICD-10-CM

## 2025-01-08 DIAGNOSIS — I50.42 CHRONIC COMBINED SYSTOLIC AND DIASTOLIC HEART FAILURE, NYHA CLASS 2 (HCC): ICD-10-CM

## 2025-01-08 PROCEDURE — 93000 ELECTROCARDIOGRAM COMPLETE: CPT | Performed by: INTERNAL MEDICINE

## 2025-01-08 PROCEDURE — 99215 OFFICE O/P EST HI 40 MIN: CPT | Performed by: INTERNAL MEDICINE

## 2025-01-08 RX ORDER — SACUBITRIL AND VALSARTAN 24; 26 MG/1; MG/1
1 TABLET, FILM COATED ORAL 2 TIMES DAILY
Qty: 60 TABLET | Refills: 2 | Status: SHIPPED | OUTPATIENT
Start: 2025-01-08

## 2025-01-08 NOTE — TELEPHONE ENCOUNTER
SAMPLES    Medication : Entresto 24/26MG  Lot : [id number]  Exp : 11/30/2026  Quantity :  4 bottles.

## 2025-01-08 NOTE — TELEPHONE ENCOUNTER
----- Message from Anup Dennis DO sent at 1/8/2025  1:04 PM EST -----  Please arrange left-sided venogram upgrade to CRT-D for declining ejection fraction I would say class II heart failure symptoms    Previously placed dual-chamber ICD with heart block and his ejection fraction is dropping thank you

## 2025-01-08 NOTE — PROGRESS NOTES
Progress note - Cardiology Office  [facility].    Tucker Carvajal 69 y.o. male MRN: [id number]  : 1955  Unit/Bed#:  Encounter: [id number]      Assessment:     1. Coronary artery disease of native artery of native heart with stable angina pectoris (HCC)    2. Chronic combined systolic and diastolic heart failure, NYHA class 2 ([facility])    3. Mixed hyperlipidemia    4. NSVT (nonsustained ventricular tachycardia) ([facility])    5. Paroxysmal atrial fibrillation ([facility])    6. Sick sinus syndrome ([facility])        Discussion summary and Plan:       1.  Coronary artery disease status post catheterization in 2017 has moderate ostial circ disease and nonobstructive disease in other arteries.  He has repeat cardiac catheterization done.  Cath was done in 2021.  Patient has 50-70% ostial circumflex stenosis which is non dominant artery and IFR is 0.96 other arteries have nonobstructive disease.  Continue statins and aspirin.  No change in symptoms for now.     2.  Combined chronic systolic and diastolic heart failure with NYHA functional class II with worsening fatigue and tiredness..  Patient has no evidence of volume overload.  His EF by echo was 45 by nuclear 34%.  We did talk about starting the patient on Entresto and Jardiance.  He would prefer to start on Entresto first we started a lower dose samples given.  And we will check electrolytes in about 7 to 10 days after testing it.  If he has any problem with Entresto he will give us a call.  He feels more fatigued and tired and able to do less activities as he was able to do before.     3.  History of AV block and episodes of NSVT status post dual-chamber ICD currently about 96% paced his QRS duration is 180 ms he has developed pacemaker mediated cardiomyopathy.  Discussed with the EP would benefit from upgrade of his pacemaker to biventricular pacemaker and ICD.     4. Obstructive sleep apnea.  Continue using CPAP machine.  He is pretty  compliant     5. Paroxysmal atrial fibrillation.  Patient is noted to have episodes of atrial fibrillation on the device.  AFib burden was 2.5%.  He is already on beta-blocker.  No more reoccurrence of AFib is noted.  If he has significant AFib we will increase beta-blockers.  He has stopped taking his Eliquis.  Repeat interrogation shows no further episodes of atrial fibrillation.  He is on aspirin.      6.  Dyslipidemia.  Continue statins.  Last blood test LDL was 82 patient Crestor was increased.      7. Fatigue and tiredness.  Fatigue and  tiredness has improved with adjustment of device and Ranexa.  He is currently on 500 mg twice a day.    8.  Chronic stable angina.  Patient has responded very well to Ranexa we will continue Ranexa 500 twice a day at this time.  Also issue related to Ranexa discussed with him.    9.  History of episodes of NSVT and sustained VT on EP study status post dual-chamber Medtronic ICD.  Which was done October 2021.    Plan.  Start the patient currently on Entresto check BMP in 2 weeks.  EP follow-up which was discussed with Dr. Dennis.  And at some point we will add Jardiance.  He may not be a good candidate for diuretics because of his prostate issue and there is no evidence of any volume overload.  He is already on sotalol for his NSVT.    Thank you for your consultation.  If you have any question please call me at [phone]    Counseling :  A description of the counseling.  Goals and Barriers.  Patient's ability to self care: Yes  Medication side effect reviewed with patient in detail and all their questions answered to their satisfaction.      Primary Care Physician : Shemar Shepard DO    HPI :     Tucker Carvajal is a 69 y.o. year old male who was referred by primary care doctor for for his history of cardiac disease.  Patient who used to follow up with [facility] and has been evaluated by [facility] in 2017 for 2nd opinion has medical history  significant for coronary artery disease, dyslipidemia, DJD and GERD along with obstructive sleep apnea.  He had a catheterization done in June of 2017 which shows he had a moderate ostial circumflex disease and he chose to have medical therapy.  At that time he was seen by  [facility] for 2nd opinion and various options were discussed with him including continue medical Rx, angioplasty of ostial circumflex which could be done but slightly high risk or single-vessel bypass surgery.  At that time he was asymptomatic and he was continued medical therapy.    Patient was recently in [place] where he had an episode of palpitation and fast heartbeat.  He was kept overnight ruled out for acute coronary syndrome and he was advised to follow up with Cardiology here.  He is known to have history of irregular heartbeat.  EKG shows normal sinus with first-degree AV block and frequent PVCs.  PVCs are also in the form of bigeminy and fusion beats are noted.  He is otherwise very active he denies any chest pain any shortness of breath.  He is able to do his activities without any problems.  No nausea no vomiting no PND no orthopnea no syncope no other issues.    No recent surgery.  He has a previous history of surgery of gallbladder removal as well as neck fusion    He does not smoke but occasionally he will smoke a cigar maybe once every 3 months        09/01/2021.      Above reviewed.  Patient came for follow-up he is doing well he underwent cardiac catheterization we found he has ostial circumflex 50- 70% visually but IFR was 0.96.  It was felt patient does not need stenting.  His nuclear stress test also shows no ischemia.  He denies any symptoms he is very active he does exercise without any problems.  But he does have history of palpitations and he was found to have episode of NSVT, as well as intermittent third-degree AV block and junctional escape.  He was evaluated by EP and was recommended dual-chamber  pacemaker but they would like to have him cardiac catheterization done fast.  His medications reviewed today EKG shows heart rate 64 beats per minute rare PVCs.  No nausea no vomiting he is not on any AV node blocking drug because of his episodes of bradycardia.  He has multiple questions regarding his angiogram and related to his rhythm problem and pacemaker which were discussed openly.  No other cardiovascular issues at this time.    01/18/2022.    Above reviewed.  Patient came for follow-up.  He is doing well now.  He had Medtronic dual-chamber ICD paced after EP study as he was having intermittent AV block and episodes of NSVT.  He had an episode of VT during EP study.  He does have history of coronary artery disease with ostial circumflex 50-70% stenosis visually but IFR was 0.96.  It was felt that patient does not need stent and he has no symptoms.  He denies any new complaints.  Actually since his procedure he is feeling lot better he has no nausea no vomiting no fever no chills no other issues.  He is compliant with medications he is taking aspirin statins and metoprolol.  He is tolerating his Crestor well no other cardiovascular issues.  His vitals has been stable.  He has blood test done on October 2021 where cholesterol profile was acceptable electrolytes were acceptable his ALT was slightly elevated but still less than 2 times the normal.  His device was interrogated in November 2021. He had episode of NSVT but otherwise device is functioning adequately.  His low rate is set at 50     05/03/2022.    Above reviewed.  Patient came for follow-up.  He had a medical history significant for dual-chamber ICD which was placed after EP study as he was found to have intermittent AV block and episode of NSVT.  Recently he was noted to have episode of atrial fibrillation and started on antithrombotic therapy.  He had history of coronary artery disease status post cardiac catheterization which shows ostial circumflex  50-70% stenosis but IFR was 0.96.  Patient did not need stent and he has no symptoms.  He has blood test done in April 2022 which has been acceptable.  His other medical history significant for dyslipidemia, hypertension, and peptic ulcer disease but no recent bleeding.  His device interrogation shows patient has AFib burden of 2.5% which was new.  He does have history of obstructive sleep apnea and he is compliant with his CPAP machine he is overall feeling well.  He has multiple questions regarding blood thinners which were answered he does have history of hemorrhoid but not active at this time.  No issues at this time his device interrogation reviewed with him in detail.    11/29/2022.    Above reviewed.  Patient came for follow-up.  He had a medical history significant for dual-chamber ICD which was placed after EP study add he was found to have intermittent AV block and episode of NSVT.  He still continues have brief episode of NSVT.  Has been evaluated by EP.  He is noted to have atrial fibrillation and started on antithrombotic therapy.  He does have history of coronary artery disease status post catheterization which shows ostial circumflex 50-70% stenosis where IFR was 0.96.  He did not need any stent and he has no symptoms.  He had blood test done on 10/04/2022 which has been acceptable cholesterol profile is stable blood pressure has been stable.  He has other medical history significant for dyslipidemia, hypertension, peptic ulcer disease with no recent bleeding.  His device interrogation was done in October 2022.  He had 5 episode of NSVT.  He was put on sotalol by the EP.  And he did not need any therapy.  No AFib was noted at distal interrogation.  Patient is complaining about fatigue and tiredness since he has device placed.  He feels that when he exercise he cannot ramp up his heart rate.  Spoke to [employer] they will interrogated tomorrow.  Otherwise no other issues.    7/26/2023.    Above  reviewed.  Patient came for follow-up he is doing well.  He was evaluated for second opinion for his coronary artery disease with a intermediate 50 to 70% ostial circumflex stenosis.  And he was put on Ranexa which really helped him.  He has medical history significant for dual-chamber ICD for NSVT, intermittent AV block, coronary artery disease, dyslipidemia and hypertension.  He was put on sotalol by EP.  He still have occasional episodes of NSVT with every cycle length 400 ms.  Today EKG shows he is AV paced.  His vitals are stable his exercise capacity has improved.  He is able to do his activities without any chest tightness.  He has a blood work done 4/5/2023 which shows his electrolytes are stable cholesterol profile is acceptable no other cardiovascular issues.  His device is of [employer].    2/12/2024.    Above reviewed.  Patient came for follow-up he is doing well.  He has medical history significant for episodes of NSVT s/p Medtronic ICD and pacemaker, on sotalol, history of intermediate 50 to 70% ostial circumflex lesion being managed with medical therapy with Ranexa and cholesterol medications, dyslipidemia, intermittent AV block s/p pacemaker, hypertensive heart disease who came for follow-up.  He is doing well.  He still occasionally get episode of chest pain when he has cold air but otherwise he can do his exercise without any problem.  There are days he feels these episodes more frequently and there are days he can do in his normal level of exercise without any problems.  His blood test in November 2023 was acceptable and his medications reviewed.  Device interrogation shows that he had brief episodes of NSVT for 5-6 beat which is mostly asymptomatic.  No other cardiovascular issues.    1/8/2025.    Above reviewed.  Patient came for follow-up.  He has a medical history significant for episode of NSVT s/p Medtronic ICD and pacemaker on sotalol, history of intermediate 50 to 70% ostial  circumflex stenosis managed with medical therapy with recent stress test showing no ischemia, dyslipidemia, intermittent AV block status post pacemaker, hypertensive heart disease who came for follow-up.  He has recent cardiac workup done in the form of echo and nuclear stress test.  Echo shows his EF dropped to 45%.  He is about 96% ventricular paced.  His vitals are stable.  Electrolyte done 12/17/2024 are acceptable.  Weight has been stable.  It appears he has developed pacemaker mediated cardiomyopathy.  He has developed weakness of his heart muscle.  By nuclear EF was around 35%.  He feels fatigue and tired.  He is EKG today shows he is AV paced and his QRS duration is 186 ms.  He has dual-chamber ICD.  He is not allergic to any ACE inhibitors but he is allergic to dye and amoxicillin and Augmentin.  No leg swelling, no PND no orthopnea.  His history of sleep apnea and he has been compliant with his CPAP.      Review of Systems   Constitutional:  Positive for fatigue. Negative for activity change, chills, diaphoresis, fever and unexpected weight change.   HENT:  Negative for congestion.    Eyes:  Negative for discharge and redness.   Respiratory:  Negative for cough, chest tightness, shortness of breath and wheezing.    Cardiovascular: Negative.  Negative for chest pain, palpitations and leg swelling.   Gastrointestinal:  Negative for abdominal pain, diarrhea and nausea.   Endocrine: Negative.    Genitourinary:  Negative for decreased urine volume and urgency.   Musculoskeletal: Negative.  Negative for arthralgias, back pain and gait problem.   Skin:  Negative for rash and wound.   Allergic/Immunologic: Negative.    Neurological:  Negative for dizziness, seizures, syncope, weakness, light-headedness and headaches.   Hematological: Negative.    Psychiatric/Behavioral:  Positive for sleep disturbance. Negative for agitation and confusion. The patient is nervous/anxious.        Historical Information   Past  Medical History:   Diagnosis Date    Abrasion of left foot     LAST ASSESSED: 9/23/13    Achilles tendinitis, unspecified leg     LAST ASSESSED: 11/30/12    Allergic rhinitis     LAST ASSESSED: 11/25/13    Allergic rhinitis 06/25/2008    LAST ASSESSED: 6/25/08    Arthritis     Cervicalgia 04/22/2011    LAST ASSESSED: 4/22/11    Chronic pain disorder     Colon polyp     Coronary artery disease     CPAP (continuous positive airway pressure) dependence     Dysfunction of left eustachian tube     LAST ASSESSED: 9/23/13    Epistaxis     LAST ASSESSED: 5/27/15    First degree atrioventricular block     LAST ASSESSED: 7/10/17    Gastric ulcer 10/07/2011    LAST ASSESSED: 3/9/15    GERD (gastroesophageal reflux disease)     Hearing aid worn     Hemorrhoids 05/13/2011    LAST ASSESSED: 5/13/11    Hyperlipidemia     Hypertension     Irregular heart beat     Long term use of drug     LAST ASSESSED: 10/11/12    Myalgia 12/21/2007    LAST ASSESSED: 12/21/07    Myositis 12/21/2007    LAST ASSESSED: 12/21/07    Numbness and tingling of foot     LAST ASSESSED: 3/9/15    Pacemaker     Premature ventricular beats     states cardiologist ordered [employer] ambulatory cardiac monitor    Sleep apnea     Wears glasses      Past Surgical History:   Procedure Laterality Date    CARDIAC CATHETERIZATION  2017    CARDIAC ELECTROPHYSIOLOGY PROCEDURE N/A 10/25/2021    Procedure: CARDIAC EPS;  Surgeon: Uday Desir MD;  Location: [facility];  Service: Cardiology    CARDIAC ELECTROPHYSIOLOGY PROCEDURE Left 10/25/2021    Procedure: Cardiac icd implant;  Surgeon: Uday Desir MD;  Location: [facility];  Service: Cardiology    CARDIAC PACEMAKER PLACEMENT      CARDIOVERSION N/A 10/25/2021    Procedure: Cardioversion;  Surgeon: Uday Desir MD;  Location: [facility];  Service: Cardiology    CATARACT EXTRACTION Right     CERVICAL FUSION      pt thinks C4-C5    CHOLECYSTECTOMY      LAPAROSCOPIC; LAST ASSESSED: 10/17/17    COLONOSCOPY       COMPLETE; 3-4 YEARS AGO BY [facility]; LAST ASSESSED: 14    MYRINGOTOMY W/ TUBES      WITH VENTILATING TUBE INSERTION    CA HEMORRHOIDECTOMY INT & XTRNL 2/> COLUMN/GERBER N/A 2023    Procedure: HEMORRHOIDECTOMY EXCISION;  Surgeon: Clovis Leos MD;  Location: [facility];  Service: Colorectal    CA NEUROPLASTY &/TRANSPOS MEDIAN NRV CARPAL TUNNE Left 2021    Procedure: RELEASE CARPAL TUNNEL;  Surgeon: Luis Rubio MD;  Location: [facility];  Service: Orthopedics    CA NEUROPLASTY &/TRANSPOS MEDIAN NRV CARPAL TUNNE Right 10/14/2022    Procedure: RELEASE CARPAL TUNNEL;  Surgeon: Luis Rubio MD;  Location: [facility];  Service: Orthopedics    CA TENDON SHEATH INCISION Right 10/14/2022    Procedure: RELEASE TRIGGER FINGER,RING AND LONG TENOSYNOVECTOMY FDS +FDP;  Surgeon: Luis Rubio MD;  Location: [facility];  Service: Orthopedics    VASECTOMY       Social History     Substance and Sexual Activity   Alcohol Use Yes    Alcohol/week: 7.0 standard drinks of alcohol    Types: 7 Glasses of wine per week    Comment: few times a week     Social History     Substance and Sexual Activity   Drug Use No     Social History     Tobacco Use   Smoking Status Former    Current packs/day: 0.00    Types: Cigarettes    Quit date:     Years since quittin.0   Smokeless Tobacco Never     Family History:   Family History   Problem Relation Age of Onset    Cancer Mother         SOFT TISSUE CANCER ON RT SIDE CHEST WALL AND     Prostate cancer Maternal Uncle        Meds/Allergies     Allergies   Allergen Reactions    Amoxicillin Anaphylaxis    Augmentin [Amoxicillin-Pot Clavulanate] Anaphylaxis    Acetaminophen      Rapid heart rate    Cherry Flavor - Food Allergy Other (See Comments)     States allergy is to raw cherries difficulty breathing    Pollen Extract     Dye [Iodinated Contrast Media] Itching       Current Outpatient Medications:     aspirin 325 mg tablet, Take 325 mg by  mouth daily, Disp: , Rfl:     Coenzyme Q10 (COQ10) 200 MG CAPS, Take 1 tablet by mouth daily, Disp: , Rfl:     COLLAGEN-CHOND-HYALURONIC ACID PO, Take 1 capsule by mouth in the morning, Disp: , Rfl:     finasteride (PROSCAR) 5 mg tablet, Take 1 tablet (5 mg total) by mouth daily, Disp: 90 tablet, Rfl: 1    fluticasone (VERAMYST) 27.5 MCG/SPRAY nasal spray, 2 sprays into each nostril daily, Disp: , Rfl:     Glucosamine-Chondroitin (GLUCOSAMINE CHONDR COMPLEX PO), Take 1 tablet by mouth daily, Disp: , Rfl:     Omega-3 Fatty Acids (FISH OIL) 1000 MG CPDR, Take by mouth daily, Disp: , Rfl:     pantoprazole (PROTONIX) 40 mg tablet, Take 1 tablet (40 mg total) by mouth daily, Disp: 90 tablet, Rfl: 3    ranolazine (RANEXA) 500 mg 12 hr tablet, TAKE 1 TABLET BY MOUTH TWICE A DAY, Disp: 180 tablet, Rfl: 1    rosuvastatin (CRESTOR) 20 MG tablet, Take 1 tablet (20 mg total) by mouth daily, Disp: , Rfl:     sacubitril-valsartan (Entresto) 24-26 MG TABS, Take 1 tablet by mouth 2 (two) times a day, Disp: 60 tablet, Rfl: 2    sotalol (BETAPACE) 80 mg tablet, TAKE 1 TABLET BY MOUTH EVERY 12 HOURS, Disp: 180 tablet, Rfl: 3    Turmeric 500 MG CAPS, Take by mouth 3 (three) times a week, Disp: , Rfl:     EPINEPHrine (EpiPen 2-Leroy) 0.3 mg/0.3 mL SOAJ, Inject 0.3 mL (0.3 mg total) into a muscle once for 1 dose, Disp: 0.6 mL, Rfl: 0    mefloquine (LARIAM) 250 MG tablet, Take 250 mg once weekly starting 2 days prior to travel Do not start before November 26, 2023., Disp: 3 tablet, Rfl: 0    Vitals: Blood pressure 130/80, pulse 100, weight 98.9 kg (218 lb), SpO2 98%.    Body mass index is 29.57 kg/m².  Wt Readings from Last 3 Encounters:   01/08/25 98.9 kg (218 lb)   12/27/24 98.9 kg (218 lb)   12/17/24 98.9 kg (218 lb)     Vitals:    01/08/25 1245   Weight: 98.9 kg (218 lb)       BP Readings from Last 3 Encounters:   01/08/25 130/80   12/27/24 138/90   12/27/24 138/81         Physical Exam  Constitutional:       General: He is not in acute  distress.     Appearance: He is well-developed. He is not diaphoretic.   Neck:      Thyroid: No thyromegaly.      Vascular: No JVD.      Trachea: No tracheal deviation.   Cardiovascular:      Rate and Rhythm: Normal rate and regular rhythm.      Heart sounds: S1 normal and S2 normal. Heart sounds not distant. Murmur heard.      Systolic (ejection) murmur is present with a grade of 2/6.      No friction rub. No gallop. No S3 or S4 sounds.   Pulmonary:      Effort: Pulmonary effort is normal. No respiratory distress.      Breath sounds: Normal breath sounds. No wheezing or rales.   Chest:      Chest wall: No tenderness.   Abdominal:      General: Bowel sounds are normal. There is no distension.      Palpations: Abdomen is soft.      Tenderness: There is no abdominal tenderness.   Musculoskeletal:         General: No deformity.      Cervical back: Neck supple.   Skin:     General: Skin is warm and dry.      Coloration: Skin is not pale.      Findings: No rash.   Neurological:      Mental Status: He is alert and oriented to person, place, and time.   Psychiatric:         Behavior: Behavior normal.         Judgment: Judgment normal.             Diagnostic Studies Review Cardio:    Echo Doppler.  Echo Doppler done Dec 2024 shows patient's EF had dropped to 45%.  Mild valvular disease noted.     cardiac catheterization done in June 2017 at [facility] shows he had moderate stenosis of his ostial circumflex.  Had a nonobstructive disease in other arteries and had a normal LV systolic function.      Repeat cardiac catheterization done 08/19/2021 shows patient has nonobstructive disease involving RCA and LAD.  50- 70% ostial circumflex and IFR was 0.95 EF is acceptable.      Nuclear stress test done in October 2020.  Nuclear stress done in October 2020 patient exercised for about 9 minutes.  Fixed defect was seen no ischemia noted.  EF was 47%    Nuclear stress test.  Nuclear stress test shows no major reversible defect  to suggest ischemia.  Paradoxical defect improved with prone images.  EF was 34%.  EF was noted to be lower no other significant issues noted.      Event monitor has shows episodes of pauses, intermittent 3 AV block, episode of NSVT was evaluated by EP.    EKG:  Twelve lead EKG done 06/30/2021 shows normal sinus rhythm first-degree AV block with PVCs.  No significant change from previous EKG done at his primary care doctor's office     12 lead EKG done 09/01/2021 shows Normal sinus rhythm first-degree AV block heart rate 64 beats per minute.  Rare PVCs noted.    Twelve-lead EKG done 7/26/2023 shows AV paced heart rate 77 bpm.    Twelve-lead EKG done 2/12/2024 shows atrial sensed ventricular paced heart rate 67 bpm.    Twelve-lead EKG done on 1/8/2025 AV paced with a heart rate 78 bpm.      Imaging:  Chest X-Ray:   No Chest XR results available for this patient.    CT-scan of the chest:     CTA dissection protocol chest abdomen pelvis w wo contrast    Result Date: 10/12/2020  Impression 1.  No aortic dissection or aneurysm. 2.  No acute finding within chest, abdomen, and pelvis. 3.  Colonic diverticulosis. 4.  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  Recommend correlation/comparison with prior outside imaging. Workstation performed: [id number]     Lab Review   Lab Results   Component Value Date    WBC 5.1 10/01/2024    HGB 16.1 10/01/2024    HCT 48.1 10/01/2024    MCV 96.4 10/01/2024    RDW 12.5 10/01/2024     10/01/2024     BMP:  Lab Results   Component Value Date    SODIUM 138 12/17/2024    K 4.6 12/17/2024     12/17/2024    CO2 30 12/17/2024    BUN 15 12/17/2024    CREATININE 0.88 12/17/2024    GLUC 95 12/17/2024    GLUF 102 (H) 10/04/2022    CALCIUM 9.7 12/17/2024    EGFR 87 12/17/2024    MG 2.0 12/17/2024     LFT:  Lab Results   Component Value Date    AST 22 10/01/2024    ALT 37  "10/01/2024    ALKPHOS 65 10/01/2024    TP 6.8 10/01/2024    ALB 4.3 10/01/2024      Lab Results   Component Value Date    ZOX1JMOEDYPN 2.052 10/04/2022     No components found for: \"TSH3\"  Lab Results   Component Value Date    HGBA1C 5.8 (H) 10/01/2024     Lipid Profile:   Lab Results   Component Value Date    CHOLESTEROL 146 10/01/2024    HDL 40 10/01/2024    LDLCALC 82 10/01/2024    TRIG 139 10/01/2024     Lab Results   Component Value Date    CHOLESTEROL 146 10/01/2024    CHOLESTEROL 154 04/12/2024     Lab Results   Component Value Date    TROPONINI <0.02 10/13/2020           Dr. Robert Vences MD [facility]      \"This note has been constructed using a voice recognition system.Therefore there may be syntax, spelling, and/or grammatical errors. Please call if you have any questions. \"  "

## 2025-01-09 DIAGNOSIS — J44.9 OSA AND COPD OVERLAP SYNDROME (HCC): Primary | ICD-10-CM

## 2025-01-09 DIAGNOSIS — G47.33 OSA AND COPD OVERLAP SYNDROME (HCC): Primary | ICD-10-CM

## 2025-01-14 NOTE — TELEPHONE ENCOUNTER
Follow up with patient in regard Venogram appointment not been schedule. He didn't received a phone call.  Contact IR department, they will call pt to schedule appointment.

## 2025-01-15 DIAGNOSIS — E78.5 DYSLIPIDEMIA: ICD-10-CM

## 2025-01-15 DIAGNOSIS — I25.118 CORONARY ARTERY DISEASE OF NATIVE ARTERY OF NATIVE HEART WITH STABLE ANGINA PECTORIS (HCC): ICD-10-CM

## 2025-01-15 RX ORDER — ROSUVASTATIN CALCIUM 20 MG/1
20 TABLET, COATED ORAL DAILY
Qty: 90 TABLET | Refills: 3 | Status: SHIPPED | OUTPATIENT
Start: 2025-01-15

## 2025-01-15 NOTE — TELEPHONE ENCOUNTER
Reason for call: NOT A DUPLICATE. Refill was not sent to the pharmacy  [x] Refill   [] Prior Auth  [] Other:     Office:   [] PCP/Provider -   [x] Specialty/Provider - Cardio    Medication: rosuvastatin (CRESTOR) 20 MG tablet      Dose/Frequency:  Take 1 tablet (20 mg total) by mouth daily     Quantity: 90    Pharmacy: [facility]/pharmacy #[id number] [address]     Does the patient have enough for 3 days?   [x] Yes   [] No - Send as HP to POD

## 2025-01-16 ENCOUNTER — RESULTS FOLLOW-UP (OUTPATIENT)
Dept: CARDIOLOGY CLINIC | Facility: CLINIC | Age: 70
End: 2025-01-16

## 2025-01-16 RX ORDER — ROSUVASTATIN CALCIUM 20 MG/1
20 TABLET, COATED ORAL DAILY
Qty: 90 TABLET | Refills: 1 | Status: SHIPPED | OUTPATIENT
Start: 2025-01-16

## 2025-01-17 ENCOUNTER — TELEPHONE (OUTPATIENT)
Dept: RADIOLOGY | Facility: HOSPITAL | Age: 70
End: 2025-01-17

## 2025-01-18 LAB
CHOLEST SERPL-MCNC: 125 MG/DL
CHOLEST/HDLC SERPL: 3.5 (CALC)
HDLC SERPL-MCNC: 36 MG/DL
LDLC SERPL CALC-MCNC: 70 MG/DL (CALC)
NONHDLC SERPL-MCNC: 89 MG/DL (CALC)
TRIGL SERPL-MCNC: 109 MG/DL

## 2025-01-20 DIAGNOSIS — Z91.041 CONTRAST MEDIA ALLERGY: Primary | ICD-10-CM

## 2025-01-20 RX ORDER — METHYLPREDNISOLONE 32 MG/1
32 TABLET ORAL SEE ADMIN INSTRUCTIONS
Qty: 2 TABLET | Refills: 0 | Status: SHIPPED | OUTPATIENT
Start: 2025-01-20

## 2025-01-20 RX ORDER — DIPHENHYDRAMINE HCL 50 MG
50 CAPSULE ORAL SEE ADMIN INSTRUCTIONS
Qty: 2 CAPSULE | Refills: 0 | Status: SHIPPED | OUTPATIENT
Start: 2025-01-20

## 2025-01-20 NOTE — PROGRESS NOTES
1/20/2025    Patient is scheduled for a venogram at 830 1/31/2025. He is refusing to take prednisone. Prescription for Medrol and Benadryl [facility] as below   -   Benadryl 50 mg (two tablets), take one at bedtime and one one hour prior to your procedure   -   Medrol 32 mg take one tablet at 10 PM the night prior to procedure and second tablet two hours prior to procedure.    Message sent to interventional radiology staff, if patient does not take this prep then they will need to give emergency IV contrast prep.    Natasha MCCOY  Cardiology

## 2025-01-27 DIAGNOSIS — I50.42 CHRONIC COMBINED SYSTOLIC AND DIASTOLIC HEART FAILURE, NYHA CLASS 2 (HCC): ICD-10-CM

## 2025-01-27 RX ORDER — SACUBITRIL AND VALSARTAN 24; 26 MG/1; MG/1
1 TABLET, FILM COATED ORAL 2 TIMES DAILY
Qty: 60 TABLET | Refills: 0 | Status: SHIPPED | OUTPATIENT
Start: 2025-01-27

## 2025-01-27 NOTE — TELEPHONE ENCOUNTER
Patient would like Entresto 24-26 sent to a [place] pharmacy. He would like to  hardcopy. Please sent patient a message through [employer] when this is available for  at the [place] office.

## 2025-01-29 ENCOUNTER — OFFICE VISIT (OUTPATIENT)
Dept: SLEEP CENTER | Facility: CLINIC | Age: 70
End: 2025-01-29
Payer: MEDICARE

## 2025-01-29 VITALS
DIASTOLIC BLOOD PRESSURE: 70 MMHG | SYSTOLIC BLOOD PRESSURE: 120 MMHG | WEIGHT: 218 LBS | BODY MASS INDEX: 29.53 KG/M2 | HEIGHT: 72 IN

## 2025-01-29 DIAGNOSIS — I25.10 CORONARY ARTERY DISEASE INVOLVING NATIVE CORONARY ARTERY OF NATIVE HEART WITHOUT ANGINA PECTORIS: ICD-10-CM

## 2025-01-29 DIAGNOSIS — G47.33 OSA (OBSTRUCTIVE SLEEP APNEA): Primary | ICD-10-CM

## 2025-01-29 PROCEDURE — G2211 COMPLEX E/M VISIT ADD ON: HCPCS | Performed by: STUDENT IN AN ORGANIZED HEALTH CARE EDUCATION/TRAINING PROGRAM

## 2025-01-29 PROCEDURE — 99214 OFFICE O/P EST MOD 30 MIN: CPT | Performed by: STUDENT IN AN ORGANIZED HEALTH CARE EDUCATION/TRAINING PROGRAM

## 2025-01-29 NOTE — PROGRESS NOTES
Name: Tucker Carvajal      : 1955      MRN: [id number]  Encounter Provider: Jaime Barron DO  Encounter Date: 2025   Encounter department: [facility] SLEEP [facility]IC[facility] [place]  :  Assessment & Plan  LENY (obstructive sleep apnea)  Patient presenting for sleep apnea compliance followup with CPAP. Patient has continued very good compliance on CPAP with nonsignificant leaks and residual AHI 3.5. Patient having no significant problems on CPAP or the current mask. Patient willing to continue using the machine.    Discussed with Dr. Barron  Continue AutoPAP at settings of  4-20 cmH2O  Prescription for new supplies ordered today  Reviewed CMS/insurance requirements and resupply guidelines  Information provided on the above as well as general maintenance steps  Recommended maintaining good Sleep Hygiene  Followup in 1 year  Patient agreeable to above        Coronary artery disease involving native coronary artery of native heart without angina pectoris  Patient continuing to follow with cardiology and recently did have an echocardiogram showing lowered EF to 45% and currently under pharmacologic management. Patient continuing to follow Cardiology.    Reviewed the importance of treating SDB on cardiovascular risk reduction (including but not limited to risk of heart attack, CHF, and both initial occurrence of A-fib and recurrence of A-fib following ablation or similar intervention)  Defer primary/medical management of patient's CAD per cardiology and/or patient's PCP.           History of Present Illness   HPI  Per Last Visit Note (Date: 2024):  Tucker is a pleasant 68-year-old gentleman with a PMHx of GERD, PAF, SSS, cervical radiculopathy, HLD who presents in follow up for LENY/COPD overlap syndrome (AHI 17.7, supine AHI 40.5, O2 uday 88% 2020).  He continues to endorse significant benefit from PAP therapy, with no significant complaints or concerns for me today.     - Continue AutoPap at settings of  "4-20 cm H2O  - Prescription for new supplies ordered today  - Reviewed CMS/insurance requirements and resupply guidelines  - Information provided on the above as well as general maintenance steps  - he is to see me back in ~12 months, however may reach out sooner if needed.         Sleep Studies:  -PSG 7/24/2020: .5 minutes, sleep efficiency 31.9% sleep onset latency 52.7 minutes, REM sleep latency 82.5 minutes.  Diminished stage N3 and REM sleep noted.  AHI 17.7, supine AHI 40.5, REM AHI 0.  O2 uday 88%.  PLM index 0.      ________________________________________________________________________________________________      Interval History: Tucker Carvajal is a 69 y.o. male with a PMHx of CAD, s/p ICD, LENY, pAfib, who presents in follow up for sleep apnea compliance. No problems with the CPAP machine. Patient started using the Entresto less than a month ago, takes 10-15 minutes to fall asleep to go to bed. Patient wakes up 1 time per night to use the restroom and variable in terms of falling asleep. Echocardiogram decreased to 45% LVEF 12/27/2024.     SDB:  -Current experience with PAP Therapy: No significant problems  -Mask type: Nasal pillow  -Difficulties with mask: No problems with the mask  -Device: Resmed Airsence 10 [id number]  -Difficulties with device: Patient have some issues with the temperature (\"cold neuralgia\"- like all the teeth hurts despite the fact that hose is heated)- issue went away with the heating in the house  -Compliance: (Setup date 2020)  100% compliance  Average usage 8 hours 17 minutes  Pressure Median 7.1  Leaks 95th percentile 14.5  AHI 3.5    Changes to PMH, PSH, SH: None      Sitting and reading: (Patient-Rptd) (P) Moderate chance of dozing  Watching TV: (Patient-Rptd) (P) Slight chance of dozing  Sitting, inactive in a public place (e.g. a theatre or a meeting): (Patient-Rptd) (P) Slight chance of dozing  As a passenger in a car for an hour without a break: (Patient-Rptd) " (P) Moderate chance of dozing  Lying down to rest in the afternoon when circumstances permit: (Patient-Rptd) (P) High chance of dozing  Sitting and talking to someone: (Patient-Rptd) (P) Would never doze  Sitting quietly after a lunch without alcohol: (Patient-Rptd) (P) Would never doze  In a car, while stopped for a few minutes in traffic: (Patient-Rptd) (P) Would never doze  Total score: (Patient-Rptd) (P) 9     Review of Systems   Respiratory:  Negative for chest tightness and shortness of breath.    Cardiovascular:  Negative for chest pain and palpitations.   Gastrointestinal:  Negative for diarrhea and nausea.   Endocrine: Negative for polyuria.   Genitourinary:  Negative for enuresis and urgency.   Musculoskeletal:  Negative for back pain and neck pain.   Skin:  Negative for rash.   Neurological:  Negative for weakness and numbness.   Psychiatric/Behavioral:  Negative for dysphoric mood. The patient is not nervous/anxious.      Pertinent positives/negatives included in HPI and also as noted:     Pertinent Medical History     Medical History Reviewed by provider this encounter:     .  Past Medical History   Past Medical History:   Diagnosis Date    Abrasion of left foot     LAST ASSESSED: 9/23/13    Achilles tendinitis, unspecified leg     LAST ASSESSED: 11/30/12    Allergic rhinitis     LAST ASSESSED: 11/25/13    Allergic rhinitis 06/25/2008    LAST ASSESSED: 6/25/08    Arthritis     Cervicalgia 04/22/2011    LAST ASSESSED: 4/22/11    Chronic pain disorder     Colon polyp     Coronary artery disease     CPAP (continuous positive airway pressure) dependence     Dysfunction of left eustachian tube     LAST ASSESSED: 9/23/13    Epistaxis     LAST ASSESSED: 5/27/15    First degree atrioventricular block     LAST ASSESSED: 7/10/17    Gastric ulcer 10/07/2011    LAST ASSESSED: 3/9/15    GERD (gastroesophageal reflux disease)     Hearing aid worn     Hemorrhoids 05/13/2011    LAST ASSESSED: 5/13/11    Hyperlipidemia      Hypertension     Irregular heart beat     Long term use of drug     LAST ASSESSED: 10/11/12    Myalgia 12/21/2007    LAST ASSESSED: 12/21/07    Myositis 12/21/2007    LAST ASSESSED: 12/21/07    Numbness and tingling of foot     LAST ASSESSED: 3/9/15    Pacemaker     Premature ventricular beats     states cardiologist ordered Zio ambulatory cardiac monitor    Sleep apnea     Wears glasses      Past Surgical History:   Procedure Laterality Date    CARDIAC CATHETERIZATION  2017    CARDIAC ELECTROPHYSIOLOGY PROCEDURE N/A 10/25/2021    Procedure: CARDIAC EPS;  Surgeon: Uday Desir MD;  Location: [facility];  Service: Cardiology    CARDIAC ELECTROPHYSIOLOGY PROCEDURE Left 10/25/2021    Procedure: Cardiac icd implant;  Surgeon: Uday Desir MD;  Location: [facility];  Service: Cardiology    CARDIAC PACEMAKER PLACEMENT      CARDIOVERSION N/A 10/25/2021    Procedure: Cardioversion;  Surgeon: Uday Desir MD;  Location: [facility];  Service: Cardiology    CATARACT EXTRACTION Right     CERVICAL FUSION      pt thinks C4-C5    CHOLECYSTECTOMY      LAPAROSCOPIC; LAST ASSESSED: 10/17/17    COLONOSCOPY      COMPLETE; 3-4 YEARS AGO BY [facility]; LAST ASSESSED: 8/18/14    MYRINGOTOMY W/ TUBES      WITH VENTILATING TUBE INSERTION    NV HEMORRHOIDECTOMY INT & XTRNL 2/> COLUMN/GERBER N/A 7/27/2023    Procedure: HEMORRHOIDECTOMY EXCISION;  Surgeon: Clovis Leos MD;  Location: [facility];  Service: Colorectal    NV NEUROPLASTY &/TRANSPOS MEDIAN NRV CARPAL TUNNE Left 07/16/2021    Procedure: RELEASE CARPAL TUNNEL;  Surgeon: Luis Rubio MD;  Location: [facility];  Service: Orthopedics    NV NEUROPLASTY &/TRANSPOS MEDIAN NRV CARPAL TUNNE Right 10/14/2022    Procedure: RELEASE CARPAL TUNNEL;  Surgeon: Luis Rubio MD;  [facility]: [facility];  Service: Orthopedics    NV TENDON SHEATH INCISION Right 10/14/2022    Procedure: RELEASE TRIGGER FINGER,RING AND LONG TENOSYNOVECTOMY FDS +FDP;   Surgeon: Luis Rubio MD;  Location: [facility];  Service: Orthopedics    VASECTOMY       Family History   Problem Relation Age of Onset    Cancer Mother         SOFT TISSUE CANCER ON RT SIDE CHEST WALL AND     Prostate cancer Maternal Uncle       reports that he quit smoking about 35 years ago. His smoking use included cigarettes. He has never used smokeless tobacco. He reports current alcohol use of about 7.0 standard drinks of alcohol per week. He reports that he does not use drugs.  Current Outpatient Medications on File Prior to Visit   Medication Sig Dispense Refill    aspirin 325 mg tablet Take 325 mg by mouth daily      Coenzyme Q10 (COQ10) 200 MG CAPS Take 1 tablet by mouth daily      COLLAGEN-CHOND-HYALURONIC ACID PO Take 1 capsule by mouth in the morning      diphenhydrAMINE (BENADRYL) 50 mg capsule Take 1 capsule (50 mg total) by mouth see administration instructions Patient is to take one capsule around 10 PM the night prior to his venogram and second capsule one hour prior to procedure. You will need a  to take you to and from your procedure. 2 capsule 0    EPINEPHrine (EpiPen 2-Leroy) 0.3 mg/0.3 mL SOAJ Inject 0.3 mL (0.3 mg total) into a muscle once for 1 dose 0.6 mL 0    finasteride (PROSCAR) 5 mg tablet Take 1 tablet (5 mg total) by mouth daily 90 tablet 1    fluticasone (VERAMYST) 27.5 MCG/SPRAY nasal spray 2 sprays into each nostril daily      Glucosamine-Chondroitin (GLUCOSAMINE CHONDR COMPLEX PO) Take 1 tablet by mouth daily      mefloquine (LARIAM) 250 MG tablet Take 250 mg once weekly starting 2 days prior to travel Do not start before 2023. 3 tablet 0    methylPREDNISolone (MEDROL) 32 MG tablet Take 1 tablet (32 mg total) by mouth see administration instructions You will need to take one tablet at 10 PM the evening prior to your procedure and second tablet will be taken two hours prior to your procedure. 2 tablet 0    Omega-3 Fatty Acids (FISH OIL) 1000 MG  CPDR Take by mouth daily      pantoprazole (PROTONIX) 40 mg tablet Take 1 tablet (40 mg total) by mouth daily 90 tablet 3    ranolazine (RANEXA) 500 mg 12 hr tablet TAKE 1 TABLET BY MOUTH TWICE A  tablet 1    rosuvastatin (CRESTOR) 20 MG tablet Take 1 tablet (20 mg total) by mouth daily 90 tablet 1    rosuvastatin (CRESTOR) 20 MG tablet Take 1 tablet (20 mg total) by mouth daily 90 tablet 3    sacubitril-valsartan (Entresto) 24-26 MG TABS Take 1 tablet by mouth 2 (two) times a day 60 tablet 0    sotalol (BETAPACE) 80 mg tablet TAKE 1 TABLET BY MOUTH EVERY 12 HOURS 180 tablet 3    Turmeric 500 MG CAPS Take by mouth 3 (three) times a week       No current facility-administered medications on file prior to visit.     Allergies   Allergen Reactions    Amoxicillin Anaphylaxis    Augmentin [Amoxicillin-Pot Clavulanate] Anaphylaxis    Acetaminophen      Rapid heart rate    Cherry Flavor - Food Allergy Other (See Comments)     States allergy is to raw cherries difficulty breathing    Pollen Extract     Dye [Iodinated Contrast Media] Itching      Current Outpatient Medications on File Prior to Visit   Medication Sig Dispense Refill    aspirin 325 mg tablet Take 325 mg by mouth daily      Coenzyme Q10 (COQ10) 200 MG CAPS Take 1 tablet by mouth daily      COLLAGEN-CHOND-HYALURONIC ACID PO Take 1 capsule by mouth in the morning      diphenhydrAMINE (BENADRYL) 50 mg capsule Take 1 capsule (50 mg total) by mouth see administration instructions Patient is to take one capsule around 10 PM the night prior to his venogram and second capsule one hour prior to procedure. You will need a  to take you to and from your procedure. 2 capsule 0    EPINEPHrine (EpiPen 2-Leroy) 0.3 mg/0.3 mL SOAJ Inject 0.3 mL (0.3 mg total) into a muscle once for 1 dose 0.6 mL 0    finasteride (PROSCAR) 5 mg tablet Take 1 tablet (5 mg total) by mouth daily 90 tablet 1    fluticasone (VERAMYST) 27.5 MCG/SPRAY nasal spray 2 sprays into each nostril  daily      Glucosamine-Chondroitin (GLUCOSAMINE CHONDR COMPLEX PO) Take 1 tablet by mouth daily      mefloquine (LARIAM) 250 MG tablet Take 250 mg once weekly starting 2 days prior to travel Do not start before 2023. 3 tablet 0    methylPREDNISolone (MEDROL) 32 MG tablet Take 1 tablet (32 mg total) by mouth see administration instructions You will need to take one tablet at 10 PM the evening prior to your procedure and second tablet will be taken two hours prior to your procedure. 2 tablet 0    Omega-3 Fatty Acids (FISH OIL) 1000 MG CPDR Take by mouth daily      pantoprazole (PROTONIX) 40 mg tablet Take 1 tablet (40 mg total) by mouth daily 90 tablet 3    ranolazine (RANEXA) 500 mg 12 hr tablet TAKE 1 TABLET BY MOUTH TWICE A  tablet 1    rosuvastatin (CRESTOR) 20 MG tablet Take 1 tablet (20 mg total) by mouth daily 90 tablet 1    rosuvastatin (CRESTOR) 20 MG tablet Take 1 tablet (20 mg total) by mouth daily 90 tablet 3    sacubitril-valsartan (Entresto) 24-26 MG TABS Take 1 tablet by mouth 2 (two) times a day 60 tablet 0    sotalol (BETAPACE) 80 mg tablet TAKE 1 TABLET BY MOUTH EVERY 12 HOURS 180 tablet 3    Turmeric 500 MG CAPS Take by mouth 3 (three) times a week       No current facility-administered medications on file prior to visit.      Social History     Tobacco Use    Smoking status: Former     Current packs/day: 0.00     Types: Cigarettes     Quit date:      Years since quittin.1    Smokeless tobacco: Never   Vaping Use    Vaping status: Never Used   Substance and Sexual Activity    Alcohol use: Yes     Alcohol/week: 7.0 standard drinks of alcohol     Types: 7 Glasses of wine per week     Comment: few times a week    Drug use: No    Sexual activity: Not Currently     Objective   /70   Ht 6' (1.829 m)   Wt 98.9 kg (218 lb) Comment: Pt stated. Did not want to get weight taken in office  BMI 29.57 kg/m²        Mallampati Grade : Mallampati 4  Oropharynx : crowded  Tongue  ": Large tongue  Soft palate and uvula : Normal soft palate  Hard Palate : high arched  Nose : Normal nasal structure  Craniofacial anatomy : normal  Dental Stucture and Dentition : Mild overbite      Appearance : no distress, alert, cooperative  Mental Status. Memory, and Affect : alert and oriented, normal affect, makes good eye contact, provides a detailed history  Cardiac- : regular rate and rhythm, S1, S2 normal, no murmur, click, rub or gallop  Pulmonary : clear to auscultation bilaterally  Cranial Nerves: CN II-XII grossly intact  No peripheral edema       Data  Lab Results   Component Value Date    HGB 16.1 10/01/2024    HCT 48.1 10/01/2024    MCV 96.4 10/01/2024      Lab Results   Component Value Date    CALCIUM 9.7 12/17/2024     09/29/2017    K 4.6 12/17/2024    CO2 30 12/17/2024     12/17/2024    BUN 15 12/17/2024    CREATININE 0.88 12/17/2024     No results found for: \"IRON\", \"TIBC\", \"FERRITIN\"  Lab Results   Component Value Date    AST 22 10/01/2024    ALT 37 10/01/2024         "

## 2025-01-29 NOTE — PATIENT INSTRUCTIONS
Continue PAP Therapy  Continue AutoPAP at settings of 4-20 cmH2O  Remember to clean your mask and equipment regularly, as directed.  You should be eligible for new supplies approximately every 3-6 months, depending on your insurance coverage. Contact your Durable Medical Equipment (DME) company for new supplies as needed.  Practice good Sleep Hygiene, as outlined below.  Follow up in 12 months.      Care and Maintenance  Headgear should be washed as needed. Daily inspection and weekly washings are recommended. Do not disassemble the straps. Machine wash in warm water, making sure to attach Velcro hooks and tabs before washing. Line dry or machine dry on a low setting.  Masks should be washed every day. Daily inspection is recommended. Leave the mask and tubing attached. Gently wash the mask with a soft cloth using warm water and mild detergent, concentrating on the mask cushion flaps. DO NOT use alcohol or bleach. Rinse thoroughly and air dry.  Tubing and headgear should be washed weekly. Daily inspection is recommended. Wash in warm water and mild detergent and rinse thoroughly. Hook the tubing to the machine and blow until dry.  Humidifier should be washed daily and filled with DISTILLED water before use. Wash with warm water and mild detergent. Rinse thoroughly and air dry.  Disposable filters should be replaced once a month. Wash reusable foam filters with warm water and mild detergent at least once a month. Rinse thoroughly and dry with paper towels.  Avoid  that contain fragrance or conditioners, as these will leave a residue.  NEVER iron any soft goods.      CMS Requirements    Your insurance requires a face-to-face follow up visit within a 31-90 day period after starting CPAP.  Your insurance requires compliance with CPAP, which is at least 4 hours per night for 70% of the time. This must be done over a 30 day period and must occur within the initial 31-90 day period after starting CPAP.  Your  insurance also requires at least yearly follow ups to continue to pay for CPAP supplies.       PAP Supply Guidelines    Below are the guidelines for reordering your supplies. You will be responsible for your deductible, co payments, and out of pocket expenses.    Item Frequency   Nasal Mask (no headgear) 1 every 3 months   Nasal Mask Cushion 1 every 2 weeks   Full Face Mask (no headgear) 1 every 3 months   Full Face Mask Cushion 1 every month   Nasal Pillows 1 every 2 weeks   Headgear 1 every 6 months   hin Strap 1 every 6 months   yash 1 every 3 months   Filters: Reusable 1 every 6 months   Filters: Disposable 1 every 2 weeks   Humidifier Chamber(disposable) 1 every 6 months         Good Sleep Hygiene  Wake up at the same time every day, even on the weekends.  Use your bed for sleep and intimacy only.  If you have been in bed awake for 30 minutes, get up and leave the bedroom. Choose a dull activity not involving a blue screen (TV, computer, handheld devices). Go back to bed when you feel sleepy.  Avoid caffeine, nicotine and alcohol before you go to bed.  Avoid large meals before you go to bed.  Avoid using screens (computers, tablets, smartphones, etc.) for at least 1 hour before bedtime  Exercise regularly, but do not exercise right before you go to bed.  Avoid daytime naps. If you do take a nap, sleep for 20-40 minutes, and not after 2pm.

## 2025-01-29 NOTE — ASSESSMENT & PLAN NOTE
Patient continuing to follow with cardiology and recently did have an echocardiogram showing lowered EF to 45% and currently under pharmacologic management. Patient continuing to follow Cardiology.    Reviewed the importance of treating SDB on cardiovascular risk reduction (including but not limited to risk of heart attack, CHF, and both initial occurrence of A-fib and recurrence of A-fib following ablation or similar intervention)  Defer primary/medical management of patient's CAD per cardiology and/or patient's PCP.

## 2025-01-29 NOTE — ASSESSMENT & PLAN NOTE
Patient presenting for sleep apnea compliance followup with CPAP. Patient has continued very good compliance on CPAP with nonsignificant leaks and residual AHI 3.5. Patient having no significant problems on CPAP or the current mask. Patient willing to continue using the machine.    Discussed with Dr. Barron  Continue AutoPAP at settings of 4-20 cmH2O  Prescription for new supplies ordered today  Reviewed CMS/insurance requirements and resupply guidelines  Information provided on the above as well as general maintenance steps  Recommended maintaining good Sleep Hygiene  Followup in 1 year  Patient agreeable to above

## 2025-01-30 ENCOUNTER — TELEPHONE (OUTPATIENT)
Dept: SLEEP CENTER | Facility: CLINIC | Age: 70
End: 2025-01-30

## 2025-01-31 ENCOUNTER — HOSPITAL ENCOUNTER (OUTPATIENT)
Dept: RADIOLOGY | Facility: HOSPITAL | Age: 70
Discharge: HOME | End: 2025-01-31
Attending: RADIOLOGY
Payer: MEDICARE

## 2025-01-31 DIAGNOSIS — J44.9 OSA AND COPD OVERLAP SYNDROME (HCC): ICD-10-CM

## 2025-01-31 DIAGNOSIS — G47.33 OSA AND COPD OVERLAP SYNDROME (HCC): ICD-10-CM

## 2025-01-31 LAB

## 2025-01-31 PROCEDURE — 75822 VEIN X-RAY ARMS/LEGS: CPT

## 2025-01-31 PROCEDURE — 36005 INJECTION EXT VENOGRAPHY: CPT

## 2025-01-31 PROCEDURE — NC001 PR NO CHARGE: Performed by: STUDENT IN AN ORGANIZED HEALTH CARE EDUCATION/TRAINING PROGRAM

## 2025-01-31 NOTE — BRIEF OP NOTE (RAD/CATH)
INTERVENTIONAL RADIOLOGY PROCEDURE NOTE    Date: 1/31/2025    Procedure:   Procedure Summary       Date: 01/31/25 Room / Location: [facility] Interventional [facility]    Anesthesia Start:  Anesthesia Stop:     Procedure: IR UPPER EXTREMITY VENOGRAM- DIAGNOSTIC Diagnosis:       LENY and COPD overlap syndrome (HCC)      (device upgrade, SSS PAF)    Scheduled Providers: Cholo Espinoza DO Responsible Provider:     Anesthesia Type: Not recorded ASA Status: Not recorded            Preoperative diagnosis:   1. LENY and COPD overlap syndrome (HCC)         Postoperative diagnosis: Same.    Surgeon: Diogo Richard MD     Assistant: None. No qualified resident was available.    Blood loss: 0 ml    Specimens: none.     Findings:   Venogram documents central venous patency.    Complications: None immediate.    Anesthesia: local

## 2025-02-04 NOTE — TELEPHONE ENCOUNTER
Script signed, scanned, and I called patient who requested that I mail it out to him   Address confirmed.

## 2025-02-05 ENCOUNTER — TELEPHONE (OUTPATIENT)
Age: 70
End: 2025-02-05

## 2025-02-05 NOTE — TELEPHONE ENCOUNTER
Caller: Tucker     Doctor: Mamadou     Reason for call: Patient returning call.     Call back#: [phone]

## 2025-02-05 NOTE — TELEPHONE ENCOUNTER
Patient Tucker [phone] established patient of Dr. Robert Vences, contacting [facility] requesting to speak with Dr. Dennis regarding insertion of an additional ICD wire.

## 2025-02-12 ENCOUNTER — TELEPHONE (OUTPATIENT)
Age: 70
End: 2025-02-12

## 2025-02-12 DIAGNOSIS — Z29.89 NEED FOR MALARIA PROPHYLAXIS: ICD-10-CM

## 2025-02-12 NOTE — TELEPHONE ENCOUNTER
Caller: Tucker Carvajal    Doctor: Dr. Dennis     Reason for call: Patient is waiting for a call back to schedule the ICD wire insertion. [place] going [place] and would like to schedule this before he leaves.  Please call him as soon as possible to get this scheduled.      Thank you    Call back#: [phone]

## 2025-02-13 ENCOUNTER — REMOTE DEVICE CLINIC VISIT (OUTPATIENT)
Dept: CARDIOLOGY CLINIC | Facility: CLINIC | Age: 70
End: 2025-02-13
Payer: MEDICARE

## 2025-02-13 DIAGNOSIS — I47.20 VENTRICULAR TACHYCARDIA (HCC): ICD-10-CM

## 2025-02-13 DIAGNOSIS — I49.5 SSS (SICK SINUS SYNDROME) (HCC): Primary | ICD-10-CM

## 2025-02-13 PROCEDURE — 93296 REM INTERROG EVL PM/IDS: CPT | Performed by: INTERNAL MEDICINE

## 2025-02-13 PROCEDURE — 93295 DEV INTERROG REMOTE 1/2/MLT: CPT | Performed by: INTERNAL MEDICINE

## 2025-02-13 RX ORDER — MEFLOQUINE HYDROCHLORIDE 250 MG/1
TABLET ORAL
Qty: 3 TABLET | Refills: 0 | Status: SHIPPED | OUTPATIENT
Start: 2025-02-13 | End: 2025-02-20

## 2025-02-13 NOTE — TELEPHONE ENCOUNTER
Please find out how long he is going to be away and in a malaria endemic region.  Had refill request for 3 days.

## 2025-02-13 NOTE — PROGRESS NOTES
Results for orders placed or performed in visit on 02/13/25   Cardiac EP device report    Narrative    MDT-DUAL CHAMBER ICD (AAI-DDD MODE) / ACTIVE SYSTEM IS MRI CONDITIONAL  CARELINK TRANSMISSION: BATTERY VOLTAGE ADEQUATE (7.2 YRS). AP: 39.4%. : 98% (>40%~MVP-ON/50). ALL AVAILABLE LEAD PARAMETERS WITHIN NORMAL LIMITS. 13 VT-NS EPISODES W/ AVAIL EGMS SHOWING NSVT 9 BEATS @ 146 BPM, 8 BEATS @ 167 BPM, 7 BEATS @ 154 BPM, 6 BEATS @ 154 BPM, 7 BEATS @ 143 BPM. PT TAKES ENTRESTO, SOTALOL, RANEXA, ASA 325MG. EF: 34% (NUC ST TX 12/27/24). OPTI-VOL WITHIN NORMAL LIMITS. NORMAL DEVICE FUNCTION. CH

## 2025-02-16 ENCOUNTER — RESULTS FOLLOW-UP (OUTPATIENT)
Dept: CARDIOLOGY CLINIC | Facility: CLINIC | Age: 70
End: 2025-02-16

## 2025-03-07 DIAGNOSIS — I50.42 CHRONIC COMBINED SYSTOLIC AND DIASTOLIC HEART FAILURE, NYHA CLASS 2 (HCC): ICD-10-CM

## 2025-03-07 RX ORDER — SACUBITRIL AND VALSARTAN 24; 26 MG/1; MG/1
1 TABLET, FILM COATED ORAL 2 TIMES DAILY
Qty: 180 TABLET | Refills: 1 | Status: SHIPPED | OUTPATIENT
Start: 2025-03-07

## 2025-03-07 NOTE — TELEPHONE ENCOUNTER
Reason for call:   [x] Refill   [] Prior Auth  [x] Other: NOT A DUPLICATE - PHARMACY DEACTIVATED THE RX ON FILE     Office:   [] PCP/Provider -   [x] Specialty/Provider - Robert Vences / tomas man     Medication: sacubitril-valsartan (Entresto) 24-26 MG TABS     Dose/Frequency:  Take 1 tablet by mouth 2 (two) times a day,     Quantity: 180    Pharmacy: [facility]/pharmacy #[id number] - [place], [place] - [address] [place] [address] Pharmacy   Does the patient have enough for 3 days?   [] Yes   [] No - Send as HP to POD    Mail Away Pharmacy   Does the patient have enough for 10 days?   [] Yes   [] No - Send as HP to POD

## 2025-03-10 ENCOUNTER — RESULTS FOLLOW-UP (OUTPATIENT)
Dept: CARDIOLOGY CLINIC | Facility: CLINIC | Age: 70
End: 2025-03-10

## 2025-03-10 ENCOUNTER — LAB REQUISITION (OUTPATIENT)
Dept: LAB | Facility: HOSPITAL | Age: 70
End: 2025-03-10
Payer: MEDICARE

## 2025-03-10 ENCOUNTER — APPOINTMENT (OUTPATIENT)
Dept: LAB | Facility: CLINIC | Age: 70
End: 2025-03-10
Payer: MEDICARE

## 2025-03-10 ENCOUNTER — CONSULT (OUTPATIENT)
Dept: CARDIAC SURGERY | Facility: CLINIC | Age: 70
End: 2025-03-10
Payer: MEDICARE

## 2025-03-10 VITALS
HEIGHT: 72 IN | OXYGEN SATURATION: 100 % | DIASTOLIC BLOOD PRESSURE: 60 MMHG | SYSTOLIC BLOOD PRESSURE: 131 MMHG | WEIGHT: 216 LBS | HEART RATE: 74 BPM | BODY MASS INDEX: 29.26 KG/M2

## 2025-03-10 DIAGNOSIS — I48.0 PAROXYSMAL ATRIAL FIBRILLATION (HCC): Primary | ICD-10-CM

## 2025-03-10 DIAGNOSIS — I48.0 PAROXYSMAL ATRIAL FIBRILLATION (HCC): ICD-10-CM

## 2025-03-10 DIAGNOSIS — I50.42 CHRONIC COMBINED SYSTOLIC AND DIASTOLIC HEART FAILURE, NYHA CLASS 2 (HCC): ICD-10-CM

## 2025-03-10 LAB
ABO GROUP BLD: NORMAL
ANION GAP SERPL CALCULATED.3IONS-SCNC: 10 MMOL/L (ref 4–13)
BLD GP AB SCN SERPL QL: NEGATIVE
BUN SERPL-MCNC: 13 MG/DL (ref 5–25)
CALCIUM SERPL-MCNC: 9.5 MG/DL (ref 8.4–10.2)
CHLORIDE SERPL-SCNC: 104 MMOL/L (ref 96–108)
CO2 SERPL-SCNC: 25 MMOL/L (ref 21–32)
CREAT SERPL-MCNC: 0.78 MG/DL (ref 0.6–1.3)
ERYTHROCYTE [DISTWIDTH] IN BLOOD BY AUTOMATED COUNT: 12.9 % (ref 11.6–15.1)
GFR SERPL CREATININE-BSD FRML MDRD: 91 ML/MIN/1.73SQ M
GLUCOSE P FAST SERPL-MCNC: 99 MG/DL (ref 65–99)
HCT VFR BLD AUTO: 47.5 % (ref 36.5–49.3)
HGB BLD-MCNC: 15.8 G/DL (ref 12–17)
INR PPP: 1.01 (ref 0.85–1.19)
MCH RBC QN AUTO: 31.9 PG (ref 26.8–34.3)
MCHC RBC AUTO-ENTMCNC: 33.3 G/DL (ref 31.4–37.4)
MCV RBC AUTO: 96 FL (ref 82–98)
PLATELET # BLD AUTO: 203 THOUSANDS/UL (ref 149–390)
PMV BLD AUTO: 10 FL (ref 8.9–12.7)
POTASSIUM SERPL-SCNC: 4.5 MMOL/L (ref 3.5–5.3)
PROTHROMBIN TIME: 13.6 SECONDS (ref 12.3–15)
RBC # BLD AUTO: 4.95 MILLION/UL (ref 3.88–5.62)
RH BLD: POSITIVE
SODIUM SERPL-SCNC: 139 MMOL/L (ref 135–147)
SPECIMEN EXPIRATION DATE: NORMAL
WBC # BLD AUTO: 5.29 THOUSAND/UL (ref 4.31–10.16)

## 2025-03-10 PROCEDURE — 85027 COMPLETE CBC AUTOMATED: CPT

## 2025-03-10 PROCEDURE — 93005 ELECTROCARDIOGRAM TRACING: CPT

## 2025-03-10 PROCEDURE — 99204 OFFICE O/P NEW MOD 45 MIN: CPT | Performed by: THORACIC SURGERY (CARDIOTHORACIC VASCULAR SURGERY)

## 2025-03-10 PROCEDURE — 86900 BLOOD TYPING SEROLOGIC ABO: CPT | Performed by: PHYSICIAN ASSISTANT

## 2025-03-10 PROCEDURE — 85610 PROTHROMBIN TIME: CPT

## 2025-03-10 PROCEDURE — 36415 COLL VENOUS BLD VENIPUNCTURE: CPT

## 2025-03-10 PROCEDURE — 86850 RBC ANTIBODY SCREEN: CPT | Performed by: PHYSICIAN ASSISTANT

## 2025-03-10 PROCEDURE — 86901 BLOOD TYPING SEROLOGIC RH(D): CPT | Performed by: PHYSICIAN ASSISTANT

## 2025-03-10 PROCEDURE — 80048 BASIC METABOLIC PNL TOTAL CA: CPT

## 2025-03-10 RX ORDER — DIPHENHYDRAMINE HCL 50 MG
CAPSULE ORAL
Qty: 2 CAPSULE | Refills: 0 | Status: SHIPPED | OUTPATIENT
Start: 2025-03-10

## 2025-03-10 RX ORDER — FAMOTIDINE 20 MG/1
TABLET, FILM COATED ORAL
Qty: 2 TABLET | Refills: 0 | Status: SHIPPED | OUTPATIENT
Start: 2025-03-10

## 2025-03-10 RX ORDER — CIPROFLOXACIN 2 MG/ML
400 INJECTION, SOLUTION INTRAVENOUS ONCE
Status: CANCELLED | OUTPATIENT
Start: 2025-03-10 | End: 2025-03-10

## 2025-03-10 RX ORDER — VANCOMYCIN HYDROCHLORIDE 1 G/200ML
1000 INJECTION, SOLUTION INTRAVENOUS ONCE
Status: CANCELLED | OUTPATIENT
Start: 2025-03-10 | End: 2025-03-10

## 2025-03-10 NOTE — TELEPHONE ENCOUNTER
----- Message from Robert Vences MD sent at 3/10/2025  2:14 PM EDT -----  Patient blood test reviewed.  They are acceptable.  Will continue same medication.  Patient should keep his appointment for follow-up.

## 2025-03-10 NOTE — H&P
History and Physical - Cardiac Surgery   Tucker Carvajal 69 y.o. male MRN: [id number]    Physician Requesting Consult: Karlee    Reason for Consult / Principal Problem: Cardiomyopathy    History of Present Illness: Tucker Carvajal is a 69 y.o. year old male who presents today for initial outpatient consultation for pacemaker lead extraction/BiV ICD upgrade.    He follows with his primary cardiologist, Dr. Desir.  Recent interrogation did identify significant episodes of ventricular tachycardia.  Most recent interrogation identified a normally functioning device.  The QRS was noted to be wide.  In light of this finding, he previously underwent bilateral upper extremity venogram.  This demonstrated patency of all systems.    During review today, he notes progressive exertional dyspnea and fatigability.  He also notes occasional angina.  He denies prior discharging of his indwelling ICD device.      His past medical history is otherwise significant for coronary artery disease sick sinus syndrome, atrial fibrillation, AV block, remote history of ventricular tachycardia, obstructive sleep apnea on CPAP therapy, hypertension, hyperlipidemia, arthritis, GERD, and history of gastric ulcer.      Past Medical History:  Past Medical History:   Diagnosis Date    Abrasion of left foot     LAST ASSESSED: 9/23/13    Achilles tendinitis, unspecified leg     LAST ASSESSED: 11/30/12    Allergic rhinitis     LAST ASSESSED: 11/25/13    Allergic rhinitis 06/25/2008    LAST ASSESSED: 6/25/08    Arthritis     Cervicalgia 04/22/2011    LAST ASSESSED: 4/22/11    Chronic pain disorder     Colon polyp     Coronary artery disease     CPAP (continuous positive airway pressure) dependence     Dysfunction of left eustachian tube     LAST ASSESSED: 9/23/13    Epistaxis     LAST ASSESSED: 5/27/15    First degree atrioventricular block     LAST ASSESSED: 7/10/17    Gastric ulcer 10/07/2011    LAST ASSESSED: 3/9/15    GERD (gastroesophageal reflux  disease)     Hearing aid worn     Hemorrhoids 05/13/2011    LAST ASSESSED: 5/13/11    Hyperlipidemia     Hypertension     Irregular heart beat     Long term use of drug     LAST ASSESSED: 10/11/12    Myalgia 12/21/2007    LAST ASSESSED: 12/21/07    Myositis 12/21/2007    LAST ASSESSED: 12/21/07    Numbness and tingling of foot     LAST ASSESSED: 3/9/15    Pacemaker     Premature ventricular beats     states cardiologist ordered Zio ambulatory cardiac monitor    Sleep apnea     Wears glasses          Past Surgical History:   Past Surgical History:   Procedure Laterality Date    CARDIAC CATHETERIZATION  2017    CARDIAC ELECTROPHYSIOLOGY PROCEDURE N/A 10/25/2021    Procedure: CARDIAC EPS;  Surgeon: Uday Desir MD;  [facility];  Service: Cardiology    CARDIAC ELECTROPHYSIOLOGY PROCEDURE Left 10/25/2021    Procedure: Cardiac icd implant;  Surgeon: Uday Desir MD;  [facility];  Service: Cardiology    CARDIAC PACEMAKER PLACEMENT      CARDIOVERSION N/A 10/25/2021    Procedure: Cardioversion;  Surgeon: Uday Desir MD;  [facility];  Service: Cardiology    CATARACT EXTRACTION Right     CERVICAL FUSION      pt thinks C4-C5    CHOLECYSTECTOMY      LAPAROSCOPIC; LAST ASSESSED: 10/17/17    COLONOSCOPY      COMPLETE; 3-4 YEARS AGO BY [place] ; LAST ASSESSED: 8/18/14    IR UPPER EXTREMITY VENOGRAM- DIAGNOSTIC  1/31/2025    MYRINGOTOMY W/ TUBES      WITH VENTILATING TUBE INSERTION    NC HEMORRHOIDECTOMY INT & XTRNL 2/> COLUMN/GERBER N/A 7/27/2023    Procedure: HEMORRHOIDECTOMY EXCISION;  Surgeon: Clovis Leos MD;  Location: [facility];  Service: Colorectal    NC NEUROPLASTY &/TRANSPOS MEDIAN NRV CARPAL TUNNE Left 07/16/2021    Procedure: RELEASE CARPAL TUNNEL;  Surgeon: Luis Rubio MD;  Location: [facility];  Service: Orthopedics    NC NEUROPLASTY &/TRANSPOS MEDIAN NRV CARPAL TUNNE Right 10/14/2022    Procedure: RELEASE CARPAL TUNNEL;  Surgeon: Luis  MD Ramiro;  Location: [facility];  Service: Orthopedics    NC TENDON SHEATH INCISION Right 10/14/2022    Procedure: RELEASE TRIGGER FINGER,RING AND LONG TENOSYNOVECTOMY FDS +FDP;  Surgeon: Luis Rubio MD;  [facility];  Service: Orthopedics    VASECTOMY           Family History:  Family History   Problem Relation Age of Onset    Cancer Mother         SOFT TISSUE CANCER ON RT SIDE CHEST WALL AND     Prostate cancer Maternal Uncle          Social History:      Social History     Substance and Sexual Activity   Alcohol Use Yes    Alcohol/week: 7.0 standard drinks of alcohol    Types: 7 Glasses of wine per week    Comment: few times a week     Social History     Substance and Sexual Activity   Drug Use No     Social History     Tobacco Use   Smoking Status Former    Current packs/day: 0.00    Types: Cigarettes    Quit date:     Years since quittin.2   Smokeless Tobacco Never       Home Medications:   Prior to Admission medications    Medication Sig Start Date End Date Taking? Authorizing Provider   aspirin 325 mg tablet Take 325 mg by mouth daily    Historical Provider, MD   Coenzyme Q10 (COQ10) 200 MG CAPS Take 1 tablet by mouth daily    Historical Provider, MD   COLLAGEN-CHOND-HYALURONIC ACID PO Take 1 capsule by mouth in the morning    Historical Provider, MD   diphenhydrAMINE (BENADRYL) 50 mg capsule Take 1 capsule (50 mg total) by mouth see administration instructions Patient is to take one capsule around 10 PM the night prior to his venogram and second capsule one hour prior to procedure. You will need a  to take you to and from your procedure. 25   DARIUS Maddox   EPINEPHrine (EpiPen 2-Leroy) 0.3 mg/0.3 mL SOAJ Inject 0.3 mL (0.3 mg total) into a muscle once for 1 dose 22  Shemar Shepard DO   finasteride (PROSCAR) 5 mg tablet Take 1 tablet (5 mg total) by mouth daily 10/30/24   Shemar Shepard DO   fluticasone (VERAMYST) 27.5 MCG/SPRAY nasal spray 2  sprays into each nostril daily    Historical Provider, MD   Glucosamine-Chondroitin (GLUCOSAMINE CHONDR COMPLEX PO) Take 1 tablet by mouth daily    Historical Provider, MD   mefloquine (LARIAM) 250 MG tablet Take 250 mg once weekly starting 2 days prior to travel 2/13/25 2/20/25  Shemar Shepard DO   methylPREDNISolone (MEDROL) 32 MG tablet Take 1 tablet (32 mg total) by mouth see administration instructions You will need to take one tablet at 10 PM the evening prior to your procedure and second tablet will be taken two hours prior to your procedure. 1/20/25   DARIUS Maddox   Omega-3 Fatty Acids (FISH OIL) 1000 MG CPDR Take by mouth daily    Historical Provider, MD   pantoprazole (PROTONIX) 40 mg tablet Take 1 tablet (40 mg total) by mouth daily 4/23/24   Shemar Shepard DO   ranolazine (RANEXA) 500 mg 12 hr tablet TAKE 1 TABLET BY MOUTH TWICE A DAY 9/25/24   Robert Vences MD   rosuvastatin (CRESTOR) 20 MG tablet Take 1 tablet (20 mg total) by mouth daily 1/16/25   DARIUS Jean   rosuvastatin (CRESTOR) 20 MG tablet Take 1 tablet (20 mg total) by mouth daily 1/15/25   DARIUS Phan   sacubitril-valsartan (Entresto) 24-26 MG TABS Take 1 tablet by mouth 2 (two) times a day 3/7/25   Robert Vences MD   sotalol (BETAPACE) 80 mg tablet TAKE 1 TABLET BY MOUTH EVERY 12 HOURS 12/20/24   Robert Vences MD   Turmeric 500 MG CAPS Take by mouth 3 (three) times a week    Historical Provider, MD       Allergies:  Allergies   Allergen Reactions    Amoxicillin Anaphylaxis    Augmentin [Amoxicillin-Pot Clavulanate] Anaphylaxis    Acetaminophen      Rapid heart rate    Cherry Flavor - Food Allergy Other (See Comments)     States allergy is to raw cherries difficulty breathing    Pollen Extract     Dye [Iodinated Contrast Media] Itching       Review of Systems:     Review of Systems   Constitutional:  Positive for activity change and fatigue.   HENT: Negative.     Eyes: Negative.    Respiratory:  Positive for  chest tightness and shortness of breath.    Cardiovascular:  Positive for chest pain. Negative for leg swelling.   Endocrine: Negative.    Genitourinary: Negative.    Musculoskeletal: Negative.    Skin: Negative.    Neurological:  Positive for weakness.   Psychiatric/Behavioral: Negative.         Vital Signs:     Vitals:    03/10/25 0850 03/10/25 0852   BP: 125/58 131/60   BP Location: Right arm Left arm   Patient Position: Sitting Sitting   Cuff Size: Large Large   Pulse: 74    SpO2: 100%    Weight: 98 kg (216 lb)    Height: 6' (1.829 m)        Physical Exam:     Physical Exam  Constitutional:       Appearance: Normal appearance. He is well-developed.   HENT:      Head: Normocephalic and atraumatic.   Eyes:      Conjunctiva/sclera: Conjunctivae normal.   Neck:      Thyroid: No thyromegaly.      Vascular: No carotid bruit or JVD.      Trachea: No tracheal deviation.   Cardiovascular:      Rate and Rhythm: Normal rate and regular rhythm.      Pulses:           Carotid pulses are 2+ on the right side and 2+ on the left side.       Dorsalis pedis pulses are 2+ on the right side and 2+ on the left side.        Posterior tibial pulses are 2+ on the right side and 2+ on the left side.      Heart sounds: S1 normal and S2 normal.   Pulmonary:      Effort: No accessory muscle usage or respiratory distress.      Breath sounds: No wheezing or rales.   Chest:      Chest wall: No tenderness.   Abdominal:      General: Bowel sounds are normal.      Palpations: Abdomen is soft.      Tenderness: There is no abdominal tenderness.   Musculoskeletal:         General: Normal range of motion.      Cervical back: Full passive range of motion without pain and normal range of motion.   Skin:     General: Skin is warm and dry.   Neurological:      Mental Status: He is alert and oriented to person, place, and time.      Cranial Nerves: No cranial nerve deficit.      Sensory: No sensory deficit.   Psychiatric:         Speech: Speech normal.    "      Behavior: Behavior normal.         Lab Results:               Invalid input(s): \"LABGLOM\"      Lab Results   Component Value Date    HGBA1C 5.8 (H) 10/01/2024     Lab Results   Component Value Date    TROPONINI <0.02 10/13/2020       Imaging Studies:     Echocardiogram:       Left Ventricle: Left ventricular cavity size is normal. Wall thickness is normal. The left ventricular ejection fraction is 45% by visual estimation. Systolic function is mildly reduced. There is mild global hypokinesis. Diastolic function is mildly abnormal, consistent with grade I (abnormal) relaxation.    IVS: There is abnormal septal motion consistent with left bundle branch block or right ventricular pacing.    Right Ventricle: Systolic function is normal. Normal tricuspid annular plane systolic excursion (TAPSE) > 1.7 cm.    Left Atrium: The atrium is mildly dilated.    Venogram:     1.  Right upper extremity venography showed the right subclavian and brachiocephalic veins to be patent.  2.  Left upper extremity venography showed the left subclavian and brachiocephalic veins to be patent.  3.  The superior vena cava was patent.    Results Review Statement: I reviewed radiology reports from this admission including: procedure reports and Echocardiogram.    Assessment:  Patient Active Problem List    Diagnosis Date Noted    Coronary artery disease involving native coronary artery of native heart without angina pectoris 01/29/2025    Tremor of right hand 07/08/2024    Gastroesophageal reflux disease 04/23/2024    LENY (obstructive sleep apnea) 01/17/2024    Allergic reaction to penicillin 11/07/2022    Neck pain 06/06/2022    Status post cervical spinal fusion 06/06/2022    Cervical radiculopathy 06/06/2022    Low back pain with sciatica 06/06/2022    Paroxysmal atrial fibrillation (HCC) 05/05/2022    S/P ICD (internal cardiac defibrillator) procedure 10/25/2021    Sick sinus syndrome (HCC) 08/16/2021    CPAP (continuous positive airway " pressure) dependence     Intermittent palpitations 04/27/2021    Trigger thumb of right hand 12/10/2020    Trigger finger, right ring finger 12/10/2020    Carpal tunnel syndrome, bilateral 12/10/2020    Premature ventricular beats 10/12/2020    LENY and COPD overlap syndrome (HCC)     Cataract 08/05/2019    Need for malaria prophylaxis 01/23/2019    Osteoarthritis of fingers of both hands 11/01/2018    Tick bite of lower back 06/25/2018    Benign prostatic hyperplasia with nocturia 04/24/2018    Primary osteoarthritis of left knee 12/28/2017    Coronary artery disease of native artery of native heart with stable angina pectoris (HCC) 07/10/2017    External hemorrhoids 03/11/2016    Environmental and seasonal allergies 05/27/2015    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 03/09/2015    Chronic back pain 03/09/2015    Sleep apnea 01/13/2015    Mixed hyperlipidemia 11/30/2012    Impaired fasting glucose 11/30/2012    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     Indwelling ICD lead; Ongoing work up for ead extraction/VBiV ICD updtrade    Plan:  Risks and benefits of lead extraction were discussed in detail today with the patient.  They understand and wish to proceed with further workup and ultimately surgical intervention.  We have ordered routine preoperative laboratory and vascular studies.  Pending the results of these tests, they will be scheduled for surgery  with KATARINA Jaffe.[email or web address]. and Dr. Dennis.    As he has a history of contrast allergy, we will prophylax him with Benadryl and Pepcid.  Prednisone was also to be ordered but the patient refuses secondary to sensitivity.  The decision was made in our office that should he require contrast, IV steroid could be administered at the time of surgery.    Tucker Carvajal was comfortable with our recommendations, and their questions were answered to their satisfaction.  Thank you for allowing us to participate in the care of this patient.     The patient recently had a screening colonoscopy in 2024.   Therefore GI referral is not indicated at this time.     SIGNATURE: Joseph Vazquez PA-C  DATE: 03/10/25  TIME: 9:18 AM    * This note was completed in part utilizing [employer] direct voice recognition software.   Grammatical errors, random word insertion, spelling mistakes, and incomplete sentences may be an occasional consequence of the system secondary to software limitations, ambient noise and hardware issues. At the time of dictation, efforts were made to edit, clarify and /or correct errors. Please read the chart carefully and recognize, using context, where substitutions have occurred.  If you have any questions or concerns about the context, text or information contained within the body of this dictation, please contact myself, the provider, for further clarification.

## 2025-03-10 NOTE — PROGRESS NOTES
Consultation - Cardiac Surgery   Tucker Carvajal 69 y.o. male MRN: [id number]    Physician Requesting Consult: Karlee    Reason for Consult / Principal Problem: Cardiomyopathy    History of Present Illness: Tucker Carvajal is a 69 y.o. year old male who presents today for initial outpatient consultation for pacemaker lead extraction/BiV ICD upgrade.    He follows with his primary cardiologist, Dr. Desir.  Recent interrogation did identify significant episodes of ventricular tachycardia.  Most recent interrogation identified a normally functioning device.  The QRS was noted to be wide.  In light of this finding, he previously underwent bilateral upper extremity venogram.  This demonstrated patency of all systems.    During review today, he notes progressive exertional dyspnea and fatigability.  He also notes occasional angina.  He denies prior discharging of his indwelling ICD device.      His past medical history is otherwise significant for coronary artery disease sick sinus syndrome, atrial fibrillation, AV block, remote history of ventricular tachycardia, obstructive sleep apnea on CPAP therapy, hypertension, hyperlipidemia, arthritis, GERD, and history of gastric ulcer.      Past Medical History:  Past Medical History:   Diagnosis Date    Abrasion of left foot     LAST ASSESSED: 9/23/13    Achilles tendinitis, unspecified leg     LAST ASSESSED: 11/30/12    Allergic rhinitis     LAST ASSESSED: 11/25/13    Allergic rhinitis 06/25/2008    LAST ASSESSED: 6/25/08    Arthritis     Cervicalgia 04/22/2011    LAST ASSESSED: 4/22/11    Chronic pain disorder     Colon polyp     Coronary artery disease     CPAP (continuous positive airway pressure) dependence     Dysfunction of left eustachian tube     LAST ASSESSED: 9/23/13    Epistaxis     LAST ASSESSED: 5/27/15    First degree atrioventricular block     LAST ASSESSED: 7/10/17    Gastric ulcer 10/07/2011    LAST ASSESSED: 3/9/15    GERD (gastroesophageal reflux disease)      Hearing aid worn     Hemorrhoids 05/13/2011    LAST ASSESSED: 5/13/11    Hyperlipidemia     Hypertension     Irregular heart beat     Long term use of drug     LAST ASSESSED: 10/11/12    Myalgia 12/21/2007    LAST ASSESSED: 12/21/07    Myositis 12/21/2007    LAST ASSESSED: 12/21/07    Numbness and tingling of foot     LAST ASSESSED: 3/9/15    Pacemaker     Premature ventricular beats     states cardiologist ordered Zio ambulatory cardiac monitor    Sleep apnea     Wears glasses          Past Surgical History:   Past Surgical History:   Procedure Laterality Date    CARDIAC CATHETERIZATION  2017    CARDIAC ELECTROPHYSIOLOGY PROCEDURE N/A 10/25/2021    Procedure: CARDIAC EPS;  Surgeon: Uday Desir MD;  [facility];  Service: Cardiology    CARDIAC ELECTROPHYSIOLOGY PROCEDURE Left 10/25/2021    Procedure: Cardiac icd implant;  Surgeon: Uday Desir MD;  [facility];  Service: Cardiology    CARDIAC PACEMAKER PLACEMENT      CARDIOVERSION N/A 10/25/2021    Procedure: Cardioversion;  Surgeon: Uday Desir MD;  [facility]: [facility];  Service: Cardiology    CATARACT EXTRACTION Right     CERVICAL FUSION      pt thinks C4-C5    CHOLECYSTECTOMY      LAPAROSCOPIC; LAST ASSESSED: 10/17/17    COLONOSCOPY      COMPLETE; 3-4 YEARS AGO BY [place] ; LAST ASSESSED: 8/18/14    IR UPPER EXTREMITY VENOGRAM- DIAGNOSTIC  1/31/2025    MYRINGOTOMY W/ TUBES      WITH VENTILATING TUBE INSERTION    OH HEMORRHOIDECTOMY INT & XTRNL 2/> COLUMN/GERBER N/A 7/27/2023    Procedure: HEMORRHOIDECTOMY EXCISION;  Surgeon: Clovis Leos MD;  Location: [facility];  Service: Colorectal    OH NEUROPLASTY &/TRANSPOS MEDIAN NRV CARPAL TUNNE Left 07/16/2021    Procedure: RELEASE CARPAL TUNNEL;  Surgeon: Luis Rubio MD;  Location: [facility];  Service: Orthopedics    OH NEUROPLASTY &/TRANSPOS MEDIAN NRV CARPAL TUNNE Right 10/14/2022    Procedure: RELEASE CARPAL TUNNEL;  Surgeon: Luis Rubio MD;   [facility];  Service: Orthopedics    MO TENDON SHEATH INCISION Right 10/14/2022    Procedure: RELEASE TRIGGER FINGER,RING AND LONG TENOSYNOVECTOMY FDS +FDP;  Surgeon: Luis Rubio MD;  [facility];  Service: Orthopedics    VASECTOMY           Family History:  Family History   Problem Relation Age of Onset    Cancer Mother         SOFT TISSUE CANCER ON RT SIDE CHEST WALL AND     Prostate cancer Maternal Uncle          Social History:      Social History     Substance and Sexual Activity   Alcohol Use Yes    Alcohol/week: 7.0 standard drinks of alcohol    Types: 7 Glasses of wine per week    Comment: few times a week     Social History     Substance and Sexual Activity   Drug Use No     Social History     Tobacco Use   Smoking Status Former    Current packs/day: 0.00    Types: Cigarettes    Quit date:     Years since quittin.2   Smokeless Tobacco Never       Home Medications:   Prior to Admission medications    Medication Sig Start Date End Date Taking? Authorizing Provider   aspirin 325 mg tablet Take 325 mg by mouth daily    Historical Provider, MD   Coenzyme Q10 (COQ10) 200 MG CAPS Take 1 tablet by mouth daily    Historical Provider, MD   COLLAGEN-CHOND-HYALURONIC ACID PO Take 1 capsule by mouth in the morning    Historical Provider, MD   diphenhydrAMINE (BENADRYL) 50 mg capsule Take 1 capsule (50 mg total) by mouth see administration instructions Patient is to take one capsule around 10 PM the night prior to his venogram and second capsule one hour prior to procedure. You will need a  to take you to and from your procedure. 25   DARIUS Maddox   EPINEPHrine (EpiPen 2-Leroy) 0.3 mg/0.3 mL SOAJ Inject 0.3 mL (0.3 mg total) into a muscle once for 1 dose 22  Shemar Shepard DO   finasteride (PROSCAR) 5 mg tablet Take 1 tablet (5 mg total) by mouth daily 10/30/24   Shemar Shepard DO   fluticasone (VERAMYST) 27.5 MCG/SPRAY nasal spray 2 sprays into each  nostril daily    Historical Provider, MD   Glucosamine-Chondroitin (GLUCOSAMINE CHONDR COMPLEX PO) Take 1 tablet by mouth daily    Historical Provider, MD   mefloquine (LARIAM) 250 MG tablet Take 250 mg once weekly starting 2 days prior to travel 2/13/25 2/20/25  Shemar Shepard DO   methylPREDNISolone (MEDROL) 32 MG tablet Take 1 tablet (32 mg total) by mouth see administration instructions You will need to take one tablet at 10 PM the evening prior to your procedure and second tablet will be taken two hours prior to your procedure. 1/20/25   DARIUS Maddox   Omega-3 Fatty Acids (FISH OIL) 1000 MG CPDR Take by mouth daily    Historical Provider, MD   pantoprazole (PROTONIX) 40 mg tablet Take 1 tablet (40 mg total) by mouth daily 4/23/24   Shemar Shepard DO   ranolazine (RANEXA) 500 mg 12 hr tablet TAKE 1 TABLET BY MOUTH TWICE A DAY 9/25/24   Robert Vences MD   rosuvastatin (CRESTOR) 20 MG tablet Take 1 tablet (20 mg total) by mouth daily 1/16/25   DARIUS Jean   rosuvastatin (CRESTOR) 20 MG tablet Take 1 tablet (20 mg total) by mouth daily 1/15/25   DARIUS Phan   sacubitril-valsartan (Entresto) 24-26 MG TABS Take 1 tablet by mouth 2 (two) times a day 3/7/25   Robert Vences MD   sotalol (BETAPACE) 80 mg tablet TAKE 1 TABLET BY MOUTH EVERY 12 HOURS 12/20/24   Robert Vences MD   Turmeric 500 MG CAPS Take by mouth 3 (three) times a week    Historical Provider, MD       Allergies:  Allergies   Allergen Reactions    Amoxicillin Anaphylaxis    Augmentin [Amoxicillin-Pot Clavulanate] Anaphylaxis    Acetaminophen      Rapid heart rate    Cherry Flavor - Food Allergy Other (See Comments)     States allergy is to raw cherries difficulty breathing    Pollen Extract     Dye [Iodinated Contrast Media] Itching       Review of Systems:     Review of Systems   Constitutional:  Positive for activity change and fatigue.   HENT: Negative.     Eyes: Negative.    Respiratory:  Positive for chest tightness  and shortness of breath.    Cardiovascular:  Positive for chest pain. Negative for leg swelling.   Endocrine: Negative.    Genitourinary: Negative.    Musculoskeletal: Negative.    Skin: Negative.    Neurological:  Positive for weakness.   Psychiatric/Behavioral: Negative.         Vital Signs:     Vitals:    03/10/25 0850 03/10/25 0852   BP: 125/58 131/60   BP Location: Right arm Left arm   Patient Position: Sitting Sitting   Cuff Size: Large Large   Pulse: 74    SpO2: 100%    Weight: 98 kg (216 lb)    Height: 6' (1.829 m)        Physical Exam:     Physical Exam  Constitutional:       Appearance: Normal appearance. He is well-developed.   HENT:      Head: Normocephalic and atraumatic.   Eyes:      Conjunctiva/sclera: Conjunctivae normal.   Neck:      Thyroid: No thyromegaly.      Vascular: No carotid bruit or JVD.      Trachea: No tracheal deviation.   Cardiovascular:      Rate and Rhythm: Normal rate and regular rhythm.      Pulses:           Carotid pulses are 2+ on the right side and 2+ on the left side.       Dorsalis pedis pulses are 2+ on the right side and 2+ on the left side.        Posterior tibial pulses are 2+ on the right side and 2+ on the left side.      Heart sounds: S1 normal and S2 normal.   Pulmonary:      Effort: No accessory muscle usage or respiratory distress.      Breath sounds: No wheezing or rales.   Chest:      Chest wall: No tenderness.   Abdominal:      General: Bowel sounds are normal.      Palpations: Abdomen is soft.      Tenderness: There is no abdominal tenderness.   Musculoskeletal:         General: Normal range of motion.      Cervical back: Full passive range of motion without pain and normal range of motion.   Skin:     General: Skin is warm and dry.   Neurological:      Mental Status: He is alert and oriented to person, place, and time.      Cranial Nerves: No cranial nerve deficit.      Sensory: No sensory deficit.   Psychiatric:         Speech: Speech normal.         Behavior:  "Behavior normal.         Lab Results:               Invalid input(s): \"LABGLOM\"      Lab Results   Component Value Date    HGBA1C 5.8 (H) 10/01/2024     Lab Results   Component Value Date    TROPONINI <0.02 10/13/2020       Imaging Studies:     Echocardiogram:       Left Ventricle: Left ventricular cavity size is normal. Wall thickness is normal. The left ventricular ejection fraction is 45% by visual estimation. Systolic function is mildly reduced. There is mild global hypokinesis. Diastolic function is mildly abnormal, consistent with grade I (abnormal) relaxation.    IVS: There is abnormal septal motion consistent with left bundle branch block or right ventricular pacing.    Right Ventricle: Systolic function is normal. Normal tricuspid annular plane systolic excursion (TAPSE) > 1.7 cm.    Left Atrium: The atrium is mildly dilated.    Venogram:     1.  Right upper extremity venography showed the right subclavian and brachiocephalic veins to be patent.  2.  Left upper extremity venography showed the left subclavian and brachiocephalic veins to be patent.  3.  The superior vena cava was patent.    Results Review Statement: I reviewed radiology reports from this admission including: procedure reports and Echocardiogram.    Assessment:  Patient Active Problem List    Diagnosis Date Noted    Coronary artery disease involving native coronary artery of native heart without angina pectoris 01/29/2025    Tremor of right hand 07/08/2024    Gastroesophageal reflux disease 04/23/2024    LENY (obstructive sleep apnea) 01/17/2024    Allergic reaction to penicillin 11/07/2022    Neck pain 06/06/2022    Status post cervical spinal fusion 06/06/2022    Cervical radiculopathy 06/06/2022    Low back pain with sciatica 06/06/2022    Paroxysmal atrial fibrillation (HCC) 05/05/2022    S/P ICD (internal cardiac defibrillator) procedure 10/25/2021    Sick sinus syndrome (HCC) 08/16/2021    CPAP (continuous positive airway pressure) " dependence     Intermittent palpitations 04/27/2021    Trigger thumb of right hand 12/10/2020    Trigger finger, right ring finger 12/10/2020    Carpal tunnel syndrome, bilateral 12/10/2020    Premature ventricular beats 10/12/2020    LENY and COPD overlap syndrome (HCC)     Cataract 08/05/2019    Need for malaria prophylaxis 01/23/2019    Osteoarthritis of fingers of both hands 11/01/2018    Tick bite of lower back 06/25/2018    Benign prostatic hyperplasia with nocturia 04/24/2018    Primary osteoarthritis of left knee 12/28/2017    Coronary artery disease of native artery of native heart with stable angina pectoris (HCC) 07/10/2017    External hemorrhoids 03/11/2016    Environmental and seasonal allergies 05/27/2015    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 03/09/2015    Chronic back pain 03/09/2015    Sleep apnea 01/13/2015    Mixed hyperlipidemia 11/30/2012    Impaired fasting glucose 11/30/2012    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     Indwelling ICD lead; Ongoing work up for ead extraction/VBiV ICD updtrade    Plan:  Risks and benefits of lead extraction were discussed in detail today with the patient.  They understand and wish to proceed with further workup and ultimately surgical intervention.  We have ordered routine preoperative laboratory and vascular studies.  Pending the results of these tests, they will be scheduled for surgery  with KATARINA Jaffe.[email or web address]. and Dr. Dennis.    As he has a history of contrast allergy, we will prophylax him with Benadryl and Pepcid.  Prednisone was also to be ordered but the patient refuses secondary to sensitivity.  The decision was made in our office that should he require contrast, IV steroid could be administered at the time of surgery.    Tucker Carvajal was comfortable with our recommendations, and their questions were answered to their satisfaction.  Thank you for allowing us to participate in the care of this patient.     The patient recently had a screening colonoscopy in 2024.  Therefore  GI referral is not indicated at this time.     SIGNATURE: Joseph Vazquez PA-C  DATE: 03/10/25  TIME: 9:18 AM    * This note was completed in part utilizing P21 direct voice recognition software.   Grammatical errors, random word insertion, spelling mistakes, and incomplete sentences may be an occasional consequence of the system secondary to software limitations, ambient noise and hardware issues. At the time of dictation, efforts were made to edit, clarify and /or correct errors. Please read the chart carefully and recognize, using context, where substitutions have occurred.  If you have any questions or concerns about the context, text or information contained within the body of this dictation, please contact myself, the provider, for further clarification.

## 2025-03-11 LAB
ATRIAL RATE: 65 BPM
P AXIS: 11 DEGREES
PR INTERVAL: 182 MS
QRS AXIS: -33 DEGREES
QRSD INTERVAL: 180 MS
QT INTERVAL: 468 MS
QTC INTERVAL: 486 MS
T WAVE AXIS: 68 DEGREES
VENTRICULAR RATE: 65 BPM

## 2025-03-11 PROCEDURE — 93010 ELECTROCARDIOGRAM REPORT: CPT | Performed by: INTERNAL MEDICINE

## 2025-03-18 ENCOUNTER — PREP FOR PROCEDURE (OUTPATIENT)
Dept: CARDIAC SURGERY | Facility: CLINIC | Age: 70
End: 2025-03-18

## 2025-03-21 ENCOUNTER — HOSPITAL ENCOUNTER (OUTPATIENT)
Facility: HOSPITAL | Age: 70
Setting detail: OUTPATIENT SURGERY
Discharge: HOME | End: 2025-03-21
Attending: STUDENT IN AN ORGANIZED HEALTH CARE EDUCATION/TRAINING PROGRAM
Payer: MEDICARE

## 2025-03-21 ENCOUNTER — APPOINTMENT (OUTPATIENT)
Dept: NON INVASIVE DIAGNOSTICS | Facility: HOSPITAL | Age: 70
End: 2025-03-21
Payer: MEDICARE

## 2025-03-21 ENCOUNTER — ANESTHESIA EVENT (OUTPATIENT)
Dept: PERIOP | Facility: HOSPITAL | Age: 70
End: 2025-03-21
Payer: MEDICARE

## 2025-03-21 ENCOUNTER — APPOINTMENT (OUTPATIENT)
Dept: RADIOLOGY | Facility: HOSPITAL | Age: 70
End: 2025-03-21
Payer: MEDICARE

## 2025-03-21 ENCOUNTER — ANESTHESIA (OUTPATIENT)
Dept: PERIOP | Facility: HOSPITAL | Age: 70
End: 2025-03-21
Payer: MEDICARE

## 2025-03-21 VITALS
OXYGEN SATURATION: 95 % | DIASTOLIC BLOOD PRESSURE: 63 MMHG | RESPIRATION RATE: 17 BRPM | HEIGHT: 72 IN | SYSTOLIC BLOOD PRESSURE: 134 MMHG | TEMPERATURE: 96.3 F | HEART RATE: 63 BPM | WEIGHT: 217.93 LBS | BODY MASS INDEX: 29.52 KG/M2

## 2025-03-21 DIAGNOSIS — Z95.0 PACEMAKER: ICD-10-CM

## 2025-03-21 DIAGNOSIS — I48.0 PAROXYSMAL ATRIAL FIBRILLATION (HCC): ICD-10-CM

## 2025-03-21 LAB
ABO GROUP BLD: NORMAL
ATRIAL RATE: 56 BPM
QRS AXIS: 68 DEGREES
QRSD INTERVAL: 108 MS
QT INTERVAL: 410 MS
QTC INTERVAL: 429 MS
RH BLD: POSITIVE
T WAVE AXIS: -48 DEGREES
VENTRICULAR RATE: 66 BPM

## 2025-03-21 PROCEDURE — C1730 CATH, EP, 19 OR FEW ELECT: HCPCS | Performed by: INTERNAL MEDICINE

## 2025-03-21 PROCEDURE — C1900 LEAD, CORONARY VENOUS: HCPCS | Performed by: INTERNAL MEDICINE

## 2025-03-21 PROCEDURE — C1769 GUIDE WIRE: HCPCS | Performed by: INTERNAL MEDICINE

## 2025-03-21 PROCEDURE — 93005 ELECTROCARDIOGRAM TRACING: CPT

## 2025-03-21 PROCEDURE — C1882 AICD, OTHER THAN SING/DUAL: HCPCS | Performed by: INTERNAL MEDICINE

## 2025-03-21 PROCEDURE — C1892 INTRO/SHEATH,FIXED,PEEL-AWAY: HCPCS | Performed by: INTERNAL MEDICINE

## 2025-03-21 PROCEDURE — C1887 CATHETER, GUIDING: HCPCS | Performed by: INTERNAL MEDICINE

## 2025-03-21 PROCEDURE — 93010 ELECTROCARDIOGRAM REPORT: CPT | Performed by: INTERNAL MEDICINE

## 2025-03-21 PROCEDURE — NC001 PR NO CHARGE: Performed by: INTERNAL MEDICINE

## 2025-03-21 PROCEDURE — 33264 RMVL & RPLCMT DFB GEN MLT LD: CPT | Performed by: INTERNAL MEDICINE

## 2025-03-21 PROCEDURE — 71045 X-RAY EXAM CHEST 1 VIEW: CPT

## 2025-03-21 PROCEDURE — 86923 COMPATIBILITY TEST ELECTRIC: CPT

## 2025-03-21 PROCEDURE — 33225 L VENTRIC PACING LEAD ADD-ON: CPT | Performed by: INTERNAL MEDICINE

## 2025-03-21 DEVICE — ENVELOPE CMRM6133 ABSORB LRG MR
Type: IMPLANTABLE DEVICE | Site: CHEST | Status: FUNCTIONAL
Brand: TYRX™

## 2025-03-21 DEVICE — CRTD DTPA2QQ COBALT XT HF QUAD MRI DF4
Type: IMPLANTABLE DEVICE | Site: CHEST | Status: FUNCTIONAL
Brand: COBALT™ XT HF QUAD CRT-D MRI SURESCAN™

## 2025-03-21 DEVICE — LEAD 479888 ATTAIN MRI US
Type: IMPLANTABLE DEVICE | Site: CHEST | Status: FUNCTIONAL
Brand: ATTAIN STABILITY™ QUAD MRI SURESCAN™

## 2025-03-21 RX ORDER — PROPOFOL 10 MG/ML
INJECTION, EMULSION INTRAVENOUS CONTINUOUS PRN
Status: DISCONTINUED | OUTPATIENT
Start: 2025-03-21 | End: 2025-03-21

## 2025-03-21 RX ORDER — CIPROFLOXACIN 2 MG/ML
400 INJECTION, SOLUTION INTRAVENOUS ONCE
Status: DISCONTINUED | OUTPATIENT
Start: 2025-03-21 | End: 2025-03-21 | Stop reason: HOSPADM

## 2025-03-21 RX ORDER — VANCOMYCIN HYDROCHLORIDE 1 G/200ML
1000 INJECTION, SOLUTION INTRAVENOUS ONCE
Status: COMPLETED | OUTPATIENT
Start: 2025-03-21 | End: 2025-03-21

## 2025-03-21 RX ORDER — HYDROMORPHONE HCL/PF 1 MG/ML
0.5 SYRINGE (ML) INJECTION
Status: DISCONTINUED | OUTPATIENT
Start: 2025-03-21 | End: 2025-03-21 | Stop reason: HOSPADM

## 2025-03-21 RX ORDER — MAGNESIUM HYDROXIDE 1200 MG/15ML
LIQUID ORAL AS NEEDED
Status: DISCONTINUED | OUTPATIENT
Start: 2025-03-21 | End: 2025-03-21 | Stop reason: HOSPADM

## 2025-03-21 RX ORDER — CETIRIZINE HYDROCHLORIDE 10 MG/1
10 TABLET ORAL DAILY
COMMUNITY

## 2025-03-21 RX ORDER — HEPARIN SODIUM 200 [USP'U]/100ML
INJECTION, SOLUTION INTRAVENOUS
Status: COMPLETED | OUTPATIENT
Start: 2025-03-21 | End: 2025-03-21

## 2025-03-21 RX ORDER — FENTANYL CITRATE/PF 50 MCG/ML
50 SYRINGE (ML) INJECTION
Status: DISCONTINUED | OUTPATIENT
Start: 2025-03-21 | End: 2025-03-21 | Stop reason: HOSPADM

## 2025-03-21 RX ORDER — FENTANYL CITRATE 50 UG/ML
INJECTION, SOLUTION INTRAMUSCULAR; INTRAVENOUS AS NEEDED
Status: DISCONTINUED | OUTPATIENT
Start: 2025-03-21 | End: 2025-03-21

## 2025-03-21 RX ORDER — LIDOCAINE HYDROCHLORIDE AND EPINEPHRINE 10; 10 MG/ML; UG/ML
INJECTION, SOLUTION INFILTRATION; PERINEURAL AS NEEDED
Status: DISCONTINUED | OUTPATIENT
Start: 2025-03-21 | End: 2025-03-21 | Stop reason: HOSPADM

## 2025-03-21 RX ORDER — PROMETHAZINE HYDROCHLORIDE 25 MG/ML
12.5 INJECTION, SOLUTION INTRAMUSCULAR; INTRAVENOUS ONCE AS NEEDED
Status: DISCONTINUED | OUTPATIENT
Start: 2025-03-21 | End: 2025-03-21 | Stop reason: HOSPADM

## 2025-03-21 RX ORDER — MIDAZOLAM HYDROCHLORIDE 2 MG/2ML
INJECTION, SOLUTION INTRAMUSCULAR; INTRAVENOUS AS NEEDED
Status: DISCONTINUED | OUTPATIENT
Start: 2025-03-21 | End: 2025-03-21

## 2025-03-21 RX ORDER — LIDOCAINE HYDROCHLORIDE 10 MG/ML
INJECTION, SOLUTION EPIDURAL; INFILTRATION; INTRACAUDAL; PERINEURAL AS NEEDED
Status: DISCONTINUED | OUTPATIENT
Start: 2025-03-21 | End: 2025-03-21

## 2025-03-21 RX ORDER — DEXAMETHASONE SODIUM PHOSPHATE 10 MG/ML
INJECTION, SOLUTION INTRAMUSCULAR; INTRAVENOUS AS NEEDED
Status: DISCONTINUED | OUTPATIENT
Start: 2025-03-21 | End: 2025-03-21

## 2025-03-21 RX ORDER — LIDOCAINE HYDROCHLORIDE 10 MG/ML
INJECTION, SOLUTION EPIDURAL; INFILTRATION; INTRACAUDAL; PERINEURAL AS NEEDED
Status: DISCONTINUED | OUTPATIENT
Start: 2025-03-21 | End: 2025-03-21 | Stop reason: HOSPADM

## 2025-03-21 RX ORDER — ONDANSETRON 2 MG/ML
4 INJECTION INTRAMUSCULAR; INTRAVENOUS ONCE AS NEEDED
Status: DISCONTINUED | OUTPATIENT
Start: 2025-03-21 | End: 2025-03-21 | Stop reason: HOSPADM

## 2025-03-21 RX ORDER — SODIUM CHLORIDE, SODIUM LACTATE, POTASSIUM CHLORIDE, CALCIUM CHLORIDE 600; 310; 30; 20 MG/100ML; MG/100ML; MG/100ML; MG/100ML
INJECTION, SOLUTION INTRAVENOUS CONTINUOUS PRN
Status: DISCONTINUED | OUTPATIENT
Start: 2025-03-21 | End: 2025-03-21

## 2025-03-21 RX ADMIN — DEXAMETHASONE SODIUM PHOSPHATE 10 MG: 10 INJECTION INTRAMUSCULAR; INTRAVENOUS at 10:27

## 2025-03-21 RX ADMIN — NOREPINEPHRINE BITARTRATE 2 MCG/MIN: 1 INJECTION, SOLUTION, CONCENTRATE INTRAVENOUS at 10:25

## 2025-03-21 RX ADMIN — MIDAZOLAM 1 MG: 1 INJECTION INTRAMUSCULAR; INTRAVENOUS at 10:21

## 2025-03-21 RX ADMIN — SODIUM CHLORIDE, SODIUM LACTATE, POTASSIUM CHLORIDE, AND CALCIUM CHLORIDE: .6; .31; .03; .02 INJECTION, SOLUTION INTRAVENOUS at 10:15

## 2025-03-21 RX ADMIN — MIDAZOLAM 1 MG: 1 INJECTION INTRAMUSCULAR; INTRAVENOUS at 10:17

## 2025-03-21 RX ADMIN — FENTANYL CITRATE 50 MCG: 50 INJECTION INTRAMUSCULAR; INTRAVENOUS at 10:27

## 2025-03-21 RX ADMIN — MUPIROCIN 1 APPLICATION: 20 OINTMENT TOPICAL at 08:20

## 2025-03-21 RX ADMIN — LIDOCAINE HYDROCHLORIDE 30 MG: 10 INJECTION, SOLUTION EPIDURAL; INFILTRATION; INTRACAUDAL; PERINEURAL at 10:25

## 2025-03-21 RX ADMIN — PROPOFOL 80 MCG/KG/MIN: 10 INJECTION, EMULSION INTRAVENOUS at 10:27

## 2025-03-21 RX ADMIN — VANCOMYCIN HYDROCHLORIDE 1000 MG: 1 INJECTION, SOLUTION INTRAVENOUS at 08:50

## 2025-03-21 NOTE — DISCHARGE INSTR - AVS FIRST PAGE
Please restart aspirin Sunday, 3/23.    Please refer to post device implantation discharge instructions and restrictions below and your device booklet/temporary card.     Keep incision dry for one week. Leave outer bandage in place for 1 week - it is water proof, and as long as it is fully adhered to your skin you may shower with it.  If it appears as though the bandage is coming off and/or there is any communication to the area of device incision, please then keep the whole area dry for the remaining week.  After 1 week, please remove by pulling all edges away from the center of the bandage. After the bandage is removed, you may then shower normally and get the area wet with soap and water, no scrubbing, and pat dry. Do not use lotions/powders/creams on incision.    No overhead reaching/pushing/pulling/lifting greater than 5-10lbs with left arm for six weeks. Please call the office if you notice redness, swelling, bleeding, or drainage from incision or if you develop fevers    Cardiac Resynchronization Therapy (CRT)    If you have any questions, please call [phone] to speak with a nurse (8:30am-4pm, or [phone] after hours). For appointments, please call [phone].    WHAT YOU SHOULD KNOW:    Your device is a biventricular ICD, which delivers resynchronization therapy and is also a defibrillator (see below). Cardiac resynchronization therapy (CRT) is a procedure used to treat problems with how your heart contracts. CRT is also called biventricular pacing. Your heart has 2 upper chambers, called atria, and 2 lower chambers, called ventricles. Your heartbeat is synchronized when all areas of your heart contract together properly. When the areas of your heart do not contract as they should, your heart cannot pump enough blood and oxygen to your body. You may have trouble breathing, tire easily, and have swelling in your legs and feet. With a biventricular device, there is one wire in the right atria (in  most cases), one wire in the right ventricle, and a third wire that goes through a vein and wraps around to your left ventricle. The two ventricular wires work together to help your heart contract more synchronously and efficiently.               AFTER YOU LEAVE:      Medicines:   Heart medicine may be given to help your heart beat strongly and regularly. Ask your healthcare provider for more information about heart medicines.    Take your medicine as directed. Contact your healthcare provider if you think your medicine is not helping or if you have side effects. Tell him if you are allergic to any medicine. Keep a list of the medicines, vitamins, and herbs you take. Include the amounts, and when and why you take them. Bring the list or the pill bottles to follow-up visits. Carry your medicine list with you in case of an emergency.    Driving: you are ok to drive 48 hours after device is implanted     Follow up with your healthcare provider as directed: You will need to return to have your pacemaker checked. You may also need an EKG, echocardiogram, or chest x-ray to check the leads in your heart, and your doctor will order these tests if necessary. Write down your questions so you remember to ask them during your visits.    Device safety: Some electrical devices may interfere with how your pacemaker works. Some examples are cell phones, security systems, power cables, and electric monitors. Ask your healthcare provider how long you can be near these devices, and which devices to avoid. Tell caregivers you have a pacemaker before you have a procedure or surgery.    Wound care: Care for your wound as directed. Keep incision dry for one week. Do not use lotions/powders/creams on incision. Leave outer bandage in place for 1 week - it is water proof, and as long as it is fully adhered to your skin you may shower with it.  If it appears as though the bandage is coming off and/or there is any communication to the area of  device incision, please then keep the whole area dry for the remaining week.  After 1 week, please remove by pulling all edges away from the center of the bandage. No overhead reaching/pushing/pulling/lifting greater than 5-10lbs with left arm for 6 weeks. Please call the office if you notice redness, swelling, bleeding, or drainage from incision or if you develop fevers      Contact your healthcare provider if:   You have new or increased swelling in your legs or feet.   You feel more tired than usual.   You have trouble breathing during activity.    Your wound is red, warm, swollen, or draining pus.   You have a fever.   You have questions or concerns about your condition or care.    Seek care immediately or call 911 if:   You feel like your heart is fluttering or jumping.   You have any of the following signs of a heart attack:    Squeezing, pressure, fullness, or pain in your chest that lasts longer than a few minutes or returns   Discomfort or pain in your back, neck, jaw, stomach, or arm   Shortness of breath or breathing problems   A sudden cold sweat, lightheadedness, dizziness, or nausea, especially with chest pain or trouble breathing      Implantable Cardioverter Defibrillator (ICD)    If you have any questions, please call [phone] to speak with a nurse (8:30am-4pm, or [phone] after hours). For appointments, please call [phone].    WHAT YOU SHOULD KNOW:    Your device is a biventricular ICD, which delivers resynchronization therapy (see above) and is also a defibrillator. An implantable cardioverter defibrillator (ICD) is a small device that monitors your heart rate and rhythm. It is commonly placed inside your upper chest region. It may be used if you have a ventricular arrhythmia, which is an irregular, dangerous rhythm from the bottom chamber of your heart. Some arrhythmias may cause your heart to suddenly stop beating. An ICD can give a shock to your heart to make it start beating  again, or it can give pacing therapy (also known as pain-free therapy) to return your heart to normal rhythm. It is also a pacemaker, so it will pace your heart if needed to prevent it from beating too slowly.           AFTER YOU LEAVE:      Follow up with your primary healthcare provider or cardiologist as directed: You will need to follow-up to have your ICD checked and make sure you are not having problems. Write down your questions so you remember to ask them during your visits.    Self-care:   Ask about activity: Ask if you need to avoid moving your shoulder or arm, and for how long. Ask if you should avoid lifting heavy objects. Do not play any contact sports, such as football or wrestling, until your primary healthcare provider (PHP) or cardiologist tells you it is okay. You may only be able to drive for a certain amount of time per day, or during certain hours. Ask when you can return to work.   Care for your skin over the ICD: see answer in instructions above     When you get a shock from your ICD: A shock may feel like someone has hit you, or you may feel a thump in the chest. If someone is touching you when you get a shock, they may feel a tingling feeling. The first time you feel a shock, it may scare you. Sit or lie down and stay calm. Ask someone to stay with you if possible. Please either call your cardiologist or report to an emergency room.    Safety instructions when you have an ICD:   Carry an ID card for the ICD: This card has important information about your ICD.    Stay away from magnets or machines with electric fields: This includes MRI machines. Avoid leaning into a car engine or doing welding. These things can interfere with how your ICD works.    Tell airport security you have an ICD: You may need to be searched by hand when you go through a security gate. The security gate or handheld wand could harm your ICD.    Keep an ICD diary: Record when you get a shock and what you were doing  before you got the shock. Keep track of how you felt before and after the shock, as well as how many shocks you received. Write down the day and time of each shock. Bring the diary with you when you see your PHP or cardiologist.   Carry medical alert identification: Wear medical alert jewelry or carry a card that says you have an ICD. Ask your PHP or cardiologist where to get these items.    Contact your primary healthcare provider or cardiologist if:   You have a fever.    You feel 1 or more shocks from your ICD and feel fine afterwards.   Your feet or ankles swell.   The area around your ICD is painful or tender after surgery.   The skin around your stitches or staples is red, swollen, or draining pus or fluid.    You have chills, a cough, and feel weak or achy.    You are sad or anxious and find it hard to do your usual activities.   You have questions or concerns about your condition or care.    Seek care immediately or call 911 if:   Your stitches or staples come apart.   Blood soaks through your bandage.   You feel more than 3 shocks in a row from your ICD.   You become weak, dizzy, or faint.   You feel your heart skip beats or beat very fast or slow, but you do not feel a shock from your ICD.   You have chest pain that does not go away with rest or medicine.        © 2014 [employer]. Information is for End User's use only and may not be sold, redistributed or otherwise used for commercial purposes. All illustrations and images included in CareNotes® are the copyrighted property of [employer] or [employer].  The above information is an  only. It is not intended as medical advice for individual conditions or treatments. Talk to your doctor, nurse or pharmacist before following any medical regimen to see if it is safe and effective for you.

## 2025-03-21 NOTE — H&P
Please see 3/10/25  note, no changes , patient seen and examined by  me. Consent obtained. Venogram shows patent cephalic vein to subclavian will use this to add LV lead and likely won't need to laser extract.  Recommend conscious sedation , no MICHAEL needed to just add a lead.       i and Dr. Smith on 3/21/25.              History and Physical - Cardiac Surgery   Tucker Carvajal 69 y.o. male MRN: [id number]     Physician Requesting Consult: Karlee     Reason for Consult / Principal Problem: Cardiomyopathy     History of Present Illness: Tucker Carvajal is a 69 y.o. year old male who presents today for initial outpatient consultation for pacemaker lead extraction/BiV ICD upgrade.     He follows with his primary cardiologist, Dr. Desir.  Recent interrogation did identify significant episodes of ventricular tachycardia.  Most recent interrogation identified a normally functioning device.  The QRS was noted to be wide.  In light of this finding, he previously underwent bilateral upper extremity venogram.  This demonstrated patency of all systems.     During review today, he notes progressive exertional dyspnea and fatigability.  He also notes occasional angina.  He denies prior discharging of his indwelling ICD device.       His past medical history is otherwise significant for coronary artery disease sick sinus syndrome, atrial fibrillation, AV block, remote history of ventricular tachycardia, obstructive sleep apnea on CPAP therapy, hypertension, hyperlipidemia, arthritis, GERD, and history of gastric ulcer.        Past Medical History:       Past Medical History:   Diagnosis Date    Abrasion of left foot       LAST ASSESSED: 9/23/13    Achilles tendinitis, unspecified leg       LAST ASSESSED: 11/30/12    Allergic rhinitis       LAST ASSESSED: 11/25/13    Allergic rhinitis 06/25/2008     LAST ASSESSED: 6/25/08    Arthritis      Cervicalgia 04/22/2011     LAST ASSESSED: 4/22/11    Chronic pain disorder      Colon polyp       Coronary artery disease      CPAP (continuous positive airway pressure) dependence      Dysfunction of left eustachian tube       LAST ASSESSED: 9/23/13    Epistaxis       LAST ASSESSED: 5/27/15    First degree atrioventricular block       LAST ASSESSED: 7/10/17    Gastric ulcer 10/07/2011     LAST ASSESSED: 3/9/15    GERD (gastroesophageal reflux disease)      Hearing aid worn      Hemorrhoids 05/13/2011     LAST ASSESSED: 5/13/11    Hyperlipidemia      Hypertension      Irregular heart beat      Long term use of drug       LAST ASSESSED: 10/11/12    Myalgia 12/21/2007     LAST ASSESSED: 12/21/07    Myositis 12/21/2007     LAST ASSESSED: 12/21/07    Numbness and tingling of foot       LAST ASSESSED: 3/9/15    Pacemaker      Premature ventricular beats       states cardiologist ordered Zio ambulatory cardiac monitor    Sleep apnea      Wears glasses              Past Surgical History:         Past Surgical History:   Procedure Laterality Date    CARDIAC CATHETERIZATION   2017    CARDIAC ELECTROPHYSIOLOGY PROCEDURE N/A 10/25/2021     Procedure: CARDIAC EPS;  Surgeon: Uday Desir MD;  [facility];  Service: Cardiology    CARDIAC ELECTROPHYSIOLOGY PROCEDURE Left 10/25/2021     Procedure: Cardiac icd implant;  Surgeon: Uday Desir MD;  [facility];  Service: Cardiology    CARDIAC PACEMAKER PLACEMENT        CARDIOVERSION N/A 10/25/2021     Procedure: Cardioversion;  Surgeon: Uday Desir MD;  [facility];  Service: Cardiology    CATARACT EXTRACTION Right      CERVICAL FUSION         pt thinks C4-C5    CHOLECYSTECTOMY         LAPAROSCOPIC; LAST ASSESSED: 10/17/17    COLONOSCOPY         COMPLETE; 3-4 YEARS AGO BY [facility]; LAST ASSESSED: 8/18/14    IR UPPER EXTREMITY VENOGRAM- DIAGNOSTIC   1/31/2025    MYRINGOTOMY W/ TUBES         WITH VENTILATING TUBE INSERTION    NH HEMORRHOIDECTOMY INT & XTRNL 2/> COLUMN/GERBER N/A 7/27/2023     Procedure: HEMORRHOIDECTOMY  EXCISION;  Surgeon: Clovis Leos MD;  Location: [facility];  Service: Colorectal    TN NEUROPLASTY &/TRANSPOS MEDIAN NRV CARPAL TUNNE Left 2021     Procedure: RELEASE CARPAL TUNNEL;  Surgeon: Luis Rubio MD;  [facility]: [facility];  Service: Orthopedics    TN NEUROPLASTY &/TRANSPOS MEDIAN NRV CARPAL TUNNE Right 10/14/2022     Procedure: RELEASE CARPAL TUNNEL;  Surgeon: Luis Rubio MD;  [facility];  Service: Orthopedics    TN TENDON SHEATH INCISION Right 10/14/2022     Procedure: RELEASE TRIGGER FINGER,RING AND LONG TENOSYNOVECTOMY FDS +FDP;  Surgeon: Luis Rubio MD;  [facility];  Service: Orthopedics    VASECTOMY                Family History:        Family History   Problem Relation Age of Onset    Cancer Mother           SOFT TISSUE CANCER ON RT SIDE CHEST WALL AND     Prostate cancer Maternal Uncle              Social History:        Social History           Substance and Sexual Activity   Alcohol Use Yes    Alcohol/week: 7.0 standard drinks of alcohol    Types: 7 Glasses of wine per week     Comment: few times a week      Social History          Substance and Sexual Activity   Drug Use No      Social History           Tobacco Use   Smoking Status Former    Current packs/day: 0.00    Types: Cigarettes    Quit date:     Years since quittin.2   Smokeless Tobacco Never         Home Medications:           Prior to Admission medications    Medication Sig Start Date End Date Taking? Authorizing Provider   aspirin 325 mg tablet Take 325 mg by mouth daily       Historical Provider, MD   Coenzyme Q10 (COQ10) 200 MG CAPS Take 1 tablet by mouth daily       Historical Provider, MD   COLLAGEN-CHOND-HYALURONIC ACID PO Take 1 capsule by mouth in the morning       Historical Provider, MD   diphenhydrAMINE (BENADRYL) 50 mg capsule Take 1 capsule (50 mg total) by mouth see administration instructions Patient is to take one capsule around 10 PM the night prior  to his venogram and second capsule one hour prior to procedure. You will need a  to take you to and from your procedure. 1/20/25     DARIUS Maddox   EPINEPHrine (EpiPen 2-Leroy) 0.3 mg/0.3 mL SOAJ Inject 0.3 mL (0.3 mg total) into a muscle once for 1 dose 11/7/22 2/2/23   Shemar Shepard DO   finasteride (PROSCAR) 5 mg tablet Take 1 tablet (5 mg total) by mouth daily 10/30/24     Shemar Shepard DO   fluticasone (VERAMYST) 27.5 MCG/SPRAY nasal spray 2 sprays into each nostril daily       Historical Provider, MD   Glucosamine-Chondroitin (GLUCOSAMINE CHONDR COMPLEX PO) Take 1 tablet by mouth daily       Historical Provider, MD   mefloquine (LARIAM) 250 MG tablet Take 250 mg once weekly starting 2 days prior to travel 2/13/25 2/20/25   Shemar Shepard DO   methylPREDNISolone (MEDROL) 32 MG tablet Take 1 tablet (32 mg total) by mouth see administration instructions You will need to take one tablet at 10 PM the evening prior to your procedure and second tablet will be taken two hours prior to your procedure. 1/20/25     DARIUS Maddox   Omega-3 Fatty Acids (FISH OIL) 1000 MG CPDR Take by mouth daily       Historical Provider, MD   pantoprazole (PROTONIX) 40 mg tablet Take 1 tablet (40 mg total) by mouth daily 4/23/24     Shemar Shepard DO   ranolazine (RANEXA) 500 mg 12 hr tablet TAKE 1 TABLET BY MOUTH TWICE A DAY 9/25/24     Robert Vences MD   rosuvastatin (CRESTOR) 20 MG tablet Take 1 tablet (20 mg total) by mouth daily 1/16/25     DARIUS Jean   rosuvastatin (CRESTOR) 20 MG tablet Take 1 tablet (20 mg total) by mouth daily 1/15/25     DARIUS Phan   sacubitril-valsartan (Entresto) 24-26 MG TABS Take 1 tablet by mouth 2 (two) times a day 3/7/25     Robert Vences MD   sotalol (BETAPACE) 80 mg tablet TAKE 1 TABLET BY MOUTH EVERY 12 HOURS 12/20/24     Robert Vences MD   Turmeric 500 MG CAPS Take by mouth 3 (three) times a week       Historical Provider, MD         Allergies:         Allergies   Allergen Reactions    Amoxicillin Anaphylaxis    Augmentin [Amoxicillin-Pot Clavulanate] Anaphylaxis    Acetaminophen         Rapid heart rate    Cherry Flavor - Food Allergy Other (See Comments)       States allergy is to raw cherries difficulty breathing    Pollen Extract      Dye [Iodinated Contrast Media] Itching         Review of Systems:     Review of Systems   Constitutional:  Positive for activity change and fatigue.   HENT: Negative.     Eyes: Negative.    Respiratory:  Positive for chest tightness and shortness of breath.    Cardiovascular:  Positive for chest pain. Negative for leg swelling.   Endocrine: Negative.    Genitourinary: Negative.    Musculoskeletal: Negative.    Skin: Negative.    Neurological:  Positive for weakness.   Psychiatric/Behavioral: Negative.           Vital Signs:           Vitals:     03/10/25 0850 03/10/25 0852   BP: 125/58 131/60   BP Location: Right arm Left arm   Patient Position: Sitting Sitting   Cuff Size: Large Large   Pulse: 74     SpO2: 100%     Weight: 98 kg (216 lb)     Height: 6' (1.829 m)           Physical Exam:     Physical Exam  Constitutional:       Appearance: Normal appearance. He is well-developed.   HENT:      Head: Normocephalic and atraumatic.   Eyes:      Conjunctiva/sclera: Conjunctivae normal.   Neck:      Thyroid: No thyromegaly.      Vascular: No carotid bruit or JVD.      Trachea: No tracheal deviation.   Cardiovascular:      Rate and Rhythm: Normal rate and regular rhythm.      Pulses:           Carotid pulses are 2+ on the right side and 2+ on the left side.       Dorsalis pedis pulses are 2+ on the right side and 2+ on the left side.        Posterior tibial pulses are 2+ on the right side and 2+ on the left side.      Heart sounds: S1 normal and S2 normal.   Pulmonary:      Effort: No accessory muscle usage or respiratory distress.      Breath sounds: No wheezing or rales.   Chest:      Chest wall: No tenderness.   Abdominal:       "General: Bowel sounds are normal.      Palpations: Abdomen is soft.      Tenderness: There is no abdominal tenderness.   Musculoskeletal:         General: Normal range of motion.      Cervical back: Full passive range of motion without pain and normal range of motion.   Skin:     General: Skin is warm and dry.   Neurological:      Mental Status: He is alert and oriented to person, place, and time.      Cranial Nerves: No cranial nerve deficit.      Sensory: No sensory deficit.   Psychiatric:         Speech: Speech normal.         Behavior: Behavior normal.            Lab Results:                 Invalid input(s): \"LABGLOM\"            Lab Results   Component Value Date     HGBA1C 5.8 (H) 10/01/2024            Lab Results   Component Value Date     TROPONINI <0.02 10/13/2020         Imaging Studies:      Echocardiogram:        Left Ventricle: Left ventricular cavity size is normal. Wall thickness is normal. The left ventricular ejection fraction is 45% by visual estimation. Systolic function is mildly reduced. There is mild global hypokinesis. Diastolic function is mildly abnormal, consistent with grade I (abnormal) relaxation.    IVS: There is abnormal septal motion consistent with left bundle branch block or right ventricular pacing.    Right Ventricle: Systolic function is normal. Normal tricuspid annular plane systolic excursion (TAPSE) > 1.7 cm.    Left Atrium: The atrium is mildly dilated.     Venogram:      1.  Right upper extremity venography showed the right subclavian and brachiocephalic veins to be patent.  2.  Left upper extremity venography showed the left subclavian and brachiocephalic veins to be patent.  3.  The superior vena cava was patent.     Results Review Statement: I reviewed radiology reports from this admission including: procedure reports and Echocardiogram.     Assessment:       Patient Active Problem List     Diagnosis Date Noted    Coronary artery disease involving native coronary artery " of native heart without angina pectoris 01/29/2025    Tremor of right hand 07/08/2024    Gastroesophageal reflux disease 04/23/2024    LENY (obstructive sleep apnea) 01/17/2024    Allergic reaction to penicillin 11/07/2022    Neck pain 06/06/2022    Status post cervical spinal fusion 06/06/2022    Cervical radiculopathy 06/06/2022    Low back pain with sciatica 06/06/2022    Paroxysmal atrial fibrillation (HCC) 05/05/2022    S/P ICD (internal cardiac defibrillator) procedure 10/25/2021    Sick sinus syndrome (HCC) 08/16/2021    CPAP (continuous positive airway pressure) dependence      Intermittent palpitations 04/27/2021    Trigger thumb of right hand 12/10/2020    Trigger finger, right ring finger 12/10/2020    Carpal tunnel syndrome, bilateral 12/10/2020    Premature ventricular beats 10/12/2020    LENY and COPD overlap syndrome (HCC)      Cataract 08/05/2019    Need for malaria prophylaxis 01/23/2019    Osteoarthritis of fingers of both hands 11/01/2018    Tick bite of lower back 06/25/2018    Benign prostatic hyperplasia with nocturia 04/24/2018    Primary osteoarthritis of left knee 12/28/2017    Coronary artery disease of native artery of native heart with stable angina pectoris (HCC) 07/10/2017    External hemorrhoids 03/11/2016    Environmental and seasonal allergies 05/27/2015    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 03/09/2015    Chronic back pain 03/09/2015    Sleep apnea 01/13/2015    Mixed hyperlipidemia 11/30/2012    Impaired fasting glucose 11/30/2012    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      Indwelling ICD lead; Ongoing work up for ead extraction/VBiV ICD updtrade     Plan:  Risks and benefits of lead extraction were discussed in detail today with the patient.  They understand and wish to proceed with further workup and ultimately surgical intervention.  We have ordered routine preoperative laboratory and vascular studies.  Pending the results of these tests, they will be scheduled for surgery  with Dr. Galileo Tay D.O.  and Dr. Dennis.     As he has a history of contrast allergy, we will prophylax him with Benadryl and Pepcid.  Prednisone was also to be ordered but the patient refuses secondary to sensitivity.  The decision was made in our office that should he require contrast, IV steroid could be administered at the time of surgery.     Rocíoronald Carvajal was comfortable with our recommendations, and their questions were answered to their satisfaction.  Thank you for allowing us to participate in the care of this patient.      The patient recently had a screening colonoscopy in 2024.  Therefore GI referral is not indicated at this time.      SIGNATURE: Joseph Vazquez PA-C  DATE: 03/10/25  TIME: 9:18 AM     * This note was completed in part utilizing [email or web address] direct voice recognition software.   Grammatical errors, random word insertion, spelling mistakes, and incomplete sentences may be an occasional consequence of the system secondary to software limitations, ambient noise and hardware issues. At the time of dictation, efforts were made to edit, clarify and /or correct errors. Please read the chart carefully and recognize, using context, where substitutions have occurred.  If you have any questions or concerns about the context, text or information contained within the body of this dictation, please contact myself, the provider, for further clarification.

## 2025-03-21 NOTE — OP NOTE
ELECTROPHYSIOLOGY  OPERATIVE REPORT  PATIENT NAME: Tucker Carvajal  :  1955  MRN: [id number]  Date of surgery: 25  Surgeon: Shemar Smith MD  Pt Location[facility]    SURGERY DATE: 3/21/2025    Surgeons and Role:     * Shemar Smith MD - Primary    Preop Diagnosis:  Paroxysmal atrial fibrillation (HCC) [I48.0]    Post-Op Diagnosis Codes:     * Paroxysmal atrial fibrillation (HCC) [I48.0]    Procedure(s):  upgrade to cardiac resynchronization therapy ICD    Specimen(s):  * No specimens in log *    Estimated Blood Loss:   Minimal    Drains:  * No LDAs found *    Anesthesia Type:   General        PROCEDURE PERFORMED:   1)BIVENTRICULAR ICD Implantable Cardioverter Defibrillator upgrade add LV lead to existing dual chamber ICD    Preoperative Medications: Ancef  ANESTHESIA: Conscious sedation    PREOPERATIVE DIAGNOSIS:   1. Chronic systolic CHF EF 34% on GDMT decline in EF.   2) Complete heart block 100% RV paced, dependent    NCDR ICD REGISTRY INFO    Heart Failure: Yes  NYHA Functional Classification: Class III.  Ischemic Cardiomyopathy: Yes. Timeframe: 0 >= 3 Months. Guideline Directed Medical Therapy Maximum Dose - Yes (for 3 Months).    Non-Ischemic Cardiomyopathy: Yes. Timeframe: 0 < 3 Months. Guideline Directed Medical Therapy Maximum Dose - Yes (for 3 Months).    Ejection Fraction: 35  QRS Morphology: Paced, Width: 170 ms  Ventricular Tachycardia: No.  Indications for Permanent Pacemaker: Yes. Class I or Class II Guideline Bradycardia Pacemaker Indication Present - Yes. Reason Pacing Indicated - Complete Heart Block (Intrinsic). Ejection fraction < 35%. Anticipated Requirement of >40% RV Pacing - Yes.    ICD Indication: Primary Prevention.   A formal shared decision making encounter has occurred with the patient using an evidence-based decision tool on ICDs prior to initial ICD implantation: N/A  Generator changes only- Patients clinical condition is unchanged and the LVEF will not be  assessed: N/A  Syndrome(s) w/Risk of Sudden Death: No.      POSTOPERATIVE DIAGNOSIS: Successful Biventricular ICD Implant  Same as Preop.     Informed Consent: Risks, benefits, and alternatives discussed with patient and any family present. He understands risks, which include but are not limited to life threatening  bleeding, infection, air around lungs, blood around the heart and reoperation dislodged or malfunctioning device, also discussed 5% chance anatomy not suitable for LV lead.     Procedure Description:  After informed consent was obtained, the patient was brought to the electrophysiology laboratory in the post-absorptive state. A time out was called and the patient was properly identified. The patient was pre-medicated as above. The LEFT infraclavicular area was prepped and draped in the usual sterile fashion. After local anesthetic infiltration with 1% lidocaine, an incision was made below the left clavicle. The incision was extended down to the level of the pectoral fascia. The old dual ICD was freed up with blunt dissection and plasma blade. . A cephalic cut down approach was done but making small venotomy in cephalic vein and using 2-0 silk ties distally and proximally to control bleeding.    Left Ventricular lead was place using a 9F safe safe sheath Super CS through an ATTAIN Extended hook Outer sheath, the sheath was placed in the CS. A venogram, showing suitable lateral branch.An inner guide was used  to cannulate the branch, a Run-through 0.14mm wire was used to cannulate the branch and the CS Lead was advanced over the wire. All sheaths were slit and the lead was tied down with 2-0 Ethibond.     The old HV RV lead and RA lead was connected to new BIV ICD generator and new LV lead connected. Old generator removed.    A Medtronic absorbable ICD pouch was used.     The lead and pulse generator were placed in the pre-pectoral pocket. The pocket was then closed with 3 layers of 2-0, 3-0, 4-0 -Vicryl  suture was used to close the skin.      EBL: Minimal  Complications: None  Contrast:see report  Findings:           The patient tolerated the procedure well.    Plan: Routine postoperative care, CXR. Follow-up in 2 weeks with incision check and interrogation.           SIGNATURE: Shemar Smith MD  DATE: March 21, 2025  TIME: 1:48 PM

## 2025-03-21 NOTE — ANESTHESIA POSTPROCEDURE EVALUATION
Post-Op Assessment Note    CV Status:  Stable    Pain management: adequate       Hydration Status:  Stable   Airway Patency:  Patent     Post Op Vitals Reviewed: Yes    No anethesia notable event occurred.    Staff: Anesthesiologist           Last Filed PACU Vitals:  Vitals Value Taken Time   Temp 97.3 °F (36.3 °C) 03/21/25 1225   Pulse 66 03/21/25 1414   /60 03/21/25 1400   Resp 22 03/21/25 1414   SpO2 95 % 03/21/25 1414   Vitals shown include unfiled device data.    Modified Ritesh:     Vitals Value Taken Time   Activity 2 03/21/25 1400   Respiration 2 03/21/25 1400   Circulation 2 03/21/25 1400   Consciousness 2 03/21/25 1400   Oxygen Saturation 2 03/21/25 1400     Modified Ritesh Score: 10

## 2025-03-21 NOTE — ANESTHESIA PREPROCEDURE EVALUATION
Procedure:  REMOVAL PACEMAKER/AICD LEADS W LASER / BIV ICD UPGRADE (Chest)  Cardiac laser lead extraction (Chest)    Relevant Problems   ANESTHESIA (within normal limits)      CARDIO   (+) Coronary artery disease involving native coronary artery of native heart without angina pectoris   (+) Coronary artery disease of native artery of native heart with stable angina pectoris (HCC)   (+) External hemorrhoids   (+) Mixed hyperlipidemia   (+) Paroxysmal atrial fibrillation (HCC)   (+) Premature ventricular beats   (+) Sick sinus syndrome (HCC)      ENDO   (-) Diabetes mellitus type 1 (HCC)   (-) Diabetes mellitus, type 2 (HCC)      GI/HEPATIC   (+) Gastroesophageal reflux disease      /RENAL   (-) Chronic kidney disease      MUSCULOSKELETAL   (+) Chronic back pain   (+) Low back pain with sciatica   (+) Osteoarthritis of fingers of both hands   (+) Primary osteoarthritis of left knee      NEURO/PSYCH   (+) Chronic back pain   (-) CVA (cerebral vascular accident) ([facility])   (-) Seizures (HCC)      PULMONARY   (+) LENY (obstructive sleep apnea)   (+) LENY and COPD overlap syndrome ([facility])   (+) Sleep apnea   (-) Asthma   (-) Chronic obstructive pulmonary disease ([facility])      Echo 12/27/2024:    Left Ventricle: Left ventricular cavity size is normal. Wall thickness is normal. The left ventricular ejection fraction is 45% by visual estimation. Systolic function is mildly reduced. There is mild global hypokinesis. Diastolic function is mildly abnormal, consistent with grade I (abnormal) relaxation.    IVS: There is abnormal septal motion consistent with left bundle branch block or right ventricular pacing.    Right Ventricle: Systolic function is normal. Normal tricuspid annular plane systolic excursion (TAPSE) > 1.7 cm.    Left Atrium: The atrium is mildly dilated.  Physical Exam    Airway    Mallampati score: III  TM Distance: >3 FB  Neck ROM: limited     Dental   No notable dental hx     Cardiovascular      Pulmonary      Other  Findings        Anesthesia Plan  ASA Score- 3     Anesthesia Type- IV sedation with anesthesia with ASA Monitors.         Additional Monitors:     Airway Plan:            Plan Factors-Exercise tolerance (METS): >4 METS.    Chart reviewed.  Imaging results reviewed. Existing labs reviewed. Patient summary reviewed.                  Induction- intravenous.    Postoperative Plan- Plan for postoperative opioid use. Planned trial extubation        Informed Consent- Anesthetic plan and risks discussed with patient.  I personally reviewed this patient with the CRNA. Discussed and agreed on the Anesthesia Plan with the CRNA..      NPO Status:  Vitals Value Taken Time   Date of last liquid 03/21/25 03/21/25 0753   Time of last liquid 0630 03/21/25 0753   Date of last solid 03/20/25 03/21/25 0753   Time of last solid 2000 03/21/25 0753

## 2025-03-21 NOTE — QUICK NOTE
Patient was seen postoperatively.  He did not require extraction as lead was able to be guided via cephalic vein with only upgrade to BiV ICD done.    Saw the patient afterwards and is feeling well.  Only small half centimeter by half centimeter area of dried blood on Aquacel bandage for which border was outlined.  Postop restrictions were discussed with the patient.    Advised him caution with left arm as he is going to [employer] in roughly 4 weeks (shoots right-handed).    Patient was advised that he should continue to follow his general cardiologist and electrophysiology will see him as needed.  He would prefer moving forward that if he requires any electrophysiology intervention that he be set up to see Dr. Smith.    No changes with medications on discharge.

## 2025-03-21 NOTE — ANESTHESIA POSTPROCEDURE EVALUATION
Post-Op Assessment Note    CV Status:  Stable    Pain management: adequate       Mental Status:  Alert   Hydration Status:  Stable   PONV Controlled:  None   Airway Patency:  Patent and adequate     Post Op Vitals Reviewed: Yes    No anethesia notable event occurred.    Staff: CRNA           Last Filed PACU Vitals:  Vitals Value Taken Time   Temp 97.3 °F (36.3 °C) 03/21/25 1225   Pulse 67 03/21/25 1226   /69 03/21/25 1225   Resp 14 03/21/25 1226   SpO2 96 % 03/21/25 1226   Vitals shown include unfiled device data.

## 2025-03-22 LAB
ABO GROUP BLD BPU: NORMAL
BPU ID: NORMAL
CROSSMATCH: NORMAL
UNIT DISPENSE STATUS: NORMAL
UNIT PRODUCT CODE: NORMAL
UNIT PRODUCT VOLUME: 350 ML
UNIT RH: NORMAL

## 2025-03-22 NOTE — ASSESSMENT & PLAN NOTE
- patient remains on Crestor, aspirin  Not presently on beta-blocker  He did have nuclear stress test and October which was negative for acute ischemia      -patient given referral to Kaela Verma cardiologist   At his request he would like to see an interventional cardiologist   Referral to Dr Green Blizzard Patient's blood pressure range in the last 24 hours was: BP  Min: 95/70  Max: 115/86.The patient's inpatient anti-hypertensive regimen is listed below:  Current Antihypertensives  carvediloL tablet 3.125 mg, 2 times daily, Oral  hydrALAZINE injection 10 mg, Every 6 hours PRN, Intravenous    Plan  - BP is controlled, no changes needed to their regimen  -    36.7

## 2025-03-31 ENCOUNTER — RESULTS FOLLOW-UP (OUTPATIENT)
Dept: CARDIOLOGY CLINIC | Facility: CLINIC | Age: 70
End: 2025-03-31

## 2025-03-31 ENCOUNTER — TELEPHONE (OUTPATIENT)
Dept: CARDIOLOGY CLINIC | Facility: CLINIC | Age: 70
End: 2025-03-31

## 2025-03-31 DIAGNOSIS — I47.29 NSVT (NONSUSTAINED VENTRICULAR TACHYCARDIA) (HCC): Primary | ICD-10-CM

## 2025-03-31 RX ORDER — METOPROLOL SUCCINATE 25 MG/1
50 TABLET, EXTENDED RELEASE ORAL DAILY
Qty: 180 TABLET | Refills: 0 | Status: SHIPPED | OUTPATIENT
Start: 2025-03-31 | End: 2025-03-31

## 2025-03-31 NOTE — RESULT ENCOUNTER NOTE
Normal Device Function  Sustained VT with appropriate ATP,  he has had this before  We called roxanne and recommended metoprolol XL 50mg daily but he refused, said makes him too tired.

## 2025-03-31 NOTE — TELEPHONE ENCOUNTER
Message left for patient to call device clinic to discuss any symptoms, changes to medication, medical history etc.     [facility] alert for ATP delivered for VT episodes this morning at 5:51 a.m.

## 2025-04-02 DIAGNOSIS — R07.9 CHEST PAIN: ICD-10-CM

## 2025-04-02 RX ORDER — RANOLAZINE 500 MG/1
500 TABLET, EXTENDED RELEASE ORAL 2 TIMES DAILY
Qty: 180 TABLET | Refills: 1 | Status: SHIPPED | OUTPATIENT
Start: 2025-04-02

## 2025-04-07 ENCOUNTER — OFFICE VISIT (OUTPATIENT)
Dept: OBGYN CLINIC | Facility: CLINIC | Age: 70
End: 2025-04-07
Payer: MEDICARE

## 2025-04-07 ENCOUNTER — APPOINTMENT (OUTPATIENT)
Dept: RADIOLOGY | Facility: AMBULARY SURGERY CENTER | Age: 70
End: 2025-04-07
Attending: STUDENT IN AN ORGANIZED HEALTH CARE EDUCATION/TRAINING PROGRAM
Payer: MEDICARE

## 2025-04-07 VITALS — BODY MASS INDEX: 29.52 KG/M2 | HEIGHT: 72 IN | WEIGHT: 217.93 LBS

## 2025-04-07 DIAGNOSIS — M25.562 LEFT KNEE PAIN, UNSPECIFIED CHRONICITY: ICD-10-CM

## 2025-04-07 DIAGNOSIS — M17.12 PRIMARY OSTEOARTHRITIS OF LEFT KNEE: Primary | ICD-10-CM

## 2025-04-07 PROCEDURE — 73562 X-RAY EXAM OF KNEE 3: CPT

## 2025-04-07 PROCEDURE — 99214 OFFICE O/P EST MOD 30 MIN: CPT | Performed by: STUDENT IN AN ORGANIZED HEALTH CARE EDUCATION/TRAINING PROGRAM

## 2025-04-07 PROCEDURE — 20610 DRAIN/INJ JOINT/BURSA W/O US: CPT | Performed by: STUDENT IN AN ORGANIZED HEALTH CARE EDUCATION/TRAINING PROGRAM

## 2025-04-07 RX ORDER — METHYLPREDNISOLONE ACETATE 40 MG/ML
1 INJECTION, SUSPENSION INTRA-ARTICULAR; INTRALESIONAL; INTRAMUSCULAR; SOFT TISSUE
Status: COMPLETED | OUTPATIENT
Start: 2025-04-07 | End: 2025-04-07

## 2025-04-07 RX ORDER — BUPIVACAINE HYDROCHLORIDE 2.5 MG/ML
4 INJECTION, SOLUTION INFILTRATION; PERINEURAL
Status: COMPLETED | OUTPATIENT
Start: 2025-04-07 | End: 2025-04-07

## 2025-04-07 RX ADMIN — BUPIVACAINE HYDROCHLORIDE 4 ML: 2.5 INJECTION, SOLUTION INFILTRATION; PERINEURAL at 09:00

## 2025-04-07 RX ADMIN — METHYLPREDNISOLONE ACETATE 1 ML: 40 INJECTION, SUSPENSION INTRA-ARTICULAR; INTRALESIONAL; INTRAMUSCULAR; SOFT TISSUE at 09:00

## 2025-04-07 NOTE — PROGRESS NOTES
Orthopaedics Office Visit - New Patient Visit    ASSESSMENT/PLAN:    Assessment:   Left knee osteoarthritis-acute exacerbation  Left knee MCL sprain    Plan:   X-ray of the left knee was reviewed which demonstrated left tricompartmental osteoarthritis most notably in the medial compartment with no acute fractures or dislocations.  Discussion was had with the patient about nonoperative versus operative management for knee osteoarthritis.  We discussed nonoperative management in the forms of anti-inflammatory medications, activity modifications, corticosteroid, viscosupplementation.  Also discussed that if these methods do not successful the surgical option would be a total knee arthroplasty.  The patient would like to continue with conservative management options at this time   Weightbearing as tolerated left lower extremity  Provided with CSI to left knee today  Recommend conservative treatment with rest, ice, compression, and elevation as well as OTC anti-inflammatories  May continue OTC knee brace as needed for comfort  Follow up as needed    _____________________________________________________  CHIEF COMPLAINT:  Chief Complaint   Patient presents with    Left Knee - Pain     Twisted the knee and felt a pop,          SUBJECTIVE:  Tucker Carvajal is a 69 y.o. male who presents with left knee pain ongoing for approximately 2 weeks after he had a misstep down stairs and twisted his knee.  States he felt a pop.  He has been having difficulty with weightbearing and walking since the injury.  He states that he has been taking Motrin and icing and compressing his knee.  He states his knee has felt better over the last 2 weeks but occasionally still hurts him while he is walking.  He denies any prior injury but states that his knees would occasionally have pain after prolonged weightbearing.  He has not had any injections in his knees but has had injections in his ankles.  Denies any numbness or tingling. Has been  wearing a OTC brace for comfort.    PAST MEDICAL HISTORY:  Past Medical History:   Diagnosis Date    Abrasion of left foot     LAST ASSESSED: 9/23/13    Achilles tendinitis, unspecified leg     LAST ASSESSED: 11/30/12    Allergic rhinitis     LAST ASSESSED: 11/25/13    Allergic rhinitis 06/25/2008    LAST ASSESSED: 6/25/08    Arthritis     Cervicalgia 04/22/2011    LAST ASSESSED: 4/22/11    Chronic pain disorder     Colon polyp     Coronary artery disease     CPAP (continuous positive airway pressure) dependence     Dysfunction of left eustachian tube     LAST ASSESSED: 9/23/13    Epistaxis     LAST ASSESSED: 5/27/15    First degree atrioventricular block     LAST ASSESSED: 7/10/17    Gastric ulcer 10/07/2011    LAST ASSESSED: 3/9/15    GERD (gastroesophageal reflux disease)     Hearing aid worn     Hemorrhoids 05/13/2011    LAST ASSESSED: 5/13/11    Hyperlipidemia     Hypertension     Irregular heart beat     Long term use of drug     LAST ASSESSED: 10/11/12    Myalgia 12/21/2007    LAST ASSESSED: 12/21/07    Myositis 12/21/2007    LAST ASSESSED: 12/21/07    Numbness and tingling of foot     LAST ASSESSED: 3/9/15    Pacemaker     Premature ventricular beats     states cardiologist ordered Zio ambulatory cardiac monitor    Sleep apnea     Wears glasses        PAST SURGICAL HISTORY:  Past Surgical History:   Procedure Laterality Date    CARDIAC CATHETERIZATION  2017    CARDIAC ELECTROPHYSIOLOGY PROCEDURE N/A 10/25/2021    Procedure: CARDIAC EPS;  Surgeon: Uday Desir MD;  Location: [facility];  Service: Cardiology    CARDIAC ELECTROPHYSIOLOGY PROCEDURE Left 10/25/2021    Procedure: Cardiac icd implant;  Surgeon: Uday Desir MD;  Location: [facility];  Service: Cardiology    CARDIAC ELECTROPHYSIOLOGY PROCEDURE N/A 3/21/2025    Procedure: upgrade to cardiac resynchronization therapy ICD;  Surgeon: Shemar Smith MD;  Location: [facility];  Service: Cardiology    CARDIAC PACEMAKER PLACEMENT       CARDIOVERSION N/A 10/25/2021    Procedure: Cardioversion;  Surgeon: Uday Desir MD;  Location: [facility];  Service: Cardiology    CATARACT EXTRACTION Right     CERVICAL FUSION      pt thinks C4-C5    CHOLECYSTECTOMY      LAPAROSCOPIC; LAST ASSESSED: 10/17/17    COLONOSCOPY      COMPLETE; 3-4 YEARS AGO BY [facility]; LAST ASSESSED: 14    IR UPPER EXTREMITY VENOGRAM- DIAGNOSTIC  2025    MYRINGOTOMY W/ TUBES      WITH VENTILATING TUBE INSERTION    VA HEMORRHOIDECTOMY INT & XTRNL 2/> COLUMN/GERBER N/A 2023    Procedure: HEMORRHOIDECTOMY EXCISION;  Surgeon: Clovis Leos MD;  Location: [facility];  Service: Colorectal    VA NEUROPLASTY &/TRANSPOS MEDIAN NRV CARPAL TUNNE Left 2021    Procedure: RELEASE CARPAL TUNNEL;  Surgeon: Luis Rubio MD;  Location: [facility];  Service: Orthopedics    VA NEUROPLASTY &/TRANSPOS MEDIAN NRV CARPAL TUNNE Right 10/14/2022    Procedure: RELEASE CARPAL TUNNEL;  Surgeon: Luis Rubio MD;  [facility]: [facility];  Service: Orthopedics    VA TENDON SHEATH INCISION Right 10/14/2022    Procedure: RELEASE TRIGGER FINGER,RING AND LONG TENOSYNOVECTOMY FDS +FDP;  Surgeon: Luis Rubio MD;  Location[facility];  Service: Orthopedics    VASECTOMY         FAMILY HISTORY:  Family History   Problem Relation Age of Onset    Cancer Mother         SOFT TISSUE CANCER ON RT SIDE CHEST WALL AND     Prostate cancer Maternal Uncle        SOCIAL HISTORY:  Social History     Tobacco Use    Smoking status: Former     Current packs/day: 0.00     Types: Cigarettes     Quit date:      Years since quittin.2    Smokeless tobacco: Never   Vaping Use    Vaping status: Never Used   Substance Use Topics    Alcohol use: Yes     Alcohol/week: 7.0 standard drinks of alcohol     Types: 7 Glasses of wine per week     Comment: few times a week    Drug use: No       MEDICATIONS:    Current Outpatient Medications:     aspirin 325 mg tablet, Take 325 mg by  mouth daily, Disp: , Rfl:     cetirizine (ZyrTEC) 10 mg tablet, Take 10 mg by mouth daily, Disp: , Rfl:     Coenzyme Q10 (COQ10) 200 MG CAPS, Take 1 tablet by mouth daily, Disp: , Rfl:     COLLAGEN-CHOND-HYALURONIC ACID PO, Take 1 capsule by mouth in the morning, Disp: , Rfl:     diphenhydrAMINE (BENADRYL) 50 mg capsule, Take 1 capsule (50 mg total) by mouth see administration instructions Patient is to take one capsule around 10 PM the night prior to his venogram and second capsule one hour prior to procedure. You will need a  to take you to and from your procedure., Disp: 2 capsule, Rfl: 0    famotidine (PEPCID) 20 mg tablet, Take one tab the night before surgery, and one tab the morning of surgery., Disp: 2 tablet, Rfl: 0    finasteride (PROSCAR) 5 mg tablet, Take 1 tablet (5 mg total) by mouth daily, Disp: 90 tablet, Rfl: 1    fluticasone (VERAMYST) 27.5 MCG/SPRAY nasal spray, 2 sprays into each nostril daily, Disp: , Rfl:     Glucosamine-Chondroitin (GLUCOSAMINE CHONDR COMPLEX PO), Take 1 tablet by mouth daily, Disp: , Rfl:     Omega-3 Fatty Acids (FISH OIL) 1000 MG CPDR, Take by mouth daily, Disp: , Rfl:     pantoprazole (PROTONIX) 40 mg tablet, Take 1 tablet (40 mg total) by mouth daily, Disp: 90 tablet, Rfl: 3    ranolazine (RANEXA) 500 mg 12 hr tablet, TAKE 1 TABLET BY MOUTH TWICE A DAY, Disp: 180 tablet, Rfl: 1    rosuvastatin (CRESTOR) 20 MG tablet, Take 1 tablet (20 mg total) by mouth daily, Disp: 90 tablet, Rfl: 3    sacubitril-valsartan (Entresto) 24-26 MG TABS, Take 1 tablet by mouth 2 (two) times a day, Disp: 180 tablet, Rfl: 1    sotalol (BETAPACE) 80 mg tablet, TAKE 1 TABLET BY MOUTH EVERY 12 HOURS, Disp: 180 tablet, Rfl: 3    Turmeric 500 MG CAPS, Take by mouth 3 (three) times a week, Disp: , Rfl:     diphenhydrAMINE (BENADRYL) 50 mg capsule, Take one tab the night before surgery, and one tab the morning of surgery., Disp: 2 capsule, Rfl: 0    EPINEPHrine (EpiPen 2-Leroy) 0.3 mg/0.3 mL SOAJ,  "Inject 0.3 mL (0.3 mg total) into a muscle once for 1 dose, Disp: 0.6 mL, Rfl: 0    ALLERGIES:  Allergies   Allergen Reactions    Amoxicillin Anaphylaxis    Augmentin [Amoxicillin-Pot Clavulanate] Anaphylaxis    Acetaminophen      Rapid heart rate    Cherry Flavor - Food Allergy Other (See Comments)     States allergy is to raw cherries difficulty breathing    Pollen Extract     Dye [Iodinated Contrast Media] Itching       REVIEW OF SYSTEMS:  MSK: see HPI  Neuro: see HPI  Pertinent items are otherwise noted in HPI.  A comprehensive review of systems was otherwise negative.    LABS:  HgA1c:   Lab Results   Component Value Date    HGBA1C 5.8 (H) 10/01/2024     BMP:   Lab Results   Component Value Date    CALCIUM 9.5 03/10/2025     09/29/2017    K 4.5 03/10/2025    CO2 25 03/10/2025     03/10/2025    BUN 13 03/10/2025    CREATININE 0.78 03/10/2025     CBC: No components found for: \"CBC\"    _____________________________________________________  PHYSICAL EXAMINATION:  Vital signs: Ht 6' (1.829 m)   Wt 98.9 kg (217 lb 14.8 oz)   BMI 29.56 kg/m²   General: No acute distress, awake and alert  Psychiatric: Mood and affect appear appropriate  HEENT: Trachea Midline, No torticollis, no apparent facial trauma  Cardiovascular: No audible murmurs; Extremities appear perfused  Pulmonary: No audible wheezing or stridor  Skin: No open lesions; see further details (if any) below    MUSCULOSKELETAL EXAMINATION:  Extremities:  The left lower extremity was exposed and inspected. Visible skin intact without erythema, ecchymosis, effusion or obvious osseous deformity. TTP over medial joint line.  Tenderness to palpation over the medial femoral condyle at the MCL origin.  Pt able to range from 0 degrees of flexion to 110 degrees of flexion.  The patient knee is stable to varus and valgus stress at 0 and 30 degrees.  With increasing flexion and valgus stress the patient has some pain over the MCL but a firm endpoint.  Patient " has negative anterior posterior drawer testing.  Sensation intact to superficial peroneal, deep peroneal, sural, saphenous, plantar nerve distributions. Motor intact to extensor hallux longus, tibialis anterior, gastrocnemius muscles, extensor mechanism intact. Limb is well perfused. Brisk capillary refill in all 5 digits. Compartments soft and compressible.         _____________________________________________________  STUDIES REVIEWED:  I personally reviewed the images and interpretation is as follows:  Xrays of the left knee demonstrate moderate medial joint space narrowing, osteophyte formation over the patella, no evidence of subchondral cysts or sclerosis.    PROCEDURES PERFORMED:  Large joint arthrocentesis  Universal Protocol:  Consent: Verbal consent obtained.  Risks and benefits: risks, benefits and alternatives were discussed  Consent given by: patient  Timeout called at: 4/7/2025 9:41 AM.  Patient understanding: patient states understanding of the procedure being performed  Patient identity confirmed: verbally with patient  Supporting Documentation  Indications: pain   Procedure Details  Location: knee -   Needle size: 22 G  Ultrasound guidance: no  Approach: anterolateral  Medications administered: 4 mL bupivacaine 0.25 %; 1 mL methylPREDNISolone acetate 40 mg/mL            Donald Perla MD

## 2025-04-09 ENCOUNTER — IN-CLINIC DEVICE VISIT (OUTPATIENT)
Dept: CARDIOLOGY CLINIC | Facility: CLINIC | Age: 70
End: 2025-04-09
Payer: MEDICARE

## 2025-04-09 ENCOUNTER — OFFICE VISIT (OUTPATIENT)
Dept: CARDIOLOGY CLINIC | Facility: CLINIC | Age: 70
End: 2025-04-09
Payer: MEDICARE

## 2025-04-09 ENCOUNTER — RESULTS FOLLOW-UP (OUTPATIENT)
Dept: CARDIOLOGY CLINIC | Facility: CLINIC | Age: 70
End: 2025-04-09

## 2025-04-09 VITALS
OXYGEN SATURATION: 98 % | WEIGHT: 217 LBS | DIASTOLIC BLOOD PRESSURE: 70 MMHG | SYSTOLIC BLOOD PRESSURE: 148 MMHG | HEART RATE: 63 BPM | BODY MASS INDEX: 29.43 KG/M2

## 2025-04-09 DIAGNOSIS — G47.33 OSA (OBSTRUCTIVE SLEEP APNEA): ICD-10-CM

## 2025-04-09 DIAGNOSIS — I25.10 CORONARY ARTERY DISEASE INVOLVING NATIVE CORONARY ARTERY OF NATIVE HEART WITHOUT ANGINA PECTORIS: Primary | ICD-10-CM

## 2025-04-09 DIAGNOSIS — I48.0 PAROXYSMAL ATRIAL FIBRILLATION (HCC): ICD-10-CM

## 2025-04-09 DIAGNOSIS — I47.20 VT (VENTRICULAR TACHYCARDIA) (HCC): ICD-10-CM

## 2025-04-09 DIAGNOSIS — I49.5 SICK SINUS SYNDROME (HCC): ICD-10-CM

## 2025-04-09 DIAGNOSIS — I50.22 CHRONIC HEART FAILURE WITH REDUCED EJECTION FRACTION (HFREF, <= 40%) (HCC): Primary | ICD-10-CM

## 2025-04-09 DIAGNOSIS — Z95.810 PRESENCE OF IMPLANTABLE CARDIOVERTER-DEFIBRILLATOR (ICD): ICD-10-CM

## 2025-04-09 DIAGNOSIS — I50.22 CHRONIC HEART FAILURE WITH REDUCED EJECTION FRACTION (HFREF, <= 40%) (HCC): ICD-10-CM

## 2025-04-09 DIAGNOSIS — Z95.810 S/P ICD (INTERNAL CARDIAC DEFIBRILLATOR) PROCEDURE: ICD-10-CM

## 2025-04-09 PROCEDURE — 99214 OFFICE O/P EST MOD 30 MIN: CPT

## 2025-04-09 PROCEDURE — 99024 POSTOP FOLLOW-UP VISIT: CPT | Performed by: INTERNAL MEDICINE

## 2025-04-09 RX ORDER — METOPROLOL SUCCINATE 25 MG/1
12.5 TABLET, EXTENDED RELEASE ORAL DAILY
Qty: 45 TABLET | Refills: 2 | Status: SHIPPED | OUTPATIENT
Start: 2025-04-09

## 2025-04-09 NOTE — ASSESSMENT & PLAN NOTE
He had an ICD placed in 2022 for VT. Reported SOB and has decreased LVEF on most recent TTE and was indicated for BiV upgrade   3/21/25 BIVENTRICULAR ICD Implantable Cardioverter Defibrillator upgrade add LV lead to existing dual chamber ICD with Dr Smith

## 2025-04-09 NOTE — PROGRESS NOTES
[facility]  General Cardiology Note  Tucker Carvajal  1955  [id number]      Referring Provider - No ref. provider found  Chief Complaint   Patient presents with    Follow-up     No cardiac concerns or complaints.       Assessment & Plan  Coronary artery disease involving native coronary artery of native heart without angina pectoris  Coronary artery disease status post catheterization in June 2017 has moderate ostial circ disease and nonobstructive disease in other arteries. He has repeat cardiac catheterization done. Cath was done in August 2021. Patient has 50-70% ostial circumflex stenosis which is non dominant artery and IFR is 0.96 other arteries have nonobstructive disease. Continue statins and aspirin. ranexa  Denies any angina or anginal equivalent  S/P ICD (internal cardiac defibrillator) procedure  He had an ICD placed in 2022 for VT. Reported SOB and has decreased LVEF on most recent TTE and was indicated for BiV upgrade   3/21/25 BIVENTRICULAR ICD Implantable Cardioverter Defibrillator upgrade add LV lead to existing dual chamber ICD with Dr Smith  Paroxysmal atrial fibrillation (HCC)  Continue on sotalol  Repeat interrogation shows no further episodes of atrial fibrillation. No longer on eliquis  Continue on aspirin.   LENY (obstructive sleep apnea)  Continue using CPAP machine   Chronic heart failure with reduced ejection fraction (HFrEF, <= 40%) ([facility])  Wt Readings from Last 3 Encounters:   04/09/25 98.4 kg (217 lb)   04/07/25 98.9 kg (217 lb 14.8 oz)   03/21/25 98.9 kg (217 lb 14.8 oz)   - 6/8/22 echo EF 60%  - 12/27/24 echo: EF 45%  - 12/27/24 lexiscan stress test Negative study for major reversible defects to suggest ischemia. Paradoxical defect improved with prone imaging. Reduced EF 34% by gated imaging. Compared to prior stress study no major changed. No major arrhythmias noted with vasodilation   Patient has no evidence of volume overload.  Continue on  Entresto  Repeat echo in 2 months  VT (ventricular tachycardia) (HCC)  1 VT, 1 VT-NS EPISODE successful termination w/atp x1. on device check  He initially declined metoprolol 50 mg daily on 3/31 but today agreed to trial metoprolol at a lower dose 12.5 mg daily in about a month.        Relevant testing  - Cardiac EP device report  Result Date: 3/31/2025  Narrative: MDT BI-V ICD / ACTIVE SYSTEM IS MRI CONDITIONAL NON-BILLABLE CARELINK TRANSMISSION: ALERT/TODAY, 05:51 A.M.  -  1 VT, 1 VT-NS EPISODE (SAME EVENT - 1 BEAT DROP FROM COUNT) - TOTAL DURATION 75 BEATS @ 152 BPM, ACCELERATE  BPM - SUCCESSFUL TERMINATION W/ATP X1. AS/BVP @ AVG 72 PPM ON CURRENT EGM. PATIENT TAKING  MG, SOTALOL, ENTRESTO. EF 45% (12/27/24 ECHO). BATTERY VOLTAGE ADEQUATE (9.9 YR - ROMINA/EXTRACTION W/BIV UPGRADE ON 3/21/25). AP 40.9%  98.2% + VSR PACE <0.1% (CRT %). ALL AVAILABLE LEAD PARAMETERS WITHIN NORMAL LIMITS. 1 V SENSE EPISODE (TODAY'S EPISODE). OPTI-VOL FLUID THRESHOLD OPTIMIZING. L/M FOR PATIENT TO CALL TO ASSESS SYMPTOMS. NORMAL DEVICE FUNCTION.       Will RTO in August or sooner if necessary  Patient / Caretaker was advised and educated to call our office  immediately if  patient has any new symptoms of chest pain/shortness of breath, near-syncope, syncope, light headedness sustained palpitations  or any other cardiovascular symptoms before their scheduled follow-up appointment.  ED precautions reviewed    Interval History: 69 y.o.  male  with PMH as below is here for HFU  Patient of Dr. Vences     He developed pacemaker mediated cardiomyopathy. He had an ICD placed in 2022 for VT. Reported fatigue and has decreased LVEF on most recent TTE and was indicated for BiV upgrade Patient had ICD upgrade on 3/21/25 with Dr Smith    Had severe fatigue before upgrade and now feels better. Denies any acute symptoms. Compliant with medications. He sleeps better now   Was on metoprolol about 3 years ago and experienceed  seevere fatigue and is reticent on going back on.      Patient Active Problem List    Diagnosis Date Noted    Chronic heart failure with reduced ejection fraction (HFrEF, <= 40%) ([facility]) 04/09/2025    Coronary artery disease involving native coronary artery of native heart without angina pectoris 01/29/2025    Tremor of right hand 07/08/2024    Gastroesophageal reflux disease 04/23/2024    LENY (obstructive sleep apnea) 01/17/2024    Allergic reaction to penicillin 11/07/2022    Neck pain 06/06/2022    Status post cervical spinal fusion 06/06/2022    Cervical radiculopathy 06/06/2022    Low back pain with sciatica 06/06/2022    Paroxysmal atrial fibrillation (HCC) 05/05/2022    S/P ICD (internal cardiac defibrillator) procedure 10/25/2021    Sick sinus syndrome (HCC) 08/16/2021    CPAP (continuous positive airway pressure) dependence     Intermittent palpitations 04/27/2021    Trigger thumb of right hand 12/10/2020    Trigger finger, right ring finger 12/10/2020    Carpal tunnel syndrome, bilateral 12/10/2020    Premature ventricular beats 10/12/2020    LENY and COPD overlap syndrome (HCC)     Cataract 08/05/2019    Need for malaria prophylaxis 01/23/2019    Osteoarthritis of fingers of both hands 11/01/2018    Tick bite of lower back 06/25/2018    Benign prostatic hyperplasia with nocturia 04/24/2018    Primary osteoarthritis of left knee 12/28/2017    Coronary artery disease of native artery of native heart with stable angina pectoris ([facility]) 07/10/2017    External hemorrhoids 03/11/2016    Environmental and seasonal allergies 05/27/2015    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 03/09/2015    Chronic back pain 03/09/2015    Sleep apnea 01/13/2015    Mixed hyperlipidemia 11/30/2012    Impaired fasting glucose 11/30/2012    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     Past Medical History:   Diagnosis Date    Abrasion of left foot     LAST ASSESSED: 9/23/13    Achilles tendinitis, unspecified leg     LAST ASSESSED: 11/30/12    Allergic rhinitis     LAST  ASSESSED: 13    Allergic rhinitis 2008    LAST ASSESSED: 08    Arthritis     Cervicalgia 2011    LAST ASSESSED: 11    Chronic pain disorder     Colon polyp     Coronary artery disease     CPAP (continuous positive airway pressure) dependence     Dysfunction of left eustachian tube     LAST ASSESSED: 13    Epistaxis     LAST ASSESSED: 5/27/15    First degree atrioventricular block     LAST ASSESSED: 7/10/17    Gastric ulcer 10/07/2011    LAST ASSESSED: 3/9/15    GERD (gastroesophageal reflux disease)     Hearing aid worn     Hemorrhoids 2011    LAST ASSESSED: 11    Hyperlipidemia     Hypertension     Irregular heart beat     Long term use of drug     LAST ASSESSED: 10/11/12    Myalgia 2007    LAST ASSESSED: 07    Myositis 2007    LAST ASSESSED: 07    Numbness and tingling of foot     LAST ASSESSED: 3/9/15    Pacemaker     Premature ventricular beats     states cardiologist ordered Zio ambulatory cardiac monitor    Sleep apnea     Wears glasses      Social History     Tobacco Use    Smoking status: Former     Current packs/day: 0.00     Types: Cigarettes     Quit date:      Years since quittin.2    Smokeless tobacco: Never   Vaping Use    Vaping status: Never Used   Substance Use Topics    Alcohol use: Yes     Alcohol/week: 7.0 standard drinks of alcohol     Types: 7 Glasses of wine per week     Comment: few times a week    Drug use: No      Family History   Problem Relation Age of Onset    Cancer Mother         SOFT TISSUE CANCER ON RT SIDE CHEST WALL AND     Prostate cancer Maternal Uncle      Past Surgical History:   Procedure Laterality Date    CARDIAC CATHETERIZATION  2017    CARDIAC ELECTROPHYSIOLOGY PROCEDURE N/A 10/25/2021    Procedure: CARDIAC EPS;  Surgeon: Uday Desir MD;  Location: [facility];  Service: Cardiology    CARDIAC ELECTROPHYSIOLOGY PROCEDURE Left 10/25/2021    Procedure: Cardiac icd implant;  Surgeon:  Uday Desir MD;  Location: [facility];  Service: Cardiology    CARDIAC ELECTROPHYSIOLOGY PROCEDURE N/A 3/21/2025    Procedure: upgrade to cardiac resynchronization therapy ICD;  Surgeon: Shemar Smith MD;  Location: [facility];  Service: Cardiology    CARDIAC PACEMAKER PLACEMENT      CARDIOVERSION N/A 10/25/2021    Procedure: Cardioversion;  Surgeon: Uday Desir MD;  Location: [facility];  Service: Cardiology    CATARACT EXTRACTION Right     CERVICAL FUSION      pt thinks C4-C5    CHOLECYSTECTOMY      LAPAROSCOPIC; LAST ASSESSED: 10/17/17    COLONOSCOPY      COMPLETE; 3-4 YEARS AGO BY [facility]; LAST ASSESSED: 8/18/14    IR UPPER EXTREMITY VENOGRAM- DIAGNOSTIC  1/31/2025    MYRINGOTOMY W/ TUBES      WITH VENTILATING TUBE INSERTION    GA HEMORRHOIDECTOMY INT & XTRNL 2/> COLUMN/GERBER N/A 7/27/2023    Procedure: HEMORRHOIDECTOMY EXCISION;  Surgeon: Clovis Leos MD;  Location: [facility];  Service: Colorectal    GA NEUROPLASTY &/TRANSPOS MEDIAN NRV CARPAL TUNNE Left 07/16/2021    Procedure: RELEASE CARPAL TUNNEL;  Surgeon: Luis Rubio MD;  Location: [facility];  Service: Orthopedics    GA NEUROPLASTY &/TRANSPOS MEDIAN NRV CARPAL TUNNE Right 10/14/2022    Procedure: RELEASE CARPAL TUNNEL;  Surgeon: Luis Rubio MD;  [facility];  Service: Orthopedics    GA TENDON SHEATH INCISION Right 10/14/2022    Procedure: RELEASE TRIGGER FINGER,RING AND LONG TENOSYNOVECTOMY FDS +FDP;  Surgeon: Luis Rubio MD;  Location: [facility];  Service: Orthopedics    VASECTOMY         Current Outpatient Medications:     aspirin 325 mg tablet, Take 325 mg by mouth daily, Disp: , Rfl:     cetirizine (ZyrTEC) 10 mg tablet, Take 10 mg by mouth daily, Disp: , Rfl:     Coenzyme Q10 (COQ10) 200 MG CAPS, Take 1 tablet by mouth daily, Disp: , Rfl:     diphenhydrAMINE (BENADRYL) 50 mg capsule, Take 1 capsule (50 mg total) by mouth see administration instructions Patient is to take one capsule  around 10 PM the night prior to his venogram and second capsule one hour prior to procedure. You will need a  to take you to and from your procedure., Disp: 2 capsule, Rfl: 0    famotidine (PEPCID) 20 mg tablet, Take one tab the night before surgery, and one tab the morning of surgery., Disp: 2 tablet, Rfl: 0    finasteride (PROSCAR) 5 mg tablet, Take 1 tablet (5 mg total) by mouth daily, Disp: 90 tablet, Rfl: 1    fluticasone (VERAMYST) 27.5 MCG/SPRAY nasal spray, 2 sprays into each nostril daily, Disp: , Rfl:     Glucosamine-Chondroitin (GLUCOSAMINE CHONDR COMPLEX PO), Take 1 tablet by mouth daily, Disp: , Rfl:     Omega-3 Fatty Acids (FISH OIL) 1000 MG CPDR, Take by mouth daily, Disp: , Rfl:     pantoprazole (PROTONIX) 40 mg tablet, Take 1 tablet (40 mg total) by mouth daily, Disp: 90 tablet, Rfl: 3    ranolazine (RANEXA) 500 mg 12 hr tablet, TAKE 1 TABLET BY MOUTH TWICE A DAY, Disp: 180 tablet, Rfl: 1    rosuvastatin (CRESTOR) 20 MG tablet, Take 1 tablet (20 mg total) by mouth daily, Disp: 90 tablet, Rfl: 3    sacubitril-valsartan (Entresto) 24-26 MG TABS, Take 1 tablet by mouth 2 (two) times a day, Disp: 180 tablet, Rfl: 1    sotalol (BETAPACE) 80 mg tablet, TAKE 1 TABLET BY MOUTH EVERY 12 HOURS, Disp: 180 tablet, Rfl: 3    Turmeric 500 MG CAPS, Take by mouth 3 (three) times a week, Disp: , Rfl:     COLLAGEN-CHOND-HYALURONIC ACID PO, Take 1 capsule by mouth in the morning (Patient not taking: Reported on 4/9/2025), Disp: , Rfl:     diphenhydrAMINE (BENADRYL) 50 mg capsule, Take one tab the night before surgery, and one tab the morning of surgery., Disp: 2 capsule, Rfl: 0    EPINEPHrine (EpiPen 2-Leroy) 0.3 mg/0.3 mL SOAJ, Inject 0.3 mL (0.3 mg total) into a muscle once for 1 dose, Disp: 0.6 mL, Rfl: 0    Allergies   Allergen Reactions    Amoxicillin Anaphylaxis    Augmentin [Amoxicillin-Pot Clavulanate] Anaphylaxis    Acetaminophen      Rapid heart rate    Cherry Flavor - Food Allergy Other (See Comments)      States allergy is to raw cherries difficulty breathing    Pollen Extract     Dye [Iodinated Contrast Media] Itching       Vitals:    04/09/25 1007   BP: 148/70   BP Location: Right arm   Patient Position: Sitting   Cuff Size: Standard   Pulse: 63   SpO2: 98%   Weight: 98.4 kg (217 lb)        Vitals:    04/09/25 1007   Weight: 98.4 kg (217 lb)          Body mass index is 29.43 kg/m².    Labs:   Lab Results   Component Value Date/Time    CHOLESTEROL 125 01/17/2025 08:51 AM    TRIG 109 01/17/2025 08:51 AM    HDL 36 (L) 01/17/2025 08:51 AM    LDLCALC 70 01/17/2025 08:51 AM      Lab Results   Component Value Date    SODIUM 139 03/10/2025    K 4.5 03/10/2025     03/10/2025    CREATININE 0.78 03/10/2025    EGFR 91 03/10/2025    BUN 13 03/10/2025    CO2 25 03/10/2025    ALT 37 10/01/2024    AST 22 10/01/2024    INR 1.01 03/10/2025    GLUF 99 03/10/2025    HGBA1C 5.8 (H) 10/01/2024    WBC 5.29 03/10/2025    HGB 15.8 03/10/2025    HCT 47.5 03/10/2025     03/10/2025         Imaging: XR knee 3 vw left non injury  Result Date: 4/8/2025  Narrative: XR KNEE 3 VW LEFT NON INJURY INDICATION: M25.562: Pain in left knee. COMPARISON: None FINDINGS: No acute fracture or dislocation. No joint effusion. There is moderate approaching severe medial compartment joint space narrowing. There are small marginal osteophytes at the medial and patellofemoral compartments. There are enthesophytes at the insertion of the quadriceps and patellar tendons. No lytic or blastic osseous lesion. Unremarkable soft tissues.     Impression: No acute osseous abnormality. Degenerative changes as described. Computerized Assisted Algorithm (CAA) may have been used to analyze all applicable images. Workstation performed: [id number]     Cardiac EP device report  Result Date: 3/31/2025  Narrative: MDT BI-V ICD / ACTIVE SYSTEM IS MRI CONDITIONAL NON-BILLABLE CARELINK TRANSMISSION: ALERT/TODAY, 05:51 A.M.  -  1 VT, 1 VT-NS EPISODE (SAME EVENT - 1 BEAT  DROP FROM COUNT) - TOTAL DURATION 75 BEATS @ 152 BPM, ACCELERATE  BPM - SUCCESSFUL TERMINATION W/ATP X1. AS/BVP @ AVG 72 PPM ON CURRENT EGM. PATIENT TAKING  MG, SOTALOL, ENTRESTO. EF 45% (24 ECHO). BATTERY VOLTAGE ADEQUATE (9.9 YR - ROMINA/EXTRACTION W/BIV UPGRADE ON 3/21/25). AP 40.9%  98.2% + VSR PACE <0.1% (CRT %). ALL AVAILABLE LEAD PARAMETERS WITHIN NORMAL LIMITS. 1 V SENSE EPISODE (TODAY'S EPISODE). OPTI-VOL FLUID THRESHOLD OPTIMIZING. L/M FOR PATIENT TO CALL TO ASSESS SYMPTOMS. NORMAL DEVICE FUNCTION.   ES     Cardiac ep lab eps/ablations  Result Date: 3/21/2025  Narrative: Images from the original result were not included. ELECTROPHYSIOLOGY OPERATIVE REPORT PATIENT NAME: Tucker Carvajal :  1955 MRN: [id number] Date of surgery: 25 Surgeon: Shemar Smith MD Pt Location: [facility] PROCEDURE PERFORMED: 1)BIVENTRICULAR ICD Implantable Cardioverter Defibrillator upgrade add LV lead to existing dual chamber ICD Preoperative Medications: Ancef ANESTHESIA: Conscious sedation PREOPERATIVE DIAGNOSIS: 1. Chronic systolic CHF EF 34% on GDMT decline in EF. 2) Complete heart block 100% RV paced, dependent NCDR ICD REGISTRY INFO Heart Failure: Yes NYHA Functional Classification: Class III. Ischemic Cardiomyopathy: Yes. Timeframe: 0 >= 3 Months. Guideline Directed Medical Therapy Maximum Dose - Yes (for 3 Months).  Non-Ischemic Cardiomyopathy: Yes. Timeframe: 0 < 3 Months. Guideline Directed Medical Therapy Maximum Dose - Yes (for 3 Months).  Ejection Fraction: 35 QRS Morphology: Paced, Width: 170 ms Ventricular Tachycardia: No. Indications for Permanent Pacemaker: Yes. Class I or Class II Guideline Bradycardia Pacemaker Indication Present - Yes. Reason Pacing Indicated - Complete Heart Block (Intrinsic). Ejection fraction < 35%. Anticipated Requirement of >40% RV Pacing - Yes.  ICD Indication: Primary Prevention. A formal shared decision making encounter has occurred with the  patient using an evidence-based decision tool on ICDs prior to initial ICD implantation: N/A Generator changes only- Patients clinical condition is unchanged and the LVEF will not be assessed: N/A Syndrome(s) w/Risk of Sudden Death: No. POSTOPERATIVE DIAGNOSIS: Successful Biventricular ICD Implant  Same as Preop. Informed Consent: Risks, benefits, and alternatives discussed with patient and any family present. He understands risks, which include but are not limited to life threatening  bleeding, infection, air around lungs, blood around the heart and reoperation dislodged or malfunctioning device, also discussed 5% chance anatomy not suitable for LV lead. Procedure Description: After informed consent was obtained, the patient was brought to the electrophysiology laboratory in the post-absorptive state. A time out was called and the patient was properly identified. The patient was pre-medicated as above. The LEFT infraclavicular area was prepped and draped in the usual sterile fashion. After local anesthetic infiltration with 1% lidocaine, an incision was made below the left clavicle. The incision was extended down to the level of the pectoral fascia. The old dual ICD was freed up with blunt dissection and plasma blade. . A cephalic cut down approach was done but making small venotomy in cephalic vein and using 2-0 silk ties distally and proximally to control bleeding. Left Ventricular lead was place using a 9F safe safe sheath Super CS through an ATTAIN Extended hook Outer sheath, the sheath was placed in the CS. A venogram, showing suitable lateral branch.An inner guide was used  to cannulate the branch, a Run-through 0.14mm wire was used to cannulate the branch and the CS Lead was advanced over the wire. All sheaths were slit and the lead was tied down with 2-0 Ethibond. The old HV RV lead and RA lead was connected to new BIV ICD generator and new LV lead connected. Old generator removed. A Lenddo absorbable  ICD pouch was used. The lead and pulse generator were placed in the pre-pectoral pocket. The pocket was then closed with 3 layers of 2-0, 3-0, 4-0 -Vicryl suture was used to close the skin. EBL: Minimal Complications: None Contrast:see report Findings: The patient tolerated the procedure well. Plan: Routine postoperative care, CXR. Follow-up in 2 weeks with incision check and interrogation.     XR chest portable  Result Date: 3/21/2025  Narrative: XR CHEST PORTABLE INDICATION: Patient s/p Pacemaker/ICD Insertion. COMPARISON: CXR 4/5/2023, 10/25/2021, chest CT 10/12/2020. FINDINGS: Clear lungs. No pneumothorax or pleural effusion. Normal heart size with left subclavian ICD leads in the right atrium, right ventricle, and cardiac vein. Bones are unremarkable for age. ACDF. Normal upper abdomen.     Impression: ICD well-positioned with no pneumothorax. Workstation performed: [id number]       ECG:    Reviewed by DARIUS Ulrich      Review of Systems   All other systems reviewed and are negative.    Except as noted in HPI, is otherwise reviewed in detail and a 12 point review of systems is negative.    Physical Exam  Vitals reviewed.   Constitutional:       General: He is not in acute distress.     Appearance: Normal appearance. He is not diaphoretic.   HENT:      Head: Normocephalic and atraumatic.   Cardiovascular:      Rate and Rhythm: Normal rate and regular rhythm.      Pulses: Normal pulses.           Radial pulses are 2+ on the right side and 2+ on the left side.      Heart sounds: Normal heart sounds, S1 normal and S2 normal.   Pulmonary:      Effort: Pulmonary effort is normal. No tachypnea or respiratory distress.      Breath sounds: Normal breath sounds. No wheezing or rales.   Abdominal:      General: Bowel sounds are normal. There is no distension.      Palpations: Abdomen is soft.   Musculoskeletal:      Right lower leg: No edema.      Left lower leg: No edema.   Skin:     General: Skin is warm and dry.       Capillary Refill: Capillary refill takes less than 2 seconds.   Neurological:      Mental Status: He is alert and oriented to person, place, and time.   Psychiatric:         Mood and Affect: Mood normal.         Behavior: Behavior normal.          **Please Note: This note may have been constructed using a voice recognition system**     Thank you for the opportunity to participate in the care of this patient.   DARIUS Ulrich

## 2025-04-09 NOTE — ASSESSMENT & PLAN NOTE
Wt Readings from Last 3 Encounters:   04/09/25 98.4 kg (217 lb)   04/07/25 98.9 kg (217 lb 14.8 oz)   03/21/25 98.9 kg (217 lb 14.8 oz)   - 6/8/22 echo EF 60%  - 12/27/24 echo: EF 45%  - 12/27/24 lexiscan stress test Negative study for major reversible defects to suggest ischemia. Paradoxical defect improved with prone imaging. Reduced EF 34% by gated imaging. Compared to prior stress study no major changed. No major arrhythmias noted with vasodilation   Patient has no evidence of volume overload.  Continue on Entresto  Repeat echo in 2 months

## 2025-04-09 NOTE — PROGRESS NOTES
Results for orders placed or performed in visit on 04/09/25   Cardiac EP device report    Narrative    MDT BI-V ICD / ACTIVE SYSTEM IS MRI CONDITIONAL  DEVICE INTERROGATED IN THE [place] OFFICE. WOUND CHECK: INCISION CLEAN AND DRY WITH EDGES APPROXIMATED; WOUND CARE AND RESTRICTIONS REVIEWED WITH PATIENT (SEE PIC IN EPOC-MEDIA).  BATTERY VOLTAGE ADEQUATE (10 YRS).  AP 48.2%   98.4%.  ALL LEAD PARAMETERS WITHIN NORMAL LIMITS. 3 VT NS EPISODES, EGM'S SHOW NSVT, B BEATS @ 194 BPM, 7 @ 152, 11 @ 170. PT ASYMPTOMATIC.  PT TAKES ASA, SOTALOL, ENTRESTO.  GIVEN METOPROLOL SUCC @ OV W/ DARIUS NAVARRETE TODAY-BUT PT WILL NOT START YET, WHICH WAS OK'D BY REI DUNCAN.  3 V SENSE EPISODES, LONGEST EPISODE 6 SECS, EF 45% (ECHO 12/27/2024).   PVC COUNT 81.6/HR (SINGLES). OPTI-VOL WITHIN NORMAL LIMITS, AND NORMALIZING.  NO PROGRAMMING CHANGES MADE TO DEVICE PARAMETERS.  NORMAL DEVICE FUNCTION.  PAS

## 2025-04-09 NOTE — TELEPHONE ENCOUNTER
----- Message from Robert Vences MD sent at 4/9/2025  3:50 PM EDT -----  Patient's device interrogation reading shows,  device function is normal.  Patient continues to have episode of NSVT.  Has been discussed with EP in past about patient's this episodes of slow VT..  They would like to continue same Rx.  Patient is asymptomatic.      Please call patient.

## 2025-04-09 NOTE — TELEPHONE ENCOUNTER
I spoke with patient, made aware of results.   He was told to start metoprolol and he reports feeling like a zombie.   He was advised to take 12.5mg to try it out.. H  inocencia when discussed with Sharif, he was going to hold off on medications at this time.

## 2025-04-09 NOTE — ASSESSMENT & PLAN NOTE
Coronary artery disease status post catheterization in June 2017 has moderate ostial circ disease and nonobstructive disease in other arteries. He has repeat cardiac catheterization done. Cath was done in August 2021. Patient has 50-70% ostial circumflex stenosis which is non dominant artery and IFR is 0.96 other arteries have nonobstructive disease. Continue statins and aspirin. ranexa  Denies any angina or anginal equivalent

## 2025-04-09 NOTE — RESULT ENCOUNTER NOTE
Patient's device interrogation reading shows,  device function is normal.  Patient continues to have episode of NSVT.  Has been discussed with EP in past about patient's this episodes of slow VT..  They would like to continue same Rx.  Patient is asymptomatic.      Please call patient.

## 2025-04-09 NOTE — ASSESSMENT & PLAN NOTE
Continue on sotalol  Repeat interrogation shows no further episodes of atrial fibrillation. No longer on eliquis  Continue on aspirin.

## 2025-04-11 DIAGNOSIS — K21.9 GASTROESOPHAGEAL REFLUX DISEASE, UNSPECIFIED WHETHER ESOPHAGITIS PRESENT: ICD-10-CM

## 2025-04-11 RX ORDER — PANTOPRAZOLE SODIUM 40 MG/1
40 TABLET, DELAYED RELEASE ORAL DAILY
Qty: 90 TABLET | Refills: 1 | Status: SHIPPED | OUTPATIENT
Start: 2025-04-11

## 2025-04-23 DIAGNOSIS — R35.1 BENIGN PROSTATIC HYPERPLASIA WITH NOCTURIA: ICD-10-CM

## 2025-04-23 DIAGNOSIS — N40.1 BENIGN PROSTATIC HYPERPLASIA WITH NOCTURIA: ICD-10-CM

## 2025-04-23 RX ORDER — FINASTERIDE 5 MG/1
5 TABLET, FILM COATED ORAL DAILY
Qty: 90 TABLET | Refills: 1 | Status: SHIPPED | OUTPATIENT
Start: 2025-04-23

## 2025-05-02 ENCOUNTER — RA CDI HCC (OUTPATIENT)
Dept: OTHER | Facility: HOSPITAL | Age: 70
End: 2025-05-02

## 2025-05-02 ENCOUNTER — TELEPHONE (OUTPATIENT)
Age: 70
End: 2025-05-02

## 2025-05-02 NOTE — PROGRESS NOTES
HCC coding opportunities          Chart Reviewed number of suggestions sent to Provider: 1     Patients Insurance     Medicare Insurance: Medicare        [id number]

## 2025-05-02 NOTE — TELEPHONE ENCOUNTER
Patient called because he misplaced his lab orders and would like copies uploaded to his [employer] portal. Please advise. Thank you.

## 2025-05-02 NOTE — TELEPHONE ENCOUNTER
I called patient back and explained his lab orders are located under upcoming tests and procedures on his MyChart. He also said he would be going to [facility] lab and I explained they wouldn't require the lab order script.

## 2025-05-05 ENCOUNTER — RESULTS FOLLOW-UP (OUTPATIENT)
Dept: FAMILY MEDICINE CLINIC | Facility: CLINIC | Age: 70
End: 2025-05-05

## 2025-05-05 ENCOUNTER — APPOINTMENT (OUTPATIENT)
Dept: LAB | Facility: CLINIC | Age: 70
End: 2025-05-05
Payer: MEDICARE

## 2025-05-05 DIAGNOSIS — E78.2 MIXED HYPERLIPIDEMIA: ICD-10-CM

## 2025-05-05 DIAGNOSIS — I48.0 PAROXYSMAL ATRIAL FIBRILLATION (HCC): ICD-10-CM

## 2025-05-05 DIAGNOSIS — R73.01 IMPAIRED FASTING GLUCOSE: ICD-10-CM

## 2025-05-05 DIAGNOSIS — I25.118 CORONARY ARTERY DISEASE OF NATIVE ARTERY OF NATIVE HEART WITH STABLE ANGINA PECTORIS (HCC): ICD-10-CM

## 2025-05-05 LAB
ALBUMIN SERPL BCG-MCNC: 4.6 G/DL (ref 3.5–5)
ALP SERPL-CCNC: 63 U/L (ref 34–104)
ALT SERPL W P-5'-P-CCNC: 31 U/L (ref 7–52)
ANION GAP SERPL CALCULATED.3IONS-SCNC: 7 MMOL/L (ref 4–13)
AST SERPL W P-5'-P-CCNC: 19 U/L (ref 13–39)
BASOPHILS # BLD AUTO: 0.03 THOUSANDS/ÂΜL (ref 0–0.1)
BASOPHILS NFR BLD AUTO: 1 % (ref 0–1)
BILIRUB SERPL-MCNC: 1.73 MG/DL (ref 0.2–1)
BUN SERPL-MCNC: 18 MG/DL (ref 5–25)
CALCIUM SERPL-MCNC: 9.7 MG/DL (ref 8.4–10.2)
CHLORIDE SERPL-SCNC: 104 MMOL/L (ref 96–108)
CHOLEST SERPL-MCNC: 141 MG/DL (ref ?–200)
CO2 SERPL-SCNC: 28 MMOL/L (ref 21–32)
CREAT SERPL-MCNC: 0.93 MG/DL (ref 0.6–1.3)
EOSINOPHIL # BLD AUTO: 0.09 THOUSAND/ÂΜL (ref 0–0.61)
EOSINOPHIL NFR BLD AUTO: 2 % (ref 0–6)
ERYTHROCYTE [DISTWIDTH] IN BLOOD BY AUTOMATED COUNT: 13.1 % (ref 11.6–15.1)
EST. AVERAGE GLUCOSE BLD GHB EST-MCNC: 120 MG/DL
GFR SERPL CREATININE-BSD FRML MDRD: 82 ML/MIN/1.73SQ M
GLUCOSE P FAST SERPL-MCNC: 100 MG/DL (ref 65–99)
HBA1C MFR BLD: 5.8 %
HCT VFR BLD AUTO: 50.1 % (ref 36.5–49.3)
HDLC SERPL-MCNC: 44 MG/DL
HGB BLD-MCNC: 16.5 G/DL (ref 12–17)
IMM GRANULOCYTES # BLD AUTO: 0.01 THOUSAND/UL (ref 0–0.2)
IMM GRANULOCYTES NFR BLD AUTO: 0 % (ref 0–2)
LDLC SERPL CALC-MCNC: 72 MG/DL (ref 0–100)
LYMPHOCYTES # BLD AUTO: 1.61 THOUSANDS/ÂΜL (ref 0.6–4.47)
LYMPHOCYTES NFR BLD AUTO: 30 % (ref 14–44)
MCH RBC QN AUTO: 31.9 PG (ref 26.8–34.3)
MCHC RBC AUTO-ENTMCNC: 32.9 G/DL (ref 31.4–37.4)
MCV RBC AUTO: 97 FL (ref 82–98)
MONOCYTES # BLD AUTO: 0.61 THOUSAND/ÂΜL (ref 0.17–1.22)
MONOCYTES NFR BLD AUTO: 11 % (ref 4–12)
NEUTROPHILS # BLD AUTO: 3.02 THOUSANDS/ÂΜL (ref 1.85–7.62)
NEUTS SEG NFR BLD AUTO: 56 % (ref 43–75)
NONHDLC SERPL-MCNC: 97 MG/DL
NRBC BLD AUTO-RTO: 0 /100 WBCS
PLATELET # BLD AUTO: 185 THOUSANDS/UL (ref 149–390)
PMV BLD AUTO: 9.7 FL (ref 8.9–12.7)
POTASSIUM SERPL-SCNC: 4.7 MMOL/L (ref 3.5–5.3)
PROT SERPL-MCNC: 7.3 G/DL (ref 6.4–8.4)
RBC # BLD AUTO: 5.17 MILLION/UL (ref 3.88–5.62)
SODIUM SERPL-SCNC: 139 MMOL/L (ref 135–147)
TRIGL SERPL-MCNC: 124 MG/DL (ref ?–150)
TSH SERPL DL<=0.05 MIU/L-ACNC: 2.53 UIU/ML (ref 0.45–4.5)
WBC # BLD AUTO: 5.37 THOUSAND/UL (ref 4.31–10.16)

## 2025-05-05 PROCEDURE — 36415 COLL VENOUS BLD VENIPUNCTURE: CPT

## 2025-05-05 PROCEDURE — 85025 COMPLETE CBC W/AUTO DIFF WBC: CPT

## 2025-05-05 PROCEDURE — 80053 COMPREHEN METABOLIC PANEL: CPT

## 2025-05-05 PROCEDURE — 84443 ASSAY THYROID STIM HORMONE: CPT

## 2025-05-05 PROCEDURE — 83036 HEMOGLOBIN GLYCOSYLATED A1C: CPT

## 2025-05-05 PROCEDURE — 80061 LIPID PANEL: CPT

## 2025-05-09 ENCOUNTER — OFFICE VISIT (OUTPATIENT)
Dept: FAMILY MEDICINE CLINIC | Facility: CLINIC | Age: 70
End: 2025-05-09
Payer: MEDICARE

## 2025-05-09 VITALS
DIASTOLIC BLOOD PRESSURE: 74 MMHG | SYSTOLIC BLOOD PRESSURE: 128 MMHG | OXYGEN SATURATION: 97 % | HEART RATE: 68 BPM | HEIGHT: 72 IN | BODY MASS INDEX: 29.26 KG/M2 | WEIGHT: 216 LBS | RESPIRATION RATE: 16 BRPM

## 2025-05-09 DIAGNOSIS — G47.33 OSA AND COPD OVERLAP SYNDROME (HCC): ICD-10-CM

## 2025-05-09 DIAGNOSIS — I49.3 PREMATURE VENTRICULAR BEATS: ICD-10-CM

## 2025-05-09 DIAGNOSIS — J44.9 OSA AND COPD OVERLAP SYNDROME (HCC): ICD-10-CM

## 2025-05-09 DIAGNOSIS — I50.22 CHRONIC HEART FAILURE WITH REDUCED EJECTION FRACTION (HFREF, <= 40%) (HCC): Primary | ICD-10-CM

## 2025-05-09 DIAGNOSIS — Z12.5 PROSTATE CANCER SCREENING: ICD-10-CM

## 2025-05-09 DIAGNOSIS — I25.118 CORONARY ARTERY DISEASE OF NATIVE ARTERY OF NATIVE HEART WITH STABLE ANGINA PECTORIS (HCC): ICD-10-CM

## 2025-05-09 DIAGNOSIS — R73.01 IMPAIRED FASTING GLUCOSE: ICD-10-CM

## 2025-05-09 DIAGNOSIS — E78.2 MIXED HYPERLIPIDEMIA: ICD-10-CM

## 2025-05-09 PROCEDURE — G2211 COMPLEX E/M VISIT ADD ON: HCPCS | Performed by: FAMILY MEDICINE

## 2025-05-09 PROCEDURE — 99215 OFFICE O/P EST HI 40 MIN: CPT | Performed by: FAMILY MEDICINE

## 2025-05-09 NOTE — ASSESSMENT & PLAN NOTE
Goal LDL less than 70 with his history of heart disease.  He is now on Crestor 20 mg once daily.  Has not experienced any side effects from increased dose

## 2025-05-09 NOTE — ASSESSMENT & PLAN NOTE
Wt Readings from Last 3 Encounters:   05/09/25 98 kg (216 lb)   04/09/25 98.4 kg (217 lb)   04/07/25 98.9 kg (217 lb 14.8 oz)     Patient remains on Entresto and is scheduled for repeat echocardiogram in June    - Patient feels much better.  Appears to be euvolemic.  No lower extremity edema or pulmonary adventitious findings

## 2025-05-09 NOTE — PROGRESS NOTES
"  Subjective:      Patient ID: Tucker Carvajal is a 69 y.o. male.    69-year-old male with past medical history of coronary artery disease, cardiomyopathy  with reduced left ventricular ejection fraction, hypertension, hyperlipidemia, obstructive sleep apnea presents to the office for follow-up of chronic conditions.  In March the patient had pacemaker change and in January he was placed on Entresto.  Patient states that he feels as good as he did when he was 59.  He was able to go to a shooting range and walk up and down a hill for some distance without stopping whereas normally he would have to stop 2 or 3 times.  No shortness of breath.  No chest pain.  No palpitations.  Does complain of allergy symptoms this season and he did have to take Zyrtec-D but now is back to taking Zyrtec and Flonase.  Labs reviewed which showed normal CBC, hemoglobin A1c at 5.8%, normal TSH, total cholesterol 141, HDL 44, LDL 72.  He has no complaints and reportedly feels \"terrific\"        Past Medical History:   Diagnosis Date   • Abrasion of left foot     LAST ASSESSED: 9/23/13   • Achilles tendinitis, unspecified leg     LAST ASSESSED: 11/30/12   • Allergic rhinitis     LAST ASSESSED: 11/25/13   • Allergic rhinitis 06/25/2008    LAST ASSESSED: 6/25/08   • Arthritis    • Cervicalgia 04/22/2011    LAST ASSESSED: 4/22/11   • Chronic pain disorder    • Colon polyp    • Coronary artery disease    • CPAP (continuous positive airway pressure) dependence    • Dysfunction of left eustachian tube     LAST ASSESSED: 9/23/13   • Epistaxis     LAST ASSESSED: 5/27/15   • First degree atrioventricular block     LAST ASSESSED: 7/10/17   • Gastric ulcer 10/07/2011    LAST ASSESSED: 3/9/15   • GERD (gastroesophageal reflux disease)    • Hearing aid worn    • Hemorrhoids 05/13/2011    LAST ASSESSED: 5/13/11   • Hyperlipidemia    • Hypertension    • Irregular heart beat    • Long term use of drug     LAST ASSESSED: 10/11/12   • Myalgia 12/21/2007    " LAST ASSESSED: 07   • Myositis 2007    LAST ASSESSED: 07   • Numbness and tingling of foot     LAST ASSESSED: 3/9/15   • Pacemaker    • Premature ventricular beats     states cardiologist ordered Zio ambulatory cardiac monitor   • Sleep apnea    • Wears glasses        Family History   Problem Relation Age of Onset   • Cancer Mother         SOFT TISSUE CANCER ON RT SIDE CHEST WALL AND    • Prostate cancer Maternal Uncle        Past Surgical History:   Procedure Laterality Date   • CARDIAC CATHETERIZATION     • CARDIAC ELECTROPHYSIOLOGY PROCEDURE N/A 10/25/2021    Procedure: CARDIAC EPS;  Surgeon: Uday Desir MD;  [facility]: [facility];  Service: Cardiology   • CARDIAC ELECTROPHYSIOLOGY PROCEDURE Left 10/25/2021    Procedure: Cardiac icd implant;  Surgeon: Uday Desir MD;  [facility];  Service: Cardiology   • CARDIAC ELECTROPHYSIOLOGY PROCEDURE N/A 3/21/2025    Procedure: upgrade to cardiac resynchronization therapy ICD;  Surgeon: Shemar Smith MD;  Location: [facility];  Service: Cardiology   • CARDIAC PACEMAKER PLACEMENT     • CARDIOVERSION N/A 10/25/2021    Procedure: Cardioversion;  Surgeon: Uday Desir MD;  [facility];  Service: Cardiology   • CATARACT EXTRACTION Right    • CERVICAL FUSION      pt thinks C4-C5   • CHOLECYSTECTOMY      LAPAROSCOPIC; LAST ASSESSED: 10/17/17   • COLONOSCOPY      COMPLETE; 3-4 YEARS AGO BY [facility] ; LAST ASSESSED: 14   • IR UPPER EXTREMITY VENOGRAM- DIAGNOSTIC  2025   • MYRINGOTOMY W/ TUBES      WITH VENTILATING TUBE INSERTION   • WA HEMORRHOIDECTOMY INT & XTRNL 2/> COLUMN/GERBER N/A 2023    Procedure: HEMORRHOIDECTOMY EXCISION;  Surgeon: Clovis Leos MD;  Location: [facility];  Service: Colorectal   • WA NEUROPLASTY &/TRANSPOS MEDIAN NRV CARPAL TUNNE Left 2021    Procedure: RELEASE CARPAL TUNNEL;  Surgeon: Luis Rubio MD;  [facility]: [facility];  Service: Orthopedics    • IL NEUROPLASTY &/TRANSPOS MEDIAN NRV CARPAL TUNNE Right 10/14/2022    Procedure: RELEASE CARPAL TUNNEL;  Surgeon: Luis Rubio MD;  [facility];  Service: Orthopedics   • IL TENDON SHEATH INCISION Right 10/14/2022    Procedure: RELEASE TRIGGER FINGER,RING AND LONG TENOSYNOVECTOMY FDS +FDP;  Surgeon: Luis Rubio MD;  [facility];  Service: Orthopedics   • VASECTOMY          reports that he quit smoking about 35 years ago. His smoking use included cigarettes. He has never used smokeless tobacco. He reports current alcohol use of about 7.0 standard drinks of alcohol per week. He reports that he does not use drugs.      Current Outpatient Medications:   •  aspirin 325 mg tablet, Take 325 mg by mouth daily, Disp: , Rfl:   •  cetirizine (ZyrTEC) 10 mg tablet, Take 10 mg by mouth daily, Disp: , Rfl:   •  Coenzyme Q10 (COQ10) 200 MG CAPS, Take 1 tablet by mouth daily, Disp: , Rfl:   •  famotidine (PEPCID) 20 mg tablet, Take one tab the night before surgery, and one tab the morning of surgery., Disp: 2 tablet, Rfl: 0  •  finasteride (PROSCAR) 5 mg tablet, TAKE 1 TABLET (5 MG TOTAL) BY MOUTH DAILY., Disp: 90 tablet, Rfl: 1  •  fluticasone (VERAMYST) 27.5 MCG/SPRAY nasal spray, 2 sprays into each nostril daily, Disp: , Rfl:   •  Glucosamine-Chondroitin (GLUCOSAMINE CHONDR COMPLEX PO), Take 1 tablet by mouth daily, Disp: , Rfl:   •  metoprolol succinate (TOPROL-XL) 25 mg 24 hr tablet, Take 0.5 tablets (12.5 mg total) by mouth daily, Disp: 45 tablet, Rfl: 2  •  Omega-3 Fatty Acids (FISH OIL) 1000 MG CPDR, Take by mouth daily, Disp: , Rfl:   •  pantoprazole (PROTONIX) 40 mg tablet, TAKE 1 TABLET BY MOUTH EVERY DAY, Disp: 90 tablet, Rfl: 1  •  ranolazine (RANEXA) 500 mg 12 hr tablet, TAKE 1 TABLET BY MOUTH TWICE A DAY, Disp: 180 tablet, Rfl: 1  •  rosuvastatin (CRESTOR) 20 MG tablet, Take 1 tablet (20 mg total) by mouth daily, Disp: 90 tablet, Rfl: 3  •  sacubitril-valsartan (Entresto) 24-26 MG  TABS, Take 1 tablet by mouth 2 (two) times a day, Disp: 180 tablet, Rfl: 1  •  sotalol (BETAPACE) 80 mg tablet, TAKE 1 TABLET BY MOUTH EVERY 12 HOURS, Disp: 180 tablet, Rfl: 3  •  Turmeric 500 MG CAPS, Take by mouth 3 (three) times a week, Disp: , Rfl:   •  COLLAGEN-CHOND-HYALURONIC ACID PO, Take 1 capsule by mouth in the morning (Patient not taking: Reported on 4/9/2025), Disp: , Rfl:   •  diphenhydrAMINE (BENADRYL) 50 mg capsule, Take 1 capsule (50 mg total) by mouth see administration instructions Patient is to take one capsule around 10 PM the night prior to his venogram and second capsule one hour prior to procedure. You will need a  to take you to and from your procedure. (Patient not taking: Reported on 5/9/2025), Disp: 2 capsule, Rfl: 0  •  EPINEPHrine (EpiPen 2-Leroy) 0.3 mg/0.3 mL SOAJ, Inject 0.3 mL (0.3 mg total) into a muscle once for 1 dose, Disp: 0.6 mL, Rfl: 0    The following portions of the patient's history were reviewed and updated as appropriate: allergies, current medications, past family history, past medical history, past social history, past surgical history and problem list.    Review of Systems   Constitutional: Negative.    HENT:  Positive for congestion and sneezing.    Eyes: Negative.    Respiratory: Negative.     Cardiovascular: Negative.    Gastrointestinal: Negative.    Endocrine: Negative.    Genitourinary: Negative.    Musculoskeletal: Negative.    Skin: Negative.    Allergic/Immunologic: Negative.    Neurological: Negative.    Hematological: Negative.    Psychiatric/Behavioral: Negative.     All other systems reviewed and are negative.          Objective:    /74   Pulse 68   Resp 16   Ht 6' (1.829 m)   Wt 98 kg (216 lb)   SpO2 97%   BMI 29.29 kg/m²      Physical Exam  Vitals and nursing note reviewed.   Constitutional:       General: He is not in acute distress.     Appearance: Normal appearance. He is well-developed and normal weight. He is not ill-appearing.    HENT:      Head: Normocephalic and atraumatic.      Right Ear: Tympanic membrane, ear canal and external ear normal.      Left Ear: Tympanic membrane, ear canal and external ear normal.      Nose: Nose normal.      Mouth/Throat:      Mouth: Mucous membranes are moist.   Eyes:      Extraocular Movements: Extraocular movements intact.      Conjunctiva/sclera: Conjunctivae normal.      Pupils: Pupils are equal, round, and reactive to light.   Cardiovascular:      Rate and Rhythm: Normal rate and regular rhythm.      Pulses: Normal pulses.      Heart sounds: Normal heart sounds. No murmur heard.  Pulmonary:      Effort: Pulmonary effort is normal.      Breath sounds: Normal breath sounds.   Abdominal:      General: Abdomen is flat. Bowel sounds are normal.      Palpations: Abdomen is soft.   Musculoskeletal:         General: Normal range of motion.      Cervical back: Normal range of motion and neck supple.      Right lower leg: No edema.      Left lower leg: No edema.   Skin:     General: Skin is warm and dry.   Neurological:      General: No focal deficit present.      Mental Status: He is alert and oriented to person, place, and time.   Psychiatric:         Mood and Affect: Mood normal.         Behavior: Behavior normal.         Thought Content: Thought content normal.         Judgment: Judgment normal.           Recent Results (from the past 6 weeks)   Hemoglobin A1C    Collection Time: 05/05/25  9:07 AM   Result Value Ref Range    Hemoglobin A1C 5.8 (H) Normal 4.0-5.6%; PreDiabetic 5.7-6.4%; Diabetic >=6.5%; Glycemic control for adults with diabetes <7.0% %     mg/dl   TSH, 3rd generation with Free T4 reflex    Collection Time: 05/05/25  9:07 AM   Result Value Ref Range    TSH 3RD GENERATON 2.534 0.450 - 4.500 uIU/mL   Lipid panel    Collection Time: 05/05/25  9:07 AM   Result Value Ref Range    Cholesterol 141 See Comment mg/dL    Triglycerides 124 See Comment mg/dL    HDL, Direct 44 >=40 mg/dL    LDL  Calculated 72 0 - 100 mg/dL    Non-HDL-Chol (CHOL-HDL) 97 mg/dl   Comprehensive metabolic panel    Collection Time: 05/05/25  9:07 AM   Result Value Ref Range    Sodium 139 135 - 147 mmol/L    Potassium 4.7 3.5 - 5.3 mmol/L    Chloride 104 96 - 108 mmol/L    CO2 28 21 - 32 mmol/L    ANION GAP 7 4 - 13 mmol/L    BUN 18 5 - 25 mg/dL    Creatinine 0.93 0.60 - 1.30 mg/dL    Glucose, Fasting 100 (H) 65 - 99 mg/dL    Calcium 9.7 8.4 - 10.2 mg/dL    AST 19 13 - 39 U/L    ALT 31 7 - 52 U/L    Alkaline Phosphatase 63 34 - 104 U/L    Total Protein 7.3 6.4 - 8.4 g/dL    Albumin 4.6 3.5 - 5.0 g/dL    Total Bilirubin 1.73 (H) 0.20 - 1.00 mg/dL    eGFR 82 ml/min/1.73sq m   CBC and differential    Collection Time: 05/05/25  9:07 AM   Result Value Ref Range    WBC 5.37 4.31 - 10.16 Thousand/uL    RBC 5.17 3.88 - 5.62 Million/uL    Hemoglobin 16.5 12.0 - 17.0 g/dL    Hematocrit 50.1 (H) 36.5 - 49.3 %    MCV 97 82 - 98 fL    MCH 31.9 26.8 - 34.3 pg    MCHC 32.9 31.4 - 37.4 g/dL    RDW 13.1 11.6 - 15.1 %    MPV 9.7 8.9 - 12.7 fL    Platelets 185 149 - 390 Thousands/uL    nRBC 0 /100 WBCs    Segmented % 56 43 - 75 %    Immature Grans % 0 0 - 2 %    Lymphocytes % 30 14 - 44 %    Monocytes % 11 4 - 12 %    Eosinophils Relative 2 0 - 6 %    Basophils Relative 1 0 - 1 %    Absolute Neutrophils 3.02 1.85 - 7.62 Thousands/µL    Absolute Immature Grans 0.01 0.00 - 0.20 Thousand/uL    Absolute Lymphocytes 1.61 0.60 - 4.47 Thousands/µL    Absolute Monocytes 0.61 0.17 - 1.22 Thousand/µL    Eosinophils Absolute 0.09 0.00 - 0.61 Thousand/µL    Basophils Absolute 0.03 0.00 - 0.10 Thousands/µL       Assessment/Plan:    Chronic heart failure with reduced ejection fraction (HFrEF, <= 40%) ([facility])  Wt Readings from Last 3 Encounters:   05/09/25 98 kg (216 lb)   04/09/25 98.4 kg (217 lb)   04/07/25 98.9 kg (217 lb 14.8 oz)     Patient remains on Entresto and is scheduled for repeat echocardiogram in June    - Patient feels much better.  Appears to be  euvolemic.  No lower extremity edema or pulmonary adventitious findings          Coronary artery disease of native artery of native heart with stable angina pectoris ([facility])  Patient remains on Ranexa, statin, aspirin, sotalol.  Scheduled for follow-up with cardiology    Premature ventricular beats  Patient has pacemaker in place.  Unaware of his ectopic beats    LENY and COPD overlap syndrome ([facility])  Remains on CPAP and does follow-up with sleep specialist    Impaired fasting glucose  Hemoglobin A1c of 5.8%.  Goal is less than 7%.  Continue to watch dietary intake of carbohydrates.  Repeat A1c in 6 months    Mixed hyperlipidemia  Goal LDL less than 70 with his history of heart disease.  He is now on Crestor 20 mg once daily.  Has not experienced any side effects from increased dose          Problem List Items Addressed This Visit        Cardiovascular and Mediastinum    Chronic heart failure with reduced ejection fraction (HFrEF, <= 40%) ([facility]) - Primary    Wt Readings from Last 3 Encounters:   05/09/25 98 kg (216 lb)   04/09/25 98.4 kg (217 lb)   04/07/25 98.9 kg (217 lb 14.8 oz)     Patient remains on Entresto and is scheduled for repeat echocardiogram in June    - Patient feels much better.  Appears to be euvolemic.  No lower extremity edema or pulmonary adventitious findings               Relevant Orders    CBC and differential    TSH, 3rd generation with Free T4 reflex    Coronary artery disease of native artery of native heart with stable angina pectoris ([facility])    Patient remains on Ranexa, statin, aspirin, sotalol.  Scheduled for follow-up with cardiology         Premature ventricular beats    Patient has pacemaker in place.  Unaware of his ectopic beats            Respiratory    LENY and COPD overlap syndrome ([facility])    Remains on CPAP and does follow-up with sleep specialist            Endocrine    Impaired fasting glucose    Hemoglobin A1c of 5.8%.  Goal is less than 7%.  Continue to watch dietary intake of  carbohydrates.  Repeat A1c in 6 months         Relevant Orders    Hemoglobin A1C       Other    Mixed hyperlipidemia    Goal LDL less than 70 with his history of heart disease.  He is now on Crestor 20 mg once daily.  Has not experienced any side effects from increased dose         Relevant Orders    Comprehensive metabolic panel    Lipid panel   Other Visit Diagnoses       Prostate cancer screening        Relevant Orders    PSA, Total Screen          I have spent a total time of 41 minutes in caring for this patient on the day of the visit/encounter including Diagnostic results, Prognosis, Risks and benefits of tx options, Instructions for management, Patient and family education, Importance of tx compliance, Risk factor reductions, Impressions, Counseling / Coordination of care, Documenting in the medical record, Reviewing/placing orders in the medical record (including tests, medications, and/or procedures), and Obtaining or reviewing history  .

## 2025-06-02 ENCOUNTER — HOSPITAL ENCOUNTER (OUTPATIENT)
Dept: NON INVASIVE DIAGNOSTICS | Facility: CLINIC | Age: 70
Discharge: HOME | End: 2025-06-02
Payer: MEDICARE

## 2025-06-02 VITALS
BODY MASS INDEX: 29.26 KG/M2 | SYSTOLIC BLOOD PRESSURE: 128 MMHG | HEIGHT: 72 IN | DIASTOLIC BLOOD PRESSURE: 74 MMHG | HEART RATE: 68 BPM | WEIGHT: 216 LBS

## 2025-06-02 DIAGNOSIS — Z95.810 S/P ICD (INTERNAL CARDIAC DEFIBRILLATOR) PROCEDURE: ICD-10-CM

## 2025-06-02 DIAGNOSIS — I50.22 CHRONIC HEART FAILURE WITH REDUCED EJECTION FRACTION (HFREF, <= 40%) (HCC): ICD-10-CM

## 2025-06-02 DIAGNOSIS — I25.10 CORONARY ARTERY DISEASE INVOLVING NATIVE CORONARY ARTERY OF NATIVE HEART WITHOUT ANGINA PECTORIS: ICD-10-CM

## 2025-06-02 LAB
AV REGURGITATION PRESSURE HALF TIME: 733 MS
BSA FOR ECHO PROCEDURE: 2.2 M2
LAAS-AP2: 18.6 CM2
LAAS-AP4: 19.3 CM2
LEFT ATRIUM VOLUME (MOD BIPLANE): 55 ML
LEFT ATRIUM VOLUME INDEX (MOD BIPLANE): 25 ML/M2
LV EF US.2D.A4C+ESTIMATED: 59 %
RIGHT ATRIUM AREA SYSTOLE A4C: 10.6 CM2
RIGHT VENTRICLE ID DIMENSION: 3.8 CM
SL CV AV DECELERATION TIME RETROGRADE: 2529 MS
SL CV AV PEAK GRADIENT RETROGRADE: 30 MMHG
SL CV LEFT ATRIUM LENGTH A2C: 4.6 CM
SL CV LV EF: 55
TR MAX PG: 20 MMHG
TR PEAK VELOCITY: 2.2 M/S
TRICUSPID VALVE PEAK REGURGITATION VELOCITY: 2.23 M/S

## 2025-06-02 PROCEDURE — 93308 TTE F-UP OR LMTD: CPT | Performed by: STUDENT IN AN ORGANIZED HEALTH CARE EDUCATION/TRAINING PROGRAM

## 2025-06-02 PROCEDURE — 93325 DOPPLER ECHO COLOR FLOW MAPG: CPT

## 2025-06-02 PROCEDURE — 93321 DOPPLER ECHO F-UP/LMTD STD: CPT

## 2025-06-02 PROCEDURE — 93308 TTE F-UP OR LMTD: CPT

## 2025-06-02 PROCEDURE — 93321 DOPPLER ECHO F-UP/LMTD STD: CPT | Performed by: STUDENT IN AN ORGANIZED HEALTH CARE EDUCATION/TRAINING PROGRAM

## 2025-06-02 PROCEDURE — 93325 DOPPLER ECHO COLOR FLOW MAPG: CPT | Performed by: STUDENT IN AN ORGANIZED HEALTH CARE EDUCATION/TRAINING PROGRAM

## 2025-06-03 ENCOUNTER — RESULTS FOLLOW-UP (OUTPATIENT)
Dept: CARDIOLOGY CLINIC | Facility: CLINIC | Age: 70
End: 2025-06-03

## 2025-06-25 ENCOUNTER — APPOINTMENT (EMERGENCY)
Dept: RADIOLOGY | Facility: HOSPITAL | Age: 70
End: 2025-06-25
Payer: COMMERCIAL

## 2025-06-25 ENCOUNTER — HOSPITAL ENCOUNTER (EMERGENCY)
Facility: HOSPITAL | Age: 70
Discharge: HOME | End: 2025-06-25
Attending: EMERGENCY MEDICINE
Payer: COMMERCIAL

## 2025-06-25 ENCOUNTER — APPOINTMENT (EMERGENCY)
Dept: CT IMAGING | Facility: HOSPITAL | Age: 70
End: 2025-06-25
Payer: COMMERCIAL

## 2025-06-25 VITALS
SYSTOLIC BLOOD PRESSURE: 159 MMHG | RESPIRATION RATE: 20 BRPM | DIASTOLIC BLOOD PRESSURE: 72 MMHG | OXYGEN SATURATION: 94 % | TEMPERATURE: 98.6 F | HEART RATE: 64 BPM

## 2025-06-25 DIAGNOSIS — S82.831A CLOSED FRACTURE OF HEAD OF RIGHT FIBULA, INITIAL ENCOUNTER: ICD-10-CM

## 2025-06-25 DIAGNOSIS — S87.81XA CRUSHING INJURY OF RIGHT LOWER EXTREMITY, INITIAL ENCOUNTER: Primary | ICD-10-CM

## 2025-06-25 DIAGNOSIS — S80.811A ABRASION OF RIGHT LOWER EXTREMITY, INITIAL ENCOUNTER: ICD-10-CM

## 2025-06-25 LAB
ANION GAP SERPL CALCULATED.3IONS-SCNC: 7 MMOL/L (ref 4–13)
BASOPHILS # BLD AUTO: 0.03 THOUSANDS/ÂΜL (ref 0–0.1)
BASOPHILS NFR BLD AUTO: 0 % (ref 0–1)
BUN SERPL-MCNC: 15 MG/DL (ref 5–25)
CALCIUM SERPL-MCNC: 9 MG/DL (ref 8.4–10.2)
CHLORIDE SERPL-SCNC: 108 MMOL/L (ref 96–108)
CK SERPL-CCNC: 87 U/L (ref 39–308)
CO2 SERPL-SCNC: 24 MMOL/L (ref 21–32)
CREAT SERPL-MCNC: 0.95 MG/DL (ref 0.6–1.3)
EOSINOPHIL # BLD AUTO: 0.09 THOUSAND/ÂΜL (ref 0–0.61)
EOSINOPHIL NFR BLD AUTO: 1 % (ref 0–6)
ERYTHROCYTE [DISTWIDTH] IN BLOOD BY AUTOMATED COUNT: 12.4 % (ref 11.6–15.1)
GFR SERPL CREATININE-BSD FRML MDRD: 80 ML/MIN/1.73SQ M
GLUCOSE SERPL-MCNC: 107 MG/DL (ref 65–140)
HCT VFR BLD AUTO: 48.2 % (ref 36.5–49.3)
HGB BLD-MCNC: 16.4 G/DL (ref 12–17)
IMM GRANULOCYTES # BLD AUTO: 0.04 THOUSAND/UL (ref 0–0.2)
IMM GRANULOCYTES NFR BLD AUTO: 1 % (ref 0–2)
LYMPHOCYTES # BLD AUTO: 2.01 THOUSANDS/ÂΜL (ref 0.6–4.47)
LYMPHOCYTES NFR BLD AUTO: 28 % (ref 14–44)
MCH RBC QN AUTO: 32.3 PG (ref 26.8–34.3)
MCHC RBC AUTO-ENTMCNC: 34 G/DL (ref 31.4–37.4)
MCV RBC AUTO: 95 FL (ref 82–98)
MONOCYTES # BLD AUTO: 0.84 THOUSAND/ÂΜL (ref 0.17–1.22)
MONOCYTES NFR BLD AUTO: 12 % (ref 4–12)
NEUTROPHILS # BLD AUTO: 4.24 THOUSANDS/ÂΜL (ref 1.85–7.62)
NEUTS SEG NFR BLD AUTO: 58 % (ref 43–75)
NRBC BLD AUTO-RTO: 0 /100 WBCS
PLATELET # BLD AUTO: 166 THOUSANDS/UL (ref 149–390)
PMV BLD AUTO: 10 FL (ref 8.9–12.7)
POTASSIUM SERPL-SCNC: 4.4 MMOL/L (ref 3.5–5.3)
RBC # BLD AUTO: 5.08 MILLION/UL (ref 3.88–5.62)
SODIUM SERPL-SCNC: 139 MMOL/L (ref 135–147)
WBC # BLD AUTO: 7.25 THOUSAND/UL (ref 4.31–10.16)

## 2025-06-25 PROCEDURE — 80048 BASIC METABOLIC PNL TOTAL CA: CPT | Performed by: EMERGENCY MEDICINE

## 2025-06-25 PROCEDURE — 96374 THER/PROPH/DIAG INJ IV PUSH: CPT

## 2025-06-25 PROCEDURE — 90715 TDAP VACCINE 7 YRS/> IM: CPT | Performed by: EMERGENCY MEDICINE

## 2025-06-25 PROCEDURE — 73630 X-RAY EXAM OF FOOT: CPT

## 2025-06-25 PROCEDURE — 36415 COLL VENOUS BLD VENIPUNCTURE: CPT | Performed by: EMERGENCY MEDICINE

## 2025-06-25 PROCEDURE — 99285 EMERGENCY DEPT VISIT HI MDM: CPT

## 2025-06-25 PROCEDURE — 96376 TX/PRO/DX INJ SAME DRUG ADON: CPT

## 2025-06-25 PROCEDURE — 96372 THER/PROPH/DIAG INJ SC/IM: CPT

## 2025-06-25 PROCEDURE — 99285 EMERGENCY DEPT VISIT HI MDM: CPT | Performed by: EMERGENCY MEDICINE

## 2025-06-25 PROCEDURE — 73700 CT LOWER EXTREMITY W/O DYE: CPT

## 2025-06-25 PROCEDURE — 85025 COMPLETE CBC W/AUTO DIFF WBC: CPT | Performed by: EMERGENCY MEDICINE

## 2025-06-25 PROCEDURE — 73590 X-RAY EXAM OF LOWER LEG: CPT

## 2025-06-25 PROCEDURE — 96361 HYDRATE IV INFUSION ADD-ON: CPT

## 2025-06-25 PROCEDURE — 73564 X-RAY EXAM KNEE 4 OR MORE: CPT

## 2025-06-25 PROCEDURE — 93005 ELECTROCARDIOGRAM TRACING: CPT

## 2025-06-25 PROCEDURE — 82550 ASSAY OF CK (CPK): CPT | Performed by: EMERGENCY MEDICINE

## 2025-06-25 PROCEDURE — 73610 X-RAY EXAM OF ANKLE: CPT

## 2025-06-25 RX ORDER — LIDOCAINE 40 MG/G
CREAM TOPICAL ONCE
Status: COMPLETED | OUTPATIENT
Start: 2025-06-25 | End: 2025-06-25

## 2025-06-25 RX ORDER — OXYCODONE HYDROCHLORIDE 5 MG/1
5 TABLET ORAL ONCE
Refills: 0 | Status: COMPLETED | OUTPATIENT
Start: 2025-06-25 | End: 2025-06-25

## 2025-06-25 RX ORDER — OXYCODONE HYDROCHLORIDE 5 MG/1
5 TABLET ORAL EVERY 6 HOURS PRN
Qty: 12 TABLET | Refills: 0 | Status: SHIPPED | OUTPATIENT
Start: 2025-06-25 | End: 2025-07-05

## 2025-06-25 RX ORDER — HYDROMORPHONE HCL/PF 1 MG/ML
0.5 SYRINGE (ML) INJECTION ONCE
Refills: 0 | Status: COMPLETED | OUTPATIENT
Start: 2025-06-25 | End: 2025-06-25

## 2025-06-25 RX ORDER — HYDROMORPHONE HCL/PF 1 MG/ML
0.5 SYRINGE (ML) INJECTION ONCE
Status: COMPLETED | OUTPATIENT
Start: 2025-06-25 | End: 2025-06-25

## 2025-06-25 RX ADMIN — OXYCODONE HYDROCHLORIDE 5 MG: 5 TABLET ORAL at 22:35

## 2025-06-25 RX ADMIN — TETANUS TOXOID, REDUCED DIPHTHERIA TOXOID AND ACELLULAR PERTUSSIS VACCINE, ADSORBED 0.5 ML: 5; 2.5; 8; 8; 2.5 SUSPENSION INTRAMUSCULAR at 19:23

## 2025-06-25 RX ADMIN — HYDROMORPHONE HYDROCHLORIDE 0.5 MG: 1 INJECTION, SOLUTION INTRAMUSCULAR; INTRAVENOUS; SUBCUTANEOUS at 18:13

## 2025-06-25 RX ADMIN — LIDOCAINE 4% 1 APPLICATION: 4 CREAM TOPICAL at 19:00

## 2025-06-25 RX ADMIN — HYDROMORPHONE HYDROCHLORIDE 0.5 MG: 1 INJECTION, SOLUTION INTRAMUSCULAR; INTRAVENOUS; SUBCUTANEOUS at 21:08

## 2025-06-25 RX ADMIN — SODIUM CHLORIDE 1000 ML: 0.9 INJECTION, SOLUTION INTRAVENOUS at 18:11
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
## 2025-06-26 LAB
ATRIAL RATE: 70 BPM
P AXIS: 43 DEGREES
PR INTERVAL: 184 MS
QRS AXIS: 85 DEGREES
QRSD INTERVAL: 108 MS
QT INTERVAL: 424 MS
QTC INTERVAL: 457 MS
T WAVE AXIS: 81 DEGREES
VENTRICULAR RATE: 70 BPM

## 2025-06-26 PROCEDURE — 93010 ELECTROCARDIOGRAM REPORT: CPT | Performed by: INTERNAL MEDICINE

## 2025-06-26 NOTE — ED NOTES
RLE abrasions scrubbed and gravel removed.  Cleansed with NSS.  Xeroform applied to open areas. Covered with abd pad.  Wrapped with aleida and secured with tape     Dennys Ann RN  06/25/25 [id number]

## 2025-06-26 NOTE — ED NOTES
RLE knee immobilizer applied.  Educated on wound care.  Fitted for crutches.  Able to demonstrate proper crutch technique.  Waiting ride at this time     Dennys Ann RN  06/25/25 [id number]

## 2025-07-01 ENCOUNTER — OFFICE VISIT (OUTPATIENT)
Dept: OBGYN CLINIC | Facility: CLINIC | Age: 70
End: 2025-07-01
Attending: EMERGENCY MEDICINE
Payer: COMMERCIAL

## 2025-07-01 VITALS — WEIGHT: 216 LBS | BODY MASS INDEX: 29.26 KG/M2 | HEIGHT: 72 IN

## 2025-07-01 DIAGNOSIS — S87.81XA CRUSHING INJURY OF RIGHT LOWER EXTREMITY, INITIAL ENCOUNTER: ICD-10-CM

## 2025-07-01 DIAGNOSIS — S82.831A CLOSED FRACTURE OF HEAD OF RIGHT FIBULA, INITIAL ENCOUNTER: ICD-10-CM

## 2025-07-01 PROCEDURE — 99214 OFFICE O/P EST MOD 30 MIN: CPT | Performed by: STUDENT IN AN ORGANIZED HEALTH CARE EDUCATION/TRAINING PROGRAM
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
## 2025-07-07 ENCOUNTER — OFFICE VISIT (OUTPATIENT)
Dept: WOUND CARE | Facility: HOSPITAL | Age: 70
End: 2025-07-07
Payer: COMMERCIAL

## 2025-07-07 VITALS
BODY MASS INDEX: 29.53 KG/M2 | WEIGHT: 218 LBS | HEIGHT: 72 IN | DIASTOLIC BLOOD PRESSURE: 76 MMHG | TEMPERATURE: 98 F | SYSTOLIC BLOOD PRESSURE: 130 MMHG | RESPIRATION RATE: 18 BRPM | HEART RATE: 88 BPM

## 2025-07-07 DIAGNOSIS — S91.309A OPEN WOUND OF ANKLE AND FOOT: Primary | ICD-10-CM

## 2025-07-07 DIAGNOSIS — S87.81XA CRUSH INJURY LOWER LEG, RIGHT, INITIAL ENCOUNTER: ICD-10-CM

## 2025-07-07 DIAGNOSIS — S91.009A OPEN WOUND OF ANKLE AND FOOT: Primary | ICD-10-CM

## 2025-07-07 DIAGNOSIS — S81.801A TRAUMATIC OPEN WOUND OF RIGHT LOWER LEG, INITIAL ENCOUNTER: ICD-10-CM

## 2025-07-07 DIAGNOSIS — Z01.818 PREOPERATIVE CLEARANCE: ICD-10-CM

## 2025-07-07 PROCEDURE — 11042 DBRDMT SUBQ TIS 1ST 20SQCM/<: CPT | Performed by: STUDENT IN AN ORGANIZED HEALTH CARE EDUCATION/TRAINING PROGRAM

## 2025-07-07 PROCEDURE — 99213 OFFICE O/P EST LOW 20 MIN: CPT | Performed by: STUDENT IN AN ORGANIZED HEALTH CARE EDUCATION/TRAINING PROGRAM

## 2025-07-07 PROCEDURE — 99204 OFFICE O/P NEW MOD 45 MIN: CPT | Performed by: STUDENT IN AN ORGANIZED HEALTH CARE EDUCATION/TRAINING PROGRAM

## 2025-07-07 PROCEDURE — 11045 DBRDMT SUBQ TISS EACH ADDL: CPT | Performed by: STUDENT IN AN ORGANIZED HEALTH CARE EDUCATION/TRAINING PROGRAM

## 2025-07-07 RX ORDER — LIDOCAINE HYDROCHLORIDE 40 MG/ML
5 SOLUTION TOPICAL ONCE
Status: COMPLETED | OUTPATIENT
Start: 2025-07-07 | End: 2025-07-07

## 2025-07-07 RX ADMIN — LIDOCAINE HYDROCHLORIDE 5 ML: 40 SOLUTION TOPICAL at 14:56
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
## 2025-07-07 NOTE — PROGRESS NOTES
HBO Qualification & Assessment     Tucker Carvajal presents today for a consultation for HBO treatment.  Prior to diving, patient will need to complete cardiology clearance and perform chest x-ray.  Will also need insurance authorization.    After viewing chamber, patient states that he would not be able to tolerate dives with high pressures.  Will hold insurance authorization at this time.    HBO Indication    Crush Injury      Date of crush injury 6/25/25    Patient has signs/symptoms of crush injury including tissue ischemia as evidenced by necrotic tissue and/or tissue loss, and/or has significant edema secondary to injury.  Yes    HBO is being used to support of definitive surgical procedure  Yes    Surgery was performed on 7/2/25    Patient is at high risk of secondary complications of crush injury including infection, non-healing wounds, amputation, and non-union of fractures, if HBOT is not initiated.  Yes    Brief history of co-morbidities that may affect HBO treatment:    Previously treated with: No chemotherapy or medications which are contraindications to HBO    Patient reports s/s of No acute infections    Eyes    Patient has  No history of Myopia, Cataracts or Optic Nerve    Ears    Patient has a history of No ear abnormalities or conditions    Ears assessed for tympanic membrane or middle ear abnormalities YES    Patient instructed on how to clear ears and demonstrate proper technique: Yes    Right ear baseline TEEDS: Grade 0    Left ear baseline TEEDS: Grade 0    ENT consult for Myringotomy tube insertion due to No consult is needed    Cerumen removal performed:  N/A. ears clear of cerumen    Neurological    Patient flores a history of seizures: No    Cardiovascular    Patient has a history of: CHF, Cardiac arrhythmia, and Pacemaker    EKG ordered: No    Echocardiogram ordered: No    Cardiovascular consult ordered: Yes    Smoking  Tobacco Use History[1]    Respiratory    Patient has a history of  pneumothorax: No    Patient has a history of: COPD    Lungs clear bilaterally: Yes    Chest x-ray ordered: Yes    Psychiatric    Patient has confinement anxiety: No    Patient education and consent    Informed Consent:  Tucker Carvajal has no contraindications to hyperbaric oxygen therapy. We have discussed the risks (ear and sinus barotrauma, pneumothorax, visual refractory changes, oxygen-induced seizures, and claustrophobia) and potential benefits of hyperbaric oxygen therapy.   Patient understands these risks along with the potential benefits and agrees to undergo hyperbaric oxygen therapy.  I discussed with and educated the patient about the HBO procedure, smoking/drug/alcohol policy, and items not allowed in the hyperbaric chamber.  Yes    Patient has been oriented to the HBO chamber. Clearing techniques have been reviewed.    Written consent for HBO obtained: Yes    A trained emergency response team and ICU is available in this facility to assist with complications if required: Yes    HBO treatment goal    Tucker Carvajal is an excellent candidate for hyperbaric oxygen treatment as adjunctive therapy for the treatment of  crush injury. Radiation causes an obliterative endarteritis, subsequent hypoxic/hypovascular tissue and secondary fibrosis. Hyperbaric oxygen has been shown to be an effective treatment in radiation tissue injury by stimulating angiogenesis and inducing neovacularization in the hypoxic irradiated tissue. Serial hyperbaric oxygen treatments improve local host immune response and enhance the clearance of infection by improving the leukocyte oxidative killing of aerobic bacteria and the direct bactericidal activity against many anaerobic bacteria. Hyperbaric oxygen also stimulates tissue growth through fibroblast proliferation, collagen synthesis, stimulation of the release of vascular endothelial growth factors (VEGF), induction of the receptor appearance of platelet derived growth factors  (PDGF) and angiogenesis.      Complete wound healing and Angiogenesis               [1]  Social History  Tobacco Use   Smoking Status Former   • Current packs/day: 0.00   • Types: Cigarettes   • Quit date:    • Years since quittin.5   Smokeless Tobacco Never

## 2025-07-07 NOTE — PATIENT INSTRUCTIONS
Orders Placed This Encounter   Procedures    Debridement Right;Lateral Foot     This order was created via procedure documentation    Debridement Right;Lateral Knee     This order was created via procedure documentation    Wound cleansing and dressings     KNEE WOUND:  Wash your hands with soap and water.  Remove old dressing, discard into plastic bag and place in trash.  Cleanse the wound with mild soap and water or saline prior to applying a clean dressing. Do not use tissue or cotton balls. Do not scrub the wound. Pat dry using gauze.  Shower yes   Apply Polymem AG to the knee wound.  Cover with ABD  Secure with gauze and rolled gauze and tape  Change dressing every other day.    Protein: Eat protein with each meal to promote healing.  Examples of protein are fish, meat, chicken, nuts, peanut butter, eggs, lentils, edamame or a protein shake.      Wound infection:  If you have signs of infection please call the wound center.  If the wound center is closed- please go to the Emergency department.  Some signs of infection:  fever, chills, increased redness, red streaks, increase in pain, increased drainage.  Drainage with an odor, Change in drainage color: white/milky/green/tan/yellow,  an increase in swelling, chest pain and/or shortness of breath.     Standing Status:   Future     Expiration Date:   7/14/2025    Wound cleansing and dressings Right;Lateral Foot     RIGHT FOOT WOUND:  Wash your hands with soap and water.  Remove old dressing, discard into plastic bag and place in trash.  Cleanse the wound with soap and water or saline prior to applying a clean dressing. Do not use tissue or cotton balls. Do not scrub the wound. Pat dry using gauze.  Shower yes don NOT get the foot wound wet- after you clean it- dry it immediately  Apply Betadine to the foot wound.  Cover with ABD   Secure with rolled gauze and tape  Change dressing daily.     Standing Status:   Future     Expiration Date:   7/14/2025

## 2025-07-08 ENCOUNTER — TELEPHONE (OUTPATIENT)
Dept: WOUND CARE | Facility: HOSPITAL | Age: 70
End: 2025-07-08

## 2025-07-08 ENCOUNTER — HOSPITAL ENCOUNTER (OUTPATIENT)
Dept: RADIOLOGY | Facility: HOSPITAL | Age: 70
Discharge: HOME | End: 2025-07-08
Attending: STUDENT IN AN ORGANIZED HEALTH CARE EDUCATION/TRAINING PROGRAM
Payer: MEDICARE

## 2025-07-08 DIAGNOSIS — S91.309A OPEN WOUND OF ANKLE AND FOOT: ICD-10-CM

## 2025-07-08 DIAGNOSIS — S91.009A OPEN WOUND OF ANKLE AND FOOT: ICD-10-CM

## 2025-07-08 PROCEDURE — 93923 UPR/LXTR ART STDY 3+ LVLS: CPT | Performed by: SURGERY

## 2025-07-08 PROCEDURE — 93926 LOWER EXTREMITY STUDY: CPT

## 2025-07-08 PROCEDURE — 93926 LOWER EXTREMITY STUDY: CPT | Performed by: SURGERY

## 2025-07-08 NOTE — TELEPHONE ENCOUNTER
Patient stopped into wound care office with questions re: potential HBO treatment.  Instructed him that Dr. Rodrigez will review with the medical director of wound care, Dr. Dempsey and we will let him know the response tomorrow. Also per Dr Rodrigez patient's NJ's were in the normal range. He verbalized understanding.

## 2025-07-09 ENCOUNTER — TELEPHONE (OUTPATIENT)
Dept: WOUND CARE | Facility: HOSPITAL | Age: 70
End: 2025-07-09

## 2025-07-09 NOTE — TELEPHONE ENCOUNTER
Called patient to make him aware that no decision will be made regarding potential HBO until he is reassessed at next weeks wound care visit. Patient sates the wound is doing better day by day and he agrees with plan.

## 2025-07-11 ENCOUNTER — REMOTE DEVICE CLINIC VISIT (OUTPATIENT)
Dept: CARDIOLOGY CLINIC | Facility: CLINIC | Age: 70
End: 2025-07-11
Payer: MEDICARE

## 2025-07-11 DIAGNOSIS — Z95.810 AICD (AUTOMATIC CARDIOVERTER/DEFIBRILLATOR) PRESENT: Primary | ICD-10-CM

## 2025-07-11 PROCEDURE — 93295 DEV INTERROG REMOTE 1/2/MLT: CPT | Performed by: INTERNAL MEDICINE

## 2025-07-11 PROCEDURE — 93296 REM INTERROG EVL PM/IDS: CPT | Performed by: INTERNAL MEDICINE

## 2025-07-14 NOTE — PROGRESS NOTES
"Results for orders placed or performed in visit on 07/11/25   Cardiac EP device report    Narrative    MDT BI-V ICD / ACTIVE SYSTEM IS MRI CONDITIONAL  CARELINK TRANSMISSION: PRESENTING EGRAM AS BVP@ 69 BPM. BATTERY STATUS \"9 YRS.\" AP 59% CRT PACING (BIV) 100% (LV) 0%. ALL AVAILABLE LEAD PARAMETERS WITHIN NORMAL LIMITS. 7 VHRS NOTED; NO THERAPIES GIVEN; RATES>150 BPM. AVAIL EGRAMS PRESENT AS NSVT/VT. PT ON SOTALOL & METO SUCC. EF 55% (ECHO 2025). VENT SENSING MARKERS NOTED. OPTI-VOL WITHIN NORMAL LIMITS. NORMAL DEVICE FUNCTION. NC         "

## 2025-07-15 ENCOUNTER — OFFICE VISIT (OUTPATIENT)
Dept: WOUND CARE | Facility: HOSPITAL | Age: 70
End: 2025-07-15
Payer: MEDICARE

## 2025-07-15 VITALS
RESPIRATION RATE: 16 BRPM | HEART RATE: 62 BPM | DIASTOLIC BLOOD PRESSURE: 70 MMHG | TEMPERATURE: 97.1 F | SYSTOLIC BLOOD PRESSURE: 141 MMHG

## 2025-07-15 DIAGNOSIS — S81.801A TRAUMATIC OPEN WOUND OF RIGHT LOWER LEG, INITIAL ENCOUNTER: ICD-10-CM

## 2025-07-15 DIAGNOSIS — S91.309A OPEN WOUND OF ANKLE AND FOOT: Primary | ICD-10-CM

## 2025-07-15 DIAGNOSIS — S91.009A OPEN WOUND OF ANKLE AND FOOT: Primary | ICD-10-CM

## 2025-07-15 PROCEDURE — 97597 DBRDMT OPN WND 1ST 20 CM/<: CPT | Performed by: STUDENT IN AN ORGANIZED HEALTH CARE EDUCATION/TRAINING PROGRAM

## 2025-07-15 PROCEDURE — 97598 DBRDMT OPN WND ADDL 20CM/<: CPT | Performed by: STUDENT IN AN ORGANIZED HEALTH CARE EDUCATION/TRAINING PROGRAM

## 2025-07-15 PROCEDURE — 99213 OFFICE O/P EST LOW 20 MIN: CPT | Performed by: STUDENT IN AN ORGANIZED HEALTH CARE EDUCATION/TRAINING PROGRAM

## 2025-07-15 RX ORDER — LIDOCAINE HYDROCHLORIDE 40 MG/ML
5 SOLUTION TOPICAL ONCE
Status: COMPLETED | OUTPATIENT
Start: 2025-07-15 | End: 2025-07-15

## 2025-07-15 RX ADMIN — LIDOCAINE HYDROCHLORIDE 5 ML: 40 SOLUTION TOPICAL at 08:59

## 2025-07-16 ENCOUNTER — TELEPHONE (OUTPATIENT)
Dept: WOUND CARE | Facility: HOSPITAL | Age: 70
End: 2025-07-16

## 2025-07-16 NOTE — TELEPHONE ENCOUNTER
Returned patient's call regarding clarification in use of compression. Reviewed recommendation in wearing compression during the day and removal at night. Also recommended elevation through out the day. Patient stated understanding.

## 2025-07-18 ENCOUNTER — TELEPHONE (OUTPATIENT)
Dept: WOUND CARE | Facility: HOSPITAL | Age: 70
End: 2025-07-18

## 2025-07-18 NOTE — TELEPHONE ENCOUNTER
Patient called to report bleeding from wound area on dressing. States it is still oozing. Directed to redress as ordered. Patient stated understanding.

## 2025-07-22 ENCOUNTER — APPOINTMENT (INPATIENT)
Dept: RADIOLOGY | Facility: HOSPITAL | Age: 70
DRG: 574 | End: 2025-07-22
Attending: EMERGENCY MEDICINE
Payer: MEDICARE

## 2025-07-22 ENCOUNTER — OFFICE VISIT (OUTPATIENT)
Dept: WOUND CARE | Facility: HOSPITAL | Age: 70
End: 2025-07-22
Payer: MEDICARE

## 2025-07-22 ENCOUNTER — HOSPITAL ENCOUNTER (INPATIENT)
Facility: HOSPITAL | Age: 70
LOS: 10 days | Discharge: HOME HEALTH | DRG: 574 | End: 2025-08-01
Attending: EMERGENCY MEDICINE | Admitting: STUDENT IN AN ORGANIZED HEALTH CARE EDUCATION/TRAINING PROGRAM
Payer: MEDICARE

## 2025-07-22 ENCOUNTER — APPOINTMENT (EMERGENCY)
Dept: RADIOLOGY | Facility: HOSPITAL | Age: 70
DRG: 574 | End: 2025-07-22
Payer: MEDICARE

## 2025-07-22 VITALS
TEMPERATURE: 96.4 F | HEART RATE: 64 BPM | DIASTOLIC BLOOD PRESSURE: 84 MMHG | SYSTOLIC BLOOD PRESSURE: 152 MMHG | RESPIRATION RATE: 18 BRPM

## 2025-07-22 DIAGNOSIS — S81.801A TRAUMATIC OPEN WOUND OF RIGHT LOWER LEG, INITIAL ENCOUNTER: ICD-10-CM

## 2025-07-22 DIAGNOSIS — S91.309A OPEN WOUND OF FOOT: Primary | ICD-10-CM

## 2025-07-22 DIAGNOSIS — L03.115 CELLULITIS OF RIGHT FOOT: ICD-10-CM

## 2025-07-22 DIAGNOSIS — S91.301D OPEN WOUND OF RIGHT FOOT, SUBSEQUENT ENCOUNTER: ICD-10-CM

## 2025-07-22 DIAGNOSIS — S91.309A OPEN WOUND OF ANKLE AND FOOT: Primary | ICD-10-CM

## 2025-07-22 DIAGNOSIS — S91.301A OPEN WOUND OF RIGHT FOOT: ICD-10-CM

## 2025-07-22 DIAGNOSIS — S91.009A OPEN WOUND OF ANKLE AND FOOT: Primary | ICD-10-CM

## 2025-07-22 PROBLEM — S82.831A CLOSED FRACTURE OF PROXIMAL END OF RIGHT FIBULA: Status: ACTIVE | Noted: 2025-07-22

## 2025-07-22 LAB
ALBUMIN SERPL BCG-MCNC: 4 G/DL (ref 3.5–5)
ALP SERPL-CCNC: 68 U/L (ref 34–104)
ALT SERPL W P-5'-P-CCNC: 41 U/L (ref 7–52)
ANION GAP SERPL CALCULATED.3IONS-SCNC: 6 MMOL/L (ref 4–13)
AST SERPL W P-5'-P-CCNC: 33 U/L (ref 13–39)
BASOPHILS # BLD AUTO: 0.04 THOUSANDS/ÂΜL (ref 0–0.1)
BASOPHILS NFR BLD AUTO: 1 % (ref 0–1)
BILIRUB SERPL-MCNC: 0.75 MG/DL (ref 0.2–1)
BUN SERPL-MCNC: 14 MG/DL (ref 5–25)
CALCIUM SERPL-MCNC: 8.9 MG/DL (ref 8.4–10.2)
CHLORIDE SERPL-SCNC: 106 MMOL/L (ref 96–108)
CO2 SERPL-SCNC: 25 MMOL/L (ref 21–32)
CREAT SERPL-MCNC: 0.87 MG/DL (ref 0.6–1.3)
CRP SERPL QL: 13.6 MG/L
EOSINOPHIL # BLD AUTO: 0.09 THOUSAND/ÂΜL (ref 0–0.61)
EOSINOPHIL NFR BLD AUTO: 1 % (ref 0–6)
ERYTHROCYTE [DISTWIDTH] IN BLOOD BY AUTOMATED COUNT: 12.7 % (ref 11.6–15.1)
ERYTHROCYTE [SEDIMENTATION RATE] IN BLOOD: 33 MM/HOUR (ref 0–19)
GFR SERPL CREATININE-BSD FRML MDRD: 87 ML/MIN/1.73SQ M
GLUCOSE SERPL-MCNC: 102 MG/DL (ref 65–140)
HCT VFR BLD AUTO: 44.3 % (ref 36.5–49.3)
HGB BLD-MCNC: 14.6 G/DL (ref 12–17)
IMM GRANULOCYTES # BLD AUTO: 0.02 THOUSAND/UL (ref 0–0.2)
IMM GRANULOCYTES NFR BLD AUTO: 0 % (ref 0–2)
LYMPHOCYTES # BLD AUTO: 1.51 THOUSANDS/ÂΜL (ref 0.6–4.47)
LYMPHOCYTES NFR BLD AUTO: 23 % (ref 14–44)
MCH RBC QN AUTO: 31.3 PG (ref 26.8–34.3)
MCHC RBC AUTO-ENTMCNC: 33 G/DL (ref 31.4–37.4)
MCV RBC AUTO: 95 FL (ref 82–98)
MONOCYTES # BLD AUTO: 0.91 THOUSAND/ÂΜL (ref 0.17–1.22)
MONOCYTES NFR BLD AUTO: 14 % (ref 4–12)
NEUTROPHILS # BLD AUTO: 3.91 THOUSANDS/ÂΜL (ref 1.85–7.62)
NEUTS SEG NFR BLD AUTO: 61 % (ref 43–75)
NRBC BLD AUTO-RTO: 0 /100 WBCS
PLATELET # BLD AUTO: 205 THOUSANDS/UL (ref 149–390)
PMV BLD AUTO: 9.4 FL (ref 8.9–12.7)
POTASSIUM SERPL-SCNC: 5.3 MMOL/L (ref 3.5–5.3)
PROT SERPL-MCNC: 6.9 G/DL (ref 6.4–8.4)
RBC # BLD AUTO: 4.67 MILLION/UL (ref 3.88–5.62)
SODIUM SERPL-SCNC: 137 MMOL/L (ref 135–147)
WBC # BLD AUTO: 6.48 THOUSAND/UL (ref 4.31–10.16)

## 2025-07-22 PROCEDURE — 36415 COLL VENOUS BLD VENIPUNCTURE: CPT

## 2025-07-22 PROCEDURE — 11043 DBRDMT MUSC&/FSCA 1ST 20/<: CPT | Performed by: STUDENT IN AN ORGANIZED HEALTH CARE EDUCATION/TRAINING PROGRAM

## 2025-07-22 PROCEDURE — 87147 CULTURE TYPE IMMUNOLOGIC: CPT | Performed by: STUDENT IN AN ORGANIZED HEALTH CARE EDUCATION/TRAINING PROGRAM

## 2025-07-22 PROCEDURE — 86140 C-REACTIVE PROTEIN: CPT

## 2025-07-22 PROCEDURE — 99285 EMERGENCY DEPT VISIT HI MDM: CPT | Performed by: EMERGENCY MEDICINE

## 2025-07-22 PROCEDURE — 87205 SMEAR GRAM STAIN: CPT | Performed by: STUDENT IN AN ORGANIZED HEALTH CARE EDUCATION/TRAINING PROGRAM

## 2025-07-22 PROCEDURE — 85025 COMPLETE CBC W/AUTO DIFF WBC: CPT

## 2025-07-22 PROCEDURE — 99223 1ST HOSP IP/OBS HIGH 75: CPT | Performed by: STUDENT IN AN ORGANIZED HEALTH CARE EDUCATION/TRAINING PROGRAM

## 2025-07-22 PROCEDURE — 80053 COMPREHEN METABOLIC PANEL: CPT

## 2025-07-22 PROCEDURE — 73701 CT LOWER EXTREMITY W/DYE: CPT

## 2025-07-22 PROCEDURE — 11046 DBRDMT MUSC&/FSCA EA ADDL: CPT | Performed by: STUDENT IN AN ORGANIZED HEALTH CARE EDUCATION/TRAINING PROGRAM

## 2025-07-22 PROCEDURE — 85652 RBC SED RATE AUTOMATED: CPT

## 2025-07-22 PROCEDURE — 87077 CULTURE AEROBIC IDENTIFY: CPT | Performed by: STUDENT IN AN ORGANIZED HEALTH CARE EDUCATION/TRAINING PROGRAM

## 2025-07-22 PROCEDURE — 99215 OFFICE O/P EST HI 40 MIN: CPT | Performed by: STUDENT IN AN ORGANIZED HEALTH CARE EDUCATION/TRAINING PROGRAM

## 2025-07-22 PROCEDURE — 73630 X-RAY EXAM OF FOOT: CPT

## 2025-07-22 PROCEDURE — 96365 THER/PROPH/DIAG IV INF INIT: CPT

## 2025-07-22 PROCEDURE — NC001 PR NO CHARGE: Performed by: PODIATRIST

## 2025-07-22 PROCEDURE — 87070 CULTURE OTHR SPECIMN AEROBIC: CPT | Performed by: STUDENT IN AN ORGANIZED HEALTH CARE EDUCATION/TRAINING PROGRAM

## 2025-07-22 PROCEDURE — 99284 EMERGENCY DEPT VISIT MOD MDM: CPT

## 2025-07-22 PROCEDURE — 87186 SC STD MICRODIL/AGAR DIL: CPT | Performed by: STUDENT IN AN ORGANIZED HEALTH CARE EDUCATION/TRAINING PROGRAM

## 2025-07-22 RX ORDER — METHYLPREDNISOLONE 16 MG/1
32 TABLET ORAL
Status: COMPLETED | OUTPATIENT
Start: 2025-07-22 | End: 2025-07-22

## 2025-07-22 RX ORDER — RANOLAZINE 500 MG/1
500 TABLET, EXTENDED RELEASE ORAL 2 TIMES DAILY
Status: CANCELLED | OUTPATIENT
Start: 2025-07-22

## 2025-07-22 RX ORDER — PANTOPRAZOLE SODIUM 40 MG/1
40 TABLET, DELAYED RELEASE ORAL DAILY
Status: CANCELLED | OUTPATIENT
Start: 2025-07-23

## 2025-07-22 RX ORDER — SACUBITRIL AND VALSARTAN 24; 26 MG/1; MG/1
1 TABLET, FILM COATED ORAL 2 TIMES DAILY
Status: DISCONTINUED | OUTPATIENT
Start: 2025-07-22 | End: 2025-08-01 | Stop reason: HOSPADM

## 2025-07-22 RX ORDER — LORATADINE 10 MG/1
10 TABLET ORAL DAILY
Status: CANCELLED | OUTPATIENT
Start: 2025-07-23

## 2025-07-22 RX ORDER — LIDOCAINE HYDROCHLORIDE 40 MG/ML
5 SOLUTION TOPICAL ONCE
Status: COMPLETED | OUTPATIENT
Start: 2025-07-22 | End: 2025-07-22

## 2025-07-22 RX ORDER — DIPHENHYDRAMINE HCL 25 MG
50 TABLET ORAL
Status: DISCONTINUED | OUTPATIENT
Start: 2025-07-22 | End: 2025-08-01 | Stop reason: HOSPADM

## 2025-07-22 RX ORDER — FINASTERIDE 5 MG/1
5 TABLET, FILM COATED ORAL DAILY
Status: CANCELLED | OUTPATIENT
Start: 2025-07-23

## 2025-07-22 RX ORDER — ATORVASTATIN CALCIUM 40 MG/1
40 TABLET, FILM COATED ORAL
Status: CANCELLED | OUTPATIENT
Start: 2025-07-22

## 2025-07-22 RX ORDER — FINASTERIDE 5 MG/1
5 TABLET, FILM COATED ORAL DAILY
Status: DISCONTINUED | OUTPATIENT
Start: 2025-07-23 | End: 2025-08-01 | Stop reason: HOSPADM

## 2025-07-22 RX ORDER — SOTALOL HYDROCHLORIDE 80 MG/1
80 TABLET ORAL EVERY 12 HOURS
Status: DISCONTINUED | OUTPATIENT
Start: 2025-07-22 | End: 2025-08-01 | Stop reason: HOSPADM

## 2025-07-22 RX ORDER — PANTOPRAZOLE SODIUM 40 MG/1
40 TABLET, DELAYED RELEASE ORAL DAILY
Status: DISCONTINUED | OUTPATIENT
Start: 2025-07-23 | End: 2025-08-01 | Stop reason: HOSPADM

## 2025-07-22 RX ORDER — SACUBITRIL AND VALSARTAN 24; 26 MG/1; MG/1
1 TABLET, FILM COATED ORAL 2 TIMES DAILY
Status: CANCELLED | OUTPATIENT
Start: 2025-07-22

## 2025-07-22 RX ORDER — SOTALOL HYDROCHLORIDE 80 MG/1
80 TABLET ORAL EVERY 12 HOURS
Status: CANCELLED | OUTPATIENT
Start: 2025-07-22

## 2025-07-22 RX ORDER — RANOLAZINE 500 MG/1
500 TABLET, EXTENDED RELEASE ORAL 2 TIMES DAILY
Status: DISCONTINUED | OUTPATIENT
Start: 2025-07-22 | End: 2025-08-01 | Stop reason: HOSPADM

## 2025-07-22 RX ORDER — VANCOMYCIN HYDROCHLORIDE 1 G/200ML
1000 INJECTION, SOLUTION INTRAVENOUS EVERY 12 HOURS
Status: DISCONTINUED | OUTPATIENT
Start: 2025-07-23 | End: 2025-07-26

## 2025-07-22 RX ORDER — ASPIRIN 325 MG
325 TABLET ORAL DAILY
Status: CANCELLED | OUTPATIENT
Start: 2025-07-23

## 2025-07-22 RX ORDER — ENOXAPARIN SODIUM 100 MG/ML
40 INJECTION SUBCUTANEOUS DAILY
Status: DISCONTINUED | OUTPATIENT
Start: 2025-07-23 | End: 2025-08-01 | Stop reason: HOSPADM

## 2025-07-22 RX ORDER — ATORVASTATIN CALCIUM 40 MG/1
40 TABLET, FILM COATED ORAL
Status: DISCONTINUED | OUTPATIENT
Start: 2025-07-23 | End: 2025-08-01 | Stop reason: HOSPADM

## 2025-07-22 RX ORDER — ASPIRIN 81 MG/1
162 TABLET, CHEWABLE ORAL DAILY
Status: DISCONTINUED | OUTPATIENT
Start: 2025-07-23 | End: 2025-08-01 | Stop reason: HOSPADM

## 2025-07-22 RX ADMIN — DIPHENHYDRAMINE HCL 50 MG: 25 TABLET ORAL at 17:16

## 2025-07-22 RX ADMIN — IOHEXOL 70 ML: 350 INJECTION, SOLUTION INTRAVENOUS at 18:47

## 2025-07-22 RX ADMIN — METHYLPREDNISOLONE 32 MG: 16 TABLET ORAL at 23:00

## 2025-07-22 RX ADMIN — SOTALOL HYDROCHLORIDE 80 MG: 80 TABLET ORAL at 22:21

## 2025-07-22 RX ADMIN — METHYLPREDNISOLONE 32 MG: 16 TABLET ORAL at 14:18

## 2025-07-22 RX ADMIN — Medication 2.5 MG: at 16:58

## 2025-07-22 RX ADMIN — RANOLAZINE 500 MG: 500 TABLET, FILM COATED, EXTENDED RELEASE ORAL at 17:16

## 2025-07-22 RX ADMIN — LIDOCAINE HYDROCHLORIDE 5 ML: 40 SOLUTION TOPICAL at 09:35

## 2025-07-22 RX ADMIN — VANCOMYCIN HYDROCHLORIDE 2000 MG: 10 INJECTION, POWDER, LYOPHILIZED, FOR SOLUTION INTRAVENOUS at 13:35

## 2025-07-22 RX ADMIN — SACUBITRIL AND VALSARTAN 1 TABLET: 24; 26 TABLET, FILM COATED ORAL at 17:17
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
## 2025-07-22 NOTE — PATIENT INSTRUCTIONS
Orders Placed This Encounter   Procedures    Wound Procedure Treatment     This order was created via procedure documentation    Wound cleansing and dressings Right;Lateral Foot     RIGHT FOOT WOUND:     Wash your hands with soap and water. Remove old dressing, discard into plastic bag and place in trash. Cleanse dakins moistened gauze prior to applying a clean dressing. Do not use tissue or cotton balls. Do not scrub the wound. Pat dry using gauze.     Shower yes do NOT get the foot wound wet- after you clean it- dry it immediately     Stop Betadine for increased drainage of wound    Start for increased in drainage    Apply puracol to wound bed  Apply Silver gelling fiber over puracol  Cover with ABD   Secure with rolled gauze and tape     Change dressing every other day.     Elastic Tubular Stocking: Spanda- E    Tubular elastic bandage: Apply from base of toes to behind the knee. Apply in AM, may remove for sleep.    Avoid prolonged standing in one place.    Elevate leg(s) above the level of the heart when sitting or as much as possible.     Off-loading Instructions:    Keep weight and pressure off wound at all times. Limit walking to only necessity. Remove Shoes when not walking    Protein: Eat protein with each meal to promote healing.  Examples of protein are fish, meat, chicken, nuts, peanut butter, eggs, lentils, edamame or a protein shake.     Standing Status:   Future     Expiration Date:   7/29/2025

## 2025-07-22 NOTE — PROGRESS NOTES
Wound Procedure Treatment Right;Lateral Foot    Performed by: Ashley Pang RN  Authorized by: Jitendra Rodrigez  Associated wounds:   Wound 07/07/25 Traumatic Foot Right;Lateral    Wound cleansed with:  Dakin's 0.25% and NSS   Applied primary dressing:  Collagen dressing, Silver and Gelling fiber   Applied secondary dressing:  ABD   Dressing secured with:  Sloan, Tape, Elastic tubular stocking and Size E   Comments:  Puracol to wound bed, followed by silver gelling fiber, covered with abd, secured with

## 2025-07-23 ENCOUNTER — APPOINTMENT (INPATIENT)
Dept: RADIOLOGY | Facility: HOSPITAL | Age: 70
DRG: 574 | End: 2025-07-23
Payer: MEDICARE

## 2025-07-23 ENCOUNTER — ANESTHESIA EVENT (INPATIENT)
Dept: PERIOP | Facility: HOSPITAL | Age: 70
DRG: 574 | End: 2025-07-23
Payer: MEDICARE

## 2025-07-23 ENCOUNTER — ANESTHESIA (INPATIENT)
Dept: PERIOP | Facility: HOSPITAL | Age: 70
DRG: 574 | End: 2025-07-23
Payer: MEDICARE

## 2025-07-23 LAB
ANION GAP SERPL CALCULATED.3IONS-SCNC: 7 MMOL/L (ref 4–13)
BASOPHILS # BLD AUTO: 0.01 THOUSANDS/ÂΜL (ref 0–0.1)
BASOPHILS NFR BLD AUTO: 0 % (ref 0–1)
BUN SERPL-MCNC: 15 MG/DL (ref 5–25)
CALCIUM SERPL-MCNC: 9.5 MG/DL (ref 8.4–10.2)
CHLORIDE SERPL-SCNC: 103 MMOL/L (ref 96–108)
CO2 SERPL-SCNC: 25 MMOL/L (ref 21–32)
CREAT SERPL-MCNC: 0.83 MG/DL (ref 0.6–1.3)
EOSINOPHIL # BLD AUTO: 0.01 THOUSAND/ÂΜL (ref 0–0.61)
EOSINOPHIL NFR BLD AUTO: 0 % (ref 0–6)
ERYTHROCYTE [DISTWIDTH] IN BLOOD BY AUTOMATED COUNT: 12.3 % (ref 11.6–15.1)
GFR SERPL CREATININE-BSD FRML MDRD: 89 ML/MIN/1.73SQ M
GLUCOSE SERPL-MCNC: 147 MG/DL (ref 65–140)
HCT VFR BLD AUTO: 44 % (ref 36.5–49.3)
HGB BLD-MCNC: 14.6 G/DL (ref 12–17)
IMM GRANULOCYTES # BLD AUTO: 0.05 THOUSAND/UL (ref 0–0.2)
IMM GRANULOCYTES NFR BLD AUTO: 1 % (ref 0–2)
LYMPHOCYTES # BLD AUTO: 0.85 THOUSANDS/ÂΜL (ref 0.6–4.47)
LYMPHOCYTES NFR BLD AUTO: 9 % (ref 14–44)
MCH RBC QN AUTO: 31.2 PG (ref 26.8–34.3)
MCHC RBC AUTO-ENTMCNC: 33.2 G/DL (ref 31.4–37.4)
MCV RBC AUTO: 94 FL (ref 82–98)
MONOCYTES # BLD AUTO: 0.13 THOUSAND/ÂΜL (ref 0.17–1.22)
MONOCYTES NFR BLD AUTO: 1 % (ref 4–12)
NEUTROPHILS # BLD AUTO: 8.61 THOUSANDS/ÂΜL (ref 1.85–7.62)
NEUTS SEG NFR BLD AUTO: 89 % (ref 43–75)
NRBC BLD AUTO-RTO: 0 /100 WBCS
PLATELET # BLD AUTO: 218 THOUSANDS/UL (ref 149–390)
PMV BLD AUTO: 9.2 FL (ref 8.9–12.7)
POTASSIUM SERPL-SCNC: 4.3 MMOL/L (ref 3.5–5.3)
RBC # BLD AUTO: 4.68 MILLION/UL (ref 3.88–5.62)
SODIUM SERPL-SCNC: 135 MMOL/L (ref 135–147)
WBC # BLD AUTO: 9.66 THOUSAND/UL (ref 4.31–10.16)

## 2025-07-23 PROCEDURE — 15275 SKIN SUB GRAFT FACE/NK/HF/G: CPT | Performed by: PODIATRIST

## 2025-07-23 PROCEDURE — 85025 COMPLETE CBC W/AUTO DIFF WBC: CPT | Performed by: STUDENT IN AN ORGANIZED HEALTH CARE EDUCATION/TRAINING PROGRAM

## 2025-07-23 PROCEDURE — 94760 N-INVAS EAR/PLS OXIMETRY 1: CPT

## 2025-07-23 PROCEDURE — 0QBJ0ZZ EXCISION OF RIGHT FIBULA, OPEN APPROACH: ICD-10-PCS | Performed by: PODIATRIST

## 2025-07-23 PROCEDURE — 15004 WOUND PREP F/N/HF/G: CPT | Performed by: PODIATRIST

## 2025-07-23 PROCEDURE — NC001 PR NO CHARGE: Performed by: PODIATRIST

## 2025-07-23 PROCEDURE — 36415 COLL VENOUS BLD VENIPUNCTURE: CPT | Performed by: STUDENT IN AN ORGANIZED HEALTH CARE EDUCATION/TRAINING PROGRAM

## 2025-07-23 PROCEDURE — 73630 X-RAY EXAM OF FOOT: CPT

## 2025-07-23 PROCEDURE — A2015: HCPCS | Performed by: PODIATRIST

## 2025-07-23 PROCEDURE — 99232 SBSQ HOSP IP/OBS MODERATE 35: CPT | Performed by: FAMILY MEDICINE

## 2025-07-23 PROCEDURE — A9585 GADOBUTROL INJECTION: HCPCS | Performed by: FAMILY MEDICINE

## 2025-07-23 PROCEDURE — 73723 MRI JOINT LWR EXTR W/O&W/DYE: CPT

## 2025-07-23 PROCEDURE — 80048 BASIC METABOLIC PNL TOTAL CA: CPT | Performed by: STUDENT IN AN ORGANIZED HEALTH CARE EDUCATION/TRAINING PROGRAM

## 2025-07-23 PROCEDURE — 3E0V329 INTRODUCTION OF OTHER ANTI-INFECTIVE INTO BONES, PERCUTANEOUS APPROACH: ICD-10-PCS | Performed by: PODIATRIST

## 2025-07-23 PROCEDURE — 0HRMXJZ REPLACEMENT OF RIGHT FOOT SKIN WITH SYNTHETIC SUBSTITUTE, EXTERNAL APPROACH: ICD-10-PCS | Performed by: PODIATRIST

## 2025-07-23 PROCEDURE — 15276 SKIN SUB GRAFT F/N/HF/G ADDL: CPT | Performed by: PODIATRIST

## 2025-07-23 DEVICE — IMPLANTABLE DEVICE: Type: IMPLANTABLE DEVICE | Site: FOOT | Status: FUNCTIONAL

## 2025-07-23 RX ORDER — POLYETHYLENE GLYCOL 3350 17 G/17G
17 POWDER, FOR SOLUTION ORAL DAILY
Status: DISCONTINUED | OUTPATIENT
Start: 2025-07-23 | End: 2025-07-25

## 2025-07-23 RX ORDER — SODIUM CHLORIDE, SODIUM LACTATE, POTASSIUM CHLORIDE, CALCIUM CHLORIDE 600; 310; 30; 20 MG/100ML; MG/100ML; MG/100ML; MG/100ML
INJECTION, SOLUTION INTRAVENOUS CONTINUOUS PRN
Status: DISCONTINUED | OUTPATIENT
Start: 2025-07-23 | End: 2025-07-23

## 2025-07-23 RX ORDER — PROPOFOL 10 MG/ML
INJECTION, EMULSION INTRAVENOUS AS NEEDED
Status: DISCONTINUED | OUTPATIENT
Start: 2025-07-23 | End: 2025-07-23

## 2025-07-23 RX ORDER — FENTANYL CITRATE 50 UG/ML
INJECTION, SOLUTION INTRAMUSCULAR; INTRAVENOUS AS NEEDED
Status: DISCONTINUED | OUTPATIENT
Start: 2025-07-23 | End: 2025-07-23

## 2025-07-23 RX ORDER — KETAMINE HCL IN NACL, ISO-OSM 100MG/10ML
SYRINGE (ML) INJECTION AS NEEDED
Status: DISCONTINUED | OUTPATIENT
Start: 2025-07-23 | End: 2025-07-23

## 2025-07-23 RX ORDER — LIDOCAINE HYDROCHLORIDE 10 MG/ML
INJECTION, SOLUTION EPIDURAL; INFILTRATION; INTRACAUDAL; PERINEURAL AS NEEDED
Status: DISCONTINUED | OUTPATIENT
Start: 2025-07-23 | End: 2025-07-23

## 2025-07-23 RX ORDER — EPHEDRINE SULFATE 50 MG/ML
INJECTION INTRAVENOUS AS NEEDED
Status: DISCONTINUED | OUTPATIENT
Start: 2025-07-23 | End: 2025-07-23

## 2025-07-23 RX ORDER — DEXAMETHASONE SODIUM PHOSPHATE 10 MG/ML
INJECTION, SOLUTION INTRAMUSCULAR; INTRAVENOUS AS NEEDED
Status: DISCONTINUED | OUTPATIENT
Start: 2025-07-23 | End: 2025-07-23

## 2025-07-23 RX ORDER — VANCOMYCIN HYDROCHLORIDE 1 G/20ML
INJECTION, POWDER, LYOPHILIZED, FOR SOLUTION INTRAVENOUS AS NEEDED
Status: DISCONTINUED | OUTPATIENT
Start: 2025-07-23 | End: 2025-07-23 | Stop reason: HOSPADM

## 2025-07-23 RX ORDER — MIDAZOLAM HYDROCHLORIDE 2 MG/2ML
INJECTION, SOLUTION INTRAMUSCULAR; INTRAVENOUS AS NEEDED
Status: DISCONTINUED | OUTPATIENT
Start: 2025-07-23 | End: 2025-07-23

## 2025-07-23 RX ORDER — GADOBUTROL 604.72 MG/ML
9 INJECTION INTRAVENOUS
Status: COMPLETED | OUTPATIENT
Start: 2025-07-23 | End: 2025-07-23

## 2025-07-23 RX ADMIN — SACUBITRIL AND VALSARTAN 1 TABLET: 24; 26 TABLET, FILM COATED ORAL at 09:31

## 2025-07-23 RX ADMIN — ASPIRIN 81 MG CHEWABLE TABLET 162 MG: 81 TABLET CHEWABLE at 09:31

## 2025-07-23 RX ADMIN — RANOLAZINE 500 MG: 500 TABLET, FILM COATED, EXTENDED RELEASE ORAL at 18:44

## 2025-07-23 RX ADMIN — EPHEDRINE SULFATE 15 MG: 50 INJECTION, SOLUTION INTRAVENOUS at 15:18

## 2025-07-23 RX ADMIN — FENTANYL CITRATE 50 MCG: 50 INJECTION INTRAMUSCULAR; INTRAVENOUS at 15:01

## 2025-07-23 RX ADMIN — VANCOMYCIN HYDROCHLORIDE 1000 MG: 1 INJECTION, SOLUTION INTRAVENOUS at 12:53

## 2025-07-23 RX ADMIN — Medication 10 MG: at 15:05

## 2025-07-23 RX ADMIN — FINASTERIDE 5 MG: 5 TABLET, FILM COATED ORAL at 09:31

## 2025-07-23 RX ADMIN — DEXAMETHASONE SODIUM PHOSPHATE 10 MG: 10 INJECTION, SOLUTION INTRAMUSCULAR; INTRAVENOUS at 15:01

## 2025-07-23 RX ADMIN — MIDAZOLAM 2 MG: 1 INJECTION INTRAMUSCULAR; INTRAVENOUS at 14:55

## 2025-07-23 RX ADMIN — FENTANYL CITRATE 25 MCG: 50 INJECTION INTRAMUSCULAR; INTRAVENOUS at 15:03

## 2025-07-23 RX ADMIN — Medication 1 PACKET: at 18:47

## 2025-07-23 RX ADMIN — LIDOCAINE HYDROCHLORIDE 100 MG: 10 INJECTION, SOLUTION EPIDURAL; INFILTRATION; INTRACAUDAL; PERINEURAL at 14:59

## 2025-07-23 RX ADMIN — Medication 2.5 MG: at 00:49

## 2025-07-23 RX ADMIN — PROPOFOL 200 MG: 10 INJECTION, EMULSION INTRAVENOUS at 14:59

## 2025-07-23 RX ADMIN — PANTOPRAZOLE SODIUM 40 MG: 40 TABLET, DELAYED RELEASE ORAL at 09:31

## 2025-07-23 RX ADMIN — VANCOMYCIN HYDROCHLORIDE 1000 MG: 1 INJECTION, SOLUTION INTRAVENOUS at 01:13

## 2025-07-23 RX ADMIN — SOTALOL HYDROCHLORIDE 80 MG: 80 TABLET ORAL at 20:03

## 2025-07-23 RX ADMIN — Medication 2.5 MG: at 20:03

## 2025-07-23 RX ADMIN — EPHEDRINE SULFATE 5 MG: 50 INJECTION, SOLUTION INTRAVENOUS at 15:10

## 2025-07-23 RX ADMIN — EPHEDRINE SULFATE 5 MG: 50 INJECTION, SOLUTION INTRAVENOUS at 15:14

## 2025-07-23 RX ADMIN — SACUBITRIL AND VALSARTAN 1 TABLET: 24; 26 TABLET, FILM COATED ORAL at 18:44

## 2025-07-23 RX ADMIN — PHENYLEPHRINE HYDROCHLORIDE 40 MCG/MIN: 50 INJECTION INTRAVENOUS at 15:26

## 2025-07-23 RX ADMIN — EPHEDRINE SULFATE 5 MG: 50 INJECTION, SOLUTION INTRAVENOUS at 15:09

## 2025-07-23 RX ADMIN — EPHEDRINE SULFATE 5 MG: 50 INJECTION, SOLUTION INTRAVENOUS at 15:24

## 2025-07-23 RX ADMIN — SOTALOL HYDROCHLORIDE 80 MG: 80 TABLET ORAL at 09:58

## 2025-07-23 RX ADMIN — SODIUM CHLORIDE, SODIUM LACTATE, POTASSIUM CHLORIDE, AND CALCIUM CHLORIDE: .6; .31; .03; .02 INJECTION, SOLUTION INTRAVENOUS at 14:55

## 2025-07-23 RX ADMIN — RANOLAZINE 500 MG: 500 TABLET, FILM COATED, EXTENDED RELEASE ORAL at 09:31

## 2025-07-23 RX ADMIN — GADOBUTROL 9 ML: 604.72 INJECTION INTRAVENOUS at 12:00

## 2025-07-23 NOTE — ANESTHESIA PREPROCEDURE EVALUATION
Procedure:  DEBRIDEMENT FOOT/TOE (WASH OUT) (Right: Foot)    Relevant Problems   CARDIO   (+) Coronary artery disease involving native coronary artery of native heart without angina pectoris   (+) Coronary artery disease of native artery of native heart with stable angina pectoris (HCC)   (+) External hemorrhoids   (+) Mixed hyperlipidemia   (+) Paroxysmal atrial fibrillation (HCC)   (+) Premature ventricular beats   (+) Sick sinus syndrome (HCC)      GI/HEPATIC   (+) Gastroesophageal reflux disease      MUSCULOSKELETAL   (+) Chronic back pain   (+) Low back pain with sciatica   (+) Osteoarthritis of fingers of both hands   (+) Primary osteoarthritis of left knee      NEURO/PSYCH   (+) Chronic back pain      PULMONARY   (+) LENY and COPD overlap syndrome (HCC)   (+) Sleep apnea      Surgery/Wound/Pain   (+) Open wound of right foot      Orthopedic/Musculoskeletal   (+) Cervical radiculopathy      Other   (+) CPAP (continuous positive airway pressure) dependence        Physical Exam    Airway     Mallampati score: III  TM Distance: >3 FB  Neck ROM: limited extension  Mouth opening: >= 4 cm      Cardiovascular      Dental        Pulmonary   No wheezes    Neurological    He appears alert and oriented x3.      Other Findings  post-pubertal.      Anesthesia Plan  ASA Score- 3     Anesthesia Type- general with ASA Monitors.         Additional Monitors:     Airway Plan: LMA and LMA.    Comment: Postprocedure sciatic block offered..       Plan Factors-    Chart reviewed. EKG reviewed. Imaging results reviewed. Existing labs reviewed. Patient summary reviewed.    Patient is not a current smoker.  Patient did not smoke on day of surgery.            Induction- intravenous.    Postoperative Plan- .   Monitoring Plan - Monitoring plan - standard ASA monitoring  Post Operative Pain Plan - multimodal analgesia    Perioperative Resuscitation Plan - Level 1 - Full Code.       Informed Consent- Anesthetic plan and risks discussed with  patient.  I personally reviewed this patient with the CRNA. Discussed and agreed on the Anesthesia Plan with the CRNA..      NPO Status:  No vitals data found for the desired time range.      VITALS  /64   Pulse 78   Temp 97.8 °F (36.6 °C) (Temporal)   Resp 16   SpO2 98%  BP Readings from Last 3 Encounters:   07/23/25 139/64   07/22/25 152/84   07/15/25 141/70        LABS  Results from Last 12 Months   Lab Units 07/23/25  0423 07/22/25  1335   WBC Thousand/uL 9.66 6.48   HEMOGLOBIN g/dL 14.6 14.6   HEMATOCRIT % 44.0 44.3   PLATELETS Thousands/uL 218 205     Results from Last 12 Months   Lab Units 03/10/25  1013   INR  1.01    Results from Last 12 Months   Lab Units 07/23/25  0423 07/22/25  1335 06/25/25  1811 05/05/25  0907 03/10/25  1013 12/17/24  0922 10/01/24  0819   SODIUM mmol/L 135 137   < > 139   < > 138 139   POTASSIUM mmol/L 4.3 5.3   < > 4.7   < > 4.6 4.5   CHLORIDE mmol/L 103 106   < > 104   < > 104 103   CO2 mmol/L 25 25   < > 28   < > 30 27   ANION GAP mmol/L 7 6   < > 7   < > 4  --    BUN mg/dL 15 14   < > 18   < > 15 15   CREATININE mg/dL 0.83 0.87   < > 0.93   < > 0.88 1.05   CALCIUM mg/dL 9.5 8.9   < > 9.7   < > 9.7 9.5   GLUCOSE RANDOM mg/dL 147* 102   < >  --   --  95 98   MAGNESIUM mg/dL  --   --   --   --   --  2.0  --    HEMOGLOBIN A1C %  --   --   --  5.8*  --   --  5.8*    < > = values in this interval not displayed.        ECG      ECHOCARDIOGRAPHY OR OTHER TESTING/IMAGING  Encounter Date: 06/25/25   ECG 12 lead   Result Value    Ventricular Rate 70    Atrial Rate 70    WV Interval 184    QRSD Interval 108    QT Interval 424    QTC Interval 457    P Axis 43    QRS Axis 85    T Wave Axis 81    Narrative    Atrial-sensed ventricular-paced rhythm  Abnormal ECG  When compared with ECG of 21-Mar-2025 12:36,  Premature ventricular complexes are no longer Present    Confirmed by Diogo Mckeon ([id number]) on 6/26/2025 9:09:25 AM     Results for orders placed or performed in visit on 07/11/25  "  Cardiac EP device report    Narrative    MDT BI-V ICD / ACTIVE SYSTEM IS MRI CONDITIONAL  CARELINK TRANSMISSION: PRESENTING EGRAM AS BVP@ 69 BPM. BATTERY STATUS \"9 YRS.\" AP 59% CRT PACING (BIV) 100% (LV) 0%. ALL AVAILABLE LEAD PARAMETERS WITHIN NORMAL LIMITS. 7 VHRS NOTED; NO THERAPIES GIVEN; RATES>150 BPM. AVAIL EGRAMS PRESENT AS NSVT/VT. PT ON SOTALOL & METO SUCC. EF 55% (ECHO 2025). VENT SENSING MARKERS NOTED. OPTI-VOL WITHIN NORMAL LIMITS. NORMAL DEVICE FUNCTION. NC      History    CAD, HLD, A-Fib, HFrEF, ICD     Interpretation Summary  Show Result Comparison     Limited echocardiogram.    Left Ventricle: Left ventricular cavity size is normal. Wall thickness is normal. The left ventricular ejection fraction is 55% by visual estimation. Systolic function is normal. Wall motion is normal.    Left Atrium: The atrium is mildly dilated.    Aortic Valve: There is mild regurgitation.    Mitral Valve: There is mild annular calcification. There is mild to moderate regurgitation.    Tricuspid Valve: There is mild regurgitation.     ------- ANESTHESIA RISK-BENEFIT DISCUSSION -------  BENEFITS OF A SPECIALIZED ANESTHESIA TEAM INCLUDE (NBK [id number], PMID [id number]):  (1) Reduce mortality and morbidity for major surgeries/procedures. (2) The team provides analgesia/sedation/amnesia/akinesia as safely as possible. (3) The team strives to reduce discomfort as safely as possible.    RISKS, AND PLANS TO MITIGATE RISKS, INCLUDE:    - Neurologic system: IntraOp awareness (Risk is ~1:1,000 - 1:14,000; PMID 12153531), Stroke (Risk ~<0.1-2% for most cases; PMID 78724724), nerve injury, vision loss, and POCD.  Since regional anesthesia may be used, risks of block failure, bleeding, infection, and permanent or temporary nerve injury or injury to other local structures were discussed.  - Airway and Pulmonary system: Dental or mouth injury, throat pain, critical hypoxia, pneumothorax, prolonged intubation, post-op respiratory " compromise.  Airway/Intubation risks and prior data: LENY; Mask Ventilation: Mask ventilation not attempted (0); Brand: LMA; Size: 4; Insertion Attempts: 1; Placement Verify: End tidal CO2;   Major ARISCAT risk factors for pulmonary complications include: none, yielding a score of 0-1= Low risk, 1.6%.  - Cardiovascular system: Hypotension, arrhythmias, cardiac injury or arrest, blood clots, bleeding, infection, vascular injuries.  Alexis's RCRI score items: ICM and CHF, yielding an RCRI Score of 2= 7% risk of MACE. Discussion of exercise tolerance or stress testing is warranted: This is a necessary procedure of osteomyeltitis, more extensive cardiac workup than what has already been done is not appropriate  Are cheryl-op or intra-op beta blockers indicated? (PMID 32688183): no, patient has already taken recently.  - FEN/GI system: Aspiration risk (~0.5% per PMC 6914220) and PONV (10-80% per Apfel score) especially if the patient has not fasted.  ASA NPO guideline compliance?: Yes  - Medication risk assessment: Allergic reactions, excessive bleeding with anticoagulant use, overdoses, drug-drug interactions, injury to a fetus or  in pregnant or breastfeeding patients, cheryl-procedural sedation including while driving/operating machinery.  Recent relevant medications: See MAR or Med Review  Personal or family history of anesthesia complications: no  Pregnancy Status: N/A  - Estimate mortality risks associated with anesthesia based on ASA-PS (PMID 81378120): ASA-PS III: 1:3,500

## 2025-07-24 PROBLEM — R93.7 ABNORMAL MRI SCAN, BONE: Status: ACTIVE | Noted: 2025-07-24

## 2025-07-24 PROBLEM — L03.115 CELLULITIS OF RIGHT FOOT: Status: ACTIVE | Noted: 2025-07-24

## 2025-07-24 LAB
ANION GAP SERPL CALCULATED.3IONS-SCNC: 7 MMOL/L (ref 4–13)
BUN SERPL-MCNC: 19 MG/DL (ref 5–25)
CALCIUM SERPL-MCNC: 9.6 MG/DL (ref 8.4–10.2)
CHLORIDE SERPL-SCNC: 104 MMOL/L (ref 96–108)
CO2 SERPL-SCNC: 26 MMOL/L (ref 21–32)
CREAT SERPL-MCNC: 0.9 MG/DL (ref 0.6–1.3)
ERYTHROCYTE [DISTWIDTH] IN BLOOD BY AUTOMATED COUNT: 12.7 % (ref 11.6–15.1)
GFR SERPL CREATININE-BSD FRML MDRD: 86 ML/MIN/1.73SQ M
GLUCOSE SERPL-MCNC: 130 MG/DL (ref 65–140)
HCT VFR BLD AUTO: 44.7 % (ref 36.5–49.3)
HGB BLD-MCNC: 14.5 G/DL (ref 12–17)
MCH RBC QN AUTO: 31.3 PG (ref 26.8–34.3)
MCHC RBC AUTO-ENTMCNC: 32.4 G/DL (ref 31.4–37.4)
MCV RBC AUTO: 97 FL (ref 82–98)
PLATELET # BLD AUTO: 211 THOUSANDS/UL (ref 149–390)
PMV BLD AUTO: 10.2 FL (ref 8.9–12.7)
POTASSIUM SERPL-SCNC: 4.6 MMOL/L (ref 3.5–5.3)
RBC # BLD AUTO: 4.63 MILLION/UL (ref 3.88–5.62)
SODIUM SERPL-SCNC: 137 MMOL/L (ref 135–147)
VANCOMYCIN SERPL-MCNC: 10.1 UG/ML (ref 10–20)
WBC # BLD AUTO: 18.41 THOUSAND/UL (ref 4.31–10.16)

## 2025-07-24 PROCEDURE — G0545 PR INHERENT VISIT TO INPT: HCPCS | Performed by: INTERNAL MEDICINE

## 2025-07-24 PROCEDURE — 80202 ASSAY OF VANCOMYCIN: CPT | Performed by: INTERNAL MEDICINE

## 2025-07-24 PROCEDURE — 99222 1ST HOSP IP/OBS MODERATE 55: CPT | Performed by: INTERNAL MEDICINE

## 2025-07-24 PROCEDURE — 99232 SBSQ HOSP IP/OBS MODERATE 35: CPT

## 2025-07-24 PROCEDURE — NC001 PR NO CHARGE: Performed by: PODIATRIST

## 2025-07-24 PROCEDURE — 85027 COMPLETE CBC AUTOMATED: CPT

## 2025-07-24 PROCEDURE — 80048 BASIC METABOLIC PNL TOTAL CA: CPT

## 2025-07-24 RX ORDER — HYDROMORPHONE HCL IN WATER/PF 6 MG/30 ML
0.2 PATIENT CONTROLLED ANALGESIA SYRINGE INTRAVENOUS ONCE
Refills: 0 | Status: COMPLETED | OUTPATIENT
Start: 2025-07-24 | End: 2025-07-24

## 2025-07-24 RX ORDER — METHOCARBAMOL 500 MG/1
500 TABLET, FILM COATED ORAL EVERY 6 HOURS PRN
Status: DISCONTINUED | OUTPATIENT
Start: 2025-07-24 | End: 2025-08-01 | Stop reason: HOSPADM

## 2025-07-24 RX ORDER — HYDROMORPHONE HCL/PF 1 MG/ML
0.5 SYRINGE (ML) INJECTION EVERY 4 HOURS PRN
Status: DISCONTINUED | OUTPATIENT
Start: 2025-07-24 | End: 2025-08-01 | Stop reason: HOSPADM

## 2025-07-24 RX ORDER — OXYCODONE HYDROCHLORIDE 5 MG/1
5 TABLET ORAL EVERY 4 HOURS PRN
Refills: 0 | Status: DISCONTINUED | OUTPATIENT
Start: 2025-07-24 | End: 2025-08-01 | Stop reason: HOSPADM

## 2025-07-24 RX ORDER — HYDROMORPHONE HCL/PF 1 MG/ML
0.5 SYRINGE (ML) INJECTION ONCE
Status: COMPLETED | OUTPATIENT
Start: 2025-07-24 | End: 2025-07-24

## 2025-07-24 RX ORDER — LIDOCAINE HYDROCHLORIDE 10 MG/ML
10 INJECTION, SOLUTION EPIDURAL; INFILTRATION; INTRACAUDAL; PERINEURAL ONCE
Status: COMPLETED | OUTPATIENT
Start: 2025-07-24 | End: 2025-07-24

## 2025-07-24 RX ORDER — AMOXICILLIN 250 MG
1 CAPSULE ORAL 2 TIMES DAILY
Status: DISCONTINUED | OUTPATIENT
Start: 2025-07-24 | End: 2025-08-01 | Stop reason: HOSPADM

## 2025-07-24 RX ORDER — BISACODYL 5 MG/1
5 TABLET, DELAYED RELEASE ORAL DAILY PRN
Status: DISCONTINUED | OUTPATIENT
Start: 2025-07-24 | End: 2025-08-01 | Stop reason: HOSPADM

## 2025-07-24 RX ADMIN — FINASTERIDE 5 MG: 5 TABLET, FILM COATED ORAL at 08:07

## 2025-07-24 RX ADMIN — SOTALOL HYDROCHLORIDE 80 MG: 80 TABLET ORAL at 21:16

## 2025-07-24 RX ADMIN — CEFEPIME 2000 MG: 2 INJECTION, POWDER, FOR SOLUTION INTRAVENOUS at 12:04

## 2025-07-24 RX ADMIN — METHOCARBAMOL 500 MG: 500 TABLET ORAL at 21:16

## 2025-07-24 RX ADMIN — BISACODYL 5 MG: 5 TABLET, COATED ORAL at 21:16

## 2025-07-24 RX ADMIN — POLYETHYLENE GLYCOL 3350 17 G: 17 POWDER, FOR SOLUTION ORAL at 08:07

## 2025-07-24 RX ADMIN — LIDOCAINE HYDROCHLORIDE 10 ML: 10 INJECTION, SOLUTION EPIDURAL; INFILTRATION; INTRACAUDAL; PERINEURAL at 09:42

## 2025-07-24 RX ADMIN — VANCOMYCIN HYDROCHLORIDE 1000 MG: 1 INJECTION, SOLUTION INTRAVENOUS at 00:43

## 2025-07-24 RX ADMIN — SOTALOL HYDROCHLORIDE 80 MG: 80 TABLET ORAL at 08:09

## 2025-07-24 RX ADMIN — SACUBITRIL AND VALSARTAN 1 TABLET: 24; 26 TABLET, FILM COATED ORAL at 17:56

## 2025-07-24 RX ADMIN — SENNOSIDES AND DOCUSATE SODIUM 1 TABLET: 50; 8.6 TABLET ORAL at 08:08

## 2025-07-24 RX ADMIN — OXYCODONE HYDROCHLORIDE 5 MG: 5 TABLET ORAL at 21:18

## 2025-07-24 RX ADMIN — ENOXAPARIN SODIUM 40 MG: 40 INJECTION SUBCUTANEOUS at 08:07

## 2025-07-24 RX ADMIN — RANOLAZINE 500 MG: 500 TABLET, FILM COATED, EXTENDED RELEASE ORAL at 17:56

## 2025-07-24 RX ADMIN — VANCOMYCIN HYDROCHLORIDE 1000 MG: 1 INJECTION, SOLUTION INTRAVENOUS at 21:16

## 2025-07-24 RX ADMIN — HYDROMORPHONE HYDROCHLORIDE 0.2 MG: 0.2 INJECTION, SOLUTION INTRAMUSCULAR; INTRAVENOUS; SUBCUTANEOUS at 02:19

## 2025-07-24 RX ADMIN — ATORVASTATIN CALCIUM 40 MG: 40 TABLET, FILM COATED ORAL at 17:56

## 2025-07-24 RX ADMIN — RANOLAZINE 500 MG: 500 TABLET, FILM COATED, EXTENDED RELEASE ORAL at 08:09

## 2025-07-24 RX ADMIN — SACUBITRIL AND VALSARTAN 1 TABLET: 24; 26 TABLET, FILM COATED ORAL at 09:42

## 2025-07-24 RX ADMIN — PANTOPRAZOLE SODIUM 40 MG: 40 TABLET, DELAYED RELEASE ORAL at 08:07

## 2025-07-24 RX ADMIN — Medication 2.5 MG: at 00:43

## 2025-07-24 RX ADMIN — CEFEPIME 2000 MG: 2 INJECTION, POWDER, FOR SOLUTION INTRAVENOUS at 23:19

## 2025-07-24 RX ADMIN — VANCOMYCIN HYDROCHLORIDE 1000 MG: 1 INJECTION, SOLUTION INTRAVENOUS at 12:59

## 2025-07-24 RX ADMIN — OXYCODONE HYDROCHLORIDE 5 MG: 5 TABLET ORAL at 06:08

## 2025-07-24 RX ADMIN — HYDROMORPHONE HYDROCHLORIDE 0.5 MG: 1 INJECTION, SOLUTION INTRAMUSCULAR; INTRAVENOUS; SUBCUTANEOUS at 11:41

## 2025-07-24 RX ADMIN — Medication 1 PACKET: at 08:07

## 2025-07-24 RX ADMIN — SENNOSIDES AND DOCUSATE SODIUM 1 TABLET: 50; 8.6 TABLET ORAL at 17:56

## 2025-07-24 RX ADMIN — ASPIRIN 81 MG CHEWABLE TABLET 162 MG: 81 TABLET CHEWABLE at 08:07

## 2025-07-25 PROBLEM — K59.00 CONSTIPATION: Status: ACTIVE | Noted: 2025-07-25

## 2025-07-25 LAB
ANION GAP SERPL CALCULATED.3IONS-SCNC: 7 MMOL/L (ref 4–13)
BACTERIA WND AEROBE CULT: ABNORMAL
BUN SERPL-MCNC: 26 MG/DL (ref 5–25)
CALCIUM SERPL-MCNC: 9.3 MG/DL (ref 8.4–10.2)
CHLORIDE SERPL-SCNC: 103 MMOL/L (ref 96–108)
CO2 SERPL-SCNC: 27 MMOL/L (ref 21–32)
CREAT SERPL-MCNC: 0.94 MG/DL (ref 0.6–1.3)
ERYTHROCYTE [DISTWIDTH] IN BLOOD BY AUTOMATED COUNT: 13.1 % (ref 11.6–15.1)
GFR SERPL CREATININE-BSD FRML MDRD: 81 ML/MIN/1.73SQ M
GLUCOSE SERPL-MCNC: 110 MG/DL (ref 65–140)
GRAM STN SPEC: ABNORMAL
GRAM STN SPEC: ABNORMAL
HCT VFR BLD AUTO: 45 % (ref 36.5–49.3)
HGB BLD-MCNC: 14.6 G/DL (ref 12–17)
MCH RBC QN AUTO: 31.3 PG (ref 26.8–34.3)
MCHC RBC AUTO-ENTMCNC: 32.4 G/DL (ref 31.4–37.4)
MCV RBC AUTO: 97 FL (ref 82–98)
PLATELET # BLD AUTO: 209 THOUSANDS/UL (ref 149–390)
PMV BLD AUTO: 9.5 FL (ref 8.9–12.7)
POTASSIUM SERPL-SCNC: 4.2 MMOL/L (ref 3.5–5.3)
RBC # BLD AUTO: 4.66 MILLION/UL (ref 3.88–5.62)
SODIUM SERPL-SCNC: 137 MMOL/L (ref 135–147)
WBC # BLD AUTO: 8.81 THOUSAND/UL (ref 4.31–10.16)

## 2025-07-25 PROCEDURE — 99232 SBSQ HOSP IP/OBS MODERATE 35: CPT | Performed by: INTERNAL MEDICINE

## 2025-07-25 PROCEDURE — G0545 PR INHERENT VISIT TO INPT: HCPCS | Performed by: INTERNAL MEDICINE

## 2025-07-25 PROCEDURE — 85027 COMPLETE CBC AUTOMATED: CPT

## 2025-07-25 PROCEDURE — 80048 BASIC METABOLIC PNL TOTAL CA: CPT

## 2025-07-25 PROCEDURE — 99232 SBSQ HOSP IP/OBS MODERATE 35: CPT | Performed by: STUDENT IN AN ORGANIZED HEALTH CARE EDUCATION/TRAINING PROGRAM

## 2025-07-25 RX ORDER — IBUPROFEN 600 MG/1
600 TABLET, FILM COATED ORAL EVERY 6 HOURS PRN
Status: DISCONTINUED | OUTPATIENT
Start: 2025-07-25 | End: 2025-08-01 | Stop reason: HOSPADM

## 2025-07-25 RX ADMIN — SENNOSIDES AND DOCUSATE SODIUM 1 TABLET: 50; 8.6 TABLET ORAL at 17:07

## 2025-07-25 RX ADMIN — Medication 1 PACKET: at 08:51

## 2025-07-25 RX ADMIN — FINASTERIDE 5 MG: 5 TABLET, FILM COATED ORAL at 08:51

## 2025-07-25 RX ADMIN — RANOLAZINE 500 MG: 500 TABLET, FILM COATED, EXTENDED RELEASE ORAL at 17:07

## 2025-07-25 RX ADMIN — CEFEPIME 2000 MG: 2 INJECTION, POWDER, FOR SOLUTION INTRAVENOUS at 11:44

## 2025-07-25 RX ADMIN — CEFEPIME 2000 MG: 2 INJECTION, POWDER, FOR SOLUTION INTRAVENOUS at 22:40

## 2025-07-25 RX ADMIN — SACUBITRIL AND VALSARTAN 1 TABLET: 24; 26 TABLET, FILM COATED ORAL at 08:51

## 2025-07-25 RX ADMIN — ATORVASTATIN CALCIUM 40 MG: 40 TABLET, FILM COATED ORAL at 17:07

## 2025-07-25 RX ADMIN — SOTALOL HYDROCHLORIDE 80 MG: 80 TABLET ORAL at 21:26

## 2025-07-25 RX ADMIN — SENNOSIDES AND DOCUSATE SODIUM 1 TABLET: 50; 8.6 TABLET ORAL at 08:51

## 2025-07-25 RX ADMIN — VANCOMYCIN HYDROCHLORIDE 1000 MG: 1 INJECTION, SOLUTION INTRAVENOUS at 08:52

## 2025-07-25 RX ADMIN — POLYETHYLENE GLYCOL 3350 17 G: 17 POWDER, FOR SOLUTION ORAL at 08:51

## 2025-07-25 RX ADMIN — SACUBITRIL AND VALSARTAN 1 TABLET: 24; 26 TABLET, FILM COATED ORAL at 17:07

## 2025-07-25 RX ADMIN — Medication 1 PACKET: at 17:06

## 2025-07-25 RX ADMIN — ENOXAPARIN SODIUM 40 MG: 40 INJECTION SUBCUTANEOUS at 08:51

## 2025-07-25 RX ADMIN — VANCOMYCIN HYDROCHLORIDE 1000 MG: 1 INJECTION, SOLUTION INTRAVENOUS at 21:24

## 2025-07-25 RX ADMIN — SOTALOL HYDROCHLORIDE 80 MG: 80 TABLET ORAL at 08:51

## 2025-07-25 RX ADMIN — ASPIRIN 81 MG CHEWABLE TABLET 162 MG: 81 TABLET CHEWABLE at 08:51

## 2025-07-25 RX ADMIN — RANOLAZINE 500 MG: 500 TABLET, FILM COATED, EXTENDED RELEASE ORAL at 08:51

## 2025-07-25 RX ADMIN — PANTOPRAZOLE SODIUM 40 MG: 40 TABLET, DELAYED RELEASE ORAL at 08:51

## 2025-07-25 NOTE — ANESTHESIA POSTPROCEDURE EVALUATION
CHIEF COMPLAINT:  Chief Complaint   Patient presents with   • Routine Foot Care     Nail trim. Last visit with Dr. Levin on 4/25/17.       SUBJECTIVE:     Giovanni Ng is a 53 year old male who presents to the clinic today complaining of long, thickened and painful toenails which are difficult to debride by the patient. Patient relates pain has been present for several months duration. The patient denies any nausea, vomiting, fever, chills or calf pain.  No other significant pedal complaints are noted at this time.  The patient does relate that periodic debridements with a podiatric physician do help resolve symptoms. Patient is not diabetic    PHYSICAL EXAMINATION:  INTEGUMENT:  Nails are long, thickened, discolored, dystrophic, and friable with subungual debris 1-5 bilaterally and pain on palpation bilaterally.  Skin is somewhat cool, mildly dry, and somewhat atrophic. No current open lesions or signs of infection. Pedal hair is absent. no calluses are present. No other significant integumentary findings.  Vasc: DP 1/4 PT, 1/4 b/l. no Edema present bilaterally. CFT = 3 seconds bilaterally  Neuro: intact to light touch and painful stimuli as well as [place] monofilament.  M/S: dorsally contracted digits 2-5 and mildly Laterally deviated hallux bilaterally. Pain at all nails on palpation bilaterally.    ASSESSMENT:  1. Bilateral foot pain    2. Dermatophytosis of nail    3. Blind    4. Ingrowing nail        PLAN:  Discussed in depth in detail with the patient conditions we'll treatment options  All nails were debrided sharply and mechanically is medically necessary to a level safe and comfortable for the patient to include all necrotic nail down to the nailbed.  I did discuss proper foot care and hygiene with the patient.  Advised patient on use of emollients.  I did advise the patient to continue to monitor for signs of infection and return to clinic in Return in about 3 months (around  Post-Op Assessment Note    CV Status:  Stable    Pain management: adequate       Mental Status:  Awake and alert   Hydration Status:  Stable   PONV Controlled:  None   Airway Patency:  Patent     Post Op Vitals Reviewed: Yes    No anethesia notable event occurred.    Staff: Anesthesiologist           Last Filed PACU Vitals:  Vitals Value Taken Time   Temp 97 °F (36.1 °C) 07/23/25 16:03   Pulse 66 07/23/25 17:43   /58 07/23/25 17:15   Resp 58 07/23/25 17:26   SpO2 94 % 07/23/25 17:43   Vitals shown include unfiled device data.    Modified Ritesh:     Vitals Value Taken Time   Activity 2 07/23/25 16:03   Respiration 2 07/23/25 16:03   Circulation 2 07/23/25 16:03   Consciousness 2 07/23/25 16:03   Oxygen Saturation 2 07/23/25 16:03     Modified Ritesh Score: 10             8/16/2017).    Cosmo Berman DPM

## 2025-07-26 PROCEDURE — F08G5YZ WOUND MANAGEMENT TREATMENT OF INTEGUMENTARY SYSTEM - LOWER BACK / LOWER EXTREMITY USING OTHER EQUIPMENT: ICD-10-PCS

## 2025-07-26 PROCEDURE — 99232 SBSQ HOSP IP/OBS MODERATE 35: CPT | Performed by: INTERNAL MEDICINE

## 2025-07-26 PROCEDURE — 97163 PT EVAL HIGH COMPLEX 45 MIN: CPT

## 2025-07-26 PROCEDURE — G0545 PR INHERENT VISIT TO INPT: HCPCS | Performed by: INTERNAL MEDICINE

## 2025-07-26 PROCEDURE — 99232 SBSQ HOSP IP/OBS MODERATE 35: CPT | Performed by: STUDENT IN AN ORGANIZED HEALTH CARE EDUCATION/TRAINING PROGRAM

## 2025-07-26 PROCEDURE — 94760 N-INVAS EAR/PLS OXIMETRY 1: CPT

## 2025-07-26 RX ORDER — LIDOCAINE HYDROCHLORIDE 10 MG/ML
10 INJECTION, SOLUTION EPIDURAL; INFILTRATION; INTRACAUDAL; PERINEURAL ONCE
Status: COMPLETED | OUTPATIENT
Start: 2025-07-26 | End: 2025-07-26

## 2025-07-26 RX ADMIN — CEFEPIME 2000 MG: 2 INJECTION, POWDER, FOR SOLUTION INTRAVENOUS at 22:38

## 2025-07-26 RX ADMIN — PANTOPRAZOLE SODIUM 40 MG: 40 TABLET, DELAYED RELEASE ORAL at 08:03

## 2025-07-26 RX ADMIN — SOTALOL HYDROCHLORIDE 80 MG: 80 TABLET ORAL at 21:21

## 2025-07-26 RX ADMIN — BISACODYL 5 MG: 5 TABLET, COATED ORAL at 15:58

## 2025-07-26 RX ADMIN — SENNOSIDES AND DOCUSATE SODIUM 1 TABLET: 50; 8.6 TABLET ORAL at 08:03

## 2025-07-26 RX ADMIN — ASPIRIN 81 MG CHEWABLE TABLET 162 MG: 81 TABLET CHEWABLE at 08:03

## 2025-07-26 RX ADMIN — OXYCODONE HYDROCHLORIDE 5 MG: 5 TABLET ORAL at 08:03

## 2025-07-26 RX ADMIN — RANOLAZINE 500 MG: 500 TABLET, FILM COATED, EXTENDED RELEASE ORAL at 08:03

## 2025-07-26 RX ADMIN — SACUBITRIL AND VALSARTAN 1 TABLET: 24; 26 TABLET, FILM COATED ORAL at 08:03

## 2025-07-26 RX ADMIN — CEFEPIME 2000 MG: 2 INJECTION, POWDER, FOR SOLUTION INTRAVENOUS at 11:15

## 2025-07-26 RX ADMIN — VANCOMYCIN HYDROCHLORIDE 1000 MG: 1 INJECTION, SOLUTION INTRAVENOUS at 08:06

## 2025-07-26 RX ADMIN — OXYCODONE HYDROCHLORIDE 5 MG: 5 TABLET ORAL at 13:20

## 2025-07-26 RX ADMIN — SOTALOL HYDROCHLORIDE 80 MG: 80 TABLET ORAL at 08:06

## 2025-07-26 RX ADMIN — SENNOSIDES AND DOCUSATE SODIUM 1 TABLET: 50; 8.6 TABLET ORAL at 17:14

## 2025-07-26 RX ADMIN — Medication 1 PACKET: at 17:14

## 2025-07-26 RX ADMIN — FINASTERIDE 5 MG: 5 TABLET, FILM COATED ORAL at 08:03

## 2025-07-26 RX ADMIN — ATORVASTATIN CALCIUM 40 MG: 40 TABLET, FILM COATED ORAL at 17:14

## 2025-07-26 RX ADMIN — LIDOCAINE HYDROCHLORIDE 10 ML: 10 INJECTION, SOLUTION EPIDURAL; INFILTRATION; INTRACAUDAL at 11:22

## 2025-07-26 RX ADMIN — SACUBITRIL AND VALSARTAN 1 TABLET: 24; 26 TABLET, FILM COATED ORAL at 17:14

## 2025-07-26 RX ADMIN — RANOLAZINE 500 MG: 500 TABLET, FILM COATED, EXTENDED RELEASE ORAL at 17:14

## 2025-07-26 RX ADMIN — HYDROMORPHONE HYDROCHLORIDE 0.5 MG: 1 INJECTION, SOLUTION INTRAMUSCULAR; INTRAVENOUS; SUBCUTANEOUS at 11:23

## 2025-07-26 RX ADMIN — HYDROMORPHONE HYDROCHLORIDE 0.5 MG: 1 INJECTION, SOLUTION INTRAMUSCULAR; INTRAVENOUS; SUBCUTANEOUS at 16:44

## 2025-07-26 RX ADMIN — Medication 1 PACKET: at 08:03

## 2025-07-26 RX ADMIN — OXYCODONE HYDROCHLORIDE 5 MG: 5 TABLET ORAL at 21:21

## 2025-07-26 RX ADMIN — ENOXAPARIN SODIUM 40 MG: 40 INJECTION SUBCUTANEOUS at 08:03

## 2025-07-27 LAB
ANION GAP SERPL CALCULATED.3IONS-SCNC: 8 MMOL/L (ref 4–13)
BUN SERPL-MCNC: 24 MG/DL (ref 5–25)
CALCIUM SERPL-MCNC: 9.4 MG/DL (ref 8.4–10.2)
CHLORIDE SERPL-SCNC: 100 MMOL/L (ref 96–108)
CO2 SERPL-SCNC: 29 MMOL/L (ref 21–32)
CREAT SERPL-MCNC: 0.87 MG/DL (ref 0.6–1.3)
ERYTHROCYTE [DISTWIDTH] IN BLOOD BY AUTOMATED COUNT: 12.9 % (ref 11.6–15.1)
GFR SERPL CREATININE-BSD FRML MDRD: 87 ML/MIN/1.73SQ M
GLUCOSE SERPL-MCNC: 110 MG/DL (ref 65–140)
HCT VFR BLD AUTO: 44.3 % (ref 36.5–49.3)
HGB BLD-MCNC: 14.6 G/DL (ref 12–17)
MCH RBC QN AUTO: 31.7 PG (ref 26.8–34.3)
MCHC RBC AUTO-ENTMCNC: 33 G/DL (ref 31.4–37.4)
MCV RBC AUTO: 96 FL (ref 82–98)
PLATELET # BLD AUTO: 210 THOUSANDS/UL (ref 149–390)
PMV BLD AUTO: 9.5 FL (ref 8.9–12.7)
POTASSIUM SERPL-SCNC: 4.1 MMOL/L (ref 3.5–5.3)
RBC # BLD AUTO: 4.61 MILLION/UL (ref 3.88–5.62)
SODIUM SERPL-SCNC: 137 MMOL/L (ref 135–147)
WBC # BLD AUTO: 8.08 THOUSAND/UL (ref 4.31–10.16)

## 2025-07-27 PROCEDURE — 94760 N-INVAS EAR/PLS OXIMETRY 1: CPT

## 2025-07-27 PROCEDURE — 80048 BASIC METABOLIC PNL TOTAL CA: CPT | Performed by: STUDENT IN AN ORGANIZED HEALTH CARE EDUCATION/TRAINING PROGRAM

## 2025-07-27 PROCEDURE — G0545 PR INHERENT VISIT TO INPT: HCPCS | Performed by: INTERNAL MEDICINE

## 2025-07-27 PROCEDURE — 99232 SBSQ HOSP IP/OBS MODERATE 35: CPT | Performed by: INTERNAL MEDICINE

## 2025-07-27 PROCEDURE — 99232 SBSQ HOSP IP/OBS MODERATE 35: CPT | Performed by: STUDENT IN AN ORGANIZED HEALTH CARE EDUCATION/TRAINING PROGRAM

## 2025-07-27 PROCEDURE — 85027 COMPLETE CBC AUTOMATED: CPT | Performed by: STUDENT IN AN ORGANIZED HEALTH CARE EDUCATION/TRAINING PROGRAM

## 2025-07-27 RX ADMIN — CEFEPIME 2000 MG: 2 INJECTION, POWDER, FOR SOLUTION INTRAVENOUS at 22:23

## 2025-07-27 RX ADMIN — CEFEPIME 2000 MG: 2 INJECTION, POWDER, FOR SOLUTION INTRAVENOUS at 11:18

## 2025-07-27 RX ADMIN — Medication 1 PACKET: at 17:10

## 2025-07-27 RX ADMIN — SENNOSIDES AND DOCUSATE SODIUM 1 TABLET: 50; 8.6 TABLET ORAL at 09:13

## 2025-07-27 RX ADMIN — ENOXAPARIN SODIUM 40 MG: 40 INJECTION SUBCUTANEOUS at 09:09

## 2025-07-27 RX ADMIN — RANOLAZINE 500 MG: 500 TABLET, FILM COATED, EXTENDED RELEASE ORAL at 17:11

## 2025-07-27 RX ADMIN — PANTOPRAZOLE SODIUM 40 MG: 40 TABLET, DELAYED RELEASE ORAL at 09:12

## 2025-07-27 RX ADMIN — SACUBITRIL AND VALSARTAN 1 TABLET: 24; 26 TABLET, FILM COATED ORAL at 09:15

## 2025-07-27 RX ADMIN — SACUBITRIL AND VALSARTAN 1 TABLET: 24; 26 TABLET, FILM COATED ORAL at 17:11

## 2025-07-27 RX ADMIN — FINASTERIDE 5 MG: 5 TABLET, FILM COATED ORAL at 09:11

## 2025-07-27 RX ADMIN — SOTALOL HYDROCHLORIDE 80 MG: 80 TABLET ORAL at 22:42

## 2025-07-27 RX ADMIN — RANOLAZINE 500 MG: 500 TABLET, FILM COATED, EXTENDED RELEASE ORAL at 09:16

## 2025-07-27 RX ADMIN — SOTALOL HYDROCHLORIDE 80 MG: 80 TABLET ORAL at 09:11

## 2025-07-27 RX ADMIN — ATORVASTATIN CALCIUM 40 MG: 40 TABLET, FILM COATED ORAL at 17:10

## 2025-07-27 RX ADMIN — ASPIRIN 81 MG CHEWABLE TABLET 162 MG: 81 TABLET CHEWABLE at 09:06

## 2025-07-27 RX ADMIN — Medication 1 PACKET: at 09:12

## 2025-07-28 LAB
DME PARACHUTE DELIVERY DATE REQUESTED: NORMAL
DME PARACHUTE DELIVERY NOTE: NORMAL
DME PARACHUTE ITEM DESCRIPTION: NORMAL
DME PARACHUTE ORDER STATUS: NORMAL
DME PARACHUTE SUPPLIER NAME: NORMAL
DME PARACHUTE SUPPLIER PHONE: NORMAL

## 2025-07-28 PROCEDURE — NC001 PR NO CHARGE: Performed by: PODIATRIST

## 2025-07-28 PROCEDURE — 97166 OT EVAL MOD COMPLEX 45 MIN: CPT

## 2025-07-28 PROCEDURE — G0545 PR INHERENT VISIT TO INPT: HCPCS | Performed by: INTERNAL MEDICINE

## 2025-07-28 PROCEDURE — 99232 SBSQ HOSP IP/OBS MODERATE 35: CPT | Performed by: STUDENT IN AN ORGANIZED HEALTH CARE EDUCATION/TRAINING PROGRAM

## 2025-07-28 PROCEDURE — F08G5YZ WOUND MANAGEMENT TREATMENT OF INTEGUMENTARY SYSTEM - LOWER BACK / LOWER EXTREMITY USING OTHER EQUIPMENT: ICD-10-PCS

## 2025-07-28 PROCEDURE — 99232 SBSQ HOSP IP/OBS MODERATE 35: CPT | Performed by: INTERNAL MEDICINE

## 2025-07-28 RX ADMIN — HYDROMORPHONE HYDROCHLORIDE 0.5 MG: 1 INJECTION, SOLUTION INTRAMUSCULAR; INTRAVENOUS; SUBCUTANEOUS at 14:06

## 2025-07-28 RX ADMIN — SACUBITRIL AND VALSARTAN 1 TABLET: 24; 26 TABLET, FILM COATED ORAL at 17:41

## 2025-07-28 RX ADMIN — Medication 1 PACKET: at 17:42

## 2025-07-28 RX ADMIN — OXYCODONE HYDROCHLORIDE 5 MG: 5 TABLET ORAL at 08:43

## 2025-07-28 RX ADMIN — CEFEPIME 2000 MG: 2 INJECTION, POWDER, FOR SOLUTION INTRAVENOUS at 12:08

## 2025-07-28 RX ADMIN — CEFEPIME 2000 MG: 2 INJECTION, POWDER, FOR SOLUTION INTRAVENOUS at 21:41

## 2025-07-28 RX ADMIN — IBUPROFEN 600 MG: 600 TABLET, FILM COATED ORAL at 16:07

## 2025-07-28 RX ADMIN — SENNOSIDES AND DOCUSATE SODIUM 1 TABLET: 50; 8.6 TABLET ORAL at 08:43

## 2025-07-28 RX ADMIN — FINASTERIDE 5 MG: 5 TABLET, FILM COATED ORAL at 08:43

## 2025-07-28 RX ADMIN — Medication 1 PACKET: at 08:41

## 2025-07-28 RX ADMIN — ASPIRIN 81 MG CHEWABLE TABLET 162 MG: 81 TABLET CHEWABLE at 08:43

## 2025-07-28 RX ADMIN — RANOLAZINE 500 MG: 500 TABLET, FILM COATED, EXTENDED RELEASE ORAL at 17:42

## 2025-07-28 RX ADMIN — ENOXAPARIN SODIUM 40 MG: 40 INJECTION SUBCUTANEOUS at 08:42

## 2025-07-28 RX ADMIN — SOTALOL HYDROCHLORIDE 80 MG: 80 TABLET ORAL at 08:44

## 2025-07-28 RX ADMIN — SENNOSIDES AND DOCUSATE SODIUM 1 TABLET: 50; 8.6 TABLET ORAL at 17:42

## 2025-07-28 RX ADMIN — ATORVASTATIN CALCIUM 40 MG: 40 TABLET, FILM COATED ORAL at 16:08

## 2025-07-28 RX ADMIN — SACUBITRIL AND VALSARTAN 1 TABLET: 24; 26 TABLET, FILM COATED ORAL at 08:44

## 2025-07-28 RX ADMIN — PANTOPRAZOLE SODIUM 40 MG: 40 TABLET, DELAYED RELEASE ORAL at 08:43

## 2025-07-28 RX ADMIN — SOTALOL HYDROCHLORIDE 80 MG: 80 TABLET ORAL at 21:41

## 2025-07-28 RX ADMIN — RANOLAZINE 500 MG: 500 TABLET, FILM COATED, EXTENDED RELEASE ORAL at 08:44

## 2025-07-29 PROCEDURE — 97112 NEUROMUSCULAR REEDUCATION: CPT

## 2025-07-29 PROCEDURE — 97116 GAIT TRAINING THERAPY: CPT

## 2025-07-29 PROCEDURE — 99232 SBSQ HOSP IP/OBS MODERATE 35: CPT | Performed by: INTERNAL MEDICINE

## 2025-07-29 PROCEDURE — G0545 PR INHERENT VISIT TO INPT: HCPCS | Performed by: INTERNAL MEDICINE

## 2025-07-29 PROCEDURE — 97530 THERAPEUTIC ACTIVITIES: CPT

## 2025-07-29 RX ADMIN — ATORVASTATIN CALCIUM 40 MG: 40 TABLET, FILM COATED ORAL at 17:31

## 2025-07-29 RX ADMIN — ENOXAPARIN SODIUM 40 MG: 40 INJECTION SUBCUTANEOUS at 09:24

## 2025-07-29 RX ADMIN — IBUPROFEN 600 MG: 600 TABLET, FILM COATED ORAL at 04:56

## 2025-07-29 RX ADMIN — SACUBITRIL AND VALSARTAN 1 TABLET: 24; 26 TABLET, FILM COATED ORAL at 21:09

## 2025-07-29 RX ADMIN — CEFEPIME 2000 MG: 2 INJECTION, POWDER, FOR SOLUTION INTRAVENOUS at 11:14

## 2025-07-29 RX ADMIN — SOTALOL HYDROCHLORIDE 80 MG: 80 TABLET ORAL at 11:16

## 2025-07-29 RX ADMIN — SENNOSIDES AND DOCUSATE SODIUM 1 TABLET: 50; 8.6 TABLET ORAL at 17:31

## 2025-07-29 RX ADMIN — FINASTERIDE 5 MG: 5 TABLET, FILM COATED ORAL at 09:22

## 2025-07-29 RX ADMIN — RANOLAZINE 500 MG: 500 TABLET, FILM COATED, EXTENDED RELEASE ORAL at 09:22

## 2025-07-29 RX ADMIN — SACUBITRIL AND VALSARTAN 1 TABLET: 24; 26 TABLET, FILM COATED ORAL at 09:22

## 2025-07-29 RX ADMIN — PANTOPRAZOLE SODIUM 40 MG: 40 TABLET, DELAYED RELEASE ORAL at 09:22

## 2025-07-29 RX ADMIN — Medication 1 PACKET: at 17:31

## 2025-07-29 RX ADMIN — RANOLAZINE 500 MG: 500 TABLET, FILM COATED, EXTENDED RELEASE ORAL at 21:08

## 2025-07-29 RX ADMIN — ASPIRIN 81 MG CHEWABLE TABLET 162 MG: 81 TABLET CHEWABLE at 09:21

## 2025-07-29 RX ADMIN — Medication 1 PACKET: at 09:21

## 2025-07-29 RX ADMIN — SOTALOL HYDROCHLORIDE 80 MG: 80 TABLET ORAL at 21:09

## 2025-07-29 RX ADMIN — SENNOSIDES AND DOCUSATE SODIUM 1 TABLET: 50; 8.6 TABLET ORAL at 09:22

## 2025-07-29 RX ADMIN — CEFEPIME 2000 MG: 2 INJECTION, POWDER, FOR SOLUTION INTRAVENOUS at 21:08

## 2025-07-30 ENCOUNTER — ANESTHESIA EVENT (INPATIENT)
Dept: PERIOP | Facility: HOSPITAL | Age: 70
DRG: 574 | End: 2025-07-30
Payer: MEDICARE

## 2025-07-30 ENCOUNTER — ANESTHESIA (INPATIENT)
Dept: PERIOP | Facility: HOSPITAL | Age: 70
DRG: 574 | End: 2025-07-30
Payer: MEDICARE

## 2025-07-30 LAB
ANION GAP SERPL CALCULATED.3IONS-SCNC: 6 MMOL/L (ref 4–13)
BASOPHILS # BLD AUTO: 0.04 THOUSANDS/ÂΜL (ref 0–0.1)
BASOPHILS NFR BLD AUTO: 1 % (ref 0–1)
BUN SERPL-MCNC: 21 MG/DL (ref 5–25)
CALCIUM SERPL-MCNC: 9.4 MG/DL (ref 8.4–10.2)
CHLORIDE SERPL-SCNC: 104 MMOL/L (ref 96–108)
CO2 SERPL-SCNC: 26 MMOL/L (ref 21–32)
CREAT SERPL-MCNC: 0.87 MG/DL (ref 0.6–1.3)
EOSINOPHIL # BLD AUTO: 0.2 THOUSAND/ÂΜL (ref 0–0.61)
EOSINOPHIL NFR BLD AUTO: 2 % (ref 0–6)
ERYTHROCYTE [DISTWIDTH] IN BLOOD BY AUTOMATED COUNT: 13.1 % (ref 11.6–15.1)
GFR SERPL CREATININE-BSD FRML MDRD: 87 ML/MIN/1.73SQ M
GLUCOSE SERPL-MCNC: 98 MG/DL (ref 65–140)
HCT VFR BLD AUTO: 43.5 % (ref 36.5–49.3)
HGB BLD-MCNC: 14.6 G/DL (ref 12–17)
IMM GRANULOCYTES # BLD AUTO: 0.06 THOUSAND/UL (ref 0–0.2)
IMM GRANULOCYTES NFR BLD AUTO: 1 % (ref 0–2)
LYMPHOCYTES # BLD AUTO: 2.1 THOUSANDS/ÂΜL (ref 0.6–4.47)
LYMPHOCYTES NFR BLD AUTO: 25 % (ref 14–44)
MCH RBC QN AUTO: 31.3 PG (ref 26.8–34.3)
MCHC RBC AUTO-ENTMCNC: 33.6 G/DL (ref 31.4–37.4)
MCV RBC AUTO: 93 FL (ref 82–98)
MONOCYTES # BLD AUTO: 0.86 THOUSAND/ÂΜL (ref 0.17–1.22)
MONOCYTES NFR BLD AUTO: 10 % (ref 4–12)
NEUTROPHILS # BLD AUTO: 5.09 THOUSANDS/ÂΜL (ref 1.85–7.62)
NEUTS SEG NFR BLD AUTO: 61 % (ref 43–75)
NRBC BLD AUTO-RTO: 0 /100 WBCS
PLATELET # BLD AUTO: 183 THOUSANDS/UL (ref 149–390)
PMV BLD AUTO: 9.5 FL (ref 8.9–12.7)
POTASSIUM SERPL-SCNC: 4.1 MMOL/L (ref 3.5–5.3)
RBC # BLD AUTO: 4.66 MILLION/UL (ref 3.88–5.62)
SODIUM SERPL-SCNC: 136 MMOL/L (ref 135–147)
WBC # BLD AUTO: 8.35 THOUSAND/UL (ref 4.31–10.16)

## 2025-07-30 PROCEDURE — 80048 BASIC METABOLIC PNL TOTAL CA: CPT | Performed by: INTERNAL MEDICINE

## 2025-07-30 PROCEDURE — 85025 COMPLETE CBC W/AUTO DIFF WBC: CPT | Performed by: INTERNAL MEDICINE

## 2025-07-30 PROCEDURE — NC001 PR NO CHARGE: Performed by: PODIATRIST

## 2025-07-30 PROCEDURE — 99232 SBSQ HOSP IP/OBS MODERATE 35: CPT | Performed by: INTERNAL MEDICINE

## 2025-07-30 PROCEDURE — 15004 WOUND PREP F/N/HF/G: CPT | Performed by: PODIATRIST

## 2025-07-30 PROCEDURE — G0545 PR INHERENT VISIT TO INPT: HCPCS | Performed by: INTERNAL MEDICINE

## 2025-07-30 PROCEDURE — 15276 SKIN SUB GRAFT F/N/HF/G ADDL: CPT | Performed by: PODIATRIST

## 2025-07-30 PROCEDURE — 0HRMXK3 REPLACEMENT OF RIGHT FOOT SKIN WITH NONAUTOLOGOUS TISSUE SUBSTITUTE, FULL THICKNESS, EXTERNAL APPROACH: ICD-10-PCS | Performed by: PODIATRIST

## 2025-07-30 PROCEDURE — 0JDQ0ZZ EXTRACTION OF RIGHT FOOT SUBCUTANEOUS TISSUE AND FASCIA, OPEN APPROACH: ICD-10-PCS | Performed by: PODIATRIST

## 2025-07-30 PROCEDURE — 15275 SKIN SUB GRAFT FACE/NK/HF/G: CPT | Performed by: PODIATRIST

## 2025-07-30 DEVICE — IMPLANTABLE DEVICE: Type: IMPLANTABLE DEVICE | Site: FOOT | Status: FUNCTIONAL

## 2025-07-30 RX ORDER — BUPIVACAINE HYDROCHLORIDE 2.5 MG/ML
INJECTION, SOLUTION EPIDURAL; INFILTRATION; INTRACAUDAL; PERINEURAL AS NEEDED
Status: DISCONTINUED | OUTPATIENT
Start: 2025-07-30 | End: 2025-07-30 | Stop reason: HOSPADM

## 2025-07-30 RX ORDER — HYDROMORPHONE HCL IN WATER/PF 6 MG/30 ML
0.2 PATIENT CONTROLLED ANALGESIA SYRINGE INTRAVENOUS
Status: DISCONTINUED | OUTPATIENT
Start: 2025-07-30 | End: 2025-07-30 | Stop reason: HOSPADM

## 2025-07-30 RX ORDER — ONDANSETRON 2 MG/ML
4 INJECTION INTRAMUSCULAR; INTRAVENOUS ONCE AS NEEDED
Status: DISCONTINUED | OUTPATIENT
Start: 2025-07-30 | End: 2025-07-30 | Stop reason: HOSPADM

## 2025-07-30 RX ORDER — FENTANYL CITRATE/PF 50 MCG/ML
25 SYRINGE (ML) INJECTION
Status: DISCONTINUED | OUTPATIENT
Start: 2025-07-30 | End: 2025-07-30 | Stop reason: HOSPADM

## 2025-07-30 RX ORDER — FENTANYL CITRATE 50 UG/ML
INJECTION, SOLUTION INTRAMUSCULAR; INTRAVENOUS AS NEEDED
Status: DISCONTINUED | OUTPATIENT
Start: 2025-07-30 | End: 2025-07-30

## 2025-07-30 RX ORDER — LIDOCAINE HYDROCHLORIDE 10 MG/ML
INJECTION, SOLUTION EPIDURAL; INFILTRATION; INTRACAUDAL; PERINEURAL AS NEEDED
Status: DISCONTINUED | OUTPATIENT
Start: 2025-07-30 | End: 2025-07-30

## 2025-07-30 RX ORDER — MAGNESIUM HYDROXIDE 1200 MG/15ML
LIQUID ORAL AS NEEDED
Status: DISCONTINUED | OUTPATIENT
Start: 2025-07-30 | End: 2025-07-30 | Stop reason: HOSPADM

## 2025-07-30 RX ORDER — FENTANYL CITRATE/PF 50 MCG/ML
50 SYRINGE (ML) INJECTION
Status: DISCONTINUED | OUTPATIENT
Start: 2025-07-30 | End: 2025-07-30 | Stop reason: HOSPADM

## 2025-07-30 RX ORDER — PROPOFOL 10 MG/ML
INJECTION, EMULSION INTRAVENOUS AS NEEDED
Status: DISCONTINUED | OUTPATIENT
Start: 2025-07-30 | End: 2025-07-30

## 2025-07-30 RX ORDER — ONDANSETRON 2 MG/ML
INJECTION INTRAMUSCULAR; INTRAVENOUS AS NEEDED
Status: DISCONTINUED | OUTPATIENT
Start: 2025-07-30 | End: 2025-07-30

## 2025-07-30 RX ORDER — SODIUM CHLORIDE, SODIUM LACTATE, POTASSIUM CHLORIDE, CALCIUM CHLORIDE 600; 310; 30; 20 MG/100ML; MG/100ML; MG/100ML; MG/100ML
INJECTION, SOLUTION INTRAVENOUS CONTINUOUS PRN
Status: DISCONTINUED | OUTPATIENT
Start: 2025-07-30 | End: 2025-07-30

## 2025-07-30 RX ADMIN — RANOLAZINE 500 MG: 500 TABLET, FILM COATED, EXTENDED RELEASE ORAL at 17:38

## 2025-07-30 RX ADMIN — ONDANSETRON 4 MG: 2 INJECTION INTRAMUSCULAR; INTRAVENOUS at 16:08

## 2025-07-30 RX ADMIN — LIDOCAINE HYDROCHLORIDE 50 MG: 10 INJECTION, SOLUTION EPIDURAL; INFILTRATION; INTRACAUDAL; PERINEURAL at 15:41

## 2025-07-30 RX ADMIN — FENTANYL CITRATE 25 MCG: 50 INJECTION INTRAMUSCULAR; INTRAVENOUS at 16:12

## 2025-07-30 RX ADMIN — Medication 1 PACKET: at 17:37

## 2025-07-30 RX ADMIN — FENTANYL CITRATE 25 MCG: 50 INJECTION INTRAMUSCULAR; INTRAVENOUS at 15:56

## 2025-07-30 RX ADMIN — SACUBITRIL AND VALSARTAN 1 TABLET: 24; 26 TABLET, FILM COATED ORAL at 17:38

## 2025-07-30 RX ADMIN — FENTANYL CITRATE 50 MCG: 50 INJECTION INTRAMUSCULAR; INTRAVENOUS at 15:41

## 2025-07-30 RX ADMIN — CEFEPIME 2000 MG: 2 INJECTION, POWDER, FOR SOLUTION INTRAVENOUS at 09:43

## 2025-07-30 RX ADMIN — PROPOFOL 150 MG: 10 INJECTION, EMULSION INTRAVENOUS at 15:41

## 2025-07-30 RX ADMIN — HYDROMORPHONE HYDROCHLORIDE 0.5 MG: 1 INJECTION, SOLUTION INTRAMUSCULAR; INTRAVENOUS; SUBCUTANEOUS at 09:42

## 2025-07-30 RX ADMIN — PHENYLEPHRINE HYDROCHLORIDE 20 MCG/MIN: 50 INJECTION INTRAVENOUS at 15:55

## 2025-07-30 RX ADMIN — SOTALOL HYDROCHLORIDE 80 MG: 80 TABLET ORAL at 20:24

## 2025-07-30 RX ADMIN — BISACODYL 5 MG: 5 TABLET, COATED ORAL at 17:37

## 2025-07-30 RX ADMIN — SODIUM CHLORIDE, SODIUM LACTATE, POTASSIUM CHLORIDE, AND CALCIUM CHLORIDE: .6; .31; .03; .02 INJECTION, SOLUTION INTRAVENOUS at 15:37

## 2025-07-30 RX ADMIN — Medication 2.5 MG: at 20:24

## 2025-07-30 RX ADMIN — SENNOSIDES AND DOCUSATE SODIUM 1 TABLET: 50; 8.6 TABLET ORAL at 17:37

## 2025-07-30 RX ADMIN — HYDROMORPHONE HYDROCHLORIDE 0.5 MG: 1 INJECTION, SOLUTION INTRAMUSCULAR; INTRAVENOUS; SUBCUTANEOUS at 21:39

## 2025-07-31 LAB
ANION GAP SERPL CALCULATED.3IONS-SCNC: 7 MMOL/L (ref 4–13)
BASOPHILS # BLD AUTO: 0.03 THOUSANDS/ÂΜL (ref 0–0.1)
BASOPHILS NFR BLD AUTO: 0 % (ref 0–1)
BUN SERPL-MCNC: 21 MG/DL (ref 5–25)
CALCIUM SERPL-MCNC: 9.5 MG/DL (ref 8.4–10.2)
CHLORIDE SERPL-SCNC: 102 MMOL/L (ref 96–108)
CO2 SERPL-SCNC: 29 MMOL/L (ref 21–32)
CREAT SERPL-MCNC: 0.95 MG/DL (ref 0.6–1.3)
DME PARACHUTE DELIVERY DATE ACTUAL: NORMAL
DME PARACHUTE DELIVERY DATE EXPECTED: NORMAL
DME PARACHUTE DELIVERY DATE REQUESTED: NORMAL
DME PARACHUTE ITEM DESCRIPTION: NORMAL
DME PARACHUTE ORDER STATUS: NORMAL
DME PARACHUTE SUPPLIER NAME: NORMAL
DME PARACHUTE SUPPLIER PHONE: NORMAL
EOSINOPHIL # BLD AUTO: 0.19 THOUSAND/ÂΜL (ref 0–0.61)
EOSINOPHIL NFR BLD AUTO: 3 % (ref 0–6)
ERYTHROCYTE [DISTWIDTH] IN BLOOD BY AUTOMATED COUNT: 13 % (ref 11.6–15.1)
GFR SERPL CREATININE-BSD FRML MDRD: 80 ML/MIN/1.73SQ M
GLUCOSE SERPL-MCNC: 93 MG/DL (ref 65–140)
HCT VFR BLD AUTO: 44.6 % (ref 36.5–49.3)
HGB BLD-MCNC: 14.6 G/DL (ref 12–17)
IMM GRANULOCYTES # BLD AUTO: 0.05 THOUSAND/UL (ref 0–0.2)
IMM GRANULOCYTES NFR BLD AUTO: 1 % (ref 0–2)
LYMPHOCYTES # BLD AUTO: 2.27 THOUSANDS/ÂΜL (ref 0.6–4.47)
LYMPHOCYTES NFR BLD AUTO: 31 % (ref 14–44)
MCH RBC QN AUTO: 31.5 PG (ref 26.8–34.3)
MCHC RBC AUTO-ENTMCNC: 32.7 G/DL (ref 31.4–37.4)
MCV RBC AUTO: 96 FL (ref 82–98)
MONOCYTES # BLD AUTO: 0.92 THOUSAND/ÂΜL (ref 0.17–1.22)
MONOCYTES NFR BLD AUTO: 13 % (ref 4–12)
NEUTROPHILS # BLD AUTO: 3.88 THOUSANDS/ÂΜL (ref 1.85–7.62)
NEUTS SEG NFR BLD AUTO: 52 % (ref 43–75)
NRBC BLD AUTO-RTO: 0 /100 WBCS
PLATELET # BLD AUTO: 189 THOUSANDS/UL (ref 149–390)
PMV BLD AUTO: 9.8 FL (ref 8.9–12.7)
POTASSIUM SERPL-SCNC: 4.5 MMOL/L (ref 3.5–5.3)
RBC # BLD AUTO: 4.64 MILLION/UL (ref 3.88–5.62)
SODIUM SERPL-SCNC: 138 MMOL/L (ref 135–147)
VANCOMYCIN SERPL-MCNC: <3 UG/ML (ref 10–20)
WBC # BLD AUTO: 7.34 THOUSAND/UL (ref 4.31–10.16)

## 2025-07-31 PROCEDURE — NC001 PR NO CHARGE: Performed by: PODIATRIST

## 2025-07-31 PROCEDURE — 94760 N-INVAS EAR/PLS OXIMETRY 1: CPT

## 2025-07-31 PROCEDURE — 80048 BASIC METABOLIC PNL TOTAL CA: CPT

## 2025-07-31 PROCEDURE — 99232 SBSQ HOSP IP/OBS MODERATE 35: CPT | Performed by: INTERNAL MEDICINE

## 2025-07-31 PROCEDURE — 80202 ASSAY OF VANCOMYCIN: CPT

## 2025-07-31 PROCEDURE — 85025 COMPLETE CBC W/AUTO DIFF WBC: CPT

## 2025-07-31 PROCEDURE — G0545 PR INHERENT VISIT TO INPT: HCPCS | Performed by: INTERNAL MEDICINE

## 2025-07-31 RX ADMIN — SENNOSIDES AND DOCUSATE SODIUM 1 TABLET: 50; 8.6 TABLET ORAL at 09:08

## 2025-07-31 RX ADMIN — Medication 1 PACKET: at 18:02

## 2025-07-31 RX ADMIN — Medication 1 PACKET: at 09:08

## 2025-07-31 RX ADMIN — RANOLAZINE 500 MG: 500 TABLET, FILM COATED, EXTENDED RELEASE ORAL at 18:04

## 2025-07-31 RX ADMIN — RANOLAZINE 500 MG: 500 TABLET, FILM COATED, EXTENDED RELEASE ORAL at 09:10

## 2025-07-31 RX ADMIN — SACUBITRIL AND VALSARTAN 1 TABLET: 24; 26 TABLET, FILM COATED ORAL at 09:09

## 2025-07-31 RX ADMIN — ASPIRIN 81 MG CHEWABLE TABLET 162 MG: 81 TABLET CHEWABLE at 09:08

## 2025-07-31 RX ADMIN — ATORVASTATIN CALCIUM 40 MG: 40 TABLET, FILM COATED ORAL at 18:03

## 2025-07-31 RX ADMIN — SACUBITRIL AND VALSARTAN 1 TABLET: 24; 26 TABLET, FILM COATED ORAL at 18:03

## 2025-07-31 RX ADMIN — FINASTERIDE 5 MG: 5 TABLET, FILM COATED ORAL at 09:08

## 2025-07-31 RX ADMIN — PANTOPRAZOLE SODIUM 40 MG: 40 TABLET, DELAYED RELEASE ORAL at 09:08

## 2025-07-31 RX ADMIN — SOTALOL HYDROCHLORIDE 80 MG: 80 TABLET ORAL at 09:10

## 2025-07-31 RX ADMIN — ENOXAPARIN SODIUM 40 MG: 40 INJECTION SUBCUTANEOUS at 09:08

## 2025-07-31 RX ADMIN — SENNOSIDES AND DOCUSATE SODIUM 1 TABLET: 50; 8.6 TABLET ORAL at 18:03

## 2025-07-31 RX ADMIN — SOTALOL HYDROCHLORIDE 80 MG: 80 TABLET ORAL at 23:57

## 2025-08-01 VITALS
OXYGEN SATURATION: 98 % | BODY MASS INDEX: 29.89 KG/M2 | SYSTOLIC BLOOD PRESSURE: 120 MMHG | RESPIRATION RATE: 16 BRPM | TEMPERATURE: 98.5 F | WEIGHT: 220.68 LBS | DIASTOLIC BLOOD PRESSURE: 50 MMHG | HEART RATE: 63 BPM | HEIGHT: 72 IN

## 2025-08-01 LAB
ANION GAP SERPL CALCULATED.3IONS-SCNC: 6 MMOL/L (ref 4–13)
BASOPHILS # BLD AUTO: 0.03 THOUSANDS/ÂΜL (ref 0–0.1)
BASOPHILS NFR BLD AUTO: 1 % (ref 0–1)
BUN SERPL-MCNC: 21 MG/DL (ref 5–25)
CALCIUM SERPL-MCNC: 9.2 MG/DL (ref 8.4–10.2)
CHLORIDE SERPL-SCNC: 106 MMOL/L (ref 96–108)
CO2 SERPL-SCNC: 26 MMOL/L (ref 21–32)
CREAT SERPL-MCNC: 1.01 MG/DL (ref 0.6–1.3)
EOSINOPHIL # BLD AUTO: 0.16 THOUSAND/ÂΜL (ref 0–0.61)
EOSINOPHIL NFR BLD AUTO: 3 % (ref 0–6)
ERYTHROCYTE [DISTWIDTH] IN BLOOD BY AUTOMATED COUNT: 13 % (ref 11.6–15.1)
GFR SERPL CREATININE-BSD FRML MDRD: 75 ML/MIN/1.73SQ M
GLUCOSE SERPL-MCNC: 97 MG/DL (ref 65–140)
HCT VFR BLD AUTO: 41 % (ref 36.5–49.3)
HGB BLD-MCNC: 13.6 G/DL (ref 12–17)
IMM GRANULOCYTES # BLD AUTO: 0.03 THOUSAND/UL (ref 0–0.2)
IMM GRANULOCYTES NFR BLD AUTO: 1 % (ref 0–2)
LYMPHOCYTES # BLD AUTO: 2.26 THOUSANDS/ÂΜL (ref 0.6–4.47)
LYMPHOCYTES NFR BLD AUTO: 37 % (ref 14–44)
MCH RBC QN AUTO: 31.2 PG (ref 26.8–34.3)
MCHC RBC AUTO-ENTMCNC: 33.2 G/DL (ref 31.4–37.4)
MCV RBC AUTO: 94 FL (ref 82–98)
MONOCYTES # BLD AUTO: 0.87 THOUSAND/ÂΜL (ref 0.17–1.22)
MONOCYTES NFR BLD AUTO: 14 % (ref 4–12)
NEUTROPHILS # BLD AUTO: 2.76 THOUSANDS/ÂΜL (ref 1.85–7.62)
NEUTS SEG NFR BLD AUTO: 44 % (ref 43–75)
NRBC BLD AUTO-RTO: 0 /100 WBCS
PLATELET # BLD AUTO: 170 THOUSANDS/UL (ref 149–390)
PMV BLD AUTO: 9.5 FL (ref 8.9–12.7)
POTASSIUM SERPL-SCNC: 4.2 MMOL/L (ref 3.5–5.3)
RBC # BLD AUTO: 4.36 MILLION/UL (ref 3.88–5.62)
SODIUM SERPL-SCNC: 138 MMOL/L (ref 135–147)
WBC # BLD AUTO: 6.11 THOUSAND/UL (ref 4.31–10.16)

## 2025-08-01 PROCEDURE — 99232 SBSQ HOSP IP/OBS MODERATE 35: CPT | Performed by: INTERNAL MEDICINE

## 2025-08-01 PROCEDURE — 99239 HOSP IP/OBS DSCHRG MGMT >30: CPT | Performed by: INTERNAL MEDICINE

## 2025-08-01 PROCEDURE — G0545 PR INHERENT VISIT TO INPT: HCPCS | Performed by: INTERNAL MEDICINE

## 2025-08-01 PROCEDURE — 80048 BASIC METABOLIC PNL TOTAL CA: CPT

## 2025-08-01 PROCEDURE — 85025 COMPLETE CBC W/AUTO DIFF WBC: CPT

## 2025-08-01 RX ORDER — IBUPROFEN 600 MG/1
600 TABLET, FILM COATED ORAL EVERY 6 HOURS PRN
Qty: 30 TABLET | Refills: 0 | Status: SHIPPED | OUTPATIENT
Start: 2025-08-01

## 2025-08-01 RX ADMIN — ENOXAPARIN SODIUM 40 MG: 40 INJECTION SUBCUTANEOUS at 09:18

## 2025-08-01 RX ADMIN — PANTOPRAZOLE SODIUM 40 MG: 40 TABLET, DELAYED RELEASE ORAL at 09:17

## 2025-08-01 RX ADMIN — SENNOSIDES AND DOCUSATE SODIUM 1 TABLET: 50; 8.6 TABLET ORAL at 09:17

## 2025-08-01 RX ADMIN — FINASTERIDE 5 MG: 5 TABLET, FILM COATED ORAL at 09:17

## 2025-08-01 RX ADMIN — SACUBITRIL AND VALSARTAN 1 TABLET: 24; 26 TABLET, FILM COATED ORAL at 09:18

## 2025-08-01 RX ADMIN — SOTALOL HYDROCHLORIDE 80 MG: 80 TABLET ORAL at 09:17

## 2025-08-01 RX ADMIN — Medication 1 PACKET: at 09:17

## 2025-08-01 RX ADMIN — ASPIRIN 81 MG CHEWABLE TABLET 162 MG: 81 TABLET CHEWABLE at 09:17

## 2025-08-01 RX ADMIN — IBUPROFEN 600 MG: 600 TABLET, FILM COATED ORAL at 01:44

## 2025-08-01 RX ADMIN — RANOLAZINE 500 MG: 500 TABLET, FILM COATED, EXTENDED RELEASE ORAL at 09:18

## 2025-08-03 ENCOUNTER — TELEPHONE (OUTPATIENT)
Dept: OTHER | Facility: OTHER | Age: 70
End: 2025-08-03

## 2025-08-03 ENCOUNTER — HOME CARE VISIT (OUTPATIENT)
Dept: HOME HEALTH SERVICES | Facility: HOME HEALTHCARE | Age: 70
End: 2025-08-03

## 2025-08-04 ENCOUNTER — HOME CARE VISIT (OUTPATIENT)
Dept: HOME HEALTH SERVICES | Facility: HOME HEALTHCARE | Age: 70
End: 2025-08-04
Payer: MEDICARE

## 2025-08-04 ENCOUNTER — HOME CARE VISIT (OUTPATIENT)
Dept: HOME HEALTH SERVICES | Facility: HOME HEALTHCARE | Age: 70
End: 2025-08-04
Attending: INTERNAL MEDICINE
Payer: MEDICARE

## 2025-08-04 VITALS
HEART RATE: 68 BPM | OXYGEN SATURATION: 97 % | SYSTOLIC BLOOD PRESSURE: 132 MMHG | RESPIRATION RATE: 16 BRPM | DIASTOLIC BLOOD PRESSURE: 56 MMHG | TEMPERATURE: 98 F

## 2025-08-04 PROCEDURE — G0299 HHS/HOSPICE OF RN EA 15 MIN: HCPCS

## 2025-08-04 PROCEDURE — 400013 VN SOC

## 2025-08-05 ENCOUNTER — OFFICE VISIT (OUTPATIENT)
Dept: CARDIOLOGY CLINIC | Facility: CLINIC | Age: 70
End: 2025-08-05
Payer: MEDICARE

## 2025-08-05 VITALS
OXYGEN SATURATION: 98 % | WEIGHT: 220 LBS | BODY MASS INDEX: 29.8 KG/M2 | SYSTOLIC BLOOD PRESSURE: 120 MMHG | HEART RATE: 69 BPM | DIASTOLIC BLOOD PRESSURE: 70 MMHG | HEIGHT: 72 IN

## 2025-08-05 DIAGNOSIS — I47.20 VT (VENTRICULAR TACHYCARDIA) (HCC): ICD-10-CM

## 2025-08-05 DIAGNOSIS — I48.0 PAROXYSMAL ATRIAL FIBRILLATION (HCC): ICD-10-CM

## 2025-08-05 DIAGNOSIS — Z95.810 S/P ICD (INTERNAL CARDIAC DEFIBRILLATOR) PROCEDURE: ICD-10-CM

## 2025-08-05 DIAGNOSIS — E78.5 DYSLIPIDEMIA: ICD-10-CM

## 2025-08-05 DIAGNOSIS — G47.33 OSA AND COPD OVERLAP SYNDROME (HCC): ICD-10-CM

## 2025-08-05 DIAGNOSIS — Z99.89 CPAP (CONTINUOUS POSITIVE AIRWAY PRESSURE) DEPENDENCE: ICD-10-CM

## 2025-08-05 DIAGNOSIS — I25.10 CORONARY ARTERY DISEASE INVOLVING NATIVE CORONARY ARTERY OF NATIVE HEART WITHOUT ANGINA PECTORIS: ICD-10-CM

## 2025-08-05 DIAGNOSIS — J44.9 OSA AND COPD OVERLAP SYNDROME (HCC): ICD-10-CM

## 2025-08-05 PROCEDURE — 93000 ELECTROCARDIOGRAM COMPLETE: CPT | Performed by: INTERNAL MEDICINE

## 2025-08-05 PROCEDURE — 99214 OFFICE O/P EST MOD 30 MIN: CPT | Performed by: INTERNAL MEDICINE

## 2025-08-06 ENCOUNTER — EPISODE CHANGES (OUTPATIENT)
Dept: GERIATRICS | Facility: OTHER | Age: 70
End: 2025-08-06

## 2025-08-06 ENCOUNTER — TELEMEDICINE (OUTPATIENT)
Dept: GERIATRICS | Facility: OTHER | Age: 70
End: 2025-08-06
Payer: MEDICARE

## 2025-08-06 ENCOUNTER — HOME CARE VISIT (OUTPATIENT)
Dept: HOME HEALTH SERVICES | Facility: HOME HEALTHCARE | Age: 70
End: 2025-08-06
Payer: MEDICARE

## 2025-08-06 VITALS
OXYGEN SATURATION: 99 % | DIASTOLIC BLOOD PRESSURE: 70 MMHG | RESPIRATION RATE: 18 BRPM | HEART RATE: 64 BPM | TEMPERATURE: 97.7 F | SYSTOLIC BLOOD PRESSURE: 128 MMHG

## 2025-08-06 DIAGNOSIS — I25.10 CORONARY ARTERY DISEASE INVOLVING NATIVE CORONARY ARTERY OF NATIVE HEART WITHOUT ANGINA PECTORIS: ICD-10-CM

## 2025-08-06 DIAGNOSIS — Z95.810 S/P ICD (INTERNAL CARDIAC DEFIBRILLATOR) PROCEDURE: ICD-10-CM

## 2025-08-06 DIAGNOSIS — R26.2 AMBULATORY DYSFUNCTION: ICD-10-CM

## 2025-08-06 DIAGNOSIS — L03.115 CELLULITIS OF RIGHT FOOT: ICD-10-CM

## 2025-08-06 DIAGNOSIS — I48.0 PAROXYSMAL ATRIAL FIBRILLATION (HCC): ICD-10-CM

## 2025-08-06 DIAGNOSIS — K59.01 SLOW TRANSIT CONSTIPATION: ICD-10-CM

## 2025-08-06 DIAGNOSIS — I50.22 CHRONIC HEART FAILURE WITH REDUCED EJECTION FRACTION (HFREF, <= 40%) (HCC): ICD-10-CM

## 2025-08-06 DIAGNOSIS — I49.5 SICK SINUS SYNDROME (HCC): ICD-10-CM

## 2025-08-06 DIAGNOSIS — S91.301D OPEN WOUND OF RIGHT FOOT, SUBSEQUENT ENCOUNTER: Primary | ICD-10-CM

## 2025-08-06 PROCEDURE — G0299 HHS/HOSPICE OF RN EA 15 MIN: HCPCS

## 2025-08-06 PROCEDURE — 99205 OFFICE O/P NEW HI 60 MIN: CPT | Performed by: NURSE PRACTITIONER

## 2025-08-07 ENCOUNTER — HOME CARE VISIT (OUTPATIENT)
Dept: HOME HEALTH SERVICES | Facility: HOME HEALTHCARE | Age: 70
End: 2025-08-07
Payer: MEDICARE

## 2025-08-07 VITALS
RESPIRATION RATE: 20 BRPM | HEART RATE: 76 BPM | OXYGEN SATURATION: 98 % | SYSTOLIC BLOOD PRESSURE: 120 MMHG | DIASTOLIC BLOOD PRESSURE: 70 MMHG

## 2025-08-07 PROCEDURE — G0151 HHCP-SERV OF PT,EA 15 MIN: HCPCS

## 2025-08-08 ENCOUNTER — OFFICE VISIT (OUTPATIENT)
Dept: WOUND CARE | Facility: HOSPITAL | Age: 70
End: 2025-08-08
Payer: COMMERCIAL

## 2025-08-08 VITALS
TEMPERATURE: 97.4 F | SYSTOLIC BLOOD PRESSURE: 158 MMHG | RESPIRATION RATE: 16 BRPM | HEART RATE: 62 BPM | DIASTOLIC BLOOD PRESSURE: 67 MMHG

## 2025-08-08 DIAGNOSIS — S91.009A OPEN WOUND OF ANKLE AND FOOT: Primary | ICD-10-CM

## 2025-08-08 DIAGNOSIS — S81.801A TRAUMATIC OPEN WOUND OF RIGHT LOWER LEG, INITIAL ENCOUNTER: ICD-10-CM

## 2025-08-08 DIAGNOSIS — S91.309A OPEN WOUND OF ANKLE AND FOOT: Primary | ICD-10-CM

## 2025-08-08 PROCEDURE — 99214 OFFICE O/P EST MOD 30 MIN: CPT | Performed by: PODIATRIST

## 2025-08-08 PROCEDURE — 99213 OFFICE O/P EST LOW 20 MIN: CPT | Performed by: PODIATRIST

## 2025-08-08 RX ORDER — CHLORHEXIDINE GLUCONATE ORAL RINSE 1.2 MG/ML
15 SOLUTION DENTAL ONCE
OUTPATIENT
Start: 2025-08-08 | End: 2025-08-08

## 2025-08-08 RX ORDER — CLINDAMYCIN PHOSPHATE 900 MG/50ML
900 INJECTION, SOLUTION INTRAVENOUS ONCE
OUTPATIENT
Start: 2025-08-08 | End: 2025-08-08

## 2025-08-08 RX ORDER — CHLORHEXIDINE GLUCONATE 40 MG/ML
SOLUTION TOPICAL DAILY PRN
OUTPATIENT
Start: 2025-08-08

## 2025-08-10 PROCEDURE — G0180 MD CERTIFICATION HHA PATIENT: HCPCS | Performed by: INTERNAL MEDICINE

## 2025-08-12 ENCOUNTER — HOME CARE VISIT (OUTPATIENT)
Dept: HOME HEALTH SERVICES | Facility: HOME HEALTHCARE | Age: 70
End: 2025-08-12
Payer: MEDICARE

## 2025-08-13 ENCOUNTER — TELEMEDICINE (OUTPATIENT)
Dept: GERIATRICS | Facility: OTHER | Age: 70
End: 2025-08-13
Payer: MEDICARE

## 2025-08-13 ENCOUNTER — HOME CARE VISIT (OUTPATIENT)
Dept: HOME HEALTH SERVICES | Facility: HOME HEALTHCARE | Age: 70
End: 2025-08-13
Payer: MEDICARE

## 2025-08-13 ENCOUNTER — TELEPHONE (OUTPATIENT)
Dept: PODIATRY | Facility: CLINIC | Age: 70
End: 2025-08-13

## 2025-08-13 PROBLEM — I25.10 CORONARY ARTERY DISEASE INVOLVING NATIVE CORONARY ARTERY OF NATIVE HEART WITHOUT ANGINA PECTORIS: Status: ACTIVE | Noted: 2017-07-10

## 2025-08-14 ENCOUNTER — CONSULT (OUTPATIENT)
Dept: FAMILY MEDICINE CLINIC | Facility: CLINIC | Age: 70
End: 2025-08-14
Payer: COMMERCIAL

## 2025-08-14 PROBLEM — K59.00 CONSTIPATION: Status: RESOLVED | Noted: 2025-07-25 | Resolved: 2025-08-14

## 2025-08-14 PROBLEM — R00.2 INTERMITTENT PALPITATIONS: Status: RESOLVED | Noted: 2021-04-27 | Resolved: 2025-08-14

## 2025-08-14 PROBLEM — Z29.89 NEED FOR MALARIA PROPHYLAXIS: Status: RESOLVED | Noted: 2019-01-23 | Resolved: 2025-08-14

## 2025-08-18 LAB
DME PARACHUTE DELIVERY DATE ACTUAL: NORMAL
DME PARACHUTE DELIVERY DATE REQUESTED: NORMAL
DME PARACHUTE DELIVERY NOTE: NORMAL
DME PARACHUTE ITEM DESCRIPTION: NORMAL
DME PARACHUTE ORDER STATUS: NORMAL
DME PARACHUTE SUPPLIER NAME: NORMAL
DME PARACHUTE SUPPLIER PHONE: NORMAL

## 2025-08-19 ENCOUNTER — HOME CARE VISIT (OUTPATIENT)
Dept: HOME HEALTH SERVICES | Facility: HOME HEALTHCARE | Age: 70
End: 2025-08-19
Payer: MEDICARE

## 2025-08-19 VITALS
RESPIRATION RATE: 18 BRPM | HEART RATE: 65 BPM | OXYGEN SATURATION: 98 % | TEMPERATURE: 97.6 F | DIASTOLIC BLOOD PRESSURE: 66 MMHG | SYSTOLIC BLOOD PRESSURE: 122 MMHG

## 2025-08-19 PROCEDURE — G0299 HHS/HOSPICE OF RN EA 15 MIN: HCPCS

## 2025-08-20 ENCOUNTER — IN-CLINIC DEVICE VISIT (OUTPATIENT)
Dept: CARDIOLOGY CLINIC | Facility: CLINIC | Age: 70
End: 2025-08-20
Payer: MEDICARE

## 2025-08-20 DIAGNOSIS — Z95.810 PRESENCE OF AUTOMATIC CARDIOVERTER/DEFIBRILLATOR (AICD): Primary | ICD-10-CM

## 2025-08-20 DIAGNOSIS — I50.42 CHRONIC COMBINED SYSTOLIC AND DIASTOLIC HEART FAILURE, NYHA CLASS 2 (HCC): ICD-10-CM

## 2025-08-20 PROCEDURE — 93284 PRGRMG EVAL IMPLANTABLE DFB: CPT | Performed by: INTERNAL MEDICINE

## (undated) DEVICE — GUIDE SHEATH 7FR 90 D ATTAIN SELECT II SUREVALVE

## (undated) DEVICE — STRETCH BANDAGE: Brand: CURITY

## (undated) DEVICE — KNIFE LIGHT 10,PK: Brand: KNIFELIGHT

## (undated) DEVICE — PLUMEPEN PRO 10FT

## (undated) DEVICE — PACK PBDS MAJOR ORTHO EXTREMITY RF

## (undated) DEVICE — INTENDED FOR TISSUE SEPARATION, AND OTHER PROCEDURES THAT REQUIRE A SHARP SURGICAL BLADE TO PUNCTURE OR CUT.: Brand: BARD-PARKER ® CARBON RIB-BACK BLADES

## (undated) DEVICE — DRAPE SHEET THREE QUARTER

## (undated) DEVICE — GLOVE SRG BIOGEL 7

## (undated) DEVICE — SNAP KOVER: Brand: UNBRANDED

## (undated) DEVICE — ELECTRODE NEEDLE MOD E-Z CLEAN 2.75IN 7CM -0013M

## (undated) DEVICE — SUT VICRYL 3-0 SH 27 IN J416H

## (undated) DEVICE — DISPOSABLE EQUIPMENT COVER: Brand: SMALL TOWEL DRAPE

## (undated) DEVICE — NEEDLE 25G X 1 1/2

## (undated) DEVICE — Device

## (undated) DEVICE — PREMIUM DRY TRAY LF: Brand: MEDLINE INDUSTRIES, INC.

## (undated) DEVICE — STERILE BETHLEHEM PLASTIC HAND: Brand: CARDINAL HEALTH

## (undated) DEVICE — SPONGE LAP 18 X 18 IN

## (undated) DEVICE — SUT VICRYL PLUS 2-0 CTB-1 27 IN VCPB259H

## (undated) DEVICE — MINI BLADE ROUND TIP SHARP ON ONE SIDE

## (undated) DEVICE — MEDI-VAC YANK SUCT HNDL W/TPRD BULBOUS TIP: Brand: CARDINAL HEALTH

## (undated) DEVICE — SUT ETHIBOND 2-0 SH/SH 36 IN X523H

## (undated) DEVICE — CUFF TOURNIQUET 18 X 4 IN QUICK CONNECT DISP 1 BLADDER

## (undated) DEVICE — GUIDE SHEATH 9FR ATTAIN COMMAND 9FR EH CURVE

## (undated) DEVICE — SUT PROLENE 4-0 PS-2 18 IN 8682G

## (undated) DEVICE — GAUZE SPONGES,16 PLY: Brand: CURITY

## (undated) DEVICE — IV SET EXT 30 IN

## (undated) DEVICE — DISPOSABLE EQUIPMENT COVER: Brand: LARGE TOWEL DRAPE

## (undated) DEVICE — STERILE SURGICAL LUBRICANT,  TUBE: Brand: SURGILUBE

## (undated) DEVICE — INTRO SHEATH PEEL AWAY 9 FR

## (undated) DEVICE — POV-IOD SOLUTION 4OZ BT

## (undated) DEVICE — PADDING CAST 2IN COTTON STRL

## (undated) DEVICE — SUT SILK 0 CT-1 30 IN 424H

## (undated) DEVICE — DRAPE C-ARM BAG/DOME XR ELSTIC OPEN LF

## (undated) DEVICE — SPONGE PVP SCRUB WING STERILE

## (undated) DEVICE — ACE WRAP 3 IN VELCRO LATEX FREE

## (undated) DEVICE — PERCUTANEOUS ENTRY THINWALL NEEDLE  ONE-PART: Brand: COOK

## (undated) DEVICE — SLITTER ADJUSTABLE

## (undated) DEVICE — DRAPE SURGIKIT SADDLE BAG

## (undated) DEVICE — DEFIB ADULT ELECTRODE CARDINAL

## (undated) DEVICE — 3M™ TEGADERM™ TRANSPARENT FILM DRESSING FRAME STYLE, 1626W, 4 IN X 4-3/4 IN (10 CM X 12 CM), 50/CT 4CT/CASE: Brand: 3M™ TEGADERM™

## (undated) DEVICE — SUT MONOCRYL 4-0 PS-2 18 IN Y496G

## (undated) DEVICE — Device: Brand: WEBSTER

## (undated) DEVICE — GLOVE SRG BIOGEL 8

## (undated) DEVICE — GLOVE INDICATOR PI UNDERGLOVE SZ 7 BLUE

## (undated) DEVICE — CHLORAPREP HI-LITE 26ML ORANGE

## (undated) DEVICE — INTRO SHEATH PEEL AWAY 7FR

## (undated) DEVICE — GLOVE SRG BIOGEL ORTHOPEDIC 7.5

## (undated) DEVICE — RUNTHROUGH NS EXTRA FLOPPY PTCA GUIDEWIRE: Brand: RUNTHROUGH

## (undated) DEVICE — STRL BETHLACCESS CATHETER PACK: Brand: CARDINAL HEALTH

## (undated) DEVICE — TUBING SUCTION 5MM X 12 FT

## (undated) DEVICE — CATH EP 5FR QUAD SUPREME JSN

## (undated) DEVICE — 3M™ IOBAN™ 2 ANTIMICROBIAL INCISE DRAPE 6651EZ: Brand: IOBAN™ 2

## (undated) DEVICE — 3M™ STERI-STRIP™ REINFORCED ADHESIVE SKIN CLOSURES, R1547, 1/2 IN X 4 IN (12 MM X 100 MM), 6 STRIPS/ENVELOPE: Brand: 3M™ STERI-STRIP™

## (undated) DEVICE — DISPOSABLE BRIEF/UNDERWEAR

## (undated) DEVICE — GLOVE INDICATOR PI UNDERGLOVE SZ 8.5 BLUE

## (undated) DEVICE — ANTIBACTERIAL UNDYED BRAIDED (POLYGLACTIN 910), SYNTHETIC ABSORBABLE SUTURE: Brand: COATED VICRYL

## (undated) DEVICE — REM POLYHESIVE ADULT PATIENT RETURN ELECTRODE: Brand: VALLEYLAB

## (undated) DEVICE — DRESSING AQUACEL AG 3.5 X 6 IN

## (undated) DEVICE — OCCLUSIVE GAUZE STRIP,3% BISMUTH TRIBROMOPHENATE IN PETROLATUM BLEND: Brand: XEROFORM

## (undated) DEVICE — PLASMABLADE PS200-040 4.0: Brand: PLASMABLADE™

## (undated) DEVICE — SYRINGE 10ML SLIP TIP LF

## (undated) DEVICE — SUT SILK 2 60 IN SA8H

## (undated) DEVICE — CUFF TOURNIQUET 18 X 5.5 IN QUICK CONNECT 2BLA

## (undated) DEVICE — PROBE COVER: Brand: STERILE PROBE COVER

## (undated) DEVICE — SCD SEQUENTIAL COMPRESSION COMFORT SLEEVE LARGE KNEE LENGTH: Brand: KENDALL SCD

## (undated) DEVICE — 3M™ IOBAN™ 2 ANTIMICROBIAL INCISE DRAPE 6650EZ: Brand: IOBAN™ 2

## (undated) DEVICE — BETHLEHEM UNIVERSAL MINOR GEN: Brand: CARDINAL HEALTH

## (undated) DEVICE — BASIC SINGLE BASIN 2-LF: Brand: MEDLINE INDUSTRIES, INC.

## (undated) DEVICE — RADIFOCUS GLIDEWIRE: Brand: GLIDEWIRE

## (undated) DEVICE — ACE WRAP 4 IN UNSTERILE

## (undated) DEVICE — BULB SYRINGE, IRRIGATION WITH PROTECTIVE CAP, 60 CC, INDIVIDUALLY WRAPPED: Brand: DOVER

## (undated) DEVICE — CARDIOVASCULAR SPLIT DRAPE: Brand: CONVERTORS